# Patient Record
Sex: MALE | Race: WHITE | NOT HISPANIC OR LATINO | Employment: OTHER | RURAL
[De-identification: names, ages, dates, MRNs, and addresses within clinical notes are randomized per-mention and may not be internally consistent; named-entity substitution may affect disease eponyms.]

---

## 2017-03-23 ENCOUNTER — CONVERSION ENCOUNTER (OUTPATIENT)
Dept: FAMILY MEDICINE CLINIC | Facility: CLINIC | Age: 82
End: 2017-03-23

## 2017-03-23 LAB
ALBUMIN SERPL-MCNC: 3.7 G/DL (ref 3.6–5.1)
ALP SERPL-CCNC: 54 U/L (ref 40–115)
AST SERPL-CCNC: 31 U/L (ref 10–35)
BILIRUB SERPL-MCNC: 0.5 MG/DL (ref 0.2–1.2)
BUN SERPL-MCNC: 24 MG/DL (ref 7–25)
BUN/CREAT SERPL: 15 (CALC) (ref 6–22)
CALCIUM SERPL-MCNC: 10.4 MG/DL (ref 8.6–10.2)
CHLORIDE SERPL-SCNC: 101 MMOL/L (ref 98–110)
CHOLEST SERPL-MCNC: 132 MG/DL (ref 125–200)
CONV CO2: 27 MMOL/L (ref 21–33)
CONV TOTAL PROTEIN: 6.4 G/DL (ref 6.2–8.3)
CREAT UR-MCNC: 1.6 MG/DL (ref 0.67–1.54)
GLOBULIN UR ELPH-MCNC: 2.7 MG/DL (ref 2.1–3.7)
GLUCOSE UR QL: 147 MG/DL (ref 65–99)
HDLC SERPL-MCNC: 34 MG/DL
INSULIN SERPL-ACNC: 1.4 (CALC) (ref 1–2.1)
LDLC SERPL CALC-MCNC: 53 MG/DL
POTASSIUM SERPL-SCNC: 5.4 MMOL/L (ref 3.5–5.3)
SODIUM SERPL-SCNC: 143 MMOL/L (ref 135–146)
TRIGL SERPL-MCNC: 224 MG/DL

## 2017-04-04 ENCOUNTER — CONVERSION ENCOUNTER (OUTPATIENT)
Dept: FAMILY MEDICINE CLINIC | Facility: CLINIC | Age: 82
End: 2017-04-04

## 2017-04-05 LAB
ALBUMIN SERPL-MCNC: 3.9 G/DL (ref 3.6–5.1)
ALP SERPL-CCNC: 54 U/L (ref 40–115)
AST SERPL-CCNC: 24 U/L (ref 10–35)
BASOPHILS # BLD AUTO: 200 CELLS/UL (ref 0–200)
BILIRUB SERPL-MCNC: 0.6 MG/DL (ref 0.2–1.2)
BUN SERPL-MCNC: 25 MG/DL (ref 7–25)
BUN/CREAT SERPL: 15.6 (CALC) (ref 6–22)
CALCIUM SERPL-MCNC: 9.6 MG/DL (ref 8.6–10.2)
CHLORIDE SERPL-SCNC: 104 MMOL/L (ref 98–110)
CONV CO2: 25 MMOL/L (ref 21–33)
CONV TOTAL PROTEIN: 6.2 G/DL (ref 6.2–8.3)
CREAT UR-MCNC: 1.6 MG/DL (ref 0.67–1.54)
EOSINOPHIL # BLD AUTO: 400 CELLS/UL (ref 15–500)
EOSINOPHIL # BLD AUTO: 5 %
ERYTHROCYTE [DISTWIDTH] IN BLOOD BY AUTOMATED COUNT: 14.7 % (ref 11–15)
GLOBULIN UR ELPH-MCNC: 2.3 MG/DL (ref 2.1–3.7)
GLUCOSE UR QL: 128 MG/DL (ref 65–99)
HCT VFR BLD AUTO: 40.6 % (ref 38.5–50)
HGB BLD-MCNC: 13.4 G/DL (ref 13.2–17.1)
INSULIN SERPL-ACNC: 1.7 (CALC) (ref 1–2.1)
LYMPHOCYTES # BLD AUTO: 1500 CELLS/UL (ref 850–3900)
LYMPHOCYTES NFR BLD AUTO: 18 %
MAGNESIUM SERPL-MCNC: 1.7 MG/DL (ref 1.5–2.5)
MCH RBC QN AUTO: 28.9 PG (ref 27–33)
MCHC RBC AUTO-ENTMCNC: 33 G/DL (ref 32–36)
MCV RBC AUTO: 87.5 FL (ref 80–100)
MONOCYTES # BLD AUTO: 600 CELLS/UL (ref 200–950)
MONOCYTES NFR BLD AUTO: 7 %
NEUTROPHILS # BLD AUTO: 5900 CELLS/UL (ref 1500–7800)
NEUTROPHILS NFR BLD AUTO: 69 %
PLATELET # BLD AUTO: 219 10*3/UL (ref 140–400)
PMV BLD AUTO: 10.4 FL (ref 7.5–11.5)
POTASSIUM SERPL-SCNC: 4.8 MMOL/L (ref 3.5–5.3)
RBC # BLD AUTO: 4.64 MILLION/UL (ref 4.2–5.8)
SODIUM SERPL-SCNC: 140 MMOL/L (ref 135–146)
WBC # BLD AUTO: 8.6 10*3/UL (ref 3.8–10.8)

## 2017-08-11 ENCOUNTER — HOSPITAL ENCOUNTER (OUTPATIENT)
Dept: OTHER | Facility: HOSPITAL | Age: 82
Discharge: HOME | End: 2017-08-11
Attending: INTERNAL MEDICINE | Admitting: INTERNAL MEDICINE

## 2018-07-12 ENCOUNTER — ON CAMPUS - OUTPATIENT (OUTPATIENT)
Dept: URBAN - METROPOLITAN AREA HOSPITAL 85 | Facility: HOSPITAL | Age: 83
End: 2018-07-12
Payer: COMMERCIAL

## 2018-07-12 ENCOUNTER — OFFICE (OUTPATIENT)
Dept: URBAN - METROPOLITAN AREA CLINIC 64 | Facility: CLINIC | Age: 83
End: 2018-07-12
Payer: COMMERCIAL

## 2018-07-12 VITALS
SYSTOLIC BLOOD PRESSURE: 115 MMHG | HEIGHT: 70 IN | DIASTOLIC BLOOD PRESSURE: 43 MMHG | HEART RATE: 61 BPM | WEIGHT: 175 LBS

## 2018-07-12 DIAGNOSIS — D12.8 BENIGN NEOPLASM OF RECTUM: ICD-10-CM

## 2018-07-12 DIAGNOSIS — K44.9 DIAPHRAGMATIC HERNIA WITHOUT OBSTRUCTION OR GANGRENE: ICD-10-CM

## 2018-07-12 DIAGNOSIS — R19.7 DIARRHEA, UNSPECIFIED: ICD-10-CM

## 2018-07-12 DIAGNOSIS — R11.0 NAUSEA: ICD-10-CM

## 2018-07-12 DIAGNOSIS — R63.0 ANOREXIA: ICD-10-CM

## 2018-07-12 DIAGNOSIS — R63.4 ABNORMAL WEIGHT LOSS: ICD-10-CM

## 2018-07-12 DIAGNOSIS — K57.30 DIVERTICULOSIS OF LARGE INTESTINE WITHOUT PERFORATION OR ABS: ICD-10-CM

## 2018-07-12 DIAGNOSIS — K52.9 NONINFECTIVE GASTROENTERITIS AND COLITIS, UNSPECIFIED: ICD-10-CM

## 2018-07-12 DIAGNOSIS — Z80.0 FAMILY HISTORY OF MALIGNANT NEOPLASM OF DIGESTIVE ORGANS: ICD-10-CM

## 2018-07-12 DIAGNOSIS — K29.70 GASTRITIS, UNSPECIFIED, WITHOUT BLEEDING: ICD-10-CM

## 2018-07-12 PROCEDURE — 99204 OFFICE O/P NEW MOD 45 MIN: CPT | Performed by: INTERNAL MEDICINE

## 2018-07-12 PROCEDURE — 45385 COLONOSCOPY W/LESION REMOVAL: CPT | Performed by: INTERNAL MEDICINE

## 2018-07-12 PROCEDURE — 43239 EGD BIOPSY SINGLE/MULTIPLE: CPT | Performed by: INTERNAL MEDICINE

## 2018-08-14 ENCOUNTER — HOSPITAL ENCOUNTER (OUTPATIENT)
Dept: PREOP | Facility: HOSPITAL | Age: 83
Setting detail: HOSPITAL OUTPATIENT SURGERY
Discharge: HOME | End: 2018-08-14
Attending: INTERNAL MEDICINE | Admitting: INTERNAL MEDICINE

## 2018-08-14 LAB — GLUCOSE BLD-MCNC: 126 MG/DL (ref 70–105)

## 2018-08-24 ENCOUNTER — OFFICE (OUTPATIENT)
Dept: URBAN - METROPOLITAN AREA CLINIC 64 | Facility: CLINIC | Age: 83
End: 2018-08-24
Payer: COMMERCIAL

## 2018-08-24 VITALS
DIASTOLIC BLOOD PRESSURE: 58 MMHG | HEART RATE: 53 BPM | WEIGHT: 177 LBS | HEIGHT: 70 IN | SYSTOLIC BLOOD PRESSURE: 125 MMHG

## 2018-08-24 DIAGNOSIS — R63.4 ABNORMAL WEIGHT LOSS: ICD-10-CM

## 2018-08-24 DIAGNOSIS — R19.7 DIARRHEA, UNSPECIFIED: ICD-10-CM

## 2018-08-24 PROCEDURE — 99214 OFFICE O/P EST MOD 30 MIN: CPT | Performed by: INTERNAL MEDICINE

## 2018-08-24 RX ORDER — MESALAMINE 1.2 G/1
4.8 TABLET, DELAYED RELEASE ORAL
Qty: 120 | Refills: 11 | Status: ACTIVE
Start: 2018-08-24

## 2018-08-30 ENCOUNTER — HOSPITAL ENCOUNTER (OUTPATIENT)
Dept: LAB | Facility: HOSPITAL | Age: 83
Discharge: HOME | End: 2018-08-30
Attending: INTERNAL MEDICINE | Admitting: INTERNAL MEDICINE

## 2018-09-07 LAB
IBD PROGNOSTIC PNL SERPL IA: NORMAL
IBD SGI DIAGNOSTIC: NORMAL

## 2018-09-27 ENCOUNTER — OFFICE (OUTPATIENT)
Dept: URBAN - METROPOLITAN AREA CLINIC 64 | Facility: CLINIC | Age: 83
End: 2018-09-27
Payer: COMMERCIAL

## 2018-09-27 VITALS
SYSTOLIC BLOOD PRESSURE: 128 MMHG | HEART RATE: 71 BPM | HEIGHT: 70 IN | DIASTOLIC BLOOD PRESSURE: 57 MMHG | WEIGHT: 181 LBS

## 2018-09-27 DIAGNOSIS — R19.7 DIARRHEA, UNSPECIFIED: ICD-10-CM

## 2018-09-27 PROCEDURE — 99213 OFFICE O/P EST LOW 20 MIN: CPT | Performed by: INTERNAL MEDICINE

## 2018-09-27 NOTE — SERVICEHPINOTES
LLUVIA AMBRIZ is a 83 year old male c hx of cad, dm and lap jacek is being seen in the office today for diarrhea. He takes metformin and mag oxide and has been on both for years. He has been losing wt 20 pounds in past year. He has early satiety and does not want to eat. He has urgency with bm and about 5 daily for past year with worsening symptoms and occ accidents. EGD and colonoscopy suggested ulceration in TI suggesting possible crohn's but bx were nonspecific. He continued to have 4-5 bm. He was started on lialda which was $800. He took it for 1 month and his bowels are much better. He is having 2-3 formed stool. His IBD serology was negative. He has gained several pounds. He developed rash while on lialda.BRSept 2018 IBD serology NOT c/w IBDBRAug 2018 egd/colon gastritis, ulceration in TI bx showed ulcerated mucosa, TA polyp [id number] colonoscopy [place] hp polyp.

## 2019-02-07 ENCOUNTER — CONVERSION ENCOUNTER (OUTPATIENT)
Dept: FAMILY MEDICINE CLINIC | Facility: CLINIC | Age: 84
End: 2019-02-07

## 2019-02-08 LAB
ALBUMIN SERPL-MCNC: 3.6 G/DL (ref 3.6–5.1)
ALP SERPL-CCNC: 70 U/L (ref 40–115)
AST SERPL-CCNC: 25 U/L (ref 10–35)
BASOPHILS # BLD AUTO: 200 CELLS/UL (ref 0–200)
BILIRUB SERPL-MCNC: 0.9 MG/DL (ref 0.2–1.2)
BUN SERPL-MCNC: 28 MG/DL (ref 7–25)
BUN/CREAT SERPL: 17.5 (CALC) (ref 6–22)
CALCIUM SERPL-MCNC: 9.1 MG/DL (ref 8.6–10.2)
CHLORIDE SERPL-SCNC: 100 MMOL/L (ref 98–110)
CHOLEST SERPL-MCNC: 126 MG/DL (ref 125–200)
CONV CO2: 26 MMOL/L (ref 21–33)
CONV TOTAL PROTEIN: 5.9 G/DL (ref 6.2–8.3)
CREAT UR-MCNC: 1.6 MG/DL (ref 0.67–1.54)
EOSINOPHIL # BLD AUTO: 600 CELLS/UL (ref 15–500)
EOSINOPHIL # BLD AUTO: 7 %
ERYTHROCYTE [DISTWIDTH] IN BLOOD BY AUTOMATED COUNT: 14.7 % (ref 11–15)
GLOBULIN UR ELPH-MCNC: 2.3 MG/DL (ref 2.1–3.7)
GLUCOSE UR QL: 139 MG/DL (ref 65–99)
HBA1C MFR BLD: 6.1 %
HCT VFR BLD AUTO: 36.9 % (ref 38.5–50)
HDLC SERPL-MCNC: 37 MG/DL
HGB BLD-MCNC: 11.8 G/DL (ref 13.2–17.1)
INSULIN SERPL-ACNC: 1.6 (CALC) (ref 1–2.1)
LDLC SERPL CALC-MCNC: 64 MG/DL
LYMPHOCYTES # BLD AUTO: 1500 CELLS/UL (ref 850–3900)
LYMPHOCYTES NFR BLD AUTO: 16 %
MCH RBC QN AUTO: 29.8 PG (ref 27–33)
MCHC RBC AUTO-ENTMCNC: 32 G/DL (ref 32–36)
MCV RBC AUTO: 92.9 FL (ref 80–100)
MONOCYTES # BLD AUTO: 700 CELLS/UL (ref 200–950)
MONOCYTES NFR BLD AUTO: 7 %
NEUTROPHILS # BLD AUTO: 6200 CELLS/UL (ref 1500–7800)
NEUTROPHILS NFR BLD AUTO: 68 %
PLATELET # BLD AUTO: 233 10*3/UL (ref 140–400)
PMV BLD AUTO: 10 FL (ref 7.5–11.5)
POTASSIUM SERPL-SCNC: 5.3 MMOL/L (ref 3.5–5.3)
RBC # BLD AUTO: 3.97 MILLION/UL (ref 4.2–5.8)
SODIUM SERPL-SCNC: 134 MMOL/L (ref 135–146)
TRIGL SERPL-MCNC: 127 MG/DL
WBC # BLD AUTO: 9.2 10*3/UL (ref 3.8–10.8)

## 2019-02-21 ENCOUNTER — CONVERSION ENCOUNTER (OUTPATIENT)
Dept: FAMILY MEDICINE CLINIC | Facility: CLINIC | Age: 84
End: 2019-02-21

## 2019-02-21 LAB — HEMOCCULT STL QL IA: NEGATIVE

## 2019-02-25 ENCOUNTER — CONVERSION ENCOUNTER (OUTPATIENT)
Dept: FAMILY MEDICINE CLINIC | Facility: CLINIC | Age: 84
End: 2019-02-25

## 2019-02-25 LAB
BASOPHILS # BLD AUTO: 100 CELLS/UL (ref 0–200)
EOSINOPHIL # BLD AUTO: 500 CELLS/UL (ref 15–500)
EOSINOPHIL # BLD AUTO: 7 %
ERYTHROCYTE [DISTWIDTH] IN BLOOD BY AUTOMATED COUNT: 14.7 % (ref 11–15)
HCT VFR BLD AUTO: 35.9 % (ref 38.5–50)
HGB BLD-MCNC: 11.9 G/DL (ref 13.2–17.1)
LYMPHOCYTES # BLD AUTO: 1200 CELLS/UL (ref 850–3900)
LYMPHOCYTES NFR BLD AUTO: 15 %
MCH RBC QN AUTO: 30.3 PG (ref 27–33)
MCHC RBC AUTO-ENTMCNC: 33.1 G/DL (ref 32–36)
MCV RBC AUTO: 91.7 FL (ref 80–100)
MONOCYTES # BLD AUTO: 600 CELLS/UL (ref 200–950)
MONOCYTES NFR BLD AUTO: 7 %
NEUTROPHILS # BLD AUTO: 5400 CELLS/UL (ref 1500–7800)
NEUTROPHILS NFR BLD AUTO: 69 %
PLATELET # BLD AUTO: 199 10*3/UL (ref 140–400)
PMV BLD AUTO: 9.7 FL (ref 7.5–11.5)
RBC # BLD AUTO: 3.92 MILLION/UL (ref 4.2–5.8)
WBC # BLD AUTO: 7.9 10*3/UL (ref 3.8–10.8)

## 2019-08-07 ENCOUNTER — OFFICE VISIT (OUTPATIENT)
Dept: FAMILY MEDICINE CLINIC | Facility: CLINIC | Age: 84
End: 2019-08-07

## 2019-08-07 VITALS
BODY MASS INDEX: 24.51 KG/M2 | TEMPERATURE: 98.6 F | WEIGHT: 171.2 LBS | DIASTOLIC BLOOD PRESSURE: 70 MMHG | OXYGEN SATURATION: 97 % | RESPIRATION RATE: 18 BRPM | HEIGHT: 70 IN | SYSTOLIC BLOOD PRESSURE: 152 MMHG | HEART RATE: 74 BPM

## 2019-08-07 DIAGNOSIS — K29.90 GASTRITIS AND GASTRODUODENITIS: ICD-10-CM

## 2019-08-07 DIAGNOSIS — E55.9 VITAMIN D DEFICIENCY: ICD-10-CM

## 2019-08-07 DIAGNOSIS — E11.9 TYPE 2 DIABETES MELLITUS WITHOUT COMPLICATION, WITHOUT LONG-TERM CURRENT USE OF INSULIN (HCC): Primary | ICD-10-CM

## 2019-08-07 DIAGNOSIS — K29.70 GASTRITIS AND GASTRODUODENITIS: ICD-10-CM

## 2019-08-07 DIAGNOSIS — D51.9 ANEMIA DUE TO VITAMIN B12 DEFICIENCY, UNSPECIFIED B12 DEFICIENCY TYPE: ICD-10-CM

## 2019-08-07 DIAGNOSIS — R35.1 NOCTURIA: ICD-10-CM

## 2019-08-07 DIAGNOSIS — I49.1 PAC (PREMATURE ATRIAL CONTRACTION): ICD-10-CM

## 2019-08-07 DIAGNOSIS — I25.10 ATHEROSCLEROSIS OF NATIVE CORONARY ARTERY OF NATIVE HEART WITHOUT ANGINA PECTORIS: ICD-10-CM

## 2019-08-07 DIAGNOSIS — E53.8 VITAMIN B12 DEFICIENCY: ICD-10-CM

## 2019-08-07 DIAGNOSIS — I77.9 CAROTID ARTERY DISEASE, UNSPECIFIED LATERALITY, UNSPECIFIED TYPE (HCC): ICD-10-CM

## 2019-08-07 PROCEDURE — 99214 OFFICE O/P EST MOD 30 MIN: CPT | Performed by: FAMILY MEDICINE

## 2019-08-07 PROCEDURE — 93000 ELECTROCARDIOGRAM COMPLETE: CPT | Performed by: FAMILY MEDICINE

## 2019-08-07 RX ORDER — CHLORAL HYDRATE 500 MG
1 CAPSULE ORAL
COMMUNITY
End: 2019-11-25

## 2019-08-07 RX ORDER — LISINOPRIL 20 MG/1
1 TABLET ORAL DAILY
COMMUNITY
Start: 2015-06-08 | End: 2019-08-07 | Stop reason: SDUPTHER

## 2019-08-07 RX ORDER — SUCRALFATE 1 G/1
1 TABLET ORAL DAILY
COMMUNITY
Start: 2015-06-08 | End: 2019-11-25

## 2019-08-07 RX ORDER — VITAMIN E 200 UNIT
1 CAPSULE ORAL DAILY
COMMUNITY
Start: 2016-12-20 | End: 2019-11-25

## 2019-08-07 RX ORDER — METOPROLOL SUCCINATE 25 MG/1
25 TABLET, EXTENDED RELEASE ORAL DAILY
COMMUNITY
End: 2019-08-07 | Stop reason: SDUPTHER

## 2019-08-07 RX ORDER — ATORVASTATIN CALCIUM 80 MG/1
1 TABLET, FILM COATED ORAL NIGHTLY
COMMUNITY
Start: 2015-06-08 | End: 2019-08-07 | Stop reason: SDUPTHER

## 2019-08-07 RX ORDER — OMEPRAZOLE 40 MG/1
1 CAPSULE, DELAYED RELEASE ORAL DAILY
COMMUNITY
Start: 2019-05-09 | End: 2019-08-07 | Stop reason: SDUPTHER

## 2019-08-07 RX ORDER — SERTRALINE HYDROCHLORIDE 25 MG/1
25 TABLET, FILM COATED ORAL DAILY
COMMUNITY
Start: 2015-06-08 | End: 2019-08-07 | Stop reason: SDUPTHER

## 2019-08-07 RX ORDER — TAMSULOSIN HYDROCHLORIDE 0.4 MG/1
1 CAPSULE ORAL DAILY
COMMUNITY
Start: 2015-06-08 | End: 2019-08-07 | Stop reason: SDUPTHER

## 2019-08-07 RX ORDER — HYDROCHLOROTHIAZIDE 50 MG/1
50 TABLET ORAL DAILY
COMMUNITY
End: 2019-08-07 | Stop reason: SDUPTHER

## 2019-08-07 NOTE — ASSESSMENT & PLAN NOTE
EKG done 08/07/19, read by me (sinus sarah, old inferior infarct),  and discussed with pt. Will follow conservatively.

## 2019-08-07 NOTE — PROGRESS NOTES
Subjective   Mikael Shanks is a 84 y.o. male.     PAC:  Pt here today to discus PAC.      Diabetes   He presents for his follow-up diabetic visit. He has type 2 diabetes mellitus. No MedicAlert identification noted. His disease course has been fluctuating. Pertinent negatives for diabetes include no chest pain, no fatigue, no polyphagia and no polyuria. He is compliant with treatment all of the time. His weight is stable. He is following a generally healthy diet. He has not had a previous visit with a dietitian. He never participates in exercise. His home blood glucose trend is fluctuating minimally. An ACE inhibitor/angiotensin II receptor blocker is being taken. He does not see a podiatrist.Eye exam is current.   Coronary Artery Disease   Presents for follow-up visit. Pertinent negatives include no chest pain, palpitations or shortness of breath. The symptoms have been stable. Compliance with diet is good. Compliance with exercise is poor. Compliance with medications is good.   Heartburn   He complains of belching and heartburn. He reports no chest pain. This is a recurrent problem. The current episode started more than 1 year ago. The problem occurs occasionally. The problem has been gradually improving. The heartburn wakes him from sleep. The heartburn does not limit his activity. The heartburn doesn't change with position. The symptoms are aggravated by certain foods. Pertinent negatives include no fatigue. The treatment provided moderate relief.        The following portions of the patient's history were reviewed and updated as appropriate: allergies, current medications, past family history, past medical history, past social history, past surgical history and problem list.    History reviewed. No pertinent family history.    Social History     Tobacco Use   • Smoking status: Former Smoker     Types: Cigarettes   • Smokeless tobacco: Never Used   Substance Use Topics   • Alcohol use: No     Frequency: Never    • Drug use: No       History reviewed. No pertinent surgical history.    Patient Active Problem List   Diagnosis   • Carotid artery disease (CMS/HCC)   • Type 2 diabetes mellitus without complication, without long-term current use of insulin (CMS/[facility])   • PAC (premature atrial contraction)   • Anemia, deficiency   • Vitamin D deficiency   • Gastritis and gastroduodenitis   • Atherosclerosis of native coronary artery of native heart without angina pectoris   • Chronic kidney disease, stage III (moderate) (CMS/[facility])   • Hyperlipidemia   • Hypertension, benign   • Leg pain   • Spinal stenosis of lumbar region   • Lumbar radiculopathy   • Other folate deficiency anemias   • Type 2 diabetes mellitus without complication, without long-term current use of insulin (CMS/HCC)       Current Outpatient Medications on File Prior to Visit   Medication Sig Dispense Refill   • aspirin 81 MG tablet 1 tablet Daily.     • atorvastatin (LIPITOR) 80 MG tablet Take 1 tablet by mouth Every Night.     • Cholecalciferol (VITAMIN D) 1000 units tablet Take 1 tablet by mouth Daily.     • hydrochlorothiazide (HYDRODIURIL) 50 MG tablet Take 50 mg by mouth Daily.     • lisinopril (PRINIVIL,ZESTRIL) 20 MG tablet Take 1 tablet by mouth Daily.     • MEGARED OMEGA-3 KRILL  MG capsule Take 1 tablet by mouth Daily.     • metFORMIN (GLUCOPHAGE) 500 MG tablet Take 2 tablets by mouth Daily.     • metoprolol succinate XL (TOPROL-XL) 25 MG 24 hr tablet Take 25 mg by mouth Daily.     • Omega-3 Fatty Acids (FISH OIL) 1000 MG capsule capsule Take 1 capsule by mouth.     • omeprazole (priLOSEC) 40 MG capsule Take 1 capsule by mouth Daily.     • sertraline (ZOLOFT) 25 MG tablet Take 25 mg by mouth Daily.     • sucralfate (CARAFATE) 1 g tablet 1 tablet Daily.     • tamsulosin (FLOMAX) 0.4 MG capsule 24 hr capsule Take 1 capsule by mouth Daily.       No current facility-administered medications on file prior to visit.        Allergies   Allergen Reactions  "  • Penicillins Unknown (See Comments)       Review of Systems   Constitutional: Negative for fatigue.   HENT: Negative for hearing loss.    Eyes: Negative for visual disturbance.   Respiratory: Negative for shortness of breath.    Cardiovascular: Negative for chest pain and palpitations.   Gastrointestinal: Positive for GERD.   Endocrine: Negative for polyphagia and polyuria.   Genitourinary: Positive for nocturia.        Nocturia   Musculoskeletal: Negative for joint swelling.   Skin: Negative for skin lesions.   Neurological: Negative for syncope, numbness and memory problem.       Objective   Visit Vitals  /70 (BP Location: Right arm, Patient Position: Sitting, Cuff Size: Large Adult)   Pulse 74   Temp 98.6 °F (37 °C) (Oral)   Resp 18   Ht 177.8 cm (70\")   Wt 77.7 kg (171 lb 3.2 oz)   SpO2 97%   BMI 24.56 kg/m²     Physical Exam   Constitutional: He is oriented to person, place, and time. He appears well-developed and well-nourished. He is cooperative.   HENT:   Head: Normocephalic.   Neck: Trachea normal. Neck supple. Carotid bruit is not present. No thyromegaly present.   Cardiovascular: Normal rate and regular rhythm. Exam reveals no gallop and no friction rub.   No murmur heard.  Abdominal: Soft. There is no tenderness.        Neurological: He is alert and oriented to person, place, and time.   Skin: Skin is dry. No rash noted. Nails show no clubbing.         Assessment/Plan .  Problem List Items Addressed This Visit        Cardiovascular and Mediastinum    Carotid artery disease (CMS/HCC)    Current Assessment & Plan     Gave pt options to follow with me to continue his care, continue with Dr. Buckley, or be referred to a new cardiologist.  Pt will consider these options.  Will follow conservatively.         PAC (premature atrial contraction)    Current Assessment & Plan     EKG done 08/07/19, read by me (sinus sarah, old inferior infarct),  and discussed with pt. Will follow conservatively.         " Relevant Medications    metoprolol succinate XL (TOPROL-XL) 25 MG 24 hr tablet    Other Relevant Orders    ECG 12 Lead    Atherosclerosis of native coronary artery of native heart without angina pectoris    Overview     use naprasyn sparingly  saw mark 2-18         Current Assessment & Plan     Pt frustrated with [employer] not accepting his insurthus I gave him various options and he will think on them         Relevant Medications    metoprolol succinate XL (TOPROL-XL) 25 MG 24 hr tablet       Digestive    Vitamin D deficiency    Current Assessment & Plan     Vitamin D ordered, will discuss results at next visit.         Relevant Orders    Vitamin D 25 hydroxy (Completed)    Gastritis and gastroduodenitis    Current Assessment & Plan     Pt restarted Omeprazole per his choice.         Relevant Medications    omeprazole (priLOSEC) 40 MG capsule    sucralfate (CARAFATE) 1 g tablet       Endocrine    Type 2 diabetes mellitus without complication, without long-term current use of insulin (CMS/[facility]) - Primary    Current Assessment & Plan     A1c ordered, will discuss results at next visit.  Monofiliment Foot Exam done, read by me (abn), and discussed with pt including keeping dm in tight control and checking feet frequently.  Pt unable to produce urine sample, will bring sample at next visit.         Relevant Medications    metFORMIN (GLUCOPHAGE) 500 MG tablet    Other Relevant Orders    POCT microalbumin    Hemoglobin A1c (Completed)       Hematopoietic and Hemostatic    Anemia, deficiency    Overview     Improved.  Labs done 5-2-19, read by me, reviewed with pt.  Hemoglobin was 12.1 up from 11.9         Current Assessment & Plan     Large anemia panel ordered, will discuss results at next visit.           Other Visit Diagnoses     Nocturia        Relevant Orders    POCT urinalysis dipstick, manual    Vitamin B12 deficiency        Relevant Orders    Methylmalonic Acid, Serum (Completed)

## 2019-08-07 NOTE — ASSESSMENT & PLAN NOTE
A1c ordered, will discuss results at next visit.  Monofiliment Foot Exam done, read by me (abn), and discussed with pt including keeping dm in tight control and checking feet frequently.  Pt unable to produce urine sample, will bring sample at next visit.

## 2019-08-07 NOTE — ASSESSMENT & PLAN NOTE
Gave pt options to follow with me to continue his care, continue with Dr. Buckley, or be referred to a new cardiologist.  Pt will consider these options.  Will follow conservatively.

## 2019-08-09 LAB
25(OH)D3+25(OH)D2 SERPL-MCNC: 24.3 NG/ML (ref 30–100)
BASOPHILS # BLD AUTO: 0.1 X10E3/UL (ref 0–0.2)
BASOPHILS NFR BLD AUTO: 1 %
EOSINOPHIL # BLD AUTO: 0.4 X10E3/UL (ref 0–0.4)
EOSINOPHIL NFR BLD AUTO: 5 %
ERYTHROCYTE [DISTWIDTH] IN BLOOD BY AUTOMATED COUNT: 13.4 % (ref 12.3–15.4)
FERRITIN SERPL-MCNC: 93 NG/ML (ref 30–400)
FOLATE SERPL-MCNC: 14.6 NG/ML
HBA1C MFR BLD: 5.8 % (ref 4.8–5.6)
HCT VFR BLD AUTO: 37 % (ref 37.5–51)
HGB BLD-MCNC: 12.1 G/DL (ref 13–17.7)
IMM GRANULOCYTES # BLD AUTO: 0.1 X10E3/UL (ref 0–0.1)
IMM GRANULOCYTES NFR BLD AUTO: 1 %
IRON SATN MFR SERPL: 23 % (ref 15–55)
IRON SERPL-MCNC: 54 UG/DL (ref 38–169)
LYMPHOCYTES # BLD AUTO: 1.4 X10E3/UL (ref 0.7–3.1)
LYMPHOCYTES NFR BLD AUTO: 16 %
Lab: NORMAL
MCH RBC QN AUTO: 29.3 PG (ref 26.6–33)
MCHC RBC AUTO-ENTMCNC: 32.7 G/DL (ref 31.5–35.7)
MCV RBC AUTO: 90 FL (ref 79–97)
METHYLMALONATE SERPL-SCNC: 370 NMOL/L (ref 0–378)
MONOCYTES # BLD AUTO: 0.6 X10E3/UL (ref 0.1–0.9)
MONOCYTES NFR BLD AUTO: 7 %
NEUTROPHILS # BLD AUTO: 6.3 X10E3/UL (ref 1.4–7)
NEUTROPHILS NFR BLD AUTO: 70 %
PLATELET # BLD AUTO: 220 X10E3/UL (ref 150–450)
RBC # BLD AUTO: 4.13 X10E6/UL (ref 4.14–5.8)
TIBC SERPL-MCNC: 238 UG/DL (ref 250–450)
UIBC SERPL-MCNC: 184 UG/DL (ref 111–343)
VIT B12 SERPL-MCNC: 382 PG/ML (ref 232–1245)
WBC # BLD AUTO: 8.9 X10E3/UL (ref 3.4–10.8)

## 2019-08-10 PROBLEM — I10 HYPERTENSION, BENIGN: Status: ACTIVE | Noted: 2019-08-10

## 2019-08-10 PROBLEM — E11.9 TYPE 2 DIABETES MELLITUS WITHOUT COMPLICATION, WITHOUT LONG-TERM CURRENT USE OF INSULIN: Status: ACTIVE | Noted: 2019-08-10

## 2019-08-10 PROBLEM — D53.9 ANEMIA, DEFICIENCY: Status: ACTIVE | Noted: 2019-08-07

## 2019-08-10 PROBLEM — E78.5 HYPERLIPIDEMIA: Status: ACTIVE | Noted: 2019-08-10

## 2019-08-10 PROBLEM — D52.8 OTHER FOLATE DEFICIENCY ANEMIAS: Status: ACTIVE | Noted: 2019-08-10

## 2019-08-10 PROBLEM — N18.30 CHRONIC KIDNEY DISEASE, STAGE III (MODERATE): Status: ACTIVE | Noted: 2019-08-10

## 2019-08-10 RX ORDER — METOPROLOL SUCCINATE 25 MG/1
25 TABLET, EXTENDED RELEASE ORAL DAILY
Qty: 30 TABLET | Refills: 5 | Status: SHIPPED | OUTPATIENT
Start: 2019-08-10 | End: 2019-12-09 | Stop reason: SDUPTHER

## 2019-08-10 RX ORDER — HYDROCHLOROTHIAZIDE 50 MG/1
50 TABLET ORAL DAILY
Qty: 30 TABLET | Refills: 5 | Status: SHIPPED | OUTPATIENT
Start: 2019-08-10 | End: 2019-11-25

## 2019-08-10 RX ORDER — SERTRALINE HYDROCHLORIDE 25 MG/1
25 TABLET, FILM COATED ORAL DAILY
Qty: 30 TABLET | Refills: 5 | Status: SHIPPED | OUTPATIENT
Start: 2019-08-10 | End: 2019-12-09 | Stop reason: SDUPTHER

## 2019-08-10 RX ORDER — TAMSULOSIN HYDROCHLORIDE 0.4 MG/1
1 CAPSULE ORAL DAILY
Qty: 30 CAPSULE | Refills: 5 | Status: SHIPPED | OUTPATIENT
Start: 2019-08-10 | End: 2019-12-09 | Stop reason: SDUPTHER

## 2019-08-10 RX ORDER — ATORVASTATIN CALCIUM 80 MG/1
80 TABLET, FILM COATED ORAL NIGHTLY
Qty: 30 TABLET | Refills: 5 | Status: SHIPPED | OUTPATIENT
Start: 2019-08-10 | End: 2019-12-09 | Stop reason: SDUPTHER

## 2019-08-10 RX ORDER — OMEPRAZOLE 40 MG/1
40 CAPSULE, DELAYED RELEASE ORAL DAILY
Qty: 30 CAPSULE | Refills: 5 | Status: SHIPPED | OUTPATIENT
Start: 2019-08-10 | End: 2019-12-09 | Stop reason: SDUPTHER

## 2019-08-10 RX ORDER — LISINOPRIL 20 MG/1
20 TABLET ORAL DAILY
Qty: 30 TABLET | Refills: 5 | Status: SHIPPED | OUTPATIENT
Start: 2019-08-10 | End: 2019-12-06 | Stop reason: HOSPADM

## 2019-08-10 NOTE — ASSESSMENT & PLAN NOTE
Pt frustrated with [employer] not accepting his insurthus I gave him various options and he will think on them

## 2019-08-14 ENCOUNTER — OFFICE VISIT (OUTPATIENT)
Dept: FAMILY MEDICINE CLINIC | Facility: CLINIC | Age: 84
End: 2019-08-14

## 2019-08-14 VITALS
WEIGHT: 171.4 LBS | OXYGEN SATURATION: 96 % | TEMPERATURE: 97.8 F | SYSTOLIC BLOOD PRESSURE: 136 MMHG | HEART RATE: 66 BPM | RESPIRATION RATE: 18 BRPM | DIASTOLIC BLOOD PRESSURE: 72 MMHG | HEIGHT: 70 IN | BODY MASS INDEX: 24.54 KG/M2

## 2019-08-14 DIAGNOSIS — I73.9 ASYMPTOMATIC PERIPHERAL VASCULAR DISEASE (HCC): ICD-10-CM

## 2019-08-14 DIAGNOSIS — I37.9 PULMONARY VALVE DISORDER: ICD-10-CM

## 2019-08-14 DIAGNOSIS — E83.42 HYPOMAGNESEMIA: ICD-10-CM

## 2019-08-14 DIAGNOSIS — K29.90 GASTRITIS AND GASTRODUODENITIS: ICD-10-CM

## 2019-08-14 DIAGNOSIS — M54.16 LUMBAR RADICULOPATHY: ICD-10-CM

## 2019-08-14 DIAGNOSIS — F32.A DEPRESSION, UNSPECIFIED DEPRESSION TYPE: ICD-10-CM

## 2019-08-14 DIAGNOSIS — I49.1 PAC (PREMATURE ATRIAL CONTRACTION): ICD-10-CM

## 2019-08-14 DIAGNOSIS — I95.2 HYPOTENSION DUE TO DRUGS: ICD-10-CM

## 2019-08-14 DIAGNOSIS — K29.70 GASTRITIS AND GASTRODUODENITIS: ICD-10-CM

## 2019-08-14 DIAGNOSIS — D51.0 PERNICIOUS ANEMIA: ICD-10-CM

## 2019-08-14 DIAGNOSIS — M72.0 DUPUYTREN'S CONTRACTURE OF BOTH HANDS: ICD-10-CM

## 2019-08-14 DIAGNOSIS — I45.5 SINUS PAUSE: ICD-10-CM

## 2019-08-14 DIAGNOSIS — M19.90 OSTEOARTHRITIS, UNSPECIFIED OSTEOARTHRITIS TYPE, UNSPECIFIED SITE: ICD-10-CM

## 2019-08-14 DIAGNOSIS — Z00.00 ANNUAL PHYSICAL EXAM: ICD-10-CM

## 2019-08-14 DIAGNOSIS — E55.9 VITAMIN D DEFICIENCY: ICD-10-CM

## 2019-08-14 DIAGNOSIS — N18.30 CHRONIC KIDNEY DISEASE, STAGE III (MODERATE) (HCC): ICD-10-CM

## 2019-08-14 DIAGNOSIS — E87.1 HYPONATREMIA: ICD-10-CM

## 2019-08-14 DIAGNOSIS — E78.2 MIXED HYPERLIPIDEMIA: ICD-10-CM

## 2019-08-14 DIAGNOSIS — E11.9 TYPE 2 DIABETES MELLITUS WITHOUT COMPLICATION, WITHOUT LONG-TERM CURRENT USE OF INSULIN (HCC): ICD-10-CM

## 2019-08-14 DIAGNOSIS — M48.061 SPINAL STENOSIS OF LUMBAR REGION, UNSPECIFIED WHETHER NEUROGENIC CLAUDICATION PRESENT: ICD-10-CM

## 2019-08-14 DIAGNOSIS — I77.9 CAROTID ARTERY DISEASE, UNSPECIFIED LATERALITY, UNSPECIFIED TYPE (HCC): Primary | ICD-10-CM

## 2019-08-14 DIAGNOSIS — H83.3X3 NOISE-INDUCED HEARING LOSS OF BOTH EARS: ICD-10-CM

## 2019-08-14 DIAGNOSIS — I10 HYPERTENSION, BENIGN: ICD-10-CM

## 2019-08-14 DIAGNOSIS — Z80.0 FAMILY HISTORY OF COLON CANCER: ICD-10-CM

## 2019-08-14 DIAGNOSIS — D52.8 OTHER FOLATE DEFICIENCY ANEMIAS: ICD-10-CM

## 2019-08-14 DIAGNOSIS — I25.10 ATHEROSCLEROSIS OF NATIVE CORONARY ARTERY OF NATIVE HEART WITHOUT ANGINA PECTORIS: ICD-10-CM

## 2019-08-14 DIAGNOSIS — N40.0 PROSTATIC HYPERTROPHY: ICD-10-CM

## 2019-08-14 DIAGNOSIS — D53.9 ANEMIA, DEFICIENCY: ICD-10-CM

## 2019-08-14 DIAGNOSIS — E87.5 HYPERKALEMIA: ICD-10-CM

## 2019-08-14 PROBLEM — H91.90 HEARING LOSS: Status: ACTIVE | Noted: 2019-08-14

## 2019-08-14 LAB
BILIRUB BLD-MCNC: NEGATIVE MG/DL
CLARITY, POC: CLEAR
COLOR UR: YELLOW
GLUCOSE UR STRIP-MCNC: NEGATIVE MG/DL
KETONES UR QL: NEGATIVE
LEUKOCYTE EST, POC: NEGATIVE
NITRITE UR-MCNC: NEGATIVE MG/ML
PH UR: 5 [PH] (ref 5–8)
PROT UR STRIP-MCNC: NEGATIVE MG/DL
RBC # UR STRIP: NEGATIVE /UL
SP GR UR: 1 (ref 1–1.03)
UROBILINOGEN UR QL: NORMAL

## 2019-08-14 PROCEDURE — 96372 THER/PROPH/DIAG INJ SC/IM: CPT | Performed by: FAMILY MEDICINE

## 2019-08-14 PROCEDURE — 81002 URINALYSIS NONAUTO W/O SCOPE: CPT | Performed by: FAMILY MEDICINE

## 2019-08-14 PROCEDURE — 99397 PER PM REEVAL EST PAT 65+ YR: CPT | Performed by: FAMILY MEDICINE

## 2019-08-14 PROCEDURE — 99214 OFFICE O/P EST MOD 30 MIN: CPT | Performed by: FAMILY MEDICINE

## 2019-08-14 RX ORDER — CYANOCOBALAMIN 1000 UG/ML
1000 INJECTION, SOLUTION INTRAMUSCULAR; SUBCUTANEOUS ONCE
Status: COMPLETED | OUTPATIENT
Start: 2019-08-14 | End: 2019-08-14

## 2019-08-14 RX ADMIN — CYANOCOBALAMIN 1000 MCG: 1000 INJECTION, SOLUTION INTRAMUSCULAR; SUBCUTANEOUS at 14:23

## 2019-08-14 NOTE — ASSESSMENT & PLAN NOTE
Doing well.  Labs done 8-7-19, read by me, reviewed with pt.  CBC showed WBC 4.13 up from 4.09, HGB 12.1 same as last, Hematocrit was 37.0 same as last

## 2019-08-14 NOTE — ASSESSMENT & PLAN NOTE
Labs done 8-7-19, read by me, reviewed with pt.  A1c was 5.8 down from 6.0  Improved, decrease Metformin to 1 daily.  Encouraged to watch sugar intake, exercise more and lose weight.   Compliant with medication.   Not monitoring sugar at home.   Follow up in 3 months  Care management needs are self-addressed. Self-management abilities addressed and patient is capable of managing his own disease.

## 2019-08-14 NOTE — ASSESSMENT & PLAN NOTE
Doing well. Labs done 8-7-19, read by me, reviewed with pt.  Vit. B12 was 382 down from 313, MMA was 370, Ferritin was 93 down from 159, Folate was 14.6 up from 8.7, Iron tot. Binding was 238

## 2019-08-14 NOTE — ASSESSMENT & PLAN NOTE
Worse.  Labs done 8-7-19, read by me, reviewed with pt.  Vit. D was 24.3 down from 36.  Pt. Advised to increase Vit. D to 2000 units daily.

## 2019-08-14 NOTE — ASSESSMENT & PLAN NOTE
Last Lipids done 5-2-19, will repeat with next labs.  Encouraged to watch fatty intake, exercise more and lose weight

## 2019-08-14 NOTE — PROGRESS NOTES
Subjective   Mikael Shanks is a 84 y.o. male here for his annual physical with me. Mikael is here for coordination of medical care, to discuss health maintenance, disease prevention as well as to followup on medical problems. Patient has been followed by me since 1988. Patient's last CPE was 6-25-18. Activity level is minimal. Exercises 0 per week. Appetite is good. Feels well with few complaints. Energy level is good and fair. Sleeps poorly. Patient's last colonoscopy was 8-14-18. He is advised that he does not jamaal to repeat colonoscopy. Patient is doing routine self skin exam monthly. Patient is doing routine self-breast exams monthly. Patient is checking testicles monthly.  Last PSA was 9-9-15, PSA was 0.9.    Lab Results   Component Value Date    PSA 0.8 12/15/2016        Anemia   Presents for follow-up visit. There has been no abdominal pain, bruising/bleeding easily, fever, light-headedness, palpitations or weight loss. There are no compliance problems.    Benign Prostatic Hypertrophy   This is a chronic problem. The current episode started more than 1 year ago. The problem is unchanged. Irritative symptoms include urgency. Irritative symptoms do not include frequency. Obstructive symptoms include dribbling. Pertinent negatives include no chills, dysuria, hematuria, nausea or vomiting. He is not sexually active. Nothing aggravates the symptoms. Past treatments include tamsulosin. The treatment provided mild relief.   Hypertension   This is a chronic problem. The current episode started more than 1 year ago. The problem has been gradually worsening since onset. The problem is controlled. Pertinent negatives include no blurred vision, chest pain, neck pain, palpitations or shortness of breath.   Heartburn   He reports no abdominal pain, no chest pain, no coughing, no nausea, no sore throat or no wheezing. This is a chronic problem. The current episode started more than 1 year ago. The problem occurs  frequently. The problem has been gradually improving. Pertinent negatives include no fatigue or weight loss. The treatment provided significant relief.   Coronary Artery Disease   Presents for follow-up visit. Pertinent negatives include no chest pain, dizziness, leg swelling, palpitations, shortness of breath or weight gain. Risk factors do not include stress. The symptoms have been stable. Compliance with diet is variable. Compliance with exercise is poor. Compliance with medications is good.       The following portions of the patient's history were reviewed and updated as appropriate: allergies, current medications, past family history, past medical history, past social history, past surgical history and problem list.    Past Medical History:   Diagnosis Date   • Diabetes mellitus (CMS/[facility])    • GERD (gastroesophageal reflux disease)    • Hyperlipidemia    • Hypertension        History reviewed. No pertinent family history.    Social History     Socioeconomic History   • Marital status:      Spouse name: Not on file   • Number of children: Not on file   • Years of education: Not on file   • Highest education level: Not on file   Occupational History   • Occupation: Retired   Social Needs   • Financial resource strain: Not hard at all   • Food insecurity:     Worry: Never true     Inability: Never true   • Transportation needs:     Medical: No     Non-medical: No   Tobacco Use   • Smoking status: Former Smoker     Types: Cigarettes   • Smokeless tobacco: Never Used   Substance and Sexual Activity   • Alcohol use: No     Frequency: Never   • Drug use: No   • Sexual activity: No   Lifestyle   • Physical activity:     Days per week: 0 days     Minutes per session: 0 min   • Stress: Not at all   Relationships   • Social connections:     Talks on phone: More than three times a week     Gets together: More than three times a week     Attends [employer] service: 1 to 4 times per year     Active member of club or  organization: Yes     Attends meetings of clubs or organizations: Never     Relationship status: Living with partner       History reviewed. No pertinent surgical history.    Current Outpatient Medications on File Prior to Visit   Medication Sig Dispense Refill   • aspirin 81 MG tablet 1 tablet Daily.     • atorvastatin (LIPITOR) 80 MG tablet Take 1 tablet by mouth Every Night. 30 tablet 5   • Cholecalciferol (VITAMIN D) 1000 units tablet Take 1 tablet by mouth Daily. 30 tablet 5   • lisinopril (PRINIVIL,ZESTRIL) 20 MG tablet Take 1 tablet by mouth Daily. 30 tablet 5   • MEGARED OMEGA-3 KRILL  MG capsule Take 1 tablet by mouth Daily.     • metoprolol succinate XL (TOPROL-XL) 25 MG 24 hr tablet Take 1 tablet by mouth Daily. 30 tablet 5   • omeprazole (priLOSEC) 40 MG capsule Take 1 capsule by mouth Daily. 30 capsule 5   • sertraline (ZOLOFT) 25 MG tablet Take 1 tablet by mouth Daily. 30 tablet 5   • tamsulosin (FLOMAX) 0.4 MG capsule 24 hr capsule Take 1 capsule by mouth Daily. 30 capsule 5   • hydrochlorothiazide (HYDRODIURIL) 50 MG tablet Take 1 tablet by mouth Daily. 30 tablet 5   • Omega-3 Fatty Acids (FISH OIL) 1000 MG capsule capsule Take 1 capsule by mouth.     • sucralfate (CARAFATE) 1 g tablet 1 tablet Daily.       No current facility-administered medications on file prior to visit.        Allergies   Allergen Reactions   • Penicillins Hives       Review of Systems   Constitutional: Negative for appetite change, chills, fatigue, fever, unexpected weight gain and unexpected weight loss.   HENT: Positive for hearing loss. Negative for congestion, dental problem, ear discharge, ear pain, nosebleeds, postnasal drip, rhinorrhea, sinus pressure, sneezing, sore throat, tinnitus and voice change.         Last dental exam 7-2019, had a tooth pulled   Eyes: Negative for blurred vision, double vision, pain, redness and visual disturbance.        Last eye exam 5-2018, Dr. Smith-pt. Advised to follow   Respiratory:  "Negative for cough, shortness of breath, wheezing and stridor.    Cardiovascular: Negative for chest pain, palpitations and leg swelling.   Gastrointestinal: Positive for GERD. Negative for abdominal pain, anal bleeding, blood in stool, constipation, diarrhea, nausea, rectal pain, vomiting and indigestion.        21 BM weekly   Endocrine: Negative for cold intolerance, heat intolerance, polydipsia, polyphagia and polyuria.        Sex drive  He is a 0  She is a 0   Genitourinary: Positive for urgency. Negative for difficulty urinating, dysuria, frequency and hematuria.   Musculoskeletal: Negative for back pain, joint swelling, myalgias, neck pain and neck stiffness.   Skin: Positive for skin lesions. Negative for color change, dry skin and rash.        Lesion on right forearm   Neurological: Negative for dizziness, syncope, speech difficulty, weakness, light-headedness, headache and memory problem.   Hematological: Does not bruise/bleed easily.   Psychiatric/Behavioral: Negative for decreased concentration, sleep disturbance, depressed mood and stress. The patient is not nervous/anxious.        Objective   Visit Vitals  /72 (BP Location: Right arm, Patient Position: Sitting, Cuff Size: Adult)   Pulse 66   Temp 97.8 °F (36.6 °C) (Oral)   Resp 18   Ht 177.8 cm (70\")   Wt 77.7 kg (171 lb 6.4 oz)   SpO2 96%   BMI 24.59 kg/m²     Physical Exam   Constitutional: He is oriented to person, place, and time. He appears well-developed and well-nourished. No distress.   HENT:   Head: Normocephalic.   Right Ear: External ear and ear canal normal. Decreased hearing is noted.   Left Ear: External ear and ear canal normal. Decreased hearing is noted.   Nose: Nose normal.   Mouth/Throat: Oropharynx is clear and moist and mucous membranes are normal. No oropharyngeal exudate.   Severe bilat without hearing aids.   Eyes: Conjunctivae and EOM are normal. Pupils are equal, round, and reactive to light. Right eye exhibits no " discharge. Left eye exhibits no discharge. No scleral icterus.   Lens bilat.     Neck: Normal range of motion. Neck supple.   Cardiovascular: Normal rate, regular rhythm, normal heart sounds and intact distal pulses.   No murmur heard.  Pulses:       Dorsalis pedis pulses are 0 on the right side, and 0 on the left side.        Posterior tibial pulses are 0 on the right side, and 0 on the left side.   Pulmonary/Chest: Effort normal and breath sounds normal. He has no wheezes. He exhibits no tenderness.   Abdominal: Soft. Bowel sounds are normal. He exhibits no distension and no mass. There is no tenderness. No hernia.   Genitourinary:   Genitourinary Comments: Pt. Declined exam   Musculoskeletal: Normal range of motion. He exhibits no edema, tenderness or deformity.        Hands:  Lymphadenopathy:     He has no cervical adenopathy.   Neurological: He is alert and oriented to person, place, and time. He displays normal reflexes. No cranial nerve deficit or sensory deficit. He exhibits normal muscle tone. Coordination normal.   Skin: Skin is warm and dry. No rash noted. He is not diaphoretic. No erythema.   Rt. Forearm hyperkeratotic skin lesion  SCAR-lower back and RUQ.  Varicosities mild of bilateral lower legs.  Sebaceous cyst of the upper back.  Black Comedone of the mid back.   Psychiatric: He has a normal mood and affect. His behavior is normal. Judgment and thought content normal.       Assessment/Plan   Problem List Items Addressed This Visit        Cardiovascular and Mediastinum    Carotid artery disease (CMS/HCC) - Primary    Current Assessment & Plan     Will follow conservatively         PAC (premature atrial contraction)    Current Assessment & Plan     Doing well         Atherosclerosis of native coronary artery of native heart without angina pectoris    Current Assessment & Plan     Coronary artery disease is stable. EKG is nl   Declines more sprague           Hyperlipidemia    Current Assessment & Plan      Last Lipids done 5-2-19, will repeat with next labs.  Encouraged to watch fatty intake, exercise more and lose weight           Relevant Orders    Lipid Panel w/ Chol/HDL Ratio    Comprehensive Metabolic Panel    Hypertension, benign    Current Assessment & Plan     Doing well  Encouraged to watch salt, exercise more and lose weight.           Asymptomatic peripheral vascular disease (CMS/HCC)    Current Assessment & Plan     Doing well         Sinus pause    Current Assessment & Plan     Doing well         Pulmonary valve disorder    Current Assessment & Plan     Doing well         RESOLVED: Hypotension due to drugs       Digestive    Vitamin D deficiency    Current Assessment & Plan     Worse.  Labs done 8-7-19, read by me, reviewed with pt.  Vit. D was 24.3 down from 36.  Pt. Advised to increase Vit. D to 2000 units daily.         Relevant Orders    Vitamin D 25 Hydroxy    Gastritis and gastroduodenitis    Current Assessment & Plan     Doing well with Omeprazole.            Endocrine    Type 2 diabetes mellitus without complication, without long-term current use of insulin (CMS/HCC)    Current Assessment & Plan     Labs done 8-7-19, read by me, reviewed with pt.  A1c was 5.8 down from 6.0  Improved, decrease Metformin to 1 daily.  Encouraged to watch sugar intake, exercise more and lose weight.   Compliant with medication.   Not monitoring sugar at home.   Follow up in 3 months  Care management needs are self-addressed. Self-management abilities addressed and patient is capable of managing his own disease.           Relevant Medications    metFORMIN (GLUCOPHAGE) 500 MG tablet    Other Relevant Orders    Hemoglobin A1c       Nervous and Auditory    Hearing loss    Current Assessment & Plan     Severe.  Pt. Advised to wear hearing protection and avoid noise.         RESOLVED: Lumbar radiculopathy       Musculoskeletal and Integument    Osteoarthritis    Current Assessment & Plan     Doing well         Dupuytren's  contracture of both hands    Current Assessment & Plan     Doing well.            Genitourinary    Chronic kidney disease, stage III (moderate) (CMS/HCC)    Current Assessment & Plan     Last CMP done 5-2-2019.  Will repeat with next labs.         Prostatic hypertrophy    Current Assessment & Plan     Stable         Relevant Orders    POCT urinalysis dipstick, manual (Completed)       Hematopoietic and Hemostatic    Anemia, deficiency    Current Assessment & Plan     Doing well.  Labs done 8-7-19, read by me, reviewed with pt.  CBC showed WBC 4.13 up from 4.09, HGB 12.1 same as last, Hematocrit was 37.0 same as last         Other folate deficiency anemias    Current Assessment & Plan     Doing well. Labs done 8-7-19, read by me, reviewed with pt.  Vit. B12 was 382 down from 313, MMA was 370, Ferritin was 93 down from 159, Folate was 14.6 up from 8.7, Iron tot. Binding was 238         Pernicious anemia    Current Assessment & Plan     Doing well.  Labs done 8-7-19, read by me, reviewed with pt.  Vit. B12 was 382 up from 313, MMA was 370         Relevant Medications    cyanocobalamin injection 1,000 mcg (Completed)       Other    Spinal stenosis of lumbar region    Current Assessment & Plan     Doing well.           Hyperkalemia    Current Assessment & Plan     Last CMP done 5-2-19, will repeat with next labs.         Hyponatremia    Current Assessment & Plan     Last CMP done 5-2-19, will repeat with next labs.         Hypomagnesemia    Current Assessment & Plan     Will repeat with next labs         Relevant Orders    Magnesium    Depression    Current Assessment & Plan     Doing well         Family history of colon cancer    Current Assessment & Plan     Last colonoscopy done 8-14-18, will not repeat due to age         Annual physical exam    Overview     Medical records thoroughly reviewed and summarized in emr, since last PE         Current Assessment & Plan     Encouraged to do self-breast exam, self-testicle  exams, and self derm exams. Congratulated on using seat belts.  Encouraged to do annual physical exams.  Immunization status reviewed.                      Encouraged to check his skin, testicles and breasts monthly. Reviewed exercising regularly, eating a balanced diet, having regular mammograms, immunizations and if due, patient counselled to check with insurance company for coverage;, importance of bone density screening and regularly checking skin and breasts.

## 2019-08-14 NOTE — ASSESSMENT & PLAN NOTE
Doing well.  Labs done 8-7-19, read by me, reviewed with pt.  Vit. B12 was 382 up from 313, MMA was 370

## 2019-08-15 PROBLEM — Z00.00 ANNUAL PHYSICAL EXAM: Status: ACTIVE | Noted: 2019-08-15

## 2019-08-15 NOTE — ASSESSMENT & PLAN NOTE
Encouraged to do self-breast exam, self-testicle exams, and self derm exams. Congratulated on using seat belts.  Encouraged to do annual physical exams.  Immunization status reviewed.

## 2019-10-03 ENCOUNTER — HOSPITAL ENCOUNTER (OUTPATIENT)
Dept: CARDIOLOGY | Facility: HOSPITAL | Age: 84
Discharge: HOME | End: 2019-10-03
Admitting: INTERNAL MEDICINE

## 2019-10-03 ENCOUNTER — HOSPITAL ENCOUNTER (OUTPATIENT)
Dept: ULTRASOUND IMAGING | Facility: HOSPITAL | Age: 84
Discharge: HOME | End: 2019-10-03

## 2019-10-03 VITALS — HEIGHT: 70 IN | BODY MASS INDEX: 24.48 KG/M2 | WEIGHT: 171 LBS

## 2019-10-03 DIAGNOSIS — R09.89 BRUIT: ICD-10-CM

## 2019-10-03 DIAGNOSIS — R07.9 CHEST PAIN: ICD-10-CM

## 2019-10-03 PROCEDURE — 93306 TTE W/DOPPLER COMPLETE: CPT

## 2019-10-03 PROCEDURE — 93880 EXTRACRANIAL BILAT STUDY: CPT

## 2019-10-04 LAB
BH CV ECHO MEAS - ACS: 1 CM
BH CV ECHO MEAS - AO MAX PG (FULL): 9.3 MMHG
BH CV ECHO MEAS - AO MAX PG: 14.3 MMHG
BH CV ECHO MEAS - AO MEAN PG (FULL): 3.8 MMHG
BH CV ECHO MEAS - AO MEAN PG: 5.6 MMHG
BH CV ECHO MEAS - AO ROOT AREA (BSA CORRECTED): 1.7
BH CV ECHO MEAS - AO ROOT AREA: 8.6 CM^2
BH CV ECHO MEAS - AO ROOT DIAM: 3.3 CM
BH CV ECHO MEAS - AO V2 MAX: 189.2 CM/SEC
BH CV ECHO MEAS - AO V2 MEAN: 108.8 CM/SEC
BH CV ECHO MEAS - AO V2 VTI: 38.8 CM
BH CV ECHO MEAS - AVA(I,A): 2.5 CM^2
BH CV ECHO MEAS - AVA(I,D): 2.5 CM^2
BH CV ECHO MEAS - AVA(V,A): 2.3 CM^2
BH CV ECHO MEAS - AVA(V,D): 2.3 CM^2
BH CV ECHO MEAS - BSA(HAYCOCK): 2 M^2
BH CV ECHO MEAS - BSA: 2 M^2
BH CV ECHO MEAS - BZI_BMI: 24.5 KILOGRAMS/M^2
BH CV ECHO MEAS - BZI_METRIC_HEIGHT: 177.8 CM
BH CV ECHO MEAS - BZI_METRIC_WEIGHT: 77.6 KG
BH CV ECHO MEAS - EDV(CUBED): 157.1 ML
BH CV ECHO MEAS - EDV(MOD-SP4): 128.5 ML
BH CV ECHO MEAS - EDV(TEICH): 141.1 ML
BH CV ECHO MEAS - EF(CUBED): 59 %
BH CV ECHO MEAS - EF(MOD-BP): 50 %
BH CV ECHO MEAS - EF(MOD-SP4): 49.7 %
BH CV ECHO MEAS - EF(TEICH): 50.1 %
BH CV ECHO MEAS - ESV(CUBED): 64.4 ML
BH CV ECHO MEAS - ESV(MOD-SP4): 64.7 ML
BH CV ECHO MEAS - ESV(TEICH): 70.4 ML
BH CV ECHO MEAS - FS: 25.7 %
BH CV ECHO MEAS - IVS/LVPW: 0.99
BH CV ECHO MEAS - IVSD: 1.2 CM
BH CV ECHO MEAS - LA DIMENSION: 5.7 CM
BH CV ECHO MEAS - LA/AO: 1.7
BH CV ECHO MEAS - LV DIASTOLIC VOL/BSA (35-75): 65.8 ML/M^2
BH CV ECHO MEAS - LV MASS(C)D: 258.8 GRAMS
BH CV ECHO MEAS - LV MASS(C)DI: 132.5 GRAMS/M^2
BH CV ECHO MEAS - LV MAX PG: 5 MMHG
BH CV ECHO MEAS - LV MEAN PG: 1.8 MMHG
BH CV ECHO MEAS - LV SYSTOLIC VOL/BSA (12-30): 33.1 ML/M^2
BH CV ECHO MEAS - LV V1 MAX: 111.4 CM/SEC
BH CV ECHO MEAS - LV V1 MEAN: 60.6 CM/SEC
BH CV ECHO MEAS - LV V1 VTI: 25.1 CM
BH CV ECHO MEAS - LVIDD: 5.4 CM
BH CV ECHO MEAS - LVIDS: 4 CM
BH CV ECHO MEAS - LVOT AREA: 3.8 CM^2
BH CV ECHO MEAS - LVOT DIAM: 2.2 CM
BH CV ECHO MEAS - LVPWD: 1.2 CM
BH CV ECHO MEAS - MR MAX PG: 146.5 MMHG
BH CV ECHO MEAS - MR MAX VEL: 605.2 CM/SEC
BH CV ECHO MEAS - MV A MAX VEL: 131 CM/SEC
BH CV ECHO MEAS - MV E MAX VEL: 100.3 CM/SEC
BH CV ECHO MEAS - MV E/A: 0.77
BH CV ECHO MEAS - MV MAX PG: 10.3 MMHG
BH CV ECHO MEAS - MV MEAN PG: 3.7 MMHG
BH CV ECHO MEAS - MV V2 MAX: 160.5 CM/SEC
BH CV ECHO MEAS - MV V2 MEAN: 90.9 CM/SEC
BH CV ECHO MEAS - MV V2 VTI: 38.9 CM
BH CV ECHO MEAS - MVA(VTI): 2.5 CM^2
BH CV ECHO MEAS - PA ACC TIME: 0.05 SEC
BH CV ECHO MEAS - PA MAX PG: 3.9 MMHG
BH CV ECHO MEAS - PA PR(ACCEL): 58.4 MMHG
BH CV ECHO MEAS - PA V2 MAX: 99.1 CM/SEC
BH CV ECHO MEAS - PI END-D VEL: 150.2 CM/SEC
BH CV ECHO MEAS - RAP SYSTOLE: 10 MMHG
BH CV ECHO MEAS - RVDD(2D): 2.2 CM
BH CV ECHO MEAS - RVDD: 2.2 CM
BH CV ECHO MEAS - RVSP: 69.4 MMHG
BH CV ECHO MEAS - SI(AO): 170.8 ML/M^2
BH CV ECHO MEAS - SI(CUBED): 47.4 ML/M^2
BH CV ECHO MEAS - SI(LVOT): 49.3 ML/M^2
BH CV ECHO MEAS - SI(MOD-SP4): 32.7 ML/M^2
BH CV ECHO MEAS - SI(TEICH): 36.2 ML/M^2
BH CV ECHO MEAS - SV(AO): 333.6 ML
BH CV ECHO MEAS - SV(CUBED): 92.7 ML
BH CV ECHO MEAS - SV(LVOT): 96.4 ML
BH CV ECHO MEAS - SV(MOD-SP4): 63.9 ML
BH CV ECHO MEAS - SV(TEICH): 70.7 ML
BH CV ECHO MEAS - TR MAX VEL: 384.2 CM/SEC
LV EF 2D ECHO EST: 50 %
MAXIMAL PREDICTED HEART RATE: 136 BPM
STRESS TARGET HR: 116 BPM

## 2019-10-04 PROCEDURE — 93306 TTE W/DOPPLER COMPLETE: CPT | Performed by: INTERNAL MEDICINE

## 2019-10-16 ENCOUNTER — OFFICE VISIT (OUTPATIENT)
Dept: FAMILY MEDICINE CLINIC | Facility: CLINIC | Age: 84
End: 2019-10-16

## 2019-10-16 VITALS
HEART RATE: 88 BPM | HEIGHT: 70 IN | SYSTOLIC BLOOD PRESSURE: 136 MMHG | TEMPERATURE: 97.3 F | RESPIRATION RATE: 16 BRPM | OXYGEN SATURATION: 94 % | WEIGHT: 177.6 LBS | DIASTOLIC BLOOD PRESSURE: 74 MMHG | BODY MASS INDEX: 25.43 KG/M2

## 2019-10-16 DIAGNOSIS — M79.602 LEFT ARM PAIN: Primary | ICD-10-CM

## 2019-10-16 PROCEDURE — 99213 OFFICE O/P EST LOW 20 MIN: CPT | Performed by: FAMILY MEDICINE

## 2019-10-16 NOTE — PROGRESS NOTES
Subjective   Mikael Shanks is a 84 y.o. male.     Arm Pain    The incident occurred more than 1 week ago. The incident occurred at home. There was no injury mechanism. The pain is present in the left wrist and left forearm. The quality of the pain is described as aching. The pain is mild. The pain has been constant since the incident. The symptoms are aggravated by movement. He has tried immobilization for the symptoms. The treatment provided mild relief.        The following portions of the patient's history were reviewed and updated as appropriate: allergies, past family history, past medical history, past social history, past surgical history and problem list.    No family history on file.    Social History     Tobacco Use   • Smoking status: Former Smoker     Types: Cigarettes   • Smokeless tobacco: Never Used   Substance Use Topics   • Alcohol use: No     Frequency: Never   • Drug use: No       No past surgical history on file.    Patient Active Problem List   Diagnosis   • Carotid artery disease (CMS/HCC)   • Type 2 diabetes mellitus without complication, without long-term current use of insulin (CMS/HCC)   • PAC (premature atrial contraction)   • Anemia, deficiency   • Vitamin D deficiency   • Gastritis and gastroduodenitis   • Atherosclerosis of native coronary artery of native heart without angina pectoris   • Chronic kidney disease, stage III (moderate) (CMS/HCC)   • Hyperlipidemia   • Hypertension, benign   • Spinal stenosis of lumbar region   • Other folate deficiency anemias   • Asymptomatic peripheral vascular disease (CMS/HCC)   • Hyperkalemia   • Hyponatremia   • Osteoarthritis   • Prostatic hypertrophy   • Pernicious anemia   • Hypomagnesemia   • Depression   • Dupuytren's contracture of both hands   • Sinus pause   • Pulmonary valve disorder   • Hearing loss   • Family history of colon cancer   • Annual physical exam   • Left arm pain       Current Outpatient Medications on File Prior to Visit  "  Medication Sig Dispense Refill   • aspirin 81 MG tablet 1 tablet Daily.     • atorvastatin (LIPITOR) 80 MG tablet Take 1 tablet by mouth Every Night. 30 tablet 5   • Cholecalciferol (VITAMIN D) 1000 units tablet Take 1 tablet by mouth Daily. 30 tablet 5   • hydrochlorothiazide (HYDRODIURIL) 50 MG tablet Take 1 tablet by mouth Daily. 30 tablet 5   • lisinopril (PRINIVIL,ZESTRIL) 20 MG tablet Take 1 tablet by mouth Daily. 30 tablet 5   • MEGARED OMEGA-3 KRILL  MG capsule Take 1 tablet by mouth Daily.     • metFORMIN (GLUCOPHAGE) 500 MG tablet Take 1 tablet by mouth Daily. 30 tablet 5   • metoprolol succinate XL (TOPROL-XL) 25 MG 24 hr tablet Take 1 tablet by mouth Daily. 30 tablet 5   • Omega-3 Fatty Acids (FISH OIL) 1000 MG capsule capsule Take 1 capsule by mouth.     • omeprazole (priLOSEC) 40 MG capsule Take 1 capsule by mouth Daily. 30 capsule 5   • sertraline (ZOLOFT) 25 MG tablet Take 1 tablet by mouth Daily. 30 tablet 5   • sucralfate (CARAFATE) 1 g tablet 1 tablet Daily.     • tamsulosin (FLOMAX) 0.4 MG capsule 24 hr capsule Take 1 capsule by mouth Daily. 30 capsule 5     No current facility-administered medications on file prior to visit.        Allergies   Allergen Reactions   • Penicillins Hives       Review of Systems    Objective   Visit Vitals  /74 (BP Location: Right arm, Patient Position: Sitting)   Pulse 88   Temp 97.3 °F (36.3 °C)   Resp 16   Ht 177.8 cm (70\")   Wt 80.6 kg (177 lb 9.6 oz)   SpO2 94%   BMI 25.48 kg/m²     Physical Exam   Constitutional: He is oriented to person, place, and time. He appears well-developed and well-nourished. He is cooperative.   HENT:   Head: Normocephalic.   Neck: Trachea normal. Neck supple. Carotid bruit is not present. No thyromegaly present.   Cardiovascular: Normal rate and regular rhythm. Exam reveals no gallop and no friction rub.   No murmur heard.  Musculoskeletal:        Arms:  Neurological: He is alert and oriented to person, place, and time. " "  Skin: Skin is dry. No rash noted. Nails show no clubbing.         Assessment/Plan .  Problem List Items Addressed This Visit        Nervous and Auditory    Left arm pain - Primary    Current Assessment & Plan     Improved at this time.  Patient was seen at [facility] er 2 days for severe pain in the left arm after hearing a \"pop\".  Xrays were normal.  Offered anti-inflammatory medication, however patient states that his wife will not let him take them.  I advised him to have her come with him at his next visit.                     "

## 2019-10-16 NOTE — ASSESSMENT & PLAN NOTE
"Improved at this time.  Patient was seen at [facility] er 2 days for severe pain in the left arm after hearing a \"pop\".  Xrays were normal.  Offered anti-inflammatory medication, however patient states that his wife will not let him take them.  I advised him to have her come with him at his next visit.   "

## 2019-10-22 ENCOUNTER — OFFICE VISIT (OUTPATIENT)
Dept: FAMILY MEDICINE CLINIC | Facility: CLINIC | Age: 84
End: 2019-10-22

## 2019-10-22 VITALS
RESPIRATION RATE: 18 BRPM | BODY MASS INDEX: 28.19 KG/M2 | OXYGEN SATURATION: 95 % | HEIGHT: 67 IN | DIASTOLIC BLOOD PRESSURE: 82 MMHG | TEMPERATURE: 98.1 F | HEART RATE: 98 BPM | SYSTOLIC BLOOD PRESSURE: 154 MMHG | WEIGHT: 179.6 LBS

## 2019-10-22 DIAGNOSIS — R06.83 SNORING: Primary | ICD-10-CM

## 2019-10-22 DIAGNOSIS — I10 HYPERTENSION, BENIGN: ICD-10-CM

## 2019-10-22 DIAGNOSIS — M79.602 LEFT ARM PAIN: ICD-10-CM

## 2019-10-22 PROCEDURE — 99214 OFFICE O/P EST MOD 30 MIN: CPT | Performed by: FAMILY MEDICINE

## 2019-10-22 NOTE — PROGRESS NOTES
Subjective   Mikael Shanks is a 84 y.o. male.     Arm Pain    The incident occurred more than 1 week ago. There was no injury mechanism. Pertinent negatives include no chest pain.   Snoring   This is a chronic problem. The current episode started more than 1 year ago. The problem occurs daily. The problem has been gradually worsening. Pertinent negatives include no chest pain, congestion or fatigue. He has tried nothing for the symptoms.   Hypertension   This is a chronic problem. The current episode started more than 1 year ago. The problem has been gradually worsening since onset. The problem is uncontrolled. Pertinent negatives include no chest pain, palpitations or shortness of breath. Risk factors for coronary artery disease include dyslipidemia.        The following portions of the patient's history were reviewed and updated as appropriate: allergies, past family history, past medical history, past social history, past surgical history and problem list.    No family history on file.    Social History     Tobacco Use   • Smoking status: Former Smoker     Types: Cigarettes   • Smokeless tobacco: Never Used   Substance Use Topics   • Alcohol use: No     Frequency: Never   • Drug use: No       No past surgical history on file.    Patient Active Problem List   Diagnosis   • Carotid artery disease (CMS/HCC)   • Type 2 diabetes mellitus without complication, without long-term current use of insulin (CMS/HCC)   • PAC (premature atrial contraction)   • Anemia, deficiency   • Vitamin D deficiency   • Gastritis and gastroduodenitis   • Atherosclerosis of native coronary artery of native heart without angina pectoris   • Chronic kidney disease, stage III (moderate) (CMS/HCC)   • Hyperlipidemia   • Hypertension, benign   • Spinal stenosis of lumbar region   • Other folate deficiency anemias   • Asymptomatic peripheral vascular disease (CMS/HCC)   • Hyperkalemia   • Hyponatremia   • Osteoarthritis   • Prostatic  hypertrophy   • Pernicious anemia   • Hypomagnesemia   • Depression   • Dupuytren's contracture of both hands   • Sinus pause   • Pulmonary valve disorder   • Hearing loss   • Family history of colon cancer   • Annual physical exam   • Left arm pain   • Snoring       Current Outpatient Medications on File Prior to Visit   Medication Sig Dispense Refill   • aspirin 81 MG tablet 1 tablet Daily.     • atorvastatin (LIPITOR) 80 MG tablet Take 1 tablet by mouth Every Night. 30 tablet 5   • Cholecalciferol (VITAMIN D) 1000 units tablet Take 1 tablet by mouth Daily. 30 tablet 5   • hydrochlorothiazide (HYDRODIURIL) 50 MG tablet Take 1 tablet by mouth Daily. 30 tablet 5   • lisinopril (PRINIVIL,ZESTRIL) 20 MG tablet Take 1 tablet by mouth Daily. 30 tablet 5   • MEGARED OMEGA-3 KRILL  MG capsule Take 1 tablet by mouth Daily.     • metFORMIN (GLUCOPHAGE) 500 MG tablet Take 1 tablet by mouth Daily. 30 tablet 5   • metoprolol succinate XL (TOPROL-XL) 25 MG 24 hr tablet Take 1 tablet by mouth Daily. 30 tablet 5   • Omega-3 Fatty Acids (FISH OIL) 1000 MG capsule capsule Take 1 capsule by mouth.     • omeprazole (priLOSEC) 40 MG capsule Take 1 capsule by mouth Daily. 30 capsule 5   • sertraline (ZOLOFT) 25 MG tablet Take 1 tablet by mouth Daily. 30 tablet 5   • sucralfate (CARAFATE) 1 g tablet 1 tablet Daily.     • tamsulosin (FLOMAX) 0.4 MG capsule 24 hr capsule Take 1 capsule by mouth Daily. 30 capsule 5     No current facility-administered medications on file prior to visit.        Allergies   Allergen Reactions   • Penicillins Hives       Review of Systems   Constitutional: Negative for fatigue, unexpected weight gain and unexpected weight loss.   HENT: Negative for congestion.    Respiratory: Positive for snoring. Negative for shortness of breath.    Cardiovascular: Negative for chest pain, palpitations and leg swelling.   Neurological: Negative for headache.       Objective   Visit Vitals  /82 (BP Location: Left  "arm, Patient Position: Sitting, Cuff Size: Adult)   Pulse 98   Temp 98.1 °F (36.7 °C) (Oral)   Resp 18   Ht 170.2 cm (67\")   Wt 81.5 kg (179 lb 9.6 oz)   SpO2 95%   BMI 28.13 kg/m²     Physical Exam   Constitutional: He is oriented to person, place, and time. He appears well-developed and well-nourished. He is cooperative.   HENT:   Head: Normocephalic.   Neck: Trachea normal. Neck supple. Carotid bruit is not present. No thyromegaly present.   Cardiovascular: Normal rate and regular rhythm. Exam reveals no gallop and no friction rub.   No murmur heard.  Musculoskeletal:        Arms:  Neurological: He is alert and oriented to person, place, and time.   Skin: Skin is dry. No rash noted. Nails show no clubbing.         Assessment/Plan .  Problem List Items Addressed This Visit        Cardiovascular and Mediastinum    Hypertension, benign    Current Assessment & Plan     Hypertension is worsening.  Dietary sodium restriction.  Weight loss.  Regular aerobic exercise.  Continue current medications.  Blood pressure will be reassessed in 2 weeks.            Respiratory    Snoring - Primary    Current Assessment & Plan     Probable sleep apnea - referral for sleep evaluation         Relevant Orders    Ambulatory Referral to Neurology       Nervous and Auditory    Left arm pain    Current Assessment & Plan     Slightly improved -- offered ortho refferal but he declines                    "

## 2019-10-27 PROBLEM — R06.83 SNORING: Status: ACTIVE | Noted: 2019-10-27

## 2019-10-27 NOTE — ASSESSMENT & PLAN NOTE
Hypertension is worsening.  Dietary sodium restriction.  Weight loss.  Regular aerobic exercise.  Continue current medications.  Blood pressure will be reassessed in 2 weeks.

## 2019-10-28 ENCOUNTER — OFFICE VISIT (OUTPATIENT)
Dept: NEUROLOGY | Facility: CLINIC | Age: 84
End: 2019-10-28

## 2019-10-28 VITALS
DIASTOLIC BLOOD PRESSURE: 68 MMHG | HEIGHT: 70 IN | BODY MASS INDEX: 25.25 KG/M2 | SYSTOLIC BLOOD PRESSURE: 170 MMHG | WEIGHT: 176.4 LBS | HEART RATE: 73 BPM

## 2019-10-28 DIAGNOSIS — G47.33 OBSTRUCTIVE SLEEP APNEA: Primary | ICD-10-CM

## 2019-10-28 PROCEDURE — 99204 OFFICE O/P NEW MOD 45 MIN: CPT | Performed by: PSYCHIATRY & NEUROLOGY

## 2019-10-28 NOTE — PROGRESS NOTES
Sleep Medicine initial Consultation    Mikael Shanks  : 1935  84 y.o. male   Date of Service: 10/28/2019  Referring provider: Xander Robison MD    New sleep referred by PCP, neck measures 16 inches.     Patient having issues with snoring, most of life. Sleeps on  Side most of the time.     restless sleep and has been told he stops breathing in the night. Patient Cardiologist Dr. Talavera wants him to have a study due to not getting enough oxygen at night.     History Of Present Illness:   Mr. Mikael Shanks  is a 84 y.o. right handed  male patient has a H/O HTN, GERD is here for the evaluation of Snoring, Excessive Daytime Sleepiness, Chronic Fatigue, Insomnia and Disturbed Sleep.     The patient c/o excessive daytime sleepiness, , chronic fatigue and There is no H/O sleep paralysis, hypnagogic hallucinations or cataplexy..      The patient complains of snoring loud in all sleeping positions, has witnessed episodes of sleep apnea, awakened gasping for breath and wakened coughing, choking, or respiratory discomfort.    The patient complains of awakened with gastric discomfort or burning sensation in the chest or sour taste. This is controlled with medication    The patient complains of difficulty falling asleep, difficulty staying asleep, frequent awakenings 3-4, has restless sleep, Does not feel rested even after a long sleep and Still feels sleepy even when increasing sleep time.    The patient reports history of There is no h/O sleepwalking or bedwetting or nightmares or sleep eating or acting out dreams    Sleep schedule: Bedtime:9-11 , gets out of bed at 12, sleep latency: 30 minutes, Gets about 4 hours of sleep.    [place] Sleepiness Scale score:   EPWORTH SLEEPINESS SCALE  Sitting and reading  3  WatchingTV  3  Sitting, inactive, in a public place  0  As a passenger in a car for 1 hour w/o a break  0  Lying down to rest in the afternoon  0  Sitting and talking to someone  0  Sitting quietly  after a lunch  3  In a car, while stopped for traffic or a light  0  Total 9        9/24.  Past Medical History:   Diagnosis Date   • Diabetes mellitus (CMS/HCC)    • GERD (gastroesophageal reflux disease)    • Hyperlipidemia    • Hypertension      History reviewed. No pertinent surgical history.  Current Outpatient Medications on File Prior to Visit   Medication Sig Dispense Refill   • aspirin 81 MG tablet 1 tablet Daily.     • atorvastatin (LIPITOR) 80 MG tablet Take 1 tablet by mouth Every Night. 30 tablet 5   • Cholecalciferol (VITAMIN D) 1000 units tablet Take 1 tablet by mouth Daily. 30 tablet 5   • hydrochlorothiazide (HYDRODIURIL) 50 MG tablet Take 1 tablet by mouth Daily. 30 tablet 5   • lisinopril (PRINIVIL,ZESTRIL) 20 MG tablet Take 1 tablet by mouth Daily. 30 tablet 5   • MEGARED OMEGA-3 KRILL  MG capsule Take 1 tablet by mouth Daily.     • metFORMIN (GLUCOPHAGE) 500 MG tablet Take 1 tablet by mouth Daily. 30 tablet 5   • metoprolol succinate XL (TOPROL-XL) 25 MG 24 hr tablet Take 1 tablet by mouth Daily. 30 tablet 5   • Omega-3 Fatty Acids (FISH OIL) 1000 MG capsule capsule Take 1 capsule by mouth.     • omeprazole (priLOSEC) 40 MG capsule Take 1 capsule by mouth Daily. 30 capsule 5   • sertraline (ZOLOFT) 25 MG tablet Take 1 tablet by mouth Daily. 30 tablet 5   • sucralfate (CARAFATE) 1 g tablet 1 tablet Daily.     • tamsulosin (FLOMAX) 0.4 MG capsule 24 hr capsule Take 1 capsule by mouth Daily. 30 capsule 5     No current facility-administered medications on file prior to visit.      Allergies   Allergen Reactions   • Penicillins Hives     History reviewed. No pertinent family history.  Social History     Socioeconomic History   • Marital status:      Spouse name: Not on file   • Number of children: Not on file   • Years of education: Not on file   • Highest education level: Not on file   Occupational History   • Occupation: Retired   Social Needs   • Financial resource strain: Not hard  at all   • Food insecurity:     Worry: Never true     Inability: Never true   • Transportation needs:     Medical: No     Non-medical: No   Tobacco Use   • Smoking status: Former Smoker     Types: Cigarettes   • Smokeless tobacco: Never Used   Substance and Sexual Activity   • Alcohol use: No     Frequency: Never   • Drug use: No   • Sexual activity: No   Lifestyle   • Physical activity:     Days per week: 0 days     Minutes per session: 0 min   • Stress: Not at all   Relationships   • Social connections:     Talks on phone: More than three times a week     Gets together: More than three times a week     Attends Confucianism service: 1 to 4 times per year     Active member of club or organization: Yes     Attends meetings of clubs or organizations: Never     Relationship status: Living with partner     Review of Systems   Constitutional: Positive for fatigue. Negative for appetite change.   HENT: Positive for hearing loss. Negative for sinus pain.    Eyes: Negative for pain and itching.   Respiratory: Positive for shortness of breath. Negative for cough.    Cardiovascular: Negative for chest pain and palpitations.   Gastrointestinal: Negative for constipation and diarrhea.   Endocrine: Negative for cold intolerance and heat intolerance.   Genitourinary: Positive for frequency. Negative for difficulty urinating.   Musculoskeletal: Negative for back pain and neck pain.   Allergic/Immunologic: Negative for environmental allergies.   Neurological: Negative for dizziness, tremors, seizures, syncope, facial asymmetry, speech difficulty, weakness, light-headedness, numbness and headaches.   Psychiatric/Behavioral: Negative for agitation and confusion.     I reviewed and addressed ROS entered by MA.    Patient examination:  Vitals:    10/28/19 1728   BP: 170/68   Pulse: 73    Body mass index is 25.31 kg/m².     Physical Exam   Constitutional: He is oriented to person, place, and time. He appears well-developed and  well-nourished.   HENT:   Nose: Nose normal.   Mouth/Throat: Oropharynx is clear and moist.   Eyes: Conjunctivae and EOM are normal. Pupils are equal, round, and reactive to light.   Cardiovascular: Normal rate, regular rhythm and normal heart sounds.   Pulmonary/Chest: Effort normal and breath sounds normal. No respiratory distress.   Musculoskeletal: Normal range of motion. He exhibits no edema or deformity.   Neurological: He is alert and oriented to person, place, and time. No cranial nerve deficit.   Psychiatric: He has a normal mood and affect. His behavior is normal. Thought content normal.   Vitals reviewed.      ASSESSMENT AND PLAN:The patient has symptoms of obstructive sleep apnea syndrome with hypersomnia. We will proceed with polysomnography and treat with CPAP therapy if needed. Sleep hygiene techniques discussed.  Exercise diet and weight loss discussed.    Encounter Diagnosis   Name Primary?   • Obstructive sleep apnea Yes       Orders Placed This Encounter   Procedures   • Polysomnography 4 or More Parameters       Return in about 3 months (around 1/28/2020) for Recheck.    This document has been electronically signed by Joseph Seipel, MD  on October 28, 2019 6:03 PM

## 2019-11-05 ENCOUNTER — HOSPITAL ENCOUNTER (OUTPATIENT)
Dept: SLEEP MEDICINE | Facility: HOSPITAL | Age: 84
Discharge: HOME | End: 2019-11-05
Admitting: PSYCHIATRY & NEUROLOGY

## 2019-11-05 VITALS — HEIGHT: 70 IN | WEIGHT: 180 LBS | BODY MASS INDEX: 25.77 KG/M2

## 2019-11-05 DIAGNOSIS — G47.33 OBSTRUCTIVE SLEEP APNEA: ICD-10-CM

## 2019-11-05 PROCEDURE — 95811 POLYSOM 6/>YRS CPAP 4/> PARM: CPT

## 2019-11-12 PROCEDURE — 95811 POLYSOM 6/>YRS CPAP 4/> PARM: CPT | Performed by: PSYCHIATRY & NEUROLOGY

## 2019-11-14 ENCOUNTER — TELEPHONE (OUTPATIENT)
Dept: NEUROLOGY | Facility: CLINIC | Age: 84
End: 2019-11-14

## 2019-11-14 DIAGNOSIS — G47.33 OBSTRUCTIVE SLEEP APNEA: Primary | ICD-10-CM

## 2019-11-25 ENCOUNTER — APPOINTMENT (OUTPATIENT)
Dept: GENERAL RADIOLOGY | Facility: HOSPITAL | Age: 84
End: 2019-11-25

## 2019-11-25 ENCOUNTER — RESULTS ENCOUNTER (OUTPATIENT)
Dept: FAMILY MEDICINE CLINIC | Facility: CLINIC | Age: 84
End: 2019-11-25

## 2019-11-25 ENCOUNTER — HOSPITAL ENCOUNTER (INPATIENT)
Dept: CARDIOLOGY | Facility: HOSPITAL | Age: 84
Discharge: HOME | End: 2019-11-25

## 2019-11-25 ENCOUNTER — HOSPITAL ENCOUNTER (INPATIENT)
Facility: HOSPITAL | Age: 84
LOS: 11 days | Discharge: HOME | End: 2019-12-06
Attending: EMERGENCY MEDICINE | Admitting: INTERNAL MEDICINE

## 2019-11-25 VITALS
DIASTOLIC BLOOD PRESSURE: 74 MMHG | HEIGHT: 70 IN | SYSTOLIC BLOOD PRESSURE: 128 MMHG | WEIGHT: 206.79 LBS | HEART RATE: 83 BPM | BODY MASS INDEX: 29.6 KG/M2

## 2019-11-25 DIAGNOSIS — R06.02 SHORTNESS OF BREATH: Primary | ICD-10-CM

## 2019-11-25 DIAGNOSIS — E78.2 MIXED HYPERLIPIDEMIA: ICD-10-CM

## 2019-11-25 DIAGNOSIS — T82.855D STENOSIS OF CORONARY ARTERY STENT, SUBSEQUENT ENCOUNTER: ICD-10-CM

## 2019-11-25 DIAGNOSIS — I10 HYPERTENSION, BENIGN: ICD-10-CM

## 2019-11-25 DIAGNOSIS — N18.30 CKD (CHRONIC KIDNEY DISEASE) STAGE 3, GFR 30-59 ML/MIN (HCC): ICD-10-CM

## 2019-11-25 DIAGNOSIS — E11.9 TYPE 2 DIABETES MELLITUS WITHOUT COMPLICATION, WITHOUT LONG-TERM CURRENT USE OF INSULIN (HCC): ICD-10-CM

## 2019-11-25 DIAGNOSIS — E55.9 VITAMIN D DEFICIENCY: ICD-10-CM

## 2019-11-25 DIAGNOSIS — E83.42 HYPOMAGNESEMIA: ICD-10-CM

## 2019-11-25 DIAGNOSIS — I50.9 ACUTE CONGESTIVE HEART FAILURE, UNSPECIFIED HEART FAILURE TYPE (HCC): ICD-10-CM

## 2019-11-25 PROBLEM — G47.33 OSA (OBSTRUCTIVE SLEEP APNEA): Chronic | Status: ACTIVE | Noted: 2019-11-25

## 2019-11-25 PROBLEM — J96.01 ACUTE RESPIRATORY FAILURE WITH HYPOXIA: Status: ACTIVE | Noted: 2019-11-25

## 2019-11-25 LAB
ANION GAP SERPL CALCULATED.3IONS-SCNC: 10 MMOL/L (ref 5–15)
ANION GAP SERPL CALCULATED.3IONS-SCNC: 9 MMOL/L (ref 5–15)
APTT PPP: 26.3 SECONDS (ref 24–31)
ARTERIAL PATENCY WRIST A: POSITIVE
ATMOSPHERIC PRESS: NORMAL MM[HG]
BASE EXCESS BLDA CALC-SCNC: 1.8 MMOL/L (ref 0–3)
BASOPHILS # BLD AUTO: 0.1 10*3/MM3 (ref 0–0.2)
BASOPHILS # BLD AUTO: 0.2 10*3/MM3 (ref 0–0.2)
BASOPHILS NFR BLD AUTO: 1.1 % (ref 0–1.5)
BASOPHILS NFR BLD AUTO: 2.4 % (ref 0–1.5)
BDY SITE: NORMAL
BH CV ECHO MEAS - ACS: 1.8 CM
BH CV ECHO MEAS - AO MAX PG (FULL): 9.3 MMHG
BH CV ECHO MEAS - AO MAX PG: 11.5 MMHG
BH CV ECHO MEAS - AO MEAN PG (FULL): 5 MMHG
BH CV ECHO MEAS - AO MEAN PG: 6.3 MMHG
BH CV ECHO MEAS - AO ROOT AREA (BSA CORRECTED): 1.5
BH CV ECHO MEAS - AO ROOT AREA: 7.6 CM^2
BH CV ECHO MEAS - AO ROOT DIAM: 3.1 CM
BH CV ECHO MEAS - AO V2 MAX: 169.5 CM/SEC
BH CV ECHO MEAS - AO V2 MEAN: 120.3 CM/SEC
BH CV ECHO MEAS - AO V2 VTI: 33.4 CM
BH CV ECHO MEAS - AORTIC HR: 79.6 BPM
BH CV ECHO MEAS - AORTIC R-R: 0.75 SEC
BH CV ECHO MEAS - ASC AORTA: 3.2 CM
BH CV ECHO MEAS - AVA(I,A): 1.6 CM^2
BH CV ECHO MEAS - AVA(I,D): 1.6 CM^2
BH CV ECHO MEAS - AVA(V,A): 1.4 CM^2
BH CV ECHO MEAS - AVA(V,D): 1.4 CM^2
BH CV ECHO MEAS - BSA(HAYCOCK): 2.2 M^2
BH CV ECHO MEAS - BSA: 2.1 M^2
BH CV ECHO MEAS - BZI_BMI: 29.6 KILOGRAMS/M^2
BH CV ECHO MEAS - BZI_METRIC_HEIGHT: 177.8 CM
BH CV ECHO MEAS - BZI_METRIC_WEIGHT: 93.4 KG
BH CV ECHO MEAS - CI(AO): 9.5 L/MIN/M^2
BH CV ECHO MEAS - CI(LVOT): 2.1 L/MIN/M^2
BH CV ECHO MEAS - CO(AO): 20.2 L/MIN
BH CV ECHO MEAS - CO(LVOT): 4.3 L/MIN
BH CV ECHO MEAS - EDV(CUBED): 169.7 ML
BH CV ECHO MEAS - EDV(MOD-SP2): 105.5 ML
BH CV ECHO MEAS - EDV(MOD-SP4): 95.1 ML
BH CV ECHO MEAS - EDV(TEICH): 149.6 ML
BH CV ECHO MEAS - EF(CUBED): 29.9 %
BH CV ECHO MEAS - EF(MOD-BP): 28 %
BH CV ECHO MEAS - EF(MOD-SP2): 22.5 %
BH CV ECHO MEAS - EF(MOD-SP4): 31.5 %
BH CV ECHO MEAS - EF(TEICH): 23.9 %
BH CV ECHO MEAS - ESV(CUBED): 119 ML
BH CV ECHO MEAS - ESV(MOD-SP2): 81.8 ML
BH CV ECHO MEAS - ESV(MOD-SP4): 65.2 ML
BH CV ECHO MEAS - ESV(TEICH): 113.8 ML
BH CV ECHO MEAS - FS: 11.1 %
BH CV ECHO MEAS - IVS/LVPW: 0.83
BH CV ECHO MEAS - IVSD: 1.1 CM
BH CV ECHO MEAS - LA DIMENSION(2D): 5.4 CM
BH CV ECHO MEAS - LV DIASTOLIC VOL/BSA (35-75): 45 ML/M^2
BH CV ECHO MEAS - LV MASS(C)D: 293.3 GRAMS
BH CV ECHO MEAS - LV MASS(C)DI: 138.8 GRAMS/M^2
BH CV ECHO MEAS - LV MAX PG: 2.2 MMHG
BH CV ECHO MEAS - LV MEAN PG: 1.3 MMHG
BH CV ECHO MEAS - LV SYSTOLIC VOL/BSA (12-30): 30.8 ML/M^2
BH CV ECHO MEAS - LV V1 MAX: 73.9 CM/SEC
BH CV ECHO MEAS - LV V1 MEAN: 52.6 CM/SEC
BH CV ECHO MEAS - LV V1 VTI: 16.4 CM
BH CV ECHO MEAS - LVIDD: 5.5 CM
BH CV ECHO MEAS - LVIDS: 4.9 CM
BH CV ECHO MEAS - LVOT AREA: 3.3 CM^2
BH CV ECHO MEAS - LVOT DIAM: 2.1 CM
BH CV ECHO MEAS - LVPWD: 1.4 CM
BH CV ECHO MEAS - MR MAX PG: 76.1 MMHG
BH CV ECHO MEAS - MR MAX VEL: 436.1 CM/SEC
BH CV ECHO MEAS - MV A MAX VEL: 99.5 CM/SEC
BH CV ECHO MEAS - MV DEC SLOPE: 495.1 CM/SEC^2
BH CV ECHO MEAS - MV DEC TIME: 0.22 SEC
BH CV ECHO MEAS - MV E MAX VEL: 107.1 CM/SEC
BH CV ECHO MEAS - MV E/A: 1.1
BH CV ECHO MEAS - MV MAX PG: 6.8 MMHG
BH CV ECHO MEAS - MV MEAN PG: 3.6 MMHG
BH CV ECHO MEAS - MV V2 MAX: 130.3 CM/SEC
BH CV ECHO MEAS - MV V2 MEAN: 91.5 CM/SEC
BH CV ECHO MEAS - MV V2 VTI: 39 CM
BH CV ECHO MEAS - MVA(VTI): 1.4 CM^2
BH CV ECHO MEAS - PA ACC TIME: 0.13 SEC
BH CV ECHO MEAS - PA MAX PG (FULL): 0.46 MMHG
BH CV ECHO MEAS - PA MAX PG: 2 MMHG
BH CV ECHO MEAS - PA MEAN PG (FULL): 0.53 MMHG
BH CV ECHO MEAS - PA MEAN PG: 1.2 MMHG
BH CV ECHO MEAS - PA PR(ACCEL): 19.1 MMHG
BH CV ECHO MEAS - PA V2 MAX: 71.4 CM/SEC
BH CV ECHO MEAS - PA V2 MEAN: 54.5 CM/SEC
BH CV ECHO MEAS - PA V2 VTI: 16.2 CM
BH CV ECHO MEAS - PI END-D VEL: 165.9 CM/SEC
BH CV ECHO MEAS - PI MAX PG: 17.4 MMHG
BH CV ECHO MEAS - PI MAX VEL: 208.8 CM/SEC
BH CV ECHO MEAS - PVA(I,A): 5.7 CM^2
BH CV ECHO MEAS - PVA(I,D): 5.7 CM^2
BH CV ECHO MEAS - PVA(V,A): 7.1 CM^2
BH CV ECHO MEAS - PVA(V,D): 7.1 CM^2
BH CV ECHO MEAS - QP/QS: 1.7
BH CV ECHO MEAS - RAP SYSTOLE: 8 MMHG
BH CV ECHO MEAS - RV MAX PG: 1.6 MMHG
BH CV ECHO MEAS - RV MEAN PG: 0.71 MMHG
BH CV ECHO MEAS - RV V1 MAX: 62.9 CM/SEC
BH CV ECHO MEAS - RV V1 MEAN: 38.7 CM/SEC
BH CV ECHO MEAS - RV V1 VTI: 11.5 CM
BH CV ECHO MEAS - RVDD: 3.5 CM
BH CV ECHO MEAS - RVOT AREA: 8 CM^2
BH CV ECHO MEAS - RVOT DIAM: 3.2 CM
BH CV ECHO MEAS - RVSP: 47.7 MMHG
BH CV ECHO MEAS - SI(AO): 119.9 ML/M^2
BH CV ECHO MEAS - SI(CUBED): 24 ML/M^2
BH CV ECHO MEAS - SI(LVOT): 25.8 ML/M^2
BH CV ECHO MEAS - SI(MOD-SP2): 11.2 ML/M^2
BH CV ECHO MEAS - SI(MOD-SP4): 14.2 ML/M^2
BH CV ECHO MEAS - SI(TEICH): 16.9 ML/M^2
BH CV ECHO MEAS - SV(AO): 253.5 ML
BH CV ECHO MEAS - SV(CUBED): 50.6 ML
BH CV ECHO MEAS - SV(LVOT): 54.5 ML
BH CV ECHO MEAS - SV(MOD-SP2): 23.7 ML
BH CV ECHO MEAS - SV(MOD-SP4): 29.9 ML
BH CV ECHO MEAS - SV(RVOT): 92.2 ML
BH CV ECHO MEAS - SV(TEICH): 35.8 ML
BH CV ECHO MEAS - TR MAX VEL: 312.7 CM/SEC
BUN BLD-MCNC: 13 MG/DL (ref 8–23)
BUN BLD-MCNC: 14 MG/DL (ref 8–23)
BUN/CREAT SERPL: 10 (ref 7–25)
BUN/CREAT SERPL: 10.4 (ref 7–25)
CALCIUM SPEC-SCNC: 8.8 MG/DL (ref 8.6–10.5)
CALCIUM SPEC-SCNC: 9 MG/DL (ref 8.6–10.5)
CHLORIDE SERPL-SCNC: 106 MMOL/L (ref 98–107)
CHLORIDE SERPL-SCNC: 107 MMOL/L (ref 98–107)
CO2 BLDA-SCNC: 27.8 MMOL/L (ref 22–29)
CO2 SERPL-SCNC: 26 MMOL/L (ref 22–29)
CO2 SERPL-SCNC: 28 MMOL/L (ref 22–29)
CREAT BLD-MCNC: 1.3 MG/DL (ref 0.76–1.27)
CREAT BLD-MCNC: 1.34 MG/DL (ref 0.76–1.27)
D-LACTATE SERPL-SCNC: 0.9 MMOL/L (ref 0.5–2)
DEPRECATED RDW RBC AUTO: 45.1 FL (ref 37–54)
DEPRECATED RDW RBC AUTO: 45.9 FL (ref 37–54)
EOSINOPHIL # BLD AUTO: 0.1 10*3/MM3 (ref 0–0.4)
EOSINOPHIL # BLD AUTO: 0.3 10*3/MM3 (ref 0–0.4)
EOSINOPHIL NFR BLD AUTO: 0.6 % (ref 0.3–6.2)
EOSINOPHIL NFR BLD AUTO: 4.6 % (ref 0.3–6.2)
ERYTHROCYTE [DISTWIDTH] IN BLOOD BY AUTOMATED COUNT: 14.7 % (ref 12.3–15.4)
ERYTHROCYTE [DISTWIDTH] IN BLOOD BY AUTOMATED COUNT: 14.7 % (ref 12.3–15.4)
GFR SERPL CREATININE-BSD FRML MDRD: 51 ML/MIN/1.73
GFR SERPL CREATININE-BSD FRML MDRD: 53 ML/MIN/1.73
GLUCOSE BLD-MCNC: 120 MG/DL (ref 65–99)
GLUCOSE BLD-MCNC: 129 MG/DL (ref 65–99)
GLUCOSE BLDC GLUCOMTR-MCNC: 126 MG/DL (ref 70–105)
GLUCOSE BLDC GLUCOMTR-MCNC: 138 MG/DL (ref 70–105)
HBA1C MFR BLD: 6 % (ref 3.5–5.6)
HCO3 BLDA-SCNC: 26.5 MMOL/L (ref 21–28)
HCT VFR BLD AUTO: 34.1 % (ref 37.5–51)
HCT VFR BLD AUTO: 34.4 % (ref 37.5–51)
HEMODILUTION: NO
HGB BLD-MCNC: 11 G/DL (ref 13–17.7)
HGB BLD-MCNC: 11.2 G/DL (ref 13–17.7)
HOLD SPECIMEN: NORMAL
HOLD SPECIMEN: NORMAL
HOROWITZ INDEX BLD+IHG-RTO: 40 %
INR PPP: 1.19 (ref 0.9–1.1)
LV EF 2D ECHO EST: 25 %
LYMPHOCYTES # BLD AUTO: 0.5 10*3/MM3 (ref 0.7–3.1)
LYMPHOCYTES # BLD AUTO: 0.9 10*3/MM3 (ref 0.7–3.1)
LYMPHOCYTES NFR BLD AUTO: 14.2 % (ref 19.6–45.3)
LYMPHOCYTES NFR BLD AUTO: 5.8 % (ref 19.6–45.3)
MCH RBC QN AUTO: 28.1 PG (ref 26.6–33)
MCH RBC QN AUTO: 28.3 PG (ref 26.6–33)
MCHC RBC AUTO-ENTMCNC: 32.3 G/DL (ref 31.5–35.7)
MCHC RBC AUTO-ENTMCNC: 32.7 G/DL (ref 31.5–35.7)
MCV RBC AUTO: 86.8 FL (ref 79–97)
MCV RBC AUTO: 87.1 FL (ref 79–97)
MODALITY: NORMAL
MONOCYTES # BLD AUTO: 0.4 10*3/MM3 (ref 0.1–0.9)
MONOCYTES # BLD AUTO: 0.5 10*3/MM3 (ref 0.1–0.9)
MONOCYTES NFR BLD AUTO: 5.1 % (ref 5–12)
MONOCYTES NFR BLD AUTO: 7.5 % (ref 5–12)
NEUTROPHILS # BLD AUTO: 4.7 10*3/MM3 (ref 1.7–7)
NEUTROPHILS # BLD AUTO: 7 10*3/MM3 (ref 1.7–7)
NEUTROPHILS NFR BLD AUTO: 71.3 % (ref 42.7–76)
NEUTROPHILS NFR BLD AUTO: 87.4 % (ref 42.7–76)
NRBC BLD AUTO-RTO: 0 /100 WBC (ref 0–0.2)
NRBC BLD AUTO-RTO: 0.1 /100 WBC (ref 0–0.2)
NT-PROBNP SERPL-MCNC: ABNORMAL PG/ML (ref 5–1800)
PCO2 BLDA: 41.3 MM HG (ref 35–48)
PEEP RESPIRATORY: 5 CM[H2O]
PH BLDA: 7.42 PH UNITS (ref 7.35–7.45)
PLATELET # BLD AUTO: 155 10*3/MM3 (ref 140–450)
PLATELET # BLD AUTO: 184 10*3/MM3 (ref 140–450)
PMV BLD AUTO: 10.8 FL (ref 6–12)
PMV BLD AUTO: 11.1 FL (ref 6–12)
PO2 BLDA: 85.3 MM HG (ref 83–108)
POTASSIUM BLD-SCNC: 4 MMOL/L (ref 3.5–5.2)
POTASSIUM BLD-SCNC: 4.2 MMOL/L (ref 3.5–5.2)
PROTHROMBIN TIME: 12.1 SECONDS (ref 9.6–11.7)
RBC # BLD AUTO: 3.92 10*6/MM3 (ref 4.14–5.8)
RBC # BLD AUTO: 3.97 10*6/MM3 (ref 4.14–5.8)
RESPIRATORY RATE: 16
SAO2 % BLDCOA: 96.6 % (ref 94–98)
SODIUM BLD-SCNC: 142 MMOL/L (ref 136–145)
SODIUM BLD-SCNC: 144 MMOL/L (ref 136–145)
TOTAL RATE: 24 BREATHS/MINUTE
TROPONIN T SERPL-MCNC: 0.02 NG/ML (ref 0–0.03)
TROPONIN T SERPL-MCNC: 0.03 NG/ML (ref 0–0.03)
TROPONIN T SERPL-MCNC: 0.03 NG/ML (ref 0–0.03)
VENTILATOR MODE: NORMAL
VT ON VENT VENT: 600 ML
WBC NRBC COR # BLD: 6.6 10*3/MM3 (ref 3.4–10.8)
WBC NRBC COR # BLD: 8 10*3/MM3 (ref 3.4–10.8)
WHOLE BLOOD HOLD SPECIMEN: NORMAL
WHOLE BLOOD HOLD SPECIMEN: NORMAL

## 2019-11-25 PROCEDURE — 84484 ASSAY OF TROPONIN QUANT: CPT | Performed by: NURSE PRACTITIONER

## 2019-11-25 PROCEDURE — 83036 HEMOGLOBIN GLYCOSYLATED A1C: CPT | Performed by: NURSE PRACTITIONER

## 2019-11-25 PROCEDURE — 99222 1ST HOSP IP/OBS MODERATE 55: CPT | Performed by: NURSE PRACTITIONER

## 2019-11-25 PROCEDURE — 94799 UNLISTED PULMONARY SVC/PX: CPT

## 2019-11-25 PROCEDURE — 93005 ELECTROCARDIOGRAM TRACING: CPT

## 2019-11-25 PROCEDURE — 71045 X-RAY EXAM CHEST 1 VIEW: CPT

## 2019-11-25 PROCEDURE — 94660 CPAP INITIATION&MGMT: CPT

## 2019-11-25 PROCEDURE — 94640 AIRWAY INHALATION TREATMENT: CPT

## 2019-11-25 PROCEDURE — 85025 COMPLETE CBC W/AUTO DIFF WBC: CPT | Performed by: EMERGENCY MEDICINE

## 2019-11-25 PROCEDURE — 85025 COMPLETE CBC W/AUTO DIFF WBC: CPT | Performed by: NURSE PRACTITIONER

## 2019-11-25 PROCEDURE — 82803 BLOOD GASES ANY COMBINATION: CPT

## 2019-11-25 PROCEDURE — 82962 GLUCOSE BLOOD TEST: CPT

## 2019-11-25 PROCEDURE — 83880 ASSAY OF NATRIURETIC PEPTIDE: CPT | Performed by: EMERGENCY MEDICINE

## 2019-11-25 PROCEDURE — 25010000002 LEVOFLOXACIN PER 250 MG: Performed by: EMERGENCY MEDICINE

## 2019-11-25 PROCEDURE — 25010000002 FUROSEMIDE PER 20 MG: Performed by: NURSE PRACTITIONER

## 2019-11-25 PROCEDURE — 85610 PROTHROMBIN TIME: CPT | Performed by: EMERGENCY MEDICINE

## 2019-11-25 PROCEDURE — 83605 ASSAY OF LACTIC ACID: CPT

## 2019-11-25 PROCEDURE — 80048 BASIC METABOLIC PNL TOTAL CA: CPT | Performed by: NURSE PRACTITIONER

## 2019-11-25 PROCEDURE — 85730 THROMBOPLASTIN TIME PARTIAL: CPT | Performed by: EMERGENCY MEDICINE

## 2019-11-25 PROCEDURE — 36600 WITHDRAWAL OF ARTERIAL BLOOD: CPT

## 2019-11-25 PROCEDURE — 25010000002 FUROSEMIDE PER 20 MG: Performed by: EMERGENCY MEDICINE

## 2019-11-25 PROCEDURE — 84484 ASSAY OF TROPONIN QUANT: CPT | Performed by: EMERGENCY MEDICINE

## 2019-11-25 PROCEDURE — 87040 BLOOD CULTURE FOR BACTERIA: CPT | Performed by: EMERGENCY MEDICINE

## 2019-11-25 PROCEDURE — 93306 TTE W/DOPPLER COMPLETE: CPT | Performed by: INTERNAL MEDICINE

## 2019-11-25 PROCEDURE — 93306 TTE W/DOPPLER COMPLETE: CPT

## 2019-11-25 PROCEDURE — 99285 EMERGENCY DEPT VISIT HI MDM: CPT

## 2019-11-25 PROCEDURE — 80048 BASIC METABOLIC PNL TOTAL CA: CPT | Performed by: EMERGENCY MEDICINE

## 2019-11-25 PROCEDURE — 93005 ELECTROCARDIOGRAM TRACING: CPT | Performed by: EMERGENCY MEDICINE

## 2019-11-25 RX ORDER — ASPIRIN 325 MG
325 TABLET ORAL ONCE
Status: COMPLETED | OUTPATIENT
Start: 2019-11-25 | End: 2019-11-25

## 2019-11-25 RX ORDER — IPRATROPIUM BROMIDE AND ALBUTEROL SULFATE 2.5; .5 MG/3ML; MG/3ML
3 SOLUTION RESPIRATORY (INHALATION) EVERY 4 HOURS PRN
Status: DISCONTINUED | OUTPATIENT
Start: 2019-11-25 | End: 2019-12-06 | Stop reason: HOSPADM

## 2019-11-25 RX ORDER — POTASSIUM CHLORIDE 20 MEQ/1
40 TABLET, EXTENDED RELEASE ORAL AS NEEDED
Status: DISCONTINUED | OUTPATIENT
Start: 2019-11-25 | End: 2019-12-06 | Stop reason: HOSPADM

## 2019-11-25 RX ORDER — METOPROLOL SUCCINATE 25 MG/1
25 TABLET, EXTENDED RELEASE ORAL DAILY
Status: DISCONTINUED | OUTPATIENT
Start: 2019-11-26 | End: 2019-12-06

## 2019-11-25 RX ORDER — ALBUTEROL SULFATE 2.5 MG/3ML
2.5 SOLUTION RESPIRATORY (INHALATION) AS NEEDED
Status: COMPLETED | OUTPATIENT
Start: 2019-11-25 | End: 2019-11-25

## 2019-11-25 RX ORDER — BISACODYL 5 MG/1
5 TABLET, DELAYED RELEASE ORAL DAILY PRN
Status: DISCONTINUED | OUTPATIENT
Start: 2019-11-25 | End: 2019-12-06 | Stop reason: HOSPADM

## 2019-11-25 RX ORDER — CHOLECALCIFEROL (VITAMIN D3) 125 MCG
5 CAPSULE ORAL NIGHTLY PRN
Status: DISCONTINUED | OUTPATIENT
Start: 2019-11-25 | End: 2019-12-06 | Stop reason: HOSPADM

## 2019-11-25 RX ORDER — MAGNESIUM SULFATE 1 G/100ML
1 INJECTION INTRAVENOUS AS NEEDED
Status: DISCONTINUED | OUTPATIENT
Start: 2019-11-25 | End: 2019-12-06 | Stop reason: HOSPADM

## 2019-11-25 RX ORDER — IPRATROPIUM BROMIDE AND ALBUTEROL SULFATE 2.5; .5 MG/3ML; MG/3ML
3 SOLUTION RESPIRATORY (INHALATION) ONCE
Status: COMPLETED | OUTPATIENT
Start: 2019-11-25 | End: 2019-11-25

## 2019-11-25 RX ORDER — ASPIRIN 81 MG/1
81 TABLET, CHEWABLE ORAL DAILY
Status: DISCONTINUED | OUTPATIENT
Start: 2019-11-26 | End: 2019-12-06 | Stop reason: HOSPADM

## 2019-11-25 RX ORDER — FUROSEMIDE 10 MG/ML
40 INJECTION INTRAMUSCULAR; INTRAVENOUS EVERY 12 HOURS
Status: DISCONTINUED | OUTPATIENT
Start: 2019-11-25 | End: 2019-11-30

## 2019-11-25 RX ORDER — FUROSEMIDE 10 MG/ML
40 INJECTION INTRAMUSCULAR; INTRAVENOUS ONCE
Status: COMPLETED | OUTPATIENT
Start: 2019-11-25 | End: 2019-11-25

## 2019-11-25 RX ORDER — PANTOPRAZOLE SODIUM 40 MG/1
40 TABLET, DELAYED RELEASE ORAL EVERY MORNING
Status: DISCONTINUED | OUTPATIENT
Start: 2019-11-26 | End: 2019-12-06 | Stop reason: HOSPADM

## 2019-11-25 RX ORDER — ACETAMINOPHEN 650 MG/1
650 SUPPOSITORY RECTAL EVERY 4 HOURS PRN
Status: DISCONTINUED | OUTPATIENT
Start: 2019-11-25 | End: 2019-12-06 | Stop reason: HOSPADM

## 2019-11-25 RX ORDER — SODIUM CHLORIDE 0.9 % (FLUSH) 0.9 %
10 SYRINGE (ML) INJECTION AS NEEDED
Status: DISCONTINUED | OUTPATIENT
Start: 2019-11-25 | End: 2019-12-06 | Stop reason: HOSPADM

## 2019-11-25 RX ORDER — TAMSULOSIN HYDROCHLORIDE 0.4 MG/1
0.4 CAPSULE ORAL DAILY
Status: DISCONTINUED | OUTPATIENT
Start: 2019-11-26 | End: 2019-12-04

## 2019-11-25 RX ORDER — BISACODYL 10 MG
10 SUPPOSITORY, RECTAL RECTAL DAILY PRN
Status: DISCONTINUED | OUTPATIENT
Start: 2019-11-25 | End: 2019-12-06 | Stop reason: HOSPADM

## 2019-11-25 RX ORDER — MAGNESIUM SULFATE HEPTAHYDRATE 40 MG/ML
2 INJECTION, SOLUTION INTRAVENOUS AS NEEDED
Status: DISCONTINUED | OUTPATIENT
Start: 2019-11-25 | End: 2019-12-06 | Stop reason: HOSPADM

## 2019-11-25 RX ORDER — SODIUM CHLORIDE 0.9 % (FLUSH) 0.9 %
10 SYRINGE (ML) INJECTION EVERY 12 HOURS SCHEDULED
Status: DISCONTINUED | OUTPATIENT
Start: 2019-11-25 | End: 2019-12-06 | Stop reason: HOSPADM

## 2019-11-25 RX ORDER — ONDANSETRON 4 MG/1
4 TABLET, FILM COATED ORAL EVERY 6 HOURS PRN
Status: DISCONTINUED | OUTPATIENT
Start: 2019-11-25 | End: 2019-12-06 | Stop reason: HOSPADM

## 2019-11-25 RX ORDER — LISINOPRIL 20 MG/1
20 TABLET ORAL DAILY
Status: DISCONTINUED | OUTPATIENT
Start: 2019-11-26 | End: 2019-12-03

## 2019-11-25 RX ORDER — ONDANSETRON 2 MG/ML
4 INJECTION INTRAMUSCULAR; INTRAVENOUS EVERY 6 HOURS PRN
Status: DISCONTINUED | OUTPATIENT
Start: 2019-11-25 | End: 2019-12-06 | Stop reason: HOSPADM

## 2019-11-25 RX ORDER — LEVOFLOXACIN 5 MG/ML
750 INJECTION, SOLUTION INTRAVENOUS ONCE
Status: COMPLETED | OUTPATIENT
Start: 2019-11-25 | End: 2019-11-25

## 2019-11-25 RX ORDER — ATORVASTATIN CALCIUM 40 MG/1
80 TABLET, FILM COATED ORAL NIGHTLY
Status: DISCONTINUED | OUTPATIENT
Start: 2019-11-25 | End: 2019-12-06 | Stop reason: HOSPADM

## 2019-11-25 RX ORDER — ALUMINA, MAGNESIA, AND SIMETHICONE 2400; 2400; 240 MG/30ML; MG/30ML; MG/30ML
15 SUSPENSION ORAL EVERY 6 HOURS PRN
Status: DISCONTINUED | OUTPATIENT
Start: 2019-11-25 | End: 2019-12-06 | Stop reason: HOSPADM

## 2019-11-25 RX ORDER — POTASSIUM CHLORIDE 1.5 G/1.77G
40 POWDER, FOR SOLUTION ORAL AS NEEDED
Status: DISCONTINUED | OUTPATIENT
Start: 2019-11-25 | End: 2019-12-06 | Stop reason: HOSPADM

## 2019-11-25 RX ORDER — NICOTINE POLACRILEX 4 MG
15 LOZENGE BUCCAL
Status: DISCONTINUED | OUTPATIENT
Start: 2019-11-25 | End: 2019-12-06 | Stop reason: HOSPADM

## 2019-11-25 RX ORDER — ACETAMINOPHEN 325 MG/1
650 TABLET ORAL EVERY 4 HOURS PRN
Status: DISCONTINUED | OUTPATIENT
Start: 2019-11-25 | End: 2019-12-06 | Stop reason: HOSPADM

## 2019-11-25 RX ORDER — ACETAMINOPHEN 160 MG/5ML
650 SOLUTION ORAL EVERY 4 HOURS PRN
Status: DISCONTINUED | OUTPATIENT
Start: 2019-11-25 | End: 2019-12-06 | Stop reason: HOSPADM

## 2019-11-25 RX ORDER — DEXTROSE MONOHYDRATE 25 G/50ML
25 INJECTION, SOLUTION INTRAVENOUS
Status: DISCONTINUED | OUTPATIENT
Start: 2019-11-25 | End: 2019-12-06 | Stop reason: HOSPADM

## 2019-11-25 RX ADMIN — ATORVASTATIN CALCIUM 80 MG: 40 TABLET, FILM COATED ORAL at 20:36

## 2019-11-25 RX ADMIN — ALBUTEROL SULFATE 2.5 MG: 2.5 SOLUTION RESPIRATORY (INHALATION) at 10:35

## 2019-11-25 RX ADMIN — ASPIRIN 325 MG ORAL TABLET 325 MG: 325 PILL ORAL at 10:24

## 2019-11-25 RX ADMIN — FUROSEMIDE 40 MG: 10 INJECTION, SOLUTION INTRAMUSCULAR; INTRAVENOUS at 20:36

## 2019-11-25 RX ADMIN — IPRATROPIUM BROMIDE AND ALBUTEROL SULFATE 3 ML: .5; 3 SOLUTION RESPIRATORY (INHALATION) at 10:44

## 2019-11-25 RX ADMIN — FUROSEMIDE 40 MG: 10 INJECTION, SOLUTION INTRAVENOUS at 11:22

## 2019-11-25 RX ADMIN — ALBUTEROL SULFATE 2.5 MG: 2.5 SOLUTION RESPIRATORY (INHALATION) at 10:33

## 2019-11-25 RX ADMIN — NITROGLYCERIN 1 INCH: 20 OINTMENT TOPICAL at 10:24

## 2019-11-25 RX ADMIN — LEVOFLOXACIN 750 MG: 5 INJECTION, SOLUTION INTRAVENOUS at 10:24

## 2019-11-25 RX ADMIN — Medication 10 ML: at 20:36

## 2019-11-26 LAB
ANION GAP SERPL CALCULATED.3IONS-SCNC: 13 MMOL/L (ref 5–15)
BASOPHILS # BLD AUTO: 0.1 10*3/MM3 (ref 0–0.2)
BASOPHILS NFR BLD AUTO: 1.2 % (ref 0–1.5)
BUN BLD-MCNC: 14 MG/DL (ref 8–23)
BUN/CREAT SERPL: 9.3 (ref 7–25)
CALCIUM SPEC-SCNC: 8.8 MG/DL (ref 8.6–10.5)
CHLORIDE SERPL-SCNC: 101 MMOL/L (ref 98–107)
CO2 SERPL-SCNC: 29 MMOL/L (ref 22–29)
CREAT BLD-MCNC: 1.5 MG/DL (ref 0.76–1.27)
DEPRECATED RDW RBC AUTO: 46.4 FL (ref 37–54)
EOSINOPHIL # BLD AUTO: 0.3 10*3/MM3 (ref 0–0.4)
EOSINOPHIL NFR BLD AUTO: 2.7 % (ref 0.3–6.2)
ERYTHROCYTE [DISTWIDTH] IN BLOOD BY AUTOMATED COUNT: 15 % (ref 12.3–15.4)
GFR SERPL CREATININE-BSD FRML MDRD: 45 ML/MIN/1.73
GLUCOSE BLD-MCNC: 109 MG/DL (ref 65–99)
GLUCOSE BLDC GLUCOMTR-MCNC: 109 MG/DL (ref 70–105)
GLUCOSE BLDC GLUCOMTR-MCNC: 121 MG/DL (ref 70–105)
GLUCOSE BLDC GLUCOMTR-MCNC: 175 MG/DL (ref 70–105)
GLUCOSE BLDC GLUCOMTR-MCNC: 92 MG/DL (ref 70–105)
HCT VFR BLD AUTO: 35.2 % (ref 37.5–51)
HGB BLD-MCNC: 11.7 G/DL (ref 13–17.7)
LYMPHOCYTES # BLD AUTO: 1.2 10*3/MM3 (ref 0.7–3.1)
LYMPHOCYTES NFR BLD AUTO: 13.1 % (ref 19.6–45.3)
MAGNESIUM SERPL-MCNC: 1.2 MG/DL (ref 1.6–2.4)
MCH RBC QN AUTO: 28.6 PG (ref 26.6–33)
MCHC RBC AUTO-ENTMCNC: 33.3 G/DL (ref 31.5–35.7)
MCV RBC AUTO: 86 FL (ref 79–97)
MONOCYTES # BLD AUTO: 0.8 10*3/MM3 (ref 0.1–0.9)
MONOCYTES NFR BLD AUTO: 8.7 % (ref 5–12)
NEUTROPHILS # BLD AUTO: 6.9 10*3/MM3 (ref 1.7–7)
NEUTROPHILS NFR BLD AUTO: 74.3 % (ref 42.7–76)
NRBC BLD AUTO-RTO: 0 /100 WBC (ref 0–0.2)
PLATELET # BLD AUTO: 188 10*3/MM3 (ref 140–450)
PMV BLD AUTO: 10.8 FL (ref 6–12)
POTASSIUM BLD-SCNC: 3.7 MMOL/L (ref 3.5–5.2)
RBC # BLD AUTO: 4.1 10*6/MM3 (ref 4.14–5.8)
SODIUM BLD-SCNC: 143 MMOL/L (ref 136–145)
TROPONIN T SERPL-MCNC: 0.04 NG/ML (ref 0–0.03)
WBC NRBC COR # BLD: 9.3 10*3/MM3 (ref 3.4–10.8)

## 2019-11-26 PROCEDURE — 84484 ASSAY OF TROPONIN QUANT: CPT | Performed by: NURSE PRACTITIONER

## 2019-11-26 PROCEDURE — 99223 1ST HOSP IP/OBS HIGH 75: CPT | Performed by: INTERNAL MEDICINE

## 2019-11-26 PROCEDURE — 82962 GLUCOSE BLOOD TEST: CPT

## 2019-11-26 PROCEDURE — 97161 PT EVAL LOW COMPLEX 20 MIN: CPT

## 2019-11-26 PROCEDURE — 80048 BASIC METABOLIC PNL TOTAL CA: CPT | Performed by: NURSE PRACTITIONER

## 2019-11-26 PROCEDURE — 94799 UNLISTED PULMONARY SVC/PX: CPT

## 2019-11-26 PROCEDURE — 99232 SBSQ HOSP IP/OBS MODERATE 35: CPT | Performed by: INTERNAL MEDICINE

## 2019-11-26 PROCEDURE — 25010000002 FUROSEMIDE PER 20 MG: Performed by: NURSE PRACTITIONER

## 2019-11-26 PROCEDURE — 85025 COMPLETE CBC W/AUTO DIFF WBC: CPT | Performed by: NURSE PRACTITIONER

## 2019-11-26 PROCEDURE — 83735 ASSAY OF MAGNESIUM: CPT | Performed by: NURSE PRACTITIONER

## 2019-11-26 PROCEDURE — 25010000002 MAGNESIUM SULFATE 2 GM/50ML SOLUTION: Performed by: INTERNAL MEDICINE

## 2019-11-26 PROCEDURE — 25010000002 MAGNESIUM SULFATE 2 GM/50ML SOLUTION: Performed by: NURSE PRACTITIONER

## 2019-11-26 PROCEDURE — 97116 GAIT TRAINING THERAPY: CPT

## 2019-11-26 RX ORDER — MAGNESIUM SULFATE HEPTAHYDRATE 40 MG/ML
2 INJECTION, SOLUTION INTRAVENOUS ONCE
Status: DISCONTINUED | OUTPATIENT
Start: 2019-11-26 | End: 2019-11-26

## 2019-11-26 RX ORDER — MAGNESIUM SULFATE HEPTAHYDRATE 40 MG/ML
2 INJECTION, SOLUTION INTRAVENOUS ONCE
Status: COMPLETED | OUTPATIENT
Start: 2019-11-26 | End: 2019-11-26

## 2019-11-26 RX ADMIN — ASPIRIN 81 MG 81 MG: 81 TABLET ORAL at 09:09

## 2019-11-26 RX ADMIN — Medication 10 ML: at 21:15

## 2019-11-26 RX ADMIN — FUROSEMIDE 40 MG: 10 INJECTION, SOLUTION INTRAMUSCULAR; INTRAVENOUS at 21:15

## 2019-11-26 RX ADMIN — PHENOL 1 SPRAY: 1.5 LIQUID ORAL at 16:59

## 2019-11-26 RX ADMIN — SERTRALINE HYDROCHLORIDE 25 MG: 50 TABLET ORAL at 09:09

## 2019-11-26 RX ADMIN — Medication 10 ML: at 09:09

## 2019-11-26 RX ADMIN — LISINOPRIL 20 MG: 20 TABLET ORAL at 09:09

## 2019-11-26 RX ADMIN — PANTOPRAZOLE SODIUM 40 MG: 40 TABLET, DELAYED RELEASE ORAL at 06:03

## 2019-11-26 RX ADMIN — MAGNESIUM SULFATE HEPTAHYDRATE 2 G: 40 INJECTION, SOLUTION INTRAVENOUS at 06:03

## 2019-11-26 RX ADMIN — METOPROLOL SUCCINATE 25 MG: 25 TABLET, EXTENDED RELEASE ORAL at 09:09

## 2019-11-26 RX ADMIN — FUROSEMIDE 40 MG: 10 INJECTION, SOLUTION INTRAMUSCULAR; INTRAVENOUS at 09:09

## 2019-11-26 RX ADMIN — TAMSULOSIN HYDROCHLORIDE 0.4 MG: 0.4 CAPSULE ORAL at 09:09

## 2019-11-26 RX ADMIN — ATORVASTATIN CALCIUM 80 MG: 40 TABLET, FILM COATED ORAL at 21:15

## 2019-11-26 RX ADMIN — MAGNESIUM SULFATE HEPTAHYDRATE 2 G: 40 INJECTION, SOLUTION INTRAVENOUS at 10:09

## 2019-11-27 LAB
ALBUMIN SERPL-MCNC: 3.6 G/DL (ref 3.5–5.2)
ALBUMIN/GLOB SERPL: 1.3 G/DL
ALP SERPL-CCNC: 72 U/L (ref 39–117)
ALT SERPL W P-5'-P-CCNC: 12 U/L (ref 1–41)
ANION GAP SERPL CALCULATED.3IONS-SCNC: 10 MMOL/L (ref 5–15)
AST SERPL-CCNC: 13 U/L (ref 1–40)
BASOPHILS # BLD AUTO: 0.2 10*3/MM3 (ref 0–0.2)
BASOPHILS NFR BLD AUTO: 1.9 % (ref 0–1.5)
BILIRUB SERPL-MCNC: 0.7 MG/DL (ref 0.2–1.2)
BUN BLD-MCNC: 21 MG/DL (ref 8–23)
BUN/CREAT SERPL: 10.8 (ref 7–25)
CALCIUM SPEC-SCNC: 8.9 MG/DL (ref 8.6–10.5)
CHLORIDE SERPL-SCNC: 97 MMOL/L (ref 98–107)
CO2 SERPL-SCNC: 32 MMOL/L (ref 22–29)
CREAT BLD-MCNC: 1.95 MG/DL (ref 0.76–1.27)
DEPRECATED RDW RBC AUTO: 44.6 FL (ref 37–54)
EOSINOPHIL # BLD AUTO: 0.6 10*3/MM3 (ref 0–0.4)
EOSINOPHIL NFR BLD AUTO: 6.1 % (ref 0.3–6.2)
ERYTHROCYTE [DISTWIDTH] IN BLOOD BY AUTOMATED COUNT: 14.6 % (ref 12.3–15.4)
GFR SERPL CREATININE-BSD FRML MDRD: 33 ML/MIN/1.73
GLOBULIN UR ELPH-MCNC: 2.7 GM/DL
GLUCOSE BLD-MCNC: 115 MG/DL (ref 65–99)
GLUCOSE BLDC GLUCOMTR-MCNC: 101 MG/DL (ref 70–105)
GLUCOSE BLDC GLUCOMTR-MCNC: 106 MG/DL (ref 70–105)
GLUCOSE BLDC GLUCOMTR-MCNC: 136 MG/DL (ref 70–105)
GLUCOSE BLDC GLUCOMTR-MCNC: 152 MG/DL (ref 70–105)
HCT VFR BLD AUTO: 34.2 % (ref 37.5–51)
HGB BLD-MCNC: 11.3 G/DL (ref 13–17.7)
LYMPHOCYTES # BLD AUTO: 1.2 10*3/MM3 (ref 0.7–3.1)
LYMPHOCYTES NFR BLD AUTO: 13.2 % (ref 19.6–45.3)
MAGNESIUM SERPL-MCNC: 2 MG/DL (ref 1.6–2.4)
MCH RBC QN AUTO: 28.8 PG (ref 26.6–33)
MCHC RBC AUTO-ENTMCNC: 33.1 G/DL (ref 31.5–35.7)
MCV RBC AUTO: 86.9 FL (ref 79–97)
MONOCYTES # BLD AUTO: 0.7 10*3/MM3 (ref 0.1–0.9)
MONOCYTES NFR BLD AUTO: 8.2 % (ref 5–12)
NEUTROPHILS # BLD AUTO: 6.4 10*3/MM3 (ref 1.7–7)
NEUTROPHILS NFR BLD AUTO: 70.6 % (ref 42.7–76)
NRBC BLD AUTO-RTO: 0 /100 WBC (ref 0–0.2)
NT-PROBNP SERPL-MCNC: 6056 PG/ML (ref 5–1800)
PLATELET # BLD AUTO: 186 10*3/MM3 (ref 140–450)
PMV BLD AUTO: 10.8 FL (ref 6–12)
POTASSIUM BLD-SCNC: 4.1 MMOL/L (ref 3.5–5.2)
PROT SERPL-MCNC: 6.3 G/DL (ref 6–8.5)
RBC # BLD AUTO: 3.93 10*6/MM3 (ref 4.14–5.8)
SODIUM BLD-SCNC: 139 MMOL/L (ref 136–145)
TROPONIN T SERPL-MCNC: 0.04 NG/ML (ref 0–0.03)
TSH SERPL DL<=0.05 MIU/L-ACNC: 2.66 UIU/ML (ref 0.27–4.2)
WBC NRBC COR # BLD: 9.1 10*3/MM3 (ref 3.4–10.8)

## 2019-11-27 PROCEDURE — 93005 ELECTROCARDIOGRAM TRACING: CPT | Performed by: INTERNAL MEDICINE

## 2019-11-27 PROCEDURE — 82962 GLUCOSE BLOOD TEST: CPT

## 2019-11-27 PROCEDURE — 85025 COMPLETE CBC W/AUTO DIFF WBC: CPT | Performed by: NURSE PRACTITIONER

## 2019-11-27 PROCEDURE — 94799 UNLISTED PULMONARY SVC/PX: CPT

## 2019-11-27 PROCEDURE — 25010000002 FUROSEMIDE PER 20 MG: Performed by: NURSE PRACTITIONER

## 2019-11-27 PROCEDURE — 84484 ASSAY OF TROPONIN QUANT: CPT | Performed by: INTERNAL MEDICINE

## 2019-11-27 PROCEDURE — 99232 SBSQ HOSP IP/OBS MODERATE 35: CPT | Performed by: INTERNAL MEDICINE

## 2019-11-27 PROCEDURE — 25010000002 DOBUTAMINE PER 250 MG: Performed by: INTERNAL MEDICINE

## 2019-11-27 PROCEDURE — 83735 ASSAY OF MAGNESIUM: CPT | Performed by: INTERNAL MEDICINE

## 2019-11-27 PROCEDURE — 80053 COMPREHEN METABOLIC PANEL: CPT | Performed by: INTERNAL MEDICINE

## 2019-11-27 PROCEDURE — 83880 ASSAY OF NATRIURETIC PEPTIDE: CPT | Performed by: INTERNAL MEDICINE

## 2019-11-27 PROCEDURE — 99233 SBSQ HOSP IP/OBS HIGH 50: CPT | Performed by: INTERNAL MEDICINE

## 2019-11-27 PROCEDURE — 84443 ASSAY THYROID STIM HORMONE: CPT | Performed by: INTERNAL MEDICINE

## 2019-11-27 RX ORDER — DOBUTAMINE HYDROCHLORIDE 100 MG/100ML
5 INJECTION INTRAVENOUS CONTINUOUS
Status: DISCONTINUED | OUTPATIENT
Start: 2019-11-27 | End: 2019-12-05

## 2019-11-27 RX ADMIN — FUROSEMIDE 40 MG: 10 INJECTION, SOLUTION INTRAMUSCULAR; INTRAVENOUS at 21:28

## 2019-11-27 RX ADMIN — Medication 10 ML: at 08:50

## 2019-11-27 RX ADMIN — FUROSEMIDE 40 MG: 10 INJECTION, SOLUTION INTRAMUSCULAR; INTRAVENOUS at 08:50

## 2019-11-27 RX ADMIN — Medication 10 ML: at 21:28

## 2019-11-27 RX ADMIN — ATORVASTATIN CALCIUM 80 MG: 40 TABLET, FILM COATED ORAL at 21:28

## 2019-11-27 RX ADMIN — SERTRALINE HYDROCHLORIDE 25 MG: 50 TABLET ORAL at 08:49

## 2019-11-27 RX ADMIN — PANTOPRAZOLE SODIUM 40 MG: 40 TABLET, DELAYED RELEASE ORAL at 08:50

## 2019-11-27 RX ADMIN — METOPROLOL SUCCINATE 25 MG: 25 TABLET, EXTENDED RELEASE ORAL at 08:49

## 2019-11-27 RX ADMIN — LISINOPRIL 20 MG: 20 TABLET ORAL at 08:49

## 2019-11-27 RX ADMIN — TAMSULOSIN HYDROCHLORIDE 0.4 MG: 0.4 CAPSULE ORAL at 08:50

## 2019-11-27 RX ADMIN — DOBUTAMINE IN DEXTROSE 5 MCG/KG/MIN: 100 INJECTION, SOLUTION INTRAVENOUS at 12:32

## 2019-11-27 RX ADMIN — ASPIRIN 81 MG 81 MG: 81 TABLET ORAL at 08:50

## 2019-11-28 LAB
ALBUMIN SERPL-MCNC: 3.8 G/DL (ref 3.5–5.2)
ALBUMIN/GLOB SERPL: 1.3 G/DL
ALP SERPL-CCNC: 72 U/L (ref 39–117)
ALT SERPL W P-5'-P-CCNC: 11 U/L (ref 1–41)
ANION GAP SERPL CALCULATED.3IONS-SCNC: 9 MMOL/L (ref 5–15)
AST SERPL-CCNC: 12 U/L (ref 1–40)
BASOPHILS # BLD AUTO: 0.1 10*3/MM3 (ref 0–0.2)
BASOPHILS NFR BLD AUTO: 1.1 % (ref 0–1.5)
BILIRUB SERPL-MCNC: 0.8 MG/DL (ref 0.2–1.2)
BUN BLD-MCNC: 28 MG/DL (ref 8–23)
BUN/CREAT SERPL: 14.1 (ref 7–25)
CALCIUM SPEC-SCNC: 9.1 MG/DL (ref 8.6–10.5)
CHLORIDE SERPL-SCNC: 95 MMOL/L (ref 98–107)
CO2 SERPL-SCNC: 33 MMOL/L (ref 22–29)
CREAT BLD-MCNC: 1.99 MG/DL (ref 0.76–1.27)
DEPRECATED RDW RBC AUTO: 46.8 FL (ref 37–54)
EOSINOPHIL # BLD AUTO: 0.4 10*3/MM3 (ref 0–0.4)
EOSINOPHIL NFR BLD AUTO: 4.4 % (ref 0.3–6.2)
ERYTHROCYTE [DISTWIDTH] IN BLOOD BY AUTOMATED COUNT: 15.1 % (ref 12.3–15.4)
GFR SERPL CREATININE-BSD FRML MDRD: 32 ML/MIN/1.73
GLOBULIN UR ELPH-MCNC: 2.9 GM/DL
GLUCOSE BLD-MCNC: 107 MG/DL (ref 65–99)
GLUCOSE BLDC GLUCOMTR-MCNC: 101 MG/DL (ref 70–105)
GLUCOSE BLDC GLUCOMTR-MCNC: 109 MG/DL (ref 70–105)
GLUCOSE BLDC GLUCOMTR-MCNC: 122 MG/DL (ref 70–105)
GLUCOSE BLDC GLUCOMTR-MCNC: 130 MG/DL (ref 70–105)
HCT VFR BLD AUTO: 35.9 % (ref 37.5–51)
HGB BLD-MCNC: 11.5 G/DL (ref 13–17.7)
LYMPHOCYTES # BLD AUTO: 1.4 10*3/MM3 (ref 0.7–3.1)
LYMPHOCYTES NFR BLD AUTO: 15.8 % (ref 19.6–45.3)
MAGNESIUM SERPL-MCNC: 1.9 MG/DL (ref 1.6–2.4)
MCH RBC QN AUTO: 27.9 PG (ref 26.6–33)
MCHC RBC AUTO-ENTMCNC: 32.1 G/DL (ref 31.5–35.7)
MCV RBC AUTO: 86.8 FL (ref 79–97)
MONOCYTES # BLD AUTO: 0.7 10*3/MM3 (ref 0.1–0.9)
MONOCYTES NFR BLD AUTO: 7.8 % (ref 5–12)
NEUTROPHILS # BLD AUTO: 6.5 10*3/MM3 (ref 1.7–7)
NEUTROPHILS NFR BLD AUTO: 70.9 % (ref 42.7–76)
NRBC BLD AUTO-RTO: 0 /100 WBC (ref 0–0.2)
PLATELET # BLD AUTO: 206 10*3/MM3 (ref 140–450)
PMV BLD AUTO: 10.7 FL (ref 6–12)
POTASSIUM BLD-SCNC: 4.2 MMOL/L (ref 3.5–5.2)
PROT SERPL-MCNC: 6.7 G/DL (ref 6–8.5)
RBC # BLD AUTO: 4.13 10*6/MM3 (ref 4.14–5.8)
SODIUM BLD-SCNC: 137 MMOL/L (ref 136–145)
WBC NRBC COR # BLD: 9.2 10*3/MM3 (ref 3.4–10.8)

## 2019-11-28 PROCEDURE — 83735 ASSAY OF MAGNESIUM: CPT | Performed by: INTERNAL MEDICINE

## 2019-11-28 PROCEDURE — 85025 COMPLETE CBC W/AUTO DIFF WBC: CPT | Performed by: NURSE PRACTITIONER

## 2019-11-28 PROCEDURE — 80053 COMPREHEN METABOLIC PANEL: CPT | Performed by: INTERNAL MEDICINE

## 2019-11-28 PROCEDURE — 25010000002 FUROSEMIDE PER 20 MG: Performed by: NURSE PRACTITIONER

## 2019-11-28 PROCEDURE — 99233 SBSQ HOSP IP/OBS HIGH 50: CPT | Performed by: INTERNAL MEDICINE

## 2019-11-28 PROCEDURE — 94799 UNLISTED PULMONARY SVC/PX: CPT

## 2019-11-28 PROCEDURE — 99232 SBSQ HOSP IP/OBS MODERATE 35: CPT | Performed by: INTERNAL MEDICINE

## 2019-11-28 PROCEDURE — 25010000002 DOBUTAMINE PER 250 MG: Performed by: INTERNAL MEDICINE

## 2019-11-28 PROCEDURE — 82962 GLUCOSE BLOOD TEST: CPT

## 2019-11-28 RX ADMIN — ATORVASTATIN CALCIUM 80 MG: 40 TABLET, FILM COATED ORAL at 20:14

## 2019-11-28 RX ADMIN — PANTOPRAZOLE SODIUM 40 MG: 40 TABLET, DELAYED RELEASE ORAL at 07:05

## 2019-11-28 RX ADMIN — TAMSULOSIN HYDROCHLORIDE 0.4 MG: 0.4 CAPSULE ORAL at 08:55

## 2019-11-28 RX ADMIN — FUROSEMIDE 40 MG: 10 INJECTION, SOLUTION INTRAMUSCULAR; INTRAVENOUS at 08:55

## 2019-11-28 RX ADMIN — Medication 10 ML: at 08:55

## 2019-11-28 RX ADMIN — ASPIRIN 81 MG 81 MG: 81 TABLET ORAL at 08:54

## 2019-11-28 RX ADMIN — SERTRALINE HYDROCHLORIDE 25 MG: 50 TABLET ORAL at 08:55

## 2019-11-28 RX ADMIN — DOBUTAMINE IN DEXTROSE 5 MCG/KG/MIN: 100 INJECTION, SOLUTION INTRAVENOUS at 06:14

## 2019-11-28 RX ADMIN — Medication 10 ML: at 20:17

## 2019-11-28 RX ADMIN — DOBUTAMINE IN DEXTROSE 5 MCG/KG/MIN: 100 INJECTION, SOLUTION INTRAVENOUS at 17:53

## 2019-11-28 RX ADMIN — FUROSEMIDE 40 MG: 10 INJECTION, SOLUTION INTRAMUSCULAR; INTRAVENOUS at 20:14

## 2019-11-28 RX ADMIN — METOPROLOL SUCCINATE 25 MG: 25 TABLET, EXTENDED RELEASE ORAL at 08:55

## 2019-11-28 RX ADMIN — LISINOPRIL 20 MG: 20 TABLET ORAL at 08:54

## 2019-11-29 LAB
ALBUMIN SERPL-MCNC: 3.7 G/DL (ref 3.5–5.2)
ALBUMIN/GLOB SERPL: 1.3 G/DL
ALP SERPL-CCNC: 69 U/L (ref 39–117)
ALT SERPL W P-5'-P-CCNC: 11 U/L (ref 1–41)
ANION GAP SERPL CALCULATED.3IONS-SCNC: 11 MMOL/L (ref 5–15)
AST SERPL-CCNC: 13 U/L (ref 1–40)
BASOPHILS # BLD AUTO: 0.1 10*3/MM3 (ref 0–0.2)
BASOPHILS NFR BLD AUTO: 1.5 % (ref 0–1.5)
BILIRUB SERPL-MCNC: 0.7 MG/DL (ref 0.2–1.2)
BUN BLD-MCNC: 33 MG/DL (ref 8–23)
BUN/CREAT SERPL: 16.4 (ref 7–25)
CALCIUM SPEC-SCNC: 8.9 MG/DL (ref 8.6–10.5)
CHLORIDE SERPL-SCNC: 96 MMOL/L (ref 98–107)
CO2 SERPL-SCNC: 29 MMOL/L (ref 22–29)
CREAT BLD-MCNC: 2.01 MG/DL (ref 0.76–1.27)
DEPRECATED RDW RBC AUTO: 45.9 FL (ref 37–54)
EOSINOPHIL # BLD AUTO: 0.4 10*3/MM3 (ref 0–0.4)
EOSINOPHIL NFR BLD AUTO: 5.1 % (ref 0.3–6.2)
ERYTHROCYTE [DISTWIDTH] IN BLOOD BY AUTOMATED COUNT: 14.8 % (ref 12.3–15.4)
GFR SERPL CREATININE-BSD FRML MDRD: 32 ML/MIN/1.73
GLOBULIN UR ELPH-MCNC: 2.8 GM/DL
GLUCOSE BLD-MCNC: 105 MG/DL (ref 65–99)
GLUCOSE BLDC GLUCOMTR-MCNC: 106 MG/DL (ref 70–105)
GLUCOSE BLDC GLUCOMTR-MCNC: 110 MG/DL (ref 70–105)
GLUCOSE BLDC GLUCOMTR-MCNC: 125 MG/DL (ref 70–105)
GLUCOSE BLDC GLUCOMTR-MCNC: 139 MG/DL (ref 70–105)
HCT VFR BLD AUTO: 35.3 % (ref 37.5–51)
HGB BLD-MCNC: 11.6 G/DL (ref 13–17.7)
LYMPHOCYTES # BLD AUTO: 1 10*3/MM3 (ref 0.7–3.1)
LYMPHOCYTES NFR BLD AUTO: 12.7 % (ref 19.6–45.3)
MCH RBC QN AUTO: 28.5 PG (ref 26.6–33)
MCHC RBC AUTO-ENTMCNC: 32.8 G/DL (ref 31.5–35.7)
MCV RBC AUTO: 86.7 FL (ref 79–97)
MONOCYTES # BLD AUTO: 0.6 10*3/MM3 (ref 0.1–0.9)
MONOCYTES NFR BLD AUTO: 7.6 % (ref 5–12)
NEUTROPHILS # BLD AUTO: 6 10*3/MM3 (ref 1.7–7)
NEUTROPHILS NFR BLD AUTO: 73.1 % (ref 42.7–76)
NRBC BLD AUTO-RTO: 0 /100 WBC (ref 0–0.2)
PLATELET # BLD AUTO: 181 10*3/MM3 (ref 140–450)
PMV BLD AUTO: 10.7 FL (ref 6–12)
POTASSIUM BLD-SCNC: 4.3 MMOL/L (ref 3.5–5.2)
PROT SERPL-MCNC: 6.5 G/DL (ref 6–8.5)
RBC # BLD AUTO: 4.07 10*6/MM3 (ref 4.14–5.8)
SODIUM BLD-SCNC: 136 MMOL/L (ref 136–145)
WBC NRBC COR # BLD: 8.2 10*3/MM3 (ref 3.4–10.8)

## 2019-11-29 PROCEDURE — 80053 COMPREHEN METABOLIC PANEL: CPT | Performed by: INTERNAL MEDICINE

## 2019-11-29 PROCEDURE — 25010000002 FUROSEMIDE PER 20 MG: Performed by: NURSE PRACTITIONER

## 2019-11-29 PROCEDURE — 99232 SBSQ HOSP IP/OBS MODERATE 35: CPT | Performed by: INTERNAL MEDICINE

## 2019-11-29 PROCEDURE — 82962 GLUCOSE BLOOD TEST: CPT

## 2019-11-29 PROCEDURE — 94799 UNLISTED PULMONARY SVC/PX: CPT

## 2019-11-29 PROCEDURE — 85025 COMPLETE CBC W/AUTO DIFF WBC: CPT | Performed by: NURSE PRACTITIONER

## 2019-11-29 PROCEDURE — 94660 CPAP INITIATION&MGMT: CPT

## 2019-11-29 PROCEDURE — 25010000002 DOBUTAMINE PER 250 MG: Performed by: INTERNAL MEDICINE

## 2019-11-29 RX ADMIN — ATORVASTATIN CALCIUM 80 MG: 40 TABLET, FILM COATED ORAL at 21:50

## 2019-11-29 RX ADMIN — FUROSEMIDE 40 MG: 10 INJECTION, SOLUTION INTRAMUSCULAR; INTRAVENOUS at 21:52

## 2019-11-29 RX ADMIN — Medication 10 ML: at 10:31

## 2019-11-29 RX ADMIN — DOBUTAMINE IN DEXTROSE 5 MCG/KG/MIN: 100 INJECTION, SOLUTION INTRAVENOUS at 20:13

## 2019-11-29 RX ADMIN — PANTOPRAZOLE SODIUM 40 MG: 40 TABLET, DELAYED RELEASE ORAL at 07:52

## 2019-11-29 RX ADMIN — ASPIRIN 81 MG 81 MG: 81 TABLET ORAL at 10:30

## 2019-11-29 RX ADMIN — FUROSEMIDE 40 MG: 10 INJECTION, SOLUTION INTRAMUSCULAR; INTRAVENOUS at 10:30

## 2019-11-29 RX ADMIN — SERTRALINE HYDROCHLORIDE 25 MG: 50 TABLET ORAL at 10:30

## 2019-11-29 RX ADMIN — TAMSULOSIN HYDROCHLORIDE 0.4 MG: 0.4 CAPSULE ORAL at 10:30

## 2019-11-29 RX ADMIN — DOBUTAMINE IN DEXTROSE 5 MCG/KG/MIN: 100 INJECTION, SOLUTION INTRAVENOUS at 07:52

## 2019-11-29 RX ADMIN — LISINOPRIL 20 MG: 20 TABLET ORAL at 10:30

## 2019-11-29 RX ADMIN — Medication 10 ML: at 21:53

## 2019-11-29 RX ADMIN — METOPROLOL SUCCINATE 25 MG: 25 TABLET, EXTENDED RELEASE ORAL at 10:30

## 2019-11-30 LAB
ALBUMIN SERPL-MCNC: 3.8 G/DL (ref 3.5–5.2)
ALBUMIN/GLOB SERPL: 1.3 G/DL
ALP SERPL-CCNC: 70 U/L (ref 39–117)
ALT SERPL W P-5'-P-CCNC: 12 U/L (ref 1–41)
ANION GAP SERPL CALCULATED.3IONS-SCNC: 13 MMOL/L (ref 5–15)
AST SERPL-CCNC: 15 U/L (ref 1–40)
BACTERIA SPEC AEROBE CULT: NORMAL
BACTERIA SPEC AEROBE CULT: NORMAL
BASOPHILS # BLD AUTO: 0.1 10*3/MM3 (ref 0–0.2)
BASOPHILS NFR BLD AUTO: 2 % (ref 0–1.5)
BILIRUB SERPL-MCNC: 0.7 MG/DL (ref 0.2–1.2)
BUN BLD-MCNC: 35 MG/DL (ref 8–23)
BUN/CREAT SERPL: 18.1 (ref 7–25)
CALCIUM SPEC-SCNC: 9 MG/DL (ref 8.6–10.5)
CHLORIDE SERPL-SCNC: 95 MMOL/L (ref 98–107)
CO2 SERPL-SCNC: 30 MMOL/L (ref 22–29)
CREAT BLD-MCNC: 1.93 MG/DL (ref 0.76–1.27)
DEPRECATED RDW RBC AUTO: 45.5 FL (ref 37–54)
EOSINOPHIL # BLD AUTO: 0.4 10*3/MM3 (ref 0–0.4)
EOSINOPHIL NFR BLD AUTO: 5.7 % (ref 0.3–6.2)
ERYTHROCYTE [DISTWIDTH] IN BLOOD BY AUTOMATED COUNT: 14.9 % (ref 12.3–15.4)
GFR SERPL CREATININE-BSD FRML MDRD: 33 ML/MIN/1.73
GLOBULIN UR ELPH-MCNC: 2.9 GM/DL
GLUCOSE BLD-MCNC: 97 MG/DL (ref 65–99)
GLUCOSE BLDC GLUCOMTR-MCNC: 107 MG/DL (ref 70–105)
GLUCOSE BLDC GLUCOMTR-MCNC: 118 MG/DL (ref 70–105)
GLUCOSE BLDC GLUCOMTR-MCNC: 133 MG/DL (ref 70–105)
GLUCOSE BLDC GLUCOMTR-MCNC: 142 MG/DL (ref 70–105)
HCT VFR BLD AUTO: 34.7 % (ref 37.5–51)
HGB BLD-MCNC: 11.3 G/DL (ref 13–17.7)
LYMPHOCYTES # BLD AUTO: 1.2 10*3/MM3 (ref 0.7–3.1)
LYMPHOCYTES NFR BLD AUTO: 15.9 % (ref 19.6–45.3)
MCH RBC QN AUTO: 28.3 PG (ref 26.6–33)
MCHC RBC AUTO-ENTMCNC: 32.7 G/DL (ref 31.5–35.7)
MCV RBC AUTO: 86.5 FL (ref 79–97)
MONOCYTES # BLD AUTO: 0.6 10*3/MM3 (ref 0.1–0.9)
MONOCYTES NFR BLD AUTO: 7.8 % (ref 5–12)
NEUTROPHILS # BLD AUTO: 5.2 10*3/MM3 (ref 1.7–7)
NEUTROPHILS NFR BLD AUTO: 68.6 % (ref 42.7–76)
NRBC BLD AUTO-RTO: 0 /100 WBC (ref 0–0.2)
PLATELET # BLD AUTO: 191 10*3/MM3 (ref 140–450)
PMV BLD AUTO: 11.1 FL (ref 6–12)
POTASSIUM BLD-SCNC: 4.7 MMOL/L (ref 3.5–5.2)
PROT SERPL-MCNC: 6.7 G/DL (ref 6–8.5)
RBC # BLD AUTO: 4.01 10*6/MM3 (ref 4.14–5.8)
SODIUM BLD-SCNC: 138 MMOL/L (ref 136–145)
WBC NRBC COR # BLD: 7.5 10*3/MM3 (ref 3.4–10.8)

## 2019-11-30 PROCEDURE — 25010000002 DOBUTAMINE PER 250 MG: Performed by: INTERNAL MEDICINE

## 2019-11-30 PROCEDURE — 94799 UNLISTED PULMONARY SVC/PX: CPT

## 2019-11-30 PROCEDURE — 85025 COMPLETE CBC W/AUTO DIFF WBC: CPT | Performed by: NURSE PRACTITIONER

## 2019-11-30 PROCEDURE — 99232 SBSQ HOSP IP/OBS MODERATE 35: CPT | Performed by: INTERNAL MEDICINE

## 2019-11-30 PROCEDURE — 82962 GLUCOSE BLOOD TEST: CPT

## 2019-11-30 PROCEDURE — 80053 COMPREHEN METABOLIC PANEL: CPT | Performed by: INTERNAL MEDICINE

## 2019-11-30 PROCEDURE — 94660 CPAP INITIATION&MGMT: CPT

## 2019-11-30 RX ORDER — LOPERAMIDE HYDROCHLORIDE 2 MG/1
2 CAPSULE ORAL 4 TIMES DAILY PRN
Status: DISCONTINUED | OUTPATIENT
Start: 2019-11-30 | End: 2019-12-06 | Stop reason: HOSPADM

## 2019-11-30 RX ORDER — FUROSEMIDE 20 MG/1
20 TABLET ORAL
Status: DISCONTINUED | OUTPATIENT
Start: 2019-11-30 | End: 2019-12-06 | Stop reason: HOSPADM

## 2019-11-30 RX ADMIN — LOPERAMIDE HYDROCHLORIDE 2 MG: 2 CAPSULE ORAL at 20:31

## 2019-11-30 RX ADMIN — Medication 10 ML: at 20:33

## 2019-11-30 RX ADMIN — FUROSEMIDE 20 MG: 20 TABLET ORAL at 17:43

## 2019-11-30 RX ADMIN — TAMSULOSIN HYDROCHLORIDE 0.4 MG: 0.4 CAPSULE ORAL at 10:51

## 2019-11-30 RX ADMIN — ATORVASTATIN CALCIUM 80 MG: 40 TABLET, FILM COATED ORAL at 20:31

## 2019-11-30 RX ADMIN — FUROSEMIDE 20 MG: 20 TABLET ORAL at 12:14

## 2019-11-30 RX ADMIN — DOBUTAMINE IN DEXTROSE 2.5 MCG/KG/MIN: 100 INJECTION, SOLUTION INTRAVENOUS at 17:44

## 2019-11-30 RX ADMIN — ASPIRIN 81 MG 81 MG: 81 TABLET ORAL at 10:52

## 2019-11-30 RX ADMIN — SERTRALINE HYDROCHLORIDE 25 MG: 50 TABLET ORAL at 10:51

## 2019-11-30 RX ADMIN — Medication 10 ML: at 10:51

## 2019-11-30 RX ADMIN — PANTOPRAZOLE SODIUM 40 MG: 40 TABLET, DELAYED RELEASE ORAL at 06:37

## 2019-12-01 LAB
ALBUMIN SERPL-MCNC: 3.7 G/DL (ref 3.5–5.2)
ALBUMIN/GLOB SERPL: 1.2 G/DL
ALP SERPL-CCNC: 68 U/L (ref 39–117)
ALT SERPL W P-5'-P-CCNC: 14 U/L (ref 1–41)
ANION GAP SERPL CALCULATED.3IONS-SCNC: 12 MMOL/L (ref 5–15)
AST SERPL-CCNC: 17 U/L (ref 1–40)
BASOPHILS # BLD AUTO: 0.2 10*3/MM3 (ref 0–0.2)
BASOPHILS NFR BLD AUTO: 2.4 % (ref 0–1.5)
BILIRUB SERPL-MCNC: 0.6 MG/DL (ref 0.2–1.2)
BUN BLD-MCNC: 34 MG/DL (ref 8–23)
BUN/CREAT SERPL: 20.5 (ref 7–25)
CALCIUM SPEC-SCNC: 9.2 MG/DL (ref 8.6–10.5)
CHLORIDE SERPL-SCNC: 96 MMOL/L (ref 98–107)
CO2 SERPL-SCNC: 29 MMOL/L (ref 22–29)
CREAT BLD-MCNC: 1.66 MG/DL (ref 0.76–1.27)
DEPRECATED RDW RBC AUTO: 45.5 FL (ref 37–54)
EOSINOPHIL # BLD AUTO: 0.4 10*3/MM3 (ref 0–0.4)
EOSINOPHIL NFR BLD AUTO: 5.6 % (ref 0.3–6.2)
ERYTHROCYTE [DISTWIDTH] IN BLOOD BY AUTOMATED COUNT: 14.8 % (ref 12.3–15.4)
GFR SERPL CREATININE-BSD FRML MDRD: 40 ML/MIN/1.73
GLOBULIN UR ELPH-MCNC: 3 GM/DL
GLUCOSE BLD-MCNC: 104 MG/DL (ref 65–99)
GLUCOSE BLDC GLUCOMTR-MCNC: 102 MG/DL (ref 70–105)
GLUCOSE BLDC GLUCOMTR-MCNC: 110 MG/DL (ref 70–105)
GLUCOSE BLDC GLUCOMTR-MCNC: 124 MG/DL (ref 70–105)
GLUCOSE BLDC GLUCOMTR-MCNC: 96 MG/DL (ref 70–105)
HCT VFR BLD AUTO: 35.7 % (ref 37.5–51)
HGB BLD-MCNC: 11.7 G/DL (ref 13–17.7)
LYMPHOCYTES # BLD AUTO: 1.1 10*3/MM3 (ref 0.7–3.1)
LYMPHOCYTES NFR BLD AUTO: 15.3 % (ref 19.6–45.3)
MCH RBC QN AUTO: 28.2 PG (ref 26.6–33)
MCHC RBC AUTO-ENTMCNC: 32.8 G/DL (ref 31.5–35.7)
MCV RBC AUTO: 86 FL (ref 79–97)
MONOCYTES # BLD AUTO: 0.5 10*3/MM3 (ref 0.1–0.9)
MONOCYTES NFR BLD AUTO: 7.3 % (ref 5–12)
NEUTROPHILS # BLD AUTO: 4.9 10*3/MM3 (ref 1.7–7)
NEUTROPHILS NFR BLD AUTO: 69.4 % (ref 42.7–76)
NRBC BLD AUTO-RTO: 0 /100 WBC (ref 0–0.2)
PLATELET # BLD AUTO: 181 10*3/MM3 (ref 140–450)
PMV BLD AUTO: 10.3 FL (ref 6–12)
POTASSIUM BLD-SCNC: 4.7 MMOL/L (ref 3.5–5.2)
PROT SERPL-MCNC: 6.7 G/DL (ref 6–8.5)
RBC # BLD AUTO: 4.16 10*6/MM3 (ref 4.14–5.8)
SODIUM BLD-SCNC: 137 MMOL/L (ref 136–145)
WBC NRBC COR # BLD: 7.1 10*3/MM3 (ref 3.4–10.8)

## 2019-12-01 PROCEDURE — 94660 CPAP INITIATION&MGMT: CPT

## 2019-12-01 PROCEDURE — 25010000002 DOBUTAMINE PER 250 MG: Performed by: INTERNAL MEDICINE

## 2019-12-01 PROCEDURE — 82962 GLUCOSE BLOOD TEST: CPT

## 2019-12-01 PROCEDURE — 94799 UNLISTED PULMONARY SVC/PX: CPT

## 2019-12-01 PROCEDURE — 85025 COMPLETE CBC W/AUTO DIFF WBC: CPT | Performed by: NURSE PRACTITIONER

## 2019-12-01 PROCEDURE — 99232 SBSQ HOSP IP/OBS MODERATE 35: CPT | Performed by: INTERNAL MEDICINE

## 2019-12-01 PROCEDURE — 80053 COMPREHEN METABOLIC PANEL: CPT | Performed by: INTERNAL MEDICINE

## 2019-12-01 RX ADMIN — FUROSEMIDE 20 MG: 20 TABLET ORAL at 08:39

## 2019-12-01 RX ADMIN — ATORVASTATIN CALCIUM 80 MG: 40 TABLET, FILM COATED ORAL at 22:19

## 2019-12-01 RX ADMIN — METOPROLOL SUCCINATE 25 MG: 25 TABLET, EXTENDED RELEASE ORAL at 08:39

## 2019-12-01 RX ADMIN — LISINOPRIL 20 MG: 20 TABLET ORAL at 08:38

## 2019-12-01 RX ADMIN — SERTRALINE HYDROCHLORIDE 25 MG: 50 TABLET ORAL at 08:39

## 2019-12-01 RX ADMIN — DOBUTAMINE IN DEXTROSE 2.5 MCG/KG/MIN: 100 INJECTION, SOLUTION INTRAVENOUS at 20:47

## 2019-12-01 RX ADMIN — Medication 10 ML: at 22:19

## 2019-12-01 RX ADMIN — FUROSEMIDE 20 MG: 20 TABLET ORAL at 18:02

## 2019-12-01 RX ADMIN — PANTOPRAZOLE SODIUM 40 MG: 40 TABLET, DELAYED RELEASE ORAL at 06:05

## 2019-12-01 RX ADMIN — Medication 10 ML: at 08:38

## 2019-12-01 RX ADMIN — ASPIRIN 81 MG 81 MG: 81 TABLET ORAL at 08:38

## 2019-12-01 RX ADMIN — TAMSULOSIN HYDROCHLORIDE 0.4 MG: 0.4 CAPSULE ORAL at 08:38

## 2019-12-01 NOTE — THERAPY TREATMENT NOTE
Pt refused to use Bipap last night. Encouraged pt to use and he stated he liked the nasal cannula better but if he got into trouble he would try the bipap.

## 2019-12-01 NOTE — PLAN OF CARE
Problem: Patient Care Overview  Goal: Plan of Care Review  Outcome: Ongoing (interventions implemented as appropriate)   12/01/19 0143   OTHER   Outcome Summary Dobutamine infusing at 2.5mcg/kg/min throughout night. Occasional PVC'S noted throughout night. No specific voiced complaints. Will continue to monitor.     Goal: Discharge Needs Assessment  Outcome: Ongoing (interventions implemented as appropriate)    Goal: Interprofessional Rounds/Family Conf  Outcome: Ongoing (interventions implemented as appropriate)      Problem: Cardiac: Heart Failure (Adult)  Goal: Signs and Symptoms of Listed Potential Problems Will be Absent, Minimized or Managed (Cardiac: Heart Failure)  Outcome: Ongoing (interventions implemented as appropriate)

## 2019-12-01 NOTE — PROGRESS NOTES
Referring Provider: Reji Newby Jr., MD    Reason for follow-up:   Congestive heart failure  Status post stent placement  Renal dysfunction     Patient Care Team:  Xander Robison MD as PCP - General  Xander Robison MD as PCP - Claims Attributed    Subjective .  Feeling better     ROS    Since I have last seen him yesterday, the patient has been without any chest discomfort ,shortness of breath, palpitations, dizziness or syncope.  Denies having any headache ,abdominal pain ,nausea, vomiting , diarrhea constipation, loss of weight or loss of appetite.  Denies having any excessive bruising ,hematuria or blood in the stool.    Review of all systems negative except as indicated    History  Past Medical History:   Diagnosis Date   • Anemia    • CAD (coronary artery disease)    • Carotid artery disease (CMS/HCC)    • Diabetes mellitus (CMS/HCC)    • Gastritis    • GERD (gastroesophageal reflux disease)    • Hyperlipidemia    • Hypertension    • Mood disorder (CMS/HCC)    • Osteoarthritis    • Premature atrial contractions    • Prostatic hypertrophy    • Pulmonary valve disorder    • PVD (peripheral vascular disease) (CMS/HCC)    • Renal disease    • Sleep apnea        Past Surgical History:   Procedure Laterality Date   • BACK SURGERY     • CHOLECYSTECTOMY         Family History   Problem Relation Age of Onset   • Cancer Mother    • Heart disease Father    • Cancer Brother        Social History     Tobacco Use   • Smoking status: Former Smoker     Types: Cigarettes   • Smokeless tobacco: Never Used   Substance Use Topics   • Alcohol use: No     Frequency: Never   • Drug use: No        Medications Prior to Admission   Medication Sig Dispense Refill Last Dose   • metFORMIN (GLUCOPHAGE) 500 MG tablet Take 500 mg by mouth Every Night.      • aspirin 81 MG tablet Take 81 mg by mouth Daily.   Taking   • atorvastatin (LIPITOR) 80 MG tablet Take 1 tablet by mouth Every Night. 30 tablet 5 Taking   • lisinopril  (PRINIVIL,ZESTRIL) 20 MG tablet Take 1 tablet by mouth Daily. 30 tablet 5 Taking   • metoprolol succinate XL (TOPROL-XL) 25 MG 24 hr tablet Take 1 tablet by mouth Daily. 30 tablet 5 Taking   • omeprazole (priLOSEC) 40 MG capsule Take 1 capsule by mouth Daily. 30 capsule 5 Taking   • sertraline (ZOLOFT) 25 MG tablet Take 1 tablet by mouth Daily. 30 tablet 5 Taking   • tamsulosin (FLOMAX) 0.4 MG capsule 24 hr capsule Take 1 capsule by mouth Daily. 30 capsule 5 Taking       Allergies  Penicillins    Scheduled Meds:    aspirin 81 mg Oral Daily   atorvastatin 80 mg Oral Nightly   furosemide 20 mg Oral BID   insulin lispro 0-7 Units Subcutaneous 4x Daily With Meals & Nightly   lisinopril 20 mg Oral Daily   metoprolol succinate XL 25 mg Oral Daily   pantoprazole 40 mg Oral QAM   sertraline 25 mg Oral Daily   sodium chloride 10 mL Intravenous Q12H   tamsulosin 0.4 mg Oral Daily     Continuous Infusions:    DOBUTamine 5 mcg/kg/min Last Rate: 2.5 mcg/kg/min (11/30/19 5854)     PRN Meds:.•  acetaminophen **OR** acetaminophen **OR** acetaminophen  •  aluminum-magnesium hydroxide-simethicone  •  bisacodyl  •  bisacodyl  •  dextrose  •  dextrose  •  glucagon (human recombinant)  •  influenza vaccine  •  insulin lispro **AND** insulin lispro  •  ipratropium-albuterol  •  loperamide  •  magnesium hydroxide  •  magnesium sulfate **OR** magnesium sulfate in D5W 1g/100mL (PREMIX)  •  melatonin  •  ondansetron **OR** ondansetron  •  phenol  •  potassium chloride  •  potassium chloride  •  [COMPLETED] Insert peripheral IV **AND** sodium chloride  •  sodium chloride  •  sodium chloride    Objective     VITAL SIGNS  Vitals:    12/01/19 0700 12/01/19 0838 12/01/19 1026 12/01/19 1423   BP: 106/55 123/84 107/64 104/47   BP Location:   Right arm Right arm   Patient Position:   Lying Lying   Pulse: 74 72 71 67   Resp: 18  22 16   Temp: 97.5 °F (36.4 °C)  97.4 °F (36.3 °C) 97.5 °F (36.4 °C)   TempSrc:   Oral Oral   SpO2: 96%  97% 100%   Weight:  "      Height:           Flowsheet Rows      First Filed Value   Admission Height  177.8 cm (70\") Documented at 11/25/2019 0838   Admission Weight  93.8 kg (206 lb 12.7 oz) Documented at 11/25/2019 0838            Intake/Output Summary (Last 24 hours) at 12/1/2019 1531  Last data filed at 12/1/2019 1423  Gross per 24 hour   Intake 1300 ml   Output 1565 ml   Net -265 ml        TELEMETRY: Sinus rhythm    Physical Exam:  The patient is alert, oriented and in no distress.  Vital signs as noted above.  Head and neck revealed no carotid bruits or jugular venous distention.  No thyromegaly or lymphadenopathy is present  Lungs clear.  No wheezing.  Breath sounds are normal bilaterally.  Heart normal first and second heart sounds.  No murmur. No precordial rub is present.  No gallop is present.  Abdomen soft and nontender.  No organomegaly is present.  Extremities with good peripheral pulses without any pedal edema.  Skin warm and dry.  Musculoskeletal system is grossly normal  CNS grossly normal      Results Review:   I reviewed the patient's new clinical results.  Lab Results (last 24 hours)     Procedure Component Value Units Date/Time    POC Glucose Once [[id number]]  (Normal) Collected:  12/01/19 1127    Specimen:  Blood Updated:  12/01/19 1131     Glucose 102 mg/dL      Comment: Serial Number: [id number]:  [id number]       POC Glucose Once [[id number]]  (Normal) Collected:  12/01/19 0726    Specimen:  Blood Updated:  12/01/19 0728     Glucose 96 mg/dL      Comment: Serial Number: [id number]:  [id number]       Comprehensive Metabolic Panel [[id number]]  (Abnormal) Collected:  12/01/19 0412    Specimen:  Blood Updated:  12/01/19 0456     Glucose 104 mg/dL      BUN 34 mg/dL      Creatinine 1.66 mg/dL      Sodium 137 mmol/L      Potassium 4.7 mmol/L      Chloride 96 mmol/L      CO2 29.0 mmol/L      Calcium 9.2 mg/dL      Total Protein 6.7 g/dL      Albumin 3.70 g/dL      ALT (SGPT) 14 U/L      AST (SGOT) 17 U/L     "  Alkaline Phosphatase 68 U/L      Total Bilirubin 0.6 mg/dL      eGFR Non African Amer 40 mL/min/1.73      Globulin 3.0 gm/dL      A/G Ratio 1.2 g/dL      BUN/Creatinine Ratio 20.5     Anion Gap 12.0 mmol/L     Narrative:       GFR Normal >60  Chronic Kidney Disease <60  Kidney Failure <15    CBC & Differential [[id number]] Collected:  12/01/19 0412    Specimen:  Blood Updated:  12/01/19 0427    Narrative:       The following orders were created for panel order CBC & Differential.  Procedure                               Abnormality         Status                     ---------                               -----------         ------                     CBC Auto Differential[[id number]]        Abnormal            Final result                 Please view results for these tests on the individual orders.    CBC Auto Differential [[id number]]  (Abnormal) Collected:  12/01/19 0412    Specimen:  Blood Updated:  12/01/19 0427     WBC 7.10 10*3/mm3      RBC 4.16 10*6/mm3      Hemoglobin 11.7 g/dL      Hematocrit 35.7 %      MCV 86.0 fL      MCH 28.2 pg      MCHC 32.8 g/dL      RDW 14.8 %      RDW-SD 45.5 fl      MPV 10.3 fL      Platelets 181 10*3/mm3      Neutrophil % 69.4 %      Lymphocyte % 15.3 %      Monocyte % 7.3 %      Eosinophil % 5.6 %      Basophil % 2.4 %      Neutrophils, Absolute 4.90 10*3/mm3      Lymphocytes, Absolute 1.10 10*3/mm3      Monocytes, Absolute 0.50 10*3/mm3      Eosinophils, Absolute 0.40 10*3/mm3      Basophils, Absolute 0.20 10*3/mm3      nRBC 0.0 /100 WBC     POC Glucose Once [[id number]]  (Abnormal) Collected:  11/30/19 2032    Specimen:  Blood Updated:  11/30/19 2035     Glucose 133 mg/dL      Comment: Serial Number: [id number]:  [id number]       POC Glucose Once [[id number]]  (Abnormal) Collected:  11/30/19 1621    Specimen:  Blood Updated:  11/30/19 1623     Glucose 142 mg/dL      Comment: Serial Number: [id number]:  [id number]             Imaging Results (Last 24 Hours)     ** No  results found for the last 24 hours. **      LAB RESULTS (LAST 7 DAYS)    CBC  Results from last 7 days   Lab Units 12/01/19 0412 11/30/19 0354 11/29/19  0553 11/28/19  0432 11/27/19 0416 11/26/19  0308 11/25/19  1519   WBC 10*3/mm3 7.10 7.50 8.20 9.20 9.10 9.30 8.00   RBC 10*6/mm3 4.16 4.01* 4.07* 4.13* 3.93* 4.10* 3.97*   HEMOGLOBIN g/dL 11.7* 11.3* 11.6* 11.5* 11.3* 11.7* 11.2*   HEMATOCRIT % 35.7* 34.7* 35.3* 35.9* 34.2* 35.2* 34.4*   MCV fL 86.0 86.5 86.7 86.8 86.9 86.0 86.8   PLATELETS 10*3/mm3 181 191 181 206 186 188 184       BMP  Results from last 7 days   Lab Units 12/01/19 0412 11/30/19 0354 11/29/19 0553 11/28/19  0432 11/27/19 0416 11/26/19 0308 11/25/19  1519   SODIUM mmol/L 137 138 136 137 139 143 144   POTASSIUM mmol/L 4.7 4.7 4.3 4.2 4.1 3.7 4.2   CHLORIDE mmol/L 96* 95* 96* 95* 97* 101 106   CO2 mmol/L 29.0 30.0* 29.0 33.0* 32.0* 29.0 28.0   BUN mg/dL 34* 35* 33* 28* 21 14 14   CREATININE mg/dL 1.66* 1.93* 2.01* 1.99* 1.95* 1.50* 1.34*   GLUCOSE mg/dL 104* 97 105* 107* 115* 109* 129*   MAGNESIUM mg/dL  --   --   --  1.9 2.0 1.2*  --        CMP   Results from last 7 days   Lab Units 12/01/19 0412 11/30/19 0354 11/29/19  0553 11/28/19 0432 11/27/19 0416 11/26/19 0308 11/25/19  1519   SODIUM mmol/L 137 138 136 137 139 143 144   POTASSIUM mmol/L 4.7 4.7 4.3 4.2 4.1 3.7 4.2   CHLORIDE mmol/L 96* 95* 96* 95* 97* 101 106   CO2 mmol/L 29.0 30.0* 29.0 33.0* 32.0* 29.0 28.0   BUN mg/dL 34* 35* 33* 28* 21 14 14   CREATININE mg/dL 1.66* 1.93* 2.01* 1.99* 1.95* 1.50* 1.34*   GLUCOSE mg/dL 104* 97 105* 107* 115* 109* 129*   ALBUMIN g/dL 3.70 3.80 3.70 3.80 3.60  --   --    BILIRUBIN mg/dL 0.6 0.7 0.7 0.8 0.7  --   --    ALK PHOS U/L 68 70 69 72 72  --   --    AST (SGOT) U/L 17 15 13 12 13  --   --    ALT (SGPT) U/L 14 12 11 11 12  --   --          BNP        TROPONIN  Results from last 7 days   Lab Units 11/27/19  0416   TROPONIN T ng/mL 0.042*       CoAg  Results from last 7 days   Lab Units  11/25/19  0942   INR  1.19*   APTT seconds 26.3       Creatinine Clearance  Estimated Creatinine Clearance: 34.2 mL/min (A) (by C-G formula based on SCr of 1.66 mg/dL (H)).    ABG  Results from last 7 days   Lab Units 11/25/19  1635   PH, ARTERIAL pH units 7.416   PCO2, ARTERIAL mm Hg 41.3   PO2 ART mm Hg 85.3   O2 SATURATION ART % 96.6   BASE EXCESS ART mmol/L 1.8       Radiology  No radiology results for the last day            EKG      I personally viewed and interpreted the patient's EKG/Telemetry data:    ECHOCARDIOGRAM:    Results for orders placed during the hospital encounter of 11/25/19   Adult Transthoracic Echo Complete W/ Cont if Necessary Per Protocol    Narrative · There is a moderate (1-2cm) circumferential pericardial effusion.  · The left ventricular cavity is mildly dilated.  · Left ventricular wall thickness is consistent with mild concentric   hypertrophy.  · Estimated EF = 25%.  · Mild pulmonic valve regurgitation is present.  · Mild tricuspid valve regurgitation is present.  · Right ventricular cavity is moderately dilated.  · Mildly reduced right ventricular systolic function noted.  · Left atrial cavity size is severely dilated.  · Mild mitral valve regurgitation is present  · Left ventricular systolic function is severely decreased.              STRESS MYOVIEW:    Cardiolite (Tc-99m Sestamibi) stress test    CARDIAC CATHETERIZATION:            OTHER:         Assessment/Plan     Principal Problem:    Acute exacerbation of CHF (congestive heart failure) (CMS/[facility])  Active Problems:    Carotid artery disease (CMS/[facility])    Type 2 diabetes mellitus without complication, without long-term current use of insulin (CMS/[facility])    PAC (premature atrial contraction)    Anemia, deficiency    Vitamin D deficiency    Gastritis and gastroduodenitis    Atherosclerosis of native coronary artery of native heart without angina pectoris    Chronic kidney disease, stage III (moderate) (CMS/[facility])    Hyperlipidemia     Hypertension, benign    Depression    Shortness of breath    BILL (obstructive sleep apnea)    Acute respiratory failure with hypoxia (CMS/HCC)          ]]]]]]]]]]]]]]]]]]]]]]]  Impression  ========  Acute systolic congestive heart failure  Significant left ventricle dysfunction with ejection fraction of 25%.  Minimal elevation of troponin.     Acute respiratory failure with hypoxia     Status post stent 2003 at [facility].  Details not available.     Hypertension   diabetes   renal dysfunction.       Anemia     Premature atrial contractions     Severe hypomagnesemia.      Allergic to penicillin.     Plan  Patient has significant acute systolic congestive heart failure.  Patient has severe left ventricle dysfunction.  Continue intravenous diuresis with close observation of renal function.    Patient has underlying renal dysfunction.  Patient is started on intravenous dobutamine and patient has been diuresing well  We will decrease the dobutamine dose to 2.5 mcg  We will monitor renal function as well as electrolytes carefully   severe hypomagnesemia- improved.   Minimal elevation of troponin.  Observe closely.  Serial troponin levels and EKG.  Follow-up labs ordered.  Overall patient's condition is critical but stable with gradually improving congestive heart failure but has worsening of renal dysfunction.  Hopefully renal function will improve with intravenous dobutamine.  Have discussed with attending nurse for coordination of care.  Further plan will depend on patient's progress.       Joel Palomares MD  12/01/19  3:31 PM

## 2019-12-01 NOTE — PROGRESS NOTES
"[facility] Hospitalist Team      Patient Care Team:  Xander Robison MD as PCP - General  Xander Robison MD as PCP - Claims Attributed        Chief Complaint:  soa    Subjective:     Mr. Shanks is a 84 y.o. male with no past medical history of CHF or COPD but does have CAD and BILL. He is waiting on his cpap mask. He has been short of breath upon exertion for the last couple weeks or so but his dyspnea became worse in the last 3-4 days. He has dyspnea with minimal exertion and orthopnea. He has had to sleep in a chair and cannot lie flat. He has had lower extremity swelling, which has not had in the past. He has had a cough but no fever. He denied any chest pain. He has been urinating well.      In the ED the patient was found to have proBNP 11,275. CXR showed cardiomegaly with bilateral interstitial and alveolar opacities suggesting pulmonary edema, less likely atypical infection. Small bilateral pleural effusions. He was given 40mg IV lasix and nitro paste. He was admitted for further treatment.      2D echo from 10/3/2019 showed left ventricular wall thickness is consistent with mild concentric hypertrophy. Mild tricuspid valve regurgitation is present. There is mild calcification of the aortic valve. Estimated EF = 50%. Left ventricular systolic function is normal. Left atrial cavity size is severely dilated. Mild mitral valve regurgitation is present Left ventricular diastolic dysfunction (grade I) consistent with impaired relaxation. Mild mitral valve stenosis is present    Objective: alert nad,feels better, still on dobutamine        Vital Signs  Temp:  [97.1 °F (36.2 °C)-98.2 °F (36.8 °C)] 97.5 °F (36.4 °C)  Heart Rate:  [63-84] 74  Resp:  [14-22] 18  BP: ()/(35-60) 106/55  Oxygen Therapy  SpO2: 96 %  Pulse Oximetry Type: Continuous  Device (Oxygen Therapy): nasal cannula  Flow (L/min): 2  Oxygen Concentration (%): 40  Flowsheet Rows      First Filed Value   Admission Height  177.8 cm (70\") " Documented at 11/25/2019 0838   Admission Weight  93.8 kg (206 lb 12.7 oz) Documented at 11/25/2019 0838        Intake & Output (last 3 days)       11/28 0701 - 11/29 0700 11/29 0701 - 11/30 0700 11/30 0701 - 12/01 0700 12/01 0701 - 12/02 0700    P.O. 1500 1200 1300     I.V. (mL/kg) 572 (7.8)       Total Intake(mL/kg) 2072 (28.1) 1200 (16.5) 1300 (17.8)     Urine (mL/kg/hr)  600 (0.3) 1250 (0.7)     Stool   0     Total Output  600 1250     Net +2072 +600 +50             Urine Unmeasured Occurrence 5 x 4 x      Stool Unmeasured Occurrence   2 x         Lines, Drains & Airways    Active LDAs     Name:   Placement date:   Placement time:   Site:   Days:    Peripheral IV 11/25/19 0847 Right Antecubital   11/25/19 0847    Antecubital   1                Physical Exam:    General Appearance:    Alert, cooperative, in no acute distress   Head:    Normocephalic, without obvious abnormality, atraumatic   Eyes:            Lids and lashes normal, conjunctivae and sclerae normal, no   icterus, no pallor, corneas clear, PERRLA   Back:     No kyphosis present, no scoliosis present, no skin lesions,      erythema or scars, no tenderness to percussion or                   palpation,   range of motion normal   Lungs:     Bibasilar crackles,respirations regular, even and                  unlabored    Heart:    Regular rhythm and normal rate, normal S1 and S2, no            murmur, no gallop, no rub, no click   Chest Wall:    No abnormalities observed   Abdomen:     Normal bowel sounds, no masses, no organomegaly, soft        non-tender, non-distended, no guarding, no rebound                tenderness   Extremities:   Moves all extremities well, no edema, no cyanosis, no             redness       Results Review:       Lab Results (last 24 hours)     Procedure Component Value Units Date/Time    POC Glucose Once [[id number]]  (Normal) Collected:  12/01/19 0726    Specimen:  Blood Updated:  12/01/19 0728     Glucose 96 mg/dL       Comment: Serial Number: [id number]:  [id number]       Comprehensive Metabolic Panel [[id number]]  (Abnormal) Collected:  12/01/19 0412    Specimen:  Blood Updated:  12/01/19 0456     Glucose 104 mg/dL      BUN 34 mg/dL      Creatinine 1.66 mg/dL      Sodium 137 mmol/L      Potassium 4.7 mmol/L      Chloride 96 mmol/L      CO2 29.0 mmol/L      Calcium 9.2 mg/dL      Total Protein 6.7 g/dL      Albumin 3.70 g/dL      ALT (SGPT) 14 U/L      AST (SGOT) 17 U/L      Alkaline Phosphatase 68 U/L      Total Bilirubin 0.6 mg/dL      eGFR Non African Amer 40 mL/min/1.73      Globulin 3.0 gm/dL      A/G Ratio 1.2 g/dL      BUN/Creatinine Ratio 20.5     Anion Gap 12.0 mmol/L     Narrative:       GFR Normal >60  Chronic Kidney Disease <60  Kidney Failure <15    CBC & Differential [[id number]] Collected:  12/01/19 0412    Specimen:  Blood Updated:  12/01/19 0427    Narrative:       The following orders were created for panel order CBC & Differential.  Procedure                               Abnormality         Status                     ---------                               -----------         ------                     CBC Auto Differential[[id number]]        Abnormal            Final result                 Please view results for these tests on the individual orders.    CBC Auto Differential [[id number]]  (Abnormal) Collected:  12/01/19 0412    Specimen:  Blood Updated:  12/01/19 0427     WBC 7.10 10*3/mm3      RBC 4.16 10*6/mm3      Hemoglobin 11.7 g/dL      Hematocrit 35.7 %      MCV 86.0 fL      MCH 28.2 pg      MCHC 32.8 g/dL      RDW 14.8 %      RDW-SD 45.5 fl      MPV 10.3 fL      Platelets 181 10*3/mm3      Neutrophil % 69.4 %      Lymphocyte % 15.3 %      Monocyte % 7.3 %      Eosinophil % 5.6 %      Basophil % 2.4 %      Neutrophils, Absolute 4.90 10*3/mm3      Lymphocytes, Absolute 1.10 10*3/mm3      Monocytes, Absolute 0.50 10*3/mm3      Eosinophils, Absolute 0.40 10*3/mm3      Basophils, Absolute 0.20 10*3/mm3       nRBC 0.0 /100 WBC     POC Glucose Once [[id number]]  (Abnormal) Collected:  11/30/19 2032    Specimen:  Blood Updated:  11/30/19 2035     Glucose 133 mg/dL      Comment: Serial Number: [id number]:  [id number]       POC Glucose Once [[id number]]  (Abnormal) Collected:  11/30/19 1621    Specimen:  Blood Updated:  11/30/19 1623     Glucose 142 mg/dL      Comment: Serial Number: [id number]:  [id number]       POC Glucose Once [[id number]]  (Abnormal) Collected:  11/30/19 1130    Specimen:  Blood Updated:  11/30/19 1132     Glucose 118 mg/dL      Comment: Serial Number: [id number]:  [id number]       Blood Culture - Blood, Arm, Right [[id number]] Collected:  11/25/19 1023    Specimen:  Blood from Arm, Right Updated:  11/30/19 1030     Blood Culture No growth at 5 days    Blood Culture - Blood, Arm, Right [[id number]] Collected:  11/25/19 0942    Specimen:  Blood from Arm, Right Updated:  11/30/19 1000     Blood Culture No growth at 5 days          Imaging Results (Last 24 Hours)     ** No results found for the last 24 hours. **                                   I reviewed the patient's new clinical results.          ECG/EMG Results (most recent)     Procedure Component Value Units Date/Time    Adult Transthoracic Echo Complete W/ Cont if Necessary Per Protocol [[id number]] Collected:  11/25/19 1541     Updated:  11/25/19 1730     BSA 2.1 m^2      BH CV ECHO LISSETH - RVDD 3.5 cm      IVSd 1.1 cm      LVIDd 5.5 cm      LVIDs 4.9 cm      LVPWd 1.4 cm      IVS/LVPW 0.83     FS 11.1 %      EDV(Teich) 149.6 ml      ESV(Teich) 113.8 ml      EF(Teich) 23.9 %      EDV(cubed) 169.7 ml      ESV(cubed) 119.0 ml      EF(cubed) 29.9 %      LV mass(C)d 293.3 grams      LV mass(C)dI 138.8 grams/m^2      SV(Teich) 35.8 ml      SI(Teich) 16.9 ml/m^2      SV(cubed) 50.6 ml      SI(cubed) 24.0 ml/m^2      Ao root diam 3.1 cm      Ao root area 7.6 cm^2      ACS 1.8 cm      asc Aorta Diam 3.2 cm      LVOT diam 2.1 cm      LVOT area  3.3 cm^2      RVOT diam 3.2 cm      RVOT area 8.0 cm^2      EDV(MOD-sp4) 95.1 ml      ESV(MOD-sp4) 65.2 ml      EF(MOD-sp4) 31.5 %      EDV(MOD-sp2) 105.5 ml      ESV(MOD-sp2) 81.8 ml      EF(MOD-sp2) 22.5 %      SV(MOD-sp4) 29.9 ml      SI(MOD-sp4) 14.2 ml/m^2      SV(MOD-sp2) 23.7 ml      SI(MOD-sp2) 11.2 ml/m^2      Ao root area (BSA corrected) 1.5     LV [place] Vol (BSA corrected) 45.0 ml/m^2      LV Sys Vol (BSA corrected) 30.8 ml/m^2      Aortic R-R 0.75 sec      Aortic HR 79.6 BPM      MV E max dennis 107.1 cm/sec      MV A max dennis 99.5 cm/sec      MV E/A 1.1     MV V2 max 130.3 cm/sec      MV max PG 6.8 mmHg      MV V2 mean 91.5 cm/sec      MV mean PG 3.6 mmHg      MV V2 VTI 39.0 cm      MVA(VTI) 1.4 cm^2      MV dec slope 495.1 cm/sec^2      MV dec time 0.22 sec      Ao pk dennis 169.5 cm/sec      Ao max PG 11.5 mmHg      Ao max PG (full) 9.3 mmHg      Ao V2 mean 120.3 cm/sec      Ao mean PG 6.3 mmHg      Ao mean PG (full) 5.0 mmHg      Ao V2 VTI 33.4 cm      JAN(I,A) 1.6 cm^2      JAN(I,D) 1.6 cm^2      JAN(V,A) 1.4 cm^2      JAN(V,D) 1.4 cm^2      LV V1 max PG 2.2 mmHg      LV V1 mean PG 1.3 mmHg      LV V1 max 73.9 cm/sec      LV V1 mean 52.6 cm/sec      LV V1 VTI 16.4 cm      MR max dennis 436.1 cm/sec      MR max PG 76.1 mmHg      CO(Ao) 20.2 l/min      CI(Ao) 9.5 l/min/m^2      SV(Ao) 253.5 ml      SI(Ao) 119.9 ml/m^2      CO(LVOT) 4.3 l/min      CI(LVOT) 2.1 l/min/m^2      SV(LVOT) 54.5 ml      SV(RVOT) 92.2 ml      SI(LVOT) 25.8 ml/m^2      PA V2 max 71.4 cm/sec      PA max PG 2.0 mmHg      PA max PG (full) 0.46 mmHg      PA V2 mean 54.5 cm/sec      PA mean PG 1.2 mmHg      PA mean PG (full) 0.53 mmHg      PA V2 VTI 16.2 cm      PVA(I,A) 5.7 cm^2      BH CV ECHO LISSETH - PVA(I,D) 5.7 cm^2      BH CV ECHO LISSETH - PVA(V,A) 7.1 cm^2      BH CV ECHO LISSETH - PVA(V,D) 7.1 cm^2      PA acc time 0.13 sec      PI end-d dennis 165.9 cm/sec      PI max dennis 208.8 cm/sec      PI max PG 17.4 mmHg      RV V1 max PG 1.6 mmHg      RV  V1 mean PG 0.71 mmHg      RV V1 max 62.9 cm/sec      RV V1 mean 38.7 cm/sec      RV V1 VTI 11.5 cm      TR max dennis 312.7 cm/sec      RVSP(TR) 47.7 mmHg      RAP systole 8.0 mmHg      PA pr(Accel) 19.1 mmHg      Qp/Qs 1.7      CV ECHO LISSETH - BZI_BMI 29.6 kilograms/m^2       CV ECHO LISSETH - BSA(HAYCOCK) 2.2 m^2       CV ECHO LISSETH - BZI_METRIC_WEIGHT 93.4 kg       CV ECHO LISSETH - BZI_METRIC_HEIGHT 177.8 cm      EF(MOD-bp) 28.0 %      LA dimension(2D) 5.4 cm      Echo EF Estimated 25 %     Narrative:       · There is a moderate (1-2cm) circumferential pericardial effusion.  · The left ventricular cavity is mildly dilated.  · Left ventricular wall thickness is consistent with mild concentric   hypertrophy.  · Estimated EF = 25%.  · Mild pulmonic valve regurgitation is present.  · Mild tricuspid valve regurgitation is present.  · Right ventricular cavity is moderately dilated.  · Mildly reduced right ventricular systolic function noted.  · Left atrial cavity size is severely dilated.  · Mild mitral valve regurgitation is present  · Left ventricular systolic function is severely decreased.       ECG 12 Lead [[id number]] Collected:  11/25/19 0846     Updated:  11/26/19 0801    Narrative:       HEART RATE= 93  bpm  RR Interval= 716  ms  ID Interval= 184  ms  P Horizontal Axis= -34  deg  P Front Axis= -7  deg  QRSD Interval= 101  ms  QT Interval= 358  ms  QRS Axis= 35  deg  T Wave Axis= 90  deg  - ABNORMAL ECG -  Sinus rhythm  Multiform ventricular premature complexes  When compared with ECG of 21-Jul-2015 12:22:39,  Significant rate increase  Electronically Signed By: Saeed Carlson ([place]oro) 26-Nov-2019 08:00:54  Date and Time of Study: 2019-11-25 08:46:26    ECG 12 Lead [[id number]] Collected:  11/27/19 0057     Updated:  11/27/19 0100    Narrative:       HEART RATE= 69  bpm  RR Interval= 868  ms  ID Interval= 189  ms  P Horizontal Axis= -64  deg  P Front Axis= 13  deg  QRSD Interval= 99  ms  QT Interval= 433  ms  QRS  Axis= 33  deg  T Wave Axis= 104  deg  - ABNORMAL ECG -  Sinus rhythm  Atrial premature complex  Probable left ventricular hypertrophy  Nonspecific T abnormalities, lateral leads  When compared with ECG of 25-Nov-2019 8:46:26,  Significant rate decrease  Significant repolarization change  Electronically Signed By:   Date and Time of Study: 2019-11-27 00:57:44            Medication Review:       Current Facility-Administered Medications:   •  acetaminophen (TYLENOL) tablet 650 mg, 650 mg, Oral, Q4H PRN **OR** acetaminophen (TYLENOL) 160 MG/5ML solution 650 mg, 650 mg, Oral, Q4H PRN **OR** acetaminophen (TYLENOL) suppository 650 mg, 650 mg, Rectal, Q4H PRN, Achterberg, Debra, APRN  •  aluminum-magnesium hydroxide-simethicone (MAALOX MAX) 400-400-40 MG/5ML suspension 15 mL, 15 mL, Oral, Q6H PRN, Achterberg, Debra, APRN  •  aspirin chewable tablet 81 mg, 81 mg, Oral, Daily, Achterberg, Debra, APRN, 81 mg at 11/30/19 1052  •  atorvastatin (LIPITOR) tablet 80 mg, 80 mg, Oral, Nightly, Achterberg, Debra, APRN, 80 mg at 11/30/19 2031  •  bisacodyl (DULCOLAX) EC tablet 5 mg, 5 mg, Oral, Daily PRN, Achterberg, Debra, APRN  •  bisacodyl (DULCOLAX) suppository 10 mg, 10 mg, Rectal, Daily PRN, Achterberg, Debra, APRN  •  dextrose (D50W) 25 g/ 50mL Intravenous Solution 25 g, 25 g, Intravenous, Q15 Min PRN, Achterberg, Debra, APRN  •  dextrose (GLUTOSE) oral gel 15 g, 15 g, Oral, Q15 Min PRN, Achterberg, Debra, APRN  •  DOBUTamine (DOBUTREX) 1 mg/mL infusion, 5 mcg/kg/min, Intravenous, Continuous, Dayday Ruiz MD, Last Rate: 11.27 mL/hr at 11/30/19 1744, 2.5 mcg/kg/min at 11/30/19 1744  •  furosemide (LASIX) tablet 20 mg, 20 mg, Oral, BID, Joel Palomares MD, 20 mg at 11/30/19 1743  •  glucagon (human recombinant) (GLUCAGEN DIAGNOSTIC) injection 1 mg, 1 mg, Subcutaneous, PRN, Debra Holt, APRN  •  influenza vac split quad (FLUZONE,FLUARIX,AFLURIA) injection 0.5 mL, 0.5 mL, Intramuscular, During  Hospitalization, Reji Newby Jr., MD  •  insulin lispro (humaLOG) injection 0-7 Units, 0-7 Units, Subcutaneous, 4x Daily With Meals & Nightly **AND** insulin lispro (humaLOG) injection 0-7 Units, 0-7 Units, Subcutaneous, PRN, Jonatan Holtle, APRN  •  ipratropium-albuterol (DUO-NEB) nebulizer solution 3 mL, 3 mL, Nebulization, Q4H PRN, Debra Holt, APRN  •  lisinopril (PRINIVIL,ZESTRIL) tablet 20 mg, 20 mg, Oral, Daily, ElanterDebra willis, APRN, 20 mg at 11/29/19 1030  •  loperamide (IMODIUM) capsule 2 mg, 2 mg, Oral, 4x Daily PRN, Reji Newby Jr., MD, 2 mg at 11/30/19 2031  •  magnesium hydroxide (MILK OF MAGNESIA) suspension 2400 mg/10mL 10 mL, 10 mL, Oral, Daily PRN, Jonatan Holtle, APRN  •  Magnesium Sulfate 2 gram infusion - Mg less than or equal to 1.5 mg/dL, 2 g, Intravenous, PRN, Last Rate: 25 mL/hr at 11/26/19 0603, 2 g at 11/26/19 0603 **OR** Magnesium Sulfate 1 gram infusion - Mg 1.6-1.9 mg/dL, 1 g, Intravenous, PRN, Jonatan Holtle, APRN  •  melatonin tablet 5 mg, 5 mg, Oral, Nightly PRN, Jonatan Holtle, APRN  •  metoprolol succinate XL (TOPROL-XL) 24 hr tablet 25 mg, 25 mg, Oral, Daily, Jonatan Holtle, APRN, 25 mg at 11/29/19 1030  •  ondansetron (ZOFRAN) tablet 4 mg, 4 mg, Oral, Q6H PRN **OR** ondansetron (ZOFRAN) injection 4 mg, 4 mg, Intravenous, Q6H PRN, Jonatan Holtle, APRN  •  pantoprazole (PROTONIX) EC tablet 40 mg, 40 mg, Oral, QAM, Debra Holt APRN, 40 mg at 12/01/19 0605  •  phenol (CHLORASEPTIC) 1.4 % liquid 1 spray, 1 spray, Mouth/Throat, Q4H PRN, Reji Newby Jr., MD, 1 spray at 11/26/19 1659  •  potassium chloride (K-DUR,KLOR-CON) CR tablet 40 mEq, 40 mEq, Oral, PRN, Debra Holt, APRN  •  potassium chloride (KLOR-CON) packet 40 mEq, 40 mEq, Oral, PRN, Debra Holt APRN  •  sertraline (ZOLOFT) tablet 25 mg, 25 mg, Oral, Daily, Debra Holt APRN, 25 mg at 11/30/19 1051  •  [COMPLETED] Insert  peripheral IV, , , Once **AND** sodium chloride 0.9 % flush 10 mL, 10 mL, Intravenous, PRN, Saeed Carlson MD  •  sodium chloride 0.9 % flush 10 mL, 10 mL, Intravenous, PRN, Saeed Carlson MD  •  sodium chloride 0.9 % flush 10 mL, 10 mL, Intravenous, Q12H, Achterberg, Debra, APRN, 10 mL at 11/30/19 2033  •  sodium chloride 0.9 % flush 10 mL, 10 mL, Intravenous, PRN, Achterberg, Debra, APRN  •  tamsulosin (FLOMAX) 24 hr capsule 0.4 mg, 0.4 mg, Oral, Daily, Achterberg, Debra, APRN, 0.4 mg at 11/30/19 1051    I have reviewed the patient's current medication list    Assessment/Plan     Principal Problem:    Acute exacerbation of CHF (congestive heart failure) (CMS/HCC)-on dobutamine gtt , feels better today  Active Problems:    Carotid artery disease (CMS/HCC)    Type 2 diabetes mellitus without complication, without long-term current use of insulin (CMS/HCC)    PAC (premature atrial contraction)    Anemia, deficiency    Vitamin D deficiency    Gastritis and gastroduodenitis    Atherosclerosis of native coronary artery of native heart without angina pectoris    Chronic kidney disease, stage III (moderate) (CMS/HCC)- Cr increasing, may have to back off diuretics    Hyperlipidemia    Hypertension, benign    Depression    Shortness of breath- on dobutamine    BILL (obstructive sleep apnea)    Acute respiratory failure with hypoxia (CMS/HCC)      Plan for disposition:home    Reji Newby Jr., MD  12/01/19  8:33 AM      Time:

## 2019-12-02 LAB
ANION GAP SERPL CALCULATED.3IONS-SCNC: 12 MMOL/L (ref 5–15)
BASOPHILS # BLD AUTO: 0 10*3/MM3 (ref 0–0.2)
BASOPHILS NFR BLD AUTO: 0.5 % (ref 0–1.5)
BUN BLD-MCNC: 39 MG/DL (ref 8–23)
BUN/CREAT SERPL: 21.8 (ref 7–25)
CALCIUM SPEC-SCNC: 8.9 MG/DL (ref 8.6–10.5)
CHLORIDE SERPL-SCNC: 95 MMOL/L (ref 98–107)
CO2 SERPL-SCNC: 28 MMOL/L (ref 22–29)
CREAT BLD-MCNC: 1.79 MG/DL (ref 0.76–1.27)
DEPRECATED RDW RBC AUTO: 44.2 FL (ref 37–54)
EOSINOPHIL # BLD AUTO: 0.4 10*3/MM3 (ref 0–0.4)
EOSINOPHIL NFR BLD AUTO: 5 % (ref 0.3–6.2)
ERYTHROCYTE [DISTWIDTH] IN BLOOD BY AUTOMATED COUNT: 14.5 % (ref 12.3–15.4)
GFR SERPL CREATININE-BSD FRML MDRD: 36 ML/MIN/1.73
GLUCOSE BLD-MCNC: 97 MG/DL (ref 65–99)
GLUCOSE BLDC GLUCOMTR-MCNC: 110 MG/DL (ref 70–105)
GLUCOSE BLDC GLUCOMTR-MCNC: 118 MG/DL (ref 70–105)
GLUCOSE BLDC GLUCOMTR-MCNC: 164 MG/DL (ref 70–105)
GLUCOSE BLDC GLUCOMTR-MCNC: 97 MG/DL (ref 70–105)
HCT VFR BLD AUTO: 33.9 % (ref 37.5–51)
HGB BLD-MCNC: 11.3 G/DL (ref 13–17.7)
LYMPHOCYTES # BLD AUTO: 1.3 10*3/MM3 (ref 0.7–3.1)
LYMPHOCYTES NFR BLD AUTO: 17.6 % (ref 19.6–45.3)
MCH RBC QN AUTO: 28.3 PG (ref 26.6–33)
MCHC RBC AUTO-ENTMCNC: 33.2 G/DL (ref 31.5–35.7)
MCV RBC AUTO: 85.4 FL (ref 79–97)
MONOCYTES # BLD AUTO: 0.6 10*3/MM3 (ref 0.1–0.9)
MONOCYTES NFR BLD AUTO: 7.3 % (ref 5–12)
NEUTROPHILS # BLD AUTO: 5.3 10*3/MM3 (ref 1.7–7)
NEUTROPHILS NFR BLD AUTO: 69.6 % (ref 42.7–76)
NRBC BLD AUTO-RTO: 0.2 /100 WBC (ref 0–0.2)
PLATELET # BLD AUTO: 192 10*3/MM3 (ref 140–450)
PMV BLD AUTO: 9.9 FL (ref 6–12)
POTASSIUM BLD-SCNC: 4.9 MMOL/L (ref 3.5–5.2)
RBC # BLD AUTO: 3.97 10*6/MM3 (ref 4.14–5.8)
SODIUM BLD-SCNC: 135 MMOL/L (ref 136–145)
WBC NRBC COR # BLD: 7.6 10*3/MM3 (ref 3.4–10.8)

## 2019-12-02 PROCEDURE — 94660 CPAP INITIATION&MGMT: CPT

## 2019-12-02 PROCEDURE — 82962 GLUCOSE BLOOD TEST: CPT

## 2019-12-02 PROCEDURE — 94799 UNLISTED PULMONARY SVC/PX: CPT

## 2019-12-02 PROCEDURE — 99233 SBSQ HOSP IP/OBS HIGH 50: CPT | Performed by: INTERNAL MEDICINE

## 2019-12-02 PROCEDURE — 85025 COMPLETE CBC W/AUTO DIFF WBC: CPT | Performed by: NURSE PRACTITIONER

## 2019-12-02 PROCEDURE — 99232 SBSQ HOSP IP/OBS MODERATE 35: CPT | Performed by: INTERNAL MEDICINE

## 2019-12-02 PROCEDURE — 80048 BASIC METABOLIC PNL TOTAL CA: CPT | Performed by: NURSE PRACTITIONER

## 2019-12-02 RX ADMIN — PANTOPRAZOLE SODIUM 40 MG: 40 TABLET, DELAYED RELEASE ORAL at 08:18

## 2019-12-02 RX ADMIN — LISINOPRIL 20 MG: 20 TABLET ORAL at 08:18

## 2019-12-02 RX ADMIN — Medication 10 ML: at 21:22

## 2019-12-02 RX ADMIN — Medication 10 ML: at 08:18

## 2019-12-02 RX ADMIN — ASPIRIN 81 MG 81 MG: 81 TABLET ORAL at 08:18

## 2019-12-02 RX ADMIN — FUROSEMIDE 20 MG: 20 TABLET ORAL at 08:18

## 2019-12-02 RX ADMIN — FUROSEMIDE 20 MG: 20 TABLET ORAL at 17:07

## 2019-12-02 RX ADMIN — SERTRALINE HYDROCHLORIDE 25 MG: 50 TABLET ORAL at 08:17

## 2019-12-02 RX ADMIN — METOPROLOL SUCCINATE 25 MG: 25 TABLET, EXTENDED RELEASE ORAL at 08:18

## 2019-12-02 RX ADMIN — ATORVASTATIN CALCIUM 80 MG: 40 TABLET, FILM COATED ORAL at 21:21

## 2019-12-02 RX ADMIN — TAMSULOSIN HYDROCHLORIDE 0.4 MG: 0.4 CAPSULE ORAL at 08:18

## 2019-12-02 NOTE — PROGRESS NOTES
Referring Provider: Reji Newby Jr., MD    Reason for follow-up:   Congestive heart failure  Status post stent placement  Renal dysfunction     Patient Care Team:  Xander Robison MD as PCP - General  Xander Robison MD as PCP - Claims Attributed    Subjective .  Feeling better     ROS    Since I have last seen him yesterday, the patient has been without any chest discomfort ,shortness of breath, palpitations, dizziness or syncope.  Denies having any headache ,abdominal pain ,nausea, vomiting , diarrhea constipation, loss of weight or loss of appetite.  Denies having any excessive bruising ,hematuria or blood in the stool.    Review of all systems negative except as indicated    History  Past Medical History:   Diagnosis Date   • Anemia    • CAD (coronary artery disease)    • Carotid artery disease (CMS/HCC)    • Diabetes mellitus (CMS/HCC)    • Gastritis    • GERD (gastroesophageal reflux disease)    • Hyperlipidemia    • Hypertension    • Mood disorder (CMS/HCC)    • Osteoarthritis    • Premature atrial contractions    • Prostatic hypertrophy    • Pulmonary valve disorder    • PVD (peripheral vascular disease) (CMS/HCC)    • Renal disease    • Sleep apnea        Past Surgical History:   Procedure Laterality Date   • BACK SURGERY     • CHOLECYSTECTOMY         Family History   Problem Relation Age of Onset   • Cancer Mother    • Heart disease Father    • Cancer Brother        Social History     Tobacco Use   • Smoking status: Former Smoker     Types: Cigarettes   • Smokeless tobacco: Never Used   Substance Use Topics   • Alcohol use: No     Frequency: Never   • Drug use: No        Medications Prior to Admission   Medication Sig Dispense Refill Last Dose   • metFORMIN (GLUCOPHAGE) 500 MG tablet Take 500 mg by mouth Every Night.      • aspirin 81 MG tablet Take 81 mg by mouth Daily.   Taking   • atorvastatin (LIPITOR) 80 MG tablet Take 1 tablet by mouth Every Night. 30 tablet 5 Taking   • lisinopril  (PRINIVIL,ZESTRIL) 20 MG tablet Take 1 tablet by mouth Daily. 30 tablet 5 Taking   • metoprolol succinate XL (TOPROL-XL) 25 MG 24 hr tablet Take 1 tablet by mouth Daily. 30 tablet 5 Taking   • omeprazole (priLOSEC) 40 MG capsule Take 1 capsule by mouth Daily. 30 capsule 5 Taking   • sertraline (ZOLOFT) 25 MG tablet Take 1 tablet by mouth Daily. 30 tablet 5 Taking   • tamsulosin (FLOMAX) 0.4 MG capsule 24 hr capsule Take 1 capsule by mouth Daily. 30 capsule 5 Taking       Allergies  Penicillins    Scheduled Meds:    aspirin 81 mg Oral Daily   atorvastatin 80 mg Oral Nightly   furosemide 20 mg Oral BID   insulin lispro 0-7 Units Subcutaneous 4x Daily With Meals & Nightly   lisinopril 20 mg Oral Daily   metoprolol succinate XL 25 mg Oral Daily   pantoprazole 40 mg Oral QAM   sertraline 25 mg Oral Daily   sodium chloride 10 mL Intravenous Q12H   tamsulosin 0.4 mg Oral Daily     Continuous Infusions:    DOBUTamine 5 mcg/kg/min Last Rate: 2.5 mcg/kg/min (12/01/19 2047)     PRN Meds:.•  acetaminophen **OR** acetaminophen **OR** acetaminophen  •  aluminum-magnesium hydroxide-simethicone  •  bisacodyl  •  bisacodyl  •  dextrose  •  dextrose  •  glucagon (human recombinant)  •  influenza vaccine  •  insulin lispro **AND** insulin lispro  •  ipratropium-albuterol  •  loperamide  •  magnesium hydroxide  •  magnesium sulfate **OR** magnesium sulfate in D5W 1g/100mL (PREMIX)  •  melatonin  •  ondansetron **OR** ondansetron  •  phenol  •  potassium chloride  •  potassium chloride  •  [COMPLETED] Insert peripheral IV **AND** sodium chloride  •  sodium chloride  •  sodium chloride    Objective     VITAL SIGNS  Vitals:    12/01/19 1802 12/01/19 1813 12/01/19 2230 12/02/19 0216   BP: (!) 104/36 (!) 104/36 109/48 105/55   BP Location:  Right arm     Patient Position:  Lying     Pulse: 81 67     Resp:  20 20 20   Temp:  97.6 °F (36.4 °C) 98.5 °F (36.9 °C) 98.5 °F (36.9 °C)   TempSrc:  Oral     SpO2:  100%  96%   Weight:       Height:     "       Flowsheet Rows      First Filed Value   Admission Height  177.8 cm (70\") Documented at 11/25/2019 0838   Admission Weight  93.8 kg (206 lb 12.7 oz) Documented at 11/25/2019 0838            Intake/Output Summary (Last 24 hours) at 12/2/2019 0617  Last data filed at 12/1/2019 1423  Gross per 24 hour   Intake 720 ml   Output 665 ml   Net 55 ml        TELEMETRY: Sinus rhythm    Physical Exam:  The patient is alert, oriented and in no distress.  Vital signs as noted above.  Head and neck revealed no carotid bruits or jugular venous distention.  No thyromegaly or lymphadenopathy is present  Lungs clear.  No wheezing.  Breath sounds are normal bilaterally.  Heart normal first and second heart sounds.  No murmur. No precordial rub is present.  No gallop is present.  Abdomen soft and nontender.  No organomegaly is present.  Extremities with good peripheral pulses without any pedal edema.  Skin warm and dry.  Musculoskeletal system is grossly normal  CNS grossly normal      Results Review:   I reviewed the patient's new clinical results.  Lab Results (last 24 hours)     Procedure Component Value Units Date/Time    Basic Metabolic Panel [[id number]]  (Abnormal) Collected:  12/02/19 0255    Specimen:  Blood Updated:  12/02/19 0335     Glucose 97 mg/dL      BUN 39 mg/dL      Creatinine 1.79 mg/dL      Sodium 135 mmol/L      Potassium 4.9 mmol/L      Chloride 95 mmol/L      CO2 28.0 mmol/L      Calcium 8.9 mg/dL      eGFR Non African Amer 36 mL/min/1.73      BUN/Creatinine Ratio 21.8     Anion Gap 12.0 mmol/L     Narrative:       GFR Normal >60  Chronic Kidney Disease <60  Kidney Failure <15    CBC & Differential [[id number]] Collected:  12/02/19 0255    Specimen:  Blood Updated:  12/02/19 0309    Narrative:       The following orders were created for panel order CBC & Differential.  Procedure                               Abnormality         Status                     ---------                               -----------      "    ------                     CBC Auto Differential[[id number]]        Abnormal            Final result                 Please view results for these tests on the individual orders.    CBC Auto Differential [[id number]]  (Abnormal) Collected:  12/02/19 0255    Specimen:  Blood Updated:  12/02/19 0309     WBC 7.60 10*3/mm3      RBC 3.97 10*6/mm3      Hemoglobin 11.3 g/dL      Hematocrit 33.9 %      MCV 85.4 fL      MCH 28.3 pg      MCHC 33.2 g/dL      RDW 14.5 %      RDW-SD 44.2 fl      MPV 9.9 fL      Platelets 192 10*3/mm3      Neutrophil % 69.6 %      Lymphocyte % 17.6 %      Monocyte % 7.3 %      Eosinophil % 5.0 %      Basophil % 0.5 %      Neutrophils, Absolute 5.30 10*3/mm3      Lymphocytes, Absolute 1.30 10*3/mm3      Monocytes, Absolute 0.60 10*3/mm3      Eosinophils, Absolute 0.40 10*3/mm3      Basophils, Absolute 0.00 10*3/mm3      nRBC 0.2 /100 WBC     POC Glucose Once [[id number]]  (Abnormal) Collected:  12/01/19 1933    Specimen:  Blood Updated:  12/01/19 1938     Glucose 110 mg/dL      Comment: Serial Number: [id number]:  [id number]       POC Glucose Once [[id number]]  (Abnormal) Collected:  12/01/19 1634    Specimen:  Blood Updated:  12/01/19 1637     Glucose 124 mg/dL      Comment: Serial Number: [id number]:  [id number]       POC Glucose Once [[id number]]  (Normal) Collected:  12/01/19 1127    Specimen:  Blood Updated:  12/01/19 1131     Glucose 102 mg/dL      Comment: Serial Number: [id number]:  [id number]       POC Glucose Once [[id number]]  (Normal) Collected:  12/01/19 0726    Specimen:  Blood Updated:  12/01/19 0728     Glucose 96 mg/dL      Comment: Serial Number: [id number]:  [id number]             Imaging Results (Last 24 Hours)     ** No results found for the last 24 hours. **      LAB RESULTS (LAST 7 DAYS)    CBC  Results from last 7 days   Lab Units 12/02/19  0255 12/01/19  0412 11/30/19  0354 11/29/19  0553 11/28/19  0432 11/27/19  0416 11/26/19  0308   WBC 10*3/mm3 7.60  7.10 7.50 8.20 9.20 9.10 9.30   RBC 10*6/mm3 3.97* 4.16 4.01* 4.07* 4.13* 3.93* 4.10*   HEMOGLOBIN g/dL 11.3* 11.7* 11.3* 11.6* 11.5* 11.3* 11.7*   HEMATOCRIT % 33.9* 35.7* 34.7* 35.3* 35.9* 34.2* 35.2*   MCV fL 85.4 86.0 86.5 86.7 86.8 86.9 86.0   PLATELETS 10*3/mm3 192 181 191 181 206 186 188       BMP  Results from last 7 days   Lab Units 12/02/19 0255 12/01/19 0412 11/30/19 0354 11/29/19  0553 11/28/19 0432 11/27/19 0416 11/26/19  0308   SODIUM mmol/L 135* 137 138 136 137 139 143   POTASSIUM mmol/L 4.9 4.7 4.7 4.3 4.2 4.1 3.7   CHLORIDE mmol/L 95* 96* 95* 96* 95* 97* 101   CO2 mmol/L 28.0 29.0 30.0* 29.0 33.0* 32.0* 29.0   BUN mg/dL 39* 34* 35* 33* 28* 21 14   CREATININE mg/dL 1.79* 1.66* 1.93* 2.01* 1.99* 1.95* 1.50*   GLUCOSE mg/dL 97 104* 97 105* 107* 115* 109*   MAGNESIUM mg/dL  --   --   --   --  1.9 2.0 1.2*       CMP   Results from last 7 days   Lab Units 12/02/19  0255 12/01/19 0412 11/30/19 0354 11/29/19  0553 11/28/19  0432 11/27/19 0416 11/26/19  0308   SODIUM mmol/L 135* 137 138 136 137 139 143   POTASSIUM mmol/L 4.9 4.7 4.7 4.3 4.2 4.1 3.7   CHLORIDE mmol/L 95* 96* 95* 96* 95* 97* 101   CO2 mmol/L 28.0 29.0 30.0* 29.0 33.0* 32.0* 29.0   BUN mg/dL 39* 34* 35* 33* 28* 21 14   CREATININE mg/dL 1.79* 1.66* 1.93* 2.01* 1.99* 1.95* 1.50*   GLUCOSE mg/dL 97 104* 97 105* 107* 115* 109*   ALBUMIN g/dL  --  3.70 3.80 3.70 3.80 3.60  --    BILIRUBIN mg/dL  --  0.6 0.7 0.7 0.8 0.7  --    ALK PHOS U/L  --  68 70 69 72 72  --    AST (SGOT) U/L  --  17 15 13 12 13  --    ALT (SGPT) U/L  --  14 12 11 11 12  --          BNP        TROPONIN  Results from last 7 days   Lab Units 11/27/19  0416   TROPONIN T ng/mL 0.042*       CoAg  Results from last 7 days   Lab Units 11/25/19  0942   INR  1.19*   APTT seconds 26.3       Creatinine Clearance  Estimated Creatinine Clearance: 31.7 mL/min (A) (by C-G formula based on SCr of 1.79 mg/dL (H)).    ABG  Results from last 7 days   Lab Units 11/25/19  1635   PH, ARTERIAL  pH units 7.416   PCO2, ARTERIAL mm Hg 41.3   PO2 ART mm Hg 85.3   O2 SATURATION ART % 96.6   BASE EXCESS ART mmol/L 1.8       Radiology  No radiology results for the last day            EKG      I personally viewed and interpreted the patient's EKG/Telemetry data:    ECHOCARDIOGRAM:    Results for orders placed during the hospital encounter of 11/25/19   Adult Transthoracic Echo Complete W/ Cont if Necessary Per Protocol    Narrative · There is a moderate (1-2cm) circumferential pericardial effusion.  · The left ventricular cavity is mildly dilated.  · Left ventricular wall thickness is consistent with mild concentric   hypertrophy.  · Estimated EF = 25%.  · Mild pulmonic valve regurgitation is present.  · Mild tricuspid valve regurgitation is present.  · Right ventricular cavity is moderately dilated.  · Mildly reduced right ventricular systolic function noted.  · Left atrial cavity size is severely dilated.  · Mild mitral valve regurgitation is present  · Left ventricular systolic function is severely decreased.              STRESS MYOVIEW:    Cardiolite (Tc-99m Sestamibi) stress test    CARDIAC CATHETERIZATION:            OTHER:         Assessment/Plan     Principal Problem:    Acute exacerbation of CHF (congestive heart failure) (CMS/HCC)  Active Problems:    Carotid artery disease (CMS/HCC)    Type 2 diabetes mellitus without complication, without long-term current use of insulin (CMS/HCC)    PAC (premature atrial contraction)    Anemia, deficiency    Vitamin D deficiency    Gastritis and gastroduodenitis    Atherosclerosis of native coronary artery of native heart without angina pectoris    Chronic kidney disease, stage III (moderate) (CMS/HCC)    Hyperlipidemia    Hypertension, benign    Depression    Shortness of breath    BILL (obstructive sleep apnea)    Acute respiratory failure with hypoxia (CMS/HCC)          ]]]]]]]]]]]]]]]]]]]]]]]  Impression  ========  Acute systolic congestive heart  failure  Significant left ventricle dysfunction with ejection fraction of 25%.  Minimal elevation of troponin.     Acute respiratory failure with hypoxia     Status post stent 2003 at [facility].  Details not available.     Hypertension diabetes renal dysfunction.  BUN 14 creatinine 1.5.  Dyslipidemia     Anemia     Premature atrial contractions     Severe hypomagnesemia.  Serum magnesium 1.2.     Allergic to penicillin.     Plan  Patient has significant acute systolic congestive heart failure.  Patient has severe left ventricle dysfunction.  Continue diuretics.   Today BUN 39 creatinine 1.8  Patient has underlying renal dysfunction.  Continue on intravenous dobutamine for renal protective and diuresis.  Observe for toxic effects.  Severe hypomagnesemia- improved.  Magnesium level today 2.0  Minimal elevation of troponin.  Observe closely.  Serial troponin levels and EKG.  Follow-up labs ordered.  Overall patient's condition is critical but stable with gradually improving congestive heart failure but has worsening of renal dysfunction.  Hopefully renal function would continue to improve with intravenous dobutamine.  Follow-up labs ordered.  Further plan will depend on patient's progress.       Dayday Ruiz MD  12/02/19  6:17 AM

## 2019-12-02 NOTE — PROGRESS NOTES
Continued Stay Note  GAUTAM Hernandez     Patient Name: Mikael Shanks  MRN: [id number]  Today's Date: 12/2/2019    Admit Date: 11/25/2019    Discharge Plan     Row Name 12/02/19 1324       Plan    Plan  Anticipate routine home. May require walking oximetry 24-48 hrs prior to dc. Wants [facility] if needed.    Plan Comments  DC Barriers: Continues on dobutmaine gtt. Has bipap at home through [facility]. Will continue to walk O2 needs as patient is requiring 2L O2 and does not have any home O2.      Kathy Gomez RN       Office Phone: [phone]  Office Cell: [phone]

## 2019-12-02 NOTE — PROGRESS NOTES
[facility] Hospitalist Team      Patient Care Team:  Xander Robison MD as PCP - General  Xander Robison MD as PCP - Claims Attributed        Chief Complaint:  soa    Subjective: reports that he is less short of breath and swelling has improved.     Mr. Shanks is a 84 y.o. male with no past medical history of CHF or COPD but does have CAD and BILL. He is waiting on his cpap mask. He has been short of breath upon exertion for the last couple weeks or so but his dyspnea became worse in the last 3-4 days. He has dyspnea with minimal exertion and orthopnea. He has had to sleep in a chair and cannot lie flat. He has had lower extremity swelling, which has not had in the past. He has had a cough but no fever. He denied any chest pain. He has been urinating well.      In the ED the patient was found to have proBNP 11,275. CXR showed cardiomegaly with bilateral interstitial and alveolar opacities suggesting pulmonary edema, less likely atypical infection. Small bilateral pleural effusions. He was given 40mg IV lasix and nitro paste. He was admitted for further treatment.      2D echo from 10/3/2019 showed left ventricular wall thickness is consistent with mild concentric hypertrophy. Mild tricuspid valve regurgitation is present. There is mild calcification of the aortic valve. Estimated EF = 50%. Left ventricular systolic function is normal. Left atrial cavity size is severely dilated. Mild mitral valve regurgitation is present Left ventricular diastolic dysfunction (grade I) consistent with impaired relaxation. Mild mitral valve stenosis is present    Objective:    Vital Signs  Temp:  [97.3 °F (36.3 °C)-98.5 °F (36.9 °C)] 97.5 °F (36.4 °C)  Heart Rate:  [66-81] 66  Resp:  [18-20] 18  BP: ()/(36-81) 114/54  Oxygen Therapy  SpO2: 100 %  Pulse Oximetry Type: Continuous  Device (Oxygen Therapy): nasal cannula  Flow (L/min): 2  Oxygen Concentration (%): 40  Flowsheet Rows      First Filed Value   Admission Height   "177.8 cm (70\") Documented at 11/25/2019 0838   Admission Weight  93.8 kg (206 lb 12.7 oz) Documented at 11/25/2019 0838        Intake & Output (last 3 days)       11/29 0701 - 11/30 0700 11/30 0701 - 12/01 0700 12/01 0701 - 12/02 0700 12/02 0701 - 12/03 0700    P.O. 1200 1300 720 360    I.V. (mL/kg)        Total Intake(mL/kg) 1200 (16.5) 1300 (17.8) 720 (9.7) 360 (4.9)    Urine (mL/kg/hr) 600 (0.3) 1250 (0.7) 665 (0.4) 825 (1.1)    Stool  0  0    Total Output 600 1250 665 825    Net +600 +50 +55 -465            Urine Unmeasured Occurrence 4 x  8 x     Stool Unmeasured Occurrence  2 x  1 x        Lines, Drains & Airways    Active LDAs     Name:   Placement date:   Placement time:   Site:   Days:    Peripheral IV 11/25/19 0847 Right Antecubital   11/25/19    0847    Antecubital   1                Physical Exam:    Gen: NAD  HEENT: EOMI, no icterus, PERRL  Neck: No JVD  Heart: RRR, no murmur  Lung: CTA b/l, adequate air movement  ABD: soft, NT, ND, no rebound or guarding  MSK: moves ext spontaneously  Neuro: AO x 3  Psych: no anxiety, no depression  Skin: warm, dry, intact  Extremities:  No edema    Results Review:       Lab Results (last 24 hours)     Procedure Component Value Units Date/Time    POC Glucose Once [[id number]]  (Abnormal) Collected:  12/02/19 1636    Specimen:  Blood Updated:  12/02/19 1641     Glucose 118 mg/dL      Comment: Serial Number: [id number]:  [id number]       POC Glucose Once [[id number]]  (Abnormal) Collected:  12/02/19 1118    Specimen:  Blood Updated:  12/02/19 1123     Glucose 110 mg/dL      Comment: Serial Number: [id number]:  [id number]       POC Glucose Once [[id number]]  (Normal) Collected:  12/02/19 0730    Specimen:  Blood Updated:  12/02/19 0732     Glucose 97 mg/dL      Comment: Serial Number: [id number]:  [id number]       Basic Metabolic Panel [[id number]]  (Abnormal) Collected:  12/02/19 0255    Specimen:  Blood Updated:  12/02/19 0335     Glucose 97 mg/dL      BUN " 39 mg/dL      Creatinine 1.79 mg/dL      Sodium 135 mmol/L      Potassium 4.9 mmol/L      Chloride 95 mmol/L      CO2 28.0 mmol/L      Calcium 8.9 mg/dL      eGFR Non African Amer 36 mL/min/1.73      BUN/Creatinine Ratio 21.8     Anion Gap 12.0 mmol/L     Narrative:       GFR Normal >60  Chronic Kidney Disease <60  Kidney Failure <15    CBC & Differential [[id number]] Collected:  12/02/19 0255    Specimen:  Blood Updated:  12/02/19 0309    Narrative:       The following orders were created for panel order CBC & Differential.  Procedure                               Abnormality         Status                     ---------                               -----------         ------                     CBC Auto Differential[[id number]]        Abnormal            Final result                 Please view results for these tests on the individual orders.    CBC Auto Differential [[id number]]  (Abnormal) Collected:  12/02/19 0255    Specimen:  Blood Updated:  12/02/19 0309     WBC 7.60 10*3/mm3      RBC 3.97 10*6/mm3      Hemoglobin 11.3 g/dL      Hematocrit 33.9 %      MCV 85.4 fL      MCH 28.3 pg      MCHC 33.2 g/dL      RDW 14.5 %      RDW-SD 44.2 fl      MPV 9.9 fL      Platelets 192 10*3/mm3      Neutrophil % 69.6 %      Lymphocyte % 17.6 %      Monocyte % 7.3 %      Eosinophil % 5.0 %      Basophil % 0.5 %      Neutrophils, Absolute 5.30 10*3/mm3      Lymphocytes, Absolute 1.30 10*3/mm3      Monocytes, Absolute 0.60 10*3/mm3      Eosinophils, Absolute 0.40 10*3/mm3      Basophils, Absolute 0.00 10*3/mm3      nRBC 0.2 /100 WBC     POC Glucose Once [[id number]]  (Abnormal) Collected:  12/01/19 1933    Specimen:  Blood Updated:  12/01/19 1938     Glucose 110 mg/dL      Comment: Serial Number: [id number]:  [id number]             Imaging Results (Last 24 Hours)     ** No results found for the last 24 hours. **                                   I reviewed the patient's new clinical results.          ECG/EMG Results (most  recent)     Procedure Component Value Units Date/Time    Adult Transthoracic Echo Complete W/ Cont if Necessary Per Protocol [[id number]] Collected:  11/25/19 1541     Updated:  11/25/19 1730     BSA 2.1 m^2       CV ECHO LISSETH - RVDD 3.5 cm      IVSd 1.1 cm      LVIDd 5.5 cm      LVIDs 4.9 cm      LVPWd 1.4 cm      IVS/LVPW 0.83     FS 11.1 %      EDV(Teich) 149.6 ml      ESV(Teich) 113.8 ml      EF(Teich) 23.9 %      EDV(cubed) 169.7 ml      ESV(cubed) 119.0 ml      EF(cubed) 29.9 %      LV mass(C)d 293.3 grams      LV mass(C)dI 138.8 grams/m^2      SV(Teich) 35.8 ml      SI(Teich) 16.9 ml/m^2      SV(cubed) 50.6 ml      SI(cubed) 24.0 ml/m^2      Ao root diam 3.1 cm      Ao root area 7.6 cm^2      ACS 1.8 cm      asc Aorta Diam 3.2 cm      LVOT diam 2.1 cm      LVOT area 3.3 cm^2      RVOT diam 3.2 cm      RVOT area 8.0 cm^2      EDV(MOD-sp4) 95.1 ml      ESV(MOD-sp4) 65.2 ml      EF(MOD-sp4) 31.5 %      EDV(MOD-sp2) 105.5 ml      ESV(MOD-sp2) 81.8 ml      EF(MOD-sp2) 22.5 %      SV(MOD-sp4) 29.9 ml      SI(MOD-sp4) 14.2 ml/m^2      SV(MOD-sp2) 23.7 ml      SI(MOD-sp2) 11.2 ml/m^2      Ao root area (BSA corrected) 1.5     LV [place] Vol (BSA corrected) 45.0 ml/m^2      LV Sys Vol (BSA corrected) 30.8 ml/m^2      Aortic R-R 0.75 sec      Aortic HR 79.6 BPM      MV E max dennis 107.1 cm/sec      MV A max dennis 99.5 cm/sec      MV E/A 1.1     MV V2 max 130.3 cm/sec      MV max PG 6.8 mmHg      MV V2 mean 91.5 cm/sec      MV mean PG 3.6 mmHg      MV V2 VTI 39.0 cm      MVA(VTI) 1.4 cm^2      MV dec slope 495.1 cm/sec^2      MV dec time 0.22 sec      Ao pk dennis 169.5 cm/sec      Ao max PG 11.5 mmHg      Ao max PG (full) 9.3 mmHg      Ao V2 mean 120.3 cm/sec      Ao mean PG 6.3 mmHg      Ao mean PG (full) 5.0 mmHg      Ao V2 VTI 33.4 cm      JAN(I,A) 1.6 cm^2      JAN(I,D) 1.6 cm^2      JAN(V,A) 1.4 cm^2      JAN(V,D) 1.4 cm^2      LV V1 max PG 2.2 mmHg      LV V1 mean PG 1.3 mmHg      LV V1 max 73.9 cm/sec      LV V1 mean 52.6  cm/sec      LV V1 VTI 16.4 cm      MR max dennis 436.1 cm/sec      MR max PG 76.1 mmHg      CO(Ao) 20.2 l/min      CI(Ao) 9.5 l/min/m^2      SV(Ao) 253.5 ml      SI(Ao) 119.9 ml/m^2      CO(LVOT) 4.3 l/min      CI(LVOT) 2.1 l/min/m^2      SV(LVOT) 54.5 ml      SV(RVOT) 92.2 ml      SI(LVOT) 25.8 ml/m^2      PA V2 max 71.4 cm/sec      PA max PG 2.0 mmHg      PA max PG (full) 0.46 mmHg      PA V2 mean 54.5 cm/sec      PA mean PG 1.2 mmHg      PA mean PG (full) 0.53 mmHg      PA V2 VTI 16.2 cm      PVA(I,A) 5.7 cm^2       CV ECHO LISSETH - PVA(I,D) 5.7 cm^2       CV ECHO LISSETH - PVA(V,A) 7.1 cm^2       CV ECHO LISSETH - PVA(V,D) 7.1 cm^2      PA acc time 0.13 sec      PI end-d dennis 165.9 cm/sec      PI max dennis 208.8 cm/sec      PI max PG 17.4 mmHg      RV V1 max PG 1.6 mmHg      RV V1 mean PG 0.71 mmHg      RV V1 max 62.9 cm/sec      RV V1 mean 38.7 cm/sec      RV V1 VTI 11.5 cm      TR max dennis 312.7 cm/sec      RVSP(TR) 47.7 mmHg      RAP systole 8.0 mmHg      PA pr(Accel) 19.1 mmHg      Qp/Qs 1.7      CV ECHO LISSETH - BZI_BMI 29.6 kilograms/m^2       CV ECHO LISSETH - BSA(HAYCOCK) 2.2 m^2       CV ECHO LISSETH - BZI_METRIC_WEIGHT 93.4 kg       CV ECHO LISSETH - BZI_METRIC_HEIGHT 177.8 cm      EF(MOD-bp) 28.0 %      LA dimension(2D) 5.4 cm      Echo EF Estimated 25 %     Narrative:       · There is a moderate (1-2cm) circumferential pericardial effusion.  · The left ventricular cavity is mildly dilated.  · Left ventricular wall thickness is consistent with mild concentric   hypertrophy.  · Estimated EF = 25%.  · Mild pulmonic valve regurgitation is present.  · Mild tricuspid valve regurgitation is present.  · Right ventricular cavity is moderately dilated.  · Mildly reduced right ventricular systolic function noted.  · Left atrial cavity size is severely dilated.  · Mild mitral valve regurgitation is present  · Left ventricular systolic function is severely decreased.       ECG 12 Lead [[id number]] Collected:  11/25/19 0846      Updated:  11/26/19 0801    Narrative:       HEART RATE= 93  bpm  RR Interval= 716  ms  ME Interval= 184  ms  P Horizontal Axis= -34  deg  P Front Axis= -7  deg  QRSD Interval= 101  ms  QT Interval= 358  ms  QRS Axis= 35  deg  T Wave Axis= 90  deg  - ABNORMAL ECG -  Sinus rhythm  Multiform ventricular premature complexes  When compared with ECG of 21-Jul-2015 12:22:39,  Significant rate increase  Electronically Signed By: Saeed Carlson ([place]) 26-Nov-2019 08:00:54  Date and Time of Study: 2019-11-25 08:46:26    ECG 12 Lead [[id number]] Collected:  11/27/19 0057     Updated:  11/27/19 0100    Narrative:       HEART RATE= 69  bpm  RR Interval= 868  ms  ME Interval= 189  ms  P Horizontal Axis= -64  deg  P Front Axis= 13  deg  QRSD Interval= 99  ms  QT Interval= 433  ms  QRS Axis= 33  deg  T Wave Axis= 104  deg  - ABNORMAL ECG -  Sinus rhythm  Atrial premature complex  Probable left ventricular hypertrophy  Nonspecific T abnormalities, lateral leads  When compared with ECG of 25-Nov-2019 8:46:26,  Significant rate decrease  Significant repolarization change  Electronically Signed By:   Date and Time of Study: 2019-11-27 00:57:44            Medication Review:       Current Facility-Administered Medications:   •  acetaminophen (TYLENOL) tablet 650 mg, 650 mg, Oral, Q4H PRN **OR** acetaminophen (TYLENOL) 160 MG/5ML solution 650 mg, 650 mg, Oral, Q4H PRN **OR** acetaminophen (TYLENOL) suppository 650 mg, 650 mg, Rectal, Q4H PRN, Debra Holt APRN  •  aluminum-magnesium hydroxide-simethicone (MAALOX MAX) 400-400-40 MG/5ML suspension 15 mL, 15 mL, Oral, Q6H PRN, Debra Holt APRN  •  aspirin chewable tablet 81 mg, 81 mg, Oral, Daily, Debra Holt APRN, 81 mg at 12/02/19 0818  •  atorvastatin (LIPITOR) tablet 80 mg, 80 mg, Oral, Nightly, Debra Holt APRN, 80 mg at 12/01/19 2219  •  bisacodyl (DULCOLAX) EC tablet 5 mg, 5 mg, Oral, Daily PRN, Debra Holt, RAMONITA  •  bisacodyl (DULCOLAX)  suppository 10 mg, 10 mg, Rectal, Daily PRN, Debra Holt, APRN  •  dextrose (D50W) 25 g/ 50mL Intravenous Solution 25 g, 25 g, Intravenous, Q15 Min PRN, Debra Holt, RAMONITA  •  dextrose (GLUTOSE) oral gel 15 g, 15 g, Oral, Q15 Min PRN, Debra Holt, APRN  •  DOBUTamine (DOBUTREX) 1 mg/mL infusion, 5 mcg/kg/min, Intravenous, Continuous, Dayday Ruiz MD, Last Rate: 11.27 mL/hr at 12/01/19 2047, 2.5 mcg/kg/min at 12/01/19 2047  •  furosemide (LASIX) tablet 20 mg, 20 mg, Oral, BID, Joel Palomares MD, 20 mg at 12/02/19 1707  •  glucagon (human recombinant) (GLUCAGEN DIAGNOSTIC) injection 1 mg, 1 mg, Subcutaneous, PRN, Debra Holt, APRN  •  influenza vac split quad (FLUZONE,FLUARIX,AFLURIA) injection 0.5 mL, 0.5 mL, Intramuscular, During Hospitalization, Reji Newby Jr., MD  •  insulin lispro (humaLOG) injection 0-7 Units, 0-7 Units, Subcutaneous, 4x Daily With Meals & Nightly **AND** insulin lispro (humaLOG) injection 0-7 Units, 0-7 Units, Subcutaneous, PRN, Debra Holt, APRN  •  ipratropium-albuterol (DUO-NEB) nebulizer solution 3 mL, 3 mL, Nebulization, Q4H PRN, Debra Holt, APRN  •  lisinopril (PRINIVIL,ZESTRIL) tablet 20 mg, 20 mg, Oral, Daily, Debra Holt APRN, 20 mg at 12/02/19 0818  •  loperamide (IMODIUM) capsule 2 mg, 2 mg, Oral, 4x Daily PRN, Reji Newby Jr., MD, 2 mg at 11/30/19 2031  •  magnesium hydroxide (MILK OF MAGNESIA) suspension 2400 mg/10mL 10 mL, 10 mL, Oral, Daily PRN, Debra Holt APRN  •  Magnesium Sulfate 2 gram infusion - Mg less than or equal to 1.5 mg/dL, 2 g, Intravenous, PRN, Last Rate: 25 mL/hr at 11/26/19 0603, 2 g at 11/26/19 0603 **OR** Magnesium Sulfate 1 gram infusion - Mg 1.6-1.9 mg/dL, 1 g, Intravenous, PRN, Debra Holt APRN  •  melatonin tablet 5 mg, 5 mg, Oral, Nightly PRN, Debra Holt APRN  •  metoprolol succinate XL (TOPROL-XL) 24 hr tablet 25 mg, 25 mg, Oral, Daily,  Achterberg, Debra, APRN, 25 mg at 12/02/19 0818  •  ondansetron (ZOFRAN) tablet 4 mg, 4 mg, Oral, Q6H PRN **OR** ondansetron (ZOFRAN) injection 4 mg, 4 mg, Intravenous, Q6H PRN, Achterberg, Debra, APRN  •  pantoprazole (PROTONIX) EC tablet 40 mg, 40 mg, Oral, QAM, Achterberg, Debra, APRN, 40 mg at 12/02/19 0818  •  phenol (CHLORASEPTIC) 1.4 % liquid 1 spray, 1 spray, Mouth/Throat, Q4H PRN, Reji Newby Jr., MD, 1 spray at 11/26/19 1659  •  potassium chloride (K-DUR,KLOR-CON) CR tablet 40 mEq, 40 mEq, Oral, PRN, Achterberg, Debra, APRN  •  potassium chloride (KLOR-CON) packet 40 mEq, 40 mEq, Oral, PRN, Achterberg, Debra, APRN  •  sertraline (ZOLOFT) tablet 25 mg, 25 mg, Oral, Daily, Achterberg, Debra, APRN, 25 mg at 12/02/19 0817  •  [COMPLETED] Insert peripheral IV, , , Once **AND** sodium chloride 0.9 % flush 10 mL, 10 mL, Intravenous, PRN, Saeed Carlson MD  •  sodium chloride 0.9 % flush 10 mL, 10 mL, Intravenous, PRN, Saeed Carlson MD  •  sodium chloride 0.9 % flush 10 mL, 10 mL, Intravenous, Q12H, Achterberg, Debra, APRN, 10 mL at 12/02/19 0818  •  sodium chloride 0.9 % flush 10 mL, 10 mL, Intravenous, PRN, Achterberg, Debra, APRN  •  tamsulosin (FLOMAX) 24 hr capsule 0.4 mg, 0.4 mg, Oral, Daily, Achterberg, Debra, APRN, 0.4 mg at 12/02/19 0818    I have reviewed the patient's current medication list    Assessment/Plan     Acute exacerbation of HFrEF (congestive heart failure) (CMS/[facility])  -remains on dobutamine  -diuresing well  -continue per cardiology stop dobutamine when cleared by them    Carotid artery disease (CMS/HCC)    Type 2 diabetes mellitus without complication, without long-term current use of insulin (CMS/[facility])  -follow BG with adjustments prn    Gastritis and gastroduodenitis  -chronic on protonix     Atherosclerosis of native coronary artery of native heart without angina pectoris    Chronic kidney disease, stage III (moderate) (CMS/HCC)  -Cr  appears stable 1.7  -monitor closely     Hyperlipidemia  Hypertension, benign  Depression  -chronic c/w home meds     Shortness of breath  Acute respiratory failure with hypoxia (CMS/HCC)  -improving on 2L NC now  -will need to assess for home O2 at time of d/c       Plan for disposition:home soon when off dobutamine gtt     Henry Munson DO  12/02/19  5:31 PM

## 2019-12-02 NOTE — PROGRESS NOTES
"Heart Failure Program  Nurse Navigator  Discharge Planning: Follow-up Note    Patient Name:Mikael Shanks  :1935  Current Admission Date: 2019       Last 3 Weights:  Wt Readings from Last 3 Encounters:   19 74 kg (163 lb 2.3 oz)   19 93.8 kg (206 lb 12.7 oz)   19 81.6 kg (180 lb)       Intake and Output totals: I/O last 3 completed shifts:  In: 820 [P.O.:820]  Out: 1415 [Urine:1415]  I/O this shift:  In: 120 [P.O.:120]  Out: 400 [Urine:400]                Patient Assessment:   Resp even and unlabored, patient lying in bed, conversation without difficulty. Patient advises he has been getting up to walk bathroom without SOA. No edema noted. Patient advises \"they have switched my diuretic medication to pills and my urine is back to a normal color not clear.\" Pt with 2L NC continue. Patient advises, \" I take it off sometimes\" to see if I need it.      Patient Education:   Reviewed daily weights, s/s of heart failure, self-care initiatives for discharge.  Importance of taking medications as directed and keeping follow-up appointments post discharge.      Acceptance of learning: acceptable, attentive, teachback self-initiatives.          Identified needs/barriers:   2L NC continue -(may need 6 min walk to evaluate pulmonary status)    Intervention follow-up:  Discuss with primary RN    Emma Anton CNS  Heart Failure Program  "

## 2019-12-03 ENCOUNTER — APPOINTMENT (OUTPATIENT)
Dept: ULTRASOUND IMAGING | Facility: HOSPITAL | Age: 84
End: 2019-12-03

## 2019-12-03 LAB
ANION GAP SERPL CALCULATED.3IONS-SCNC: 13 MMOL/L (ref 5–15)
BILIRUB UR QL STRIP: NEGATIVE
BUN BLD-MCNC: 41 MG/DL (ref 8–23)
BUN/CREAT SERPL: 23.2 (ref 7–25)
CALCIUM SPEC-SCNC: 9.9 MG/DL (ref 8.6–10.5)
CHLORIDE SERPL-SCNC: 95 MMOL/L (ref 98–107)
CK SERPL-CCNC: 59 U/L (ref 20–200)
CLARITY UR: CLEAR
CO2 SERPL-SCNC: 28 MMOL/L (ref 22–29)
COLOR UR: YELLOW
CREAT BLD-MCNC: 1.77 MG/DL (ref 0.76–1.27)
DEPRECATED RDW RBC AUTO: 44.6 FL (ref 37–54)
EOSINOPHIL SPEC QL MICRO: 0 % EOS/100 CELLS (ref 0–0)
ERYTHROCYTE [DISTWIDTH] IN BLOOD BY AUTOMATED COUNT: 14.7 % (ref 12.3–15.4)
GFR SERPL CREATININE-BSD FRML MDRD: 37 ML/MIN/1.73
GLUCOSE BLD-MCNC: 92 MG/DL (ref 65–99)
GLUCOSE BLDC GLUCOMTR-MCNC: 104 MG/DL (ref 70–105)
GLUCOSE BLDC GLUCOMTR-MCNC: 121 MG/DL (ref 70–105)
GLUCOSE BLDC GLUCOMTR-MCNC: 125 MG/DL (ref 70–105)
GLUCOSE BLDC GLUCOMTR-MCNC: 148 MG/DL (ref 70–105)
GLUCOSE UR STRIP-MCNC: NEGATIVE MG/DL
HCT VFR BLD AUTO: 36.2 % (ref 37.5–51)
HGB BLD-MCNC: 11.8 G/DL (ref 13–17.7)
HGB UR QL STRIP.AUTO: NEGATIVE
KETONES UR QL STRIP: NEGATIVE
LEUKOCYTE ESTERASE UR QL STRIP.AUTO: NEGATIVE
MAGNESIUM SERPL-MCNC: 1.8 MG/DL (ref 1.6–2.4)
MCH RBC QN AUTO: 28.3 PG (ref 26.6–33)
MCHC RBC AUTO-ENTMCNC: 32.7 G/DL (ref 31.5–35.7)
MCV RBC AUTO: 86.5 FL (ref 79–97)
NITRITE UR QL STRIP: NEGATIVE
PH UR STRIP.AUTO: 6 [PH] (ref 5–8)
PLATELET # BLD AUTO: 200 10*3/MM3 (ref 140–450)
PMV BLD AUTO: 10.3 FL (ref 6–12)
POTASSIUM BLD-SCNC: 4.9 MMOL/L (ref 3.5–5.2)
PROT UR QL STRIP: NEGATIVE
PTH-INTACT SERPL-MCNC: 116.3 PG/ML (ref 15–65)
RBC # BLD AUTO: 4.18 10*6/MM3 (ref 4.14–5.8)
SODIUM BLD-SCNC: 136 MMOL/L (ref 136–145)
SODIUM UR-SCNC: 45 MMOL/L
SP GR UR STRIP: <=1.005 (ref 1–1.03)
TSH SERPL DL<=0.05 MIU/L-ACNC: 2.53 UIU/ML (ref 0.27–4.2)
URATE SERPL-MCNC: 7.8 MG/DL (ref 3.4–7)
UROBILINOGEN UR QL STRIP: NORMAL
WBC NRBC COR # BLD: 7.9 10*3/MM3 (ref 3.4–10.8)

## 2019-12-03 PROCEDURE — 81003 URINALYSIS AUTO W/O SCOPE: CPT | Performed by: INTERNAL MEDICINE

## 2019-12-03 PROCEDURE — 83970 ASSAY OF PARATHORMONE: CPT | Performed by: INTERNAL MEDICINE

## 2019-12-03 PROCEDURE — 84165 PROTEIN E-PHORESIS SERUM: CPT | Performed by: INTERNAL MEDICINE

## 2019-12-03 PROCEDURE — 82962 GLUCOSE BLOOD TEST: CPT

## 2019-12-03 PROCEDURE — 80048 BASIC METABOLIC PNL TOTAL CA: CPT | Performed by: NURSE PRACTITIONER

## 2019-12-03 PROCEDURE — 93010 ELECTROCARDIOGRAM REPORT: CPT | Performed by: INTERNAL MEDICINE

## 2019-12-03 PROCEDURE — 99233 SBSQ HOSP IP/OBS HIGH 50: CPT | Performed by: INTERNAL MEDICINE

## 2019-12-03 PROCEDURE — 76775 US EXAM ABDO BACK WALL LIM: CPT

## 2019-12-03 PROCEDURE — 94799 UNLISTED PULMONARY SVC/PX: CPT

## 2019-12-03 PROCEDURE — 84300 ASSAY OF URINE SODIUM: CPT | Performed by: INTERNAL MEDICINE

## 2019-12-03 PROCEDURE — 84443 ASSAY THYROID STIM HORMONE: CPT | Performed by: INTERNAL MEDICINE

## 2019-12-03 PROCEDURE — 87205 SMEAR GRAM STAIN: CPT | Performed by: INTERNAL MEDICINE

## 2019-12-03 PROCEDURE — 25010000002 DOBUTAMINE PER 250 MG: Performed by: INTERNAL MEDICINE

## 2019-12-03 PROCEDURE — 83735 ASSAY OF MAGNESIUM: CPT | Performed by: INTERNAL MEDICINE

## 2019-12-03 PROCEDURE — 84155 ASSAY OF PROTEIN SERUM: CPT | Performed by: INTERNAL MEDICINE

## 2019-12-03 PROCEDURE — 84550 ASSAY OF BLOOD/URIC ACID: CPT | Performed by: INTERNAL MEDICINE

## 2019-12-03 PROCEDURE — 82550 ASSAY OF CK (CPK): CPT | Performed by: INTERNAL MEDICINE

## 2019-12-03 PROCEDURE — 25010000002 MAGNESIUM SULFATE IN D5W 1G/100ML (PREMIX) 1-5 GM/100ML-% SOLUTION: Performed by: INTERNAL MEDICINE

## 2019-12-03 PROCEDURE — 85027 COMPLETE CBC AUTOMATED: CPT | Performed by: INTERNAL MEDICINE

## 2019-12-03 RX ORDER — MAGNESIUM SULFATE 1 G/100ML
1 INJECTION INTRAVENOUS ONCE
Status: COMPLETED | OUTPATIENT
Start: 2019-12-03 | End: 2019-12-03

## 2019-12-03 RX ADMIN — FUROSEMIDE 20 MG: 20 TABLET ORAL at 08:09

## 2019-12-03 RX ADMIN — Medication 10 ML: at 08:10

## 2019-12-03 RX ADMIN — FUROSEMIDE 20 MG: 20 TABLET ORAL at 17:51

## 2019-12-03 RX ADMIN — SERTRALINE HYDROCHLORIDE 25 MG: 50 TABLET ORAL at 08:09

## 2019-12-03 RX ADMIN — ASPIRIN 81 MG 81 MG: 81 TABLET ORAL at 08:10

## 2019-12-03 RX ADMIN — LISINOPRIL 20 MG: 20 TABLET ORAL at 08:09

## 2019-12-03 RX ADMIN — TAMSULOSIN HYDROCHLORIDE 0.4 MG: 0.4 CAPSULE ORAL at 08:09

## 2019-12-03 RX ADMIN — PANTOPRAZOLE SODIUM 40 MG: 40 TABLET, DELAYED RELEASE ORAL at 08:09

## 2019-12-03 RX ADMIN — METOPROLOL SUCCINATE 25 MG: 25 TABLET, EXTENDED RELEASE ORAL at 08:09

## 2019-12-03 RX ADMIN — Medication 10 ML: at 20:54

## 2019-12-03 RX ADMIN — MAGNESIUM SULFATE HEPTAHYDRATE 1 G: 1 INJECTION, SOLUTION INTRAVENOUS at 16:50

## 2019-12-03 RX ADMIN — DOBUTAMINE IN DEXTROSE 2.5 MCG/KG/MIN: 100 INJECTION, SOLUTION INTRAVENOUS at 02:17

## 2019-12-03 RX ADMIN — ATORVASTATIN CALCIUM 80 MG: 40 TABLET, FILM COATED ORAL at 20:54

## 2019-12-03 NOTE — PROGRESS NOTES
[facility] Hospitalist Team      Patient Care Team:  Xander Robison MD as PCP - General  Xander Robison MD as PCP - Claims Attributed  Delio Lund MD as Consulting Physician (Nephrology)        Chief Complaint:  soa    Subjective: breathing is stable no chest pain. No other new issues. ROS:  Denies CP, no edema, no fever    Mr. Shanks is a 84 y.o. male with no past medical history of CHF or COPD but does have CAD and BILL. He is waiting on his cpap mask. He has been short of breath upon exertion for the last couple weeks or so but his dyspnea became worse in the last 3-4 days. He has dyspnea with minimal exertion and orthopnea. He has had to sleep in a chair and cannot lie flat. He has had lower extremity swelling, which has not had in the past. He has had a cough but no fever. He denied any chest pain. He has been urinating well.      In the ED the patient was found to have proBNP 11,275. CXR showed cardiomegaly with bilateral interstitial and alveolar opacities suggesting pulmonary edema, less likely atypical infection. Small bilateral pleural effusions. He was given 40mg IV lasix and nitro paste. He was admitted for further treatment.      2D echo from 10/3/2019 showed left ventricular wall thickness is consistent with mild concentric hypertrophy. Mild tricuspid valve regurgitation is present. There is mild calcification of the aortic valve. Estimated EF = 50%. Left ventricular systolic function is normal. Left atrial cavity size is severely dilated. Mild mitral valve regurgitation is present Left ventricular diastolic dysfunction (grade I) consistent with impaired relaxation. Mild mitral valve stenosis is present    Objective:    Vital Signs  Temp:  [97.5 °F (36.4 °C)-98.4 °F (36.9 °C)] 97.5 °F (36.4 °C)  Heart Rate:  [60-68] 66  Resp:  [16-20] 16  BP: ()/(38-61) 83/38  Oxygen Therapy  SpO2: 96 %  Pulse Oximetry Type: Continuous  Device (Oxygen Therapy): room air  Flow (L/min):  "2  Oxygen Concentration (%): 40  Flowsheet Rows      First Filed Value   Admission Height  177.8 cm (70\") Documented at 11/25/2019 0838   Admission Weight  93.8 kg (206 lb 12.7 oz) Documented at 11/25/2019 0838        Intake & Output (last 3 days)       11/30 0701 - 12/01 0700 12/01 0701 - 12/02 0700 12/02 0701 - 12/03 0700 12/03 0701 - 12/04 0700    P.O. 1300 720 360 720    Total Intake(mL/kg) 1300 (17.8) 720 (9.7) 360 (4.9) 720 (9.8)    Urine (mL/kg/hr) 1250 (0.7) 665 (0.4) 1450 (0.8) 1125 (1.5)    Stool 0  0     Total Output 7890 963 5298 1125    Net +50 +55 -1090 -405            Urine Unmeasured Occurrence  8 x      Stool Unmeasured Occurrence 2 x  1 x         Lines, Drains & Airways    Active LDAs     Name:   Placement date:   Placement time:   Site:   Days:    Peripheral IV 11/25/19 0847 Right Antecubital   11/25/19    0847    Antecubital   1                Physical Exam:    Gen: NAD  HEENT: EOMI, no icterus, PERRL  Neck: No JVD  Heart: RRR, no murmur  Lung: CTA b/l, adequate air movement  ABD: soft, NT, ND, no rebound or guarding  MSK: moves ext spontaneously  Neuro: AO x 3  Psych: no anxiety, no depression  Skin: warm, dry, intact  Extremities:  No edema    Results Review:       Lab Results (last 24 hours)     Procedure Component Value Units Date/Time    POC Glucose Once [[id number]]  (Abnormal) Collected:  12/03/19 1619    Specimen:  Blood Updated:  12/03/19 1622     Glucose 125 mg/dL      Comment: Serial Number: [id number]:  [id number]       CK [[id number]]  (Normal) Collected:  12/03/19 1418    Specimen:  Blood Updated:  12/03/19 1454     Creatine Kinase 59 U/L     Uric Acid [[id number]]  (Abnormal) Collected:  12/03/19 1418    Specimen:  Blood Updated:  12/03/19 1454     Uric Acid 7.8 mg/dL     PTH, Intact [[id number]]  (Abnormal) Collected:  12/03/19 1418    Specimen:  Blood Updated:  12/03/19 1442     PTH, Intact 116.3 pg/mL     Protein Elec + Interp, Serum [[id number]] Collected:  12/03/19 1418    " Specimen:  Blood Updated:  12/03/19 1422    POC Glucose Once [[id number]]  (Abnormal) Collected:  12/03/19 1127    Specimen:  Blood Updated:  12/03/19 1134     Glucose 121 mg/dL      Comment: Serial Number: [id number]:  [id number]       POC Glucose Once [[id number]]  (Normal) Collected:  12/03/19 0721    Specimen:  Blood Updated:  12/03/19 0723     Glucose 104 mg/dL      Comment: Serial Number: [id number]:  [id number]       Basic Metabolic Panel [[id number]]  (Abnormal) Collected:  12/03/19 0309    Specimen:  Blood Updated:  12/03/19 0409     Glucose 92 mg/dL      BUN 41 mg/dL      Creatinine 1.77 mg/dL      Sodium 136 mmol/L      Potassium 4.9 mmol/L      Chloride 95 mmol/L      CO2 28.0 mmol/L      Calcium 9.9 mg/dL      eGFR Non African Amer 37 mL/min/1.73      BUN/Creatinine Ratio 23.2     Anion Gap 13.0 mmol/L     Narrative:       GFR Normal >60  Chronic Kidney Disease <60  Kidney Failure <15    Magnesium [[id number]]  (Normal) Collected:  12/03/19 0309    Specimen:  Blood Updated:  12/03/19 0409     Magnesium 1.8 mg/dL     TSH [[id number]]  (Normal) Collected:  12/03/19 0309    Specimen:  Blood Updated:  12/03/19 0406     TSH 2.530 uIU/mL     CBC (No Diff) [[id number]]  (Abnormal) Collected:  12/03/19 0309    Specimen:  Blood Updated:  12/03/19 0334     WBC 7.90 10*3/mm3      RBC 4.18 10*6/mm3      Hemoglobin 11.8 g/dL      Hematocrit 36.2 %      MCV 86.5 fL      MCH 28.3 pg      MCHC 32.7 g/dL      RDW 14.7 %      RDW-SD 44.6 fl      MPV 10.3 fL      Platelets 200 10*3/mm3     POC Glucose Once [[id number]]  (Abnormal) Collected:  12/02/19 2038    Specimen:  Blood Updated:  12/02/19 2040     Glucose 164 mg/dL      Comment: Serial Number: [id number]:  [id number]             Imaging Results (Last 24 Hours)     ** No results found for the last 24 hours. **                                   I reviewed the patient's new clinical results.          ECG/EMG Results (most recent)     Procedure Component  Value Units Date/Time    Adult Transthoracic Echo Complete W/ Cont if Necessary Per Protocol [[id number]] Collected:  11/25/19 1541     Updated:  11/25/19 1730     BSA 2.1 m^2       CV ECHO LISSETH - RVDD 3.5 cm      IVSd 1.1 cm      LVIDd 5.5 cm      LVIDs 4.9 cm      LVPWd 1.4 cm      IVS/LVPW 0.83     FS 11.1 %      EDV(Teich) 149.6 ml      ESV(Teich) 113.8 ml      EF(Teich) 23.9 %      EDV(cubed) 169.7 ml      ESV(cubed) 119.0 ml      EF(cubed) 29.9 %      LV mass(C)d 293.3 grams      LV mass(C)dI 138.8 grams/m^2      SV(Teich) 35.8 ml      SI(Teich) 16.9 ml/m^2      SV(cubed) 50.6 ml      SI(cubed) 24.0 ml/m^2      Ao root diam 3.1 cm      Ao root area 7.6 cm^2      ACS 1.8 cm      asc Aorta Diam 3.2 cm      LVOT diam 2.1 cm      LVOT area 3.3 cm^2      RVOT diam 3.2 cm      RVOT area 8.0 cm^2      EDV(MOD-sp4) 95.1 ml      ESV(MOD-sp4) 65.2 ml      EF(MOD-sp4) 31.5 %      EDV(MOD-sp2) 105.5 ml      ESV(MOD-sp2) 81.8 ml      EF(MOD-sp2) 22.5 %      SV(MOD-sp4) 29.9 ml      SI(MOD-sp4) 14.2 ml/m^2      SV(MOD-sp2) 23.7 ml      SI(MOD-sp2) 11.2 ml/m^2      Ao root area (BSA corrected) 1.5     LV [place] Vol (BSA corrected) 45.0 ml/m^2      LV Sys Vol (BSA corrected) 30.8 ml/m^2      Aortic R-R 0.75 sec      Aortic HR 79.6 BPM      MV E max dennis 107.1 cm/sec      MV A max dennis 99.5 cm/sec      MV E/A 1.1     MV V2 max 130.3 cm/sec      MV max PG 6.8 mmHg      MV V2 mean 91.5 cm/sec      MV mean PG 3.6 mmHg      MV V2 VTI 39.0 cm      MVA(VTI) 1.4 cm^2      MV dec slope 495.1 cm/sec^2      MV dec time 0.22 sec      Ao pk dennis 169.5 cm/sec      Ao max PG 11.5 mmHg      Ao max PG (full) 9.3 mmHg      Ao V2 mean 120.3 cm/sec      Ao mean PG 6.3 mmHg      Ao mean PG (full) 5.0 mmHg      Ao V2 VTI 33.4 cm      JAN(I,A) 1.6 cm^2      JAN(I,D) 1.6 cm^2      JAN(V,A) 1.4 cm^2      JAN(V,D) 1.4 cm^2      LV V1 max PG 2.2 mmHg      LV V1 mean PG 1.3 mmHg      LV V1 max 73.9 cm/sec      LV V1 mean 52.6 cm/sec      LV V1 VTI 16.4 cm       MR max dennis 436.1 cm/sec      MR max PG 76.1 mmHg      CO(Ao) 20.2 l/min      CI(Ao) 9.5 l/min/m^2      SV(Ao) 253.5 ml      SI(Ao) 119.9 ml/m^2      CO(LVOT) 4.3 l/min      CI(LVOT) 2.1 l/min/m^2      SV(LVOT) 54.5 ml      SV(RVOT) 92.2 ml      SI(LVOT) 25.8 ml/m^2      PA V2 max 71.4 cm/sec      PA max PG 2.0 mmHg      PA max PG (full) 0.46 mmHg      PA V2 mean 54.5 cm/sec      PA mean PG 1.2 mmHg      PA mean PG (full) 0.53 mmHg      PA V2 VTI 16.2 cm      PVA(I,A) 5.7 cm^2       CV ECHO LISSETH - PVA(I,D) 5.7 cm^2       CV ECHO LISSETH - PVA(V,A) 7.1 cm^2       CV ECHO LISSETH - PVA(V,D) 7.1 cm^2      PA acc time 0.13 sec      PI end-d dennis 165.9 cm/sec      PI max dennis 208.8 cm/sec      PI max PG 17.4 mmHg      RV V1 max PG 1.6 mmHg      RV V1 mean PG 0.71 mmHg      RV V1 max 62.9 cm/sec      RV V1 mean 38.7 cm/sec      RV V1 VTI 11.5 cm      TR max dennis 312.7 cm/sec      RVSP(TR) 47.7 mmHg      RAP systole 8.0 mmHg      PA pr(Accel) 19.1 mmHg      Qp/Qs 1.7      CV ECHO LISSETH - BZI_BMI 29.6 kilograms/m^2       CV ECHO LISSETH - BSA(HAYCOCK) 2.2 m^2       CV ECHO LISSETH - BZI_METRIC_WEIGHT 93.4 kg       CV ECHO LISSETH - BZI_METRIC_HEIGHT 177.8 cm      EF(MOD-bp) 28.0 %      LA dimension(2D) 5.4 cm      Echo EF Estimated 25 %     Narrative:       · There is a moderate (1-2cm) circumferential pericardial effusion.  · The left ventricular cavity is mildly dilated.  · Left ventricular wall thickness is consistent with mild concentric   hypertrophy.  · Estimated EF = 25%.  · Mild pulmonic valve regurgitation is present.  · Mild tricuspid valve regurgitation is present.  · Right ventricular cavity is moderately dilated.  · Mildly reduced right ventricular systolic function noted.  · Left atrial cavity size is severely dilated.  · Mild mitral valve regurgitation is present  · Left ventricular systolic function is severely decreased.       ECG 12 Lead [[id number]] Collected:  11/25/19 0846     Updated:  11/26/19 0801     Narrative:       HEART RATE= 93  bpm  RR Interval= 716  ms  NC Interval= 184  ms  P Horizontal Axis= -34  deg  P Front Axis= -7  deg  QRSD Interval= 101  ms  QT Interval= 358  ms  QRS Axis= 35  deg  T Wave Axis= 90  deg  - ABNORMAL ECG -  Sinus rhythm  Multiform ventricular premature complexes  When compared with ECG of 21-Jul-2015 12:22:39,  Significant rate increase  Electronically Signed By: Saeed Carlson (Toro) 26-Nov-2019 08:00:54  Date and Time of Study: 2019-11-25 08:46:26    ECG 12 Lead [[id number]] Collected:  11/27/19 0057     Updated:  11/27/19 0100    Narrative:       HEART RATE= 69  bpm  RR Interval= 868  ms  NC Interval= 189  ms  P Horizontal Axis= -64  deg  P Front Axis= 13  deg  QRSD Interval= 99  ms  QT Interval= 433  ms  QRS Axis= 33  deg  T Wave Axis= 104  deg  - ABNORMAL ECG -  Sinus rhythm  Atrial premature complex  Probable left ventricular hypertrophy  Nonspecific T abnormalities, lateral leads  When compared with ECG of 25-Nov-2019 8:46:26,  Significant rate decrease  Significant repolarization change  Electronically Signed By:   Date and Time of Study: 2019-11-27 00:57:44            Medication Review:       Current Facility-Administered Medications:   •  acetaminophen (TYLENOL) tablet 650 mg, 650 mg, Oral, Q4H PRN **OR** acetaminophen (TYLENOL) 160 MG/5ML solution 650 mg, 650 mg, Oral, Q4H PRN **OR** acetaminophen (TYLENOL) suppository 650 mg, 650 mg, Rectal, Q4H PRN, ElanterJonatan willisle, APRN  •  aluminum-magnesium hydroxide-simethicone (MAALOX MAX) 400-400-40 MG/5ML suspension 15 mL, 15 mL, Oral, Q6H PRN, AchterbergIrineoDebra, APRN  •  aspirin chewable tablet 81 mg, 81 mg, Oral, Daily, Debra Holt, APRN, 81 mg at 12/03/19 0810  •  atorvastatin (LIPITOR) tablet 80 mg, 80 mg, Oral, Nightly, AchterJonatan willisle, APRN, 80 mg at 12/02/19 2121  •  bisacodyl (DULCOLAX) EC tablet 5 mg, 5 mg, Oral, Daily PRN, Achterberg, Debra, APRN  •  bisacodyl (DULCOLAX) suppository 10 mg, 10 mg,  Rectal, Daily PRN, Debra Holt, APRN  •  dextrose (D50W) 25 g/ 50mL Intravenous Solution 25 g, 25 g, Intravenous, Q15 Min PRN, Debra Holt, APRN  •  dextrose (GLUTOSE) oral gel 15 g, 15 g, Oral, Q15 Min PRN, Debra Holt, APRN  •  DOBUTamine (DOBUTREX) 1 mg/mL infusion, 5 mcg/kg/min, Intravenous, Continuous, Dayday Ruiz MD, Last Rate: 11.27 mL/hr at 12/03/19 0217, 2.5 mcg/kg/min at 12/03/19 0217  •  furosemide (LASIX) tablet 20 mg, 20 mg, Oral, BID, Joel Palomares MD, 20 mg at 12/03/19 0809  •  glucagon (human recombinant) (GLUCAGEN DIAGNOSTIC) injection 1 mg, 1 mg, Subcutaneous, PRN, Jonatan Holtle, APRN  •  influenza vac split quad (FLUZONE,FLUARIX,AFLURIA) injection 0.5 mL, 0.5 mL, Intramuscular, During Hospitalization, Reji Newby Jr., MD  •  insulin lispro (humaLOG) injection 0-7 Units, 0-7 Units, Subcutaneous, 4x Daily With Meals & Nightly **AND** insulin lispro (humaLOG) injection 0-7 Units, 0-7 Units, Subcutaneous, PRN, Jonatan Holtle, APRN  •  ipratropium-albuterol (DUO-NEB) nebulizer solution 3 mL, 3 mL, Nebulization, Q4H PRN, Debra Holt, APRN  •  loperamide (IMODIUM) capsule 2 mg, 2 mg, Oral, 4x Daily PRN, Reji Newby Jr., MD, 2 mg at 11/30/19 2031  •  magnesium hydroxide (MILK OF MAGNESIA) suspension 2400 mg/10mL 10 mL, 10 mL, Oral, Daily PRN, Jonatan Holtle, APRN  •  Magnesium Sulfate 2 gram infusion - Mg less than or equal to 1.5 mg/dL, 2 g, Intravenous, PRN, Last Rate: 25 mL/hr at 11/26/19 0603, 2 g at 11/26/19 0603 **OR** Magnesium Sulfate 1 gram infusion - Mg 1.6-1.9 mg/dL, 1 g, Intravenous, PRN, Debra Holt APRN  •  melatonin tablet 5 mg, 5 mg, Oral, Nightly PRN, Debra Holt APRN  •  metoprolol succinate XL (TOPROL-XL) 24 hr tablet 25 mg, 25 mg, Oral, Daily, Debra Holt APRN, 25 mg at 12/03/19 0809  •  ondansetron (ZOFRAN) tablet 4 mg, 4 mg, Oral, Q6H PRN **OR** ondansetron (ZOFRAN) injection 4 mg, 4  mg, Intravenous, Q6H PRN, Achterberg, Debra, APRN  •  pantoprazole (PROTONIX) EC tablet 40 mg, 40 mg, Oral, QAM, Achterberg, Debra, APRN, 40 mg at 12/03/19 0809  •  phenol (CHLORASEPTIC) 1.4 % liquid 1 spray, 1 spray, Mouth/Throat, Q4H PRN, Reji Newby Jr., MD, 1 spray at 11/26/19 1659  •  potassium chloride (K-DUR,KLOR-CON) CR tablet 40 mEq, 40 mEq, Oral, PRN, Achterberg, Debra, APRN  •  potassium chloride (KLOR-CON) packet 40 mEq, 40 mEq, Oral, PRN, Achterberg, Debra, APRN  •  sertraline (ZOLOFT) tablet 25 mg, 25 mg, Oral, Daily, Achterberg, Debra, APRN, 25 mg at 12/03/19 0809  •  [COMPLETED] Insert peripheral IV, , , Once **AND** sodium chloride 0.9 % flush 10 mL, 10 mL, Intravenous, PRN, Saeed Carlson MD  •  sodium chloride 0.9 % flush 10 mL, 10 mL, Intravenous, PRNRobyn William Randall, MD  •  sodium chloride 0.9 % flush 10 mL, 10 mL, Intravenous, Q12H, Achterberg, Debra, APRN, 10 mL at 12/03/19 0810  •  sodium chloride 0.9 % flush 10 mL, 10 mL, Intravenous, PRN, Achterberg, Debra, APRN  •  tamsulosin (FLOMAX) 24 hr capsule 0.4 mg, 0.4 mg, Oral, Daily, Achterberg, Debra, APRN, 0.4 mg at 12/03/19 0809    I have reviewed the patient's current medication list    Assessment/Plan     Acute exacerbation of HFrEF (congestive heart failure) (CMS/[facility])  -remains on dobutamine  -diuresing well  -continue per cardiology stop dobutamine when cleared by them  -cardiology plans stress test tomorrow    Carotid artery disease (CMS/[facility])    Type 2 diabetes mellitus without complication, without long-term current use of insulin (CMS/[facility])  -follow BG with adjustments prn    Gastritis and gastroduodenitis  -chronic on protonix     Atherosclerosis of native coronary artery of native heart without angina pectoris    SANDY on Chronic kidney disease, stage III   -Cr 1.7  -monitor closely     Hyperlipidemia  Hypertension, benign  Depression  -chronic c/w home meds     Shortness of breath  Acute  respiratory failure with hypoxia (CMS/HCC)  -improving on 2L NC now  -will need to assess for home O2 at time of d/c       Plan for disposition:home soon when off dobutamine gtt and ischemic evaluation complete    Henry Munson DO  12/03/19  5:14 PM

## 2019-12-03 NOTE — CONSULTS
NEPHROLOGY CONSULTATION-----[facility]    [facility]  [phone]  Uriah Lund MD    Patient Care Team:  Xander Robison MD as PCP - General  Xander Robison MD as PCP - Claims Attributed    CC/REASON FOR CONSULTATION: RENAL FAILURE/ELEVATED SERUM CREATININE  PHYSICIAN REQUESTING CONSULTATION:     History of Present Illness     HPI    Patient is a 84 y.o. WM with long standing h/o DM, HTN, whom I was asked to see in consultation for evaluation and management of renal failure/elevated serum creatinine. Patient was admitted with   SOB, FELIX, edema. Patient denies prior knowledge of functional kidney disease, proteinuria, or hematuria. No NSAIDs or recent IV dye exposure. No known h/o hepatitis, TB, rheumatic fever, jaundice, SLE, bleeding/bruising disorders.  Some urinary frequency. Has been getting diuretics in the form of Lasix since admission. Has also been on ACE-I in the form of Lisinopril. No edema or fluid retention.  +Compliance with home meds.   Was on ACE-I/ARB in the form of Lisinopril prior to admission. No herbal med use. Some borderline hypotension.      Review of Systems   Constitutional: Positive for activity change and fatigue. Negative for appetite change, chills, diaphoresis, fever and unexpected weight change.   HENT: Negative for congestion, dental problem, drooling, ear discharge, ear pain, facial swelling, hearing loss, mouth sores, nosebleeds, postnasal drip, rhinorrhea, sinus pressure, sinus pain, sneezing, sore throat, tinnitus, trouble swallowing and voice change.    Eyes: Negative for photophobia, pain, discharge, redness, itching and visual disturbance.   Respiratory: Positive for shortness of breath. Negative for apnea, cough, choking, chest tightness, wheezing and stridor.    Cardiovascular: Negative for chest pain, palpitations and leg swelling.   Gastrointestinal: Negative for abdominal distention, abdominal pain, anal  bleeding, blood in stool, constipation, diarrhea, nausea, rectal pain and vomiting.   Endocrine: Negative for cold intolerance, heat intolerance, polydipsia, polyphagia and polyuria.   Genitourinary: Positive for frequency. Negative for decreased urine volume, difficulty urinating, dysuria, enuresis, flank pain, genital sores, hematuria and urgency.   Musculoskeletal: Positive for arthralgias. Negative for back pain, gait problem, joint swelling, myalgias, neck pain and neck stiffness.   Skin: Negative for color change, pallor, rash and wound.   Allergic/Immunologic: Negative for environmental allergies, food allergies and immunocompromised state.   Neurological: Positive for weakness. Negative for dizziness, tremors, seizures, syncope, facial asymmetry, speech difficulty, light-headedness, numbness and headaches.   Hematological: Negative for adenopathy. Does not bruise/bleed easily.   Psychiatric/Behavioral: Negative for agitation, behavioral problems, confusion, decreased concentration, dysphoric mood, hallucinations, self-injury, sleep disturbance and suicidal ideas. The patient is not nervous/anxious and is not hyperactive.           Past Medical History:   Diagnosis Date   • Anemia    • CAD (coronary artery disease)    • Carotid artery disease (CMS/HCC)    • Diabetes mellitus (CMS/HCC)    • Gastritis    • GERD (gastroesophageal reflux disease)    • Hyperlipidemia    • Hypertension    • Mood disorder (CMS/HCC)    • Osteoarthritis    • Premature atrial contractions    • Prostatic hypertrophy    • Pulmonary valve disorder    • PVD (peripheral vascular disease) (CMS/HCC)    • Renal disease    • Sleep apnea        Past Surgical History:   Procedure Laterality Date   • BACK SURGERY     • CHOLECYSTECTOMY         Family History   Problem Relation Age of Onset   • Cancer Mother    • Heart disease Father    • Cancer Brother        Social History     Tobacco Use   • Smoking status: Former Smoker     Types: Cigarettes   •  Smokeless tobacco: Never Used   Substance Use Topics   • Alcohol use: No     Frequency: Never   • Drug use: No       Home Meds:   Medications Prior to Admission   Medication Sig Dispense Refill Last Dose   • metFORMIN (GLUCOPHAGE) 500 MG tablet Take 500 mg by mouth Every Night.      • aspirin 81 MG tablet Take 81 mg by mouth Daily.   Taking   • atorvastatin (LIPITOR) 80 MG tablet Take 1 tablet by mouth Every Night. 30 tablet 5 Taking   • lisinopril (PRINIVIL,ZESTRIL) 20 MG tablet Take 1 tablet by mouth Daily. 30 tablet 5 Taking   • metoprolol succinate XL (TOPROL-XL) 25 MG 24 hr tablet Take 1 tablet by mouth Daily. 30 tablet 5 Taking   • omeprazole (priLOSEC) 40 MG capsule Take 1 capsule by mouth Daily. 30 capsule 5 Taking   • sertraline (ZOLOFT) 25 MG tablet Take 1 tablet by mouth Daily. 30 tablet 5 Taking   • tamsulosin (FLOMAX) 0.4 MG capsule 24 hr capsule Take 1 capsule by mouth Daily. 30 capsule 5 Taking       Scheduled Meds:    aspirin 81 mg Oral Daily   atorvastatin 80 mg Oral Nightly   furosemide 20 mg Oral BID   insulin lispro 0-7 Units Subcutaneous 4x Daily With Meals & Nightly   lisinopril 20 mg Oral Daily   metoprolol succinate XL 25 mg Oral Daily   pantoprazole 40 mg Oral QAM   sertraline 25 mg Oral Daily   sodium chloride 10 mL Intravenous Q12H   tamsulosin 0.4 mg Oral Daily       Continuous Infusions:    DOBUTamine 5 mcg/kg/min Last Rate: 2.5 mcg/kg/min (12/03/19 0217)       PRN Meds:  •  acetaminophen **OR** acetaminophen **OR** acetaminophen  •  aluminum-magnesium hydroxide-simethicone  •  bisacodyl  •  bisacodyl  •  dextrose  •  dextrose  •  glucagon (human recombinant)  •  influenza vaccine  •  insulin lispro **AND** insulin lispro  •  ipratropium-albuterol  •  loperamide  •  magnesium hydroxide  •  magnesium sulfate **OR** magnesium sulfate in D5W 1g/100mL (PREMIX)  •  melatonin  •  ondansetron **OR** ondansetron  •  phenol  •  potassium chloride  •  potassium chloride  •  [COMPLETED] Insert  peripheral IV **AND** sodium chloride  •  sodium chloride  •  sodium chloride    Allergies:  Penicillins    OBJECTIVE    Vital Signs  Temp:  [97.5 °F (36.4 °C)-98.4 °F (36.9 °C)] 97.6 °F (36.4 °C)  Heart Rate:  [60-68] 68  Resp:  [18-20] 20  BP: (104-114)/(41-61) 108/43    I/O this shift:  In: 240 [P.O.:240]  Out: 725 [Urine:725]  I/O last 3 completed shifts:  In: 360 [P.O.:360]  Out: 1450 [Urine:1450]    Physical Exam: ON DOBUTAMINE GTT  General Appearance: alert, appears stated age and cooperative  Head: normocephalic, without obvious abnormality and atraumatic  Eyes: conjunctivae and sclerae normal and no icterus  Neck: supple. NO GROSS JVD AT PRESENT  Lungs: DECREASED BS BIBASILAR. NO OBVIOUS CRACKLES  Heart: regular rhythm & normal rate and normal S1, S2 +MILO  Chest Wall: no abnormalities observed  Abdomen: normal bowel sounds and soft non-tender  Extremities: moves extremities well, no edema, no cyanosis and no redness +DJD  Skin: no bleeding, bruising or rash  Neurologic: Alert, and oriented. No focal deficits    Results Review:    I reviewed the patient's new clinical results.    WBC WBC   Date Value Ref Range Status   12/03/2019 7.90 3.40 - 10.80 10*3/mm3 Final   12/02/2019 7.60 3.40 - 10.80 10*3/mm3 Final   12/01/2019 7.10 3.40 - 10.80 10*3/mm3 Final      HGB Hemoglobin   Date Value Ref Range Status   12/03/2019 11.8 (L) 13.0 - 17.7 g/dL Final   12/02/2019 11.3 (L) 13.0 - 17.7 g/dL Final   12/01/2019 11.7 (L) 13.0 - 17.7 g/dL Final      HCT Hematocrit   Date Value Ref Range Status   12/03/2019 36.2 (L) 37.5 - 51.0 % Final   12/02/2019 33.9 (L) 37.5 - 51.0 % Final   12/01/2019 35.7 (L) 37.5 - 51.0 % Final      Platlets No results found for: LABPLAT   MCV MCV   Date Value Ref Range Status   12/03/2019 86.5 79.0 - 97.0 fL Final   12/02/2019 85.4 79.0 - 97.0 fL Final   12/01/2019 86.0 79.0 - 97.0 fL Final          Sodium Sodium   Date Value Ref Range Status   12/03/2019 136 136 - 145 mmol/L Final   12/02/2019  135 (L) 136 - 145 mmol/L Final   12/01/2019 137 136 - 145 mmol/L Final      Potassium Potassium   Date Value Ref Range Status   12/03/2019 4.9 3.5 - 5.2 mmol/L Final   12/02/2019 4.9 3.5 - 5.2 mmol/L Final   12/01/2019 4.7 3.5 - 5.2 mmol/L Final      Chloride Chloride   Date Value Ref Range Status   12/03/2019 95 (L) 98 - 107 mmol/L Final   12/02/2019 95 (L) 98 - 107 mmol/L Final   12/01/2019 96 (L) 98 - 107 mmol/L Final      CO2 CO2   Date Value Ref Range Status   12/03/2019 28.0 22.0 - 29.0 mmol/L Final   12/02/2019 28.0 22.0 - 29.0 mmol/L Final   12/01/2019 29.0 22.0 - 29.0 mmol/L Final      BUN BUN   Date Value Ref Range Status   12/03/2019 41 (H) 8 - 23 mg/dL Final   12/02/2019 39 (H) 8 - 23 mg/dL Final   12/01/2019 34 (H) 8 - 23 mg/dL Final      Creatinine Creatinine   Date Value Ref Range Status   12/03/2019 1.77 (H) 0.76 - 1.27 mg/dL Final   12/02/2019 1.79 (H) 0.76 - 1.27 mg/dL Final   12/01/2019 1.66 (H) 0.76 - 1.27 mg/dL Final      Calcium Calcium   Date Value Ref Range Status   12/03/2019 9.9 8.6 - 10.5 mg/dL Final   12/02/2019 8.9 8.6 - 10.5 mg/dL Final   12/01/2019 9.2 8.6 - 10.5 mg/dL Final      PO4 No results found for: CAPO4   Albumin Albumin   Date Value Ref Range Status   12/01/2019 3.70 3.50 - 5.20 g/dL Final      Magnesium Magnesium   Date Value Ref Range Status   12/03/2019 1.8 1.6 - 2.4 mg/dL Final      Uric Acid No results found for: URICACID       Imaging Results (Last 72 Hours)     ** No results found for the last 72 hours. **            Results for orders placed during the hospital encounter of 11/25/19   XR Chest 1 View    Narrative    DATE OF EXAM:   11/25/2019 9:48 AM     PROCEDURE:   XR CHEST 1 VW-     INDICATIONS:   SOA; R06.02-Shortness of breath     COMPARISON:  03/15/2017     TECHNIQUE:   [Portable chest radiograph]     FINDINGS:    Heart is enlarged, exaggerated by portable technique. There are  bilateral interstitial and alveolar opacities which may relate to  pulmonary edema or  less likely multifocal pneumonia. Small bilateral  pleural effusions. No pneumothorax. Aortic arch atherosclerotic  calcifications.       Impression 1. Cardiomegaly with bilateral interstitial and alveolar opacities  suggesting pulmonary edema, less likely atypical infection.  2. Small bilateral pleural effusions.     Electronically Signed By-Dane Bain On:11/25/2019 10:04 AM  This report was finalized on [id number] by  Dane Bain, .              ASSESSMENT / PLAN      Acute exacerbation of CHF (congestive heart failure) (CMS/HCC)    Carotid artery disease (CMS/[facility])    Type 2 diabetes mellitus without complication, without long-term current use of insulin (CMS/[facility])    PAC (premature atrial contraction)    Anemia, deficiency    Vitamin D deficiency    Gastritis and gastroduodenitis    Atherosclerosis of native coronary artery of native heart without angina pectoris    Chronic kidney disease, stage III (moderate) (CMS/[facility])    Hyperlipidemia    Hypertension, benign    Depression    Shortness of breath    BILL (obstructive sleep apnea)    Acute respiratory failure with hypoxia (CMS/[facility])    1. RENAL FAILURE--------Nonoliguric. +ARF/SANDY on top of what appears to be CRF/CKD STG 3, with a baseline serum creatinine of 1.5. Unknown if patient has baseline proteinuria or hematuria. CRF/CKD STG 3 likely secondary to DGS vs. HTN NS. +ARF/SANDY appears to be secondary to prerenal/hemodynamic fluctuation from decompensated CHF/CP state (Cardiorenal) and need for increased diuretics since admission. Avoid hypotension. Hold ACE-I. Back down diuretics cautiously. Check urine and serum studies and renal US and PVR to further characterize ARF/SANDY and CKD and to assess for secondary disease states associated with CKD. No NSAIDs or IV dye (without proper premedication). Dose meds for CrCl less than 10 cc/min until ARF/SANDY is resolved.    2. HTN WITH CKD STG 3-----BP low. D/C Lisinopril. No ACE/ARB/DRI for now    3. DMII-------No  Metformin given ARF/SANDY. Glucometers, SSI    4. ANEMIA------? Anemia of CKD. Check Fe and SPEP    5. OA/DJD-------No NSAIDs. Check uric acid level    6. VIATMIN D DEFICIENCY------On supplementation    7. HYPERLIPIDEMIA-------Check CK. TSH normal    8. PUD PROPHYLAXIS-----Counseled on risks of chronic PPI use with regards to CKD progression    9. BPH-------On Flomax. Check PVR    10. DEPRESSION------On SSRI    11. BILL    12. CAD/CHF-------per , Cardiology. Stress test pending. Currently on Dobutamine and decreased diuretics. TSH normal        I discussed the patients findings and my recommendations with patient, family and nursing staff    Will follow along closely. Thank you for allowing us to see this patient in renal consultation.    [facility]  [phone]  MD Uriah Ann MD  12/03/19  1:50 PM

## 2019-12-03 NOTE — PROGRESS NOTES
Referring Provider: Henry Munson DO    Reason for follow-up:   Congestive heart failure  Status post stent placement  Renal dysfunction     Patient Care Team:  Xander Robison MD as PCP - General  Xander Robison MD as PCP - Claims Attributed    Subjective .  Feeling better     ROS    Since I have last seen him yesterday, the patient has been without any chest discomfort ,shortness of breath, palpitations, dizziness or syncope.  Denies having any headache ,abdominal pain ,nausea, vomiting , diarrhea constipation, loss of weight or loss of appetite.  Denies having any excessive bruising ,hematuria or blood in the stool.    Review of all systems negative except as indicated    History  Past Medical History:   Diagnosis Date   • Anemia    • CAD (coronary artery disease)    • Carotid artery disease (CMS/HCC)    • Diabetes mellitus (CMS/HCC)    • Gastritis    • GERD (gastroesophageal reflux disease)    • Hyperlipidemia    • Hypertension    • Mood disorder (CMS/HCC)    • Osteoarthritis    • Premature atrial contractions    • Prostatic hypertrophy    • Pulmonary valve disorder    • PVD (peripheral vascular disease) (CMS/HCC)    • Renal disease    • Sleep apnea        Past Surgical History:   Procedure Laterality Date   • BACK SURGERY     • CHOLECYSTECTOMY         Family History   Problem Relation Age of Onset   • Cancer Mother    • Heart disease Father    • Cancer Brother        Social History     Tobacco Use   • Smoking status: Former Smoker     Types: Cigarettes   • Smokeless tobacco: Never Used   Substance Use Topics   • Alcohol use: No     Frequency: Never   • Drug use: No        Medications Prior to Admission   Medication Sig Dispense Refill Last Dose   • metFORMIN (GLUCOPHAGE) 500 MG tablet Take 500 mg by mouth Every Night.      • aspirin 81 MG tablet Take 81 mg by mouth Daily.   Taking   • atorvastatin (LIPITOR) 80 MG tablet Take 1 tablet by mouth Every Night. 30 tablet 5 Taking   • lisinopril  (PRINIVIL,ZESTRIL) 20 MG tablet Take 1 tablet by mouth Daily. 30 tablet 5 Taking   • metoprolol succinate XL (TOPROL-XL) 25 MG 24 hr tablet Take 1 tablet by mouth Daily. 30 tablet 5 Taking   • omeprazole (priLOSEC) 40 MG capsule Take 1 capsule by mouth Daily. 30 capsule 5 Taking   • sertraline (ZOLOFT) 25 MG tablet Take 1 tablet by mouth Daily. 30 tablet 5 Taking   • tamsulosin (FLOMAX) 0.4 MG capsule 24 hr capsule Take 1 capsule by mouth Daily. 30 capsule 5 Taking       Allergies  Penicillins    Scheduled Meds:    aspirin 81 mg Oral Daily   atorvastatin 80 mg Oral Nightly   furosemide 20 mg Oral BID   insulin lispro 0-7 Units Subcutaneous 4x Daily With Meals & Nightly   lisinopril 20 mg Oral Daily   metoprolol succinate XL 25 mg Oral Daily   pantoprazole 40 mg Oral QAM   sertraline 25 mg Oral Daily   sodium chloride 10 mL Intravenous Q12H   tamsulosin 0.4 mg Oral Daily     Continuous Infusions:    DOBUTamine 5 mcg/kg/min Last Rate: 2.5 mcg/kg/min (12/03/19 0217)     PRN Meds:.•  acetaminophen **OR** acetaminophen **OR** acetaminophen  •  aluminum-magnesium hydroxide-simethicone  •  bisacodyl  •  bisacodyl  •  dextrose  •  dextrose  •  glucagon (human recombinant)  •  influenza vaccine  •  insulin lispro **AND** insulin lispro  •  ipratropium-albuterol  •  loperamide  •  magnesium hydroxide  •  magnesium sulfate **OR** magnesium sulfate in D5W 1g/100mL (PREMIX)  •  melatonin  •  ondansetron **OR** ondansetron  •  phenol  •  potassium chloride  •  potassium chloride  •  [COMPLETED] Insert peripheral IV **AND** sodium chloride  •  sodium chloride  •  sodium chloride    Objective     VITAL SIGNS  Vitals:    12/02/19 2213 12/03/19 0222 12/03/19 0501 12/03/19 0558   BP: 114/61 104/42  107/41   Pulse: 60 61  66   Resp: 18 20  18   Temp: 98.4 °F (36.9 °C) 97.5 °F (36.4 °C)  98.2 °F (36.8 °C)   TempSrc: Oral   Oral   SpO2: 96% 95%  96%   Weight:   73.6 kg (162 lb 4.1 oz)    Height:           Flowsheet Rows      First Filed  "Value   Admission Height  177.8 cm (70\") Documented at 11/25/2019 0838   Admission Weight  93.8 kg (206 lb 12.7 oz) Documented at 11/25/2019 0838            Intake/Output Summary (Last 24 hours) at 12/3/2019 0638  Last data filed at 12/3/2019 0222  Gross per 24 hour   Intake 360 ml   Output 1450 ml   Net -1090 ml        TELEMETRY: Sinus rhythm    Physical Exam:  The patient is alert, oriented and in no distress.  Vital signs as noted above.  Head and neck revealed no carotid bruits or jugular venous distention.  No thyromegaly or lymphadenopathy is present  Lungs clear.  No wheezing.  Breath sounds are normal bilaterally.  Heart normal first and second heart sounds.  No murmur. No precordial rub is present.  No gallop is present.  Abdomen soft and nontender.  No organomegaly is present.  Extremities with good peripheral pulses without any pedal edema.  Skin warm and dry.  Musculoskeletal system is grossly normal  CNS grossly normal      Results Review:   I reviewed the patient's new clinical results.  Lab Results (last 24 hours)     Procedure Component Value Units Date/Time    Basic Metabolic Panel [[id number]]  (Abnormal) Collected:  12/03/19 0309    Specimen:  Blood Updated:  12/03/19 0409     Glucose 92 mg/dL      BUN 41 mg/dL      Creatinine 1.77 mg/dL      Sodium 136 mmol/L      Potassium 4.9 mmol/L      Chloride 95 mmol/L      CO2 28.0 mmol/L      Calcium 9.9 mg/dL      eGFR Non African Amer 37 mL/min/1.73      BUN/Creatinine Ratio 23.2     Anion Gap 13.0 mmol/L     Narrative:       GFR Normal >60  Chronic Kidney Disease <60  Kidney Failure <15    Magnesium [[id number]]  (Normal) Collected:  12/03/19 0309    Specimen:  Blood Updated:  12/03/19 0409     Magnesium 1.8 mg/dL     TSH [[id number]]  (Normal) Collected:  12/03/19 0309    Specimen:  Blood Updated:  12/03/19 0406     TSH 2.530 uIU/mL     CBC (No Diff) [[id number]]  (Abnormal) Collected:  12/03/19 0309    Specimen:  Blood Updated:  12/03/19 0334     WBC " 7.90 10*3/mm3      RBC 4.18 10*6/mm3      Hemoglobin 11.8 g/dL      Hematocrit 36.2 %      MCV 86.5 fL      MCH 28.3 pg      MCHC 32.7 g/dL      RDW 14.7 %      RDW-SD 44.6 fl      MPV 10.3 fL      Platelets 200 10*3/mm3     POC Glucose Once [[id number]]  (Abnormal) Collected:  12/02/19 2038    Specimen:  Blood Updated:  12/02/19 2040     Glucose 164 mg/dL      Comment: Serial Number: [id number]:  [id number]       POC Glucose Once [[id number]]  (Abnormal) Collected:  12/02/19 1636    Specimen:  Blood Updated:  12/02/19 1641     Glucose 118 mg/dL      Comment: Serial Number: [id number]:  [id number]       POC Glucose Once [[id number]]  (Abnormal) Collected:  12/02/19 1118    Specimen:  Blood Updated:  12/02/19 1123     Glucose 110 mg/dL      Comment: Serial Number: [id number]:  [id number]       POC Glucose Once [[id number]]  (Normal) Collected:  12/02/19 0730    Specimen:  Blood Updated:  12/02/19 0732     Glucose 97 mg/dL      Comment: Serial Number: [id number]:  [id number]             Imaging Results (Last 24 Hours)     ** No results found for the last 24 hours. **      LAB RESULTS (LAST 7 DAYS)    CBC  Results from last 7 days   Lab Units 12/03/19  0309 12/02/19  0255 12/01/19  0412 11/30/19  0354 11/29/19  0553 11/28/19  0432 11/27/19  0416   WBC 10*3/mm3 7.90 7.60 7.10 7.50 8.20 9.20 9.10   RBC 10*6/mm3 4.18 3.97* 4.16 4.01* 4.07* 4.13* 3.93*   HEMOGLOBIN g/dL 11.8* 11.3* 11.7* 11.3* 11.6* 11.5* 11.3*   HEMATOCRIT % 36.2* 33.9* 35.7* 34.7* 35.3* 35.9* 34.2*   MCV fL 86.5 85.4 86.0 86.5 86.7 86.8 86.9   PLATELETS 10*3/mm3 200 192 181 191 181 206 186       BMP  Results from last 7 days   Lab Units 12/03/19  0309 12/02/19  0255 12/01/19  0412 11/30/19  0354 11/29/19  0553 11/28/19  0432 11/27/19  0416   SODIUM mmol/L 136 135* 137 138 136 137 139   POTASSIUM mmol/L 4.9 4.9 4.7 4.7 4.3 4.2 4.1   CHLORIDE mmol/L 95* 95* 96* 95* 96* 95* 97*   CO2 mmol/L 28.0 28.0 29.0 30.0* 29.0 33.0* 32.0*   BUN  mg/dL 41* 39* 34* 35* 33* 28* 21   CREATININE mg/dL 1.77* 1.79* 1.66* 1.93* 2.01* 1.99* 1.95*   GLUCOSE mg/dL 92 97 104* 97 105* 107* 115*   MAGNESIUM mg/dL 1.8  --   --   --   --  1.9 2.0       CMP   Results from last 7 days   Lab Units 12/03/19  0309 12/02/19  0255 12/01/19  0412 11/30/19  0354 11/29/19  0553 11/28/19  0432 11/27/19  0416   SODIUM mmol/L 136 135* 137 138 136 137 139   POTASSIUM mmol/L 4.9 4.9 4.7 4.7 4.3 4.2 4.1   CHLORIDE mmol/L 95* 95* 96* 95* 96* 95* 97*   CO2 mmol/L 28.0 28.0 29.0 30.0* 29.0 33.0* 32.0*   BUN mg/dL 41* 39* 34* 35* 33* 28* 21   CREATININE mg/dL 1.77* 1.79* 1.66* 1.93* 2.01* 1.99* 1.95*   GLUCOSE mg/dL 92 97 104* 97 105* 107* 115*   ALBUMIN g/dL  --   --  3.70 3.80 3.70 3.80 3.60   BILIRUBIN mg/dL  --   --  0.6 0.7 0.7 0.8 0.7   ALK PHOS U/L  --   --  68 70 69 72 72   AST (SGOT) U/L  --   --  17 15 13 12 13   ALT (SGPT) U/L  --   --  14 12 11 11 12         BNP        TROPONIN  Results from last 7 days   Lab Units 11/27/19  0416   TROPONIN T ng/mL 0.042*       CoAg        Creatinine Clearance  Estimated Creatinine Clearance: 32.3 mL/min (A) (by C-G formula based on SCr of 1.77 mg/dL (H)).    ABG        Radiology  No radiology results for the last day            EKG      I personally viewed and interpreted the patient's EKG/Telemetry data:    ECHOCARDIOGRAM:    Results for orders placed during the hospital encounter of 11/25/19   Adult Transthoracic Echo Complete W/ Cont if Necessary Per Protocol    Narrative · There is a moderate (1-2cm) circumferential pericardial effusion.  · The left ventricular cavity is mildly dilated.  · Left ventricular wall thickness is consistent with mild concentric   hypertrophy.  · Estimated EF = 25%.  · Mild pulmonic valve regurgitation is present.  · Mild tricuspid valve regurgitation is present.  · Right ventricular cavity is moderately dilated.  · Mildly reduced right ventricular systolic function noted.  · Left atrial cavity size is severely  dilated.  · Mild mitral valve regurgitation is present  · Left ventricular systolic function is severely decreased.              STRESS MYOVIEW:    Cardiolite (Tc-99m Sestamibi) stress test    CARDIAC CATHETERIZATION:            OTHER:         Assessment/Plan     Principal Problem:    Acute exacerbation of CHF (congestive heart failure) (CMS/[facility])  Active Problems:    Carotid artery disease (CMS/[facility])    Type 2 diabetes mellitus without complication, without long-term current use of insulin (CMS/[facility])    PAC (premature atrial contraction)    Anemia, deficiency    Vitamin D deficiency    Gastritis and gastroduodenitis    Atherosclerosis of native coronary artery of native heart without angina pectoris    Chronic kidney disease, stage III (moderate) (CMS/[facility])    Hyperlipidemia    Hypertension, benign    Depression    Shortness of breath    BILL (obstructive sleep apnea)    Acute respiratory failure with hypoxia (CMS/[facility])          ]]]]]]]]]]]]]]]]]]]]]]]  Impression  ========  Acute systolic congestive heart failure  Significant left ventricle dysfunction with ejection fraction of 25%.  Minimal elevation of troponin.     Acute respiratory failure with hypoxia     Status post stent 2003 at [facility].  Details not available.     Hypertension diabetes renal dysfunction.  BUN 14 creatinine 1.5.  Dyslipidemia     Anemia     Premature atrial contractions     Severe hypomagnesemia.  Serum magnesium 1.2.     Allergic to penicillin.     Plan  Patient has significant acute systolic congestive heart failure.  Patient has severe left ventricle dysfunction.  Continue diuretics.   Today BUN 41 creatinine 1.8     Patient has underlying renal dysfunction.  Continue on intravenous dobutamine for renal protective and diuresis.  Observe for toxic effects.  Severe hypomagnesemia- improved.  Magnesium level today 1.8  Minimal elevation of troponin.  Observe closely.  Serial troponin levels and EKG.  Follow-up labs ordered.  Overall  patient's condition is critical but stable with gradually improving congestive heart failure but has worsening of renal dysfunction.  Hopefully renal function would continue to improve with intravenous dobutamine.  Patient would benefit from ischemic cardiac work-up.  Lexiscan Cardiolite test.  Renal consultation to assess and take renal precautions in case patient would need cardiac catheterization.  Follow-up labs ordered.  Have discussed with attending nurse for coordination of care.  Further plan will depend on patient's progress.       Dayday Ruiz MD  12/03/19  6:38 AM

## 2019-12-04 ENCOUNTER — APPOINTMENT (OUTPATIENT)
Dept: NUCLEAR MEDICINE | Facility: HOSPITAL | Age: 84
End: 2019-12-04

## 2019-12-04 LAB
ALBUMIN SERPL-MCNC: 3.5 G/DL (ref 2.9–4.4)
ALBUMIN SERPL-MCNC: 3.8 G/DL (ref 3.5–5.2)
ALBUMIN/GLOB SERPL: 1.1 {RATIO} (ref 0.7–1.7)
ALBUMIN/GLOB SERPL: 1.2 G/DL
ALP SERPL-CCNC: 68 U/L (ref 39–117)
ALPHA1 GLOB FLD ELPH-MCNC: 0.3 G/DL (ref 0–0.4)
ALPHA2 GLOB SERPL ELPH-MCNC: 0.9 G/DL (ref 0.4–1)
ALT SERPL W P-5'-P-CCNC: 16 U/L (ref 1–41)
ANION GAP SERPL CALCULATED.3IONS-SCNC: 12 MMOL/L (ref 5–15)
AST SERPL-CCNC: 16 U/L (ref 1–40)
B-GLOBULIN SERPL ELPH-MCNC: 0.9 G/DL (ref 0.7–1.3)
BILIRUB SERPL-MCNC: 0.7 MG/DL (ref 0.2–1.2)
BUN BLD-MCNC: 41 MG/DL (ref 8–23)
BUN/CREAT SERPL: 24.7 (ref 7–25)
CA-I SERPL ISE-MCNC: 1.25 MMOL/L (ref 1.2–1.3)
CALCIUM SPEC-SCNC: 9.7 MG/DL (ref 8.6–10.5)
CHLORIDE SERPL-SCNC: 97 MMOL/L (ref 98–107)
CO2 SERPL-SCNC: 27 MMOL/L (ref 22–29)
CREAT BLD-MCNC: 1.66 MG/DL (ref 0.76–1.27)
DEPRECATED RDW RBC AUTO: 44.6 FL (ref 37–54)
ERYTHROCYTE [DISTWIDTH] IN BLOOD BY AUTOMATED COUNT: 14.9 % (ref 12.3–15.4)
GAMMA GLOB SERPL ELPH-MCNC: 0.9 G/DL (ref 0.4–1.8)
GFR SERPL CREATININE-BSD FRML MDRD: 40 ML/MIN/1.73
GLOBULIN SER CALC-MCNC: 3.1 G/DL (ref 2.2–3.9)
GLOBULIN UR ELPH-MCNC: 3.2 GM/DL
GLUCOSE BLD-MCNC: 101 MG/DL (ref 65–99)
GLUCOSE BLDC GLUCOMTR-MCNC: 121 MG/DL (ref 70–105)
GLUCOSE BLDC GLUCOMTR-MCNC: 136 MG/DL (ref 70–105)
GLUCOSE BLDC GLUCOMTR-MCNC: 139 MG/DL (ref 70–105)
HCT VFR BLD AUTO: 34.7 % (ref 37.5–51)
HGB BLD-MCNC: 11.4 G/DL (ref 13–17.7)
IRON 24H UR-MRATE: 82 MCG/DL (ref 59–158)
Lab: NORMAL
M-SPIKE: NORMAL G/DL
MAGNESIUM SERPL-MCNC: 2.1 MG/DL (ref 1.6–2.4)
MCH RBC QN AUTO: 28.1 PG (ref 26.6–33)
MCHC RBC AUTO-ENTMCNC: 32.9 G/DL (ref 31.5–35.7)
MCV RBC AUTO: 85.4 FL (ref 79–97)
PHOSPHATE SERPL-MCNC: 3.6 MG/DL (ref 2.5–4.5)
PLATELET # BLD AUTO: 180 10*3/MM3 (ref 140–450)
PMV BLD AUTO: 9.9 FL (ref 6–12)
POTASSIUM BLD-SCNC: 4.6 MMOL/L (ref 3.5–5.2)
PROT PATTERN SERPL ELPH-IMP: NORMAL
PROT SERPL-MCNC: 6.6 G/DL (ref 6–8.5)
PROT SERPL-MCNC: 7 G/DL (ref 6–8.5)
RBC # BLD AUTO: 4.07 10*6/MM3 (ref 4.14–5.8)
SODIUM BLD-SCNC: 136 MMOL/L (ref 136–145)
WBC NRBC COR # BLD: 7 10*3/MM3 (ref 3.4–10.8)

## 2019-12-04 PROCEDURE — 83540 ASSAY OF IRON: CPT | Performed by: INTERNAL MEDICINE

## 2019-12-04 PROCEDURE — A9500 TC99M SESTAMIBI: HCPCS | Performed by: INTERNAL MEDICINE

## 2019-12-04 PROCEDURE — 94799 UNLISTED PULMONARY SVC/PX: CPT

## 2019-12-04 PROCEDURE — 99232 SBSQ HOSP IP/OBS MODERATE 35: CPT | Performed by: INTERNAL MEDICINE

## 2019-12-04 PROCEDURE — 25010000002 DOBUTAMINE PER 250 MG: Performed by: INTERNAL MEDICINE

## 2019-12-04 PROCEDURE — 83735 ASSAY OF MAGNESIUM: CPT | Performed by: INTERNAL MEDICINE

## 2019-12-04 PROCEDURE — 80053 COMPREHEN METABOLIC PANEL: CPT | Performed by: INTERNAL MEDICINE

## 2019-12-04 PROCEDURE — 82330 ASSAY OF CALCIUM: CPT | Performed by: INTERNAL MEDICINE

## 2019-12-04 PROCEDURE — 0 TECHNETIUM SESTAMIBI: Performed by: INTERNAL MEDICINE

## 2019-12-04 PROCEDURE — 84100 ASSAY OF PHOSPHORUS: CPT | Performed by: INTERNAL MEDICINE

## 2019-12-04 PROCEDURE — 85027 COMPLETE CBC AUTOMATED: CPT | Performed by: INTERNAL MEDICINE

## 2019-12-04 PROCEDURE — 82962 GLUCOSE BLOOD TEST: CPT

## 2019-12-04 RX ORDER — ALLOPURINOL 100 MG/1
100 TABLET ORAL DAILY
Status: DISCONTINUED | OUTPATIENT
Start: 2019-12-04 | End: 2019-12-06 | Stop reason: HOSPADM

## 2019-12-04 RX ORDER — TAMSULOSIN HYDROCHLORIDE 0.4 MG/1
0.4 CAPSULE ORAL 2 TIMES DAILY
Status: DISCONTINUED | OUTPATIENT
Start: 2019-12-04 | End: 2019-12-06

## 2019-12-04 RX ORDER — FINASTERIDE 5 MG/1
5 TABLET, FILM COATED ORAL DAILY
Status: DISCONTINUED | OUTPATIENT
Start: 2019-12-04 | End: 2019-12-06 | Stop reason: HOSPADM

## 2019-12-04 RX ADMIN — TAMSULOSIN HYDROCHLORIDE 0.4 MG: 0.4 CAPSULE ORAL at 08:22

## 2019-12-04 RX ADMIN — ATORVASTATIN CALCIUM 80 MG: 40 TABLET, FILM COATED ORAL at 21:00

## 2019-12-04 RX ADMIN — FINASTERIDE 5 MG: 5 TABLET, FILM COATED ORAL at 08:22

## 2019-12-04 RX ADMIN — FUROSEMIDE 20 MG: 20 TABLET ORAL at 17:16

## 2019-12-04 RX ADMIN — METOPROLOL SUCCINATE 25 MG: 25 TABLET, EXTENDED RELEASE ORAL at 08:22

## 2019-12-04 RX ADMIN — DOBUTAMINE IN DEXTROSE 2.5 MCG/KG/MIN: 100 INJECTION, SOLUTION INTRAVENOUS at 06:56

## 2019-12-04 RX ADMIN — Medication 10 ML: at 08:22

## 2019-12-04 RX ADMIN — FUROSEMIDE 20 MG: 20 TABLET ORAL at 08:22

## 2019-12-04 RX ADMIN — Medication 1200 MG: at 21:00

## 2019-12-04 RX ADMIN — Medication 1200 MG: at 08:22

## 2019-12-04 RX ADMIN — TAMSULOSIN HYDROCHLORIDE 0.4 MG: 0.4 CAPSULE ORAL at 21:00

## 2019-12-04 RX ADMIN — ALLOPURINOL 100 MG: 100 TABLET ORAL at 08:22

## 2019-12-04 RX ADMIN — PANTOPRAZOLE SODIUM 40 MG: 40 TABLET, DELAYED RELEASE ORAL at 08:22

## 2019-12-04 RX ADMIN — Medication 10 ML: at 21:00

## 2019-12-04 RX ADMIN — SERTRALINE HYDROCHLORIDE 25 MG: 50 TABLET ORAL at 08:22

## 2019-12-04 RX ADMIN — ASPIRIN 81 MG 81 MG: 81 TABLET ORAL at 08:22

## 2019-12-04 NOTE — PROGRESS NOTES
[facility] Hospitalist Team      Patient Care Team:  Xander Robison MD as PCP - General  Xander Robison MD as PCP - Claims Attributed  Delio Lund MD as Consulting Physician (Nephrology)        Chief Complaint:  soa    Subjective: Doing stable and edema has improved.  The patient refused stress testing earlier today.    ROS:  Denies CP, no edema, no fever    Mr. Shanks is a 84 y.o. male with no past medical history of CHF or COPD but does have CAD and BILL. He is waiting on his cpap mask. He has been short of breath upon exertion for the last couple weeks or so but his dyspnea became worse in the last 3-4 days. He has dyspnea with minimal exertion and orthopnea. He has had to sleep in a chair and cannot lie flat. He has had lower extremity swelling, which has not had in the past. He has had a cough but no fever. He denied any chest pain. He has been urinating well.      In the ED the patient was found to have proBNP 11,275. CXR showed cardiomegaly with bilateral interstitial and alveolar opacities suggesting pulmonary edema, less likely atypical infection. Small bilateral pleural effusions. He was given 40mg IV lasix and nitro paste. He was admitted for further treatment.      2D echo from 10/3/2019 showed left ventricular wall thickness is consistent with mild concentric hypertrophy. Mild tricuspid valve regurgitation is present. There is mild calcification of the aortic valve. Estimated EF = 50%. Left ventricular systolic function is normal. Left atrial cavity size is severely dilated. Mild mitral valve regurgitation is present Left ventricular diastolic dysfunction (grade I) consistent with impaired relaxation. Mild mitral valve stenosis is present    Objective:    Vital Signs  Temp:  [97.3 °F (36.3 °C)-98.7 °F (37.1 °C)] 98.7 °F (37.1 °C)  Heart Rate:  [59-96] 69  Resp:  [14-18] 16  BP: (106-129)/(42-61) 112/61  Oxygen Therapy  SpO2: 100 %  Pulse Oximetry Type: Continuous  Device (Oxygen  "Therapy): room air  Flow (L/min): 2  Oxygen Concentration (%): 40  Flowsheet Rows      First Filed Value   Admission Height  177.8 cm (70\") Documented at 11/25/2019 0838   Admission Weight  93.8 kg (206 lb 12.7 oz) Documented at 11/25/2019 0838        Intake & Output (last 3 days)       12/01 0701 - 12/02 0700 12/02 0701 - 12/03 0700 12/03 0701 - 12/04 0700 12/04 0701 - 12/05 0700    P.O. 720 360 720 360    Total Intake(mL/kg) 720 (9.7) 360 (4.9) 720 (9.9) 360 (5)    Urine (mL/kg/hr) 665 (0.4) 1450 (0.8) 1475 (0.8) 0 (0)    Stool  0      Total Output 665 1450 1475 0    Net +55 -1090 -755 +360            Urine Unmeasured Occurrence 8 x       Stool Unmeasured Occurrence  1 x          Lines, Drains & Airways    Active LDAs     Name:   Placement date:   Placement time:   Site:   Days:    Peripheral IV 11/25/19 0847 Right Antecubital   11/25/19    0847    Antecubital   1                Physical Exam:    Gen: NAD  HEENT: EOMI, no icterus, PERRL  Neck: No JVD  Heart: RRR, no murmur  Lung: CTA b/l, adequate air movement  ABD: soft, NT  MSK: moves ext spontaneously  Neuro: AO x 3  Psych: no anxiety, no depression  Skin: warm, dry, intact  Extremities:  No edema    Results Review:       Lab Results (last 24 hours)     Procedure Component Value Units Date/Time    POC Glucose Once [[id number]]  (Abnormal) Collected:  12/04/19 1644    Specimen:  Blood Updated:  12/04/19 1651     Glucose 121 mg/dL      Comment: Serial Number: [id number]:  [id number]       Protein Elec + Interp, Serum [[id number]] Collected:  12/03/19 1418    Specimen:  Blood Updated:  12/04/19 1409     Total Protein 6.6 g/dL      Albumin 3.5 g/dL      Alpha-1-Globulin 0.3 g/dL      Alpha-2-Globulin 0.9 g/dL      Beta Globulin 0.9 g/dL      Gamma Globulin 0.9 g/dL      M-Isael Not Observed g/dL      Globulin 3.1 g/dL      A/G Ratio 1.1     Please note Comment     Comment: Protein electrophoresis scan will follow via computer, mail, or   delivery.        " SPE Interpretation Comment     Comment: The SPE pattern appears essentially unremarkable. Evidence of  monoclonal protein is not apparent.       Narrative:       Performed at:  [address] [facility]  [id number]  : Hilario Sheppard PhD, Phone:  [phone]    POC Glucose Once [[id number]]  (Abnormal) Collected:  12/04/19 1152    Specimen:  Blood Updated:  12/04/19 1207     Glucose 136 mg/dL      Comment: Serial Number: [id number]:  [id number]       Iron [[id number]]  (Normal) Collected:  12/04/19 0257    Specimen:  Blood Updated:  12/04/19 0428     Iron 82 mcg/dL     Comprehensive Metabolic Panel [[id number]]  (Abnormal) Collected:  12/04/19 0257    Specimen:  Blood Updated:  12/04/19 0350     Glucose 101 mg/dL      BUN 41 mg/dL      Creatinine 1.66 mg/dL      Sodium 136 mmol/L      Potassium 4.6 mmol/L      Chloride 97 mmol/L      CO2 27.0 mmol/L      Calcium 9.7 mg/dL      Total Protein 7.0 g/dL      Albumin 3.80 g/dL      ALT (SGPT) 16 U/L      AST (SGOT) 16 U/L      Alkaline Phosphatase 68 U/L      Total Bilirubin 0.7 mg/dL      eGFR Non African Amer 40 mL/min/1.73      Globulin 3.2 gm/dL      A/G Ratio 1.2 g/dL      BUN/Creatinine Ratio 24.7     Anion Gap 12.0 mmol/L     Narrative:       GFR Normal >60  Chronic Kidney Disease <60  Kidney Failure <15    Phosphorus [[id number]]  (Normal) Collected:  12/04/19 0257    Specimen:  Blood Updated:  12/04/19 0350     Phosphorus 3.6 mg/dL     Magnesium [[id number]]  (Normal) Collected:  12/04/19 0257    Specimen:  Blood Updated:  12/04/19 0350     Magnesium 2.1 mg/dL     Calcium, Ionized [[id number]]  (Normal) Collected:  12/04/19 0257    Specimen:  Blood Updated:  12/04/19 0338     Ionized Calcium 1.25 mmol/L     CBC (No Diff) [[id number]]  (Abnormal) Collected:  12/04/19 0257    Specimen:  Blood Updated:  12/04/19 0334     WBC 7.00 10*3/mm3      RBC 4.07 10*6/mm3      Hemoglobin 11.4 g/dL      Hematocrit 34.7 %      MCV 85.4 fL       MCH 28.1 pg      MCHC 32.9 g/dL      RDW 14.9 %      RDW-SD 44.6 fl      MPV 9.9 fL      Platelets 180 10*3/mm3     Eosinophil Smear - Urine, Urine, Clean Catch [[id number]]  (Normal) Collected:  12/03/19 2038    Specimen:  Urine, Clean Catch Updated:  12/03/19 2142     Eosinophil Smear 0 % EOS/100 Cells     Sodium, Urine, Random - Urine, Clean Catch [[id number]] Collected:  12/03/19 2038    Specimen:  Urine, Clean Catch Updated:  12/03/19 2132     Sodium, Urine 45 mmol/L     Narrative:       Reference intervals for random urine have not been established.  Clinical usage is dependent upon physician's interpretation in combination with other laboratory tests.     Urinalysis With Culture If Indicated - Urine, Clean Catch [[id number]]  (Normal) Collected:  12/03/19 2037    Specimen:  Urine, Clean Catch Updated:  12/03/19 2053     Color, UA Yellow     Appearance, UA Clear     pH, UA 6.0     Specific Gravity, UA <=1.005     Glucose, UA Negative     Ketones, UA Negative     Bilirubin, UA Negative     Blood, UA Negative     Protein, UA Negative     Leuk Esterase, UA Negative     Nitrite, UA Negative     Urobilinogen, UA 0.2 E.U./dL    Narrative:       Urine microscopic not indicated.    POC Glucose Once [[id number]]  (Abnormal) Collected:  12/03/19 1947    Specimen:  Blood Updated:  12/03/19 1948     Glucose 148 mg/dL      Comment: Serial Number: [id number]:  [id number]             Imaging Results (Last 24 Hours)     Procedure Component Value Units Date/Time    US Renal Bilateral [[id number]] Collected:  12/04/19 0322     Updated:  12/04/19 0525    Narrative:       EXAMINATION: US RENAL BILATERAL    DATE: 12/3/2019 8:29 PM     INDICATION: Headache a.m. on CT the ;     COMPARISON: None.     FINDINGS:     Right Kidney: The right kidney measures 9.8 cm. The cortical thickness is within normal limits.  The renal cortical echogenicity is normal. There is no hydronephrosis or mass.     Left Kidney: The left kidney measures 9.9  cm. The cortical thickness is within normal limits. The renal cortical echogenicity is normal. There is no hydronephrosis or mass.     Bladder: No wall thickening.  Bilateral ureteral jets documented.    The prevoid bladder volume is 22 ml.    Incidentally noted markedly enlarged prostate gland measuring 6.4 x 4.3 x 4.9 cm        Impression:          1.  No hydronephrosis.  2.  Marked prostatomegaly.       Electronically signed by:  Hue Villalobos M.D.    12/4/2019 3:24 AM                                   I reviewed the patient's new clinical results.          ECG/EMG Results (most recent)     Procedure Component Value Units Date/Time    Adult Transthoracic Echo Complete W/ Cont if Necessary Per Protocol [[id number]] Collected:  11/25/19 1541     Updated:  11/25/19 1730     BSA 2.1 m^2       CV ECHO LISSETH - RVDD 3.5 cm      IVSd 1.1 cm      LVIDd 5.5 cm      LVIDs 4.9 cm      LVPWd 1.4 cm      IVS/LVPW 0.83     FS 11.1 %      EDV(Teich) 149.6 ml      ESV(Teich) 113.8 ml      EF(Teich) 23.9 %      EDV(cubed) 169.7 ml      ESV(cubed) 119.0 ml      EF(cubed) 29.9 %      LV mass(C)d 293.3 grams      LV mass(C)dI 138.8 grams/m^2      SV(Teich) 35.8 ml      SI(Teich) 16.9 ml/m^2      SV(cubed) 50.6 ml      SI(cubed) 24.0 ml/m^2      Ao root diam 3.1 cm      Ao root area 7.6 cm^2      ACS 1.8 cm      asc Aorta Diam 3.2 cm      LVOT diam 2.1 cm      LVOT area 3.3 cm^2      RVOT diam 3.2 cm      RVOT area 8.0 cm^2      EDV(MOD-sp4) 95.1 ml      ESV(MOD-sp4) 65.2 ml      EF(MOD-sp4) 31.5 %      EDV(MOD-sp2) 105.5 ml      ESV(MOD-sp2) 81.8 ml      EF(MOD-sp2) 22.5 %      SV(MOD-sp4) 29.9 ml      SI(MOD-sp4) 14.2 ml/m^2      SV(MOD-sp2) 23.7 ml      SI(MOD-sp2) 11.2 ml/m^2      Ao root area (BSA corrected) 1.5     LV [place] Vol (BSA corrected) 45.0 ml/m^2      LV Sys Vol (BSA corrected) 30.8 ml/m^2      Aortic R-R 0.75 sec      Aortic HR 79.6 BPM      MV E max dennis 107.1 cm/sec      MV A max dennis 99.5 cm/sec      MV E/A 1.1      MV V2 max 130.3 cm/sec      MV max PG 6.8 mmHg      MV V2 mean 91.5 cm/sec      MV mean PG 3.6 mmHg      MV V2 VTI 39.0 cm      MVA(VTI) 1.4 cm^2      MV dec slope 495.1 cm/sec^2      MV dec time 0.22 sec      Ao pk dennis 169.5 cm/sec      Ao max PG 11.5 mmHg      Ao max PG (full) 9.3 mmHg      Ao V2 mean 120.3 cm/sec      Ao mean PG 6.3 mmHg      Ao mean PG (full) 5.0 mmHg      Ao V2 VTI 33.4 cm      JAN(I,A) 1.6 cm^2      JAN(I,D) 1.6 cm^2      JAN(V,A) 1.4 cm^2      JAN(V,D) 1.4 cm^2      LV V1 max PG 2.2 mmHg      LV V1 mean PG 1.3 mmHg      LV V1 max 73.9 cm/sec      LV V1 mean 52.6 cm/sec      LV V1 VTI 16.4 cm      MR max dennis 436.1 cm/sec      MR max PG 76.1 mmHg      CO(Ao) 20.2 l/min      CI(Ao) 9.5 l/min/m^2      SV(Ao) 253.5 ml      SI(Ao) 119.9 ml/m^2      CO(LVOT) 4.3 l/min      CI(LVOT) 2.1 l/min/m^2      SV(LVOT) 54.5 ml      SV(RVOT) 92.2 ml      SI(LVOT) 25.8 ml/m^2      PA V2 max 71.4 cm/sec      PA max PG 2.0 mmHg      PA max PG (full) 0.46 mmHg      PA V2 mean 54.5 cm/sec      PA mean PG 1.2 mmHg      PA mean PG (full) 0.53 mmHg      PA V2 VTI 16.2 cm      PVA(I,A) 5.7 cm^2      BH CV ECHO LISSETH - PVA(I,D) 5.7 cm^2      BH CV ECHO LISSETH - PVA(V,A) 7.1 cm^2      BH CV ECHO LISSETH - PVA(V,D) 7.1 cm^2      PA acc time 0.13 sec      PI end-d dennis 165.9 cm/sec      PI max dennis 208.8 cm/sec      PI max PG 17.4 mmHg      RV V1 max PG 1.6 mmHg      RV V1 mean PG 0.71 mmHg      RV V1 max 62.9 cm/sec      RV V1 mean 38.7 cm/sec      RV V1 VTI 11.5 cm      TR max dennis 312.7 cm/sec      RVSP(TR) 47.7 mmHg      RAP systole 8.0 mmHg      PA pr(Accel) 19.1 mmHg      Qp/Qs 1.7      CV ECHO LISSETH - BZI_BMI 29.6 kilograms/m^2       CV ECHO LISSETH - BSA(HAYCOCK) 2.2 m^2       CV ECHO LISSETH - BZI_METRIC_WEIGHT 93.4 kg       CV ECHO LISSETH - BZI_METRIC_HEIGHT 177.8 cm      EF(MOD-bp) 28.0 %      LA dimension(2D) 5.4 cm      Echo EF Estimated 25 %     Narrative:       · There is a moderate (1-2cm) circumferential pericardial  effusion.  · The left ventricular cavity is mildly dilated.  · Left ventricular wall thickness is consistent with mild concentric   hypertrophy.  · Estimated EF = 25%.  · Mild pulmonic valve regurgitation is present.  · Mild tricuspid valve regurgitation is present.  · Right ventricular cavity is moderately dilated.  · Mildly reduced right ventricular systolic function noted.  · Left atrial cavity size is severely dilated.  · Mild mitral valve regurgitation is present  · Left ventricular systolic function is severely decreased.       ECG 12 Lead [[id number]] Collected:  11/25/19 0846     Updated:  11/26/19 0801    Narrative:       HEART RATE= 93  bpm  RR Interval= 716  ms  GA Interval= 184  ms  P Horizontal Axis= -34  deg  P Front Axis= -7  deg  QRSD Interval= 101  ms  QT Interval= 358  ms  QRS Axis= 35  deg  T Wave Axis= 90  deg  - ABNORMAL ECG -  Sinus rhythm  Multiform ventricular premature complexes  When compared with ECG of 21-Jul-2015 12:22:39,  Significant rate increase  Electronically Signed By: Saeed Carlson ([facility]) 26-Nov-2019 08:00:54  Date and Time of Study: 2019-11-25 08:46:26    ECG 12 Lead [[id number]] Collected:  11/27/19 0057     Updated:  12/03/19 2232    Narrative:       HEART RATE= 69  bpm  RR Interval= 868  ms  GA Interval= 189  ms  P Horizontal Axis= -64  deg  P Front Axis= 13  deg  QRSD Interval= 99  ms  QT Interval= 433  ms  QRS Axis= 33  deg  T Wave Axis= 104  deg  - ABNORMAL ECG -  Sinus rhythm  Atrial premature complex  Probable left ventricular hypertrophy  Nonspecific T abnormalities, lateral leads  When compared with ECG of 25-Nov-2019 8:46:26,  Significant rate decrease  Significant repolarization change  Electronically Signed By: Dayday Ruiz ([facility]) 03-Dec-2019 22:31:53  Date and Time of Study: 2019-11-27 00:57:44            Medication Review:       Current Facility-Administered Medications:   •  acetaminophen (TYLENOL) tablet 650 mg, 650 mg, Oral, Q4H PRN **OR** acetaminophen  (TYLENOL) 160 MG/5ML solution 650 mg, 650 mg, Oral, Q4H PRN **OR** acetaminophen (TYLENOL) suppository 650 mg, 650 mg, Rectal, Q4H PRN, Jonatan Holtle, APRN  •  Acetylcysteine capsule 1,200 mg, 1,200 mg, Oral, BID, Deilo Lund MD, 1,200 mg at 12/04/19 0822  •  allopurinol (ZYLOPRIM) tablet 100 mg, 100 mg, Oral, Daily, Delio Lund MD, 100 mg at 12/04/19 0822  •  aluminum-magnesium hydroxide-simethicone (MAALOX MAX) 400-400-40 MG/5ML suspension 15 mL, 15 mL, Oral, Q6H PRN, Jonatan Holtle, APRN  •  aspirin chewable tablet 81 mg, 81 mg, Oral, Daily, Jonatan Holtle, APRN, 81 mg at 12/04/19 0822  •  atorvastatin (LIPITOR) tablet 80 mg, 80 mg, Oral, Nightly, ElanterJonatan willisle, APRN, 80 mg at 12/03/19 2054  •  bisacodyl (DULCOLAX) EC tablet 5 mg, 5 mg, Oral, Daily PRN, ElanterJonatan willisle, APRN  •  bisacodyl (DULCOLAX) suppository 10 mg, 10 mg, Rectal, Daily PRN, Achterberg, Debra, APRN  •  dextrose (D50W) 25 g/ 50mL Intravenous Solution 25 g, 25 g, Intravenous, Q15 Min PRN, Elanteralba, Debra, APRN  •  dextrose (GLUTOSE) oral gel 15 g, 15 g, Oral, Q15 Min PRN, Achterberg, Debra, APRN  •  DOBUTamine (DOBUTREX) 1 mg/mL infusion, 5 mcg/kg/min, Intravenous, Continuous, Dayday Ruiz MD, Last Rate: 11.27 mL/hr at 12/04/19 0656, 2.5 mcg/kg/min at 12/04/19 0656  •  finasteride (PROSCAR) tablet 5 mg, 5 mg, Oral, Daily, Delio Lund MD, 5 mg at 12/04/19 0822  •  furosemide (LASIX) tablet 20 mg, 20 mg, Oral, BID, Joel Palomares MD, 20 mg at 12/04/19 0822  •  glucagon (human recombinant) (GLUCAGEN DIAGNOSTIC) injection 1 mg, 1 mg, Subcutaneous, PRN, Debra Holt, RAMONITA  •  influenza vac split quad (FLUZONE,FLUARIX,AFLURIA) injection 0.5 mL, 0.5 mL, Intramuscular, During Hospitalization, Reji Newby Jr., MD  •  insulin lispro (humaLOG) injection 0-7 Units, 0-7 Units, Subcutaneous, 4x Daily With Meals & Nightly **AND** insulin lispro (humaLOG)  injection 0-7 Units, 0-7 Units, Subcutaneous, PRN, ElanterJonatan willisle, APRN  •  ipratropium-albuterol (DUO-NEB) nebulizer solution 3 mL, 3 mL, Nebulization, Q4H PRN, Debra Holt, APRN  •  loperamide (IMODIUM) capsule 2 mg, 2 mg, Oral, 4x Daily PRN, Reji Newby Jr., MD, 2 mg at 11/30/19 2031  •  magnesium hydroxide (MILK OF MAGNESIA) suspension 2400 mg/10mL 10 mL, 10 mL, Oral, Daily PRN, ElanterJonatan willisle, APRN  •  Magnesium Sulfate 2 gram infusion - Mg less than or equal to 1.5 mg/dL, 2 g, Intravenous, PRN, Last Rate: 25 mL/hr at 11/26/19 0603, 2 g at 11/26/19 0603 **OR** Magnesium Sulfate 1 gram infusion - Mg 1.6-1.9 mg/dL, 1 g, Intravenous, PRN, Debra Holt, APRN  •  melatonin tablet 5 mg, 5 mg, Oral, Nightly PRN, Debra Holt, APRN  •  metoprolol succinate XL (TOPROL-XL) 24 hr tablet 25 mg, 25 mg, Oral, Daily, Debra Holt, APRN, 25 mg at 12/04/19 0822  •  ondansetron (ZOFRAN) tablet 4 mg, 4 mg, Oral, Q6H PRN **OR** ondansetron (ZOFRAN) injection 4 mg, 4 mg, Intravenous, Q6H PRN, Debra Holt, APRN  •  pantoprazole (PROTONIX) EC tablet 40 mg, 40 mg, Oral, QAM, ElanterJonatan willisle, APRN, 40 mg at 12/04/19 0822  •  phenol (CHLORASEPTIC) 1.4 % liquid 1 spray, 1 spray, Mouth/Throat, Q4H PRN, Reji Newby Jr., MD, 1 spray at 11/26/19 1659  •  potassium chloride (K-DUR,KLOR-CON) CR tablet 40 mEq, 40 mEq, Oral, PRN, Jonatan Holtle, APRN  •  potassium chloride (KLOR-CON) packet 40 mEq, 40 mEq, Oral, PRN, Debra Holt APRN  •  sertraline (ZOLOFT) tablet 25 mg, 25 mg, Oral, Daily, Debra Holt APRN, 25 mg at 12/04/19 0822  •  [COMPLETED] Insert peripheral IV, , , Once **AND** sodium chloride 0.9 % flush 10 mL, 10 mL, Intravenous, PRN, Saeed Carlson MD  •  sodium chloride 0.9 % flush 10 mL, 10 mL, Intravenous, PRN, Saeed Carlson MD  •  sodium chloride 0.9 % flush 10 mL, 10 mL, Intravenous, Q12H, Debra Holt APRN,  10 mL at 12/04/19 0822  •  sodium chloride 0.9 % flush 10 mL, 10 mL, Intravenous, PRN, Debra Holt APRN  •  tamsulosin (FLOMAX) 24 hr capsule 0.4 mg, 0.4 mg, Oral, BID, Delio Lund MD, 0.4 mg at 12/04/19 0822  •  technetium sestamibi (CARDIOLITE) injection 1 dose, 1 dose, Intravenous, Once in imaging, Henry Munson,     I have reviewed the patient's current medication list    Assessment/Plan     Acute exacerbation of HFrEF (congestive heart failure) (CMS/HCC)  -remains on dobutamine  -Diuresis per nephrology  -continue per cardiology stop dobutamine when cleared by them  -Review stress test awaiting further plans per cardiology    Carotid artery disease   -Home meds    Type 2 diabetes mellitus without complication, without long-term current use of insulin   -follow BG with adjustments prn    Gastritis and gastroduodenitis  -chronic on protonix     Atherosclerosis of native coronary artery of native heart without angina pectoris    SANDY on Chronic kidney disease, stage III   -Cr improved to 0.6  -monitor closely     Hyperlipidemia  Hypertension, benign  Depression  -chronic c/w home meds     Shortness of breath  Acute respiratory failure with hypoxia (CMS/HCC)  -improving on 2L NC now  -will need to assess for home O2 at time of d/c       Plan for disposition:home soon when off dobutamine gtt when cleared by cardiology    Henry Munson DO  12/04/19  5:01 PM

## 2019-12-04 NOTE — PROGRESS NOTES
Referring Provider: Henry Munson DO    Reason for follow-up:   Congestive heart failure  Status post stent placement  Renal dysfunction     Patient Care Team:  Xander Robison MD as PCP - General  Xander Robison MD as PCP - [facility]  Delio Lund MD as Consulting Physician (Nephrology)    Subjective .  Feeling better     ROS    Since I have last seen him yesterday, the patient has been without any chest discomfort ,shortness of breath, palpitations, dizziness or syncope.  Denies having any headache ,abdominal pain ,nausea, vomiting , diarrhea constipation, loss of weight or loss of appetite.  Denies having any excessive bruising ,hematuria or blood in the stool.    Review of all systems negative except as indicated    History  Past Medical History:   Diagnosis Date   • Anemia    • CAD (coronary artery disease)    • Carotid artery disease (CMS/HCC)    • Diabetes mellitus (CMS/HCC)    • Gastritis    • GERD (gastroesophageal reflux disease)    • Hyperlipidemia    • Hypertension    • Mood disorder (CMS/HCC)    • Osteoarthritis    • Premature atrial contractions    • Prostatic hypertrophy    • Pulmonary valve disorder    • PVD (peripheral vascular disease) (CMS/HCC)    • Renal disease    • Sleep apnea        Past Surgical History:   Procedure Laterality Date   • BACK SURGERY     • CHOLECYSTECTOMY         Family History   Problem Relation Age of Onset   • Cancer Mother    • Heart disease Father    • Cancer Brother        Social History     Tobacco Use   • Smoking status: Former Smoker     Types: Cigarettes   • Smokeless tobacco: Never Used   Substance Use Topics   • Alcohol use: No     Frequency: Never   • Drug use: No        Medications Prior to Admission   Medication Sig Dispense Refill Last Dose   • metFORMIN (GLUCOPHAGE) 500 MG tablet Take 500 mg by mouth Every Night.      • aspirin 81 MG tablet Take 81 mg by mouth Daily.   Taking   • atorvastatin (LIPITOR) 80 MG tablet Take 1 tablet by  mouth Every Night. 30 tablet 5 Taking   • lisinopril (PRINIVIL,ZESTRIL) 20 MG tablet Take 1 tablet by mouth Daily. 30 tablet 5 Taking   • metoprolol succinate XL (TOPROL-XL) 25 MG 24 hr tablet Take 1 tablet by mouth Daily. 30 tablet 5 Taking   • omeprazole (priLOSEC) 40 MG capsule Take 1 capsule by mouth Daily. 30 capsule 5 Taking   • sertraline (ZOLOFT) 25 MG tablet Take 1 tablet by mouth Daily. 30 tablet 5 Taking   • tamsulosin (FLOMAX) 0.4 MG capsule 24 hr capsule Take 1 capsule by mouth Daily. 30 capsule 5 Taking       Allergies  Penicillins    Scheduled Meds:    aspirin 81 mg Oral Daily   atorvastatin 80 mg Oral Nightly   furosemide 20 mg Oral BID   insulin lispro 0-7 Units Subcutaneous 4x Daily With Meals & Nightly   metoprolol succinate XL 25 mg Oral Daily   pantoprazole 40 mg Oral QAM   sertraline 25 mg Oral Daily   sodium chloride 10 mL Intravenous Q12H   tamsulosin 0.4 mg Oral Daily     Continuous Infusions:    DOBUTamine 5 mcg/kg/min Last Rate: 2.5 mcg/kg/min (12/03/19 0217)     PRN Meds:.•  acetaminophen **OR** acetaminophen **OR** acetaminophen  •  aluminum-magnesium hydroxide-simethicone  •  bisacodyl  •  bisacodyl  •  dextrose  •  dextrose  •  glucagon (human recombinant)  •  influenza vaccine  •  insulin lispro **AND** insulin lispro  •  ipratropium-albuterol  •  loperamide  •  magnesium hydroxide  •  magnesium sulfate **OR** magnesium sulfate in D5W 1g/100mL (PREMIX)  •  melatonin  •  ondansetron **OR** ondansetron  •  phenol  •  potassium chloride  •  potassium chloride  •  [COMPLETED] Insert peripheral IV **AND** sodium chloride  •  sodium chloride  •  sodium chloride    Objective     VITAL SIGNS  Vitals:    12/03/19 1729 12/03/19 2350 12/04/19 0004 12/04/19 0305   BP: 106/53 111/42  129/46   Pulse: 60 63 59 63   Resp: 16 16  14   Temp: 97.8 °F (36.6 °C) 97.8 °F (36.6 °C)  98.3 °F (36.8 °C)   TempSrc: Oral Oral  Oral   SpO2:  98% 97% 94%   Weight:       Height:           Flowsheet Rows      First  "Filed Value   Admission Height  177.8 cm (70\") Documented at 11/25/2019 0838   Admission Weight  93.8 kg (206 lb 12.7 oz) Documented at 11/25/2019 0838            Intake/Output Summary (Last 24 hours) at 12/4/2019 0636  Last data filed at 12/3/2019 2000  Gross per 24 hour   Intake 720 ml   Output 1475 ml   Net -755 ml        TELEMETRY: Sinus rhythm    Physical Exam:  The patient is alert, oriented and in no distress.  Vital signs as noted above.  Head and neck revealed no carotid bruits or jugular venous distention.  No thyromegaly or lymphadenopathy is present  Lungs clear.  No wheezing.  Breath sounds are normal bilaterally.  Heart normal first and second heart sounds.  No murmur. No precordial rub is present.  No gallop is present.  Abdomen soft and nontender.  No organomegaly is present.  Extremities with good peripheral pulses without any pedal edema.  Skin warm and dry.  Musculoskeletal system is grossly normal  CNS grossly normal      Results Review:   I reviewed the patient's new clinical results.  Lab Results (last 24 hours)     Procedure Component Value Units Date/Time    Iron [[id number]]  (Normal) Collected:  12/04/19 0257    Specimen:  Blood Updated:  12/04/19 0428     Iron 82 mcg/dL     Comprehensive Metabolic Panel [[id number]]  (Abnormal) Collected:  12/04/19 0257    Specimen:  Blood Updated:  12/04/19 0350     Glucose 101 mg/dL      BUN 41 mg/dL      Creatinine 1.66 mg/dL      Sodium 136 mmol/L      Potassium 4.6 mmol/L      Chloride 97 mmol/L      CO2 27.0 mmol/L      Calcium 9.7 mg/dL      Total Protein 7.0 g/dL      Albumin 3.80 g/dL      ALT (SGPT) 16 U/L      AST (SGOT) 16 U/L      Alkaline Phosphatase 68 U/L      Total Bilirubin 0.7 mg/dL      eGFR Non African Amer 40 mL/min/1.73      Globulin 3.2 gm/dL      A/G Ratio 1.2 g/dL      BUN/Creatinine Ratio 24.7     Anion Gap 12.0 mmol/L     Narrative:       GFR Normal >60  Chronic Kidney Disease <60  Kidney Failure <15    Phosphorus [[id number]]  " (Normal) Collected:  12/04/19 0257    Specimen:  Blood Updated:  12/04/19 0350     Phosphorus 3.6 mg/dL     Magnesium [[id number]]  (Normal) Collected:  12/04/19 0257    Specimen:  Blood Updated:  12/04/19 0350     Magnesium 2.1 mg/dL     Calcium, Ionized [[id number]]  (Normal) Collected:  12/04/19 0257    Specimen:  Blood Updated:  12/04/19 0338     Ionized Calcium 1.25 mmol/L     CBC (No Diff) [[id number]]  (Abnormal) Collected:  12/04/19 0257    Specimen:  Blood Updated:  12/04/19 0334     WBC 7.00 10*3/mm3      RBC 4.07 10*6/mm3      Hemoglobin 11.4 g/dL      Hematocrit 34.7 %      MCV 85.4 fL      MCH 28.1 pg      MCHC 32.9 g/dL      RDW 14.9 %      RDW-SD 44.6 fl      MPV 9.9 fL      Platelets 180 10*3/mm3     Eosinophil Smear - Urine, Urine, Clean Catch [[id number]]  (Normal) Collected:  12/03/19 2038    Specimen:  Urine, Clean Catch Updated:  12/03/19 2142     Eosinophil Smear 0 % EOS/100 Cells     Sodium, Urine, Random - Urine, Clean Catch [[id number]] Collected:  12/03/19 2038    Specimen:  Urine, Clean Catch Updated:  12/03/19 2132     Sodium, Urine 45 mmol/L     Narrative:       Reference intervals for random urine have not been established.  Clinical usage is dependent upon physician's interpretation in combination with other laboratory tests.     Urinalysis With Culture If Indicated - Urine, Clean Catch [[id number]]  (Normal) Collected:  12/03/19 2037    Specimen:  Urine, Clean Catch Updated:  12/03/19 2053     Color, UA Yellow     Appearance, UA Clear     pH, UA 6.0     Specific Gravity, UA <=1.005     Glucose, UA Negative     Ketones, UA Negative     Bilirubin, UA Negative     Blood, UA Negative     Protein, UA Negative     Leuk Esterase, UA Negative     Nitrite, UA Negative     Urobilinogen, UA 0.2 E.U./dL    Narrative:       Urine microscopic not indicated.    POC Glucose Once [[id number]]  (Abnormal) Collected:  12/03/19 1947    Specimen:  Blood Updated:  12/03/19 1948     Glucose 148 mg/dL      Comment:  Serial Number: [id number]:  [id number]       POC Glucose Once [[id number]]  (Abnormal) Collected:  12/03/19 1619    Specimen:  Blood Updated:  12/03/19 1622     Glucose 125 mg/dL      Comment: Serial Number: [id number]:  [id number]       CK [[id number]]  (Normal) Collected:  12/03/19 1418    Specimen:  Blood Updated:  12/03/19 1454     Creatine Kinase 59 U/L     Uric Acid [[id number]]  (Abnormal) Collected:  12/03/19 1418    Specimen:  Blood Updated:  12/03/19 1454     Uric Acid 7.8 mg/dL     PTH, Intact [[id number]]  (Abnormal) Collected:  12/03/19 1418    Specimen:  Blood Updated:  12/03/19 1442     PTH, Intact 116.3 pg/mL     Protein Elec + Interp, Serum [[id number]] Collected:  12/03/19 1418    Specimen:  Blood Updated:  12/03/19 1422    POC Glucose Once [[id number]]  (Abnormal) Collected:  12/03/19 1127    Specimen:  Blood Updated:  12/03/19 1134     Glucose 121 mg/dL      Comment: Serial Number: [id number]:  [id number]       POC Glucose Once [[id number]]  (Normal) Collected:  12/03/19 0721    Specimen:  Blood Updated:  12/03/19 0723     Glucose 104 mg/dL      Comment: Serial Number: [id number]:  [id number]             Imaging Results (Last 24 Hours)     Procedure Component Value Units Date/Time    US Renal Bilateral [[id number]] Collected:  12/04/19 0322     Updated:  12/04/19 0525    Narrative:       EXAMINATION: US RENAL BILATERAL    DATE: 12/3/2019 8:29 PM     INDICATION: Headache a.m. on CT the ;     COMPARISON: None.     FINDINGS:     Right Kidney: The right kidney measures 9.8 cm. The cortical thickness is within normal limits.  The renal cortical echogenicity is normal. There is no hydronephrosis or mass.     Left Kidney: The left kidney measures 9.9 cm. The cortical thickness is within normal limits. The renal cortical echogenicity is normal. There is no hydronephrosis or mass.     Bladder: No wall thickening.  Bilateral ureteral jets documented.    The prevoid bladder volume is 22  ml.    Incidentally noted markedly enlarged prostate gland measuring 6.4 x 4.3 x 4.9 cm        Impression:          1.  No hydronephrosis.  2.  Marked prostatomegaly.       Electronically signed by:  Hue Villalobos M.D.    12/4/2019 3:24 AM      LAB RESULTS (LAST 7 DAYS)    CBC  Results from last 7 days   Lab Units 12/04/19 0257 12/03/19 0309 12/02/19 0255 12/01/19 0412 11/30/19 0354 11/29/19  0553 11/28/19  0432   WBC 10*3/mm3 7.00 7.90 7.60 7.10 7.50 8.20 9.20   RBC 10*6/mm3 4.07* 4.18 3.97* 4.16 4.01* 4.07* 4.13*   HEMOGLOBIN g/dL 11.4* 11.8* 11.3* 11.7* 11.3* 11.6* 11.5*   HEMATOCRIT % 34.7* 36.2* 33.9* 35.7* 34.7* 35.3* 35.9*   MCV fL 85.4 86.5 85.4 86.0 86.5 86.7 86.8   PLATELETS 10*3/mm3 180 200 192 181 191 181 206       BMP  Results from last 7 days   Lab Units 12/04/19 0257 12/03/19 0309 12/02/19 0255 12/01/19 0412 11/30/19 0354 11/29/19  0553 11/28/19  0432   SODIUM mmol/L 136 136 135* 137 138 136 137   POTASSIUM mmol/L 4.6 4.9 4.9 4.7 4.7 4.3 4.2   CHLORIDE mmol/L 97* 95* 95* 96* 95* 96* 95*   CO2 mmol/L 27.0 28.0 28.0 29.0 30.0* 29.0 33.0*   BUN mg/dL 41* 41* 39* 34* 35* 33* 28*   CREATININE mg/dL 1.66* 1.77* 1.79* 1.66* 1.93* 2.01* 1.99*   GLUCOSE mg/dL 101* 92 97 104* 97 105* 107*   MAGNESIUM mg/dL 2.1 1.8  --   --   --   --  1.9   PHOSPHORUS mg/dL 3.6  --   --   --   --   --   --        CMP   Results from last 7 days   Lab Units 12/04/19  0257 12/03/19  0309 12/02/19  0255 12/01/19  0412 11/30/19  0354 11/29/19  0553 11/28/19  0432   SODIUM mmol/L 136 136 135* 137 138 136 137   POTASSIUM mmol/L 4.6 4.9 4.9 4.7 4.7 4.3 4.2   CHLORIDE mmol/L 97* 95* 95* 96* 95* 96* 95*   CO2 mmol/L 27.0 28.0 28.0 29.0 30.0* 29.0 33.0*   BUN mg/dL 41* 41* 39* 34* 35* 33* 28*   CREATININE mg/dL 1.66* 1.77* 1.79* 1.66* 1.93* 2.01* 1.99*   GLUCOSE mg/dL 101* 92 97 104* 97 105* 107*   ALBUMIN g/dL 3.80  --   --  3.70 3.80 3.70 3.80   BILIRUBIN mg/dL 0.7  --   --  0.6 0.7 0.7 0.8   ALK PHOS U/L 68  --   --  68 70  69 72   AST (SGOT) U/L 16  --   --  17 15 13 12   ALT (SGPT) U/L 16  --   --  14 12 11 11         BNP        TROPONIN  Results from last 7 days   Lab Units 12/03/19  1418   CK TOTAL U/L 59       CoAg        Creatinine Clearance  Estimated Creatinine Clearance: 34.5 mL/min (A) (by C-G formula based on SCr of 1.66 mg/dL (H)).    ABG        Radiology  Us Renal Bilateral    Result Date: 12/4/2019   1.  No hydronephrosis. 2.  Marked prostatomegaly.  Electronically signed by:  Hue Villalobos M.D.  12/4/2019 3:24 AM              EKG      I personally viewed and interpreted the patient's EKG/Telemetry data:    ECHOCARDIOGRAM:    Results for orders placed during the hospital encounter of 11/25/19   Adult Transthoracic Echo Complete W/ Cont if Necessary Per Protocol    Narrative · There is a moderate (1-2cm) circumferential pericardial effusion.  · The left ventricular cavity is mildly dilated.  · Left ventricular wall thickness is consistent with mild concentric   hypertrophy.  · Estimated EF = 25%.  · Mild pulmonic valve regurgitation is present.  · Mild tricuspid valve regurgitation is present.  · Right ventricular cavity is moderately dilated.  · Mildly reduced right ventricular systolic function noted.  · Left atrial cavity size is severely dilated.  · Mild mitral valve regurgitation is present  · Left ventricular systolic function is severely decreased.              STRESS MYOVIEW:    Cardiolite (Tc-99m Sestamibi) stress test    CARDIAC CATHETERIZATION:            OTHER:         Assessment/Plan     Principal Problem:    Acute exacerbation of CHF (congestive heart failure) (CMS/HCC)  Active Problems:    Carotid artery disease (CMS/HCC)    Type 2 diabetes mellitus without complication, without long-term current use of insulin (CMS/HCC)    PAC (premature atrial contraction)    Anemia, deficiency    Vitamin D deficiency    Gastritis and gastroduodenitis    Atherosclerosis of native coronary artery of native heart without  angina pectoris    Chronic kidney disease, stage III (moderate) (CMS/HCC)    Hyperlipidemia    Hypertension, benign    Depression    Shortness of breath    BILL (obstructive sleep apnea)    Acute respiratory failure with hypoxia (CMS/HCC)          ]]]]]]]]]]]]]]]]]]]]]]]  Impression  ========  Acute systolic congestive heart failure  Significant left ventricle dysfunction with ejection fraction of 25%.  Minimal elevation of troponin.     Acute respiratory failure with hypoxia     Status post stent 2003 at [facility].  Details not available.     Hypertension diabetes renal dysfunction.  BUN 14 creatinine 1.5.  Dyslipidemia     Anemia     Premature atrial contractions     Severe hypomagnesemia.  Serum magnesium 1.2.     Allergic to penicillin.     Plan  Patient has significant acute systolic congestive heart failure.  Patient has severe left ventricle dysfunction.  Continue diuretics.   Today BUN 41 creatinine 1.66   Patient has underlying renal dysfunction.  Continue on intravenous dobutamine for renal protective and diuresis.  Observe for toxic effects.  Severe hypomagnesemia- improved.  Magnesium level today 1.8  Minimal elevation of troponin.  Observe closely.  Serial troponin levels and EKG.  Follow-up labs ordered.  Overall patient's condition is critical but stable with gradually improving congestive heart failure but has worsening of renal dysfunction.  Hopefully renal function would continue to improve with intravenous dobutamine.  Patient would benefit from ischemic cardiac work-up.  Lexiscan Cardiolite test.  Patient has declined to have Lexiscan Cardiolite test today.  Renal consultation to assess and take renal precautions in case patient would need cardiac catheterization.  Follow-up labs ordered.  We will discuss with patient and patient's family regarding possible cardiac catheterization to assess coronary artery status.  Further plan will depend on patient's progress.       Dayday Ruiz  MD  12/04/19  6:36 AM

## 2019-12-04 NOTE — PROGRESS NOTES
"NEPHROLOGY PROGRESS NOTE------[facility]    [facility]  [phone]  Uriah Lund MD      Patient Care Team:  Xander Robison MD as PCP - General  Xander Robison MD as PCP - [facility]  Delio Lund MD as Consulting Physician (Nephrology)      Provider:  Uriah Lund MD  Patient Name: Mikael Shanks  :  1935    SUBJECTIVE:    Patient refused stress test this morning. No angina. No dysuria.    Medication:    aspirin 81 mg Oral Daily   atorvastatin 80 mg Oral Nightly   furosemide 20 mg Oral BID   insulin lispro 0-7 Units Subcutaneous 4x Daily With Meals & Nightly   metoprolol succinate XL 25 mg Oral Daily   pantoprazole 40 mg Oral QAM   sertraline 25 mg Oral Daily   sodium chloride 10 mL Intravenous Q12H   tamsulosin 0.4 mg Oral Daily       DOBUTamine 5 mcg/kg/min Last Rate: 2.5 mcg/kg/min (19 0656)       OBJECTIVE    Vital Sign Min/Max for last 24 hours  Temp  Min: 97.5 °F (36.4 °C)  Max: 98.3 °F (36.8 °C)   BP  Min: 83/38  Max: 129/46   Pulse  Min: 59  Max: 68   Resp  Min: 14  Max: 20   SpO2  Min: 93 %  Max: 98 %   No Data Recorded   Weight  Min: 72.4 kg (159 lb 9.8 oz)  Max: 72.4 kg (159 lb 9.8 oz)     Flowsheet Rows      First Filed Value   Admission Height  177.8 cm (70\") Documented at 2019 0838   Admission Weight  93.8 kg (206 lb 12.7 oz) Documented at 2019 0838          No intake/output data recorded.  I/O last 3 completed shifts:  In: 720 [P.O.:720]  Out: 2100 [Urine:2100]    Physical Exam:  General Appearance: alert, appears stated age and cooperative  Head: normocephalic, without obvious abnormality and atraumatic  Eyes: conjunctivae and sclerae normal and no icterus  Neck: supple and no JVD  Lungs: clear to auscultation and respirations regular  Heart: regular rhythm & normal rate and normal S1, S2  Chest: Wall no abnormalities observed  Abdomen: normal bowel sounds and soft " non-tender  Extremities: moves extremities well, no edema, no cyanosis and no redness  Skin: no bleeding, bruising or rash, turgor normal, color normal and no lesions noted  Neurologic: Alert, and oriented. No focal deficits    Labs:    WBC WBC   Date Value Ref Range Status   12/04/2019 7.00 3.40 - 10.80 10*3/mm3 Final   12/03/2019 7.90 3.40 - 10.80 10*3/mm3 Final   12/02/2019 7.60 3.40 - 10.80 10*3/mm3 Final      HGB Hemoglobin   Date Value Ref Range Status   12/04/2019 11.4 (L) 13.0 - 17.7 g/dL Final   12/03/2019 11.8 (L) 13.0 - 17.7 g/dL Final   12/02/2019 11.3 (L) 13.0 - 17.7 g/dL Final      HCT Hematocrit   Date Value Ref Range Status   12/04/2019 34.7 (L) 37.5 - 51.0 % Final   12/03/2019 36.2 (L) 37.5 - 51.0 % Final   12/02/2019 33.9 (L) 37.5 - 51.0 % Final      Platlets No results found for: LABPLAT   MCV MCV   Date Value Ref Range Status   12/04/2019 85.4 79.0 - 97.0 fL Final   12/03/2019 86.5 79.0 - 97.0 fL Final   12/02/2019 85.4 79.0 - 97.0 fL Final          Sodium Sodium   Date Value Ref Range Status   12/04/2019 136 136 - 145 mmol/L Final   12/03/2019 136 136 - 145 mmol/L Final   12/02/2019 135 (L) 136 - 145 mmol/L Final      Potassium Potassium   Date Value Ref Range Status   12/04/2019 4.6 3.5 - 5.2 mmol/L Final   12/03/2019 4.9 3.5 - 5.2 mmol/L Final   12/02/2019 4.9 3.5 - 5.2 mmol/L Final      Chloride Chloride   Date Value Ref Range Status   12/04/2019 97 (L) 98 - 107 mmol/L Final   12/03/2019 95 (L) 98 - 107 mmol/L Final   12/02/2019 95 (L) 98 - 107 mmol/L Final      CO2 CO2   Date Value Ref Range Status   12/04/2019 27.0 22.0 - 29.0 mmol/L Final   12/03/2019 28.0 22.0 - 29.0 mmol/L Final   12/02/2019 28.0 22.0 - 29.0 mmol/L Final      BUN BUN   Date Value Ref Range Status   12/04/2019 41 (H) 8 - 23 mg/dL Final   12/03/2019 41 (H) 8 - 23 mg/dL Final   12/02/2019 39 (H) 8 - 23 mg/dL Final      Creatinine Creatinine   Date Value Ref Range Status   12/04/2019 1.66 (H) 0.76 - 1.27 mg/dL Final    12/03/2019 1.77 (H) 0.76 - 1.27 mg/dL Final   12/02/2019 1.79 (H) 0.76 - 1.27 mg/dL Final      Calcium Calcium   Date Value Ref Range Status   12/04/2019 9.7 8.6 - 10.5 mg/dL Final   12/03/2019 9.9 8.6 - 10.5 mg/dL Final   12/02/2019 8.9 8.6 - 10.5 mg/dL Final      PO4 No components found for: PO4   Albumin Albumin   Date Value Ref Range Status   12/04/2019 3.80 3.50 - 5.20 g/dL Final      Magnesium Magnesium   Date Value Ref Range Status   12/04/2019 2.1 1.6 - 2.4 mg/dL Final   12/03/2019 1.8 1.6 - 2.4 mg/dL Final      Uric Acid No components found for: URIC ACID     Imaging Results (Last 72 Hours)     Procedure Component Value Units Date/Time    US Renal Bilateral [[id number]] Collected:  12/04/19 0322     Updated:  12/04/19 0525    Narrative:       EXAMINATION: US RENAL BILATERAL    DATE: 12/3/2019 8:29 PM     INDICATION: Headache a.m. on CT the ;     COMPARISON: None.     FINDINGS:     Right Kidney: The right kidney measures 9.8 cm. The cortical thickness is within normal limits.  The renal cortical echogenicity is normal. There is no hydronephrosis or mass.     Left Kidney: The left kidney measures 9.9 cm. The cortical thickness is within normal limits. The renal cortical echogenicity is normal. There is no hydronephrosis or mass.     Bladder: No wall thickening.  Bilateral ureteral jets documented.    The prevoid bladder volume is 22 ml.    Incidentally noted markedly enlarged prostate gland measuring 6.4 x 4.3 x 4.9 cm        Impression:          1.  No hydronephrosis.  2.  Marked prostatomegaly.       Electronically signed by:  Hue Villalobos M.D.    12/4/2019 3:24 AM          Results for orders placed during the hospital encounter of 11/25/19   XR Chest 1 View    Narrative    DATE OF EXAM:   11/25/2019 9:48 AM     PROCEDURE:   XR CHEST 1 VW-     INDICATIONS:   SOA; R06.02-Shortness of breath     COMPARISON:  03/15/2017     TECHNIQUE:   [Portable chest radiograph]     FINDINGS:    Heart is enlarged,  exaggerated by portable technique. There are  bilateral interstitial and alveolar opacities which may relate to  pulmonary edema or less likely multifocal pneumonia. Small bilateral  pleural effusions. No pneumothorax. Aortic arch atherosclerotic  calcifications.       Impression 1. Cardiomegaly with bilateral interstitial and alveolar opacities  suggesting pulmonary edema, less likely atypical infection.  2. Small bilateral pleural effusions.     Electronically Signed By-Dane Bain On:11/25/2019 10:04 AM  This report was finalized on [id number] by  Dane Bain, .              ASSESSMENT / PLAN      Acute exacerbation of CHF (congestive heart failure) (CMS/[facility])    Carotid artery disease (CMS/[facility])    Type 2 diabetes mellitus without complication, without long-term current use of insulin (CMS/[facility])    PAC (premature atrial contraction)    Anemia, deficiency    Vitamin D deficiency    Gastritis and gastroduodenitis    Atherosclerosis of native coronary artery of native heart without angina pectoris    Chronic kidney disease, stage III (moderate) (CMS/[facility])    Hyperlipidemia    Hypertension, benign    Depression    Shortness of breath    BILL (obstructive sleep apnea)    Acute respiratory failure with hypoxia (CMS/[facility])      1. ARF/SANDY/CRF/CKD STG 3--------Nonoliguric. BUN/Cr down a little. +ARF/SANDY on top of what appears to be CRF/CKD STG 3, with a baseline serum creatinine of 1.5. Unknown if patient has baseline proteinuria or hematuria. CRF/CKD STG 3 likely secondary to DGS vs. HTN NS. +ARF/SANDY appears to be secondary to prerenal/hemodynamic fluctuation from decompensated CHF/CP state (Cardiorenal) and need for increased diuretics since admission. Avoid hypotension. Hold ACE-I. Backed down diuretics cautiously. Renal US without obstructive uropathy. No NSAIDs or IV dye (without proper premedication). Dose meds for CrCl less than 10 cc/min until ARF/SANDY is resolved.     2. HTN WITH CKD STG 3-----BP low. Off  Lisinopril. No ACE/ARB/DRI for now     3. DMII-------No Metformin given ARF/SANDY. Glucometers, SSI     4. ANEMIA OF CKD------Fe normal. SPEP pending     5. OA/DJD-------No NSAIDs. Slightly elevated uric acid level     6. VIATMIN D DEFICIENCY------On supplementation     7. HYPERLIPIDEMIA------CK, TSH okay     8. PUD PROPHYLAXIS-----Counseled on risks of chronic PPI use with regards to CKD progression     9. BPH-------Increase Flomax and add Proscar. Will need outpatient Urology eval     10. DEPRESSION------On SSRI     11. BILL     12. CAD/CHF-------per , Cardiology. ?Cath. Currently on Dobutamine and decreased diuretics. TSH normal    13. SECONDARY HYPERPARATHYROIDISM------Intact PTH below treatment threshold    Uriah Lund MD  [facility]  [phone]  12/04/19  7:41 AM

## 2019-12-05 PROBLEM — E78.2 MIXED HYPERLIPIDEMIA: Status: ACTIVE | Noted: 2019-11-25

## 2019-12-05 PROBLEM — T82.855A STENOSIS OF CORONARY ARTERY STENT: Status: ACTIVE | Noted: 2019-11-25

## 2019-12-05 LAB
ANION GAP SERPL CALCULATED.3IONS-SCNC: 11 MMOL/L (ref 5–15)
APTT PPP: 25.5 SECONDS (ref 24–31)
BUN BLD-MCNC: 39 MG/DL (ref 8–23)
BUN/CREAT SERPL: 23.1 (ref 7–25)
CALCIUM SPEC-SCNC: 10 MG/DL (ref 8.6–10.5)
CHLORIDE SERPL-SCNC: 95 MMOL/L (ref 98–107)
CO2 SERPL-SCNC: 29 MMOL/L (ref 22–29)
CREAT BLD-MCNC: 1.69 MG/DL (ref 0.76–1.27)
DEPRECATED RDW RBC AUTO: 43.8 FL (ref 37–54)
ERYTHROCYTE [DISTWIDTH] IN BLOOD BY AUTOMATED COUNT: 14.5 % (ref 12.3–15.4)
GFR SERPL CREATININE-BSD FRML MDRD: 39 ML/MIN/1.73
GLUCOSE BLD-MCNC: 97 MG/DL (ref 65–99)
GLUCOSE BLDC GLUCOMTR-MCNC: 104 MG/DL (ref 70–105)
GLUCOSE BLDC GLUCOMTR-MCNC: 169 MG/DL (ref 70–105)
GLUCOSE BLDC GLUCOMTR-MCNC: 92 MG/DL (ref 70–105)
GLUCOSE BLDC GLUCOMTR-MCNC: 95 MG/DL (ref 70–105)
HCT VFR BLD AUTO: 36 % (ref 37.5–51)
HGB BLD-MCNC: 11.8 G/DL (ref 13–17.7)
INR PPP: 1.09 (ref 0.9–1.1)
MAGNESIUM SERPL-MCNC: 1.9 MG/DL (ref 1.6–2.4)
MAGNESIUM SERPL-MCNC: 2 MG/DL (ref 1.6–2.4)
MCH RBC QN AUTO: 28.3 PG (ref 26.6–33)
MCHC RBC AUTO-ENTMCNC: 32.8 G/DL (ref 31.5–35.7)
MCV RBC AUTO: 86.2 FL (ref 79–97)
PHOSPHATE SERPL-MCNC: 3.5 MG/DL (ref 2.5–4.5)
PLATELET # BLD AUTO: 175 10*3/MM3 (ref 140–450)
PMV BLD AUTO: 10.4 FL (ref 6–12)
POTASSIUM BLD-SCNC: 4.7 MMOL/L (ref 3.5–5.2)
PROTHROMBIN TIME: 11.3 SECONDS (ref 9.6–11.7)
RBC # BLD AUTO: 4.18 10*6/MM3 (ref 4.14–5.8)
SODIUM BLD-SCNC: 135 MMOL/L (ref 136–145)
WBC NRBC COR # BLD: 7 10*3/MM3 (ref 3.4–10.8)

## 2019-12-05 PROCEDURE — 99232 SBSQ HOSP IP/OBS MODERATE 35: CPT | Performed by: INTERNAL MEDICINE

## 2019-12-05 PROCEDURE — C1769 GUIDE WIRE: HCPCS | Performed by: INTERNAL MEDICINE

## 2019-12-05 PROCEDURE — 83735 ASSAY OF MAGNESIUM: CPT | Performed by: INTERNAL MEDICINE

## 2019-12-05 PROCEDURE — C1894 INTRO/SHEATH, NON-LASER: HCPCS | Performed by: INTERNAL MEDICINE

## 2019-12-05 PROCEDURE — 4A023N7 MEASUREMENT OF CARDIAC SAMPLING AND PRESSURE, LEFT HEART, PERCUTANEOUS APPROACH: ICD-10-PCS | Performed by: INTERNAL MEDICINE

## 2019-12-05 PROCEDURE — 99233 SBSQ HOSP IP/OBS HIGH 50: CPT | Performed by: INTERNAL MEDICINE

## 2019-12-05 PROCEDURE — 93005 ELECTROCARDIOGRAM TRACING: CPT | Performed by: INTERNAL MEDICINE

## 2019-12-05 PROCEDURE — 85730 THROMBOPLASTIN TIME PARTIAL: CPT | Performed by: INTERNAL MEDICINE

## 2019-12-05 PROCEDURE — 85610 PROTHROMBIN TIME: CPT | Performed by: INTERNAL MEDICINE

## 2019-12-05 PROCEDURE — 25010000002 FENTANYL CITRATE (PF) 100 MCG/2ML SOLUTION: Performed by: INTERNAL MEDICINE

## 2019-12-05 PROCEDURE — 25010000003 LIDOCAINE 1 % SOLUTION: Performed by: INTERNAL MEDICINE

## 2019-12-05 PROCEDURE — 84100 ASSAY OF PHOSPHORUS: CPT | Performed by: INTERNAL MEDICINE

## 2019-12-05 PROCEDURE — 25010000002 MIDAZOLAM PER 1 MG: Performed by: INTERNAL MEDICINE

## 2019-12-05 PROCEDURE — 63710000001 INSULIN LISPRO (HUMAN) PER 5 UNITS: Performed by: NURSE PRACTITIONER

## 2019-12-05 PROCEDURE — B2151ZZ FLUOROSCOPY OF LEFT HEART USING LOW OSMOLAR CONTRAST: ICD-10-PCS | Performed by: INTERNAL MEDICINE

## 2019-12-05 PROCEDURE — B2111ZZ FLUOROSCOPY OF MULTIPLE CORONARY ARTERIES USING LOW OSMOLAR CONTRAST: ICD-10-PCS | Performed by: INTERNAL MEDICINE

## 2019-12-05 PROCEDURE — 93456 R HRT CORONARY ARTERY ANGIO: CPT | Performed by: INTERNAL MEDICINE

## 2019-12-05 PROCEDURE — 99153 MOD SED SAME PHYS/QHP EA: CPT | Performed by: INTERNAL MEDICINE

## 2019-12-05 PROCEDURE — 82962 GLUCOSE BLOOD TEST: CPT

## 2019-12-05 PROCEDURE — 99152 MOD SED SAME PHYS/QHP 5/>YRS: CPT | Performed by: INTERNAL MEDICINE

## 2019-12-05 PROCEDURE — 80048 BASIC METABOLIC PNL TOTAL CA: CPT | Performed by: INTERNAL MEDICINE

## 2019-12-05 PROCEDURE — 85027 COMPLETE CBC AUTOMATED: CPT | Performed by: INTERNAL MEDICINE

## 2019-12-05 PROCEDURE — 25010000002 DOBUTAMINE PER 250 MG: Performed by: INTERNAL MEDICINE

## 2019-12-05 RX ORDER — SODIUM CHLORIDE 9 MG/ML
250 INJECTION, SOLUTION INTRAVENOUS ONCE AS NEEDED
Status: DISCONTINUED | OUTPATIENT
Start: 2019-12-05 | End: 2019-12-06 | Stop reason: HOSPADM

## 2019-12-05 RX ORDER — SODIUM CHLORIDE 0.9 % (FLUSH) 0.9 %
10 SYRINGE (ML) INJECTION AS NEEDED
Status: CANCELLED | OUTPATIENT
Start: 2019-12-05

## 2019-12-05 RX ORDER — DIPHENHYDRAMINE HCL 25 MG
25 TABLET ORAL EVERY 6 HOURS PRN
Status: DISCONTINUED | OUTPATIENT
Start: 2019-12-05 | End: 2019-12-06 | Stop reason: HOSPADM

## 2019-12-05 RX ORDER — ONDANSETRON 2 MG/ML
4 INJECTION INTRAMUSCULAR; INTRAVENOUS EVERY 6 HOURS PRN
Status: DISCONTINUED | OUTPATIENT
Start: 2019-12-05 | End: 2019-12-05 | Stop reason: SDUPTHER

## 2019-12-05 RX ORDER — ONDANSETRON 4 MG/1
4 TABLET, FILM COATED ORAL EVERY 6 HOURS PRN
Status: DISCONTINUED | OUTPATIENT
Start: 2019-12-05 | End: 2019-12-05 | Stop reason: SDUPTHER

## 2019-12-05 RX ORDER — ACETAMINOPHEN 325 MG/1
650 TABLET ORAL EVERY 4 HOURS PRN
Status: DISCONTINUED | OUTPATIENT
Start: 2019-12-05 | End: 2019-12-06 | Stop reason: HOSPADM

## 2019-12-05 RX ORDER — LIDOCAINE HYDROCHLORIDE 10 MG/ML
INJECTION, SOLUTION INFILTRATION; PERINEURAL AS NEEDED
Status: DISCONTINUED | OUTPATIENT
Start: 2019-12-05 | End: 2019-12-05 | Stop reason: HOSPADM

## 2019-12-05 RX ORDER — MIDAZOLAM HYDROCHLORIDE 1 MG/ML
INJECTION INTRAMUSCULAR; INTRAVENOUS AS NEEDED
Status: DISCONTINUED | OUTPATIENT
Start: 2019-12-05 | End: 2019-12-05 | Stop reason: HOSPADM

## 2019-12-05 RX ORDER — SODIUM CHLORIDE 0.9 % (FLUSH) 0.9 %
3 SYRINGE (ML) INJECTION EVERY 12 HOURS SCHEDULED
Status: CANCELLED | OUTPATIENT
Start: 2019-12-05

## 2019-12-05 RX ORDER — FENTANYL CITRATE 50 UG/ML
INJECTION, SOLUTION INTRAMUSCULAR; INTRAVENOUS AS NEEDED
Status: DISCONTINUED | OUTPATIENT
Start: 2019-12-05 | End: 2019-12-05 | Stop reason: HOSPADM

## 2019-12-05 RX ORDER — ATROPINE SULFATE 1 MG/ML
.5-1 INJECTION, SOLUTION INTRAMUSCULAR; INTRAVENOUS; SUBCUTANEOUS
Status: DISCONTINUED | OUTPATIENT
Start: 2019-12-05 | End: 2019-12-06 | Stop reason: HOSPADM

## 2019-12-05 RX ORDER — SODIUM CHLORIDE 9 MG/ML
50 INJECTION, SOLUTION INTRAVENOUS CONTINUOUS
Status: DISCONTINUED | OUTPATIENT
Start: 2019-12-06 | End: 2019-12-06

## 2019-12-05 RX ADMIN — TAMSULOSIN HYDROCHLORIDE 0.4 MG: 0.4 CAPSULE ORAL at 09:49

## 2019-12-05 RX ADMIN — Medication 10 ML: at 21:07

## 2019-12-05 RX ADMIN — INSULIN LISPRO 2 UNITS: 100 INJECTION, SOLUTION INTRAVENOUS; SUBCUTANEOUS at 12:24

## 2019-12-05 RX ADMIN — ALLOPURINOL 100 MG: 100 TABLET ORAL at 09:49

## 2019-12-05 RX ADMIN — FUROSEMIDE 20 MG: 20 TABLET ORAL at 21:11

## 2019-12-05 RX ADMIN — LOPERAMIDE HYDROCHLORIDE 2 MG: 2 CAPSULE ORAL at 12:24

## 2019-12-05 RX ADMIN — Medication 10 ML: at 09:50

## 2019-12-05 RX ADMIN — SODIUM CHLORIDE 50 ML/HR: 900 INJECTION, SOLUTION INTRAVENOUS at 15:00

## 2019-12-05 RX ADMIN — FUROSEMIDE 20 MG: 20 TABLET ORAL at 09:49

## 2019-12-05 RX ADMIN — DOBUTAMINE IN DEXTROSE 2.5 MCG/KG/MIN: 100 INJECTION, SOLUTION INTRAVENOUS at 11:14

## 2019-12-05 RX ADMIN — Medication 1200 MG: at 09:49

## 2019-12-05 RX ADMIN — FINASTERIDE 5 MG: 5 TABLET, FILM COATED ORAL at 09:49

## 2019-12-05 RX ADMIN — METOPROLOL SUCCINATE 25 MG: 25 TABLET, EXTENDED RELEASE ORAL at 09:49

## 2019-12-05 RX ADMIN — Medication 1200 MG: at 21:08

## 2019-12-05 RX ADMIN — TAMSULOSIN HYDROCHLORIDE 0.4 MG: 0.4 CAPSULE ORAL at 21:07

## 2019-12-05 RX ADMIN — ASPIRIN 81 MG 81 MG: 81 TABLET ORAL at 09:49

## 2019-12-05 RX ADMIN — ATORVASTATIN CALCIUM 80 MG: 40 TABLET, FILM COATED ORAL at 21:07

## 2019-12-05 RX ADMIN — PANTOPRAZOLE SODIUM 40 MG: 40 TABLET, DELAYED RELEASE ORAL at 06:15

## 2019-12-05 RX ADMIN — SERTRALINE HYDROCHLORIDE 25 MG: 50 TABLET ORAL at 09:49

## 2019-12-05 NOTE — PROGRESS NOTES
[facility] Hospitalist Team      Patient Care Team:  Xander Robison MD as PCP - General  Xander Robison MD as PCP - Claims Attributed  Delio Lund MD as Consulting Physician (Nephrology)        Chief Complaint:  soa    Subjective: setting in chair and reports feeling improved.   ROS:  Denies CP, no edema, no fever    Mr. Shanks is a 84 y.o. male with no past medical history of CHF or COPD but does have CAD and BILL. He is waiting on his cpap mask. He has been short of breath upon exertion for the last couple weeks or so but his dyspnea became worse in the last 3-4 days. He has dyspnea with minimal exertion and orthopnea. He has had to sleep in a chair and cannot lie flat. He has had lower extremity swelling, which has not had in the past. He has had a cough but no fever. He denied any chest pain. He has been urinating well.      In the ED the patient was found to have proBNP 11,275. CXR showed cardiomegaly with bilateral interstitial and alveolar opacities suggesting pulmonary edema, less likely atypical infection. Small bilateral pleural effusions. He was given 40mg IV lasix and nitro paste. He was admitted for further treatment.      2D echo from 10/3/2019 showed left ventricular wall thickness is consistent with mild concentric hypertrophy. Mild tricuspid valve regurgitation is present. There is mild calcification of the aortic valve. Estimated EF = 50%. Left ventricular systolic function is normal. Left atrial cavity size is severely dilated. Mild mitral valve regurgitation is present Left ventricular diastolic dysfunction (grade I) consistent with impaired relaxation. Mild mitral valve stenosis is present    Objective:    Vital Signs  Temp:  [97.3 °F (36.3 °C)-98.7 °F (37.1 °C)] 97.5 °F (36.4 °C)  Heart Rate:  [60-96] 66  Resp:  [16-18] 17  BP: (105-133)/(44-61) 117/49  Oxygen Therapy  SpO2: 96 %  Pulse Oximetry Type: Continuous  Device (Oxygen Therapy): room air  Flow (L/min): 2  Oxygen  "Concentration (%): 40  Flowsheet Rows      First Filed Value   Admission Height  177.8 cm (70\") Documented at 11/25/2019 0838   Admission Weight  93.8 kg (206 lb 12.7 oz) Documented at 11/25/2019 0838        Intake & Output (last 3 days)       12/02 0701 - 12/03 0700 12/03 0701 - 12/04 0700 12/04 0701 - 12/05 0700 12/05 0701 - 12/06 0700    P.O. 360 720 960     Total Intake(mL/kg) 360 (4.9) 720 (9.9) 960 (13.4)     Urine (mL/kg/hr) 1450 (0.8) 1475 (0.8) 400 (0.2) 400 (1.6)    Stool 0       Total Output 1450 1475 400 400    Net -1090 -755 +560 -400            Stool Unmeasured Occurrence 1 x           Lines, Drains & Airways    Active LDAs     Name:   Placement date:   Placement time:   Site:   Days:    Peripheral IV 11/25/19 0847 Right Antecubital   11/25/19    0847    Antecubital   1                Physical Exam:    Gen: NAD  HEENT: EOMI, no icterus, PERRL  Neck: No JVD  Heart: RRR, no murmur  Lung: CTA b/l, adequate air movement  ABD: soft, NT  MSK: moves ext spontaneously  Neuro: AO x 3  Psych: no anxiety, no depression  Skin: warm, dry, intact  Extremities:  No edema    Results Review:       Lab Results (last 24 hours)     Procedure Component Value Units Date/Time    POC Glucose Once [[id number]]  (Normal) Collected:  12/05/19 0728    Specimen:  Blood Updated:  12/05/19 0733     Glucose 95 mg/dL      Comment: Serial Number: [id number]:  [id number]       Basic Metabolic Panel [[id number]]  (Abnormal) Collected:  12/05/19 0514    Specimen:  Blood Updated:  12/05/19 0608     Glucose 97 mg/dL      BUN 39 mg/dL      Creatinine 1.69 mg/dL      Sodium 135 mmol/L      Potassium 4.7 mmol/L      Chloride 95 mmol/L      CO2 29.0 mmol/L      Calcium 10.0 mg/dL      eGFR Non African Amer 39 mL/min/1.73      BUN/Creatinine Ratio 23.1     Anion Gap 11.0 mmol/L     Narrative:       GFR Normal >60  Chronic Kidney Disease <60  Kidney Failure <15    Magnesium [[id number]]  (Normal) Collected:  12/05/19 0514    Specimen:  Blood " Updated:  12/05/19 0608     Magnesium 2.0 mg/dL     Phosphorus [[id number]]  (Normal) Collected:  12/05/19 0514    Specimen:  Blood Updated:  12/05/19 0608     Phosphorus 3.5 mg/dL     CBC (No Diff) [[id number]]  (Abnormal) Collected:  12/05/19 0514    Specimen:  Blood Updated:  12/05/19 0540     WBC 7.00 10*3/mm3      RBC 4.18 10*6/mm3      Hemoglobin 11.8 g/dL      Hematocrit 36.0 %      MCV 86.2 fL      MCH 28.3 pg      MCHC 32.8 g/dL      RDW 14.5 %      RDW-SD 43.8 fl      MPV 10.4 fL      Platelets 175 10*3/mm3     POC Glucose Once [[id number]]  (Abnormal) Collected:  12/04/19 2020    Specimen:  Blood Updated:  12/04/19 2026     Glucose 139 mg/dL      Comment: Serial Number: [id number]:  [id number]       POC Glucose Once [[id number]]  (Abnormal) Collected:  12/04/19 1644    Specimen:  Blood Updated:  12/04/19 1651     Glucose 121 mg/dL      Comment: Serial Number: [id number]:  [id number]       Protein Elec + Interp, Serum [[id number]] Collected:  12/03/19 1418    Specimen:  Blood Updated:  12/04/19 1409     Total Protein 6.6 g/dL      Albumin 3.5 g/dL      Alpha-1-Globulin 0.3 g/dL      Alpha-2-Globulin 0.9 g/dL      Beta Globulin 0.9 g/dL      Gamma Globulin 0.9 g/dL      M-Isael Not Observed g/dL      Globulin 3.1 g/dL      A/G Ratio 1.1     Please note Comment     Comment: Protein electrophoresis scan will follow via computer, mail, or   delivery.        SPE Interpretation Comment     Comment: The SPE pattern appears essentially unremarkable. Evidence of  monoclonal protein is not apparent.       Narrative:       Performed at:  [address] [place] [address]  [id number]  : Hilario Sheppard PhD, Phone:  [phone]    POC Glucose Once [[id number]]  (Abnormal) Collected:  12/04/19 1152    Specimen:  Blood Updated:  12/04/19 1207     Glucose 136 mg/dL      Comment: Serial Number: [id number]:  [id number]             Imaging Results (Last 24 Hours)     ** No results  found for the last 24 hours. **                                   I reviewed the patient's new clinical results.          ECG/EMG Results (most recent)     Procedure Component Value Units Date/Time    Adult Transthoracic Echo Complete W/ Cont if Necessary Per Protocol [[id number]] Collected:  11/25/19 1541     Updated:  11/25/19 1730     BSA 2.1 m^2       CV ECHO LISSETH - RVDD 3.5 cm      IVSd 1.1 cm      LVIDd 5.5 cm      LVIDs 4.9 cm      LVPWd 1.4 cm      IVS/LVPW 0.83     FS 11.1 %      EDV(Teich) 149.6 ml      ESV(Teich) 113.8 ml      EF(Teich) 23.9 %      EDV(cubed) 169.7 ml      ESV(cubed) 119.0 ml      EF(cubed) 29.9 %      LV mass(C)d 293.3 grams      LV mass(C)dI 138.8 grams/m^2      SV(Teich) 35.8 ml      SI(Teich) 16.9 ml/m^2      SV(cubed) 50.6 ml      SI(cubed) 24.0 ml/m^2      Ao root diam 3.1 cm      Ao root area 7.6 cm^2      ACS 1.8 cm      asc Aorta Diam 3.2 cm      LVOT diam 2.1 cm      LVOT area 3.3 cm^2      RVOT diam 3.2 cm      RVOT area 8.0 cm^2      EDV(MOD-sp4) 95.1 ml      ESV(MOD-sp4) 65.2 ml      EF(MOD-sp4) 31.5 %      EDV(MOD-sp2) 105.5 ml      ESV(MOD-sp2) 81.8 ml      EF(MOD-sp2) 22.5 %      SV(MOD-sp4) 29.9 ml      SI(MOD-sp4) 14.2 ml/m^2      SV(MOD-sp2) 23.7 ml      SI(MOD-sp2) 11.2 ml/m^2      Ao root area (BSA corrected) 1.5     LV [place] Vol (BSA corrected) 45.0 ml/m^2      LV Sys Vol (BSA corrected) 30.8 ml/m^2      Aortic R-R 0.75 sec      Aortic HR 79.6 BPM      MV E max dennis 107.1 cm/sec      MV A max dennis 99.5 cm/sec      MV E/A 1.1     MV V2 max 130.3 cm/sec      MV max PG 6.8 mmHg      MV V2 mean 91.5 cm/sec      MV mean PG 3.6 mmHg      MV V2 VTI 39.0 cm      MVA(VTI) 1.4 cm^2      MV dec slope 495.1 cm/sec^2      MV dec time 0.22 sec      Ao pk dennis 169.5 cm/sec      Ao max PG 11.5 mmHg      Ao max PG (full) 9.3 mmHg      Ao V2 mean 120.3 cm/sec      Ao mean PG 6.3 mmHg      Ao mean PG (full) 5.0 mmHg      Ao V2 VTI 33.4 cm      JAN(I,A) 1.6 cm^2      JAN(I,D) 1.6 cm^2       JAN(V,A) 1.4 cm^2      JAN(V,D) 1.4 cm^2      LV V1 max PG 2.2 mmHg      LV V1 mean PG 1.3 mmHg      LV V1 max 73.9 cm/sec      LV V1 mean 52.6 cm/sec      LV V1 VTI 16.4 cm      MR max dennis 436.1 cm/sec      MR max PG 76.1 mmHg      CO(Ao) 20.2 l/min      CI(Ao) 9.5 l/min/m^2      SV(Ao) 253.5 ml      SI(Ao) 119.9 ml/m^2      CO(LVOT) 4.3 l/min      CI(LVOT) 2.1 l/min/m^2      SV(LVOT) 54.5 ml      SV(RVOT) 92.2 ml      SI(LVOT) 25.8 ml/m^2      PA V2 max 71.4 cm/sec      PA max PG 2.0 mmHg      PA max PG (full) 0.46 mmHg      PA V2 mean 54.5 cm/sec      PA mean PG 1.2 mmHg      PA mean PG (full) 0.53 mmHg      PA V2 VTI 16.2 cm      PVA(I,A) 5.7 cm^2       CV ECHO LISSETH - PVA(I,D) 5.7 cm^2       CV ECHO LISSETH - PVA(V,A) 7.1 cm^2       CV ECHO LISSETH - PVA(V,D) 7.1 cm^2      PA acc time 0.13 sec      PI end-d dennis 165.9 cm/sec      PI max dennis 208.8 cm/sec      PI max PG 17.4 mmHg      RV V1 max PG 1.6 mmHg      RV V1 mean PG 0.71 mmHg      RV V1 max 62.9 cm/sec      RV V1 mean 38.7 cm/sec      RV V1 VTI 11.5 cm      TR max dennis 312.7 cm/sec      RVSP(TR) 47.7 mmHg      RAP systole 8.0 mmHg      PA pr(Accel) 19.1 mmHg      Qp/Qs 1.7      CV ECHO LISSETH - BZI_BMI 29.6 kilograms/m^2       CV ECHO LISSETH - BSA(HAYCOCK) 2.2 m^2       CV ECHO LISSETH - BZI_METRIC_WEIGHT 93.4 kg       CV ECHO LISSETH - BZI_METRIC_HEIGHT 177.8 cm      EF(MOD-bp) 28.0 %      LA dimension(2D) 5.4 cm      Echo EF Estimated 25 %     Narrative:       · There is a moderate (1-2cm) circumferential pericardial effusion.  · The left ventricular cavity is mildly dilated.  · Left ventricular wall thickness is consistent with mild concentric   hypertrophy.  · Estimated EF = 25%.  · Mild pulmonic valve regurgitation is present.  · Mild tricuspid valve regurgitation is present.  · Right ventricular cavity is moderately dilated.  · Mildly reduced right ventricular systolic function noted.  · Left atrial cavity size is severely dilated.  · Mild mitral valve  regurgitation is present  · Left ventricular systolic function is severely decreased.       ECG 12 Lead [[id number]] Collected:  11/25/19 0846     Updated:  11/26/19 0801    Narrative:       HEART RATE= 93  bpm  RR Interval= 716  ms  IA Interval= 184  ms  P Horizontal Axis= -34  deg  P Front Axis= -7  deg  QRSD Interval= 101  ms  QT Interval= 358  ms  QRS Axis= 35  deg  T Wave Axis= 90  deg  - ABNORMAL ECG -  Sinus rhythm  Multiform ventricular premature complexes  When compared with ECG of 21-Jul-2015 12:22:39,  Significant rate increase  Electronically Signed By: Saeed Carlson ([facility]) 26-Nov-2019 08:00:54  Date and Time of Study: 2019-11-25 08:46:26    ECG 12 Lead [[id number]] Collected:  11/27/19 0057     Updated:  12/03/19 2232    Narrative:       HEART RATE= 69  bpm  RR Interval= 868  ms  IA Interval= 189  ms  P Horizontal Axis= -64  deg  P Front Axis= 13  deg  QRSD Interval= 99  ms  QT Interval= 433  ms  QRS Axis= 33  deg  T Wave Axis= 104  deg  - ABNORMAL ECG -  Sinus rhythm  Atrial premature complex  Probable left ventricular hypertrophy  Nonspecific T abnormalities, lateral leads  When compared with ECG of 25-Nov-2019 8:46:26,  Significant rate decrease  Significant repolarization change  Electronically Signed By: Dayday Ruiz ([facility]) 03-Dec-2019 22:31:53  Date and Time of Study: 2019-11-27 00:57:44            Medication Review:       Current Facility-Administered Medications:   •  acetaminophen (TYLENOL) tablet 650 mg, 650 mg, Oral, Q4H PRN **OR** acetaminophen (TYLENOL) 160 MG/5ML solution 650 mg, 650 mg, Oral, Q4H PRN **OR** acetaminophen (TYLENOL) suppository 650 mg, 650 mg, Rectal, Q4H PRN, Debra Holt APRN  •  Acetylcysteine capsule 1,200 mg, 1,200 mg, Oral, BID, Delio Lund MD, 1,200 mg at 12/05/19 0949  •  allopurinol (ZYLOPRIM) tablet 100 mg, 100 mg, Oral, Daily, Delio Lund MD, 100 mg at 12/05/19 0949  •  aluminum-magnesium hydroxide-simethicone (MAALOX  MAX) 400-400-40 MG/5ML suspension 15 mL, 15 mL, Oral, Q6H PRN, Achterberg, Debra, APRN  •  aspirin chewable tablet 81 mg, 81 mg, Oral, Daily, Achterberg, Dbera, APRN, 81 mg at 12/05/19 0949  •  atorvastatin (LIPITOR) tablet 80 mg, 80 mg, Oral, Nightly, Achterberg, Debra, APRN, 80 mg at 12/04/19 2100  •  bisacodyl (DULCOLAX) EC tablet 5 mg, 5 mg, Oral, Daily PRN, Achterberg, Debra, APRN  •  bisacodyl (DULCOLAX) suppository 10 mg, 10 mg, Rectal, Daily PRN, Achterberg, Debra, APRN  •  dextrose (D50W) 25 g/ 50mL Intravenous Solution 25 g, 25 g, Intravenous, Q15 Min PRN, Achterberg, Debra, APRN  •  dextrose (GLUTOSE) oral gel 15 g, 15 g, Oral, Q15 Min PRN, Achterberg, Debra, APRN  •  DOBUTamine (DOBUTREX) 1 mg/mL infusion, 5 mcg/kg/min, Intravenous, Continuous, Dayday Ruiz MD, Last Rate: 11.27 mL/hr at 12/04/19 0656, 2.5 mcg/kg/min at 12/04/19 0656  •  finasteride (PROSCAR) tablet 5 mg, 5 mg, Oral, Daily, Delio Lund MD, 5 mg at 12/05/19 0949  •  furosemide (LASIX) tablet 20 mg, 20 mg, Oral, BID, Joel Palomares MD, 20 mg at 12/05/19 0949  •  glucagon (human recombinant) (GLUCAGEN DIAGNOSTIC) injection 1 mg, 1 mg, Subcutaneous, PRN, Achterberg, Debra, APRN  •  influenza vac split quad (FLUZONE,FLUARIX,AFLURIA) injection 0.5 mL, 0.5 mL, Intramuscular, During Hospitalization, Reji Newby Jr., MD  •  insulin lispro (humaLOG) injection 0-7 Units, 0-7 Units, Subcutaneous, 4x Daily With Meals & Nightly **AND** insulin lispro (humaLOG) injection 0-7 Units, 0-7 Units, Subcutaneous, PRN, Debra Holt APRN  •  ipratropium-albuterol (DUO-NEB) nebulizer solution 3 mL, 3 mL, Nebulization, Q4H PRN, Debra Holt APRN  •  loperamide (IMODIUM) capsule 2 mg, 2 mg, Oral, 4x Daily PRN, Reji Newby Jr., MD, 2 mg at 11/30/19 2031  •  magnesium hydroxide (MILK OF MAGNESIA) suspension 2400 mg/10mL 10 mL, 10 mL, Oral, Daily PRN, Debra Holt APRN  •  Magnesium  Sulfate 2 gram infusion - Mg less than or equal to 1.5 mg/dL, 2 g, Intravenous, PRN, Last Rate: 25 mL/hr at 11/26/19 0603, 2 g at 11/26/19 0603 **OR** Magnesium Sulfate 1 gram infusion - Mg 1.6-1.9 mg/dL, 1 g, Intravenous, PRN, Achterberg, Debra, APRN  •  melatonin tablet 5 mg, 5 mg, Oral, Nightly PRN, Achterberg, Debra, APRN  •  metoprolol succinate XL (TOPROL-XL) 24 hr tablet 25 mg, 25 mg, Oral, Daily, Achterberg, Debra, APRN, 25 mg at 12/05/19 0949  •  ondansetron (ZOFRAN) tablet 4 mg, 4 mg, Oral, Q6H PRN **OR** ondansetron (ZOFRAN) injection 4 mg, 4 mg, Intravenous, Q6H PRN, Achterberg, Debra, APRN  •  pantoprazole (PROTONIX) EC tablet 40 mg, 40 mg, Oral, QAM, Achterberg, Debra, APRN, 40 mg at 12/05/19 0615  •  phenol (CHLORASEPTIC) 1.4 % liquid 1 spray, 1 spray, Mouth/Throat, Q4H PRN, Reji Newby Jr., MD, 1 spray at 11/26/19 1659  •  potassium chloride (K-DUR,KLOR-CON) CR tablet 40 mEq, 40 mEq, Oral, PRN, Achterberg, Debra, APRN  •  potassium chloride (KLOR-CON) packet 40 mEq, 40 mEq, Oral, PRN, Achterberg, Debra, APRN  •  sertraline (ZOLOFT) tablet 25 mg, 25 mg, Oral, Daily, Achterberg, Debra, APRN, 25 mg at 12/05/19 0949  •  [COMPLETED] Insert peripheral IV, , , Once **AND** sodium chloride 0.9 % flush 10 mL, 10 mL, Intravenous, PRN, Saeed Carlson MD  •  sodium chloride 0.9 % flush 10 mL, 10 mL, Intravenous, PRNRobyn William Randall, MD  •  sodium chloride 0.9 % flush 10 mL, 10 mL, Intravenous, Q12H, Debra Holt APRN, 10 mL at 12/05/19 0950  •  sodium chloride 0.9 % flush 10 mL, 10 mL, Intravenous, PRN, Debra Holt, APRN  •  tamsulosin (FLOMAX) 24 hr capsule 0.4 mg, 0.4 mg, Oral, BID, Delio Lund MD, 0.4 mg at 12/05/19 0949  •  technetium sestamibi (CARDIOLITE) injection 1 dose, 1 dose, Intravenous, Once in imaging, Henry Munson, DO    I have reviewed the patient's current medication list    Assessment/Plan     Acute exacerbation  of HFrEF (congestive heart failure) (CMS/HCC)  -remains on dobutamine  -Diuresis per nephrology  -continue per cardiology stop dobutamine when cleared by them  -refused stress test, planning LHC per cardiology?    Carotid artery disease   -Home meds    Type 2 diabetes mellitus without complication, without long-term current use of insulin   -follow BG with adjustments prn    Gastritis and gastroduodenitis  -chronic on protonix     Atherosclerosis of native coronary artery of native heart without angina pectoris    SANDY on Chronic kidney disease, stage III   -Cr at 1.7  -monitor closely  -nephrology following     Hyperlipidemia  Hypertension, benign  Depression  -chronic c/w home meds     Shortness of breath  Acute respiratory failure with hypoxia (CMS/[facility])  -improving on RA  -will need to assess for home O2 at time of d/c       Plan for disposition:home soon when off dobutamine gtt when cleared by cardiology    Henry Munson DO  12/05/19  10:34 AM

## 2019-12-05 NOTE — PLAN OF CARE
Problem: Patient Care Overview  Goal: Plan of Care Review  Outcome: Ongoing (interventions implemented as appropriate)   12/05/19 9633   Plan of Care Review   Progress no change   Coping/Psychosocial   Plan of Care Reviewed With patient   OTHER   Outcome Summary Patient resting comfortably with no pain reported. Dobutamine gtt still on, monitoring blood pressures, vitals stable.        Problem: Cardiac: Heart Failure (Adult)  Goal: Signs and Symptoms of Listed Potential Problems Will be Absent, Minimized or Managed (Cardiac: Heart Failure)  Outcome: Ongoing (interventions implemented as appropriate)

## 2019-12-05 NOTE — PROGRESS NOTES
Referring Provider: Henry Munson DO    Reason for follow-up:   Congestive heart failure  Status post stent placement  Renal dysfunction     Patient Care Team:  Xander Robison MD as PCP - General  Xander Robison MD as PCP - [facility]  Delio Lund MD as Consulting Physician (Nephrology)    Subjective .  Feeling better     ROS    Since I have last seen him yesterday, the patient has been without any chest discomfort ,shortness of breath, palpitations, dizziness or syncope.  Denies having any headache ,abdominal pain ,nausea, vomiting , diarrhea constipation, loss of weight or loss of appetite.  Denies having any excessive bruising ,hematuria or blood in the stool.    Review of all systems negative except as indicated    History  Past Medical History:   Diagnosis Date   • Anemia    • CAD (coronary artery disease)    • Carotid artery disease (CMS/HCC)    • Diabetes mellitus (CMS/HCC)    • Gastritis    • GERD (gastroesophageal reflux disease)    • Hyperlipidemia    • Hypertension    • Mood disorder (CMS/HCC)    • Osteoarthritis    • Premature atrial contractions    • Prostatic hypertrophy    • Pulmonary valve disorder    • PVD (peripheral vascular disease) (CMS/HCC)    • Renal disease    • Sleep apnea        Past Surgical History:   Procedure Laterality Date   • BACK SURGERY     • CHOLECYSTECTOMY         Family History   Problem Relation Age of Onset   • Cancer Mother    • Heart disease Father    • Cancer Brother        Social History     Tobacco Use   • Smoking status: Former Smoker     Types: Cigarettes   • Smokeless tobacco: Never Used   Substance Use Topics   • Alcohol use: No     Frequency: Never   • Drug use: No        Medications Prior to Admission   Medication Sig Dispense Refill Last Dose   • metFORMIN (GLUCOPHAGE) 500 MG tablet Take 500 mg by mouth Every Night.      • aspirin 81 MG tablet Take 81 mg by mouth Daily.   Taking   • atorvastatin (LIPITOR) 80 MG tablet Take 1 tablet by  mouth Every Night. 30 tablet 5 Taking   • lisinopril (PRINIVIL,ZESTRIL) 20 MG tablet Take 1 tablet by mouth Daily. 30 tablet 5 Taking   • metoprolol succinate XL (TOPROL-XL) 25 MG 24 hr tablet Take 1 tablet by mouth Daily. 30 tablet 5 Taking   • omeprazole (priLOSEC) 40 MG capsule Take 1 capsule by mouth Daily. 30 capsule 5 Taking   • sertraline (ZOLOFT) 25 MG tablet Take 1 tablet by mouth Daily. 30 tablet 5 Taking   • tamsulosin (FLOMAX) 0.4 MG capsule 24 hr capsule Take 1 capsule by mouth Daily. 30 capsule 5 Taking       Allergies  Penicillins    Scheduled Meds:    Acetylcysteine 1,200 mg Oral BID   allopurinol 100 mg Oral Daily   aspirin 81 mg Oral Daily   atorvastatin 80 mg Oral Nightly   finasteride 5 mg Oral Daily   furosemide 20 mg Oral BID   insulin lispro 0-7 Units Subcutaneous 4x Daily With Meals & Nightly   metoprolol succinate XL 25 mg Oral Daily   pantoprazole 40 mg Oral QAM   sertraline 25 mg Oral Daily   sodium chloride 10 mL Intravenous Q12H   tamsulosin 0.4 mg Oral BID   technetium sestamibi 1 dose Intravenous Once in imaging     Continuous Infusions:    DOBUTamine 5 mcg/kg/min Last Rate: 2.5 mcg/kg/min (12/04/19 0656)     PRN Meds:.•  acetaminophen **OR** acetaminophen **OR** acetaminophen  •  aluminum-magnesium hydroxide-simethicone  •  bisacodyl  •  bisacodyl  •  dextrose  •  dextrose  •  glucagon (human recombinant)  •  influenza vaccine  •  insulin lispro **AND** insulin lispro  •  ipratropium-albuterol  •  loperamide  •  magnesium hydroxide  •  magnesium sulfate **OR** magnesium sulfate in D5W 1g/100mL (PREMIX)  •  melatonin  •  ondansetron **OR** ondansetron  •  phenol  •  potassium chloride  •  potassium chloride  •  [COMPLETED] Insert peripheral IV **AND** sodium chloride  •  sodium chloride  •  sodium chloride    Objective     VITAL SIGNS  Vitals:    12/04/19 1421 12/04/19 1741 12/04/19 2214 12/05/19 0214   BP: 112/61 112/44 133/57 105/56   Pulse: 69 60 64 63   Resp: 16 18 18 18   Temp:  "98.7 °F (37.1 °C) 97.6 °F (36.4 °C) 97.8 °F (36.6 °C) 97.6 °F (36.4 °C)   TempSrc: Oral Oral Oral Oral   SpO2: 100% 96% 98% 94%   Weight:       Height:           Flowsheet Rows      First Filed Value   Admission Height  177.8 cm (70\") Documented at 11/25/2019 0838   Admission Weight  93.8 kg (206 lb 12.7 oz) Documented at 11/25/2019 0838            Intake/Output Summary (Last 24 hours) at 12/5/2019 0634  Last data filed at 12/5/2019 0100  Gross per 24 hour   Intake 960 ml   Output 400 ml   Net 560 ml        TELEMETRY: Sinus rhythm    Physical Exam:  The patient is alert, oriented and in no distress.  Vital signs as noted above.  Head and neck revealed no carotid bruits or jugular venous distention.  No thyromegaly or lymphadenopathy is present  Lungs clear.  No wheezing.  Breath sounds are normal bilaterally.  Heart normal first and second heart sounds.  No murmur. No precordial rub is present.  No gallop is present.  Abdomen soft and nontender.  No organomegaly is present.  Extremities with good peripheral pulses without any pedal edema.  Skin warm and dry.  Musculoskeletal system is grossly normal  CNS grossly normal      Results Review:   I reviewed the patient's new clinical results.  Lab Results (last 24 hours)     Procedure Component Value Units Date/Time    Basic Metabolic Panel [[id number]]  (Abnormal) Collected:  12/05/19 0514    Specimen:  Blood Updated:  12/05/19 0608     Glucose 97 mg/dL      BUN 39 mg/dL      Creatinine 1.69 mg/dL      Sodium 135 mmol/L      Potassium 4.7 mmol/L      Chloride 95 mmol/L      CO2 29.0 mmol/L      Calcium 10.0 mg/dL      eGFR Non African Amer 39 mL/min/1.73      BUN/Creatinine Ratio 23.1     Anion Gap 11.0 mmol/L     Narrative:       GFR Normal >60  Chronic Kidney Disease <60  Kidney Failure <15    Magnesium [[id number]]  (Normal) Collected:  12/05/19 0514    Specimen:  Blood Updated:  12/05/19 0608     Magnesium 2.0 mg/dL     Phosphorus [[id number]]  (Normal) Collected:  " 12/05/19 0514    Specimen:  Blood Updated:  12/05/19 0608     Phosphorus 3.5 mg/dL     CBC (No Diff) [[id number]]  (Abnormal) Collected:  12/05/19 0514    Specimen:  Blood Updated:  12/05/19 0540     WBC 7.00 10*3/mm3      RBC 4.18 10*6/mm3      Hemoglobin 11.8 g/dL      Hematocrit 36.0 %      MCV 86.2 fL      MCH 28.3 pg      MCHC 32.8 g/dL      RDW 14.5 %      RDW-SD 43.8 fl      MPV 10.4 fL      Platelets 175 10*3/mm3     POC Glucose Once [[id number]]  (Abnormal) Collected:  12/04/19 2020    Specimen:  Blood Updated:  12/04/19 2026     Glucose 139 mg/dL      Comment: Serial Number: [id number]:  [id number]       POC Glucose Once [[id number]]  (Abnormal) Collected:  12/04/19 1644    Specimen:  Blood Updated:  12/04/19 1651     Glucose 121 mg/dL      Comment: Serial Number: [id number]:  [id number]       Protein Elec + Interp, Serum [[id number]] Collected:  12/03/19 1418    Specimen:  Blood Updated:  12/04/19 1409     Total Protein 6.6 g/dL      Albumin 3.5 g/dL      Alpha-1-Globulin 0.3 g/dL      Alpha-2-Globulin 0.9 g/dL      Beta Globulin 0.9 g/dL      Gamma Globulin 0.9 g/dL      M-Isael Not Observed g/dL      Globulin 3.1 g/dL      A/G Ratio 1.1     Please note Comment     Comment: Protein electrophoresis scan will follow via computer, mail, or   delivery.        SPE Interpretation Comment     Comment: The SPE pattern appears essentially unremarkable. Evidence of  monoclonal protein is not apparent.       Narrative:       Performed at:  [address] [place] [address]  [id number]  : Hilario Sheppard PhD, Phone:  [phone]    POC Glucose Once [[id number]]  (Abnormal) Collected:  12/04/19 1152    Specimen:  Blood Updated:  12/04/19 1207     Glucose 136 mg/dL      Comment: Serial Number: [id number]:  [id number]             Imaging Results (Last 24 Hours)     ** No results found for the last 24 hours. **      LAB RESULTS (LAST 7 DAYS)    CBC  Results from last 7 days    Lab Units 12/05/19  0514 12/04/19 0257 12/03/19  0309 12/02/19 0255 12/01/19 0412 11/30/19 0354 11/29/19  0553   WBC 10*3/mm3 7.00 7.00 7.90 7.60 7.10 7.50 8.20   RBC 10*6/mm3 4.18 4.07* 4.18 3.97* 4.16 4.01* 4.07*   HEMOGLOBIN g/dL 11.8* 11.4* 11.8* 11.3* 11.7* 11.3* 11.6*   HEMATOCRIT % 36.0* 34.7* 36.2* 33.9* 35.7* 34.7* 35.3*   MCV fL 86.2 85.4 86.5 85.4 86.0 86.5 86.7   PLATELETS 10*3/mm3 175 180 200 192 181 191 181       BMP  Results from last 7 days   Lab Units 12/05/19 0514 12/04/19 0257 12/03/19 0309 12/02/19 0255 12/01/19 0412 11/30/19 0354 11/29/19  0553   SODIUM mmol/L 135* 136 136 135* 137 138 136   POTASSIUM mmol/L 4.7 4.6 4.9 4.9 4.7 4.7 4.3   CHLORIDE mmol/L 95* 97* 95* 95* 96* 95* 96*   CO2 mmol/L 29.0 27.0 28.0 28.0 29.0 30.0* 29.0   BUN mg/dL 39* 41* 41* 39* 34* 35* 33*   CREATININE mg/dL 1.69* 1.66* 1.77* 1.79* 1.66* 1.93* 2.01*   GLUCOSE mg/dL 97 101* 92 97 104* 97 105*   MAGNESIUM mg/dL 2.0 2.1 1.8  --   --   --   --    PHOSPHORUS mg/dL 3.5 3.6  --   --   --   --   --        CMP   Results from last 7 days   Lab Units 12/05/19 0514 12/04/19 0257 12/03/19  1418 12/03/19 0309 12/02/19 0255 12/01/19 0412 11/30/19 0354 11/29/19  0553   SODIUM mmol/L 135* 136  --  136 135* 137 138 136   POTASSIUM mmol/L 4.7 4.6  --  4.9 4.9 4.7 4.7 4.3   CHLORIDE mmol/L 95* 97*  --  95* 95* 96* 95* 96*   CO2 mmol/L 29.0 27.0  --  28.0 28.0 29.0 30.0* 29.0   BUN mg/dL 39* 41*  --  41* 39* 34* 35* 33*   CREATININE mg/dL 1.69* 1.66*  --  1.77* 1.79* 1.66* 1.93* 2.01*   GLUCOSE mg/dL 97 101*  --  92 97 104* 97 105*   ALBUMIN g/dL  --  3.80 3.5  --   --  3.70 3.80 3.70   BILIRUBIN mg/dL  --  0.7  --   --   --  0.6 0.7 0.7   ALK PHOS U/L  --  68  --   --   --  68 70 69   AST (SGOT) U/L  --  16  --   --   --  17 15 13   ALT (SGPT) U/L  --  16  --   --   --  14 12 11         BNP        TROPONIN  Results from last 7 days   Lab Units 12/03/19  1418   CK TOTAL U/L 59       CoAg        Creatinine  Clearance  Estimated Creatinine Clearance: 33.3 mL/min (A) (by C-G formula based on SCr of 1.69 mg/dL (H)).    ABG        Radiology  Us Renal Bilateral    Result Date: 12/4/2019   1.  No hydronephrosis. 2.  Marked prostatomegaly.  Electronically signed by:  Hue Villalobos M.D.  12/4/2019 3:24 AM              EKG      I personally viewed and interpreted the patient's EKG/Telemetry data:    ECHOCARDIOGRAM:    Results for orders placed during the hospital encounter of 11/25/19   Adult Transthoracic Echo Complete W/ Cont if Necessary Per Protocol    Narrative · There is a moderate (1-2cm) circumferential pericardial effusion.  · The left ventricular cavity is mildly dilated.  · Left ventricular wall thickness is consistent with mild concentric   hypertrophy.  · Estimated EF = 25%.  · Mild pulmonic valve regurgitation is present.  · Mild tricuspid valve regurgitation is present.  · Right ventricular cavity is moderately dilated.  · Mildly reduced right ventricular systolic function noted.  · Left atrial cavity size is severely dilated.  · Mild mitral valve regurgitation is present  · Left ventricular systolic function is severely decreased.              STRESS MYOVIEW:    Cardiolite (Tc-99m Sestamibi) stress test    CARDIAC CATHETERIZATION:            OTHER:         Assessment/Plan     Principal Problem:    Acute exacerbation of CHF (congestive heart failure) (CMS/[facility])  Active Problems:    Carotid artery disease (CMS/HCC)    Type 2 diabetes mellitus without complication, without long-term current use of insulin (CMS/HCC)    PAC (premature atrial contraction)    Anemia, deficiency    Vitamin D deficiency    Gastritis and gastroduodenitis    Atherosclerosis of native coronary artery of native heart without angina pectoris    Chronic kidney disease, stage III (moderate) (CMS/HCC)    Hyperlipidemia    Hypertension, benign    Depression    Shortness of breath    BILL (obstructive sleep apnea)    Acute respiratory failure with  hypoxia (CMS/[facility])          ]]]]]]]]]]]]]]]]]]]]]]]  Impression  ========  Acute systolic congestive heart failure  Significant left ventricle dysfunction with ejection fraction of 25%.  Minimal elevation of troponin.     Acute respiratory failure with hypoxia     Status post stent 2003 at [facility].  Details not available.     Hypertension diabetes renal dysfunction.  BUN 14 creatinine 1.5.  Dyslipidemia     Anemia     Premature atrial contractions     Severe hypomagnesemia.  Serum magnesium 1.2.     Allergic to penicillin.     Plan  Patient has significant acute systolic congestive heart failure.  Patient has severe left ventricle dysfunction.  Continue diuretics.  BUN 41 creatinine 1.66 today BUN 39 and creatinine 1.7  Patient has underlying renal dysfunction.  Continue on intravenous dobutamine for renal protective and diuresis.  Observe for toxic effects.  Severe hypomagnesemia- improved.  Magnesium level today 1.8  Minimal elevation of troponin.  Observe closely.  Serial troponin levels and EKG.  Follow-up labs ordered.  Overall patient's condition is critical but stable with gradually improving congestive heart failure but has worsening of renal dysfunction.  Hopefully renal function would continue to improve with intravenous dobutamine.  Patient would benefit from ischemic cardiac work-up.  Lexiscan Cardiolite test.  Patient has declined to have Lexiscan Cardiolite test today.  Patient has significant prostatic enlargement.  Renal consultation appreciated.  Patient to have right and left heart catheterization later today.  Risks and benefits pros and cons were discussed with patient.  Patient understands increased risk of renal dysfunction  Renal precautions as per renal.  Have discussed with attending nurse for coordination of care.  Further plan will depend on patient's progress.       Dayday Ruiz MD  12/05/19  6:34 AM

## 2019-12-05 NOTE — PROGRESS NOTES
"NEPHROLOGY PROGRESS NOTE------[facility]    [facility]  [phone]  Uriah Lund MD      Patient Care Team:  Xander Robison MD as PCP - General  Xander Robison MD as PCP - [facility]  Delio Lund MD as Consulting Physician (Nephrology)      Provider:  Uriah Lund MD  Patient Name: Mikael Shanks  :  1935    SUBJECTIVE:    Patient refused stress test yesterday. Still on dobutamine.  No angina. No dysuria. Still awaiting Patient/Cardiology decision about treatment    Medication:    Acetylcysteine 1,200 mg Oral BID   allopurinol 100 mg Oral Daily   aspirin 81 mg Oral Daily   atorvastatin 80 mg Oral Nightly   finasteride 5 mg Oral Daily   furosemide 20 mg Oral BID   insulin lispro 0-7 Units Subcutaneous 4x Daily With Meals & Nightly   metoprolol succinate XL 25 mg Oral Daily   pantoprazole 40 mg Oral QAM   sertraline 25 mg Oral Daily   sodium chloride 10 mL Intravenous Q12H   tamsulosin 0.4 mg Oral BID   technetium sestamibi 1 dose Intravenous Once in imaging       DOBUTamine 5 mcg/kg/min Last Rate: 2.5 mcg/kg/min (19 0656)       OBJECTIVE    Vital Sign Min/Max for last 24 hours  Temp  Min: 97.3 °F (36.3 °C)  Max: 98.7 °F (37.1 °C)   BP  Min: 105/56  Max: 133/57   Pulse  Min: 60  Max: 96   Resp  Min: 16  Max: 18   SpO2  Min: 94 %  Max: 100 %   No Data Recorded   Weight  Min: 71.6 kg (157 lb 13.6 oz)  Max: 71.6 kg (157 lb 13.6 oz)     Flowsheet Rows      First Filed Value   Admission Height  177.8 cm (70\") Documented at 2019 08   Admission Weight  93.8 kg (206 lb 12.7 oz) Documented at 2019 0838          I/O this shift:  In: -   Out: 400 [Urine:400]  I/O last 3 completed shifts:  In: 960 [P.O.:960]  Out: 750 [Urine:750]    Physical Exam:  General Appearance: alert, appears stated age and cooperative  Head: normocephalic, without obvious abnormality and atraumatic  Eyes: conjunctivae and sclerae normal and " no icterus  Neck: supple +MINIMAL JVD  Lungs: +FEW SCATTERED RHONCHI  Heart: regular rhythm & normal rate and normal S1, S2 +MILO  Chest: Wall no abnormalities observed  Abdomen: normal bowel sounds and soft non-tender  Extremities: moves extremities well, no edema, no cyanosis and no redness  Skin: no bleeding, bruising or rash, turgor normal, color normal and no lesions noted  Neurologic: Alert, and oriented. No focal deficits    Labs:    WBC WBC   Date Value Ref Range Status   12/05/2019 7.00 3.40 - 10.80 10*3/mm3 Final   12/04/2019 7.00 3.40 - 10.80 10*3/mm3 Final   12/03/2019 7.90 3.40 - 10.80 10*3/mm3 Final      HGB Hemoglobin   Date Value Ref Range Status   12/05/2019 11.8 (L) 13.0 - 17.7 g/dL Final   12/04/2019 11.4 (L) 13.0 - 17.7 g/dL Final   12/03/2019 11.8 (L) 13.0 - 17.7 g/dL Final      HCT Hematocrit   Date Value Ref Range Status   12/05/2019 36.0 (L) 37.5 - 51.0 % Final   12/04/2019 34.7 (L) 37.5 - 51.0 % Final   12/03/2019 36.2 (L) 37.5 - 51.0 % Final      Platlets No results found for: LABPLAT   MCV MCV   Date Value Ref Range Status   12/05/2019 86.2 79.0 - 97.0 fL Final   12/04/2019 85.4 79.0 - 97.0 fL Final   12/03/2019 86.5 79.0 - 97.0 fL Final          Sodium Sodium   Date Value Ref Range Status   12/05/2019 135 (L) 136 - 145 mmol/L Final   12/04/2019 136 136 - 145 mmol/L Final   12/03/2019 136 136 - 145 mmol/L Final      Potassium Potassium   Date Value Ref Range Status   12/05/2019 4.7 3.5 - 5.2 mmol/L Final   12/04/2019 4.6 3.5 - 5.2 mmol/L Final   12/03/2019 4.9 3.5 - 5.2 mmol/L Final      Chloride Chloride   Date Value Ref Range Status   12/05/2019 95 (L) 98 - 107 mmol/L Final   12/04/2019 97 (L) 98 - 107 mmol/L Final   12/03/2019 95 (L) 98 - 107 mmol/L Final      CO2 CO2   Date Value Ref Range Status   12/05/2019 29.0 22.0 - 29.0 mmol/L Final   12/04/2019 27.0 22.0 - 29.0 mmol/L Final   12/03/2019 28.0 22.0 - 29.0 mmol/L Final      BUN BUN   Date Value Ref Range Status   12/05/2019 39 (H) 8  - 23 mg/dL Final   12/04/2019 41 (H) 8 - 23 mg/dL Final   12/03/2019 41 (H) 8 - 23 mg/dL Final      Creatinine Creatinine   Date Value Ref Range Status   12/05/2019 1.69 (H) 0.76 - 1.27 mg/dL Final   12/04/2019 1.66 (H) 0.76 - 1.27 mg/dL Final   12/03/2019 1.77 (H) 0.76 - 1.27 mg/dL Final      Calcium Calcium   Date Value Ref Range Status   12/05/2019 10.0 8.6 - 10.5 mg/dL Final   12/04/2019 9.7 8.6 - 10.5 mg/dL Final   12/03/2019 9.9 8.6 - 10.5 mg/dL Final      PO4 No components found for: PO4   Albumin Albumin   Date Value Ref Range Status   12/04/2019 3.80 3.50 - 5.20 g/dL Final   12/03/2019 3.5 2.9 - 4.4 g/dL Final      Magnesium Magnesium   Date Value Ref Range Status   12/05/2019 2.0 1.6 - 2.4 mg/dL Final   12/04/2019 2.1 1.6 - 2.4 mg/dL Final   12/03/2019 1.8 1.6 - 2.4 mg/dL Final      Uric Acid No components found for: URIC ACID     Imaging Results (Last 72 Hours)     Procedure Component Value Units Date/Time    US Renal Bilateral [[id number]] Collected:  12/04/19 0322     Updated:  12/04/19 0525    Narrative:       EXAMINATION: US RENAL BILATERAL    DATE: 12/3/2019 8:29 PM     INDICATION: Headache a.m. on CT the ;     COMPARISON: None.     FINDINGS:     Right Kidney: The right kidney measures 9.8 cm. The cortical thickness is within normal limits.  The renal cortical echogenicity is normal. There is no hydronephrosis or mass.     Left Kidney: The left kidney measures 9.9 cm. The cortical thickness is within normal limits. The renal cortical echogenicity is normal. There is no hydronephrosis or mass.     Bladder: No wall thickening.  Bilateral ureteral jets documented.    The prevoid bladder volume is 22 ml.    Incidentally noted markedly enlarged prostate gland measuring 6.4 x 4.3 x 4.9 cm        Impression:          1.  No hydronephrosis.  2.  Marked prostatomegaly.       Electronically signed by:  Hue Villalobos M.D.    12/4/2019 3:24 AM          Results for orders placed during the hospital encounter of  11/25/19   XR Chest 1 View    Narrative    DATE OF EXAM:   11/25/2019 9:48 AM     PROCEDURE:   XR CHEST 1 VW-     INDICATIONS:   SOA; R06.02-Shortness of breath     COMPARISON:  03/15/2017     TECHNIQUE:   [Portable chest radiograph]     FINDINGS:    Heart is enlarged, exaggerated by portable technique. There are  bilateral interstitial and alveolar opacities which may relate to  pulmonary edema or less likely multifocal pneumonia. Small bilateral  pleural effusions. No pneumothorax. Aortic arch atherosclerotic  calcifications.       Impression 1. Cardiomegaly with bilateral interstitial and alveolar opacities  suggesting pulmonary edema, less likely atypical infection.  2. Small bilateral pleural effusions.     Electronically Signed By-Dane Bain On:11/25/2019 10:04 AM  This report was finalized on [id number] by  Dane Bain, .              ASSESSMENT / PLAN      Acute exacerbation of CHF (congestive heart failure) (CMS/HCC)    Carotid artery disease (CMS/[facility])    Type 2 diabetes mellitus without complication, without long-term current use of insulin (CMS/[facility])    PAC (premature atrial contraction)    Anemia, deficiency    Vitamin D deficiency    Gastritis and gastroduodenitis    Atherosclerosis of native coronary artery of native heart without angina pectoris    Chronic kidney disease, stage III (moderate) (CMS/HCC)    Hyperlipidemia    Hypertension, benign    Depression    Shortness of breath    BILL (obstructive sleep apnea)    Acute respiratory failure with hypoxia (CMS/[facility])      1. ARF/SANDY/CRF/CKD STG 3--------Nonoliguric. BUN/Cr down a little. +ARF/SANDY on top of what appears to be CRF/CKD STG 3, with a baseline serum creatinine of 1.5. Unknown if patient has baseline proteinuria or hematuria. CRF/CKD STG 3 likely secondary to DGS vs. HTN NS. +ARF/SANDY appears to be secondary to prerenal/hemodynamic fluctuation from decompensated CHF/CP state (Cardiorenal) and need for increased diuretics since admission.  Avoid hypotension. Hold ACE-I. Backed down diuretics cautiously. Renal US without obstructive uropathy, but marked prostatomegaly No NSAIDs or IV dye (without proper premedication). Dose meds for CrCl less than 10 cc/min until ARF/SANDY is resolved.     2. HTN WITH CKD STG 3-----BP low. Off Lisinopril. No ACE/ARB/DRI for now     3. DMII-------No Metformin given ARF/SANDY. Glucometers, SSI     4. ANEMIA OF CKD------Fe normal. SPEP pending     5. OA/DJD-------No NSAIDs. Slightly elevated uric acid level     6. VIATMIN D DEFICIENCY------On supplementation     7. HYPERLIPIDEMIA------CK, TSH okay     8. PUD PROPHYLAXIS-----Counseled on risks of chronic PPI use with regards to CKD progression     9. BPH-------Marked prostatomegaly seen on Renal US. Increase Flomax and add Proscar. Will need outpatient Urology eval     10. DEPRESSION------On SSRI     11. BILL     12. CAD/CHF-------per , Cardiology. ?Cath. Currently on Dobutamine and decreased diuretics. TSH normal    13. SECONDARY HYPERPARATHYROIDISM------Intact PTH below treatment threshold    Uriah Lund MD  [facility]  [phone]  12/05/19  8:34 AM

## 2019-12-05 NOTE — PROGRESS NOTES
Continued Stay Note  GAUTAM Hernandez     Patient Name: Mikael Shanks  MRN: [id number]  Today's Date: 12/5/2019    Admit Date: 11/25/2019    Discharge Plan    Plan to return home.  Discharge Barrier: Dobutamine gtt, monitor home 02 needs    Dariana Delgado RN

## 2019-12-05 NOTE — H&P (VIEW-ONLY)
Referring Provider: Henry Munson DO    Reason for follow-up:   Congestive heart failure  Status post stent placement  Renal dysfunction     Patient Care Team:  Xander Robison MD as PCP - General  Xander Robison MD as PCP - [facility]  Delio Lund MD as Consulting Physician (Nephrology)    Subjective .  Feeling better     ROS    Since I have last seen him yesterday, the patient has been without any chest discomfort ,shortness of breath, palpitations, dizziness or syncope.  Denies having any headache ,abdominal pain ,nausea, vomiting , diarrhea constipation, loss of weight or loss of appetite.  Denies having any excessive bruising ,hematuria or blood in the stool.    Review of all systems negative except as indicated    History  Past Medical History:   Diagnosis Date   • Anemia    • CAD (coronary artery disease)    • Carotid artery disease (CMS/HCC)    • Diabetes mellitus (CMS/HCC)    • Gastritis    • GERD (gastroesophageal reflux disease)    • Hyperlipidemia    • Hypertension    • Mood disorder (CMS/HCC)    • Osteoarthritis    • Premature atrial contractions    • Prostatic hypertrophy    • Pulmonary valve disorder    • PVD (peripheral vascular disease) (CMS/HCC)    • Renal disease    • Sleep apnea        Past Surgical History:   Procedure Laterality Date   • BACK SURGERY     • CHOLECYSTECTOMY         Family History   Problem Relation Age of Onset   • Cancer Mother    • Heart disease Father    • Cancer Brother        Social History     Tobacco Use   • Smoking status: Former Smoker     Types: Cigarettes   • Smokeless tobacco: Never Used   Substance Use Topics   • Alcohol use: No     Frequency: Never   • Drug use: No        Medications Prior to Admission   Medication Sig Dispense Refill Last Dose   • metFORMIN (GLUCOPHAGE) 500 MG tablet Take 500 mg by mouth Every Night.      • aspirin 81 MG tablet Take 81 mg by mouth Daily.   Taking   • atorvastatin (LIPITOR) 80 MG tablet Take 1 tablet by  mouth Every Night. 30 tablet 5 Taking   • lisinopril (PRINIVIL,ZESTRIL) 20 MG tablet Take 1 tablet by mouth Daily. 30 tablet 5 Taking   • metoprolol succinate XL (TOPROL-XL) 25 MG 24 hr tablet Take 1 tablet by mouth Daily. 30 tablet 5 Taking   • omeprazole (priLOSEC) 40 MG capsule Take 1 capsule by mouth Daily. 30 capsule 5 Taking   • sertraline (ZOLOFT) 25 MG tablet Take 1 tablet by mouth Daily. 30 tablet 5 Taking   • tamsulosin (FLOMAX) 0.4 MG capsule 24 hr capsule Take 1 capsule by mouth Daily. 30 capsule 5 Taking       Allergies  Penicillins    Scheduled Meds:    Acetylcysteine 1,200 mg Oral BID   allopurinol 100 mg Oral Daily   aspirin 81 mg Oral Daily   atorvastatin 80 mg Oral Nightly   finasteride 5 mg Oral Daily   furosemide 20 mg Oral BID   insulin lispro 0-7 Units Subcutaneous 4x Daily With Meals & Nightly   metoprolol succinate XL 25 mg Oral Daily   pantoprazole 40 mg Oral QAM   sertraline 25 mg Oral Daily   sodium chloride 10 mL Intravenous Q12H   tamsulosin 0.4 mg Oral BID   technetium sestamibi 1 dose Intravenous Once in imaging     Continuous Infusions:    DOBUTamine 5 mcg/kg/min Last Rate: 2.5 mcg/kg/min (12/04/19 0656)     PRN Meds:.•  acetaminophen **OR** acetaminophen **OR** acetaminophen  •  aluminum-magnesium hydroxide-simethicone  •  bisacodyl  •  bisacodyl  •  dextrose  •  dextrose  •  glucagon (human recombinant)  •  influenza vaccine  •  insulin lispro **AND** insulin lispro  •  ipratropium-albuterol  •  loperamide  •  magnesium hydroxide  •  magnesium sulfate **OR** magnesium sulfate in D5W 1g/100mL (PREMIX)  •  melatonin  •  ondansetron **OR** ondansetron  •  phenol  •  potassium chloride  •  potassium chloride  •  [COMPLETED] Insert peripheral IV **AND** sodium chloride  •  sodium chloride  •  sodium chloride    Objective     VITAL SIGNS  Vitals:    12/04/19 1421 12/04/19 1741 12/04/19 2214 12/05/19 0214   BP: 112/61 112/44 133/57 105/56   Pulse: 69 60 64 63   Resp: 16 18 18 18   Temp:  "98.7 °F (37.1 °C) 97.6 °F (36.4 °C) 97.8 °F (36.6 °C) 97.6 °F (36.4 °C)   TempSrc: Oral Oral Oral Oral   SpO2: 100% 96% 98% 94%   Weight:       Height:           Flowsheet Rows      First Filed Value   Admission Height  177.8 cm (70\") Documented at 11/25/2019 0838   Admission Weight  93.8 kg (206 lb 12.7 oz) Documented at 11/25/2019 0838            Intake/Output Summary (Last 24 hours) at 12/5/2019 0634  Last data filed at 12/5/2019 0100  Gross per 24 hour   Intake 960 ml   Output 400 ml   Net 560 ml        TELEMETRY: Sinus rhythm    Physical Exam:  The patient is alert, oriented and in no distress.  Vital signs as noted above.  Head and neck revealed no carotid bruits or jugular venous distention.  No thyromegaly or lymphadenopathy is present  Lungs clear.  No wheezing.  Breath sounds are normal bilaterally.  Heart normal first and second heart sounds.  No murmur. No precordial rub is present.  No gallop is present.  Abdomen soft and nontender.  No organomegaly is present.  Extremities with good peripheral pulses without any pedal edema.  Skin warm and dry.  Musculoskeletal system is grossly normal  CNS grossly normal      Results Review:   I reviewed the patient's new clinical results.  Lab Results (last 24 hours)     Procedure Component Value Units Date/Time    Basic Metabolic Panel [[id number]]  (Abnormal) Collected:  12/05/19 0514    Specimen:  Blood Updated:  12/05/19 0608     Glucose 97 mg/dL      BUN 39 mg/dL      Creatinine 1.69 mg/dL      Sodium 135 mmol/L      Potassium 4.7 mmol/L      Chloride 95 mmol/L      CO2 29.0 mmol/L      Calcium 10.0 mg/dL      eGFR Non African Amer 39 mL/min/1.73      BUN/Creatinine Ratio 23.1     Anion Gap 11.0 mmol/L     Narrative:       GFR Normal >60  Chronic Kidney Disease <60  Kidney Failure <15    Magnesium [[id number]]  (Normal) Collected:  12/05/19 0514    Specimen:  Blood Updated:  12/05/19 0608     Magnesium 2.0 mg/dL     Phosphorus [[id number]]  (Normal) Collected:  " 12/05/19 0514    Specimen:  Blood Updated:  12/05/19 0608     Phosphorus 3.5 mg/dL     CBC (No Diff) [[id number]]  (Abnormal) Collected:  12/05/19 0514    Specimen:  Blood Updated:  12/05/19 0540     WBC 7.00 10*3/mm3      RBC 4.18 10*6/mm3      Hemoglobin 11.8 g/dL      Hematocrit 36.0 %      MCV 86.2 fL      MCH 28.3 pg      MCHC 32.8 g/dL      RDW 14.5 %      RDW-SD 43.8 fl      MPV 10.4 fL      Platelets 175 10*3/mm3     POC Glucose Once [[id number]]  (Abnormal) Collected:  12/04/19 2020    Specimen:  Blood Updated:  12/04/19 2026     Glucose 139 mg/dL      Comment: Serial Number: [id number]:  [id number]       POC Glucose Once [[id number]]  (Abnormal) Collected:  12/04/19 1644    Specimen:  Blood Updated:  12/04/19 1651     Glucose 121 mg/dL      Comment: Serial Number: [id number]:  [id number]       Protein Elec + Interp, Serum [[id number]] Collected:  12/03/19 1418    Specimen:  Blood Updated:  12/04/19 1409     Total Protein 6.6 g/dL      Albumin 3.5 g/dL      Alpha-1-Globulin 0.3 g/dL      Alpha-2-Globulin 0.9 g/dL      Beta Globulin 0.9 g/dL      Gamma Globulin 0.9 g/dL      M-Isael Not Observed g/dL      Globulin 3.1 g/dL      A/G Ratio 1.1     Please note Comment     Comment: Protein electrophoresis scan will follow via computer, mail, or   delivery.        SPE Interpretation Comment     Comment: The SPE pattern appears essentially unremarkable. Evidence of  monoclonal protein is not apparent.       Narrative:       Performed at:  [address] [place] [address]  [id number]  : Hilario Sheppard PhD, Phone:  [phone]    POC Glucose Once [[id number]]  (Abnormal) Collected:  12/04/19 1152    Specimen:  Blood Updated:  12/04/19 1207     Glucose 136 mg/dL      Comment: Serial Number: [id number]:  [id number]             Imaging Results (Last 24 Hours)     ** No results found for the last 24 hours. **      LAB RESULTS (LAST 7 DAYS)    CBC  Results from last 7 days     Lab Units 12/05/19  0514 12/04/19 0257 12/03/19  0309 12/02/19 0255 12/01/19 0412 11/30/19 0354 11/29/19  0553   WBC 10*3/mm3 7.00 7.00 7.90 7.60 7.10 7.50 8.20   RBC 10*6/mm3 4.18 4.07* 4.18 3.97* 4.16 4.01* 4.07*   HEMOGLOBIN g/dL 11.8* 11.4* 11.8* 11.3* 11.7* 11.3* 11.6*   HEMATOCRIT % 36.0* 34.7* 36.2* 33.9* 35.7* 34.7* 35.3*   MCV fL 86.2 85.4 86.5 85.4 86.0 86.5 86.7   PLATELETS 10*3/mm3 175 180 200 192 181 191 181       BMP  Results from last 7 days   Lab Units 12/05/19 0514 12/04/19 0257 12/03/19 0309 12/02/19 0255 12/01/19 0412 11/30/19 0354 11/29/19  0553   SODIUM mmol/L 135* 136 136 135* 137 138 136   POTASSIUM mmol/L 4.7 4.6 4.9 4.9 4.7 4.7 4.3   CHLORIDE mmol/L 95* 97* 95* 95* 96* 95* 96*   CO2 mmol/L 29.0 27.0 28.0 28.0 29.0 30.0* 29.0   BUN mg/dL 39* 41* 41* 39* 34* 35* 33*   CREATININE mg/dL 1.69* 1.66* 1.77* 1.79* 1.66* 1.93* 2.01*   GLUCOSE mg/dL 97 101* 92 97 104* 97 105*   MAGNESIUM mg/dL 2.0 2.1 1.8  --   --   --   --    PHOSPHORUS mg/dL 3.5 3.6  --   --   --   --   --        CMP   Results from last 7 days   Lab Units 12/05/19 0514 12/04/19 0257 12/03/19  1418 12/03/19 0309 12/02/19 0255 12/01/19 0412 11/30/19 0354 11/29/19  0553   SODIUM mmol/L 135* 136  --  136 135* 137 138 136   POTASSIUM mmol/L 4.7 4.6  --  4.9 4.9 4.7 4.7 4.3   CHLORIDE mmol/L 95* 97*  --  95* 95* 96* 95* 96*   CO2 mmol/L 29.0 27.0  --  28.0 28.0 29.0 30.0* 29.0   BUN mg/dL 39* 41*  --  41* 39* 34* 35* 33*   CREATININE mg/dL 1.69* 1.66*  --  1.77* 1.79* 1.66* 1.93* 2.01*   GLUCOSE mg/dL 97 101*  --  92 97 104* 97 105*   ALBUMIN g/dL  --  3.80 3.5  --   --  3.70 3.80 3.70   BILIRUBIN mg/dL  --  0.7  --   --   --  0.6 0.7 0.7   ALK PHOS U/L  --  68  --   --   --  68 70 69   AST (SGOT) U/L  --  16  --   --   --  17 15 13   ALT (SGPT) U/L  --  16  --   --   --  14 12 11         BNP        TROPONIN  Results from last 7 days   Lab Units 12/03/19  1418   CK TOTAL U/L 59       CoAg        Creatinine  Clearance  Estimated Creatinine Clearance: 33.3 mL/min (A) (by C-G formula based on SCr of 1.69 mg/dL (H)).    ABG        Radiology  Us Renal Bilateral    Result Date: 12/4/2019   1.  No hydronephrosis. 2.  Marked prostatomegaly.  Electronically signed by:  Hue Villalobos M.D.  12/4/2019 3:24 AM              EKG      I personally viewed and interpreted the patient's EKG/Telemetry data:    ECHOCARDIOGRAM:    Results for orders placed during the hospital encounter of 11/25/19   Adult Transthoracic Echo Complete W/ Cont if Necessary Per Protocol    Narrative · There is a moderate (1-2cm) circumferential pericardial effusion.  · The left ventricular cavity is mildly dilated.  · Left ventricular wall thickness is consistent with mild concentric   hypertrophy.  · Estimated EF = 25%.  · Mild pulmonic valve regurgitation is present.  · Mild tricuspid valve regurgitation is present.  · Right ventricular cavity is moderately dilated.  · Mildly reduced right ventricular systolic function noted.  · Left atrial cavity size is severely dilated.  · Mild mitral valve regurgitation is present  · Left ventricular systolic function is severely decreased.              STRESS MYOVIEW:    Cardiolite (Tc-99m Sestamibi) stress test    CARDIAC CATHETERIZATION:            OTHER:         Assessment/Plan     Principal Problem:    Acute exacerbation of CHF (congestive heart failure) (CMS/[facility])  Active Problems:    Carotid artery disease (CMS/HCC)    Type 2 diabetes mellitus without complication, without long-term current use of insulin (CMS/HCC)    PAC (premature atrial contraction)    Anemia, deficiency    Vitamin D deficiency    Gastritis and gastroduodenitis    Atherosclerosis of native coronary artery of native heart without angina pectoris    Chronic kidney disease, stage III (moderate) (CMS/HCC)    Hyperlipidemia    Hypertension, benign    Depression    Shortness of breath    BILL (obstructive sleep apnea)    Acute respiratory failure with  hypoxia (CMS/[facility])          ]]]]]]]]]]]]]]]]]]]]]]]  Impression  ========  Acute systolic congestive heart failure  Significant left ventricle dysfunction with ejection fraction of 25%.  Minimal elevation of troponin.     Acute respiratory failure with hypoxia     Status post stent 2003 at [facility].  Details not available.     Hypertension diabetes renal dysfunction.  BUN 14 creatinine 1.5.  Dyslipidemia     Anemia     Premature atrial contractions     Severe hypomagnesemia.  Serum magnesium 1.2.     Allergic to penicillin.     Plan  Patient has significant acute systolic congestive heart failure.  Patient has severe left ventricle dysfunction.  Continue diuretics.  BUN 41 creatinine 1.66 today BUN 39 and creatinine 1.7  Patient has underlying renal dysfunction.  Continue on intravenous dobutamine for renal protective and diuresis.  Observe for toxic effects.  Severe hypomagnesemia– improved.  Magnesium level today 1.8  Minimal elevation of troponin.  Observe closely.  Serial troponin levels and EKG.  Follow-up labs ordered.  Overall patient's condition is critical but stable with gradually improving congestive heart failure but has worsening of renal dysfunction.  Hopefully renal function would continue to improve with intravenous dobutamine.  Patient would benefit from ischemic cardiac work-up.  Lexiscan Cardiolite test.  Patient has declined to have Lexiscan Cardiolite test today.  Patient has significant prostatic enlargement.  Renal consultation appreciated.  Patient to have right and left heart catheterization later today.  Risks and benefits pros and cons were discussed with patient.  Patient understands increased risk of renal dysfunction  Renal precautions as per renal.  Have discussed with attending nurse for coordination of care.  Further plan will depend on patient's progress.       Dayday Ruiz MD  12/05/19  6:34 AM

## 2019-12-05 NOTE — CONSULTS
"Nutrition Services    Patient Name:  Mikael Shanks  YOB: 1935  MRN: [id number]  Admit Date:  11/25/2019     Comment:    Review of pt r/t LOS, no interventions needed at this time. Will continue to monitor.       Reason for Assessment     Row Name           Reason for Assessment    Reason For Assessment LOS (10 days)       Diagnosis H&P: CHF, COPD, CAD, BILL, DM, GERD, HLD, HTN, depression     Current Problems:   AECHF- cardiology following    Gastritis and gastroduodenitis    SANDY on Chronic kidney disease, stage III- Nonoliguric, Renal following    Shortness of breath  Acute respiratory failure with hypoxia         Nutrition/Diet History     Row Name           Nutrition/Diet History    Typical Food/Fluid Intake Visited pt who reports having a good appetite currently and prior to admission. Noted per EMR pt is eating 100% of meals and is ordering full meals. Prior to admission pt reports eating two full meals daily after 24 hr recall.    Pt states upon admission he was very edematous and wt loss likely fluid. Pt reports his weight fluctuates btw 170#-210# depending on fluid and \"In the winter time gains a little bit of weight\".        Functional Status Pt reports prior to admission was living at home with his girlfriend. They both share cooking responsibilities. Pt still very mobile. Ambulates independently.        Food Allergies  NKFA        Factors Affecting Nutritional Intake  None at this time          Anthropometrics             Anthropometrics    Height 70 inches     Weight 71.6 kg (157 lb 13.6 oz)    12/5/19 - wt down r/t fluid loss vs error?        Admit Weight    Admit Weight 79 kg (174 lb 2.6 oz)  93.8 kg (206 lb 12.7 oz)    Both 11/25/19, ? accuracies       Ideal Body Weight (IBW)    Ideal Body Weight (IBW) (kg) 166#     % Ideal Body Weight 95%        Usual Body Weight (UBW)    Usual Body Weight 170-210# per pt     % Usual Body Weight 92-75%     Weight Hx  No wt hx        Body Mass Index " (BMI)    BMI (kg/m2) 22.65            Labs/Tests/Procedures/Meds                Labs    Pertinent Lab Results Comments  Gluc 169 H, Na 135 L, Crt 1.69 H, BUN 39 H, Hgb 11.8 L, Hct 36.0 L         Medications    Pertinent Medications Comments Lasix, humalog, protonix, zoloft, dobutamine, SSI          Physical Findings                Physical Findings    Overall Physical Appearance NFPE completed on 12/5/19. Noted clavicle and temple wasting. BLE with very good muscle tone. Felt this was more consistent to pt's advanced age of 83 y/o vs malnutrition in context of pts good appetite & lack of evidence of consistent wt loss.        Gastrointestinal + BM 12/2        Tubes None        Oral/Mouth Cavity Pt denies chewing or swallowing issues.        Skin Intact          Estimated/Assessed Needs              Calculation Measurements    Weight Used For Calculations      Height         KCAL/KG    KCAL/KG               Protein Requirements    Weight Used For Protein Calculations      Est Protein Requirement Amount (gms/kg)      Estimated Protein Requirements (gms/day)         Fluid Requirements    Estimated Fluid Requirements (mL/day)          Nutrition Prescription Ordered                Nutrition Prescription PO    Current PO Diet  NPO at midnight for heart cath (Previously Consistent Carb)      Supplement  -- -     Supplement Frequency  -- -        Nutrition Prescription EN    Enteral Route  -- -     Product  -- -     TF Delivery Method  -----     Continuous TF Goal Rate (mL/hr)  -- -     Water flush (mL)   -- -     Water Flush Frequency  -- -        Nutrition Prescription PN    PN Route  -- -     PN Goal Volume (mL)  -- -     Dextrose (Kcal)  -- -     Amino Acid (gm)  -- -     Lipid Concentration (%)  -- -     Lipid Volume (mL)  -- -     Lipid Frequency  -- -         Evaluation of Received Nutrient/Fluid Intake                PO Evaluation    % PO Intake 100% of meals         EN Evaluation    TF Changes  -- -     TF Residual   -- -     TF Tolerance  -- -        PN Evaluation    Number of Days PN Evaluated  -- -           Problem/Interventions:  Problem 1                Nutrition Diagnoses Problem 1    Problem 1 No nutrition dxs at this time                Intervention Goal                Intervention Goal    General Pt will continue to eat >75% of meals.          Nutrition Intervention                Nutrition Intervention    RD/Tech Action Continue to monitor.          Nutrition Prescription     Row Name           Nutrition Prescription PO    Supplement --     Supplement Frequency --        Nutrition Prescription EN    Enteral Prescription  -- -        Nutrition Prescription PN    Parenteral Prescription  -- -         Education/Evaluation                Monitor/Evaluation    Monitor  weight, intakes, labs, BM, and skin                Electronically signed by:  Vera Ding RD  12/05/19 1:31 PM

## 2019-12-06 VITALS
RESPIRATION RATE: 22 BRPM | HEART RATE: 64 BPM | HEIGHT: 70 IN | TEMPERATURE: 97.4 F | SYSTOLIC BLOOD PRESSURE: 124 MMHG | BODY MASS INDEX: 22.76 KG/M2 | OXYGEN SATURATION: 98 % | WEIGHT: 158.95 LBS | DIASTOLIC BLOOD PRESSURE: 56 MMHG

## 2019-12-06 LAB
ALBUMIN SERPL-MCNC: 3.7 G/DL (ref 3.5–5.2)
ALBUMIN/GLOB SERPL: 1.4 G/DL
ALP SERPL-CCNC: 66 U/L (ref 39–117)
ALT SERPL W P-5'-P-CCNC: 18 U/L (ref 1–41)
ANION GAP SERPL CALCULATED.3IONS-SCNC: 10 MMOL/L (ref 5–15)
AST SERPL-CCNC: 18 U/L (ref 1–40)
BASOPHILS # BLD AUTO: 0.1 10*3/MM3 (ref 0–0.2)
BASOPHILS NFR BLD AUTO: 1.8 % (ref 0–1.5)
BILIRUB SERPL-MCNC: 0.5 MG/DL (ref 0.2–1.2)
BUN BLD-MCNC: 35 MG/DL (ref 8–23)
BUN/CREAT SERPL: 20.1 (ref 7–25)
CA-I SERPL ISE-MCNC: 1.26 MMOL/L (ref 1.2–1.3)
CALCIUM SPEC-SCNC: 9.2 MG/DL (ref 8.6–10.5)
CHLORIDE SERPL-SCNC: 98 MMOL/L (ref 98–107)
CO2 SERPL-SCNC: 27 MMOL/L (ref 22–29)
CREAT BLD-MCNC: 1.74 MG/DL (ref 0.76–1.27)
DEPRECATED RDW RBC AUTO: 45.1 FL (ref 37–54)
EOSINOPHIL # BLD AUTO: 0.3 10*3/MM3 (ref 0–0.4)
EOSINOPHIL NFR BLD AUTO: 4.5 % (ref 0.3–6.2)
ERYTHROCYTE [DISTWIDTH] IN BLOOD BY AUTOMATED COUNT: 14.6 % (ref 12.3–15.4)
GFR SERPL CREATININE-BSD FRML MDRD: 38 ML/MIN/1.73
GLOBULIN UR ELPH-MCNC: 2.7 GM/DL
GLUCOSE BLD-MCNC: 142 MG/DL (ref 65–99)
GLUCOSE BLDC GLUCOMTR-MCNC: 119 MG/DL (ref 70–105)
GLUCOSE BLDC GLUCOMTR-MCNC: 140 MG/DL (ref 70–105)
GLUCOSE BLDC GLUCOMTR-MCNC: 93 MG/DL (ref 70–105)
HCT VFR BLD AUTO: 33.7 % (ref 37.5–51)
HGB BLD-MCNC: 11.1 G/DL (ref 13–17.7)
INR PPP: 1.06 (ref 0.9–1.1)
LYMPHOCYTES # BLD AUTO: 0.9 10*3/MM3 (ref 0.7–3.1)
LYMPHOCYTES NFR BLD AUTO: 15 % (ref 19.6–45.3)
MAGNESIUM SERPL-MCNC: 1.8 MG/DL (ref 1.6–2.4)
MCH RBC QN AUTO: 28.7 PG (ref 26.6–33)
MCHC RBC AUTO-ENTMCNC: 33.1 G/DL (ref 31.5–35.7)
MCV RBC AUTO: 86.7 FL (ref 79–97)
MONOCYTES # BLD AUTO: 0.6 10*3/MM3 (ref 0.1–0.9)
MONOCYTES NFR BLD AUTO: 8.9 % (ref 5–12)
NEUTROPHILS # BLD AUTO: 4.4 10*3/MM3 (ref 1.7–7)
NEUTROPHILS NFR BLD AUTO: 69.8 % (ref 42.7–76)
NRBC BLD AUTO-RTO: 0.1 /100 WBC (ref 0–0.2)
PHOSPHATE SERPL-MCNC: 3.1 MG/DL (ref 2.5–4.5)
PLATELET # BLD AUTO: 174 10*3/MM3 (ref 140–450)
PMV BLD AUTO: 10.6 FL (ref 6–12)
POTASSIUM BLD-SCNC: 4.8 MMOL/L (ref 3.5–5.2)
PROT SERPL-MCNC: 6.4 G/DL (ref 6–8.5)
PROTHROMBIN TIME: 11 SECONDS (ref 9.6–11.7)
RBC # BLD AUTO: 3.89 10*6/MM3 (ref 4.14–5.8)
SODIUM BLD-SCNC: 135 MMOL/L (ref 136–145)
WBC NRBC COR # BLD: 6.3 10*3/MM3 (ref 3.4–10.8)

## 2019-12-06 PROCEDURE — 80053 COMPREHEN METABOLIC PANEL: CPT | Performed by: INTERNAL MEDICINE

## 2019-12-06 PROCEDURE — G0008 ADMIN INFLUENZA VIRUS VAC: HCPCS | Performed by: INTERNAL MEDICINE

## 2019-12-06 PROCEDURE — 93010 ELECTROCARDIOGRAM REPORT: CPT | Performed by: INTERNAL MEDICINE

## 2019-12-06 PROCEDURE — 99239 HOSP IP/OBS DSCHRG MGMT >30: CPT | Performed by: INTERNAL MEDICINE

## 2019-12-06 PROCEDURE — 82330 ASSAY OF CALCIUM: CPT | Performed by: INTERNAL MEDICINE

## 2019-12-06 PROCEDURE — 90686 IIV4 VACC NO PRSV 0.5 ML IM: CPT | Performed by: INTERNAL MEDICINE

## 2019-12-06 PROCEDURE — 85025 COMPLETE CBC W/AUTO DIFF WBC: CPT | Performed by: INTERNAL MEDICINE

## 2019-12-06 PROCEDURE — 99232 SBSQ HOSP IP/OBS MODERATE 35: CPT | Performed by: INTERNAL MEDICINE

## 2019-12-06 PROCEDURE — 82962 GLUCOSE BLOOD TEST: CPT

## 2019-12-06 PROCEDURE — 84100 ASSAY OF PHOSPHORUS: CPT | Performed by: INTERNAL MEDICINE

## 2019-12-06 PROCEDURE — 25010000002 INFLUENZA VAC SPLIT QUAD 0.5 ML SUSPENSION PREFILLED SYRINGE: Performed by: INTERNAL MEDICINE

## 2019-12-06 PROCEDURE — 85610 PROTHROMBIN TIME: CPT | Performed by: INTERNAL MEDICINE

## 2019-12-06 PROCEDURE — 83735 ASSAY OF MAGNESIUM: CPT | Performed by: INTERNAL MEDICINE

## 2019-12-06 RX ORDER — FUROSEMIDE 20 MG/1
20 TABLET ORAL 2 TIMES DAILY
Qty: 60 TABLET | Refills: 0 | Status: SHIPPED | OUTPATIENT
Start: 2019-12-06 | End: 2019-12-09 | Stop reason: SDUPTHER

## 2019-12-06 RX ORDER — FINASTERIDE 5 MG/1
5 TABLET, FILM COATED ORAL DAILY
Qty: 30 TABLET | Refills: 0 | Status: SHIPPED | OUTPATIENT
Start: 2019-12-07 | End: 2019-12-09 | Stop reason: SDUPTHER

## 2019-12-06 RX ORDER — METOPROLOL SUCCINATE 25 MG/1
12.5 TABLET, EXTENDED RELEASE ORAL DAILY
Status: DISCONTINUED | OUTPATIENT
Start: 2019-12-07 | End: 2019-12-06 | Stop reason: HOSPADM

## 2019-12-06 RX ORDER — TAMSULOSIN HYDROCHLORIDE 0.4 MG/1
0.4 CAPSULE ORAL DAILY
Status: DISCONTINUED | OUTPATIENT
Start: 2019-12-07 | End: 2019-12-06 | Stop reason: HOSPADM

## 2019-12-06 RX ADMIN — SODIUM CHLORIDE 50 ML/HR: 900 INJECTION, SOLUTION INTRAVENOUS at 01:28

## 2019-12-06 RX ADMIN — METOPROLOL SUCCINATE 25 MG: 25 TABLET, EXTENDED RELEASE ORAL at 09:25

## 2019-12-06 RX ADMIN — Medication 1200 MG: at 09:25

## 2019-12-06 RX ADMIN — ALLOPURINOL 100 MG: 100 TABLET ORAL at 09:25

## 2019-12-06 RX ADMIN — FUROSEMIDE 20 MG: 20 TABLET ORAL at 17:37

## 2019-12-06 RX ADMIN — FINASTERIDE 5 MG: 5 TABLET, FILM COATED ORAL at 09:25

## 2019-12-06 RX ADMIN — INFLUENZA A VIRUS A/BRISBANE/02/2018 IVR-190 (H1N1) ANTIGEN (PROPIOLACTONE INACTIVATED), INFLUENZA A VIRUS A/KANSAS/14/2017 X-327 (H3N2) ANTIGEN (PROPIOLACTONE INACTIVATED), INFLUENZA B VIRUS B/MARYLAND/15/2016 ANTIGEN (PROPIOLACTONE INACTIVATED), INFLUENZA B VIRUS B/PHUKET/3073/2013 BVR-1B ANTIGEN (PROPIOLACTONE INACTIVATED) 0.5 ML: 15; 15; 15; 15 INJECTION, SUSPENSION INTRAMUSCULAR at 18:45

## 2019-12-06 RX ADMIN — SERTRALINE HYDROCHLORIDE 25 MG: 50 TABLET ORAL at 09:25

## 2019-12-06 RX ADMIN — ASPIRIN 81 MG 81 MG: 81 TABLET ORAL at 09:25

## 2019-12-06 RX ADMIN — PANTOPRAZOLE SODIUM 40 MG: 40 TABLET, DELAYED RELEASE ORAL at 06:11

## 2019-12-06 RX ADMIN — Medication 10 ML: at 09:26

## 2019-12-06 RX ADMIN — TAMSULOSIN HYDROCHLORIDE 0.4 MG: 0.4 CAPSULE ORAL at 09:25

## 2019-12-06 RX ADMIN — FUROSEMIDE 20 MG: 20 TABLET ORAL at 09:25

## 2019-12-06 NOTE — PLAN OF CARE
Problem: Patient Care Overview  Goal: Plan of Care Review  Outcome: Ongoing (interventions implemented as appropriate)   12/06/19 0201   Plan of Care Review   Progress improving   Coping/Psychosocial   Plan of Care Reviewed With patient   OTHER   Outcome Summary Patient recovering after heart cath yesterday. Patient is off bedrest and up walking around with no pain reported. Cath site is clean and dry with no bleeding or bruising.        Problem: Cardiac: Heart Failure (Adult)  Goal: Signs and Symptoms of Listed Potential Problems Will be Absent, Minimized or Managed (Cardiac: Heart Failure)  Outcome: Ongoing (interventions implemented as appropriate)

## 2019-12-06 NOTE — PROGRESS NOTES
Referring Provider: Henry Munson DO    Reason for follow-up:   Congestive heart failure  Status post stent placement  Renal dysfunction     Patient Care Team:  Xander Robison MD as PCP - General  Xander Robison MD as PCP - [facility]  Delio Lund MD as Consulting Physician (Nephrology)    Subjective .  Feeling better     ROS    Since I have last seen him yesterday, the patient has been without any chest discomfort ,shortness of breath, palpitations, dizziness or syncope.  Denies having any headache ,abdominal pain ,nausea, vomiting , diarrhea constipation, loss of weight or loss of appetite.  Denies having any excessive bruising ,hematuria or blood in the stool.    Review of all systems negative except as indicated    History  Past Medical History:   Diagnosis Date   • Anemia    • CAD (coronary artery disease)    • Carotid artery disease (CMS/[facility]ly [facility])    • Diabetes mellitus (CMS/[facility])    • Gastritis    • GERD (gastroesophageal reflux disease)    • Hyperlipidemia    • Hypertension    • Mood disorder (CMS/HCC)    • Osteoarthritis    • Premature atrial contractions    • Prostatic hypertrophy    • Pulmonary valve disorder    • PVD (peripheral vascular disease) (CMS/[facility])    • Renal disease    • Sleep apnea        Past Surgical History:   Procedure Laterality Date   • BACK SURGERY     • CARDIAC CATHETERIZATION N/A 12/5/2019    Procedure: Left Heart Cath and coronary angiogram;  Surgeon: Dayday Ruiz MD;  Location:  [facility];  Service: Cardiovascular   • CARDIAC CATHETERIZATION N/A 12/5/2019    Procedure: Right and Left Heart Cath;  Surgeon: Dayday Ruiz MD;  Location:  [facility];  Service: Cardiovascular   • CHOLECYSTECTOMY         Family History   Problem Relation Age of Onset   • Cancer Mother    • Heart disease Father    • Cancer Brother        Social History     Tobacco Use   • Smoking status: Former Smoker     Types: Cigarettes   • Smokeless  tobacco: Never Used   Substance Use Topics   • Alcohol use: No     Frequency: Never   • Drug use: No        Medications Prior to Admission   Medication Sig Dispense Refill Last Dose   • metFORMIN (GLUCOPHAGE) 500 MG tablet Take 500 mg by mouth Every Night.      • aspirin 81 MG tablet Take 81 mg by mouth Daily.   Taking   • atorvastatin (LIPITOR) 80 MG tablet Take 1 tablet by mouth Every Night. 30 tablet 5 Taking   • lisinopril (PRINIVIL,ZESTRIL) 20 MG tablet Take 1 tablet by mouth Daily. 30 tablet 5 Taking   • metoprolol succinate XL (TOPROL-XL) 25 MG 24 hr tablet Take 1 tablet by mouth Daily. 30 tablet 5 Taking   • omeprazole (priLOSEC) 40 MG capsule Take 1 capsule by mouth Daily. 30 capsule 5 Taking   • sertraline (ZOLOFT) 25 MG tablet Take 1 tablet by mouth Daily. 30 tablet 5 Taking   • tamsulosin (FLOMAX) 0.4 MG capsule 24 hr capsule Take 1 capsule by mouth Daily. 30 capsule 5 Taking       Allergies  Penicillins    Scheduled Meds:    Acetylcysteine 1,200 mg Oral BID   allopurinol 100 mg Oral Daily   aspirin 81 mg Oral Daily   atorvastatin 80 mg Oral Nightly   finasteride 5 mg Oral Daily   furosemide 20 mg Oral BID   insulin lispro 0-7 Units Subcutaneous 4x Daily With Meals & Nightly   metoprolol succinate XL 25 mg Oral Daily   pantoprazole 40 mg Oral QAM   sertraline 25 mg Oral Daily   sodium chloride 10 mL Intravenous Q12H   tamsulosin 0.4 mg Oral BID   technetium sestamibi 1 dose Intravenous Once in imaging     Continuous Infusions:    sodium chloride 50 mL/hr Last Rate: Stopped (12/06/19 0219)     PRN Meds:.•  acetaminophen **OR** acetaminophen **OR** acetaminophen  •  acetaminophen  •  aluminum-magnesium hydroxide-simethicone  •  atropine  •  bisacodyl  •  bisacodyl  •  dextrose  •  dextrose  •  diphenhydrAMINE  •  glucagon (human recombinant)  •  influenza vaccine  •  insulin lispro **AND** insulin lispro  •  ipratropium-albuterol  •  loperamide  •  magnesium hydroxide  •  magnesium sulfate **OR** magnesium  "sulfate in D5W 1g/100mL (PREMIX)  •  melatonin  •  ondansetron **OR** ondansetron  •  phenol  •  potassium chloride  •  potassium chloride  •  [COMPLETED] Insert peripheral IV **AND** sodium chloride  •  sodium chloride  •  sodium chloride  •  sodium chloride    Objective     VITAL SIGNS  Vitals:    12/05/19 1510 12/05/19 1800 12/05/19 2241 12/06/19 0302   BP: (!) 109/39 114/60 135/54 98/44   Pulse: 69 67 71 62   Resp: 20 16 18 12   Temp: 98 °F (36.7 °C) 98.1 °F (36.7 °C) 97.5 °F (36.4 °C) 97.7 °F (36.5 °C)   TempSrc:   Oral Oral   SpO2: 97% 96% 94% 97%   Weight:       Height:           Flowsheet Rows      First Filed Value   Admission Height  177.8 cm (70\") Documented at 11/25/2019 0838   Admission Weight  93.8 kg (206 lb 12.7 oz) Documented at 11/25/2019 0838            Intake/Output Summary (Last 24 hours) at 12/6/2019 0643  Last data filed at 12/5/2019 2300  Gross per 24 hour   Intake 2060 ml   Output 1100 ml   Net 960 ml        TELEMETRY: Sinus rhythm    Physical Exam:  The patient is alert, oriented and in no distress.  Vital signs as noted above.  Head and neck revealed no carotid bruits or jugular venous distention.  No thyromegaly or lymphadenopathy is present  Lungs clear.  No wheezing.  Breath sounds are normal bilaterally.  Heart normal first and second heart sounds.  No murmur. No precordial rub is present.  No gallop is present.  Abdomen soft and nontender.  No organomegaly is present.  Extremities with good peripheral pulses without any pedal edema.  Cardiac cath site looks normal.  Skin warm and dry.  Musculoskeletal system is grossly normal  CNS grossly normal      Results Review:   I reviewed the patient's new clinical results.  Lab Results (last 24 hours)     Procedure Component Value Units Date/Time    Comprehensive Metabolic Panel [[id number]]  (Abnormal) Collected:  12/06/19 0256    Specimen:  Blood Updated:  12/06/19 0450     Glucose 142 mg/dL      BUN 35 mg/dL      Creatinine 1.74 mg/dL      " Sodium 135 mmol/L      Potassium 4.8 mmol/L      Chloride 98 mmol/L      CO2 27.0 mmol/L      Calcium 9.2 mg/dL      Total Protein 6.4 g/dL      Albumin 3.70 g/dL      ALT (SGPT) 18 U/L      AST (SGOT) 18 U/L      Alkaline Phosphatase 66 U/L      Total Bilirubin 0.5 mg/dL      eGFR Non African Amer 38 mL/min/1.73      Globulin 2.7 gm/dL      A/G Ratio 1.4 g/dL      BUN/Creatinine Ratio 20.1     Anion Gap 10.0 mmol/L     Narrative:       GFR Normal >60  Chronic Kidney Disease <60  Kidney Failure <15    Magnesium [[id number]]  (Normal) Collected:  12/06/19 0256    Specimen:  Blood Updated:  12/06/19 0450     Magnesium 1.8 mg/dL     Phosphorus [[id number]]  (Normal) Collected:  12/06/19 0256    Specimen:  Blood Updated:  12/06/19 0450     Phosphorus 3.1 mg/dL     Protime-INR [[id number]]  (Normal) Collected:  12/06/19 0256    Specimen:  Blood Updated:  12/06/19 0439     Protime 11.0 Seconds      INR 1.06    Calcium, Ionized [[id number]]  (Normal) Collected:  12/06/19 0256    Specimen:  Blood Updated:  12/06/19 0436     Ionized Calcium 1.26 mmol/L     CBC & Differential [[id number]] Collected:  12/06/19 0256    Specimen:  Blood Updated:  12/06/19 0424    Narrative:       The following orders were created for panel order CBC & Differential.  Procedure                               Abnormality         Status                     ---------                               -----------         ------                     CBC Auto Differential[[id number]]        Abnormal            Final result                 Please view results for these tests on the individual orders.    CBC Auto Differential [[id number]]  (Abnormal) Collected:  12/06/19 0256    Specimen:  Blood Updated:  12/06/19 0424     WBC 6.30 10*3/mm3      RBC 3.89 10*6/mm3      Hemoglobin 11.1 g/dL      Hematocrit 33.7 %      MCV 86.7 fL      MCH 28.7 pg      MCHC 33.1 g/dL      RDW 14.6 %      RDW-SD 45.1 fl      MPV 10.6 fL      Platelets 174 10*3/mm3      Neutrophil % 69.8 %       Lymphocyte % 15.0 %      Monocyte % 8.9 %      Eosinophil % 4.5 %      Basophil % 1.8 %      Neutrophils, Absolute 4.40 10*3/mm3      Lymphocytes, Absolute 0.90 10*3/mm3      Monocytes, Absolute 0.60 10*3/mm3      Eosinophils, Absolute 0.30 10*3/mm3      Basophils, Absolute 0.10 10*3/mm3      nRBC 0.1 /100 WBC     POC Glucose Once [[id number]]  (Normal) Collected:  12/05/19 2124    Specimen:  Blood Updated:  12/05/19 2230     Glucose 104 mg/dL      Comment: Serial Number: [id number]:  [id number]       POC Glucose Once [[id number]]  (Normal) Collected:  12/05/19 1642    Specimen:  Blood Updated:  12/05/19 1644     Glucose 92 mg/dL      Comment: Serial Number: [id number]:  [id number]       Protime-INR [[id number]]  (Normal) Collected:  12/05/19 1442    Specimen:  Blood Updated:  12/05/19 1537     Protime 11.3 Seconds      INR 1.09    aPTT [[id number]]  (Normal) Collected:  12/05/19 1442    Specimen:  Blood Updated:  12/05/19 1537     PTT 25.5 seconds     Magnesium [[id number]]  (Normal) Collected:  12/05/19 1337    Specimen:  Blood Updated:  12/05/19 1403     Magnesium 1.9 mg/dL     POC Glucose Once [[id number]]  (Abnormal) Collected:  12/05/19 1147    Specimen:  Blood Updated:  12/05/19 1202     Glucose 169 mg/dL      Comment: Serial Number: [id number]:  [id number]       POC Glucose Once [[id number]]  (Normal) Collected:  12/05/19 0728    Specimen:  Blood Updated:  12/05/19 0733     Glucose 95 mg/dL      Comment: Serial Number: [id number]:  [id number]             Imaging Results (Last 24 Hours)     ** No results found for the last 24 hours. **      LAB RESULTS (LAST 7 DAYS)    CBC  Results from last 7 days   Lab Units 12/06/19  0256 12/05/19  0514 12/04/19  0257 12/03/19  0309 12/02/19  0255 12/01/19  0412 11/30/19  0354   WBC 10*3/mm3 6.30 7.00 7.00 7.90 7.60 7.10 7.50   RBC 10*6/mm3 3.89* 4.18 4.07* 4.18 3.97* 4.16 4.01*   HEMOGLOBIN g/dL 11.1* 11.8* 11.4* 11.8* 11.3* 11.7* 11.3*   HEMATOCRIT  % 33.7* 36.0* 34.7* 36.2* 33.9* 35.7* 34.7*   MCV fL 86.7 86.2 85.4 86.5 85.4 86.0 86.5   PLATELETS 10*3/mm3 174 175 180 200 192 181 191       BMP  Results from last 7 days   Lab Units 12/06/19  0256 12/05/19  1337 12/05/19  0514 12/04/19  0257 12/03/19  0309 12/02/19  0255 12/01/19 0412 11/30/19  0354   SODIUM mmol/L 135*  --  135* 136 136 135* 137 138   POTASSIUM mmol/L 4.8  --  4.7 4.6 4.9 4.9 4.7 4.7   CHLORIDE mmol/L 98  --  95* 97* 95* 95* 96* 95*   CO2 mmol/L 27.0  --  29.0 27.0 28.0 28.0 29.0 30.0*   BUN mg/dL 35*  --  39* 41* 41* 39* 34* 35*   CREATININE mg/dL 1.74*  --  1.69* 1.66* 1.77* 1.79* 1.66* 1.93*   GLUCOSE mg/dL 142*  --  97 101* 92 97 104* 97   MAGNESIUM mg/dL 1.8 1.9 2.0 2.1 1.8  --   --   --    PHOSPHORUS mg/dL 3.1  --  3.5 3.6  --   --   --   --        CMP   Results from last 7 days   Lab Units 12/06/19  0256 12/05/19  0514 12/04/19  0257 12/03/19  1418 12/03/19  0309 12/02/19  0255 12/01/19 0412 11/30/19  0354   SODIUM mmol/L 135* 135* 136  --  136 135* 137 138   POTASSIUM mmol/L 4.8 4.7 4.6  --  4.9 4.9 4.7 4.7   CHLORIDE mmol/L 98 95* 97*  --  95* 95* 96* 95*   CO2 mmol/L 27.0 29.0 27.0  --  28.0 28.0 29.0 30.0*   BUN mg/dL 35* 39* 41*  --  41* 39* 34* 35*   CREATININE mg/dL 1.74* 1.69* 1.66*  --  1.77* 1.79* 1.66* 1.93*   GLUCOSE mg/dL 142* 97 101*  --  92 97 104* 97   ALBUMIN g/dL 3.70  --  3.80 3.5  --   --  3.70 3.80   BILIRUBIN mg/dL 0.5  --  0.7  --   --   --  0.6 0.7   ALK PHOS U/L 66  --  68  --   --   --  68 70   AST (SGOT) U/L 18  --  16  --   --   --  17 15   ALT (SGPT) U/L 18  --  16  --   --   --  14 12         BNP        TROPONIN  Results from last 7 days   Lab Units 12/03/19  1418   CK TOTAL U/L 59       CoAg  Results from last 7 days   Lab Units 12/06/19  0256 12/05/19  1442   INR  1.06 1.09   APTT seconds  --  25.5       Creatinine Clearance  Estimated Creatinine Clearance: 32 mL/min (A) (by C-G formula based on SCr of 1.74 mg/dL (H)).    ABG        Radiology  No  radiology results for the last day            EKG      I personally viewed and interpreted the patient's EKG/Telemetry data:    ECHOCARDIOGRAM:    Results for orders placed during the hospital encounter of 11/25/19   Adult Transthoracic Echo Complete W/ Cont if Necessary Per Protocol    Narrative · There is a moderate (1-2cm) circumferential pericardial effusion.  · The left ventricular cavity is mildly dilated.  · Left ventricular wall thickness is consistent with mild concentric   hypertrophy.  · Estimated EF = 25%.  · Mild pulmonic valve regurgitation is present.  · Mild tricuspid valve regurgitation is present.  · Right ventricular cavity is moderately dilated.  · Mildly reduced right ventricular systolic function noted.  · Left atrial cavity size is severely dilated.  · Mild mitral valve regurgitation is present  · Left ventricular systolic function is severely decreased.              STRESS MYOVIEW:    Cardiolite (Tc-99m Sestamibi) stress test    CARDIAC CATHETERIZATION:            OTHER:         Assessment/Plan     Principal Problem:    Acute exacerbation of CHF (congestive heart failure) (CMS/HCC)  Active Problems:    Carotid artery disease (CMS/HCC)    Type 2 diabetes mellitus without complication, without long-term current use of insulin (CMS/HCC)    PAC (premature atrial contraction)    Anemia, deficiency    Vitamin D deficiency    Gastritis and gastroduodenitis    Atherosclerosis of native coronary artery of native heart without angina pectoris    Chronic kidney disease, stage III (moderate) (CMS/HCC)    Hyperlipidemia    Hypertension, benign    Depression    Shortness of breath    BILL (obstructive sleep apnea)    Acute respiratory failure with hypoxia (CMS/HCC)          ]]]]]]]]]]]]]]]]]]]]]]]  Impression  ========  Acute systolic congestive heart failure  Significant left ventricle dysfunction with ejection fraction of 25%.  Minimal elevation of troponin.    Cardiac catheterization 12/6/2019  revealed  No evidence for pulmonary hypertension is present.  Left ventricle angiogram was not performed due to pre-existing renal dysfunction.  Left main coronary artery has ostial 30% disease.  Left anterior descending artery is normal.  Circumflex coronary artery is a codominant vessel and is normal.  Right coronary artery is a codominant vessel that has 30% luminal irregularities in the midsegment.    Acute respiratory failure with hypoxia     Status post stent 2003 at [facility].  Details not available.     Hypertension diabetes renal dysfunction.  BUN 14 creatinine 1.5.  Dyslipidemia     Anemia     Premature atrial contractions     Severe hypomagnesemia.  Serum magnesium 1.2.     Allergic to penicillin.     Plan  Patient has significant acute systolic congestive heart failure.  Should better  Patient has severe left ventricle dysfunction.  Continue diuretics.  BUN 41 creatinine 1.66.  Today BUN 37 and creatinine 1.74  Patient is on oral diuretics.  Patient is off intravenous dobutamine since yesterday.  Severe hypomagnesemia- improved.  Renal dysfunction-appreciated renal consultation.  Patient had cardiac catheterization 12/5/2019 that revealed no significant obstructive disease but has significant left ventricle dysfunction and pulmonary hypertension.  Will observe renal function closely.  Okay with discharge plans tomorrow.  Further plan will depend on patient's progress.       Dayday Ruiz MD  12/06/19  6:43 AM

## 2019-12-06 NOTE — DISCHARGE SUMMARY
Date of Admission: 11/25/2019    Date of Discharge:  12/6/2019    Length of stay:  LOS: 11 days     Presenting Problem:   Shortness of breath [R06.02]  Acute congestive heart failure, unspecified heart failure type (CMS/[facility]) [I50.9]  Acute exacerbation of CHF (congestive heart failure) (CMS/[facility]) [I50.9]    Principal and Active Diagnosis During Hospital Stay:    Acute exacerbation of HFrEF (congestive heart failure) (CMS/[facility])  -improved   -daily low dose lasix  -[facility] 12/5/2019 that revealed no significant obstructive disease but has significant left ventricle dysfunction and pulmonary hypertension.  -ACE on hold given renal issues      Carotid artery disease   -Home meds     Type 2 diabetes mellitus without complication, without long-term current use of insulin   -metformin at d/c     Gastritis and gastroduodenitis  -chronic on protonix      Atherosclerosis of native coronary artery of native heart without angina pectoris     SANDY on Chronic kidney disease, stage III   -Cr at 1.7   -monitor closely  -nephrology follow up      Hyperlipidemia  Hypertension, benign  Depression  -chronic c/w home meds      Shortness of breath  Acute respiratory failure with hypoxia (CMS/[facility])  -improving on RA at time of d/c     Hospital Course  Patient is a 84 y.o. male presented with shortness of breath and was found to have acute exacerbation of heart failure with reduced ejection fraction.  He was maintained on dobutamine drip for several days.  Eventually he was weaned off the dobutamine.  Cardiology did left heart catheterization that revealed no significant obstructive disease but still with cardiomyopathy.  He was diuresed with the help of nephrology.  He will need to follow-up with both nephrology and cardiology as an outpatient and and sent home in stable condition.      Procedures Performed:as noted     Procedure(s):  Left Heart Cath and coronary angiogram  Right and Left Heart Cath  -------------------       Consults:    Consults     Date and Time Order Name Status Description    12/3/2019 1027 Inpatient Nephrology Consult Completed     11/25/2019 1606 Inpatient Cardiology Consult      11/25/2019 1403 Inpatient Cardiology Consult      11/25/2019 1351 Hospitalist (on-call MD unless specified) Completed           Pertinent Test Results:     Lab Results (last 72 hours)     Procedure Component Value Units Date/Time    POC Glucose Once [[id number]]  (Abnormal) Collected:  12/06/19 1145    Specimen:  Blood Updated:  12/06/19 1200     Glucose 119 mg/dL      Comment: Serial Number: [id number]:  [id number]       POC Glucose Once [[id number]]  (Normal) Collected:  12/06/19 0716    Specimen:  Blood Updated:  12/06/19 0719     Glucose 93 mg/dL      Comment: Serial Number: [id number]:  [id number]       Comprehensive Metabolic Panel [[id number]]  (Abnormal) Collected:  12/06/19 0256    Specimen:  Blood Updated:  12/06/19 0450     Glucose 142 mg/dL      BUN 35 mg/dL      Creatinine 1.74 mg/dL      Sodium 135 mmol/L      Potassium 4.8 mmol/L      Chloride 98 mmol/L      CO2 27.0 mmol/L      Calcium 9.2 mg/dL      Total Protein 6.4 g/dL      Albumin 3.70 g/dL      ALT (SGPT) 18 U/L      AST (SGOT) 18 U/L      Alkaline Phosphatase 66 U/L      Total Bilirubin 0.5 mg/dL      eGFR Non African Amer 38 mL/min/1.73      Globulin 2.7 gm/dL      A/G Ratio 1.4 g/dL      BUN/Creatinine Ratio 20.1     Anion Gap 10.0 mmol/L     Narrative:       GFR Normal >60  Chronic Kidney Disease <60  Kidney Failure <15    Magnesium [[id number]]  (Normal) Collected:  12/06/19 0256    Specimen:  Blood Updated:  12/06/19 0450     Magnesium 1.8 mg/dL     Phosphorus [[id number]]  (Normal) Collected:  12/06/19 0256    Specimen:  Blood Updated:  12/06/19 0450     Phosphorus 3.1 mg/dL     Protime-INR [[id number]]  (Normal) Collected:  12/06/19 0256    Specimen:  Blood Updated:  12/06/19 0439     Protime 11.0 Seconds      INR 1.06    Calcium, Ionized [[id number]]  (Normal)  Collected:  12/06/19 0256    Specimen:  Blood Updated:  12/06/19 0436     Ionized Calcium 1.26 mmol/L     CBC & Differential [[id number]] Collected:  12/06/19 0256    Specimen:  Blood Updated:  12/06/19 0424    Narrative:       The following orders were created for panel order CBC & Differential.  Procedure                               Abnormality         Status                     ---------                               -----------         ------                     CBC Auto Differential[[id number]]        Abnormal            Final result                 Please view results for these tests on the individual orders.    CBC Auto Differential [[id number]]  (Abnormal) Collected:  12/06/19 0256    Specimen:  Blood Updated:  12/06/19 0424     WBC 6.30 10*3/mm3      RBC 3.89 10*6/mm3      Hemoglobin 11.1 g/dL      Hematocrit 33.7 %      MCV 86.7 fL      MCH 28.7 pg      MCHC 33.1 g/dL      RDW 14.6 %      RDW-SD 45.1 fl      MPV 10.6 fL      Platelets 174 10*3/mm3      Neutrophil % 69.8 %      Lymphocyte % 15.0 %      Monocyte % 8.9 %      Eosinophil % 4.5 %      Basophil % 1.8 %      Neutrophils, Absolute 4.40 10*3/mm3      Lymphocytes, Absolute 0.90 10*3/mm3      Monocytes, Absolute 0.60 10*3/mm3      Eosinophils, Absolute 0.30 10*3/mm3      Basophils, Absolute 0.10 10*3/mm3      nRBC 0.1 /100 WBC     POC Glucose Once [[id number]]  (Normal) Collected:  12/05/19 2124    Specimen:  Blood Updated:  12/05/19 2230     Glucose 104 mg/dL      Comment: Serial Number: [id number]:  [id number]       POC Glucose Once [[id number]]  (Normal) Collected:  12/05/19 1642    Specimen:  Blood Updated:  12/05/19 1644     Glucose 92 mg/dL      Comment: Serial Number: [id number]:  [id number]       Protime-INR [[id number]]  (Normal) Collected:  12/05/19 1442    Specimen:  Blood Updated:  12/05/19 1537     Protime 11.3 Seconds      INR 1.09    aPTT [[id number]]  (Normal) Collected:  12/05/19 1442    Specimen:  Blood Updated:  12/05/19  1537     PTT 25.5 seconds     Magnesium [[id number]]  (Normal) Collected:  12/05/19 1337    Specimen:  Blood Updated:  12/05/19 1403     Magnesium 1.9 mg/dL     POC Glucose Once [[id number]]  (Abnormal) Collected:  12/05/19 1147    Specimen:  Blood Updated:  12/05/19 1202     Glucose 169 mg/dL      Comment: Serial Number: [id number]:  [id number]       POC Glucose Once [[id number]]  (Normal) Collected:  12/05/19 0728    Specimen:  Blood Updated:  12/05/19 0733     Glucose 95 mg/dL      Comment: Serial Number: [id number]:  [id number]       Basic Metabolic Panel [[id number]]  (Abnormal) Collected:  12/05/19 0514    Specimen:  Blood Updated:  12/05/19 0608     Glucose 97 mg/dL      BUN 39 mg/dL      Creatinine 1.69 mg/dL      Sodium 135 mmol/L      Potassium 4.7 mmol/L      Chloride 95 mmol/L      CO2 29.0 mmol/L      Calcium 10.0 mg/dL      eGFR Non African Amer 39 mL/min/1.73      BUN/Creatinine Ratio 23.1     Anion Gap 11.0 mmol/L     Narrative:       GFR Normal >60  Chronic Kidney Disease <60  Kidney Failure <15    Magnesium [[id number]]  (Normal) Collected:  12/05/19 0514    Specimen:  Blood Updated:  12/05/19 0608     Magnesium 2.0 mg/dL     Phosphorus [[id number]]  (Normal) Collected:  12/05/19 0514    Specimen:  Blood Updated:  12/05/19 0608     Phosphorus 3.5 mg/dL     CBC (No Diff) [[id number]]  (Abnormal) Collected:  12/05/19 0514    Specimen:  Blood Updated:  12/05/19 0540     WBC 7.00 10*3/mm3      RBC 4.18 10*6/mm3      Hemoglobin 11.8 g/dL      Hematocrit 36.0 %      MCV 86.2 fL      MCH 28.3 pg      MCHC 32.8 g/dL      RDW 14.5 %      RDW-SD 43.8 fl      MPV 10.4 fL      Platelets 175 10*3/mm3     POC Glucose Once [[id number]]  (Abnormal) Collected:  12/04/19 2020    Specimen:  Blood Updated:  12/04/19 2026     Glucose 139 mg/dL      Comment: Serial Number: [id number]:  [id number]       POC Glucose Once [[id number]]  (Abnormal) Collected:  12/04/19 1644    Specimen:  Blood Updated:  12/04/19  1651     Glucose 121 mg/dL      Comment: Serial Number: [id number]:  [id number]       Protein Elec + Interp, Serum [[id number]] Collected:  12/03/19 1418    Specimen:  Blood Updated:  12/04/19 1409     Total Protein 6.6 g/dL      Albumin 3.5 g/dL      Alpha-1-Globulin 0.3 g/dL      Alpha-2-Globulin 0.9 g/dL      Beta Globulin 0.9 g/dL      Gamma Globulin 0.9 g/dL      M-Isael Not Observed g/dL      Globulin 3.1 g/dL      A/G Ratio 1.1     Please note Comment     Comment: Protein electrophoresis scan will follow via computer, mail, or   delivery.        SPE Interpretation Comment     Comment: The SPE pattern appears essentially unremarkable. Evidence of  monoclonal protein is not apparent.       Narrative:       Performed at:  [address] [place] [address]  [id number]  : Hilario Sheppard PhD, Phone:  [phone]    POC Glucose Once [[id number]]  (Abnormal) Collected:  12/04/19 1152    Specimen:  Blood Updated:  12/04/19 1207     Glucose 136 mg/dL      Comment: Serial Number: [id number]:  [id number]       Iron [[id number]]  (Normal) Collected:  12/04/19 0257    Specimen:  Blood Updated:  12/04/19 0428     Iron 82 mcg/dL     Comprehensive Metabolic Panel [[id number]]  (Abnormal) Collected:  12/04/19 0257    Specimen:  Blood Updated:  12/04/19 0350     Glucose 101 mg/dL      BUN 41 mg/dL      Creatinine 1.66 mg/dL      Sodium 136 mmol/L      Potassium 4.6 mmol/L      Chloride 97 mmol/L      CO2 27.0 mmol/L      Calcium 9.7 mg/dL      Total Protein 7.0 g/dL      Albumin 3.80 g/dL      ALT (SGPT) 16 U/L      AST (SGOT) 16 U/L      Alkaline Phosphatase 68 U/L      Total Bilirubin 0.7 mg/dL      eGFR Non African Amer 40 mL/min/1.73      Globulin 3.2 gm/dL      A/G Ratio 1.2 g/dL      BUN/Creatinine Ratio 24.7     Anion Gap 12.0 mmol/L     Narrative:       GFR Normal >60  Chronic Kidney Disease <60  Kidney Failure <15    Phosphorus [[id number]]  (Normal) Collected:  12/04/19 0257     Specimen:  Blood Updated:  12/04/19 0350     Phosphorus 3.6 mg/dL     Magnesium [[id number]]  (Normal) Collected:  12/04/19 0257    Specimen:  Blood Updated:  12/04/19 0350     Magnesium 2.1 mg/dL     Calcium, Ionized [[id number]]  (Normal) Collected:  12/04/19 0257    Specimen:  Blood Updated:  12/04/19 0338     Ionized Calcium 1.25 mmol/L     CBC (No Diff) [[id number]]  (Abnormal) Collected:  12/04/19 0257    Specimen:  Blood Updated:  12/04/19 0334     WBC 7.00 10*3/mm3      RBC 4.07 10*6/mm3      Hemoglobin 11.4 g/dL      Hematocrit 34.7 %      MCV 85.4 fL      MCH 28.1 pg      MCHC 32.9 g/dL      RDW 14.9 %      RDW-SD 44.6 fl      MPV 9.9 fL      Platelets 180 10*3/mm3     Eosinophil Smear - Urine, Urine, Clean Catch [[id number]]  (Normal) Collected:  12/03/19 2038    Specimen:  Urine, Clean Catch Updated:  12/03/19 2142     Eosinophil Smear 0 % EOS/100 Cells     Sodium, Urine, Random - Urine, Clean Catch [[id number]] Collected:  12/03/19 2038    Specimen:  Urine, Clean Catch Updated:  12/03/19 2132     Sodium, Urine 45 mmol/L     Narrative:       Reference intervals for random urine have not been established.  Clinical usage is dependent upon physician's interpretation in combination with other laboratory tests.     Urinalysis With Culture If Indicated - Urine, Clean Catch [[id number]]  (Normal) Collected:  12/03/19 2037    Specimen:  Urine, Clean Catch Updated:  12/03/19 2053     Color, UA Yellow     Appearance, UA Clear     pH, UA 6.0     Specific Gravity, UA <=1.005     Glucose, UA Negative     Ketones, UA Negative     Bilirubin, UA Negative     Blood, UA Negative     Protein, UA Negative     Leuk Esterase, UA Negative     Nitrite, UA Negative     Urobilinogen, UA 0.2 E.U./dL    Narrative:       Urine microscopic not indicated.    POC Glucose Once [[id number]]  (Abnormal) Collected:  12/03/19 1947    Specimen:  Blood Updated:  12/03/19 1948     Glucose 148 mg/dL      Comment: Serial Number: [id number]:   [id number]                  Microbiology Results (last 10 days)     Procedure Component Value - Date/Time    Eosinophil Smear - Urine, Urine, Clean Catch [[id number]]  (Normal) Collected:  12/03/19 2038    Lab Status:  Final result Specimen:  Urine, Clean Catch Updated:  12/03/19 2142     Eosinophil Smear 0 % EOS/100 Cells             Results for orders placed during the hospital encounter of 11/25/19   Adult Transthoracic Echo Complete W/ Cont if Necessary Per Protocol    Narrative · There is a moderate (1-2cm) circumferential pericardial effusion.  · The left ventricular cavity is mildly dilated.  · Left ventricular wall thickness is consistent with mild concentric   hypertrophy.  · Estimated EF = 25%.  · Mild pulmonic valve regurgitation is present.  · Mild tricuspid valve regurgitation is present.  · Right ventricular cavity is moderately dilated.  · Mildly reduced right ventricular systolic function noted.  · Left atrial cavity size is severely dilated.  · Mild mitral valve regurgitation is present  · Left ventricular systolic function is severely decreased.          Imaging Results (All)     Procedure Component Value Units Date/Time    US Renal Bilateral [[id number]] Collected:  12/04/19 0322     Updated:  12/04/19 0525    Narrative:       EXAMINATION: US RENAL BILATERAL    DATE: 12/3/2019 8:29 PM     INDICATION: Headache a.m. on CT the ;     COMPARISON: None.     FINDINGS:     Right Kidney: The right kidney measures 9.8 cm. The cortical thickness is within normal limits.  The renal cortical echogenicity is normal. There is no hydronephrosis or mass.     Left Kidney: The left kidney measures 9.9 cm. The cortical thickness is within normal limits. The renal cortical echogenicity is normal. There is no hydronephrosis or mass.     Bladder: No wall thickening.  Bilateral ureteral jets documented.    The prevoid bladder volume is 22 ml.    Incidentally noted markedly enlarged prostate gland measuring 6.4 x 4.3 x 4.9  cm        Impression:          1.  No hydronephrosis.  2.  Marked prostatomegaly.       Electronically signed by:  Hue Villalobos M.D.    12/4/2019 3:24 AM    XR Chest 1 View [[id number]] Collected:  11/25/19 1003     Updated:  11/25/19 1006    Narrative:          DATE OF EXAM:   11/25/2019 9:48 AM     PROCEDURE:   XR CHEST 1 VW-     INDICATIONS:   SOA; R06.02-Shortness of breath     COMPARISON:  03/15/2017     TECHNIQUE:   [Portable chest radiograph]     FINDINGS:    Heart is enlarged, exaggerated by portable technique. There are  bilateral interstitial and alveolar opacities which may relate to  pulmonary edema or less likely multifocal pneumonia. Small bilateral  pleural effusions. No pneumothorax. Aortic arch atherosclerotic  calcifications.       Impression:       1. Cardiomegaly with bilateral interstitial and alveolar opacities  suggesting pulmonary edema, less likely atypical infection.  2. Small bilateral pleural effusions.     Electronically Signed By-Dane Bain On:11/25/2019 10:04 AM  This report was finalized on [id number] by  Dane Bain, .            Condition on Discharge:  Stable     Vital Signs  Temp:  [97.4 °F (36.3 °C)-98.1 °F (36.7 °C)] 97.4 °F (36.3 °C)  Heart Rate:  [62-73] 68  Resp:  [12-22] 22  BP: ()/(44-60) 116/60    Physical Exam:  Physical Exam   Constitutional: He is oriented to person, place, and time. No distress.   HENT:   Head: Normocephalic.   Eyes: Pupils are equal, round, and reactive to light.   Cardiovascular: Normal rate and regular rhythm.   Pulmonary/Chest: Effort normal. No respiratory distress.   Abdominal: Soft.   Neurological: He is alert and oriented to person, place, and time.   Skin: Skin is warm.        Discharge Disposition  Home or Self Care    Discharge Medications     Discharge Medications      New Medications      Instructions Start Date   finasteride 5 MG tablet  Commonly known as:  PROSCAR   5 mg, Oral, Daily   Start Date:  12/7/2019     furosemide  20 MG tablet  Commonly known as:  LASIX   20 mg, Oral, 2 Times Daily         Continue These Medications      Instructions Start Date   aspirin 81 MG tablet   81 mg, Oral, Daily      atorvastatin 80 MG tablet  Commonly known as:  LIPITOR   80 mg, Oral, Nightly      metFORMIN 500 MG tablet  Commonly known as:  GLUCOPHAGE   500 mg, Oral, Nightly      metoprolol succinate XL 25 MG 24 hr tablet  Commonly known as:  TOPROL-XL   25 mg, Oral, Daily      omeprazole 40 MG capsule  Commonly known as:  priLOSEC   40 mg, Oral, Daily      sertraline 25 MG tablet  Commonly known as:  ZOLOFT   25 mg, Oral, Daily      tamsulosin 0.4 MG capsule 24 hr capsule  Commonly known as:  FLOMAX   1 capsule, Oral, Daily         Stop These Medications    lisinopril 20 MG tablet  Commonly known as:  PRINIVILZESTRIL            Discharge Diet:   Diet Instructions     Diet: Cardiac      Discharge Diet:  Cardiac          Activity at Discharge:   Activity Instructions     Activity as Tolerated            Follow-up Appointments  No future appointments.  Additional Instructions for the Follow-ups that You Need to Schedule     Discharge Follow-up with Specified Provider: cardiology; 2 Weeks   As directed      To:  cardiology    Follow Up:  2 Weeks         Discharge Follow-up with Specified Provider: nephrology; 1 Week   As directed      To:  nephrology    Follow Up:  1 Week         Basic Metabolic Panel    Dec 13, 2019 (Approximate)            Test Results Pending at Discharge       Risk for Readmission (LACE) Score: 14 (12/6/2019  6:00 AM)          Time: Discharge 38 min with face-to-face history exam, writing of prescriptions, and documenting discharge data including care coordination.      Henry Mnuson DO  12/06/19  5:04 PM

## 2019-12-06 NOTE — PROGRESS NOTES
"NEPHROLOGY PROGRESS NOTE------[facility]    [facility]  [phone]  Uriah Lund MD      Patient Care Team:  Xander Robison MD as PCP - General  Xander Robison MD as PCP - Claims Attributed  Delio Lund MD as Consulting Physician (Nephrology)      Provider:  Uriah Lund MD  Patient Name: Mikael Shanks  :  1935    SUBJECTIVE:     Still on dobutamine.  No angina. No dysuria. S/p heart cath yesterday with medication management.     Medication:    Acetylcysteine 1,200 mg Oral BID   allopurinol 100 mg Oral Daily   aspirin 81 mg Oral Daily   atorvastatin 80 mg Oral Nightly   finasteride 5 mg Oral Daily   furosemide 20 mg Oral BID   insulin lispro 0-7 Units Subcutaneous 4x Daily With Meals & Nightly   metoprolol succinate XL 25 mg Oral Daily   pantoprazole 40 mg Oral QAM   sertraline 25 mg Oral Daily   sodium chloride 10 mL Intravenous Q12H   tamsulosin 0.4 mg Oral BID   technetium sestamibi 1 dose Intravenous Once in imaging       sodium chloride 50 mL/hr Last Rate: Stopped (19)       OBJECTIVE    Vital Sign Min/Max for last 24 hours  Temp  Min: 97.5 °F (36.4 °C)  Max: 98.4 °F (36.9 °C)   BP  Min: 98/44  Max: 135/54   Pulse  Min: 62  Max: 81   Resp  Min: 12  Max: 20   SpO2  Min: 94 %  Max: 98 %   No Data Recorded   Weight  Min: 72.1 kg (158 lb 15.2 oz)  Max: 72.1 kg (158 lb 15.2 oz)     Flowsheet Rows      First Filed Value   Admission Height  177.8 cm (70\") Documented at 2019 0838   Admission Weight  93.8 kg (206 lb 12.7 oz) Documented at 2019 0838          No intake/output data recorded.  I/O last 3 completed shifts:  In: 2540 [P.O.:1640; I.V.:900]  Out:  [Urine:]    Physical Exam:  General Appearance: alert, appears stated age and cooperative  Head: normocephalic, without obvious abnormality and atraumatic  Eyes: conjunctivae and sclerae normal and no icterus  Neck: supple +MINIMAL JVD  Lungs: +FEW " SCATTERED RHONCHI  Heart: regular rhythm & normal rate and normal S1, S2 +MILO  Chest: Wall no abnormalities observed  Abdomen: normal bowel sounds and soft non-tender  Extremities: moves extremities well, no edema, no cyanosis and no redness  Skin: no bleeding, bruising or rash, turgor normal, color normal and no lesions noted  Neurologic: Alert, and oriented. No focal deficits    Labs:    WBC WBC   Date Value Ref Range Status   12/06/2019 6.30 3.40 - 10.80 10*3/mm3 Final   12/05/2019 7.00 3.40 - 10.80 10*3/mm3 Final   12/04/2019 7.00 3.40 - 10.80 10*3/mm3 Final      HGB Hemoglobin   Date Value Ref Range Status   12/06/2019 11.1 (L) 13.0 - 17.7 g/dL Final   12/05/2019 11.8 (L) 13.0 - 17.7 g/dL Final   12/04/2019 11.4 (L) 13.0 - 17.7 g/dL Final      HCT Hematocrit   Date Value Ref Range Status   12/06/2019 33.7 (L) 37.5 - 51.0 % Final   12/05/2019 36.0 (L) 37.5 - 51.0 % Final   12/04/2019 34.7 (L) 37.5 - 51.0 % Final      Platlets No results found for: LABPLAT   MCV MCV   Date Value Ref Range Status   12/06/2019 86.7 79.0 - 97.0 fL Final   12/05/2019 86.2 79.0 - 97.0 fL Final   12/04/2019 85.4 79.0 - 97.0 fL Final          Sodium Sodium   Date Value Ref Range Status   12/06/2019 135 (L) 136 - 145 mmol/L Final   12/05/2019 135 (L) 136 - 145 mmol/L Final   12/04/2019 136 136 - 145 mmol/L Final      Potassium Potassium   Date Value Ref Range Status   12/06/2019 4.8 3.5 - 5.2 mmol/L Final   12/05/2019 4.7 3.5 - 5.2 mmol/L Final   12/04/2019 4.6 3.5 - 5.2 mmol/L Final      Chloride Chloride   Date Value Ref Range Status   12/06/2019 98 98 - 107 mmol/L Final   12/05/2019 95 (L) 98 - 107 mmol/L Final   12/04/2019 97 (L) 98 - 107 mmol/L Final      CO2 CO2   Date Value Ref Range Status   12/06/2019 27.0 22.0 - 29.0 mmol/L Final   12/05/2019 29.0 22.0 - 29.0 mmol/L Final   12/04/2019 27.0 22.0 - 29.0 mmol/L Final      BUN BUN   Date Value Ref Range Status   12/06/2019 35 (H) 8 - 23 mg/dL Final   12/05/2019 39 (H) 8 - 23 mg/dL  Final   12/04/2019 41 (H) 8 - 23 mg/dL Final      Creatinine Creatinine   Date Value Ref Range Status   12/06/2019 1.74 (H) 0.76 - 1.27 mg/dL Final   12/05/2019 1.69 (H) 0.76 - 1.27 mg/dL Final   12/04/2019 1.66 (H) 0.76 - 1.27 mg/dL Final      Calcium Calcium   Date Value Ref Range Status   12/06/2019 9.2 8.6 - 10.5 mg/dL Final   12/05/2019 10.0 8.6 - 10.5 mg/dL Final   12/04/2019 9.7 8.6 - 10.5 mg/dL Final      PO4 No components found for: PO4   Albumin Albumin   Date Value Ref Range Status   12/06/2019 3.70 3.50 - 5.20 g/dL Final   12/04/2019 3.80 3.50 - 5.20 g/dL Final   12/03/2019 3.5 2.9 - 4.4 g/dL Final      Magnesium Magnesium   Date Value Ref Range Status   12/06/2019 1.8 1.6 - 2.4 mg/dL Final   12/05/2019 1.9 1.6 - 2.4 mg/dL Final   12/05/2019 2.0 1.6 - 2.4 mg/dL Final   12/04/2019 2.1 1.6 - 2.4 mg/dL Final      Uric Acid No components found for: URIC ACID     Imaging Results (Last 72 Hours)     Procedure Component Value Units Date/Time    US Renal Bilateral [[id number]] Collected:  12/04/19 0322     Updated:  12/04/19 0525    Narrative:       EXAMINATION: US RENAL BILATERAL    DATE: 12/3/2019 8:29 PM     INDICATION: Headache a.m. on CT the ;     COMPARISON: None.     FINDINGS:     Right Kidney: The right kidney measures 9.8 cm. The cortical thickness is within normal limits.  The renal cortical echogenicity is normal. There is no hydronephrosis or mass.     Left Kidney: The left kidney measures 9.9 cm. The cortical thickness is within normal limits. The renal cortical echogenicity is normal. There is no hydronephrosis or mass.     Bladder: No wall thickening.  Bilateral ureteral jets documented.    The prevoid bladder volume is 22 ml.    Incidentally noted markedly enlarged prostate gland measuring 6.4 x 4.3 x 4.9 cm        Impression:          1.  No hydronephrosis.  2.  Marked prostatomegaly.       Electronically signed by:  Hue Villalobos M.D.    12/4/2019 3:24 AM          Results for orders placed  during the hospital encounter of 11/25/19   XR Chest 1 View    Narrative    DATE OF EXAM:   11/25/2019 9:48 AM     PROCEDURE:   XR CHEST 1 VW-     INDICATIONS:   SOA; R06.02-Shortness of breath     COMPARISON:  03/15/2017     TECHNIQUE:   [Portable chest radiograph]     FINDINGS:    Heart is enlarged, exaggerated by portable technique. There are  bilateral interstitial and alveolar opacities which may relate to  pulmonary edema or less likely multifocal pneumonia. Small bilateral  pleural effusions. No pneumothorax. Aortic arch atherosclerotic  calcifications.       Impression 1. Cardiomegaly with bilateral interstitial and alveolar opacities  suggesting pulmonary edema, less likely atypical infection.  2. Small bilateral pleural effusions.     Electronically Signed By-Dane Bain On:11/25/2019 10:04 AM  This report was finalized on [id number] by  Dane Bain, .              ASSESSMENT / PLAN      Acute exacerbation of CHF (congestive heart failure) (CMS/HCC)    Carotid artery disease (CMS/HCC)    Type 2 diabetes mellitus without complication, without long-term current use of insulin (CMS/HCC)    PAC (premature atrial contraction)    Anemia, deficiency    Vitamin D deficiency    Gastritis and gastroduodenitis    Atherosclerosis of native coronary artery of native heart without angina pectoris    Chronic kidney disease, stage III (moderate) (CMS/HCC)    Hyperlipidemia    Hypertension, benign    Depression    Shortness of breath    BILL (obstructive sleep apnea)    Acute respiratory failure with hypoxia (CMS/HCC)    Stenosis of coronary artery stent    Mixed hyperlipidemia      1. ARF/SANDY/CRF/CKD STG 3--------Nonoliguric. BUN/Cr up very little post cath yesterday, but overall stable. In the long run we have to accept a higher baseline serum creatinine in order to keep euvolemic. +ARF/SANDY on top of what appears to be CRF/CKD STG 3, with a baseline serum creatinine of 1.5. Unknown if patient has baseline proteinuria  or hematuria. CRF/CKD STG 3 likely secondary to DGS vs. HTN NS. +ARF/SANDY appears to be secondary to prerenal/hemodynamic fluctuation from decompensated CHF/CP state (Cardiorenal) and need for increased diuretics since admission. Avoid hypotension. Hold ACE-I. Backed down diuretics cautiously. Renal US without obstructive uropathy, but marked prostatomegaly.       2. HTN WITH CKD STG 3-----BP low. Off Lisinopril. No ACE/ARB/DRI for now     3. DMII-------No Metformin given ARF/SANDY. Glucometers, SSI     4. ANEMIA OF CKD------Fe normal. SPEP pending     5. OA/DJD-------No NSAIDs. Slightly elevated uric acid level     6. VIATMIN D DEFICIENCY------On supplementation     7. HYPERLIPIDEMIA------CK, TSH okay     8. PUD PROPHYLAXIS-----Counseled on risks of chronic PPI use with regards to CKD progression     9. BPH-------Marked prostatomegaly seen on Renal US. Increase Flomax and add Proscar. Will need outpatient Urology eval     10. DEPRESSION------On SSRI     11. BILL     12. CAD/CHF-------Continue diuretics. Heart cath 12/5/2019 without intervention.     13. SECONDARY HYPERPARATHYROIDISM------Intact PTH below treatment threshold    14. HYPOXIC RESPIRATORY FAILURE--- Needs to be assessed for home O2 at time of D/C.     Okay from RENAL standpoint to d/c with close outpatient follow up    Uriah Lund MD  [facility]  [phone]  12/06/19  9:18 AM

## 2019-12-07 ENCOUNTER — READMISSION MANAGEMENT (OUTPATIENT)
Dept: CALL CENTER | Facility: HOSPITAL | Age: 84
End: 2019-12-07

## 2019-12-07 NOTE — NURSING NOTE
Discharge instructions read and understood; patient education reviewed, F/U highlighted and reviewed with patient. IV discontinued. Site dressing clean, dry and intact; no bruising noted to site.  W/C transport home.

## 2019-12-07 NOTE — OUTREACH NOTE
Prep Survey      Responses   Facility patient discharged from?  [facility]   Is patient eligible?  Yes   Discharge diagnosis  /acute exac of CHF   Does the patient have one of the following disease processes/diagnoses(primary or secondary)?  CHF   Does the patient have Home health ordered?  No   Is there a DME ordered?  No   Prep survey completed?  Yes          Jamila Metzger RN

## 2019-12-09 ENCOUNTER — TRANSITIONAL CARE MANAGEMENT TELEPHONE ENCOUNTER (OUTPATIENT)
Dept: FAMILY MEDICINE CLINIC | Facility: CLINIC | Age: 84
End: 2019-12-09

## 2019-12-09 ENCOUNTER — OFFICE VISIT (OUTPATIENT)
Dept: FAMILY MEDICINE CLINIC | Facility: CLINIC | Age: 84
End: 2019-12-09

## 2019-12-09 ENCOUNTER — READMISSION MANAGEMENT (OUTPATIENT)
Dept: CALL CENTER | Facility: HOSPITAL | Age: 84
End: 2019-12-09

## 2019-12-09 VITALS
WEIGHT: 162.4 LBS | OXYGEN SATURATION: 100 % | HEIGHT: 70 IN | DIASTOLIC BLOOD PRESSURE: 64 MMHG | BODY MASS INDEX: 23.25 KG/M2 | RESPIRATION RATE: 20 BRPM | SYSTOLIC BLOOD PRESSURE: 140 MMHG | HEART RATE: 65 BPM | TEMPERATURE: 97.7 F

## 2019-12-09 DIAGNOSIS — N40.0 PROSTATIC HYPERTROPHY: ICD-10-CM

## 2019-12-09 DIAGNOSIS — I10 HYPERTENSION, BENIGN: ICD-10-CM

## 2019-12-09 DIAGNOSIS — F32.A DEPRESSION, UNSPECIFIED DEPRESSION TYPE: ICD-10-CM

## 2019-12-09 DIAGNOSIS — E78.2 MIXED HYPERLIPIDEMIA: ICD-10-CM

## 2019-12-09 DIAGNOSIS — K29.90 GASTRITIS AND GASTRODUODENITIS: ICD-10-CM

## 2019-12-09 DIAGNOSIS — E11.9 TYPE 2 DIABETES MELLITUS WITHOUT COMPLICATION, WITHOUT LONG-TERM CURRENT USE OF INSULIN (HCC): ICD-10-CM

## 2019-12-09 DIAGNOSIS — I50.22 CHRONIC SYSTOLIC CONGESTIVE HEART FAILURE (HCC): Primary | ICD-10-CM

## 2019-12-09 DIAGNOSIS — J96.01 ACUTE RESPIRATORY FAILURE WITH HYPOXIA (HCC): ICD-10-CM

## 2019-12-09 DIAGNOSIS — N18.30 CHRONIC KIDNEY DISEASE, STAGE III (MODERATE) (HCC): ICD-10-CM

## 2019-12-09 DIAGNOSIS — K29.70 GASTRITIS AND GASTRODUODENITIS: ICD-10-CM

## 2019-12-09 PROCEDURE — 99214 OFFICE O/P EST MOD 30 MIN: CPT | Performed by: FAMILY MEDICINE

## 2019-12-09 RX ORDER — METOPROLOL SUCCINATE 25 MG/1
25 TABLET, EXTENDED RELEASE ORAL DAILY
Qty: 90 TABLET | Refills: 3 | Status: SHIPPED | OUTPATIENT
Start: 2019-12-09 | End: 2020-05-06 | Stop reason: SDUPTHER

## 2019-12-09 RX ORDER — OMEPRAZOLE 40 MG/1
40 CAPSULE, DELAYED RELEASE ORAL DAILY
Qty: 90 CAPSULE | Refills: 1 | Status: SHIPPED | OUTPATIENT
Start: 2019-12-09 | End: 2020-05-04

## 2019-12-09 RX ORDER — FUROSEMIDE 20 MG/1
20 TABLET ORAL 2 TIMES DAILY
Qty: 60 TABLET | Refills: 0 | Status: SHIPPED | OUTPATIENT
Start: 2019-12-09 | End: 2020-04-30

## 2019-12-09 RX ORDER — OMEPRAZOLE 40 MG/1
40 CAPSULE, DELAYED RELEASE ORAL DAILY
Qty: 30 CAPSULE | Refills: 5 | Status: SHIPPED | OUTPATIENT
Start: 2019-12-09 | End: 2019-12-09 | Stop reason: SDUPTHER

## 2019-12-09 RX ORDER — ATORVASTATIN CALCIUM 80 MG/1
80 TABLET, FILM COATED ORAL NIGHTLY
Qty: 90 TABLET | Refills: 1 | Status: SHIPPED | OUTPATIENT
Start: 2019-12-09 | End: 2020-05-04

## 2019-12-09 RX ORDER — FUROSEMIDE 20 MG/1
20 TABLET ORAL 2 TIMES DAILY
Qty: 120 TABLET | Refills: 3 | Status: SHIPPED | OUTPATIENT
Start: 2019-12-09 | End: 2019-12-09 | Stop reason: SDUPTHER

## 2019-12-09 RX ORDER — FINASTERIDE 5 MG/1
5 TABLET, FILM COATED ORAL DAILY
Qty: 30 TABLET | Refills: 1 | Status: SHIPPED | OUTPATIENT
Start: 2019-12-09 | End: 2019-12-09 | Stop reason: SDUPTHER

## 2019-12-09 RX ORDER — FINASTERIDE 5 MG/1
5 TABLET, FILM COATED ORAL DAILY
Qty: 90 TABLET | Refills: 3 | Status: SHIPPED | OUTPATIENT
Start: 2019-12-09 | End: 2019-12-09 | Stop reason: SDUPTHER

## 2019-12-09 RX ORDER — SERTRALINE HYDROCHLORIDE 25 MG/1
25 TABLET, FILM COATED ORAL DAILY
Qty: 30 TABLET | Refills: 5 | Status: SHIPPED | OUTPATIENT
Start: 2019-12-09 | End: 2019-12-09 | Stop reason: SDUPTHER

## 2019-12-09 RX ORDER — ATORVASTATIN CALCIUM 80 MG/1
80 TABLET, FILM COATED ORAL NIGHTLY
Qty: 30 TABLET | Refills: 5 | Status: SHIPPED | OUTPATIENT
Start: 2019-12-09 | End: 2019-12-09 | Stop reason: SDUPTHER

## 2019-12-09 RX ORDER — SERTRALINE HYDROCHLORIDE 25 MG/1
25 TABLET, FILM COATED ORAL DAILY
Qty: 90 TABLET | Refills: 3 | Status: SHIPPED | OUTPATIENT
Start: 2019-12-09 | End: 2020-05-06 | Stop reason: SDUPTHER

## 2019-12-09 RX ORDER — FINASTERIDE 5 MG/1
5 TABLET, FILM COATED ORAL DAILY
Qty: 30 TABLET | Refills: 0 | Status: SHIPPED | OUTPATIENT
Start: 2019-12-09 | End: 2020-05-06 | Stop reason: SDUPTHER

## 2019-12-09 RX ORDER — FUROSEMIDE 20 MG/1
20 TABLET ORAL 2 TIMES DAILY
Qty: 60 TABLET | Refills: 1 | Status: SHIPPED | OUTPATIENT
Start: 2019-12-09 | End: 2019-12-09 | Stop reason: SDUPTHER

## 2019-12-09 RX ORDER — TAMSULOSIN HYDROCHLORIDE 0.4 MG/1
1 CAPSULE ORAL DAILY
Qty: 30 CAPSULE | Refills: 5 | Status: SHIPPED | OUTPATIENT
Start: 2019-12-09 | End: 2019-12-09 | Stop reason: SDUPTHER

## 2019-12-09 RX ORDER — TAMSULOSIN HYDROCHLORIDE 0.4 MG/1
1 CAPSULE ORAL DAILY
Qty: 90 CAPSULE | Refills: 3 | Status: SHIPPED | OUTPATIENT
Start: 2019-12-09 | End: 2020-05-06 | Stop reason: SDUPTHER

## 2019-12-09 RX ORDER — METOPROLOL SUCCINATE 25 MG/1
25 TABLET, EXTENDED RELEASE ORAL DAILY
Qty: 30 TABLET | Refills: 5 | Status: SHIPPED | OUTPATIENT
Start: 2019-12-09 | End: 2019-12-09 | Stop reason: SDUPTHER

## 2019-12-09 NOTE — PROGRESS NOTES
Subjective   Mikael Shanks is a 84 y.o. male.     Transition to care from [facility] admit: 11-25-19 discharge 12-6-19 for CHF .    Congestive Heart Failure   Presents for initial visit. Associated symptoms include edema, fatigue and shortness of breath. Pertinent negatives include no chest pain, chest pressure or palpitations. The symptoms have been improving. Compliance with diet is %. Compliance with exercise is %. Compliance with medications is %.        The following portions of the patient's history were reviewed and updated as appropriate: allergies, current medications, past family history, past medical history, past social history, past surgical history and problem list.    Family History   Problem Relation Age of Onset   • Cancer Mother    • Heart disease Father    • Cancer Brother        Social History     Tobacco Use   • Smoking status: Former Smoker     Types: Cigarettes   • Smokeless tobacco: Never Used   Substance Use Topics   • Alcohol use: No     Frequency: Never   • Drug use: No       Past Surgical History:   Procedure Laterality Date   • BACK SURGERY     • CARDIAC CATHETERIZATION N/A 12/5/2019    Procedure: Left Heart Cath and coronary angiogram;  Surgeon: Dayday Ruiz MD;  Location: [facility] CATH INVASIVE LOCATION;  Service: Cardiovascular   • CARDIAC CATHETERIZATION N/A 12/5/2019    Procedure: Right and Left Heart Cath;  Surgeon: Dayday Ruiz MD;  Location: [facility] CATH INVASIVE LOCATION;  Service: Cardiovascular   • CHOLECYSTECTOMY         Patient Active Problem List   Diagnosis   • Carotid artery disease (CMS/HCC)   • Type 2 diabetes mellitus without complication, without long-term current use of insulin (CMS/HCC)   • PAC (premature atrial contraction)   • Anemia, deficiency   • Vitamin D deficiency   • Gastritis and gastroduodenitis   • Atherosclerosis of native coronary artery of native heart without angina pectoris   • Chronic kidney disease, stage III (moderate)  "(CMS/[facility])   • Hyperlipidemia   • Hypertension, benign   • Spinal stenosis of lumbar region   • Other folate deficiency anemias   • Asymptomatic peripheral vascular disease (CMS/[facility])   • Hyperkalemia   • Hyponatremia   • Osteoarthritis   • Prostatic hypertrophy   • Pernicious anemia   • Hypomagnesemia   • Depression   • Dupuytren's contracture of both hands   • Sinus pause   • Pulmonary valve disorder   • Hearing loss   • Family history of colon cancer   • Annual physical exam   • Left arm pain   • Snoring   • Shortness of breath   • BILL (obstructive sleep apnea)   • Acute exacerbation of CHF (congestive heart failure) (CMS/[facility])   • Acute respiratory failure with hypoxia (CMS/[facility])   • Stenosis of coronary artery stent   • Mixed hyperlipidemia   • Chronic systolic congestive heart failure (CMS/[facility])       Current Outpatient Medications on File Prior to Visit   Medication Sig Dispense Refill   • aspirin 81 MG tablet Take 81 mg by mouth Daily.       No current facility-administered medications on file prior to visit.        Allergies   Allergen Reactions   • Penicillins Hives       Review of Systems   Constitutional: Positive for fatigue. Negative for appetite change.   HENT: Negative for ear pain and postnasal drip.    Respiratory: Positive for shortness of breath. Negative for cough.    Cardiovascular: Positive for leg swelling. Negative for chest pain and palpitations.   Gastrointestinal: Negative for nausea and vomiting.   Musculoskeletal: Negative for joint swelling.   Neurological: Negative for weakness and headache.   Hematological: Negative for adenopathy. Does not bruise/bleed easily.   Psychiatric/Behavioral: Negative for sleep disturbance. The patient is not nervous/anxious.        Objective   Visit Vitals  /64 (BP Location: Left arm, Patient Position: Sitting, Cuff Size: Adult)   Pulse 65   Temp 97.7 °F (36.5 °C) (Oral)   Resp 20   Ht 177.8 cm (70\")   Wt 73.7 kg (162 lb 6.4 oz)   SpO2 100%   BMI 23.30 " kg/m²     Physical Exam   Constitutional: He is oriented to person, place, and time. He appears well-developed and well-nourished. He is cooperative.   HENT:   Head: Normocephalic.   Neck: Trachea normal. Neck supple. Carotid bruit is not present. No thyromegaly present.   Cardiovascular: Normal rate and regular rhythm. Exam reveals no gallop and no friction rub.   No murmur heard.  Pulmonary/Chest: Effort normal and breath sounds normal. No respiratory distress. He has no wheezes. He exhibits no tenderness.   Abdominal:   Appendectomy scar   Neurological: He is alert and oriented to person, place, and time.   Skin: Skin is dry. No rash noted. Nails show no clubbing.   Nursing note and vitals reviewed.        Assessment/Plan .  Problem List Items Addressed This Visit        Cardiovascular and Mediastinum    Chronic systolic congestive heart failure (CMS/HCC) - Primary    Current Assessment & Plan     New dx.  Pt. Scheduled to follow with Dr. Ruiz in 1 week.  Watch weight closely and return if it increases or sob increases         Relevant Medications    metoprolol succinate XL (TOPROL-XL) 25 MG 24 hr tablet    furosemide (LASIX) 20 MG tablet    Hyperlipidemia    Current Assessment & Plan     Needs refill.         Relevant Medications    atorvastatin (LIPITOR) 80 MG tablet    Hypertension, benign    Current Assessment & Plan     Hypertension is improving with treatment.  Continue current treatment regimen.  Dietary sodium restriction.  Blood pressure will be reassessed in 2 weeks.         Relevant Medications    metoprolol succinate XL (TOPROL-XL) 25 MG 24 hr tablet    furosemide (LASIX) 20 MG tablet       Respiratory    Acute respiratory failure with hypoxia (CMS/HCC)    Current Assessment & Plan     Greatly improved but he is unable to fill his lasix since we sent a 90 day in. He came back to the office 3 times and we spoke to 3 different pharmacist. I finally spoke to the pt again myself at the end of the day and  I was finnaly able to convince the pharmacist to let him pay cash.            Digestive    Gastritis and gastroduodenitis    Current Assessment & Plan     Improving refill prilosec         Relevant Medications    omeprazole (priLOSEC) 40 MG capsule       Endocrine    Type 2 diabetes mellitus without complication, without long-term current use of insulin (CMS/[facility])    Relevant Medications    metFORMIN (GLUCOPHAGE) 500 MG tablet       Genitourinary    Chronic kidney disease, stage III (moderate) (CMS/HCC)    Current Assessment & Plan     Renal condition is worse.  Pt. Scheduled to follow with Dr. Lund in 1 week.         Relevant Medications    finasteride (PROSCAR) 5 MG tablet    furosemide (LASIX) 20 MG tablet    Prostatic hypertrophy    Relevant Medications    tamsulosin (FLOMAX) 0.4 MG capsule 24 hr capsule       Other    Depression    Relevant Medications    sertraline (ZOLOFT) 25 MG tablet

## 2019-12-09 NOTE — ASSESSMENT & PLAN NOTE
Renal condition is worse. Most recent creat is 1.7-- Pt. Scheduled to follow with Dr. Lund in 1 week.

## 2019-12-09 NOTE — OUTREACH NOTE
No TCM call made as pt was d/c on Friday 12/06/19, and has fwp with Dr Robison this morning 12/09/19 @ 1pm.

## 2019-12-09 NOTE — OUTREACH NOTE
CHF Week 1 Survey      Responses   Facility patient discharged from?  [facility]   Does the patient have one of the following disease processes/diagnoses(primary or secondary)?  CHF   Is there a successful TCM telephone encounter documented?  Yes          Karey Noble LPN

## 2019-12-09 NOTE — ASSESSMENT & PLAN NOTE
New dx. Severe with 11 day hosp stay and dobutamine drip. EF of 25% Pt is high risk for readmission.  Pt. Scheduled to follow with Dr. Ruiz in 1 week.  Watch weight closely and return if it increases or sob increases

## 2019-12-13 ENCOUNTER — READMISSION MANAGEMENT (OUTPATIENT)
Dept: CALL CENTER | Facility: HOSPITAL | Age: 84
End: 2019-12-13

## 2019-12-13 NOTE — OUTREACH NOTE
CHF Week 2 Survey      Responses   Facility patient discharged from?  [facility]   Does the patient have one of the following disease processes/diagnoses(primary or secondary)?  CHF   Week 2 attempt successful?  Yes   Call start time  1543   Call end time  1547   Discharge diagnosis  acute exac of CHF   Is patient permission given to speak with other caregiver?  No   Meds reviewed with patient/caregiver?  Yes   Is the patient having any side effects they believe may be caused by any medication additions or changes?  No   Does the patient have all medications ordered at discharge?  Yes   Is the patient taking all medications as directed (includes completed medication regime)?  Yes   Does the patient have a primary care provider?   Yes   Does the patient have an appointment with their PCP within 7 days of discharge?  Greater than 7 days   Comments regarding PCP  Xander Robison MD, Appt 12/16/19   Nursing Interventions  Verified appointment date/time/provider   Has the patient kept scheduled appointments due by today?  N/A   Has home health visited the patient within 72 hours of discharge?  N/A   Psychosocial issues?  No   Did the patient receive a copy of their discharge instructions?  Yes   Nursing interventions  Reviewed instructions with patient   What is the patient's perception of their health status since discharge?  Improving   Nursing interventions  Nurse provided patient education   Is the patient weighing daily?  Yes [Patient reports that weight is staying exactly the same. ]   Does the patient have scales?  Yes   Daily weight interventions  Education provided on importance of daily weight   Is the patient able to teach back signs and symptoms of worsening condition? (i.e. weight gain, shortness of air, etc.)  Yes   Is the patient/caregiver able to teach back the hierarchy of who to call/visit for symptoms/problems? PCP, Specialist, Home health nurse, Urgent Care, ED, 911  Yes   CHF Week 2 call completed?  Yes           Natalia Chung, RN

## 2019-12-15 NOTE — ASSESSMENT & PLAN NOTE
Greatly improved but he is unable to fill his lasix since we sent a 90 day in. He came back to the office 3 times and we spoke to 3 different pharmacist. I finally spoke to the pt again myself at the end of the day and I was finnaly able to convince the pharmacist to let him pay cash.

## 2019-12-15 NOTE — ASSESSMENT & PLAN NOTE
Hypertension is improving with treatment.  Continue current treatment regimen.  Dietary sodium restriction.  Blood pressure will be reassessed in 2 weeks.

## 2019-12-16 ENCOUNTER — OFFICE VISIT (OUTPATIENT)
Dept: FAMILY MEDICINE CLINIC | Facility: CLINIC | Age: 84
End: 2019-12-16

## 2019-12-16 VITALS
DIASTOLIC BLOOD PRESSURE: 65 MMHG | SYSTOLIC BLOOD PRESSURE: 136 MMHG | BODY MASS INDEX: 23.37 KG/M2 | WEIGHT: 163.2 LBS | OXYGEN SATURATION: 99 % | HEIGHT: 70 IN | HEART RATE: 57 BPM | RESPIRATION RATE: 16 BRPM | TEMPERATURE: 98 F

## 2019-12-16 DIAGNOSIS — R53.83 LETHARGY: ICD-10-CM

## 2019-12-16 DIAGNOSIS — T78.40XD ALLERGIC STATE, SUBSEQUENT ENCOUNTER: ICD-10-CM

## 2019-12-16 DIAGNOSIS — I50.22 CHRONIC SYSTOLIC CONGESTIVE HEART FAILURE (HCC): Primary | ICD-10-CM

## 2019-12-16 PROBLEM — T78.40XA ALLERGY: Status: ACTIVE | Noted: 2019-12-16

## 2019-12-16 PROCEDURE — 99213 OFFICE O/P EST LOW 20 MIN: CPT | Performed by: FAMILY MEDICINE

## 2019-12-16 RX ORDER — FLUTICASONE PROPIONATE 50 MCG
2 SPRAY, SUSPENSION (ML) NASAL DAILY
Qty: 3 BOTTLE | Refills: 3 | Status: SHIPPED | OUTPATIENT
Start: 2019-12-16 | End: 2020-12-28

## 2019-12-16 RX ORDER — LORATADINE 10 MG/1
10 TABLET ORAL DAILY
Status: DISCONTINUED | OUTPATIENT
Start: 2019-12-16 | End: 2020-09-04

## 2019-12-16 NOTE — PROGRESS NOTES
Subjective   Mikael Shanks is a 84 y.o. male.     Allergies-worsening    Congestive Heart Failure   Presents for follow-up visit. Associated symptoms include shortness of breath (improving). Pertinent negatives include no fatigue. The symptoms have been improving.   Fatigue   This is a recurrent problem. The current episode started more than 1 year ago. The problem occurs daily. The problem has been gradually improving. Associated symptoms include congestion. Pertinent negatives include no fatigue.        The following portions of the patient's history were reviewed and updated as appropriate: allergies, current medications, past family history, past social history, past surgical history and problem list.    Family History   Problem Relation Age of Onset   • Cancer Mother    • Heart disease Father    • Cancer Brother        Social History     Tobacco Use   • Smoking status: Former Smoker     Types: Cigarettes   • Smokeless tobacco: Never Used   Substance Use Topics   • Alcohol use: No     Frequency: Never   • Drug use: No       Past Surgical History:   Procedure Laterality Date   • BACK SURGERY     • CARDIAC CATHETERIZATION N/A 12/5/2019    Procedure: Left Heart Cath and coronary angiogram;  Surgeon: Dayday Ruiz MD;  Location: [facility] CATH INVASIVE LOCATION;  Service: Cardiovascular   • CARDIAC CATHETERIZATION N/A 12/5/2019    Procedure: Right and Left Heart Cath;  Surgeon: Dayday Ruiz MD;  Location: [facility] CATH INVASIVE LOCATION;  Service: Cardiovascular   • CHOLECYSTECTOMY         Patient Active Problem List   Diagnosis   • Carotid artery disease (CMS/[facility])   • Type 2 diabetes mellitus without complication, without long-term current use of insulin (CMS/[facility])   • PAC (premature atrial contraction)   • Vitamin D deficiency   • Gastritis and gastroduodenitis   • Atherosclerosis of native coronary artery of native heart without angina pectoris   • Chronic kidney disease, stage III (moderate) (CMS/HCC)   •  Hypertension, benign   • Spinal stenosis of lumbar region   • Other folate deficiency anemias   • Asymptomatic peripheral vascular disease (CMS/HCC)   • Hyperkalemia   • Hyponatremia   • Osteoarthritis   • Prostatic hypertrophy   • Pernicious anemia   • Hypomagnesemia   • Depression   • Dupuytren's contracture of both hands   • Sinus pause   • Pulmonary valve disorder   • Hearing loss   • Family history of colon cancer   • Annual physical exam   • Left arm pain   • Snoring   • Shortness of breath   • BILL (obstructive sleep apnea)   • Acute exacerbation of CHF (congestive heart failure) (CMS/HCC)   • Acute respiratory failure with hypoxia (CMS/HCC)   • Stenosis of coronary artery stent   • Mixed hyperlipidemia   • Chronic systolic congestive heart failure (CMS/HCC)   • Lethargy   • Allergy       Current Outpatient Medications on File Prior to Visit   Medication Sig Dispense Refill   • aspirin 81 MG tablet Take 81 mg by mouth Daily.     • atorvastatin (LIPITOR) 80 MG tablet Take 1 tablet by mouth Every Night. 90 tablet 1   • finasteride (PROSCAR) 5 MG tablet Take 1 tablet by mouth Daily. 30 tablet 0   • furosemide (LASIX) 20 MG tablet Take 1 tablet by mouth 2 (Two) Times a Day. 60 tablet 0   • metFORMIN (GLUCOPHAGE) 500 MG tablet Take 1 tablet by mouth Every Night. 90 tablet 3   • metoprolol succinate XL (TOPROL-XL) 25 MG 24 hr tablet Take 1 tablet by mouth Daily. 90 tablet 3   • omeprazole (priLOSEC) 40 MG capsule Take 1 capsule by mouth Daily. 90 capsule 1   • sertraline (ZOLOFT) 25 MG tablet Take 1 tablet by mouth Daily. 90 tablet 3   • tamsulosin (FLOMAX) 0.4 MG capsule 24 hr capsule Take 1 capsule by mouth Daily. 90 capsule 3     No current facility-administered medications on file prior to visit.        Allergies   Allergen Reactions   • Penicillins Hives       Review of Systems   Constitutional: Negative for fatigue.   HENT: Positive for congestion and rhinorrhea.    Respiratory: Positive for shortness of  "breath (improving).        Objective   Visit Vitals  /65 (BP Location: Right arm, Patient Position: Sitting, Cuff Size: Adult)   Pulse 57   Temp 98 °F (36.7 °C)   Resp 16   Ht 177.8 cm (70\")   Wt 74 kg (163 lb 3.2 oz)   SpO2 99%   BMI 23.42 kg/m²     Physical Exam   Constitutional: He is oriented to person, place, and time. He appears well-developed and well-nourished. He is cooperative.   HENT:   Head: Normocephalic.   Neck: Trachea normal. Neck supple. Carotid bruit is not present. No thyromegaly present.   Cardiovascular: Normal rate and regular rhythm. Exam reveals no gallop and no friction rub.   No murmur heard.  Pulmonary/Chest: Effort normal and breath sounds normal. No respiratory distress. He has no wheezes. He exhibits no tenderness.   Neurological: He is alert and oriented to person, place, and time.   Skin: Skin is dry. No rash noted. Nails show no clubbing.   Nursing note and vitals reviewed.        Assessment/Plan .  Problem List Items Addressed This Visit        Cardiovascular and Mediastinum    Chronic systolic congestive heart failure (CMS/HCC) - Primary    Current Assessment & Plan     Improving; Follow up in 2-3 weeks.             Other    Lethargy    Current Assessment & Plan     Improving but not back to normal. Recommended trying to get more exercise.         Allergy    Current Assessment & Plan     Worsening; Advised to start Flonase and Clartitin         Relevant Medications    fluticasone (FLONASE) 50 MCG/ACT nasal spray    loratadine (CLARITIN) tablet 10 mg (Start on 12/16/2019 12:09 PM)               "

## 2019-12-18 ENCOUNTER — OFFICE VISIT (OUTPATIENT)
Dept: CARDIOLOGY | Facility: CLINIC | Age: 84
End: 2019-12-18

## 2019-12-18 VITALS
HEART RATE: 60 BPM | SYSTOLIC BLOOD PRESSURE: 144 MMHG | OXYGEN SATURATION: 99 % | DIASTOLIC BLOOD PRESSURE: 69 MMHG | BODY MASS INDEX: 23.59 KG/M2 | HEIGHT: 70 IN | WEIGHT: 164.75 LBS

## 2019-12-18 DIAGNOSIS — E78.2 MIXED HYPERLIPIDEMIA: Primary | ICD-10-CM

## 2019-12-18 DIAGNOSIS — R06.02 SHORTNESS OF BREATH: ICD-10-CM

## 2019-12-18 DIAGNOSIS — E11.9 TYPE 2 DIABETES MELLITUS WITHOUT COMPLICATION, WITHOUT LONG-TERM CURRENT USE OF INSULIN (HCC): ICD-10-CM

## 2019-12-18 DIAGNOSIS — I10 HYPERTENSION, BENIGN: ICD-10-CM

## 2019-12-18 PROCEDURE — 99214 OFFICE O/P EST MOD 30 MIN: CPT | Performed by: INTERNAL MEDICINE

## 2019-12-18 NOTE — PROGRESS NOTES
Encounter Date:12/18/2019  Recent hospitalization for congestive heart failure      Patient ID: Mikael Shanks is a 84 y.o. male.    Chief Complaint:  Congestive heart failure  Status post stent  Hypertension  Diabetes  Renal dysfunction      History of Present Illness  Patient recently was admitted to [facility] with acute systolic congestive heart failure.  Patient had diuresis with significant improvement and subsequently had cardiac catheterization coronary artery coffee revealed patent stent with borderline coronary artery disease.  Patient was released home.  Patient was treated with intravenous dobutamine and diuresis.    Since I have last seen, the patient has been without any chest discomfort ,shortness of breath, palpitations, dizziness or syncope.  Denies having any headache ,abdominal pain ,nausea, vomiting , diarrhea constipation, loss of weight or loss of appetite.  Denies having any excessive bruising ,hematuria or blood in the stool.    Review of all systems negative except as indicated    Assessment and Plan     ]]]]]]]]]]]]]]]]]]]]]]]  Impression  ========  -Status post stent 2003 at [facility]    - Cardiomyopathy with ejection fraction of 25%.    -Status post acute on chronic systolic congestive heart failure  Significant left ventricle dysfunction with ejection fraction of 25%.    -Status post acute respiratory failure with hypoxia-improved.    Cardiac catheterization 12/6/2019 revealed  No evidence for pulmonary hypertension is present.  Left ventricle angiogram was not performed due to pre-existing renal dysfunction.  Left main coronary artery has ostial 30% disease.  Left anterior descending artery is normal.  Circumflex coronary artery is a codominant vessel and is normal.  Right coronary artery is a codominant vessel that has 30% luminal irregularities in the midsegment.      -Hypertension diabetes renal dysfunction.  BUN 14 creatinine 1.5.  Dyslipidemia     -History of  anemia     -History of premature atrial contractions     -History of severe hypomagnesemia.-Improved     -Allergic to penicillin.   =======  Plan  =======  Patient had recently significant acute systolic congestive heart failure.  Improved.  Patient has severe left ventricle dysfunction.  Patient is not having any angina pectoris or congestive heart failure.  Medications were reviewed and updated.  Follow-up in office in 3 months.  Further plan will depend on patient's progress.  ]]]]]]]]]]]]]              Diagnosis Plan   1. Mixed hyperlipidemia     2. Hypertension, benign     3. Shortness of breath     4. Type 2 diabetes mellitus without complication, without long-term current use of insulin (CMS/[facility])     LAB RESULTS (LAST 7 DAYS)    CBC        BMP        CMP         BNP        TROPONIN        CoAg        Creatinine Clearance  Estimated Creatinine Clearance: 33.4 mL/min (A) (by C-G formula based on SCr of 1.74 mg/dL (H)).    ABG        Radiology  No radiology results for the last day                The following portions of the patient's history were reviewed and updated as appropriate: allergies, current medications, past family history, past medical history, past social history, past surgical history and problem list.    Review of Systems   Constitution: Negative for malaise/fatigue.   Cardiovascular: Negative for chest pain, leg swelling, palpitations and syncope.   Respiratory: Negative for shortness of breath.    Skin: Negative for rash.   Gastrointestinal: Negative for nausea and vomiting.   Neurological: Negative for dizziness, light-headedness and numbness.         Current Outpatient Medications:   •  aspirin 81 MG tablet, Take 81 mg by mouth Daily., Disp: , Rfl:   •  atorvastatin (LIPITOR) 80 MG tablet, Take 1 tablet by mouth Every Night., Disp: 90 tablet, Rfl: 1  •  finasteride (PROSCAR) 5 MG tablet, Take 1 tablet by mouth Daily., Disp: 30 tablet, Rfl: 0  •  fluticasone (FLONASE) 50 MCG/ACT nasal spray,  2 sprays into the nostril(s) as directed by provider Daily., Disp: 3 bottle, Rfl: 3  •  furosemide (LASIX) 20 MG tablet, Take 1 tablet by mouth 2 (Two) Times a Day., Disp: 60 tablet, Rfl: 0  •  metFORMIN (GLUCOPHAGE) 500 MG tablet, Take 1 tablet by mouth Every Night., Disp: 90 tablet, Rfl: 3  •  metoprolol succinate XL (TOPROL-XL) 25 MG 24 hr tablet, Take 1 tablet by mouth Daily., Disp: 90 tablet, Rfl: 3  •  omeprazole (priLOSEC) 40 MG capsule, Take 1 capsule by mouth Daily., Disp: 90 capsule, Rfl: 1  •  sertraline (ZOLOFT) 25 MG tablet, Take 1 tablet by mouth Daily., Disp: 90 tablet, Rfl: 3  •  tamsulosin (FLOMAX) 0.4 MG capsule 24 hr capsule, Take 1 capsule by mouth Daily., Disp: 90 capsule, Rfl: 3    Current Facility-Administered Medications:   •  loratadine (CLARITIN) tablet 10 mg, 10 mg, Oral, Daily, Xander Robison MD    Allergies   Allergen Reactions   • Penicillins Hives       Family History   Problem Relation Age of Onset   • Cancer Mother    • Heart disease Father    • Cancer Brother        Past Surgical History:   Procedure Laterality Date   • BACK SURGERY     • CARDIAC CATHETERIZATION N/A 12/5/2019    Procedure: Left Heart Cath and coronary angiogram;  Surgeon: Dayday Ruiz MD;  Location: [facility] CATH INVASIVE LOCATION;  Service: Cardiovascular   • CARDIAC CATHETERIZATION N/A 12/5/2019    Procedure: Right and Left Heart Cath;  Surgeon: Dayday Ruiz MD;  Location: [facility] CATH INVASIVE LOCATION;  Service: Cardiovascular   • CHOLECYSTECTOMY         Past Medical History:   Diagnosis Date   • Carotid artery disease (CMS/HCC)    • GERD (gastroesophageal reflux disease)    • Hyperlipidemia    • Mood disorder (CMS/HCC)    • Osteoarthritis    • Prostatic hypertrophy    • Pulmonary valve disorder        Family History   Problem Relation Age of Onset   • Cancer Mother    • Heart disease Father    • Cancer Brother        Social History     Socioeconomic History   • Marital status:      Spouse name:  "Not on file   • Number of children: Not on file   • Years of education: Not on file   • Highest education level: Not on file   Occupational History   • Occupation: Retired   Social Needs   • Financial resource strain: Not hard at all   • Food insecurity:     Worry: Never true     Inability: Never true   • Transportation needs:     Medical: No     Non-medical: No   Tobacco Use   • Smoking status: Former Smoker     Types: Cigarettes   • Smokeless tobacco: Never Used   Substance and Sexual Activity   • Alcohol use: No     Frequency: Never   • Drug use: No   • Sexual activity: Never   Lifestyle   • Physical activity:     Days per week: 0 days     Minutes per session: 0 min   • Stress: Not at all   Relationships   • Social connections:     Talks on phone: More than three times a week     Gets together: More than three times a week     Attends [employer] service: 1 to 4 times per year     Active member of club or organization: Yes     Attends meetings of clubs or organizations: Never     Relationship status: Living with partner         Procedures      Objective:       Physical Exam    /69   Pulse 60   Ht 177.8 cm (70\")   Wt 74.7 kg (164 lb 12 oz)   SpO2 99%   BMI 23.64 kg/m²   The patient is alert, oriented and in no distress.    Vital signs as noted above.    Head and neck revealed no carotid bruits or jugular venous distension.  No thyromegaly or lymphadenopathy is present.    Lungs clear.  No wheezing.  Breath sounds are normal bilaterally.    Heart normal first and second heart sounds.  No murmur..  No pericardial rub is present.  No gallop is present.    Abdomen soft and nontender.  No organomegaly is present.    Extremities revealed good peripheral pulses without any pedal edema.    Skin warm and dry.    Musculoskeletal system is grossly normal.    CNS grossly normal.        "

## 2019-12-20 ENCOUNTER — READMISSION MANAGEMENT (OUTPATIENT)
Dept: CALL CENTER | Facility: HOSPITAL | Age: 84
End: 2019-12-20

## 2019-12-20 NOTE — OUTREACH NOTE
CHF Week 3 Survey      Responses   Facility patient discharged from?  [facility]   Does the patient have one of the following disease processes/diagnoses(primary or secondary)?  CHF   Week 3 attempt successful?  No   Unsuccessful attempts  Attempt 1 [NO ANSWER, NO VM]          Karey Noble LPN

## 2019-12-23 ENCOUNTER — READMISSION MANAGEMENT (OUTPATIENT)
Dept: CALL CENTER | Facility: HOSPITAL | Age: 84
End: 2019-12-23

## 2019-12-23 NOTE — OUTREACH NOTE
CHF Week 3 Survey      Responses   Facility patient discharged from?  [facility]   Does the patient have one of the following disease processes/diagnoses(primary or secondary)?  CHF   Week 3 attempt successful?  Yes   Call start time  0852   Call end time  [phone]   Discharge diagnosis  acute exac of CHF   List who call center can speak with  inga Richardson other   Person spoke with today (if not patient) and relationship  Debra Arizmendi reviewed with patient/caregiver?  Yes   Is the patient taking all medications as directed (includes completed medication regime)?  Yes   Does the patient have a primary care provider?   Yes   Has the patient kept scheduled appointments due by today?  Yes   Has home health visited the patient within 72 hours of discharge?  N/A   Psychosocial issues?  No   Did the patient receive a copy of their discharge instructions?  Yes   Nursing interventions  Reviewed instructions with patient   What is the patient's perception of their health status since discharge?  Improving   Nursing interventions  Nurse provided patient education   Is the patient weighing daily?  Yes   Does the patient have scales?  Yes   Daily weight interventions  Education provided on importance of daily weight   Is the patient able to teach back signs and symptoms of worsening condition? (i.e. weight gain, shortness of air, etc.)  Yes   Is the patient/caregiver able to teach back the hierarchy of who to call/visit for symptoms/problems? PCP, Specialist, Home health nurse, Urgent Care, ED, 911  Yes   CHF Week 3 call completed?  Yes          Natalia Chung RN

## 2019-12-31 ENCOUNTER — READMISSION MANAGEMENT (OUTPATIENT)
Dept: CALL CENTER | Facility: HOSPITAL | Age: 84
End: 2019-12-31

## 2019-12-31 NOTE — OUTREACH NOTE
CHF Week 4 Survey      Responses   Facility patient discharged from?  [facility]   Does the patient have one of the following disease processes/diagnoses(primary or secondary)?  CHF   Week 4 attempt successful?  No   Rescheduled  Revoked   Revoke  Decline to participate [NO ANSWER, NO VM]          Karey Noble LPN

## 2020-01-02 ENCOUNTER — EPISODE CHANGES (OUTPATIENT)
Dept: CASE MANAGEMENT | Facility: OTHER | Age: 85
End: 2020-01-02

## 2020-01-07 ENCOUNTER — PATIENT OUTREACH (OUTPATIENT)
Dept: CASE MANAGEMENT | Facility: OTHER | Age: 85
End: 2020-01-07

## 2020-01-07 NOTE — OUTREACH NOTE
Patient Outreach Note    Number listed is answered by patient's wife. She states patient is in the shower and can not come to the phone. She reports he has not had any chest pain and has been compliant with MD f/u visits. Reminded her patient needs to continue to weigh self daily & if develops weight gain of 3 lbs or more overnight or 5 lbs in a week will contact his MD right away. We reviewed gaps in care, patient has had his pneumonia shot and needs # 2 zoster completed. ACP is on file and MyChart is not in use. Explained role of Care Advisor and contact information given to patient.Nurse provided patient education.No other questions or concerns voiced at this time. No needs identified at this call.    Juana Hsieh RN  Ambulatory     1/7/2020, 10:07 AM

## 2020-01-08 ENCOUNTER — OFFICE VISIT (OUTPATIENT)
Dept: FAMILY MEDICINE CLINIC | Facility: CLINIC | Age: 85
End: 2020-01-08

## 2020-01-08 VITALS
BODY MASS INDEX: 23.77 KG/M2 | RESPIRATION RATE: 16 BRPM | SYSTOLIC BLOOD PRESSURE: 137 MMHG | HEIGHT: 70 IN | WEIGHT: 166 LBS | TEMPERATURE: 98 F | DIASTOLIC BLOOD PRESSURE: 65 MMHG | OXYGEN SATURATION: 96 % | HEART RATE: 64 BPM

## 2020-01-08 DIAGNOSIS — E78.2 MIXED HYPERLIPIDEMIA: Primary | ICD-10-CM

## 2020-01-08 DIAGNOSIS — I10 HYPERTENSION, BENIGN: ICD-10-CM

## 2020-01-08 DIAGNOSIS — I50.22 CHRONIC SYSTOLIC CONGESTIVE HEART FAILURE (HCC): ICD-10-CM

## 2020-01-08 DIAGNOSIS — I25.10 ATHEROSCLEROSIS OF NATIVE CORONARY ARTERY OF NATIVE HEART WITHOUT ANGINA PECTORIS: ICD-10-CM

## 2020-01-08 DIAGNOSIS — D53.9 ANEMIA, DEFICIENCY: ICD-10-CM

## 2020-01-08 DIAGNOSIS — K76.9 LIVER DISEASE, UNSPECIFIED: ICD-10-CM

## 2020-01-08 DIAGNOSIS — E11.9 TYPE 2 DIABETES MELLITUS WITHOUT COMPLICATION, WITHOUT LONG-TERM CURRENT USE OF INSULIN (HCC): ICD-10-CM

## 2020-01-08 PROCEDURE — 96372 THER/PROPH/DIAG INJ SC/IM: CPT | Performed by: FAMILY MEDICINE

## 2020-01-08 PROCEDURE — 99213 OFFICE O/P EST LOW 20 MIN: CPT | Performed by: FAMILY MEDICINE

## 2020-01-08 RX ORDER — LANCETS 33 GAUGE
1 EACH MISCELLANEOUS DAILY
Qty: 100 EACH | Refills: 5 | Status: SHIPPED | OUTPATIENT
Start: 2020-01-08 | End: 2021-06-30 | Stop reason: SDUPTHER

## 2020-01-08 RX ORDER — GLIMEPIRIDE 1 MG/1
1 TABLET ORAL
COMMUNITY
End: 2020-05-06 | Stop reason: SDUPTHER

## 2020-01-08 RX ORDER — CYANOCOBALAMIN 1000 UG/ML
1000 INJECTION, SOLUTION INTRAMUSCULAR; SUBCUTANEOUS ONCE
Status: COMPLETED | OUTPATIENT
Start: 2020-01-08 | End: 2020-01-08

## 2020-01-08 RX ORDER — BLOOD-GLUCOSE METER
1 EACH MISCELLANEOUS DAILY
Qty: 1 EACH | Refills: 0 | Status: SHIPPED | OUTPATIENT
Start: 2020-01-08 | End: 2021-06-30 | Stop reason: SDUPTHER

## 2020-01-08 RX ADMIN — CYANOCOBALAMIN 1000 MCG: 1000 INJECTION, SOLUTION INTRAMUSCULAR; SUBCUTANEOUS at 14:09

## 2020-01-08 NOTE — PROGRESS NOTES
Subjective   Mikael Shanks is a 84 y.o. male.     Congestive Heart Failure   Presents for follow-up visit. Pertinent negatives include no edema, near-syncope, palpitations or shortness of breath. The symptoms have been improving. Compliance with total regimen is %. Compliance problems include adherence to diet and adherence to exercise.    Hypertension   This is a chronic problem. The current episode started more than 1 year ago. The problem has been gradually improving since onset. Pertinent negatives include no palpitations or shortness of breath.        The following portions of the patient's history were reviewed and updated as appropriate: current medications, past family history, past medical history, past social history, past surgical history and problem list.    Family History   Problem Relation Age of Onset   • Cancer Mother    • Heart disease Father    • Cancer Brother        Social History     Tobacco Use   • Smoking status: Former Smoker     Types: Cigarettes   • Smokeless tobacco: Never Used   Substance Use Topics   • Alcohol use: No     Frequency: Never   • Drug use: No       Past Surgical History:   Procedure Laterality Date   • BACK SURGERY     • CARDIAC CATHETERIZATION N/A 12/5/2019    Procedure: Left Heart Cath and coronary angiogram;  Surgeon: Dayday Ruiz MD;  Location: [facility] CATH INVASIVE LOCATION;  Service: Cardiovascular   • CARDIAC CATHETERIZATION N/A 12/5/2019    Procedure: Right and Left Heart Cath;  Surgeon: Dayday Ruiz MD;  Location: [facility] CATH INVASIVE LOCATION;  Service: Cardiovascular   • CHOLECYSTECTOMY         Patient Active Problem List   Diagnosis   • Carotid artery disease (CMS/HCC)   • Type 2 diabetes mellitus without complication, without long-term current use of insulin (CMS/HCC)   • PAC (premature atrial contraction)   • Vitamin D deficiency   • Gastritis and gastroduodenitis   • Atherosclerosis of native coronary artery of native heart without angina  pectoris   • Chronic kidney disease, stage III (moderate) (CMS/HCC)   • Hypertension, benign   • Spinal stenosis of lumbar region   • Other folate deficiency anemias   • Asymptomatic peripheral vascular disease (CMS/HCC)   • Hyperkalemia   • Hyponatremia   • Osteoarthritis   • Prostatic hypertrophy   • Pernicious anemia   • Hypomagnesemia   • Depression   • Dupuytren's contracture of both hands   • Sinus pause   • Pulmonary valve disorder   • Hearing loss   • Family history of colon cancer   • Annual physical exam   • Left arm pain   • Snoring   • Shortness of breath   • BILL (obstructive sleep apnea)   • Acute exacerbation of CHF (congestive heart failure) (CMS/HCC)   • Acute respiratory failure with hypoxia (CMS/HCC)   • Stenosis of coronary artery stent   • Mixed hyperlipidemia   • Chronic systolic congestive heart failure (CMS/HCC)   • Lethargy   • Allergy       Current Outpatient Medications on File Prior to Visit   Medication Sig Dispense Refill   • aspirin 81 MG tablet Take 81 mg by mouth Daily.     • atorvastatin (LIPITOR) 80 MG tablet Take 1 tablet by mouth Every Night. 90 tablet 1   • finasteride (PROSCAR) 5 MG tablet Take 1 tablet by mouth Daily. 30 tablet 0   • fluticasone (FLONASE) 50 MCG/ACT nasal spray 2 sprays into the nostril(s) as directed by provider Daily. 3 bottle 3   • furosemide (LASIX) 20 MG tablet Take 1 tablet by mouth 2 (Two) Times a Day. 60 tablet 0   • glimepiride (AMARYL) 1 MG tablet Take 1 mg by mouth Every Morning Before Breakfast.     • metoprolol succinate XL (TOPROL-XL) 25 MG 24 hr tablet Take 1 tablet by mouth Daily. 90 tablet 3   • omeprazole (priLOSEC) 40 MG capsule Take 1 capsule by mouth Daily. 90 capsule 1   • sertraline (ZOLOFT) 25 MG tablet Take 1 tablet by mouth Daily. 90 tablet 3   • tamsulosin (FLOMAX) 0.4 MG capsule 24 hr capsule Take 1 capsule by mouth Daily. 90 capsule 3     Current Facility-Administered Medications on File Prior to Visit   Medication Dose Route  "Frequency Provider Last Rate Last Dose   • loratadine (CLARITIN) tablet 10 mg  10 mg Oral Daily Xander Robison MD           Allergies   Allergen Reactions   • Penicillins Hives       Review of Systems   Respiratory: Negative for shortness of breath.    Cardiovascular: Negative for palpitations and near-syncope.       Objective   Visit Vitals  /65 (BP Location: Left arm, Patient Position: Sitting, Cuff Size: Large Adult)   Pulse 64   Temp 98 °F (36.7 °C)   Resp 16   Ht 177.8 cm (70\")   Wt 75.3 kg (166 lb)   SpO2 96%   BMI 23.82 kg/m²     Physical Exam   Constitutional: He is oriented to person, place, and time. He appears well-developed and well-nourished. He is cooperative.   HENT:   Head: Normocephalic.   Neck: Trachea normal. Neck supple. Carotid bruit is not present. No thyromegaly present.   Cardiovascular: Normal rate and regular rhythm. Exam reveals no gallop and no friction rub.   No murmur heard.  Pulmonary/Chest: Effort normal and breath sounds normal. No respiratory distress. He has no wheezes. He exhibits no tenderness.   Neurological: He is alert and oriented to person, place, and time.   Skin: Skin is dry. No rash noted. Nails show no clubbing.   Nursing note and vitals reviewed.        Assessment/Plan .  Problem List Items Addressed This Visit        High    Atherosclerosis of native coronary artery of native heart without angina pectoris    Current Assessment & Plan     Coronary artery disease is improving with lifestyle modifications.  Continue current treatment regimen.  Dietary sodium restriction.  Regular aerobic exercise.  Continue current medications.  Cardiac status will be reassessed in 3 months.            Medium    Hypertension, benign    Current Assessment & Plan     Hypertension is improving with lifestyle modifications.  Continue current treatment regimen.  Dietary sodium restriction.  Continue current medications.  Blood pressure will be reassessed in 3 months.         Type 2 " diabetes mellitus without complication, without long-term current use of insulin (CMS/[facility])    Relevant Medications    glimepiride (AMARYL) 1 MG tablet    Blood Glucose Monitoring Suppl (ONE TOUCH ULTRA 2) w/Device kit    glucose blood (ONE TOUCH ULTRA TEST) test strip    ONETOUCH DELICA LANCETS 33G misc    Other Relevant Orders    Hemoglobin A1c (Completed)       Unprioritized    Chronic systolic congestive heart failure (CMS/[facility])    Current Assessment & Plan     Greatly improved; Patient has started exercising and watching diet closely.          Mixed hyperlipidemia - Primary    Overview     Added automatically from request for surgery [id number]         Relevant Orders    Lipid Panel With / Chol / HDL Ratio (Completed)    Comprehensive Metabolic Panel (Completed)      Other Visit Diagnoses     Anemia, deficiency        Relevant Medications    cyanocobalamin injection 1,000 mcg (Completed)    Other Relevant Orders    CBC & Differential (Completed)    Vitamin D 25 Hydroxy (Completed)    Vitamin B12 (Completed)    Liver disease, unspecified         Relevant Orders    Vitamin D 25 Hydroxy (Completed)

## 2020-01-09 LAB
25(OH)D3+25(OH)D2 SERPL-MCNC: 29 NG/ML (ref 30–100)
ALBUMIN SERPL-MCNC: 4.2 G/DL (ref 3.5–4.7)
ALBUMIN/GLOB SERPL: 1.6 {RATIO} (ref 1.2–2.2)
ALP SERPL-CCNC: 70 IU/L (ref 39–117)
ALT SERPL-CCNC: 33 IU/L (ref 0–44)
AST SERPL-CCNC: 29 IU/L (ref 0–40)
BASOPHILS # BLD AUTO: 0.1 X10E3/UL (ref 0–0.2)
BASOPHILS NFR BLD AUTO: 1 %
BILIRUB SERPL-MCNC: 0.3 MG/DL (ref 0–1.2)
BUN SERPL-MCNC: 29 MG/DL (ref 8–27)
BUN/CREAT SERPL: 21 (ref 10–24)
CALCIUM SERPL-MCNC: 9.2 MG/DL (ref 8.6–10.2)
CHLORIDE SERPL-SCNC: 100 MMOL/L (ref 96–106)
CHOLEST SERPL-MCNC: 131 MG/DL (ref 100–199)
CHOLEST/HDLC SERPL: 2.6 RATIO (ref 0–5)
CO2 SERPL-SCNC: 27 MMOL/L (ref 20–29)
CREAT SERPL-MCNC: 1.36 MG/DL (ref 0.76–1.27)
EOSINOPHIL # BLD AUTO: 0.4 X10E3/UL (ref 0–0.4)
EOSINOPHIL NFR BLD AUTO: 5 %
ERYTHROCYTE [DISTWIDTH] IN BLOOD BY AUTOMATED COUNT: 15.4 % (ref 11.6–15.4)
GLOBULIN SER CALC-MCNC: 2.6 G/DL (ref 1.5–4.5)
GLUCOSE SERPL-MCNC: 83 MG/DL (ref 65–99)
HBA1C MFR BLD: 6 % (ref 4.8–5.6)
HCT VFR BLD AUTO: 36.6 % (ref 37.5–51)
HDLC SERPL-MCNC: 51 MG/DL
HGB BLD-MCNC: 11.8 G/DL (ref 13–17.7)
IMM GRANULOCYTES # BLD AUTO: 0.1 X10E3/UL (ref 0–0.1)
IMM GRANULOCYTES NFR BLD AUTO: 1 %
LDLC SERPL CALC-MCNC: 57 MG/DL (ref 0–99)
LYMPHOCYTES # BLD AUTO: 1.3 X10E3/UL (ref 0.7–3.1)
LYMPHOCYTES NFR BLD AUTO: 16 %
MCH RBC QN AUTO: 27.8 PG (ref 26.6–33)
MCHC RBC AUTO-ENTMCNC: 32.2 G/DL (ref 31.5–35.7)
MCV RBC AUTO: 86 FL (ref 79–97)
MONOCYTES # BLD AUTO: 0.7 X10E3/UL (ref 0.1–0.9)
MONOCYTES NFR BLD AUTO: 8 %
NEUTROPHILS # BLD AUTO: 5.7 X10E3/UL (ref 1.4–7)
NEUTROPHILS NFR BLD AUTO: 69 %
PLATELET # BLD AUTO: 219 X10E3/UL (ref 150–450)
POTASSIUM SERPL-SCNC: 4.6 MMOL/L (ref 3.5–5.2)
PROT SERPL-MCNC: 6.8 G/DL (ref 6–8.5)
RBC # BLD AUTO: 4.25 X10E6/UL (ref 4.14–5.8)
SODIUM SERPL-SCNC: 143 MMOL/L (ref 134–144)
TRIGL SERPL-MCNC: 114 MG/DL (ref 0–149)
VIT B12 SERPL-MCNC: >2000 PG/ML (ref 232–1245)
VLDLC SERPL CALC-MCNC: 23 MG/DL (ref 5–40)
WBC # BLD AUTO: 8.3 X10E3/UL (ref 3.4–10.8)

## 2020-01-11 NOTE — ASSESSMENT & PLAN NOTE
Hypertension is improving with lifestyle modifications.  Continue current treatment regimen.  Dietary sodium restriction.  Continue current medications.  Blood pressure will be reassessed in 3 months.

## 2020-01-11 NOTE — ASSESSMENT & PLAN NOTE
Coronary artery disease is improving with lifestyle modifications.  Continue current treatment regimen.  Dietary sodium restriction.  Regular aerobic exercise.  Continue current medications.  Cardiac status will be reassessed in 3 months.

## 2020-01-15 ENCOUNTER — OFFICE VISIT (OUTPATIENT)
Dept: FAMILY MEDICINE CLINIC | Facility: CLINIC | Age: 85
End: 2020-01-15

## 2020-01-15 VITALS
RESPIRATION RATE: 15 BRPM | WEIGHT: 166.4 LBS | TEMPERATURE: 97.2 F | DIASTOLIC BLOOD PRESSURE: 73 MMHG | HEART RATE: 55 BPM | HEIGHT: 70 IN | BODY MASS INDEX: 23.82 KG/M2 | OXYGEN SATURATION: 98 % | SYSTOLIC BLOOD PRESSURE: 138 MMHG

## 2020-01-15 DIAGNOSIS — D52.8 OTHER FOLATE DEFICIENCY ANEMIAS: ICD-10-CM

## 2020-01-15 DIAGNOSIS — I10 HYPERTENSION, BENIGN: ICD-10-CM

## 2020-01-15 DIAGNOSIS — E11.9 TYPE 2 DIABETES MELLITUS WITHOUT COMPLICATION, WITHOUT LONG-TERM CURRENT USE OF INSULIN (HCC): ICD-10-CM

## 2020-01-15 DIAGNOSIS — E78.2 MIXED HYPERLIPIDEMIA: Primary | ICD-10-CM

## 2020-01-15 DIAGNOSIS — E55.9 VITAMIN D DEFICIENCY: ICD-10-CM

## 2020-01-15 PROCEDURE — 99214 OFFICE O/P EST MOD 30 MIN: CPT | Performed by: FAMILY MEDICINE

## 2020-01-15 NOTE — ASSESSMENT & PLAN NOTE
Improved.   Encouraged to watch fatty intake, exercise more, and lose weight.   compliant with medication   Is not getting adequate diet and exercise  Goals developed at last visit were met  follow up in  3 months  Care management needs are self-addressed.. Self-management abilities addressed and patient is capable of managing his/her own disease.

## 2020-01-15 NOTE — PROGRESS NOTES
Subjective   Mikael Shanks is a 84 y.o. male.     Hypertension   This is a chronic problem. The current episode started more than 1 year ago. The problem has been gradually improving since onset. The problem is controlled. Pertinent negatives include no chest pain, palpitations or shortness of breath. There are no associated agents to hypertension. Risk factors for coronary artery disease include dyslipidemia and diabetes mellitus. Current antihypertension treatment includes beta blockers. The current treatment provides mild improvement.   Hyperlipidemia   This is a chronic problem. The current episode started more than 1 year ago. The problem is controlled. Pertinent negatives include no chest pain, myalgias or shortness of breath. Risk factors for coronary artery disease include dyslipidemia, diabetes mellitus and hypertension.   Diabetes   He presents for his follow-up diabetic visit. He has type 2 diabetes mellitus. His disease course has been stable. There are no hypoglycemic associated symptoms. Pertinent negatives for hypoglycemia include no pallor. Pertinent negatives for diabetes include no chest pain, no fatigue, no polyphagia, no polyuria and no weight loss. There are no hypoglycemic complications. Symptoms are stable. Risk factors for coronary artery disease include dyslipidemia and diabetes mellitus.   Anemia   Presents for follow-up visit. There has been no abdominal pain, pallor, palpitations or weight loss.        The following portions of the patient's history were reviewed and updated as appropriate: current medications, past family history, past medical history, past social history, past surgical history and problem list.    Family History   Problem Relation Age of Onset   • Cancer Mother    • Heart disease Father    • Cancer Brother        Social History     Tobacco Use   • Smoking status: Former Smoker     Types: Cigarettes   • Smokeless tobacco: Never Used   Substance Use Topics   • Alcohol  use: No     Frequency: Never   • Drug use: No       Past Surgical History:   Procedure Laterality Date   • BACK SURGERY     • CARDIAC CATHETERIZATION N/A 12/5/2019    Procedure: Left Heart Cath and coronary angiogram;  Surgeon: Dayday Ruiz MD;  Location:  [facility] CATH INVASIVE LOCATION;  Service: Cardiovascular   • CARDIAC CATHETERIZATION N/A 12/5/2019    Procedure: Right and Left Heart Cath;  Surgeon: Dayday Ruiz MD;  Location: [facility] CATH INVASIVE LOCATION;  Service: Cardiovascular   • CHOLECYSTECTOMY         Patient Active Problem List   Diagnosis   • Carotid artery disease (CMS/[facility])   • Type 2 diabetes mellitus without complication, without long-term current use of insulin (CMS/[facility])   • PAC (premature atrial contraction)   • Vitamin D deficiency   • Gastritis and gastroduodenitis   • Atherosclerosis of native coronary artery of native heart without angina pectoris   • Chronic kidney disease, stage III (moderate) (CMS/[facility])   • Hypertension, benign   • Spinal stenosis of lumbar region   • Other folate deficiency anemias   • Asymptomatic peripheral vascular disease (CMS/[facility])   • Hyperkalemia   • Hyponatremia   • Osteoarthritis   • Prostatic hypertrophy   • Pernicious anemia   • Hypomagnesemia   • Depression   • Dupuytren's contracture of both hands   • Sinus pause   • Pulmonary valve disorder   • Hearing loss   • Family history of colon cancer   • Annual physical exam   • Left arm pain   • Snoring   • Shortness of breath   • BILL (obstructive sleep apnea)   • Acute exacerbation of CHF (congestive heart failure) (CMS/[facility])   • Acute respiratory failure with hypoxia (CMS/[facility])   • Stenosis of coronary artery stent   • Mixed hyperlipidemia   • Chronic systolic congestive heart failure (CMS/[facility])   • Lethargy   • Allergy       Current Outpatient Medications on File Prior to Visit   Medication Sig Dispense Refill   • aspirin 81 MG tablet Take 81 mg by mouth Daily.     • atorvastatin (LIPITOR) 80 MG tablet Take 1  tablet by mouth Every Night. 90 tablet 1   • finasteride (PROSCAR) 5 MG tablet Take 1 tablet by mouth Daily. 30 tablet 0   • furosemide (LASIX) 20 MG tablet Take 1 tablet by mouth 2 (Two) Times a Day. 60 tablet 0   • glimepiride (AMARYL) 1 MG tablet Take 1 mg by mouth Every Morning Before Breakfast.     • omeprazole (priLOSEC) 40 MG capsule Take 1 capsule by mouth Daily. 90 capsule 1   • sertraline (ZOLOFT) 25 MG tablet Take 1 tablet by mouth Daily. 90 tablet 3   • tamsulosin (FLOMAX) 0.4 MG capsule 24 hr capsule Take 1 capsule by mouth Daily. 90 capsule 3   • Blood Glucose Monitoring Suppl (ONE TOUCH ULTRA 2) w/Device kit 1 Device Daily. 1 each 0   • fluticasone (FLONASE) 50 MCG/ACT nasal spray 2 sprays into the nostril(s) as directed by provider Daily. 3 bottle 3   • glucose blood (ONE TOUCH ULTRA TEST) test strip Use as instructed 100 each 5   • metoprolol succinate XL (TOPROL-XL) 25 MG 24 hr tablet Take 1 tablet by mouth Daily. 90 tablet 3   • ONETOUCH DELICA LANCETS 33G misc 1 pen Daily. 100 each 5     Current Facility-Administered Medications on File Prior to Visit   Medication Dose Route Frequency Provider Last Rate Last Dose   • loratadine (CLARITIN) tablet 10 mg  10 mg Oral Daily Xander Robison MD           Allergies   Allergen Reactions   • Penicillins Hives       Review of Systems   Constitutional: Negative for fatigue, unexpected weight gain and unexpected weight loss.   Eyes: Negative for visual disturbance.   Respiratory: Negative for shortness of breath.    Cardiovascular: Negative for chest pain, palpitations and leg swelling.   Gastrointestinal: Negative for abdominal pain and nausea.   Endocrine: Negative for polyphagia and polyuria.   Genitourinary: Negative for frequency.   Musculoskeletal: Negative for myalgias.   Skin: Negative for dry skin, pallor and skin lesions.   Neurological: Negative for syncope, numbness and headache.   I have reviewed and confirmed the accuracy of the ROS as  "documented by the MA/LPN/RN Xander Robison MD      Objective   Visit Vitals  /73 (BP Location: Left arm, Patient Position: Sitting, Cuff Size: Adult)   Pulse 55   Temp 97.2 °F (36.2 °C)   Resp 15   Ht 177.8 cm (70\")   Wt 75.5 kg (166 lb 6.4 oz)   SpO2 98%   BMI 23.88 kg/m²     Physical Exam   Constitutional: He is oriented to person, place, and time. He appears well-developed and well-nourished. He is cooperative.   HENT:   Head: Normocephalic.   Neck: Trachea normal. Neck supple. Carotid bruit is not present. No thyromegaly present.   Cardiovascular: Normal rate and regular rhythm. Exam reveals no gallop and no friction rub.   No murmur heard.  Pulmonary/Chest: Effort normal and breath sounds normal. No respiratory distress. He has no wheezes. He exhibits no tenderness.   Neurological: He is alert and oriented to person, place, and time.   Skin: Skin is dry. No rash noted. Nails show no clubbing.   Nursing note and vitals reviewed.        Assessment/Plan .  Problem List Items Addressed This Visit        Medium    Hypertension, benign    Current Assessment & Plan     Doing well. Encouraged to watch salt, exercise more and lose weight.           Type 2 diabetes mellitus without complication, without long-term current use of insulin (CMS/[facility])    Current Assessment & Plan     Stable,   Encouraged to watch sugar intake, exercise more and lose weight.   compliant with medication.   Not monitoring sugar at home.   Follow up in 3 months  Care management needs are self-addressed.  Self-management abilities addressed and patient is capable of managing his/her own disease.              Unprioritized    Mixed hyperlipidemia - Primary    Current Assessment & Plan      Improved.   Encouraged to watch fatty intake, exercise more, and lose weight.   compliant with medication   Is not getting adequate diet and exercise  Goals developed at last visit were met  follow up in  3 months  Care management needs are " self-addressed.. Self-management abilities addressed and patient is capable of managing his/her own disease.           Other folate deficiency anemias    Current Assessment & Plan     Improving. Hgb increased from 11.1 to 11.8.  Will follow conservatively.         Vitamin D deficiency    Current Assessment & Plan     Improving but not at goal; Adivsed to increase Vitamin D tablets to 2 a day.

## 2020-01-15 NOTE — ASSESSMENT & PLAN NOTE
Stable,   Encouraged to watch sugar intake, exercise more and lose weight.   compliant with medication.   Not monitoring sugar at home.   Follow up in 3 months  Care management needs are self-addressed.  Self-management abilities addressed and patient is capable of managing his/her own disease.

## 2020-01-16 ENCOUNTER — EPISODE CHANGES (OUTPATIENT)
Dept: CASE MANAGEMENT | Facility: OTHER | Age: 85
End: 2020-01-16

## 2020-02-17 ENCOUNTER — OFFICE VISIT (OUTPATIENT)
Dept: NEUROLOGY | Facility: CLINIC | Age: 85
End: 2020-02-17

## 2020-02-17 VITALS
DIASTOLIC BLOOD PRESSURE: 66 MMHG | HEART RATE: 61 BPM | SYSTOLIC BLOOD PRESSURE: 146 MMHG | BODY MASS INDEX: 25.08 KG/M2 | HEIGHT: 70 IN | WEIGHT: 175.2 LBS

## 2020-02-17 DIAGNOSIS — G47.33 OBSTRUCTIVE SLEEP APNEA: Primary | ICD-10-CM

## 2020-02-17 PROCEDURE — 99212 OFFICE O/P EST SF 10 MIN: CPT | Performed by: PSYCHIATRY & NEUROLOGY

## 2020-02-17 NOTE — PROGRESS NOTES
Sleep medicine follow-up visit    Mikael Shanks   1935  84 y.o. male   DATE OF SERVICE: 2/17/2020      Patient f/u from sleep study with new PAP machine, doing well with pap therapy. Patient uses a FFM and goes through the Internet for supplies.       On NPSG at [facility] , 11/05/2019 patient had Severe obstructive sleep apnea syndrome with apnea-hypopnea index of 78.3 per sleep hour, minimum SpO2 of 85%    The compliance data--will review when available    Sleeping nightly about 8 hours with bipap , now feeling much better during the day  He is now used to sleeping with the mask.      Review of Systems   Constitutional: Positive for fatigue. Negative for appetite change.   HENT: Positive for hearing loss. Negative for sinus pain.    Eyes: Negative for pain and itching.   Respiratory: Positive for shortness of breath. Negative for cough.    Cardiovascular: Negative for chest pain and palpitations.   Gastrointestinal: Negative for constipation and diarrhea.   Endocrine: Negative for cold intolerance and heat intolerance.   Genitourinary: Positive for frequency. Negative for difficulty urinating.   Musculoskeletal: Negative for back pain and neck pain.   Allergic/Immunologic: Negative for environmental allergies.   Neurological: Negative for dizziness, tremors, seizures, syncope, facial asymmetry, speech difficulty, weakness, light-headedness, numbness and headaches.   Psychiatric/Behavioral: Negative for agitation and confusion.     I reviewed and addressed ROS entered by MA.        The following portions of the patient's history were reviewed and updated as appropriate: allergies, current medications, past family history, past medical history, past social history, past surgical history and problem list.      Family History   Problem Relation Age of Onset   • Cancer Mother    • Heart disease Father    • Cancer Brother        Past Medical History:   Diagnosis Date   • Carotid artery disease (CMS/HCC)    • GERD  (gastroesophageal reflux disease)    • Hyperlipidemia    • Mood disorder (CMS/[facility])    • Osteoarthritis    • Prostatic hypertrophy    • Pulmonary valve disorder    • Sleep apnea        Social History     Socioeconomic History   • Marital status:      Spouse name: Not on file   • Number of children: Not on file   • Years of education: Not on file   • Highest education level: Not on file   Occupational History   • Occupation: Retired   Social Needs   • Financial resource strain: Not hard at all   • Food insecurity:     Worry: Never true     Inability: Never true   • Transportation needs:     Medical: No     Non-medical: No   Tobacco Use   • Smoking status: Former Smoker     Types: Cigarettes   • Smokeless tobacco: Never Used   Substance and Sexual Activity   • Alcohol use: No     Frequency: Never   • Drug use: No   • Sexual activity: Never   Lifestyle   • Physical activity:     Days per week: 0 days     Minutes per session: 0 min   • Stress: Not at all   Relationships   • Social connections:     Talks on phone: More than three times a week     Gets together: More than three times a week     Attends J[employer] service: 1 to 4 times per year     Active member of club or organization: Yes     Attends meetings of clubs or organizations: Never     Relationship status: Living with partner         Current Outpatient Medications:   •  aspirin 81 MG tablet, Take 81 mg by mouth Daily., Disp: , Rfl:   •  atorvastatin (LIPITOR) 80 MG tablet, Take 1 tablet by mouth Every Night., Disp: 90 tablet, Rfl: 1  •  Blood Glucose Monitoring Suppl (ONE TOUCH ULTRA 2) w/Device kit, 1 Device Daily., Disp: 1 each, Rfl: 0  •  finasteride (PROSCAR) 5 MG tablet, Take 1 tablet by mouth Daily., Disp: 30 tablet, Rfl: 0  •  fluticasone (FLONASE) 50 MCG/ACT nasal spray, 2 sprays into the nostril(s) as directed by provider Daily., Disp: 3 bottle, Rfl: 3  •  furosemide (LASIX) 20 MG tablet, Take 1 tablet by mouth 2 (Two) Times a Day., Disp: 60  tablet, Rfl: 0  •  glimepiride (AMARYL) 1 MG tablet, Take 1 mg by mouth Every Morning Before Breakfast., Disp: , Rfl:   •  glucose blood (ONE TOUCH ULTRA TEST) test strip, Use as instructed, Disp: 100 each, Rfl: 5  •  metoprolol succinate XL (TOPROL-XL) 25 MG 24 hr tablet, Take 1 tablet by mouth Daily., Disp: 90 tablet, Rfl: 3  •  omeprazole (priLOSEC) 40 MG capsule, Take 1 capsule by mouth Daily., Disp: 90 capsule, Rfl: 1  •  ONETOUCH DELICA LANCETS 33G misc, 1 pen Daily., Disp: 100 each, Rfl: 5  •  sertraline (ZOLOFT) 25 MG tablet, Take 1 tablet by mouth Daily., Disp: 90 tablet, Rfl: 3  •  tamsulosin (FLOMAX) 0.4 MG capsule 24 hr capsule, Take 1 capsule by mouth Daily., Disp: 90 capsule, Rfl: 3    Current Facility-Administered Medications:   •  loratadine (CLARITIN) tablet 10 mg, 10 mg, Oral, Daily, Xander Robison MD    Allergies   Allergen Reactions   • Penicillins Hives        PHYSICAL EXAMINATION:  Vitals:    02/17/20 1422   BP: 146/66   Pulse: 61      Body mass index is 25.14 kg/m².       HEENT: Normal.      EXTREMITIES: No edema.     IMPRESSION:    Patient with obstructive sleep apnea syndrome with hypersomnia successfully treated with CPAP therapy and is compliant and benefiting from it.   Will review download when available.     RECOMMENDATIONS:   1. Continue present CPAP.   2. Follow up 1 year.     EPWORTH SLEEPINESS SCALE  Sitting and reading  0  WatchingTV  0  Sitting, inactive, in a public place  0  As a passenger in a car for 1 hour w/o a break  0  Lying down to rest in the afternoon  0  Sitting and talking to someone  0  Sitting quietly after a lunch  0  In a car, while stopped for traffic or a light  0  Total 0             This document has been electronically signed by Joseph Seipel, MD on February 17, 2020 2:33 PM

## 2020-02-18 ENCOUNTER — TELEPHONE (OUTPATIENT)
Dept: NEUROLOGY | Facility: CLINIC | Age: 85
End: 2020-02-18

## 2020-02-18 DIAGNOSIS — G47.33 OBSTRUCTIVE SLEEP APNEA: Primary | ICD-10-CM

## 2020-02-19 ENCOUNTER — OFFICE VISIT (OUTPATIENT)
Dept: FAMILY MEDICINE CLINIC | Facility: CLINIC | Age: 85
End: 2020-02-19

## 2020-02-19 VITALS
HEIGHT: 70 IN | OXYGEN SATURATION: 99 % | RESPIRATION RATE: 15 BRPM | BODY MASS INDEX: 25.51 KG/M2 | HEART RATE: 59 BPM | WEIGHT: 178.2 LBS

## 2020-02-19 DIAGNOSIS — F41.1 GENERALIZED ANXIETY DISORDER: Primary | ICD-10-CM

## 2020-02-19 DIAGNOSIS — Z63.4 GRIEF AT LOSS OF CHILD: ICD-10-CM

## 2020-02-19 DIAGNOSIS — F43.21 GRIEF AT LOSS OF CHILD: ICD-10-CM

## 2020-02-19 PROBLEM — F41.9 ANXIETY DISORDER, UNSPECIFIED: Status: ACTIVE | Noted: 2020-02-19

## 2020-02-19 PROCEDURE — 99213 OFFICE O/P EST LOW 20 MIN: CPT | Performed by: FAMILY MEDICINE

## 2020-02-19 RX ORDER — LORAZEPAM 0.5 MG/1
0.5 TABLET ORAL EVERY 8 HOURS PRN
Qty: 20 TABLET | Refills: 0 | Status: ON HOLD | OUTPATIENT
Start: 2020-02-19 | End: 2020-09-04

## 2020-02-19 SDOH — SOCIAL STABILITY - SOCIAL INSECURITY: DISSAPEARANCE AND DEATH OF FAMILY MEMBER: Z63.4

## 2020-02-19 NOTE — PROGRESS NOTES
Subjective   Mikael Shanks is a 84 y.o. male.     Anxiety   Presents for initial visit. Onset was in the past 7 days. The problem has been gradually worsening. Symptoms occur constantly. The symptoms are aggravated by family issues (daughter passed away from cancer).            The following portions of the patient's history were reviewed and updated as appropriate: current medications, past family history, past medical history, past social history, past surgical history and problem list.    Family History   Problem Relation Age of Onset   • Cancer Mother    • Heart disease Father    • Cancer Brother        Social History     Tobacco Use   • Smoking status: Former Smoker     Types: Cigarettes   • Smokeless tobacco: Never Used   Substance Use Topics   • Alcohol use: No     Frequency: Never   • Drug use: No       Past Surgical History:   Procedure Laterality Date   • BACK SURGERY     • CARDIAC CATHETERIZATION N/A 12/5/2019    Procedure: Left Heart Cath and coronary angiogram;  Surgeon: Dayday Ruiz MD;  Location: [facility] CATH INVASIVE LOCATION;  Service: Cardiovascular   • CARDIAC CATHETERIZATION N/A 12/5/2019    Procedure: Right and Left Heart Cath;  Surgeon: Dayday Ruiz MD;  Location: [facility] CATH INVASIVE LOCATION;  Service: Cardiovascular   • CHOLECYSTECTOMY         Patient Active Problem List   Diagnosis   • Carotid artery disease (CMS/HCC)   • Type 2 diabetes mellitus without complication, without long-term current use of insulin (CMS/HCC)   • PAC (premature atrial contraction)   • Vitamin D deficiency   • Gastritis and gastroduodenitis   • Atherosclerosis of native coronary artery of native heart without angina pectoris   • Chronic kidney disease, stage III (moderate) (CMS/HCC)   • Hypertension, benign   • Spinal stenosis of lumbar region   • Other folate deficiency anemias   • Asymptomatic peripheral vascular disease (CMS/HCC)   • Hyperkalemia   • Hyponatremia   • Osteoarthritis   • Prostatic  hypertrophy   • Pernicious anemia   • Hypomagnesemia   • Depression   • Dupuytren's contracture of both hands   • Sinus pause   • Pulmonary valve disorder   • Hearing loss   • Family history of colon cancer   • Annual physical exam   • Left arm pain   • Snoring   • Shortness of breath   • BILL (obstructive sleep apnea)   • Acute exacerbation of CHF (congestive heart failure) (CMS/HCC)   • Acute respiratory failure with hypoxia (CMS/HCC)   • Stenosis of coronary artery stent   • Mixed hyperlipidemia   • Chronic systolic congestive heart failure (CMS/HCC)   • Lethargy   • Allergy   • Anxiety disorder, unspecified   • Grief at loss of child       Current Outpatient Medications on File Prior to Visit   Medication Sig Dispense Refill   • aspirin 81 MG tablet Take 81 mg by mouth Daily.     • atorvastatin (LIPITOR) 80 MG tablet Take 1 tablet by mouth Every Night. 90 tablet 1   • Blood Glucose Monitoring Suppl (ONE TOUCH ULTRA 2) w/Device kit 1 Device Daily. 1 each 0   • finasteride (PROSCAR) 5 MG tablet Take 1 tablet by mouth Daily. 30 tablet 0   • fluticasone (FLONASE) 50 MCG/ACT nasal spray 2 sprays into the nostril(s) as directed by provider Daily. 3 bottle 3   • furosemide (LASIX) 20 MG tablet Take 1 tablet by mouth 2 (Two) Times a Day. 60 tablet 0   • glimepiride (AMARYL) 1 MG tablet Take 1 mg by mouth Every Morning Before Breakfast.     • glucose blood (ONE TOUCH ULTRA TEST) test strip Use as instructed 100 each 5   • metoprolol succinate XL (TOPROL-XL) 25 MG 24 hr tablet Take 1 tablet by mouth Daily. 90 tablet 3   • omeprazole (priLOSEC) 40 MG capsule Take 1 capsule by mouth Daily. 90 capsule 1   • ONETOUCH DELICA LANCETS 33G misc 1 pen Daily. 100 each 5   • sertraline (ZOLOFT) 25 MG tablet Take 1 tablet by mouth Daily. 90 tablet 3   • tamsulosin (FLOMAX) 0.4 MG capsule 24 hr capsule Take 1 capsule by mouth Daily. 90 capsule 3     Current Facility-Administered Medications on File Prior to Visit   Medication Dose  "Route Frequency Provider Last Rate Last Dose   • loratadine (CLARITIN) tablet 10 mg  10 mg Oral Daily Xander Robison MD           Allergies   Allergen Reactions   • Penicillins Hives       Review of Systems   Psychiatric/Behavioral:        Anxiety    Crying alot       Objective   Visit Vitals  Pulse 59   Resp 15   Ht 177.8 cm (70\")   Wt 80.8 kg (178 lb 3.2 oz)   SpO2 99%   BMI 25.57 kg/m²     Physical Exam   Constitutional: He is oriented to person, place, and time. He appears well-developed and well-nourished. He is cooperative.   HENT:   Head: Normocephalic.   Neck: Trachea normal. Neck supple. Carotid bruit is not present. No thyromegaly present.   Cardiovascular: Normal rate and regular rhythm. Exam reveals no gallop and no friction rub.   No murmur heard.  Pulmonary/Chest: Effort normal and breath sounds normal. No respiratory distress. He has no wheezes. He exhibits no tenderness.   Neurological: He is alert and oriented to person, place, and time.   Skin: Skin is dry. No rash noted. Nails show no clubbing.   Nursing note and vitals reviewed.        Assessment/Plan .  Problem List Items Addressed This Visit        High    Grief at loss of child    Current Assessment & Plan     Long discussion counseling - use ativan sparingly            Unprioritized    Anxiety disorder, unspecified - Primary    Current Assessment & Plan     New diagnosis due to death of a daughter from cancer. Discussed that he needs to grief. I advised him that I will give him Ativan but to use sparingly.          Relevant Medications    LORazepam (ATIVAN) 0.5 MG tablet               "

## 2020-02-19 NOTE — ASSESSMENT & PLAN NOTE
New diagnosis due to death of a daughter from cancer. Discussed that he needs to grief. I advised him that I will give him Ativan but to use sparingly.

## 2020-02-22 PROBLEM — F43.21 GRIEF AT LOSS OF CHILD: Status: ACTIVE | Noted: 2020-02-22

## 2020-02-22 PROBLEM — Z63.4 GRIEF AT LOSS OF CHILD: Status: ACTIVE | Noted: 2020-02-22

## 2020-02-26 ENCOUNTER — OFFICE VISIT (OUTPATIENT)
Dept: FAMILY MEDICINE CLINIC | Facility: CLINIC | Age: 85
End: 2020-02-26

## 2020-02-26 VITALS
BODY MASS INDEX: 24.54 KG/M2 | OXYGEN SATURATION: 97 % | SYSTOLIC BLOOD PRESSURE: 132 MMHG | DIASTOLIC BLOOD PRESSURE: 70 MMHG | TEMPERATURE: 97.6 F | WEIGHT: 171.4 LBS | HEIGHT: 70 IN | HEART RATE: 73 BPM | RESPIRATION RATE: 20 BRPM

## 2020-02-26 DIAGNOSIS — R68.89 FLU-LIKE SYMPTOMS: ICD-10-CM

## 2020-02-26 DIAGNOSIS — F43.21 GRIEF AT LOSS OF CHILD: ICD-10-CM

## 2020-02-26 DIAGNOSIS — F41.1 GENERALIZED ANXIETY DISORDER: Primary | ICD-10-CM

## 2020-02-26 DIAGNOSIS — J10.1 INFLUENZA A: ICD-10-CM

## 2020-02-26 DIAGNOSIS — Z63.4 GRIEF AT LOSS OF CHILD: ICD-10-CM

## 2020-02-26 PROCEDURE — 99213 OFFICE O/P EST LOW 20 MIN: CPT | Performed by: FAMILY MEDICINE

## 2020-02-26 PROCEDURE — 87804 INFLUENZA ASSAY W/OPTIC: CPT | Performed by: FAMILY MEDICINE

## 2020-02-26 RX ORDER — OSELTAMIVIR PHOSPHATE 75 MG/1
75 CAPSULE ORAL 2 TIMES DAILY
Qty: 10 CAPSULE | Refills: 0 | Status: SHIPPED | OUTPATIENT
Start: 2020-02-26 | End: 2020-02-26

## 2020-02-26 RX ORDER — TRIAMCINOLONE ACETONIDE 1 MG/G
CREAM TOPICAL
Status: ON HOLD | COMMUNITY
End: 2020-09-04

## 2020-02-26 RX ORDER — OSELTAMIVIR PHOSPHATE 75 MG/1
75 CAPSULE ORAL 2 TIMES DAILY
Qty: 10 CAPSULE | Refills: 0 | Status: ON HOLD | OUTPATIENT
Start: 2020-02-26 | End: 2020-09-04

## 2020-02-26 RX ORDER — TRIAMCINOLONE ACETONIDE 0.25 MG/G
CREAM TOPICAL
Status: ON HOLD | COMMUNITY
End: 2020-09-04

## 2020-02-26 SDOH — SOCIAL STABILITY - SOCIAL INSECURITY: DISSAPEARANCE AND DEATH OF FAMILY MEMBER: Z63.4

## 2020-02-26 NOTE — ASSESSMENT & PLAN NOTE
Influenza swab done, Positive for Flu A,  Prescribed Tamiflu 75mg.  Pt advised to rest and drink plenty of fluids.  Will call office if symptoms persist or worsen.  Will follow conservatively.

## 2020-02-26 NOTE — PROGRESS NOTES
Subjective   Mikael Shanks is a 84 y.o. male.     Grief:  Pt here today for follow up.    Anxiety   Presents for follow-up visit. Symptoms include depressed mood and nervous/anxious behavior. Symptoms occur most days. The severity of symptoms is moderate. The quality of sleep is fair. Nighttime awakenings: one to two.     Compliance with medications is %.   URI    This is a new problem. The current episode started in the past 7 days. The problem has been gradually worsening. There has been no fever. Associated symptoms include congestion (clear), coughing (clear) and diarrhea. He has tried nothing for the symptoms.        The following portions of the patient's history were reviewed and updated as appropriate: allergies, current medications, past family history, past medical history, past social history, past surgical history and problem list.    Family History   Problem Relation Age of Onset   • Cancer Mother    • Heart disease Father    • Cancer Brother        Social History     Tobacco Use   • Smoking status: Former Smoker     Types: Cigarettes   • Smokeless tobacco: Never Used   Substance Use Topics   • Alcohol use: No     Frequency: Never   • Drug use: No       Past Surgical History:   Procedure Laterality Date   • BACK SURGERY     • CARDIAC CATHETERIZATION N/A 12/5/2019    Procedure: Left Heart Cath and coronary angiogram;  Surgeon: Dayday Ruiz MD;  Location: [facility] CATH INVASIVE LOCATION;  Service: Cardiovascular   • CARDIAC CATHETERIZATION N/A 12/5/2019    Procedure: Right and Left Heart Cath;  Surgeon: Dayday Ruiz MD;  Location: [facility] CATH INVASIVE LOCATION;  Service: Cardiovascular   • CHOLECYSTECTOMY         Patient Active Problem List   Diagnosis   • Carotid artery disease (CMS/HCC)   • Type 2 diabetes mellitus without complication, without long-term current use of insulin (CMS/HCC)   • PAC (premature atrial contraction)   • Vitamin D deficiency   • Gastritis and gastroduodenitis   •  Atherosclerosis of native coronary artery of native heart without angina pectoris   • Chronic kidney disease, stage III (moderate) (CMS/[facility])   • Hypertension, benign   • Spinal stenosis of lumbar region   • Other folate deficiency anemias   • Asymptomatic peripheral vascular disease (CMS/[facility])   • Hyperkalemia   • Hyponatremia   • Osteoarthritis   • Prostatic hypertrophy   • Pernicious anemia   • Hypomagnesemia   • Depression   • Dupuytren's contracture of both hands   • Sinus pause   • Pulmonary valve disorder   • Hearing loss   • Family history of colon cancer   • Annual physical exam   • Left arm pain   • Snoring   • Shortness of breath   • BILL (obstructive sleep apnea)   • Acute exacerbation of CHF (congestive heart failure) (CMS/[facility])   • Acute respiratory failure with hypoxia (CMS/[facility])   • Stenosis of coronary artery stent   • Mixed hyperlipidemia   • Chronic systolic congestive heart failure (CMS/[facility])   • Lethargy   • Allergy   • Anxiety disorder, unspecified   • Grief at loss of child   • Flu-like symptoms   • Influenza A       Current Outpatient Medications on File Prior to Visit   Medication Sig Dispense Refill   • aspirin 81 MG tablet Take 81 mg by mouth Daily.     • atorvastatin (LIPITOR) 80 MG tablet Take 1 tablet by mouth Every Night. 90 tablet 1   • Blood Glucose Monitoring Suppl (ONE TOUCH ULTRA 2) w/Device kit 1 Device Daily. 1 each 0   • finasteride (PROSCAR) 5 MG tablet Take 1 tablet by mouth Daily. 30 tablet 0   • fluticasone (FLONASE) 50 MCG/ACT nasal spray 2 sprays into the nostril(s) as directed by provider Daily. 3 bottle 3   • furosemide (LASIX) 20 MG tablet Take 1 tablet by mouth 2 (Two) Times a Day. 60 tablet 0   • glimepiride (AMARYL) 1 MG tablet Take 1 mg by mouth Every Morning Before Breakfast.     • glucose blood (ONE TOUCH ULTRA TEST) test strip Use as instructed 100 each 5   • LORazepam (ATIVAN) 0.5 MG tablet Take 1 tablet by mouth Every 8 (Eight) Hours As Needed for Anxiety. 20 tablet  "0   • metoprolol succinate XL (TOPROL-XL) 25 MG 24 hr tablet Take 1 tablet by mouth Daily. 90 tablet 3   • omeprazole (priLOSEC) 40 MG capsule Take 1 capsule by mouth Daily. 90 capsule 1   • ONETOUCH DELICA LANCETS 33G misc 1 pen Daily. 100 each 5   • sertraline (ZOLOFT) 25 MG tablet Take 1 tablet by mouth Daily. 90 tablet 3   • tamsulosin (FLOMAX) 0.4 MG capsule 24 hr capsule Take 1 capsule by mouth Daily. 90 capsule 3   • triamcinolone (KENALOG) 0.025 % cream triamcinolone acetonide 0.025 % topical cream     • triamcinolone (KENALOG) 0.1 % cream triamcinolone acetonide 0.1 % topical cream       Current Facility-Administered Medications on File Prior to Visit   Medication Dose Route Frequency Provider Last Rate Last Dose   • loratadine (CLARITIN) tablet 10 mg  10 mg Oral Daily Xander Robison MD           Allergies   Allergen Reactions   • Penicillins Hives       Review of Systems   Constitutional: Positive for fatigue. Negative for fever.   HENT: Positive for congestion (clear) and postnasal drip.    Respiratory: Positive for cough (clear).    Gastrointestinal: Positive for diarrhea.   Musculoskeletal:        Body aches   Psychiatric/Behavioral: Positive for depressed mood. The patient is nervous/anxious.        Objective   Visit Vitals  /70 (BP Location: Right arm, Patient Position: Sitting, Cuff Size: Large Adult)   Pulse 73   Temp 97.6 °F (36.4 °C) (Oral)   Resp 20   Ht 177.8 cm (70\")   Wt 77.7 kg (171 lb 6.4 oz)   SpO2 97%   BMI 24.59 kg/m²     Physical Exam   Constitutional: He is oriented to person, place, and time. He appears well-developed and well-nourished. He is cooperative.   HENT:   Head: Normocephalic.   Right Ear: Tympanic membrane, external ear and ear canal normal. No middle ear effusion. cerumen impaction is not present.  Left Ear: Tympanic membrane, external ear and ear canal normal.  No middle ear effusion. An impacted cerumen is not present.  Nose: No mucosal edema, sinus tenderness, " septal deviation or congestion. Right sinus exhibits no maxillary sinus tenderness and no frontal sinus tenderness. Left sinus exhibits no maxillary sinus tenderness and no frontal sinus tenderness.   Mouth/Throat: Oropharynx is clear and moist. No oral lesions. No oropharyngeal exudate or tonsillar abscesses.   Neck: Trachea normal. Neck supple. Carotid bruit is not present. No thyromegaly present.   Cardiovascular: Normal rate and regular rhythm. Exam reveals no gallop and no friction rub.   No murmur heard.  Pulmonary/Chest: Effort normal and breath sounds normal. No respiratory distress. He has no wheezes. He exhibits no tenderness.   Neurological: He is alert and oriented to person, place, and time.   Skin: Skin is dry. No rash noted. Nails show no clubbing.   Nursing note and vitals reviewed.        Assessment/Plan .  Problem List Items Addressed This Visit        High    Grief at loss of child    Current Assessment & Plan     Improving.-- Pt states he has taken Ativan 3 times.            Unprioritized    Anxiety disorder, unspecified - Primary    Current Assessment & Plan     Improving.  Pt states he has taken Ativan 3 times.           Flu-like symptoms    Current Assessment & Plan     Influenza swab done, positive for Flu A.         Relevant Orders    POCT Influenza A/B (Completed)    Influenza A    Current Assessment & Plan     Influenza swab done, Positive for Flu A,  Prescribed Tamiflu 75mg.  Pt advised to rest and drink plenty of fluids.  Will call office if symptoms persist or worsen.  Will follow conservatively.         Relevant Medications    oseltamivir (TAMIFLU) 75 MG capsule

## 2020-02-29 LAB
EXPIRATION DATE: ABNORMAL
FLUAV AG NPH QL: POSITIVE
FLUBV AG NPH QL: NEGATIVE
INTERNAL CONTROL: ABNORMAL
Lab: 111

## 2020-04-13 ENCOUNTER — RESULTS ENCOUNTER (OUTPATIENT)
Dept: FAMILY MEDICINE CLINIC | Facility: CLINIC | Age: 85
End: 2020-04-13

## 2020-04-13 DIAGNOSIS — E55.9 VITAMIN D DEFICIENCY: ICD-10-CM

## 2020-04-13 DIAGNOSIS — D53.9 ANEMIA, DEFICIENCY: ICD-10-CM

## 2020-04-13 DIAGNOSIS — D51.0 PERNICIOUS ANEMIA: ICD-10-CM

## 2020-04-13 DIAGNOSIS — E11.9 TYPE 2 DIABETES MELLITUS WITHOUT COMPLICATION, WITHOUT LONG-TERM CURRENT USE OF INSULIN (HCC): ICD-10-CM

## 2020-04-13 DIAGNOSIS — E78.2 MIXED HYPERLIPIDEMIA: ICD-10-CM

## 2020-04-30 DIAGNOSIS — I50.22 CHRONIC SYSTOLIC CONGESTIVE HEART FAILURE (HCC): ICD-10-CM

## 2020-04-30 RX ORDER — FUROSEMIDE 20 MG/1
TABLET ORAL
Qty: 180 TABLET | Refills: 1 | Status: SHIPPED | OUTPATIENT
Start: 2020-04-30 | End: 2020-05-06 | Stop reason: SDUPTHER

## 2020-05-03 DIAGNOSIS — K29.70 GASTRITIS AND GASTRODUODENITIS: ICD-10-CM

## 2020-05-03 DIAGNOSIS — K29.90 GASTRITIS AND GASTRODUODENITIS: ICD-10-CM

## 2020-05-03 DIAGNOSIS — E78.2 MIXED HYPERLIPIDEMIA: ICD-10-CM

## 2020-05-04 ENCOUNTER — TELEPHONE (OUTPATIENT)
Dept: NEUROLOGY | Facility: CLINIC | Age: 85
End: 2020-05-04

## 2020-05-04 DIAGNOSIS — G47.33 OBSTRUCTIVE SLEEP APNEA: Primary | ICD-10-CM

## 2020-05-04 RX ORDER — OMEPRAZOLE 40 MG/1
40 CAPSULE, DELAYED RELEASE ORAL DAILY
Qty: 90 CAPSULE | Refills: 0 | Status: SHIPPED | OUTPATIENT
Start: 2020-05-04 | End: 2020-05-06 | Stop reason: SDUPTHER

## 2020-05-04 RX ORDER — ATORVASTATIN CALCIUM 80 MG/1
80 TABLET, FILM COATED ORAL NIGHTLY
Qty: 90 TABLET | Refills: 0 | Status: SHIPPED | OUTPATIENT
Start: 2020-05-04 | End: 2020-05-06 | Stop reason: SDUPTHER

## 2020-05-04 NOTE — TELEPHONE ENCOUNTER
Patient is having a hard time getting his CPAP and would like to get his supplies from [facility] in [place]. He needs the supplies that comes in the first six months., he has had the machine since November and still needs supplies    PLEASE CALL PATIENT WITH UPDATE.  [phone]

## 2020-05-06 DIAGNOSIS — E78.2 MIXED HYPERLIPIDEMIA: ICD-10-CM

## 2020-05-06 DIAGNOSIS — F32.A DEPRESSION, UNSPECIFIED DEPRESSION TYPE: ICD-10-CM

## 2020-05-06 DIAGNOSIS — N18.30 CHRONIC KIDNEY DISEASE, STAGE III (MODERATE) (HCC): ICD-10-CM

## 2020-05-06 DIAGNOSIS — K29.90 GASTRITIS AND GASTRODUODENITIS: ICD-10-CM

## 2020-05-06 DIAGNOSIS — N40.0 PROSTATIC HYPERTROPHY: ICD-10-CM

## 2020-05-06 DIAGNOSIS — I10 HYPERTENSION, BENIGN: ICD-10-CM

## 2020-05-06 DIAGNOSIS — I50.22 CHRONIC SYSTOLIC CONGESTIVE HEART FAILURE (HCC): ICD-10-CM

## 2020-05-06 DIAGNOSIS — E11.9 TYPE 2 DIABETES MELLITUS WITHOUT COMPLICATION, WITHOUT LONG-TERM CURRENT USE OF INSULIN (HCC): Primary | ICD-10-CM

## 2020-05-06 DIAGNOSIS — K29.70 GASTRITIS AND GASTRODUODENITIS: ICD-10-CM

## 2020-05-06 RX ORDER — OMEPRAZOLE 40 MG/1
40 CAPSULE, DELAYED RELEASE ORAL DAILY
Qty: 90 CAPSULE | Refills: 0 | Status: SHIPPED | OUTPATIENT
Start: 2020-05-06 | End: 2020-07-13

## 2020-05-06 RX ORDER — METOPROLOL SUCCINATE 25 MG/1
25 TABLET, EXTENDED RELEASE ORAL DAILY
Qty: 90 TABLET | Refills: 0 | Status: SHIPPED | OUTPATIENT
Start: 2020-05-06 | End: 2020-07-07

## 2020-05-06 RX ORDER — FUROSEMIDE 20 MG/1
20 TABLET ORAL 2 TIMES DAILY
Qty: 180 TABLET | Refills: 0 | Status: SHIPPED | OUTPATIENT
Start: 2020-05-06 | End: 2020-08-31 | Stop reason: SDUPTHER

## 2020-05-06 RX ORDER — FINASTERIDE 5 MG/1
5 TABLET, FILM COATED ORAL DAILY
Qty: 90 TABLET | Refills: 0 | Status: SHIPPED | OUTPATIENT
Start: 2020-05-06 | End: 2020-07-07

## 2020-05-06 RX ORDER — GLIMEPIRIDE 1 MG/1
1 TABLET ORAL
Qty: 90 TABLET | Refills: 0 | Status: SHIPPED | OUTPATIENT
Start: 2020-05-06 | End: 2020-07-07

## 2020-05-06 RX ORDER — ATORVASTATIN CALCIUM 80 MG/1
80 TABLET, FILM COATED ORAL NIGHTLY
Qty: 90 TABLET | Refills: 0 | Status: SHIPPED | OUTPATIENT
Start: 2020-05-06 | End: 2020-07-13

## 2020-05-06 RX ORDER — TAMSULOSIN HYDROCHLORIDE 0.4 MG/1
1 CAPSULE ORAL DAILY
Qty: 90 CAPSULE | Refills: 0 | Status: SHIPPED | OUTPATIENT
Start: 2020-05-06 | End: 2020-07-07

## 2020-05-06 RX ORDER — SERTRALINE HYDROCHLORIDE 25 MG/1
25 TABLET, FILM COATED ORAL DAILY
Qty: 90 TABLET | Refills: 0 | Status: SHIPPED | OUTPATIENT
Start: 2020-05-06 | End: 2020-07-07

## 2020-05-07 LAB
25(OH)D3+25(OH)D2 SERPL-MCNC: 39 NG/ML (ref 30–100)
ALBUMIN SERPL-MCNC: 4.3 G/DL (ref 3.6–4.6)
ALBUMIN/GLOB SERPL: 1.5 {RATIO} (ref 1.2–2.2)
ALP SERPL-CCNC: 80 IU/L (ref 39–117)
ALT SERPL-CCNC: 27 IU/L (ref 0–44)
AST SERPL-CCNC: 27 IU/L (ref 0–40)
BASOPHILS # BLD AUTO: 0.1 X10E3/UL (ref 0–0.2)
BASOPHILS NFR BLD AUTO: 1 %
BILIRUB SERPL-MCNC: 0.5 MG/DL (ref 0–1.2)
BUN SERPL-MCNC: 26 MG/DL (ref 8–27)
BUN/CREAT SERPL: 16 (ref 10–24)
CALCIUM SERPL-MCNC: 9.3 MG/DL (ref 8.6–10.2)
CHLORIDE SERPL-SCNC: 101 MMOL/L (ref 96–106)
CHOLEST SERPL-MCNC: 146 MG/DL (ref 100–199)
CHOLEST/HDLC SERPL: 2.8 RATIO (ref 0–5)
CO2 SERPL-SCNC: 24 MMOL/L (ref 20–29)
CREAT SERPL-MCNC: 1.64 MG/DL (ref 0.76–1.27)
EOSINOPHIL # BLD AUTO: 0.5 X10E3/UL (ref 0–0.4)
EOSINOPHIL NFR BLD AUTO: 5 %
ERYTHROCYTE [DISTWIDTH] IN BLOOD BY AUTOMATED COUNT: 12.9 % (ref 11.6–15.4)
GLOBULIN SER CALC-MCNC: 2.9 G/DL (ref 1.5–4.5)
GLUCOSE SERPL-MCNC: 113 MG/DL (ref 65–99)
HBA1C MFR BLD: 5.9 % (ref 4.8–5.6)
HCT VFR BLD AUTO: 38.4 % (ref 37.5–51)
HDLC SERPL-MCNC: 52 MG/DL
HGB BLD-MCNC: 12.6 G/DL (ref 13–17.7)
IMM GRANULOCYTES # BLD AUTO: 0.2 X10E3/UL (ref 0–0.1)
IMM GRANULOCYTES NFR BLD AUTO: 2 %
LDLC SERPL CALC-MCNC: 71 MG/DL (ref 0–99)
LYMPHOCYTES # BLD AUTO: 1.4 X10E3/UL (ref 0.7–3.1)
LYMPHOCYTES NFR BLD AUTO: 14 %
MCH RBC QN AUTO: 29.1 PG (ref 26.6–33)
MCHC RBC AUTO-ENTMCNC: 32.8 G/DL (ref 31.5–35.7)
MCV RBC AUTO: 89 FL (ref 79–97)
MONOCYTES # BLD AUTO: 0.7 X10E3/UL (ref 0.1–0.9)
MONOCYTES NFR BLD AUTO: 7 %
NEUTROPHILS # BLD AUTO: 7.2 X10E3/UL (ref 1.4–7)
NEUTROPHILS NFR BLD AUTO: 71 %
PLATELET # BLD AUTO: 215 X10E3/UL (ref 150–450)
POTASSIUM SERPL-SCNC: 4.7 MMOL/L (ref 3.5–5.2)
PROT SERPL-MCNC: 7.2 G/DL (ref 6–8.5)
RBC # BLD AUTO: 4.33 X10E6/UL (ref 4.14–5.8)
SODIUM SERPL-SCNC: 141 MMOL/L (ref 134–144)
TRIGL SERPL-MCNC: 113 MG/DL (ref 0–149)
VIT B12 SERPL-MCNC: 536 PG/ML (ref 232–1245)
VLDLC SERPL CALC-MCNC: 23 MG/DL (ref 5–40)
WBC # BLD AUTO: 10.1 X10E3/UL (ref 3.4–10.8)

## 2020-05-07 NOTE — TELEPHONE ENCOUNTER
Called pt- he stated he had issues getting supplies from [facility] [place]. I've called [facility] [place] to see what the issue is.

## 2020-05-07 NOTE — TELEPHONE ENCOUNTER
PT CALLING HE DOES NOT WANT TO USE [facility] HE WANTS TO USE [facility]. IF YOU COULD PLEASE  PUT IN A ORDER FOR HIS SUPPLIES. PT CAN NOT USE HIS CPAP CAUSE THE MASK IS WORE OUT AND WILL NOT STAY ON HIS FACE, SO PLEASE HAVE THEM CALL ME WHEN THEY SHIP OUT HIS SUPPLIES.

## 2020-05-13 ENCOUNTER — TELEPHONE (OUTPATIENT)
Dept: NEUROLOGY | Facility: CLINIC | Age: 85
End: 2020-05-13

## 2020-05-13 NOTE — TELEPHONE ENCOUNTER
PT CALLED TO REQUEST UPDATE RE: CPAP MASK HE REQUESTED TO GET FROM [facility] LAST WEEK; PER NOTES ON 5-7-20, [facility] SEE WHY THE PT WAS HAVING TROUBLE WITH THEM AS A SUPPLIER.  PT WOULD LIKE UPDATE AS HE HAS NOT HEARD FROM OFFICE OR DME COMPANY.    PLEASE CALL: [phone]; PLEASE LEAVE VM IF NO ANSWER.

## 2020-05-28 ENCOUNTER — OFFICE VISIT (OUTPATIENT)
Dept: CARDIOLOGY | Facility: CLINIC | Age: 85
End: 2020-05-28

## 2020-05-28 VITALS
HEART RATE: 69 BPM | WEIGHT: 183 LBS | SYSTOLIC BLOOD PRESSURE: 147 MMHG | DIASTOLIC BLOOD PRESSURE: 67 MMHG | BODY MASS INDEX: 26.26 KG/M2 | OXYGEN SATURATION: 97 %

## 2020-05-28 DIAGNOSIS — I25.5 ISCHEMIC CARDIOMYOPATHY: Primary | ICD-10-CM

## 2020-05-28 DIAGNOSIS — E11.9 TYPE 2 DIABETES MELLITUS WITHOUT COMPLICATION, WITHOUT LONG-TERM CURRENT USE OF INSULIN (HCC): ICD-10-CM

## 2020-05-28 DIAGNOSIS — I10 HYPERTENSION, BENIGN: ICD-10-CM

## 2020-05-28 DIAGNOSIS — R06.02 SHORTNESS OF BREATH: ICD-10-CM

## 2020-05-28 DIAGNOSIS — E78.2 MIXED HYPERLIPIDEMIA: ICD-10-CM

## 2020-05-28 PROCEDURE — 93000 ELECTROCARDIOGRAM COMPLETE: CPT | Performed by: INTERNAL MEDICINE

## 2020-05-28 PROCEDURE — 99214 OFFICE O/P EST MOD 30 MIN: CPT | Performed by: INTERNAL MEDICINE

## 2020-05-28 NOTE — PROGRESS NOTES
Encounter Date:05/28/2020  Last seen 12/18/2019      Patient ID: Mikael Shanks is a 84 y.o. male.    Chief Complaint:  Congestive heart failure  Status post stent  Hypertension  Diabetes  Renal dysfunction        History of Present Illness  Since I have last seen, the patient has been without any chest discomfort ,shortness of breath, palpitations, dizziness or syncope.  Denies having any headache ,abdominal pain ,nausea, vomiting , diarrhea constipation, loss of weight or loss of appetite.  Denies having any excessive bruising ,hematuria or blood in the stool.    Review of all systems negative except as indicated  Assessment and Plan      ]]]]]]]]]]]]]]]]]]]]]]]  Impression  ========  -Status post stent 2003 at [facility]     - Ischemic cardiomyopathy with ejection fraction of 25%.     -Status post acute on chronic systolic congestive heart failure-improved significant left ventricle dysfunction with ejection fraction of 25%.     -Status post acute respiratory failure with hypoxia-improved.     Cardiac catheterization 12/6/2019 revealed  No evidence for pulmonary hypertension is present.  Left ventricle angiogram was not performed due to pre-existing renal dysfunction.  Left main coronary artery has ostial 30% disease.  Left anterior descending artery is normal.  Circumflex coronary artery is a codominant vessel and is normal.  Right coronary artery is a codominant vessel that has 30% luminal irregularities in the midsegment.      -Hypertension diabetes renal dysfunction.  BUN 14 creatinine 1.5.  Dyslipidemia     -History of anemia     -History of premature atrial contractions     -History of severe hypomagnesemia.-Improved     -Allergic to penicillin.   =======  Plan  =======  EKG showed sinus rhythm premature ventricular contractions  Patient had recently significant acute systolic congestive heart failure.  Improved.  Patient has severe left ventricle dysfunction.  Patient is not having any angina  pectoris or congestive heart failure.  Medications were reviewed and updated.  Follow-up in office in 3 months with an echocardiogram and EKG.  Consideration will be given for ICD  Patient has renal dysfunction and not on ACE inhibitor at this time.  Further plan will depend on patient's progress.  ]]]]]]]]]]]]]                            Diagnosis Plan   1. Mixed hyperlipidemia     2. Hypertension, benign     3. Shortness of breath     4. Type 2 diabetes mellitus without complication, without long-term current use of insulin (CMS/[facility])     5. Ischemic cardiomyopathy     LAB RESULTS (LAST 7 DAYS)    CBC        BMP        CMP         BNP        TROPONIN        CoAg        Creatinine Clearance  Estimated Creatinine Clearance: 39.4 mL/min (A) (by C-G formula based on SCr of 1.64 mg/dL (H)).    ABG        Radiology  No radiology results for the last day                The following portions of the patient's history were reviewed and updated as appropriate: allergies, current medications, past family history, past medical history, past social history, past surgical history and problem list.    Review of Systems   Constitution: Negative for fever and malaise/fatigue.   HENT: Negative for congestion and hearing loss.    Eyes: Negative for double vision and visual disturbance.   Cardiovascular: Negative for chest pain, claudication, dyspnea on exertion, leg swelling and syncope.   Respiratory: Negative for cough and shortness of breath.    Endocrine: Negative for cold intolerance.   Skin: Negative for color change and rash.   Musculoskeletal: Negative for arthritis and joint pain.   Gastrointestinal: Negative for abdominal pain and heartburn.   Genitourinary: Negative for hematuria.   Neurological: Negative for excessive daytime sleepiness and dizziness.   Psychiatric/Behavioral: Negative for depression. The patient is not nervous/anxious.    All other systems reviewed and are negative.        Current Outpatient Medications:    •  aspirin 81 MG tablet, Take 81 mg by mouth Daily., Disp: , Rfl:   •  atorvastatin (LIPITOR) 80 MG tablet, Take 1 tablet by mouth Every Night., Disp: 90 tablet, Rfl: 0  •  Blood Glucose Monitoring Suppl (ONE TOUCH ULTRA 2) w/Device kit, 1 Device Daily., Disp: 1 each, Rfl: 0  •  finasteride (PROSCAR) 5 MG tablet, Take 1 tablet by mouth Daily., Disp: 90 tablet, Rfl: 0  •  fluticasone (FLONASE) 50 MCG/ACT nasal spray, 2 sprays into the nostril(s) as directed by provider Daily., Disp: 3 bottle, Rfl: 3  •  furosemide (LASIX) 20 MG tablet, Take 1 tablet by mouth 2 (Two) Times a Day., Disp: 180 tablet, Rfl: 0  •  glimepiride (AMARYL) 1 MG tablet, Take 1 tablet by mouth Every Morning Before Breakfast., Disp: 90 tablet, Rfl: 0  •  glucose blood (ONE TOUCH ULTRA TEST) test strip, Use as instructed, Disp: 100 each, Rfl: 5  •  metoprolol succinate XL (TOPROL-XL) 25 MG 24 hr tablet, Take 1 tablet by mouth Daily., Disp: 90 tablet, Rfl: 0  •  omeprazole (priLOSEC) 40 MG capsule, Take 1 capsule by mouth Daily., Disp: 90 capsule, Rfl: 0  •  ONETOUCH DELICA LANCETS 33G misc, 1 pen Daily., Disp: 100 each, Rfl: 5  •  sertraline (ZOLOFT) 25 MG tablet, Take 1 tablet by mouth Daily., Disp: 90 tablet, Rfl: 0  •  tamsulosin (FLOMAX) 0.4 MG capsule 24 hr capsule, Take 1 capsule by mouth Daily., Disp: 90 capsule, Rfl: 0  •  LORazepam (ATIVAN) 0.5 MG tablet, Take 1 tablet by mouth Every 8 (Eight) Hours As Needed for Anxiety., Disp: 20 tablet, Rfl: 0  •  oseltamivir (TAMIFLU) 75 MG capsule, Take 1 capsule by mouth 2 (Two) Times a Day., Disp: 10 capsule, Rfl: 0  •  triamcinolone (KENALOG) 0.025 % cream, triamcinolone acetonide 0.025 % topical cream, Disp: , Rfl:   •  triamcinolone (KENALOG) 0.1 % cream, triamcinolone acetonide 0.1 % topical cream, Disp: , Rfl:     Current Facility-Administered Medications:   •  loratadine (CLARITIN) tablet 10 mg, 10 mg, Oral, Daily, Xander Robison MD    Allergies   Allergen Reactions   • Penicillins Hives        Family History   Problem Relation Age of Onset   • Cancer Mother    • Heart disease Father    • Cancer Brother        Past Surgical History:   Procedure Laterality Date   • BACK SURGERY     • CARDIAC CATHETERIZATION N/A 12/5/2019    Procedure: Left Heart Cath and coronary angiogram;  Surgeon: Dayday Ruiz MD;  Location: [facility] CATH INVASIVE LOCATION;  Service: Cardiovascular   • CARDIAC CATHETERIZATION N/A 12/5/2019    Procedure: Right and Left Heart Cath;  Surgeon: Dayday Ruiz MD;  Location: [facility] CATH INVASIVE LOCATION;  Service: Cardiovascular   • CHOLECYSTECTOMY         Past Medical History:   Diagnosis Date   • Carotid artery disease (CMS/HCC)    • GERD (gastroesophageal reflux disease)    • Hyperlipidemia    • Mood disorder (CMS/HCC)    • Osteoarthritis    • Prostatic hypertrophy    • Pulmonary valve disorder    • Sleep apnea        Family History   Problem Relation Age of Onset   • Cancer Mother    • Heart disease Father    • Cancer Brother        Social History     Socioeconomic History   • Marital status:      Spouse name: Not on file   • Number of children: Not on file   • Years of education: Not on file   • Highest education level: Not on file   Occupational History   • Occupation: Retired   Social Needs   • Financial resource strain: Not hard at all   • Food insecurity:     Worry: Never true     Inability: Never true   • Transportation needs:     Medical: No     Non-medical: No   Tobacco Use   • Smoking status: Former Smoker     Types: Cigarettes   • Smokeless tobacco: Never Used   Substance and Sexual Activity   • Alcohol use: No     Frequency: Never   • Drug use: No   • Sexual activity: Never   Lifestyle   • Physical activity:     Days per week: 0 days     Minutes per session: 0 min   • Stress: Not at all   Relationships   • Social connections:     Talks on phone: More than three times a week     Gets together: More than three times a week     Attends [employer] service: 1 to 4  times per year     Active member of club or organization: Yes     Attends meetings of clubs or organizations: Never     Relationship status: Living with partner           ECG 12 Lead  Date/Time: 5/28/2020 4:30 PM  Performed by: Dayday Ruiz MD  Authorized by: Dayday Ruiz MD   Comparison: compared with previous ECG   Similar to previous ECG  Comments: Normal sinus rhythm premature ventricular contractions nonspecific ST-T wave changes 69/min normal axis normal intervals no change from previous tracing              Objective:       Physical Exam    /67   Pulse 69   Wt 83 kg (183 lb)   SpO2 97%   BMI 26.26 kg/m²   The patient is alert, oriented and in no distress.    Vital signs as noted above.    Head and neck revealed no carotid bruits or jugular venous distension.  No thyromegaly or lymphadenopathy is present.    Lungs clear.  No wheezing.  Breath sounds are normal bilaterally.    Heart normal first and second heart sounds.  No murmur..  No pericardial rub is present.  No gallop is present.    Abdomen soft and nontender.  No organomegaly is present.    Extremities revealed good peripheral pulses without any pedal edema.    Skin warm and dry.    Musculoskeletal system is grossly normal.    CNS grossly normal.

## 2020-06-04 ENCOUNTER — OFFICE VISIT (OUTPATIENT)
Dept: FAMILY MEDICINE CLINIC | Facility: CLINIC | Age: 85
End: 2020-06-04

## 2020-06-04 VITALS
TEMPERATURE: 98.3 F | WEIGHT: 184.2 LBS | BODY MASS INDEX: 26.37 KG/M2 | RESPIRATION RATE: 15 BRPM | HEART RATE: 59 BPM | HEIGHT: 70 IN | OXYGEN SATURATION: 99 % | DIASTOLIC BLOOD PRESSURE: 76 MMHG | SYSTOLIC BLOOD PRESSURE: 151 MMHG

## 2020-06-04 DIAGNOSIS — E11.9 TYPE 2 DIABETES MELLITUS WITHOUT COMPLICATION, WITHOUT LONG-TERM CURRENT USE OF INSULIN (HCC): ICD-10-CM

## 2020-06-04 DIAGNOSIS — E55.9 VITAMIN D DEFICIENCY: ICD-10-CM

## 2020-06-04 DIAGNOSIS — D52.8 OTHER FOLATE DEFICIENCY ANEMIAS: ICD-10-CM

## 2020-06-04 DIAGNOSIS — E78.2 MIXED HYPERLIPIDEMIA: ICD-10-CM

## 2020-06-04 DIAGNOSIS — N18.30 CHRONIC KIDNEY DISEASE, STAGE III (MODERATE) (HCC): Primary | ICD-10-CM

## 2020-06-04 DIAGNOSIS — I10 HYPERTENSION, BENIGN: ICD-10-CM

## 2020-06-04 DIAGNOSIS — I25.5 ISCHEMIC CARDIOMYOPATHY: ICD-10-CM

## 2020-06-04 PROBLEM — T23.231A: Status: ACTIVE | Noted: 2020-06-04

## 2020-06-04 PROCEDURE — 99214 OFFICE O/P EST MOD 30 MIN: CPT | Performed by: FAMILY MEDICINE

## 2020-06-04 NOTE — ASSESSMENT & PLAN NOTE
Improving; Hgb 12.6 up from 11.8, Vitamin B12 536,. Denies epitaxis, hemoptisis, heartburn, blood in stool or black tarry stools

## 2020-06-04 NOTE — ASSESSMENT & PLAN NOTE
Improved,  Hgb a1c 5.9 down from 6.0  Encouraged to watch sugar intake, exercise more and lose weight.   compliant with medication. Not monitoring sugar at home.   Follow up in 3 months  Care management needs are self-addressed. . Self-management abilities addressed and patient is capable of managing his own disease.
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
## 2020-06-04 NOTE — ASSESSMENT & PLAN NOTE
Worsening; Creatinine 1.64 up from 1.36.  Patient has appt with nephrologist.  Patient encouraged to avoid anti-inflammatories

## 2020-06-04 NOTE — ASSESSMENT & PLAN NOTE
Improved. Chol 146 up from 131, Trig 113 down from 114, HDL 52 up from 51, LDL 71 up from 57  Encouraged to watch fatty intake, exercise more, and lose weight.  compliant with medication    Is not getting adequate diet and exercise  Goals developed at last visit were met   Follow up in 3  months  Care management needs are self-addressed. . Self-management abilities addressed and patient is capable of managing his own disease.

## 2020-07-07 DIAGNOSIS — N18.30 CHRONIC KIDNEY DISEASE, STAGE III (MODERATE) (HCC): ICD-10-CM

## 2020-07-07 DIAGNOSIS — N40.0 PROSTATIC HYPERTROPHY: ICD-10-CM

## 2020-07-07 DIAGNOSIS — E11.9 TYPE 2 DIABETES MELLITUS WITHOUT COMPLICATION, WITHOUT LONG-TERM CURRENT USE OF INSULIN (HCC): ICD-10-CM

## 2020-07-07 DIAGNOSIS — F32.A DEPRESSION, UNSPECIFIED DEPRESSION TYPE: ICD-10-CM

## 2020-07-07 DIAGNOSIS — I10 HYPERTENSION, BENIGN: ICD-10-CM

## 2020-07-07 RX ORDER — SERTRALINE HYDROCHLORIDE 25 MG/1
25 TABLET, FILM COATED ORAL DAILY
Qty: 90 TABLET | Refills: 0 | Status: SHIPPED | OUTPATIENT
Start: 2020-07-07 | End: 2020-08-31 | Stop reason: SDUPTHER

## 2020-07-07 RX ORDER — GLIMEPIRIDE 1 MG/1
TABLET ORAL
Qty: 90 TABLET | Refills: 0 | Status: SHIPPED | OUTPATIENT
Start: 2020-07-07 | End: 2020-08-31 | Stop reason: SDUPTHER

## 2020-07-07 RX ORDER — METOPROLOL SUCCINATE 25 MG/1
25 TABLET, EXTENDED RELEASE ORAL DAILY
Qty: 90 TABLET | Refills: 0 | Status: SHIPPED | OUTPATIENT
Start: 2020-07-07 | End: 2020-08-31 | Stop reason: SDUPTHER

## 2020-07-07 RX ORDER — FINASTERIDE 5 MG/1
5 TABLET, FILM COATED ORAL DAILY
Qty: 90 TABLET | Refills: 0 | Status: SHIPPED | OUTPATIENT
Start: 2020-07-07 | End: 2020-08-31 | Stop reason: SDUPTHER

## 2020-07-07 RX ORDER — TAMSULOSIN HYDROCHLORIDE 0.4 MG/1
1 CAPSULE ORAL DAILY
Qty: 90 CAPSULE | Refills: 0 | Status: SHIPPED | OUTPATIENT
Start: 2020-07-07 | End: 2020-08-31 | Stop reason: SDUPTHER

## 2020-07-10 DIAGNOSIS — K29.90 GASTRITIS AND GASTRODUODENITIS: ICD-10-CM

## 2020-07-10 DIAGNOSIS — K29.70 GASTRITIS AND GASTRODUODENITIS: ICD-10-CM

## 2020-07-10 DIAGNOSIS — E78.2 MIXED HYPERLIPIDEMIA: ICD-10-CM

## 2020-07-13 RX ORDER — OMEPRAZOLE 40 MG/1
40 CAPSULE, DELAYED RELEASE ORAL DAILY
Qty: 90 CAPSULE | Refills: 0 | Status: SHIPPED | OUTPATIENT
Start: 2020-07-13 | End: 2020-08-31 | Stop reason: SDUPTHER

## 2020-07-13 RX ORDER — ATORVASTATIN CALCIUM 80 MG/1
80 TABLET, FILM COATED ORAL NIGHTLY
Qty: 90 TABLET | Refills: 0 | Status: SHIPPED | OUTPATIENT
Start: 2020-07-13 | End: 2020-08-31 | Stop reason: SDUPTHER

## 2020-08-31 ENCOUNTER — OFFICE VISIT (OUTPATIENT)
Dept: FAMILY MEDICINE CLINIC | Facility: CLINIC | Age: 85
End: 2020-08-31

## 2020-08-31 ENCOUNTER — HOSPITAL ENCOUNTER (OUTPATIENT)
Dept: GENERAL RADIOLOGY | Facility: HOSPITAL | Age: 85
Discharge: HOME | End: 2020-08-31

## 2020-08-31 ENCOUNTER — LAB (OUTPATIENT)
Dept: LAB | Facility: HOSPITAL | Age: 85
End: 2020-08-31

## 2020-08-31 ENCOUNTER — HOSPITAL ENCOUNTER (OUTPATIENT)
Dept: CARDIOLOGY | Facility: HOSPITAL | Age: 85
Discharge: HOME | End: 2020-08-31
Admitting: INTERNAL MEDICINE

## 2020-08-31 ENCOUNTER — OFFICE VISIT (OUTPATIENT)
Dept: CARDIOLOGY | Facility: CLINIC | Age: 85
End: 2020-08-31

## 2020-08-31 VITALS
HEART RATE: 67 BPM | BODY MASS INDEX: 26.66 KG/M2 | RESPIRATION RATE: 18 BRPM | OXYGEN SATURATION: 95 % | SYSTOLIC BLOOD PRESSURE: 160 MMHG | TEMPERATURE: 98.4 F | HEIGHT: 70 IN | DIASTOLIC BLOOD PRESSURE: 78 MMHG | WEIGHT: 186.2 LBS

## 2020-08-31 VITALS
OXYGEN SATURATION: 97 % | DIASTOLIC BLOOD PRESSURE: 70 MMHG | HEART RATE: 66 BPM | HEIGHT: 70 IN | BODY MASS INDEX: 26.77 KG/M2 | SYSTOLIC BLOOD PRESSURE: 175 MMHG | WEIGHT: 187 LBS

## 2020-08-31 VITALS
SYSTOLIC BLOOD PRESSURE: 121 MMHG | WEIGHT: 186.29 LBS | DIASTOLIC BLOOD PRESSURE: 48 MMHG | HEIGHT: 70 IN | BODY MASS INDEX: 26.67 KG/M2

## 2020-08-31 DIAGNOSIS — E11.9 TYPE 2 DIABETES MELLITUS WITHOUT COMPLICATION, WITHOUT LONG-TERM CURRENT USE OF INSULIN (HCC): ICD-10-CM

## 2020-08-31 DIAGNOSIS — I25.5 ISCHEMIC CARDIOMYOPATHY: ICD-10-CM

## 2020-08-31 DIAGNOSIS — K29.70 GASTRITIS AND GASTRODUODENITIS: ICD-10-CM

## 2020-08-31 DIAGNOSIS — E78.2 MIXED HYPERLIPIDEMIA: ICD-10-CM

## 2020-08-31 DIAGNOSIS — E11.9 TYPE 2 DIABETES MELLITUS WITHOUT COMPLICATION, WITHOUT LONG-TERM CURRENT USE OF INSULIN (HCC): Primary | ICD-10-CM

## 2020-08-31 DIAGNOSIS — R31.9 HEMATURIA, UNSPECIFIED TYPE: ICD-10-CM

## 2020-08-31 DIAGNOSIS — I10 HYPERTENSION, BENIGN: ICD-10-CM

## 2020-08-31 DIAGNOSIS — R06.02 SHORTNESS OF BREATH: ICD-10-CM

## 2020-08-31 DIAGNOSIS — I20.8 ANGINA AT REST (HCC): ICD-10-CM

## 2020-08-31 DIAGNOSIS — R53.83 LETHARGY: ICD-10-CM

## 2020-08-31 DIAGNOSIS — I50.22 CHRONIC SYSTOLIC CONGESTIVE HEART FAILURE (HCC): ICD-10-CM

## 2020-08-31 DIAGNOSIS — R35.1 NOCTURIA: ICD-10-CM

## 2020-08-31 DIAGNOSIS — F32.A DEPRESSION, UNSPECIFIED DEPRESSION TYPE: ICD-10-CM

## 2020-08-31 DIAGNOSIS — D52.8 OTHER FOLATE DEFICIENCY ANEMIAS: ICD-10-CM

## 2020-08-31 DIAGNOSIS — H83.3X3 NOISE-INDUCED HEARING LOSS OF BOTH EARS: ICD-10-CM

## 2020-08-31 DIAGNOSIS — R07.9 CHEST PAIN, UNSPECIFIED TYPE: ICD-10-CM

## 2020-08-31 DIAGNOSIS — D51.0 PERNICIOUS ANEMIA: ICD-10-CM

## 2020-08-31 DIAGNOSIS — I73.9 CLAUDICATION OF LOWER EXTREMITY (HCC): ICD-10-CM

## 2020-08-31 DIAGNOSIS — E55.9 VITAMIN D DEFICIENCY: ICD-10-CM

## 2020-08-31 DIAGNOSIS — N18.30 CHRONIC KIDNEY DISEASE, STAGE III (MODERATE) (HCC): ICD-10-CM

## 2020-08-31 DIAGNOSIS — K29.90 GASTRITIS AND GASTRODUODENITIS: ICD-10-CM

## 2020-08-31 DIAGNOSIS — R41.3 MEMORY LOSS: ICD-10-CM

## 2020-08-31 DIAGNOSIS — N40.0 PROSTATIC HYPERTROPHY: ICD-10-CM

## 2020-08-31 DIAGNOSIS — I20.8 ANGINA AT REST (HCC): Primary | ICD-10-CM

## 2020-08-31 DIAGNOSIS — E83.42 HYPOMAGNESEMIA: ICD-10-CM

## 2020-08-31 PROBLEM — I20.89 ANGINA AT REST: Status: ACTIVE | Noted: 2020-08-31

## 2020-08-31 LAB
BH CV ECHO MEAS - ACS: 1.2 CM
BH CV ECHO MEAS - AO MAX PG (FULL): 6.9 MMHG
BH CV ECHO MEAS - AO MAX PG: 10.8 MMHG
BH CV ECHO MEAS - AO MEAN PG (FULL): 4.4 MMHG
BH CV ECHO MEAS - AO MEAN PG: 6.4 MMHG
BH CV ECHO MEAS - AO ROOT AREA (BSA CORRECTED): 1.3
BH CV ECHO MEAS - AO ROOT AREA: 9.2 CM^2
BH CV ECHO MEAS - AO ROOT DIAM: 3.4 CM
BH CV ECHO MEAS - AO V2 MAX: 164.5 CM/SEC
BH CV ECHO MEAS - AO V2 MEAN: 120.8 CM/SEC
BH CV ECHO MEAS - AO V2 VTI: 33.9 CM
BH CV ECHO MEAS - ASC AORTA: 3.7 CM
BH CV ECHO MEAS - AVA(I,A): 1.9 CM^2
BH CV ECHO MEAS - AVA(I,D): 1.9 CM^2
BH CV ECHO MEAS - AVA(V,A): 1.9 CM^2
BH CV ECHO MEAS - AVA(V,D): 1.9 CM^2
BH CV ECHO MEAS - BSA(HAYCOCK): 1.8 M^2
BH CV ECHO MEAS - BSA: 2.7 M^2
BH CV ECHO MEAS - BZI_BMI: 1.4 KILOGRAMS/M^2
BH CV ECHO MEAS - BZI_METRIC_HEIGHT: 472.4 CM
BH CV ECHO MEAS - BZI_METRIC_WEIGHT: 31.8 KG
BH CV ECHO MEAS - EDV(CUBED): 86.2 ML
BH CV ECHO MEAS - EDV(MOD-SP4): 73.2 ML
BH CV ECHO MEAS - EDV(TEICH): 88.5 ML
BH CV ECHO MEAS - EF(CUBED): 67.8 %
BH CV ECHO MEAS - EF(MOD-SP4): 44.1 %
BH CV ECHO MEAS - EF(TEICH): 59.6 %
BH CV ECHO MEAS - ESV(CUBED): 27.7 ML
BH CV ECHO MEAS - ESV(MOD-SP4): 40.9 ML
BH CV ECHO MEAS - ESV(TEICH): 35.8 ML
BH CV ECHO MEAS - FS: 31.5 %
BH CV ECHO MEAS - IVS/LVPW: 1
BH CV ECHO MEAS - IVSD: 1.3 CM
BH CV ECHO MEAS - LA DIMENSION: 5.6 CM
BH CV ECHO MEAS - LA/AO: 1.6
BH CV ECHO MEAS - LV DIASTOLIC VOL/BSA (35-75): 27 ML/M^2
BH CV ECHO MEAS - LV MASS(C)D: 214 GRAMS
BH CV ECHO MEAS - LV MASS(C)DI: 78.9 GRAMS/M^2
BH CV ECHO MEAS - LV MAX PG: 3.9 MMHG
BH CV ECHO MEAS - LV MEAN PG: 2 MMHG
BH CV ECHO MEAS - LV SYSTOLIC VOL/BSA (12-30): 15.1 ML/M^2
BH CV ECHO MEAS - LV V1 MAX: 98.5 CM/SEC
BH CV ECHO MEAS - LV V1 MEAN: 68.5 CM/SEC
BH CV ECHO MEAS - LV V1 VTI: 20.4 CM
BH CV ECHO MEAS - LVIDD: 4.4 CM
BH CV ECHO MEAS - LVIDS: 3 CM
BH CV ECHO MEAS - LVOT AREA: 3.1 CM^2
BH CV ECHO MEAS - LVOT DIAM: 2 CM
BH CV ECHO MEAS - LVPWD: 1.3 CM
BH CV ECHO MEAS - MV A MAX VEL: 104.1 CM/SEC
BH CV ECHO MEAS - MV DEC SLOPE: 155.8 CM/SEC^2
BH CV ECHO MEAS - MV DEC TIME: 0.31 SEC
BH CV ECHO MEAS - MV E MAX VEL: 48.6 CM/SEC
BH CV ECHO MEAS - MV E/A: 0.47
BH CV ECHO MEAS - MV MAX PG: 6.9 MMHG
BH CV ECHO MEAS - MV MEAN PG: 1.2 MMHG
BH CV ECHO MEAS - MV V2 MAX: 131.2 CM/SEC
BH CV ECHO MEAS - MV V2 MEAN: 45.4 CM/SEC
BH CV ECHO MEAS - MV V2 VTI: 35.2 CM
BH CV ECHO MEAS - MVA(VTI): 1.8 CM^2
BH CV ECHO MEAS - PA ACC TIME: 0.11 SEC
BH CV ECHO MEAS - PA MAX PG (FULL): 4.2 MMHG
BH CV ECHO MEAS - PA MAX PG: 7.1 MMHG
BH CV ECHO MEAS - PA PR(ACCEL): 28.1 MMHG
BH CV ECHO MEAS - PA V2 MAX: 132.8 CM/SEC
BH CV ECHO MEAS - PI END-D VEL: 151.6 CM/SEC
BH CV ECHO MEAS - PI MAX PG: 12.8 MMHG
BH CV ECHO MEAS - PI MAX VEL: 178.8 CM/SEC
BH CV ECHO MEAS - RAP SYSTOLE: 3 MMHG
BH CV ECHO MEAS - RV MAX PG: 2.8 MMHG
BH CV ECHO MEAS - RV MEAN PG: 1.6 MMHG
BH CV ECHO MEAS - RV V1 MAX: 84 CM/SEC
BH CV ECHO MEAS - RV V1 MEAN: 61.3 CM/SEC
BH CV ECHO MEAS - RV V1 VTI: 18.1 CM
BH CV ECHO MEAS - RVDD: 3.6 CM
BH CV ECHO MEAS - RVSP: 36.4 MMHG
BH CV ECHO MEAS - SI(AO): 115.1 ML/M^2
BH CV ECHO MEAS - SI(CUBED): 21.6 ML/M^2
BH CV ECHO MEAS - SI(LVOT): 23.5 ML/M^2
BH CV ECHO MEAS - SI(MOD-SP4): 11.9 ML/M^2
BH CV ECHO MEAS - SI(TEICH): 19.4 ML/M^2
BH CV ECHO MEAS - SV(AO): 312.3 ML
BH CV ECHO MEAS - SV(CUBED): 58.5 ML
BH CV ECHO MEAS - SV(LVOT): 63.8 ML
BH CV ECHO MEAS - SV(MOD-SP4): 32.3 ML
BH CV ECHO MEAS - SV(TEICH): 52.8 ML
BH CV ECHO MEAS - TR MAX VEL: 288.9 CM/SEC
BILIRUB BLD-MCNC: NEGATIVE MG/DL
CLARITY, POC: CLEAR
COLOR UR: YELLOW
GLUCOSE UR STRIP-MCNC: NEGATIVE MG/DL
INR PPP: 1 (ref 0.93–1.1)
KETONES UR QL: NEGATIVE
LEUKOCYTE EST, POC: NEGATIVE
LV EF 2D ECHO EST: 55 %
MAXIMAL PREDICTED HEART RATE: 135 BPM
NITRITE UR-MCNC: NEGATIVE MG/ML
PH UR: 5 [PH] (ref 5–8)
POC CREATININE URINE: ABNORMAL
POC MICROALBUMIN URINE: 100
PROT UR STRIP-MCNC: ABNORMAL MG/DL
PROTHROMBIN TIME: 10.9 SECONDS (ref 9.6–11.7)
RBC # UR STRIP: ABNORMAL /UL
SP GR UR: 1.01 (ref 1–1.03)
STRESS TARGET HR: 115 BPM
UROBILINOGEN UR QL: NORMAL

## 2020-08-31 PROCEDURE — G0439 PPPS, SUBSEQ VISIT: HCPCS | Performed by: FAMILY MEDICINE

## 2020-08-31 PROCEDURE — 36415 COLL VENOUS BLD VENIPUNCTURE: CPT

## 2020-08-31 PROCEDURE — 80048 BASIC METABOLIC PNL TOTAL CA: CPT

## 2020-08-31 PROCEDURE — 93306 TTE W/DOPPLER COMPLETE: CPT

## 2020-08-31 PROCEDURE — 93306 TTE W/DOPPLER COMPLETE: CPT | Performed by: INTERNAL MEDICINE

## 2020-08-31 PROCEDURE — 85610 PROTHROMBIN TIME: CPT

## 2020-08-31 PROCEDURE — 81002 URINALYSIS NONAUTO W/O SCOPE: CPT | Performed by: FAMILY MEDICINE

## 2020-08-31 PROCEDURE — 80061 LIPID PANEL: CPT

## 2020-08-31 PROCEDURE — 99215 OFFICE O/P EST HI 40 MIN: CPT | Performed by: INTERNAL MEDICINE

## 2020-08-31 PROCEDURE — 85027 COMPLETE CBC AUTOMATED: CPT

## 2020-08-31 PROCEDURE — 99214 OFFICE O/P EST MOD 30 MIN: CPT | Performed by: FAMILY MEDICINE

## 2020-08-31 PROCEDURE — 99497 ADVNCD CARE PLAN 30 MIN: CPT | Performed by: FAMILY MEDICINE

## 2020-08-31 PROCEDURE — 71046 X-RAY EXAM CHEST 2 VIEWS: CPT

## 2020-08-31 PROCEDURE — 82044 UR ALBUMIN SEMIQUANTITATIVE: CPT | Performed by: FAMILY MEDICINE

## 2020-08-31 RX ORDER — GLIMEPIRIDE 1 MG/1
1 TABLET ORAL
Qty: 90 TABLET | Refills: 0 | Status: SHIPPED | OUTPATIENT
Start: 2020-08-31 | End: 2020-09-16 | Stop reason: SDUPTHER

## 2020-08-31 RX ORDER — ATORVASTATIN CALCIUM 80 MG/1
80 TABLET, FILM COATED ORAL NIGHTLY
Qty: 90 TABLET | Refills: 0 | Status: SHIPPED | OUTPATIENT
Start: 2020-08-31 | End: 2020-09-16 | Stop reason: SDUPTHER

## 2020-08-31 RX ORDER — SERTRALINE HYDROCHLORIDE 25 MG/1
25 TABLET, FILM COATED ORAL DAILY
Qty: 90 TABLET | Refills: 0 | Status: SHIPPED | OUTPATIENT
Start: 2020-08-31 | End: 2020-09-16 | Stop reason: SDUPTHER

## 2020-08-31 RX ORDER — FINASTERIDE 5 MG/1
5 TABLET, FILM COATED ORAL DAILY
Qty: 90 TABLET | Refills: 0 | Status: SHIPPED | OUTPATIENT
Start: 2020-08-31 | End: 2020-09-16 | Stop reason: SDUPTHER

## 2020-08-31 RX ORDER — OMEPRAZOLE 40 MG/1
40 CAPSULE, DELAYED RELEASE ORAL DAILY
Qty: 90 CAPSULE | Refills: 0 | Status: SHIPPED | OUTPATIENT
Start: 2020-08-31 | End: 2020-09-16 | Stop reason: SDUPTHER

## 2020-08-31 RX ORDER — FUROSEMIDE 20 MG/1
20 TABLET ORAL 2 TIMES DAILY
Qty: 180 TABLET | Refills: 0 | Status: SHIPPED | OUTPATIENT
Start: 2020-08-31 | End: 2020-09-02 | Stop reason: SDUPTHER

## 2020-08-31 RX ORDER — METOPROLOL SUCCINATE 25 MG/1
25 TABLET, EXTENDED RELEASE ORAL DAILY
Qty: 90 TABLET | Refills: 0 | Status: SHIPPED | OUTPATIENT
Start: 2020-08-31 | End: 2020-09-16 | Stop reason: SDUPTHER

## 2020-08-31 RX ORDER — TAMSULOSIN HYDROCHLORIDE 0.4 MG/1
1 CAPSULE ORAL DAILY
Qty: 90 CAPSULE | Refills: 0 | Status: SHIPPED | OUTPATIENT
Start: 2020-08-31 | End: 2020-09-16 | Stop reason: SDUPTHER

## 2020-08-31 NOTE — PROGRESS NOTES
Encounter Date:08/31/2020  Last seen 5/28/2020      Patient ID: Mikael Shanks is a 85 y.o. male.    Chief Complaint:  Congestive heart failure  Status post stent  Hypertension  Diabetes  Renal dysfunction  Bilateral arm discomfort and chest discomfort-new problem        History of Present Illness    Patient recently has been having bilateral upper extremity discomfort associated with substernal heaviness and tightness sensation with or without exertion.  No other associated aggravating or alleviating factors.    Since I have last seen, the patient has been without any shortness of breath, palpitations, dizziness or syncope.  Denies having any headache ,abdominal pain ,nausea, vomiting , diarrhea constipation, loss of weight or loss of appetite.  Denies having any excessive bruising ,hematuria or blood in the stool.     Review of all systems negative except as indicated  Assessment and Plan      ]]]]]]]]]]]]]]]]]]]]]]]  Impression  ========  -Bilateral arm discomfort and chest discomfort suggestive of angina pectoris.    -Status post stent 2003 at [facility]     - Ischemic cardiomyopathy with ejection fraction of 25%.-Improved and 55%    Echocardiogram 8/31/2020  Thickened aortic valve with adequate opening motion.  Left atrial enlargement  Left ventricular size and contractility is normal with ejection fraction of 55 %'    -Status post acute on chronic systolic congestive heart failure-improved significant left ventricle dysfunction with ejection fraction of 25%.     -Status post acute respiratory failure with hypoxia-improved.     Cardiac catheterization 12/6/2019 revealed  No evidence for pulmonary hypertension is present.  Left ventricle angiogram was not performed due to pre-existing renal dysfunction.  Left main coronary artery has ostial 30% disease.  Left anterior descending artery is normal.  Circumflex coronary artery is a codominant vessel and is normal.  Right coronary artery is a codominant  vessel that has 30% luminal irregularities in the midsegment.      -Hypertension diabetes renal dysfunction.  BUN 14 creatinine 1.5.  Dyslipidemia     -History of anemia     -History of premature atrial contractions     -History of severe hypomagnesemia.-Improved     -Allergic to penicillin.   =======  Plan  =======  Patient is having nonexertional and exertional bilateral arm discomfort and chest discomfort suggestive of angina pectoris.  Echocardiogram 8/31/2020 revealed improved left ventricular function with ejection fraction of 55%.  No need for ICD placement.  Patient was advised ischemic cardiac work-up due to chest discomfort and bilateral arm discomfort.  Patient does not want Lexiscan due to shortness of breath and cannot walk on a treadmill.  Patient strongly favors having heart catheterization done for definite evaluation of coronary artery status.  Risks and benefits pros and cons of the procedure were discussed with patient.  Medications were reviewed and updated.  Patient is not on ACE inhibitor due to pre-existing renal dysfunction.  Renal precautions.  Patient was asked to drink more fluids.  Further plan will depend on patient's progress.  ]]]]]]]]]]]]]                  Diagnosis Plan   1. Angina at rest (CMS/HCC)  Case Request Cath Lab: Left and Right Heart Cath with Coronary Angiography    CBC (No Diff)    Basic Metabolic Panel    Protime-INR    Lipid Panel    ECG 12 Lead    XR Chest 2 View   2. Mixed hyperlipidemia  Case Request Cath Lab: Left and Right Heart Cath with Coronary Angiography    CBC (No Diff)    Basic Metabolic Panel    Protime-INR    Lipid Panel    ECG 12 Lead    XR Chest 2 View   3. Hypertension, benign  Case Request Cath Lab: Left and Right Heart Cath with Coronary Angiography    CBC (No Diff)    Basic Metabolic Panel    Protime-INR    Lipid Panel    ECG 12 Lead    XR Chest 2 View   4. Shortness of breath  Case Request Cath Lab: Left and Right Heart Cath with Coronary  Angiography    CBC (No Diff)    Basic Metabolic Panel    Protime-INR    Lipid Panel    ECG 12 Lead    XR Chest 2 View   5. Ischemic cardiomyopathy  Case Request Cath Lab: Left and Right Heart Cath with Coronary Angiography    CBC (No Diff)    Basic Metabolic Panel    Protime-INR    Lipid Panel    ECG 12 Lead    XR Chest 2 View   6. Type 2 diabetes mellitus without complication, without long-term current use of insulin (CMS/[facility])  Case Request Cath Lab: Left and Right Heart Cath with Coronary Angiography    CBC (No Diff)    Basic Metabolic Panel    Protime-INR    Lipid Panel    ECG 12 Lead    XR Chest 2 View   LAB RESULTS (LAST 7 DAYS)    CBC        BMP        CMP         BNP        TROPONIN        CoAg        Creatinine Clearance  CrCl cannot be calculated (Patient's most recent lab result is older than the maximum 30 days allowed.).    ABG        Radiology  No radiology results for the last day                The following portions of the patient's history were reviewed and updated as appropriate: allergies, current medications, past family history, past medical history, past social history, past surgical history and problem list.    Review of Systems   Constitution: Negative for malaise/fatigue.   Cardiovascular: Positive for chest pain (at times). Negative for leg swelling, palpitations and syncope.   Respiratory: Positive for shortness of breath.    Skin: Negative for rash.   Musculoskeletal: Positive for joint pain (in arm).   Gastrointestinal: Negative for nausea and vomiting.   Neurological: Negative for dizziness, light-headedness and numbness.         Current Outpatient Medications:   •  aspirin 81 MG tablet, Take 81 mg by mouth Daily., Disp: , Rfl:   •  atorvastatin (LIPITOR) 80 MG tablet, Take 1 tablet by mouth Every Night., Disp: 90 tablet, Rfl: 0  •  Blood Glucose Monitoring Suppl (ONE TOUCH ULTRA 2) w/Device kit, 1 Device Daily., Disp: 1 each, Rfl: 0  •  finasteride (PROSCAR) 5 MG tablet, Take 1 tablet  by mouth Daily., Disp: 90 tablet, Rfl: 0  •  fluticasone (FLONASE) 50 MCG/ACT nasal spray, 2 sprays into the nostril(s) as directed by provider Daily., Disp: 3 bottle, Rfl: 3  •  furosemide (LASIX) 20 MG tablet, Take 1 tablet by mouth 2 (Two) Times a Day., Disp: 180 tablet, Rfl: 0  •  glimepiride (AMARYL) 1 MG tablet, Take 1 tablet by mouth Every Morning Before Breakfast., Disp: 90 tablet, Rfl: 0  •  glucose blood (ONE TOUCH ULTRA TEST) test strip, Use as instructed, Disp: 100 each, Rfl: 5  •  LORazepam (ATIVAN) 0.5 MG tablet, Take 1 tablet by mouth Every 8 (Eight) Hours As Needed for Anxiety., Disp: 20 tablet, Rfl: 0  •  metoprolol succinate XL (TOPROL-XL) 25 MG 24 hr tablet, Take 1 tablet by mouth Daily., Disp: 90 tablet, Rfl: 0  •  omeprazole (priLOSEC) 40 MG capsule, Take 1 capsule by mouth Daily., Disp: 90 capsule, Rfl: 0  •  ONETOUCH DELICA LANCETS 33G misc, 1 pen Daily., Disp: 100 each, Rfl: 5  •  oseltamivir (TAMIFLU) 75 MG capsule, Take 1 capsule by mouth 2 (Two) Times a Day., Disp: 10 capsule, Rfl: 0  •  sertraline (ZOLOFT) 25 MG tablet, Take 1 tablet by mouth Daily., Disp: 90 tablet, Rfl: 0  •  tamsulosin (FLOMAX) 0.4 MG capsule 24 hr capsule, Take 1 capsule by mouth Daily., Disp: 90 capsule, Rfl: 0  •  triamcinolone (KENALOG) 0.025 % cream, triamcinolone acetonide 0.025 % topical cream, Disp: , Rfl:   •  triamcinolone (KENALOG) 0.1 % cream, triamcinolone acetonide 0.1 % topical cream, Disp: , Rfl:     Current Facility-Administered Medications:   •  loratadine (CLARITIN) tablet 10 mg, 10 mg, Oral, Daily, Xander Robison MD    Allergies   Allergen Reactions   • Penicillins Hives       Family History   Problem Relation Age of Onset   • Cancer Mother    • Heart disease Father    • Cancer Brother        Past Surgical History:   Procedure Laterality Date   • BACK SURGERY     • CARDIAC CATHETERIZATION N/A 12/5/2019    Procedure: Left Heart Cath and coronary angiogram;  Surgeon: Dayday Ruiz MD;   Location: [facility] CATH INVASIVE LOCATION;  Service: Cardiovascular   • CARDIAC CATHETERIZATION N/A 2019    Procedure: Right and Left Heart Cath;  Surgeon: Dayday Ruiz MD;  Location: [facility] CATH INVASIVE LOCATION;  Service: Cardiovascular   • CHOLECYSTECTOMY         Past Medical History:   Diagnosis Date   • Carotid artery disease (CMS/HCC)    • GERD (gastroesophageal reflux disease)    • Hyperlipidemia    • Mood disorder (CMS/HCC)    • Osteoarthritis    • Prostatic hypertrophy    • Pulmonary valve disorder    • Sleep apnea        Family History   Problem Relation Age of Onset   • Cancer Mother    • Heart disease Father    • Cancer Brother        Social History     Socioeconomic History   • Marital status:      Spouse name: Not on file   • Number of children: Not on file   • Years of education: Not on file   • Highest education level: Not on file   Occupational History   • Occupation: Retired   Social Needs   • Financial resource strain: Not hard at all   • Food insecurity:     Worry: Never true     Inability: Never true   • Transportation needs:     Medical: No     Non-medical: No   Tobacco Use   • Smoking status: Former Smoker     Packs/day: 5.00     Types: Cigarettes     Start date: 1953     Last attempt to quit: 1960     Years since quittin.0   • Smokeless tobacco: Never Used   Substance and Sexual Activity   • Alcohol use: Not Currently     Frequency: Never     Comment: very rare   • Drug use: No   • Sexual activity: Never   Lifestyle   • Physical activity:     Days per week: 0 days     Minutes per session: 0 min   • Stress: Not at all   Relationships   • Social connections:     Talks on phone: More than three times a week     Gets together: More than three times a week     Attends [employer] service: 1 to 4 times per year     Active member of club or organization: Yes     Attends meetings of clubs or organizations: Never     Relationship status: Living with partner  "        Procedures      Objective:       Physical Exam    /70   Pulse 66   Ht 177.8 cm (70\")   Wt 84.8 kg (187 lb)   SpO2 97%   BMI 26.83 kg/m²   The patient is alert, oriented and in no distress.    Vital signs as noted above.    Head and neck revealed no carotid bruits or jugular venous distension.  No thyromegaly or lymphadenopathy is present.    Lungs clear.  No wheezing.  Breath sounds are normal bilaterally.    Heart normal first and second heart sounds.  No murmur..  No pericardial rub is present.  No gallop is present.    Abdomen soft and nontender.  No organomegaly is present.    Extremities revealed good peripheral pulses without any pedal edema.    Skin warm and dry.    Musculoskeletal system is grossly normal.    CNS grossly normal.        " Anorectal abscess    History of Clostridium difficile colitis  2012  Mass of left thigh  neuroma excised posterior thigh  Other benign neoplasm of skin of unspecified lower limb, including hip

## 2020-09-01 ENCOUNTER — HOSPITAL ENCOUNTER (OUTPATIENT)
Dept: ULTRASOUND IMAGING | Facility: HOSPITAL | Age: 85
Discharge: HOME | End: 2020-09-01
Admitting: FAMILY MEDICINE

## 2020-09-01 LAB
ANION GAP SERPL CALCULATED.3IONS-SCNC: 7.8 MMOL/L (ref 5–15)
APPEARANCE UR: CLEAR
BACTERIA #/AREA URNS HPF: NORMAL /[HPF]
BILIRUB UR QL STRIP: NEGATIVE
BUN SERPL-MCNC: 27 MG/DL (ref 8–23)
BUN/CREAT SERPL: 16.5 (ref 7–25)
CALCIUM SPEC-SCNC: 9.3 MG/DL (ref 8.6–10.5)
CHLORIDE SERPL-SCNC: 101 MMOL/L (ref 98–107)
CHOLEST SERPL-MCNC: 140 MG/DL (ref 0–200)
CO2 SERPL-SCNC: 29.2 MMOL/L (ref 22–29)
COLOR UR: YELLOW
CREAT SERPL-MCNC: 1.64 MG/DL (ref 0.76–1.27)
DEPRECATED RDW RBC AUTO: 40.3 FL (ref 37–54)
EPI CELLS #/AREA URNS HPF: NORMAL /HPF (ref 0–10)
ERYTHROCYTE [DISTWIDTH] IN BLOOD BY AUTOMATED COUNT: 12.8 % (ref 12.3–15.4)
GFR SERPL CREATININE-BSD FRML MDRD: 40 ML/MIN/1.73
GLUCOSE SERPL-MCNC: 191 MG/DL (ref 65–99)
GLUCOSE UR QL: NEGATIVE
HCT VFR BLD AUTO: 37.3 % (ref 37.5–51)
HDLC SERPL-MCNC: 34 MG/DL (ref 40–60)
HGB BLD-MCNC: 12.4 G/DL (ref 13–17.7)
HGB UR QL STRIP: NEGATIVE
KETONES UR QL STRIP: NEGATIVE
LDLC SERPL CALC-MCNC: 47 MG/DL (ref 0–100)
LDLC/HDLC SERPL: 1.38 {RATIO}
LEUKOCYTE ESTERASE UR QL STRIP: NEGATIVE
MCH RBC QN AUTO: 29.2 PG (ref 26.6–33)
MCHC RBC AUTO-ENTMCNC: 33.2 G/DL (ref 31.5–35.7)
MCV RBC AUTO: 87.8 FL (ref 79–97)
MICRO URNS: ABNORMAL
MUCOUS THREADS URNS QL MICRO: PRESENT
NITRITE UR QL STRIP: NEGATIVE
PH UR STRIP: 5.5 [PH] (ref 5–7.5)
PLATELET # BLD AUTO: 192 10*3/MM3 (ref 140–450)
PMV BLD AUTO: 11.5 FL (ref 6–12)
POTASSIUM SERPL-SCNC: 4 MMOL/L (ref 3.5–5.2)
PROT UR QL STRIP: ABNORMAL
RBC # BLD AUTO: 4.25 10*6/MM3 (ref 4.14–5.8)
RBC #/AREA URNS HPF: NORMAL /HPF (ref 0–2)
SODIUM SERPL-SCNC: 138 MMOL/L (ref 136–145)
SP GR UR: 1.02 (ref 1–1.03)
TRIGL SERPL-MCNC: 295 MG/DL (ref 0–150)
UROBILINOGEN UR STRIP-MCNC: 0.2 MG/DL (ref 0.2–1)
VLDLC SERPL-MCNC: 59 MG/DL (ref 5–40)
WBC # BLD AUTO: 8.04 10*3/MM3 (ref 3.4–10.8)
WBC #/AREA URNS HPF: NORMAL /HPF (ref 0–5)

## 2020-09-01 PROCEDURE — 93923 UPR/LXTR ART STDY 3+ LVLS: CPT

## 2020-09-02 ENCOUNTER — TRANSCRIBE ORDERS (OUTPATIENT)
Dept: ADMINISTRATIVE | Facility: HOSPITAL | Age: 85
End: 2020-09-02

## 2020-09-02 ENCOUNTER — LAB (OUTPATIENT)
Dept: LAB | Facility: HOSPITAL | Age: 85
End: 2020-09-02

## 2020-09-02 DIAGNOSIS — I50.22 CHRONIC SYSTOLIC CONGESTIVE HEART FAILURE (HCC): ICD-10-CM

## 2020-09-02 DIAGNOSIS — Z01.818 PRE-OP TESTING: ICD-10-CM

## 2020-09-02 DIAGNOSIS — Z01.818 PRE-OP TESTING: Primary | ICD-10-CM

## 2020-09-02 LAB
25(OH)D3+25(OH)D2 SERPL-MCNC: 32.7 NG/ML (ref 30–100)
ALBUMIN SERPL-MCNC: 4.3 G/DL (ref 3.6–4.6)
ALBUMIN/GLOB SERPL: 1.6 {RATIO} (ref 1.2–2.2)
ALP SERPL-CCNC: 83 IU/L (ref 39–117)
ALT SERPL-CCNC: 32 IU/L (ref 0–44)
AST SERPL-CCNC: 22 IU/L (ref 0–40)
BACTERIA UR CULT: NORMAL
BACTERIA UR CULT: NORMAL
BASOPHILS # BLD AUTO: 0.1 X10E3/UL (ref 0–0.2)
BASOPHILS NFR BLD AUTO: 1 %
BILIRUB SERPL-MCNC: 0.5 MG/DL (ref 0–1.2)
BUN SERPL-MCNC: 29 MG/DL (ref 8–27)
BUN/CREAT SERPL: 17 (ref 10–24)
CALCIUM SERPL-MCNC: 9.7 MG/DL (ref 8.6–10.2)
CHLORIDE SERPL-SCNC: 100 MMOL/L (ref 96–106)
CHOLEST SERPL-MCNC: 155 MG/DL (ref 100–199)
CHOLEST/HDLC SERPL: 3.8 RATIO (ref 0–5)
CO2 SERPL-SCNC: 24 MMOL/L (ref 20–29)
CREAT SERPL-MCNC: 1.66 MG/DL (ref 0.76–1.27)
EOSINOPHIL # BLD AUTO: 0.4 X10E3/UL (ref 0–0.4)
EOSINOPHIL NFR BLD AUTO: 4 %
ERYTHROCYTE [DISTWIDTH] IN BLOOD BY AUTOMATED COUNT: 13 % (ref 11.6–15.4)
FOLATE SERPL-MCNC: 9.9 NG/ML
GLOBULIN SER CALC-MCNC: 2.7 G/DL (ref 1.5–4.5)
GLUCOSE SERPL-MCNC: 141 MG/DL (ref 65–99)
HBA1C MFR BLD: 6.2 % (ref 4.8–5.6)
HCT VFR BLD AUTO: 38.8 % (ref 37.5–51)
HDLC SERPL-MCNC: 41 MG/DL
HGB BLD-MCNC: 12.8 G/DL (ref 13–17.7)
IMM GRANULOCYTES # BLD AUTO: 0.2 X10E3/UL (ref 0–0.1)
IMM GRANULOCYTES NFR BLD AUTO: 2 %
LABORATORY COMMENT REPORT: ABNORMAL
LDLC SERPL CALC-MCNC: 79 MG/DL (ref 0–99)
LYMPHOCYTES # BLD AUTO: 1.5 X10E3/UL (ref 0.7–3.1)
LYMPHOCYTES NFR BLD AUTO: 16 %
Lab: NORMAL
MAGNESIUM SERPL-MCNC: 1.6 MG/DL (ref 1.6–2.3)
MCH RBC QN AUTO: 28.8 PG (ref 26.6–33)
MCHC RBC AUTO-ENTMCNC: 33 G/DL (ref 31.5–35.7)
MCV RBC AUTO: 87 FL (ref 79–97)
METHYLMALONATE SERPL-SCNC: 348 NMOL/L (ref 0–378)
MONOCYTES # BLD AUTO: 0.6 X10E3/UL (ref 0.1–0.9)
MONOCYTES NFR BLD AUTO: 6 %
NEUTROPHILS # BLD AUTO: 6.6 X10E3/UL (ref 1.4–7)
NEUTROPHILS NFR BLD AUTO: 71 %
PLATELET # BLD AUTO: 205 X10E3/UL (ref 150–450)
POTASSIUM SERPL-SCNC: 4.4 MMOL/L (ref 3.5–5.2)
PROT SERPL-MCNC: 7 G/DL (ref 6–8.5)
RBC # BLD AUTO: 4.44 X10E6/UL (ref 4.14–5.8)
SODIUM SERPL-SCNC: 141 MMOL/L (ref 134–144)
TRIGL SERPL-MCNC: 206 MG/DL (ref 0–149)
VIT B12 SERPL-MCNC: 459 PG/ML (ref 232–1245)
VLDLC SERPL CALC-MCNC: 35 MG/DL (ref 5–40)
WBC # BLD AUTO: 9.3 X10E3/UL (ref 3.4–10.8)

## 2020-09-02 PROCEDURE — U0004 COV-19 TEST NON-CDC HGH THRU: HCPCS

## 2020-09-02 RX ORDER — FUROSEMIDE 20 MG/1
20 TABLET ORAL 2 TIMES DAILY
Qty: 180 TABLET | Refills: 1 | Status: SHIPPED | OUTPATIENT
Start: 2020-09-02 | End: 2020-09-16 | Stop reason: SDUPTHER

## 2020-09-03 LAB — SARS-COV-2 RNA NOSE QL NAA+PROBE: NOT DETECTED

## 2020-09-04 ENCOUNTER — HOSPITAL ENCOUNTER (OUTPATIENT)
Facility: HOSPITAL | Age: 85
Setting detail: HOSPITAL OUTPATIENT SURGERY
Discharge: HOME | End: 2020-09-04
Attending: INTERNAL MEDICINE | Admitting: INTERNAL MEDICINE

## 2020-09-04 VITALS
OXYGEN SATURATION: 95 % | WEIGHT: 184.08 LBS | TEMPERATURE: 97.3 F | BODY MASS INDEX: 27.9 KG/M2 | SYSTOLIC BLOOD PRESSURE: 147 MMHG | HEIGHT: 68 IN | RESPIRATION RATE: 16 BRPM | DIASTOLIC BLOOD PRESSURE: 110 MMHG | HEART RATE: 71 BPM

## 2020-09-04 DIAGNOSIS — N18.30 CKD (CHRONIC KIDNEY DISEASE) STAGE 3, GFR 30-59 ML/MIN (HCC): Primary | ICD-10-CM

## 2020-09-04 DIAGNOSIS — I20.8 ANGINA AT REST (HCC): ICD-10-CM

## 2020-09-04 DIAGNOSIS — E11.9 TYPE 2 DIABETES MELLITUS WITHOUT COMPLICATION, WITHOUT LONG-TERM CURRENT USE OF INSULIN (HCC): ICD-10-CM

## 2020-09-04 DIAGNOSIS — E78.2 MIXED HYPERLIPIDEMIA: ICD-10-CM

## 2020-09-04 DIAGNOSIS — I10 HYPERTENSION, BENIGN: ICD-10-CM

## 2020-09-04 DIAGNOSIS — R06.02 SHORTNESS OF BREATH: ICD-10-CM

## 2020-09-04 DIAGNOSIS — I25.5 ISCHEMIC CARDIOMYOPATHY: ICD-10-CM

## 2020-09-04 LAB — GLUCOSE BLDC GLUCOMTR-MCNC: 192 MG/DL (ref 70–105)

## 2020-09-04 PROCEDURE — C1894 INTRO/SHEATH, NON-LASER: HCPCS | Performed by: INTERNAL MEDICINE

## 2020-09-04 PROCEDURE — 99152 MOD SED SAME PHYS/QHP 5/>YRS: CPT | Performed by: INTERNAL MEDICINE

## 2020-09-04 PROCEDURE — 93458 L HRT ARTERY/VENTRICLE ANGIO: CPT | Performed by: INTERNAL MEDICINE

## 2020-09-04 PROCEDURE — 0 IOPAMIDOL PER 1 ML: Performed by: INTERNAL MEDICINE

## 2020-09-04 PROCEDURE — C1769 GUIDE WIRE: HCPCS | Performed by: INTERNAL MEDICINE

## 2020-09-04 PROCEDURE — 25010000002 MIDAZOLAM PER 1 MG: Performed by: INTERNAL MEDICINE

## 2020-09-04 PROCEDURE — 82962 GLUCOSE BLOOD TEST: CPT

## 2020-09-04 RX ORDER — DIPHENHYDRAMINE HCL 25 MG
25 CAPSULE ORAL EVERY 6 HOURS PRN
Status: DISCONTINUED | OUTPATIENT
Start: 2020-09-04 | End: 2020-09-05 | Stop reason: HOSPADM

## 2020-09-04 RX ORDER — ONDANSETRON 2 MG/ML
4 INJECTION INTRAMUSCULAR; INTRAVENOUS EVERY 6 HOURS PRN
Status: DISCONTINUED | OUTPATIENT
Start: 2020-09-04 | End: 2020-09-05 | Stop reason: HOSPADM

## 2020-09-04 RX ORDER — ONDANSETRON 4 MG/1
4 TABLET, FILM COATED ORAL EVERY 6 HOURS PRN
Status: DISCONTINUED | OUTPATIENT
Start: 2020-09-04 | End: 2020-09-05 | Stop reason: HOSPADM

## 2020-09-04 RX ORDER — SODIUM CHLORIDE 9 MG/ML
250 INJECTION, SOLUTION INTRAVENOUS ONCE AS NEEDED
Status: DISCONTINUED | OUTPATIENT
Start: 2020-09-04 | End: 2020-09-05 | Stop reason: HOSPADM

## 2020-09-04 RX ORDER — SODIUM CHLORIDE 9 MG/ML
100 INJECTION, SOLUTION INTRAVENOUS CONTINUOUS
Status: DISCONTINUED | OUTPATIENT
Start: 2020-09-04 | End: 2020-09-04

## 2020-09-04 RX ORDER — SODIUM CHLORIDE 9 MG/ML
100 INJECTION, SOLUTION INTRAVENOUS CONTINUOUS
Status: DISCONTINUED | OUTPATIENT
Start: 2020-09-04 | End: 2020-09-05 | Stop reason: HOSPADM

## 2020-09-04 RX ORDER — MIDAZOLAM HYDROCHLORIDE 1 MG/ML
INJECTION INTRAMUSCULAR; INTRAVENOUS AS NEEDED
Status: DISCONTINUED | OUTPATIENT
Start: 2020-09-04 | End: 2020-09-04 | Stop reason: HOSPADM

## 2020-09-04 RX ORDER — ACETAMINOPHEN 325 MG/1
650 TABLET ORAL EVERY 4 HOURS PRN
Status: DISCONTINUED | OUTPATIENT
Start: 2020-09-04 | End: 2020-09-05 | Stop reason: HOSPADM

## 2020-09-04 RX ORDER — SODIUM BICARBONATE 650 MG/1
1300 TABLET ORAL EVERY 4 HOURS
Status: COMPLETED | OUTPATIENT
Start: 2020-09-04 | End: 2020-09-04

## 2020-09-04 RX ORDER — LIDOCAINE HYDROCHLORIDE 20 MG/ML
INJECTION, SOLUTION INFILTRATION; PERINEURAL AS NEEDED
Status: DISCONTINUED | OUTPATIENT
Start: 2020-09-04 | End: 2020-09-04 | Stop reason: HOSPADM

## 2020-09-04 RX ADMIN — Medication 1200 MG: at 22:12

## 2020-09-04 RX ADMIN — SODIUM BICARBONATE 650 MG TABLET 1300 MG: at 19:22

## 2020-09-04 RX ADMIN — SODIUM CHLORIDE 100 ML/HR: 900 INJECTION, SOLUTION INTRAVENOUS at 14:51

## 2020-09-04 RX ADMIN — SODIUM BICARBONATE 650 MG TABLET 1300 MG: at 12:21

## 2020-09-04 RX ADMIN — Medication 1200 MG: at 12:21

## 2020-09-04 RX ADMIN — SODIUM CHLORIDE 100 ML/HR: 900 INJECTION, SOLUTION INTRAVENOUS at 11:19

## 2020-09-04 NOTE — CONSULTS
NEPHROLOGY CONSULTATION-----[facility]    [facility]  [phone]  Uriah Lund MD    Patient Care Team:  Xander Robison MD as PCP - General  Xander Robison MD as PCP - Claims Attributed  Delio Lund MD as Consulting Physician (Nephrology)  Dayday Ruiz MD as Consulting Physician (Cardiology)    CC/REASON FOR CONSULTATION:     History of Present Illness     Patient is a 85 y.o. WM, very well known to me, whom I was asked to see in consultation for evaluation and management of renal failure/elevated serum creatinine. Patient was admitted so that he could undergo cardiac cath.  Patient with known CRF/CKD STG 3. No NSAIDs or recent IV dye exposure. No known h/o hepatitis, TB, rheumatic fever, jaundice, SLE, bleeding/bruising disorders. No urinary sx. Has been getting diuretics in the form of Lasix prior to admission. Has not been on ACE/ARB.  No edema or fluid retention. +Compliance with home meds.        Review of Systems   Constitutional: Positive for activity change and fatigue. Negative for appetite change, chills, diaphoresis, fever and unexpected weight change.   HENT: Negative for congestion, dental problem, drooling, ear discharge, ear pain, facial swelling, hearing loss, mouth sores, nosebleeds, postnasal drip, rhinorrhea, sinus pressure, sinus pain, sneezing, sore throat, tinnitus, trouble swallowing and voice change.    Eyes: Negative for photophobia, pain, discharge, redness, itching and visual disturbance.   Respiratory: Negative for apnea, cough, choking, chest tightness, shortness of breath, wheezing and stridor.    Cardiovascular: Positive for chest pain. Negative for palpitations and leg swelling.   Gastrointestinal: Negative for abdominal distention, abdominal pain, anal bleeding, blood in stool, constipation, diarrhea, nausea, rectal pain and vomiting.   Endocrine: Negative for cold intolerance, heat intolerance, polydipsia,  polyphagia and polyuria.   Genitourinary: Negative for decreased urine volume, difficulty urinating, dysuria, enuresis, flank pain, frequency, genital sores, hematuria and urgency.   Musculoskeletal: Positive for arthralgias and back pain. Negative for gait problem, joint swelling, myalgias, neck pain and neck stiffness.   Skin: Negative for color change, pallor, rash and wound.   Allergic/Immunologic: Negative for environmental allergies, food allergies and immunocompromised state.   Neurological: Negative for dizziness, tremors, seizures, syncope, facial asymmetry, speech difficulty, weakness, light-headedness, numbness and headaches.   Hematological: Negative for adenopathy. Does not bruise/bleed easily.   Psychiatric/Behavioral: Negative for agitation, behavioral problems, confusion, decreased concentration, dysphoric mood, hallucinations, self-injury, sleep disturbance and suicidal ideas. The patient is not nervous/anxious and is not hyperactive.           Past Medical History:   Diagnosis Date   • Carotid artery disease (CMS/HCC)    • Diabetes mellitus (CMS/[facility])    • GERD (gastroesophageal reflux disease)    • Hyperlipidemia    • Mood disorder (CMS/HCC)    • Osteoarthritis    • Prostatic hypertrophy    • Pulmonary valve disorder    • Sleep apnea        Past Surgical History:   Procedure Laterality Date   • BACK SURGERY     • CARDIAC CATHETERIZATION N/A 12/5/2019    Procedure: Left Heart Cath and coronary angiogram;  Surgeon: Dayday Ruiz MD;  Location: [facility] CATH INVASIVE LOCATION;  Service: Cardiovascular   • CARDIAC CATHETERIZATION N/A 12/5/2019    Procedure: Right and Left Heart Cath;  Surgeon: Dayday Ruiz MD;  Location: [facility] CATH INVASIVE LOCATION;  Service: Cardiovascular   • CAROTID ENDARTERECTOMY Left    • CHOLECYSTECTOMY     • CORONARY ANGIOPLASTY WITH STENT PLACEMENT         Family History   Problem Relation Age of Onset   • Cancer Mother    • Heart disease Father    • Cancer Brother         Social History     Tobacco Use   • Smoking status: Former Smoker     Packs/day: 5.00     Types: Cigarettes     Start date: 1953     Last attempt to quit: 1960     Years since quittin.0   • Smokeless tobacco: Never Used   Substance Use Topics   • Alcohol use: Not Currently     Frequency: Never     Comment: very rare   • Drug use: No       Home Meds:   Facility-Administered Medications Prior to Admission   Medication Dose Route Frequency Provider Last Rate Last Dose   • [DISCONTINUED] loratadine (CLARITIN) tablet 10 mg  10 mg Oral Daily Xander Robison MD         Medications Prior to Admission   Medication Sig Dispense Refill Last Dose   • aspirin 81 MG tablet Take 81 mg by mouth Daily.   2020 at Unknown time   • atorvastatin (LIPITOR) 80 MG tablet Take 1 tablet by mouth Every Night. 90 tablet 0 9/3/2020 at Unknown time   • finasteride (PROSCAR) 5 MG tablet Take 1 tablet by mouth Daily. 90 tablet 0 2020 at Unknown time   • fluticasone (FLONASE) 50 MCG/ACT nasal spray 2 sprays into the nostril(s) as directed by provider Daily. 3 bottle 3 2020 at Unknown time   • furosemide (LASIX) 20 MG tablet Take 1 tablet by mouth 2 (Two) Times a Day. 180 tablet 1 9/3/2020 at Unknown time   • glimepiride (AMARYL) 1 MG tablet Take 1 tablet by mouth Every Morning Before Breakfast. 90 tablet 0 9/3/2020 at Unknown time   • metoprolol succinate XL (TOPROL-XL) 25 MG 24 hr tablet Take 1 tablet by mouth Daily. 90 tablet 0 2020 at Unknown time   • omeprazole (priLOSEC) 40 MG capsule Take 1 capsule by mouth Daily. 90 capsule 0 9/3/2020 at Unknown time   • sertraline (ZOLOFT) 25 MG tablet Take 1 tablet by mouth Daily. 90 tablet 0 2020 at Unknown time   • tamsulosin (FLOMAX) 0.4 MG capsule 24 hr capsule Take 1 capsule by mouth Daily. 90 capsule 0 9/3/2020 at Unknown time   • Blood Glucose Monitoring Suppl (ONE TOUCH ULTRA 2) w/Device kit 1 Device Daily. 1 each 0 Taking   • glucose blood (ONE TOUCH ULTRA  TEST) test strip Use as instructed 100 each 5 Taking   • ONETOUCH DELICA LANCETS 33G misc 1 pen Daily. 100 each 5 Taking       Scheduled Meds:       Continuous Infusions:    sodium chloride 100 mL/hr Last Rate: 100 mL/hr (09/04/20 1119)       PRN Meds:      Allergies:  Penicillins    OBJECTIVE    Vital Signs  Temp:  [97.3 °F (36.3 °C)] 97.3 °F (36.3 °C)  Heart Rate:  [71] 71  Resp:  [16] 16  BP: (123-187)/(68-77) 123/68    No intake/output data recorded.  No intake/output data recorded.    Physical Exam:  General Appearance: alert, appears stated age and cooperative  Head: normocephalic, without obvious abnormality and atraumatic  Eyes: conjunctivae and sclerae normal and no icterus  Neck: supple and no JVD  Lungs: clear to auscultation and respirations regular  Heart: regular rhythm & normal rate and normal S1, S2 +MILO  Chest Wall: no abnormalities observed  Abdomen: normal bowel sounds and soft non-tender  Extremities: moves extremities well, no edema, no cyanosis and no redness +DJD  Skin: no bleeding, bruising or rash  Neurologic: Alert, and oriented. No focal deficits    Results Review:    I reviewed the patient's new clinical results.    WBC No results found for: WBC   HGB No results found for: HGB   HCT No results found for: HCT   Platlets No results found for: LABPLAT   MCV No results found for: MCV       Sodium No results found for: NA   Potassium No results found for: K   Chloride No results found for: CL   CO2 No results found for: CO2   BUN No results found for: BUN   Creatinine No results found for: CREATININE   Calcium No results found for: CALCIUM   PO4 No results found for: CAPO4   Albumin No results found for: ALBUMIN   Magnesium No results found for: MG   Uric Acid No results found for: URICACID       Imaging Results (Last 72 Hours)     ** No results found for the last 72 hours. **            Results for orders placed during the hospital encounter of 08/31/20   XR Chest 2 View    Narrative DATE OF  EXAM:  8/31/2020 4:33 PM     PROCEDURE:  XR CHEST 2 VW-     INDICATIONS:  Pre-Procedure; E78.2-Mixed hyperlipidemia; I10-Essential (primary)  hypertension; R06.02-Shortness of breath; I25.5-Ischemic cardiomyopathy;  E11.9-Type 2 diabetes mellitus without complications; I20.8-Other forms  of angina pectoris       COMPARISON:  AP portable chest 11/25/2019, 03/15/2017     TECHNIQUE:   Two radiologic views of the chest.     FINDINGS:  Low volume inspiration. Interstitial thickening in both lungs is noted,  similar to the 2017 examination, favored to represent a chronic finding.  Chronic left basilar scarring, similar to 2017. No acute lung  consolidation. Heart size is normal. Multilevel degenerative endplate  spurring in the thoracic and lumbar spine without acute osseous  abnormalities. Moderate degenerative change of the right shoulder.  Presumed cholecystectomy changes in the right upper quadrant. Calcified  granuloma in the right lung base.       Impression Chronic interstitial changes and scarring within both lungs, similar to  the more remote 2017 examination. No acute chest findings.     Electronically Signed By-Dr. Surekha Gaspar MD On:8/31/2020 4:38 PM  This report was finalized on [id number] by Dr. Surekha Gaspar MD.              ASSESSMENT / PLAN      Type 2 diabetes mellitus without complication, without long-term current use of insulin (CMS/HCC)    Hypertension, benign    Shortness of breath    Mixed hyperlipidemia    Ischemic cardiomyopathy    Angina at rest (CMS/HCC)    1. RENAL FAILURE--------Nonoliguric. Known CRF/CKD STG 3, with a baseline serum creatinine of 1.5. Current serum creatinine at baseline. CRF/CKD STG 3 is secondary to DGS/HTN NS. Will premedicate for cardiac cath today with IVFs, Mucomyst, and po Bicarb. Patient has been explained and understands risks of IV dye exposure. Will need to continue IVFs for 8 hours post cath today. If d/c today post IVFs, will need to have repeat BMP on  Monday to ensure no delayed BRITTA.  No NSAIDs. Dose meds for CrCl 30-60 cc/min. Patient to hold home Lasix until Monday.     2. HTN WITH CKD STG 3-----BP okay. No ACE/ARB/DRI for now     3. DMII WITH RENAL MANIFESTAITONS-----BS okay. Glucometers, SSI. On Amaryl     4. ANEMIA OF CKD------H/H stable     5. OA/DJD-------No NSAIDs.       6. VIATMIN D DEFICIENCY------On supplementation     7. HYPERLIPIDEMIA-------On Statin     8. GERD------On PPI. Counseled on risks of chronic PPI use with regards to CKD progression     9. BPH-------On Flomax.       10. DEPRESSION------On SSRI     11. BILL     12. CAD/CARDIOMYOPATHY------per , Cardiology. Cardiac cath today. Hold Lasix until Monday        I discussed the patients findings and my recommendations with patient, nursing staff and consulting provider    Will follow along closely. Thank you for allowing us to see this patient in renal consultation.    [facility]  [phone]  MD Uriah Ann MD  09/04/20  12:16

## 2020-09-16 ENCOUNTER — OFFICE VISIT (OUTPATIENT)
Dept: FAMILY MEDICINE CLINIC | Facility: CLINIC | Age: 85
End: 2020-09-16

## 2020-09-16 VITALS
WEIGHT: 189 LBS | RESPIRATION RATE: 18 BRPM | HEART RATE: 72 BPM | DIASTOLIC BLOOD PRESSURE: 80 MMHG | TEMPERATURE: 97.7 F | HEIGHT: 71 IN | SYSTOLIC BLOOD PRESSURE: 130 MMHG | OXYGEN SATURATION: 97 % | BODY MASS INDEX: 26.46 KG/M2

## 2020-09-16 DIAGNOSIS — R31.9 HEMATURIA, UNSPECIFIED TYPE: ICD-10-CM

## 2020-09-16 DIAGNOSIS — I10 HYPERTENSION, BENIGN: ICD-10-CM

## 2020-09-16 DIAGNOSIS — R06.02 SHORTNESS OF BREATH: ICD-10-CM

## 2020-09-16 DIAGNOSIS — Z23 NEED FOR IMMUNIZATION AGAINST INFLUENZA: ICD-10-CM

## 2020-09-16 DIAGNOSIS — G95.19 NEUROGENIC CLAUDICATION: ICD-10-CM

## 2020-09-16 DIAGNOSIS — D52.8 OTHER FOLATE DEFICIENCY ANEMIAS: ICD-10-CM

## 2020-09-16 DIAGNOSIS — H83.3X3 NOISE-INDUCED HEARING LOSS OF BOTH EARS: ICD-10-CM

## 2020-09-16 DIAGNOSIS — R53.83 LETHARGY: ICD-10-CM

## 2020-09-16 DIAGNOSIS — J96.01 ACUTE RESPIRATORY FAILURE WITH HYPOXIA (HCC): ICD-10-CM

## 2020-09-16 DIAGNOSIS — I95.2 HYPOTENSION DUE TO DRUGS: ICD-10-CM

## 2020-09-16 DIAGNOSIS — Z00.01 ENCOUNTER FOR GENERAL ADULT MEDICAL EXAMINATION WITH ABNORMAL FINDINGS: ICD-10-CM

## 2020-09-16 DIAGNOSIS — I50.22 CHRONIC SYSTOLIC CONGESTIVE HEART FAILURE (HCC): ICD-10-CM

## 2020-09-16 DIAGNOSIS — G47.33 OSA (OBSTRUCTIVE SLEEP APNEA): Chronic | ICD-10-CM

## 2020-09-16 DIAGNOSIS — N40.0 PROSTATIC HYPERTROPHY: ICD-10-CM

## 2020-09-16 DIAGNOSIS — I25.5 ISCHEMIC CARDIOMYOPATHY: ICD-10-CM

## 2020-09-16 DIAGNOSIS — E78.2 MIXED HYPERLIPIDEMIA: ICD-10-CM

## 2020-09-16 DIAGNOSIS — I37.9 PULMONARY VALVE DISORDER: ICD-10-CM

## 2020-09-16 DIAGNOSIS — F41.1 GENERALIZED ANXIETY DISORDER: ICD-10-CM

## 2020-09-16 DIAGNOSIS — E55.9 VITAMIN D DEFICIENCY: ICD-10-CM

## 2020-09-16 DIAGNOSIS — M19.90 OSTEOARTHRITIS, UNSPECIFIED OSTEOARTHRITIS TYPE, UNSPECIFIED SITE: ICD-10-CM

## 2020-09-16 DIAGNOSIS — F32.A DEPRESSION, UNSPECIFIED DEPRESSION TYPE: ICD-10-CM

## 2020-09-16 DIAGNOSIS — Z80.0 FAMILY HISTORY OF COLON CANCER: ICD-10-CM

## 2020-09-16 DIAGNOSIS — I73.9 ASYMPTOMATIC PERIPHERAL VASCULAR DISEASE (HCC): ICD-10-CM

## 2020-09-16 DIAGNOSIS — T82.855S STENOSIS OF CORONARY ARTERY STENT, SEQUELA: ICD-10-CM

## 2020-09-16 DIAGNOSIS — K29.90 GASTRITIS AND GASTRODUODENITIS: ICD-10-CM

## 2020-09-16 DIAGNOSIS — N18.30 CHRONIC KIDNEY DISEASE, STAGE III (MODERATE) (HCC): ICD-10-CM

## 2020-09-16 DIAGNOSIS — M54.16 LUMBAR RADICULOPATHY: ICD-10-CM

## 2020-09-16 DIAGNOSIS — E83.42 HYPOMAGNESEMIA: ICD-10-CM

## 2020-09-16 DIAGNOSIS — I49.1 PAC (PREMATURE ATRIAL CONTRACTION): ICD-10-CM

## 2020-09-16 DIAGNOSIS — I77.9 CAROTID ARTERY DISEASE, UNSPECIFIED LATERALITY, UNSPECIFIED TYPE (HCC): Primary | Chronic | ICD-10-CM

## 2020-09-16 DIAGNOSIS — I45.5 SINUS PAUSE: ICD-10-CM

## 2020-09-16 DIAGNOSIS — E11.9 TYPE 2 DIABETES MELLITUS WITHOUT COMPLICATION, WITHOUT LONG-TERM CURRENT USE OF INSULIN (HCC): ICD-10-CM

## 2020-09-16 DIAGNOSIS — R80.9 PROTEINURIA, UNSPECIFIED TYPE: ICD-10-CM

## 2020-09-16 DIAGNOSIS — I25.10 ATHEROSCLEROSIS OF NATIVE CORONARY ARTERY OF NATIVE HEART WITHOUT ANGINA PECTORIS: ICD-10-CM

## 2020-09-16 DIAGNOSIS — K29.70 GASTRITIS AND GASTRODUODENITIS: ICD-10-CM

## 2020-09-16 DIAGNOSIS — M72.0 DUPUYTREN'S CONTRACTURE OF BOTH HANDS: ICD-10-CM

## 2020-09-16 DIAGNOSIS — D51.0 PERNICIOUS ANEMIA: ICD-10-CM

## 2020-09-16 DIAGNOSIS — F43.21 GRIEF AT LOSS OF CHILD: ICD-10-CM

## 2020-09-16 DIAGNOSIS — M48.061 SPINAL STENOSIS OF LUMBAR REGION, UNSPECIFIED WHETHER NEUROGENIC CLAUDICATION PRESENT: ICD-10-CM

## 2020-09-16 DIAGNOSIS — Z63.4 GRIEF AT LOSS OF CHILD: ICD-10-CM

## 2020-09-16 PROBLEM — R29.818 NEUROGENIC CLAUDICATION: Status: ACTIVE | Noted: 2020-09-16

## 2020-09-16 PROCEDURE — 99214 OFFICE O/P EST MOD 30 MIN: CPT | Performed by: FAMILY MEDICINE

## 2020-09-16 PROCEDURE — 99397 PER PM REEVAL EST PAT 65+ YR: CPT | Performed by: FAMILY MEDICINE

## 2020-09-16 RX ORDER — FUROSEMIDE 20 MG/1
20 TABLET ORAL 2 TIMES DAILY
Qty: 180 TABLET | Refills: 3 | Status: SHIPPED | OUTPATIENT
Start: 2020-09-16 | End: 2021-07-21

## 2020-09-16 RX ORDER — TAMSULOSIN HYDROCHLORIDE 0.4 MG/1
1 CAPSULE ORAL DAILY
Qty: 90 CAPSULE | Refills: 3 | Status: SHIPPED | OUTPATIENT
Start: 2020-09-16 | End: 2020-12-28

## 2020-09-16 RX ORDER — GLIMEPIRIDE 1 MG/1
1 TABLET ORAL
Qty: 90 TABLET | Refills: 3 | Status: SHIPPED | OUTPATIENT
Start: 2020-09-16 | End: 2021-07-13 | Stop reason: SDUPTHER

## 2020-09-16 RX ORDER — FINASTERIDE 5 MG/1
5 TABLET, FILM COATED ORAL DAILY
Qty: 90 TABLET | Refills: 3 | Status: SHIPPED | OUTPATIENT
Start: 2020-09-16 | End: 2021-07-21

## 2020-09-16 RX ORDER — OMEPRAZOLE 40 MG/1
40 CAPSULE, DELAYED RELEASE ORAL DAILY
Qty: 90 CAPSULE | Refills: 3 | Status: SHIPPED | OUTPATIENT
Start: 2020-09-16 | End: 2021-07-21

## 2020-09-16 RX ORDER — METOPROLOL SUCCINATE 25 MG/1
25 TABLET, EXTENDED RELEASE ORAL DAILY
Qty: 90 TABLET | Refills: 3 | Status: SHIPPED | OUTPATIENT
Start: 2020-09-16 | End: 2021-07-21

## 2020-09-16 RX ORDER — SERTRALINE HYDROCHLORIDE 25 MG/1
25 TABLET, FILM COATED ORAL DAILY
Qty: 90 TABLET | Refills: 3 | Status: SHIPPED | OUTPATIENT
Start: 2020-09-16 | End: 2020-12-28

## 2020-09-16 RX ORDER — ATORVASTATIN CALCIUM 80 MG/1
80 TABLET, FILM COATED ORAL NIGHTLY
Qty: 90 TABLET | Refills: 3 | Status: SHIPPED | OUTPATIENT
Start: 2020-09-16 | End: 2021-07-21

## 2020-09-16 SDOH — SOCIAL STABILITY - SOCIAL INSECURITY: DISSAPEARANCE AND DEATH OF FAMILY MEMBER: Z63.4

## 2020-09-16 NOTE — ASSESSMENT & PLAN NOTE
Doing well.  Patient tolerated Zoloft well without side effects. I feel the benefits of the medication outweigh the risks.

## 2020-09-16 NOTE — ASSESSMENT & PLAN NOTE
Improved.  Labs done 8-, read by me, reviewed with pt.  MMA was 348 down from 370, Vit. B12 459 down from 536, globin done recently was 12.8 up from 12.6

## 2020-09-16 NOTE — ASSESSMENT & PLAN NOTE
Doing well.  Patient tolerated Omeprazole well without side effects. I feel the benefits of the medication outweigh the risks.

## 2020-09-16 NOTE — ASSESSMENT & PLAN NOTE
Doing well.  Patient tolerated Metoprolol well without side effects. I feel the benefits of the medication outweigh the risks.  Encouraged to watch salt, exercise more and lose weight.

## 2020-09-16 NOTE — ASSESSMENT & PLAN NOTE
Labs done 8-, read by me, reviewed with pt. A1c was 6.2 up from 5.9  Worsening.  Patient tolerated Amaryl well without side effects. I feel the benefits of the medication outweigh the risks.  Encouraged to watch sugar intake, exercise more and lose weight.   No medication.   Not monitoring sugar at home.   Follow up in 3 months  Care management needs are self-addressed. Self-management abilities addressed and patient is capable of managing his own disease.

## 2020-09-16 NOTE — ASSESSMENT & PLAN NOTE
Labs done 8-, read by me, reviewed with pt.  Trig. 206 up from 113, Tot. Chol. 155 up from 146, HDL 41 down from 52, LDL 79.  Patient tolerated Lipitor well without side effects. I feel the benefits of the medication outweigh the risks.  Worsening  Encouraged to watch fatty intake, exercise more, and lose weight.   Compliant with medication  Is not getting adequate diet and exercise  Goals developed at last visit were not met because diet and exercise  Follow up in   months  Care management needs are self-addressed.  Self-management abilities addressed and patient is capable of managing his own disease.

## 2020-09-16 NOTE — ASSESSMENT & PLAN NOTE
Stable, Arterial U/S done 9-1-2020, results reviewed with pt.  U/S showed Right was normal and left was mild

## 2020-09-16 NOTE — ASSESSMENT & PLAN NOTE
Slightly worse.    Labs done 8-, read by me, reviewed with pt.  Creatinine was 1.66 up from 1.64, EGFR was 37 down from 38

## 2020-09-16 NOTE — PROGRESS NOTES
Subjective   Mikael Shanks is a 85 y.o. male here for his annual physical with me. Mikael is here for coordination of medical care, to discuss health maintenance, disease prevention as well as to followup on medical problems. Patient has been followed by me since 1988. Patient's last CPE was 8-. Activity level is minimal. Exercises 0 per week. Appetite is good. Feels well with few complaints. Energy level is poor. Sleeps well. Patient's last colonoscopy was 8-.  Patient declines due to age. Patient is doing routine self skin exam monthly. Patient is doing routine self-breast exams monthly. Patient is checking testicles monthly.    Lab Results   Component Value Date    PSA 0.8 12/15/2016        Carotid Artery Disease  This is a chronic problem. The current episode started more than 1 year ago. The problem occurs constantly. The problem has been unchanged. Pertinent negatives include no abdominal pain, chest pain, chills, congestion, coughing, fatigue, fever, joint swelling, myalgias, nausea, neck pain, rash, sore throat, vomiting or weakness. Nothing aggravates the symptoms.   Chronic Kidney Disease  This is a chronic problem. The current episode started more than 1 year ago. The problem occurs constantly. The problem has been gradually worsening. Pertinent negatives include no abdominal pain, chest pain, chills, congestion, coughing, fatigue, fever, joint swelling, myalgias, nausea, neck pain, rash, sore throat, vomiting or weakness. Nothing aggravates the symptoms.   Congestive Heart Failure  Presents for follow-up visit. Pertinent negatives include no abdominal pain, chest pain, fatigue, palpitations or shortness of breath. The symptoms have been stable. Compliance with total regimen is %. Compliance with diet is %. Compliance with exercise is %. Compliance with medications is %.   Hypertension  This is a chronic problem. The current episode started more than 1 year ago. The  problem is unchanged. The problem is controlled. Pertinent negatives include no blurred vision, chest pain, neck pain, palpitations or shortness of breath.        The following portions of the patient's history were reviewed and updated as appropriate: allergies, current medications, past family history, past medical history, past social history, past surgical history and problem list.    Past Medical History:   Diagnosis Date   • Carotid artery disease (CMS/HCC)    • GERD (gastroesophageal reflux disease)    • Osteoarthritis    • Prostatic hypertrophy    • Pulmonary valve disorder    • Sleep apnea        Family History   Problem Relation Age of Onset   • Cancer Mother    • Heart disease Father    • Cancer Brother        Social History     Socioeconomic History   • Marital status:      Spouse name: Not on file   • Number of children: Not on file   • Years of education: Not on file   • Highest education level: Not on file   Occupational History   • Occupation: Retired   Social Needs   • Financial resource strain: Not hard at all   • Food insecurity     Worry: Never true     Inability: Never true   • Transportation needs     Medical: No     Non-medical: No   Tobacco Use   • Smoking status: Former Smoker     Packs/day: 5.00     Types: Cigarettes     Start date: 1953     Quit date: 1960     Years since quittin.1   • Smokeless tobacco: Never Used   Substance and Sexual Activity   • Alcohol use: Not Currently     Frequency: Never     Comment: very rare   • Drug use: No   • Sexual activity: Never   Lifestyle   • Physical activity     Days per week: 0 days     Minutes per session: 0 min   • Stress: Not at all   Relationships   • Social connections     Talks on phone: More than three times a week     Gets together: More than three times a week     Attends [employer] service: 1 to 4 times per year     Active member of club or organization: Yes     Attends meetings of clubs or organizations: Never      Relationship status: Living with partner   Social History Narrative     3 x.  First wife  for 17 years have 5 children,  to 2nd wife for 11 years, they had 1 child together, 3rd wife  for 16 years she passed in 2003 from breast cancer.  He has been with his girlfriend Debra since 2004, she is in bad health with lung disease.  Retired from [employer] in 1995.  Caffeine none, Always wears seatbelts.  Hobbies TV.  Exercise none.       Past Surgical History:   Procedure Laterality Date   • BACK SURGERY     • CARDIAC CATHETERIZATION N/A 12/5/2019    Procedure: Left Heart Cath and coronary angiogram;  Surgeon: Dayday Ruiz MD;  Location: [facility] CATH INVASIVE LOCATION;  Service: Cardiovascular   • CARDIAC CATHETERIZATION N/A 12/5/2019    Procedure: Right and Left Heart Cath;  Surgeon: Dayday Ruiz MD;  Location: [facility] CATH INVASIVE LOCATION;  Service: Cardiovascular   • CARDIAC CATHETERIZATION N/A 9/4/2020    Procedure: Left and Right Heart Cath with Coronary Angiography;  Surgeon: Dayday Ruiz MD;  Location: [facility] CATH INVASIVE LOCATION;  Service: Cardiovascular;  Laterality: N/A;   • CAROTID ENDARTERECTOMY Left    • CHOLECYSTECTOMY     • COLONOSCOPY  08/14/2018    No repeat   • CORONARY ANGIOPLASTY WITH STENT PLACEMENT         Current Outpatient Medications on File Prior to Visit   Medication Sig Dispense Refill   • aspirin 81 MG tablet Take 81 mg by mouth Daily.     • Blood Glucose Monitoring Suppl (ONE TOUCH ULTRA 2) w/Device kit 1 Device Daily. 1 each 0   • fluticasone (FLONASE) 50 MCG/ACT nasal spray 2 sprays into the nostril(s) as directed by provider Daily. 3 bottle 3   • glucose blood (ONE TOUCH ULTRA TEST) test strip Use as instructed 100 each 5   • ONETOUCH DELICA LANCETS 33G misc 1 pen Daily. 100 each 5     No current facility-administered medications on file prior to visit.        Allergies   Allergen Reactions   • Penicillins Hives       Review of Systems  "  Constitutional: Negative for appetite change, chills, fatigue, fever, unexpected weight gain and unexpected weight loss.   HENT: Negative for congestion, dental problem, ear discharge, ear pain, hearing loss, nosebleeds, postnasal drip, rhinorrhea, sinus pressure, sneezing, sore throat, tinnitus and voice change.         Last Dental exam 7-2019 had a tooth pulled-advised to follow   Eyes: Negative for blurred vision, double vision, pain, redness and visual disturbance.        Last Vision exam  5-2018, Dr. Smith advised to follow   Respiratory: Negative for cough, shortness of breath, wheezing and stridor.    Cardiovascular: Negative for chest pain, palpitations and leg swelling.   Gastrointestinal: Negative for abdominal pain, anal bleeding, blood in stool, constipation, diarrhea, nausea, rectal pain, vomiting, GERD and indigestion.        14-21 BM weekly   Endocrine: Negative for cold intolerance, heat intolerance, polydipsia, polyphagia and polyuria.        Sex Drive  He is a 0  She is a 0   Genitourinary: Negative for difficulty urinating, dysuria, frequency, hematuria and urgency.   Musculoskeletal: Negative for back pain, joint swelling, myalgias, neck pain and neck stiffness.        Leg pain   Skin: Negative for color change, dry skin and rash.   Neurological: Negative for dizziness, syncope, speech difficulty, weakness, light-headedness, headache and memory problem.   Hematological: Does not bruise/bleed easily.   Psychiatric/Behavioral: Negative for decreased concentration, sleep disturbance, depressed mood and stress. The patient is not nervous/anxious.        Objective   Visit Vitals  /80 (BP Location: Left arm, Patient Position: Sitting, Cuff Size: Adult)   Pulse 72   Temp 97.7 °F (36.5 °C) (Temporal)   Resp 18   Ht 179.1 cm (70.5\")   Wt 85.7 kg (189 lb)   SpO2 97%   BMI 26.74 kg/m²     Physical Exam  Constitutional:       Appearance: He is well-developed.   HENT:      Head: Normocephalic.      " Right Ear: Tympanic membrane, ear canal and external ear normal. Decreased hearing (hearing aids-severe loss) noted.      Left Ear: Tympanic membrane, ear canal and external ear normal. Decreased hearing (hearing aids-moderate loss) noted.      Nose: Nose normal.      Mouth/Throat:      Lips: Pink.      Mouth: Mucous membranes are moist.      Dentition: Has dentures (upper).      Pharynx: Oropharynx is clear. No oropharyngeal exudate.   Eyes:      General: Lids are normal.         Right eye: No discharge.         Left eye: No discharge.      Conjunctiva/sclera: Conjunctivae normal.      Pupils: Pupils are equal, round, and reactive to light.      Comments: Lens bilateral   Neck:      Musculoskeletal: Full passive range of motion without pain, normal range of motion and neck supple.      Trachea: Trachea normal.   Cardiovascular:      Rate and Rhythm: Normal rate and regular rhythm.      Pulses:           Dorsalis pedis pulses are 0 on the right side and 0 on the left side.        Posterior tibial pulses are 0 on the right side and 1+ on the left side.      Heart sounds: Normal heart sounds. No murmur.   Pulmonary:      Effort: Pulmonary effort is normal. No respiratory distress.      Breath sounds: Normal breath sounds. No stridor. No wheezing or rales.   Chest:      Chest wall: No tenderness.   Abdominal:      General: Bowel sounds are normal. There is no distension.      Palpations: Abdomen is soft. There is no mass.      Tenderness: There is no abdominal tenderness. There is no guarding or rebound.      Hernia: No hernia is present.   Genitourinary:     Penis: Normal and uncircumcised. No tenderness.       Scrotum/Testes: Normal.      Epididymis:      Right: Normal.      Rectum: Guaiac result negative.      Comments: Pt. Declined prostate exam due to age  Musculoskeletal: Normal range of motion.         General: No tenderness or deformity.   Lymphadenopathy:      Cervical: No cervical adenopathy.   Skin:      General: Skin is warm and dry.      Coloration: Skin is not pale.      Findings: No erythema or rash.      Comments: Scar RUQ.  Scar lower back.  Sebaceous cyst upper back.  Black comedone of the mid back.  Mild Varicosities of the lower extremities bilateral     Neurological:      General: No focal deficit present.      Mental Status: He is alert and oriented to person, place, and time.      Cranial Nerves: Cranial nerves are intact. No cranial nerve deficit.      Sensory: Sensation is intact. No sensory deficit.      Motor: Motor function is intact.      Coordination: Coordination is intact. Coordination normal.      Gait: Gait is intact.      Deep Tendon Reflexes: Reflexes normal.   Psychiatric:         Behavior: Behavior normal.         Thought Content: Thought content normal.         Judgment: Judgment normal.         Assessment/Plan   Problem List Items Addressed This Visit        High    Asymptomatic peripheral vascular disease (CMS/HCC)    Current Assessment & Plan     Stable, Arterial U/S done 9-1-2020, results reviewed with pt.  U/S showed Right was normal and left was mild         Atherosclerosis of native coronary artery of native heart without angina pectoris    Current Assessment & Plan     Doing well, followed with Dr. Ruiz         Relevant Medications    metoprolol succinate XL (TOPROL-XL) 25 MG 24 hr tablet    Grief at loss of child    Current Assessment & Plan     Improved         Ischemic cardiomyopathy    Overview     Very mild with cardiac cath done September 4, 2020 showing normal ejection fraction right coronary artery stent is patent with 20% blockage left anterior descending has luminal irregularities.         Current Assessment & Plan     Stable.  Patient tolerated Lasix well without side effects. I feel the benefits of the medication outweigh the risks.         Relevant Medications    metoprolol succinate XL (TOPROL-XL) 25 MG 24 hr tablet    Stenosis of coronary artery stent    Overview      Added automatically from request for surgery [id number]         Current Assessment & Plan     Doing well with resent cardiac cath            Medium    Carotid artery disease (CMS/HCC) - Primary (Chronic)    Current Assessment & Plan     Moderate, advised to repeat Carotid U/S, pt. Declines U/S due to age.         Chronic kidney disease, stage III (moderate) (CMS/HCC)    Current Assessment & Plan     Slightly worse.    Labs done 8-, read by me, reviewed with pt.  Creatinine was 1.66 up from 1.64, EGFR was 37 down from 38         Relevant Medications    finasteride (PROSCAR) 5 MG tablet    furosemide (LASIX) 20 MG tablet    Depression    Current Assessment & Plan     Doing well.  Patient tolerated Zoloft well without side effects. I feel the benefits of the medication outweigh the risks.         Relevant Medications    sertraline (ZOLOFT) 25 MG tablet    Hypertension, benign    Current Assessment & Plan     Doing well.  Patient tolerated Metoprolol well without side effects. I feel the benefits of the medication outweigh the risks.  Encouraged to watch salt, exercise more and lose weight.           Relevant Medications    furosemide (LASIX) 20 MG tablet    metoprolol succinate XL (TOPROL-XL) 25 MG 24 hr tablet    Mixed hyperlipidemia    Current Assessment & Plan     Labs done 8-, read by me, reviewed with pt.  Trig. 206 up from 113, Tot. Chol. 155 up from 146, HDL 41 down from 52, LDL 79.  Patient tolerated Lipitor well without side effects. I feel the benefits of the medication outweigh the risks.  Worsening  Encouraged to watch fatty intake, exercise more, and lose weight.   Compliant with medication  Is not getting adequate diet and exercise  Goals developed at last visit were not met because diet and exercise  Follow up in   months  Care management needs are self-addressed.  Self-management abilities addressed and patient is capable of managing his own disease.             Relevant Medications     atorvastatin (LIPITOR) 80 MG tablet    Other Relevant Orders    Lipid Panel With / Chol / HDL Ratio    Comprehensive metabolic panel    Other folate deficiency anemias    Current Assessment & Plan     Doing well. Labs done 8-, read by me, reviewed with pt.  Folate was 9.9 down from 14.6         Pernicious anemia    Current Assessment & Plan     Improved.  Labs done 8-, read by me, reviewed with pt.  MMA was 348 down from 370, Vit. B12 459 down from 536, globin done recently was 12.8 up from 12.6         Relevant Orders    CBC & Differential    Type 2 diabetes mellitus without complication, without long-term current use of insulin (CMS/[facility])    Current Assessment & Plan     Labs done 8-, read by me, reviewed with pt. A1c was 6.2 up from 5.9  Worsening.  Patient tolerated Amaryl well without side effects. I feel the benefits of the medication outweigh the risks.  Encouraged to watch sugar intake, exercise more and lose weight.   No medication.   Not monitoring sugar at home.   Follow up in 3 months  Care management needs are self-addressed. Self-management abilities addressed and patient is capable of managing his own disease.           Relevant Medications    glimepiride (AMARYL) 1 MG tablet    Other Relevant Orders    Hemoglobin A1c       Low    Anxiety disorder, unspecified    Current Assessment & Plan     Doing well         Relevant Medications    sertraline (ZOLOFT) 25 MG tablet    Hypomagnesemia    Current Assessment & Plan     Doing well.  Labs done 8-, read by me, reviewed with pt.  Magnesium was 1.6 down from 1.8         Relevant Orders    Magnesium    BILL (obstructive sleep apnea) (Chronic)    Current Assessment & Plan     Doing well with C-pap         Spinal stenosis of lumbar region    Current Assessment & Plan     Worse, pt. Declines further evaluation or treatment         Vitamin D deficiency    Current Assessment & Plan     Worse.  Labs done 8-, read by me, reviewed  with pt. Vit. D 32.7 down from 39.0.  Increase Vit. D to 3 daily         Relevant Orders    Vitamin D 25 hydroxy       Unprioritized    Chronic systolic congestive heart failure (CMS/HCC)    Relevant Medications    furosemide (LASIX) 20 MG tablet    metoprolol succinate XL (TOPROL-XL) 25 MG 24 hr tablet    Dupuytren's contracture of both hands    Current Assessment & Plan     Stable, follow conservatively         Encounter for general adult medical examination with abnormal findings    Overview     Medical records thoroughly reviewed and summarized in emr, since last PE           Current Assessment & Plan     Encouraged to do self-breast exam, self-testicle exams, and self derm exams. Congratulated on using seat belts.  Encouraged to do annual physical exams.  Immunization status reviewed.  He is due for Prevnar 13 and Shingrix he will check with insurance           Family history of colon cancer    Current Assessment & Plan     Pt. Declines repeat colonoscopy due to age.         Gastritis and gastroduodenitis    Overview     EGD done in August 2018 by Dr. Peraza showed a small hiatal hernia otherwise was normal esophagus.  Stomach did show some mild erythema congested consistent with gastritis         Current Assessment & Plan     Doing well.  Patient tolerated Omeprazole well without side effects. I feel the benefits of the medication outweigh the risks.         Relevant Medications    omeprazole (priLOSEC) 40 MG capsule    Hearing loss    Current Assessment & Plan     Advised to avoid noise exposure and wear hearing protection         Hematuria    Current Assessment & Plan     Resolved.  U/A done 8-, read by me, reviewed with pt.  U/A showed 1+ protein         RESOLVED: Hypotension due to drugs    Lethargy    Current Assessment & Plan      2nd to deconditioning.  Labs done 8-, read by me, reviewed with pt.         RESOLVED: Lumbar radiculopathy    Need for immunization against influenza    Relevant  Orders    Fluad Tri 65yr+    Neurogenic claudication    Current Assessment & Plan     New dx.  Pt. Declines further work up and treatment         Osteoarthritis    Current Assessment & Plan     Mild and stable         PAC (premature atrial contraction)    Relevant Medications    metoprolol succinate XL (TOPROL-XL) 25 MG 24 hr tablet    Prostatic hypertrophy    Current Assessment & Plan     Stable.  Patient tolerated Flomax and Proscar well without side effects. I feel the benefits of the medication outweigh the risks.         Relevant Medications    finasteride (PROSCAR) 5 MG tablet    tamsulosin (FLOMAX) 0.4 MG capsule 24 hr capsule    Proteinuria    Current Assessment & Plan     Advised to discuss with Dr. Briones         Pulmonary valve disorder    Current Assessment & Plan     Stable         Relevant Medications    metoprolol succinate XL (TOPROL-XL) 25 MG 24 hr tablet    Shortness of breath    Current Assessment & Plan     Improved--recent pulmonary functions were normal         Sinus pause    Current Assessment & Plan     Doing well         Relevant Medications    metoprolol succinate XL (TOPROL-XL) 25 MG 24 hr tablet      Other Visit Diagnoses     Acute respiratory failure with hypoxia (CMS/HCC)                   Encouraged to check his skin, testicles and breasts monthly. Reviewed exercising regularly, eating a balanced diet, immunizations and if due, patient counselled to check with insurance company for coverage; and regularly checking skin and breasts.

## 2020-09-16 NOTE — ASSESSMENT & PLAN NOTE
Worse.  Labs done 8-, read by me, reviewed with pt. Vit. D 32.7 down from 39.0.  Increase Vit. D to 3 daily

## 2020-09-16 NOTE — ASSESSMENT & PLAN NOTE
Stable.  Patient tolerated Flomax and Proscar well without side effects. I feel the benefits of the medication outweigh the risks.

## 2020-09-17 ENCOUNTER — RESULTS ENCOUNTER (OUTPATIENT)
Dept: FAMILY MEDICINE CLINIC | Facility: CLINIC | Age: 85
End: 2020-09-17

## 2020-09-17 DIAGNOSIS — D51.0 PERNICIOUS ANEMIA: ICD-10-CM

## 2020-09-17 PROBLEM — Z23 NEED FOR IMMUNIZATION AGAINST INFLUENZA: Status: ACTIVE | Noted: 2020-09-17

## 2020-09-17 PROBLEM — Z00.01 ENCOUNTER FOR GENERAL ADULT MEDICAL EXAMINATION WITH ABNORMAL FINDINGS: Status: ACTIVE | Noted: 2020-09-17

## 2020-09-17 PROBLEM — I50.22 CHRONIC SYSTOLIC CONGESTIVE HEART FAILURE: Status: ACTIVE | Noted: 2020-09-17

## 2020-09-17 NOTE — ASSESSMENT & PLAN NOTE
Stable.  Patient tolerated Lasix well without side effects. I feel the benefits of the medication outweigh the risks.

## 2020-09-17 NOTE — ASSESSMENT & PLAN NOTE
Encouraged to do self-breast exam, self-testicle exams, and self derm exams. Congratulated on using seat belts.  Encouraged to do annual physical exams.  Immunization status reviewed.  He is due for Prevnar 13 and Shingrix he will check with insurance

## 2020-09-27 PROCEDURE — G0008 ADMIN INFLUENZA VIRUS VAC: HCPCS | Performed by: FAMILY MEDICINE

## 2020-09-27 PROCEDURE — 90694 VACC AIIV4 NO PRSRV 0.5ML IM: CPT | Performed by: FAMILY MEDICINE

## 2020-09-30 ENCOUNTER — OFFICE VISIT (OUTPATIENT)
Dept: CARDIOLOGY | Facility: CLINIC | Age: 85
End: 2020-09-30

## 2020-09-30 VITALS
OXYGEN SATURATION: 97 % | WEIGHT: 190.75 LBS | DIASTOLIC BLOOD PRESSURE: 76 MMHG | HEIGHT: 70 IN | SYSTOLIC BLOOD PRESSURE: 161 MMHG | HEART RATE: 57 BPM | BODY MASS INDEX: 27.31 KG/M2

## 2020-09-30 DIAGNOSIS — E11.9 TYPE 2 DIABETES MELLITUS WITHOUT COMPLICATION, WITHOUT LONG-TERM CURRENT USE OF INSULIN (HCC): ICD-10-CM

## 2020-09-30 DIAGNOSIS — I25.5 ISCHEMIC CARDIOMYOPATHY: Primary | ICD-10-CM

## 2020-09-30 DIAGNOSIS — E78.2 MIXED HYPERLIPIDEMIA: ICD-10-CM

## 2020-09-30 DIAGNOSIS — N18.30 CKD (CHRONIC KIDNEY DISEASE) STAGE 3, GFR 30-59 ML/MIN (HCC): ICD-10-CM

## 2020-09-30 DIAGNOSIS — I10 HYPERTENSION, BENIGN: ICD-10-CM

## 2020-09-30 PROCEDURE — 99214 OFFICE O/P EST MOD 30 MIN: CPT | Performed by: INTERNAL MEDICINE

## 2020-09-30 RX ORDER — OMEGA-3S/DHA/EPA/FISH OIL/D3 300MG-1000
400 CAPSULE ORAL DAILY
COMMUNITY

## 2020-09-30 NOTE — PROGRESS NOTES
Encounter Date:09/30/2020  Last seen 8/31/2020      Patient ID: Mikael Shanks is a 85 y.o. male.    Chief Complaint:  Follow-up recent cardiac catheterization  Congestive heart failure  Status post stent  Hypertension  Diabetes  Renal dysfunction  Bilateral arm discomfort and chest discomfort-new problem        History of Present Illness     Patient had cardiac catheterization recently.  Patient was discharged home.    Since I have last seen, the patient has been without any shortness of breath, palpitations, dizziness or syncope.  Denies having any headache ,abdominal pain ,nausea, vomiting , diarrhea constipation, loss of weight or loss of appetite.  Denies having any excessive bruising ,hematuria or blood in the stool.     Review of all systems negative except as indicated.    Reviewed ROS  Assessment and Plan      ]]]]]]]]]]]]]]]]]]]]]]]  Impression  ========  -Bilateral arm discomfort and chest discomfort suggestive of angina pectoris.-Improved     -Status post stent 2003 at [facility]     - Ischemic cardiomyopathy with ejection fraction of 25%.-Improved and 60 %    Cardiac catheterization 9/4/2020 revealed  Left ventricular size and contractility is normal with ejection fraction of 60%.  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  Left anterior descending artery has luminal irregularities.  Circumflex coronary artery has luminal irregularities.  Right coronary artery stent is patent with 20% plaquing.      Echocardiogram 8/31/2020  Thickened aortic valve with adequate opening motion.  Left atrial enlargement  Left ventricular size and contractility is normal with ejection fraction of 55 %'     -Status post acute on chronic systolic congestive heart failure-improved significant left ventricle dysfunction with ejection fraction of 25%.     -Status post acute respiratory failure with hypoxia-improved.       -Hypertension diabetes renal dysfunction.   BUN 14 creatinine 1.5.  Dyslipidemia     -History of anemia     -History of premature atrial contractions     -History of severe hypomagnesemia.-Improved     -Allergic to penicillin.   =======  Plan  =======  Patient was having nonexertional and exertional bilateral arm discomfort and chest discomfort suggestive of angina pectoris.-Improved  Echocardiogram 8/31/2020 revealed improved left ventricular function with ejection fraction of 55%.  No need for ICD placement.  Recent cardiac catheterization results were discussed and educated patient.  No significant obstructive coronary artery disease is present.  Porcelain aorta is present.  Patient is not on ACE inhibitor due to pre-existing renal dysfunction.  Follow-up in the office in 6 months.  Further plan will depend on patient's progress.  ]]]]]]]]]]]]]                      Diagnosis Plan   1. Ischemic cardiomyopathy     2. CKD (chronic kidney disease) stage 3, GFR 30-59 ml/min     3. Hypertension, benign     4. Mixed hyperlipidemia     5. Type 2 diabetes mellitus without complication, without long-term current use of insulin (CMS/[facility])     LAB RESULTS (LAST 7 DAYS)    CBC        BMP        CMP         BNP        TROPONIN        CoAg        Creatinine Clearance  CrCl cannot be calculated (Patient's most recent lab result is older than the maximum 30 days allowed.).    ABG        Radiology  No radiology results for the last day                The following portions of the patient's history were reviewed and updated as appropriate: allergies, current medications, past family history, past medical history, past social history, past surgical history and problem list.    Review of Systems   Constitution: Negative for malaise/fatigue.   Cardiovascular: Negative for chest pain, leg swelling, palpitations and syncope.   Respiratory: Negative for shortness of breath.    Skin: Negative for rash.   Gastrointestinal: Negative for nausea and vomiting.   Neurological: Negative  for dizziness, light-headedness and numbness.         Current Outpatient Medications:   •  aspirin 81 MG tablet, Take 81 mg by mouth Daily., Disp: , Rfl:   •  atorvastatin (LIPITOR) 80 MG tablet, Take 1 tablet by mouth Every Night., Disp: 90 tablet, Rfl: 3  •  Blood Glucose Monitoring Suppl (ONE TOUCH ULTRA 2) w/Device kit, 1 Device Daily., Disp: 1 each, Rfl: 0  •  cholecalciferol (VITAMIN D3) 10 MCG (400 UNIT) tablet, Take 400 Units by mouth Daily., Disp: , Rfl:   •  finasteride (PROSCAR) 5 MG tablet, Take 1 tablet by mouth Daily., Disp: 90 tablet, Rfl: 3  •  fluticasone (FLONASE) 50 MCG/ACT nasal spray, 2 sprays into the nostril(s) as directed by provider Daily., Disp: 3 bottle, Rfl: 3  •  furosemide (LASIX) 20 MG tablet, Take 1 tablet by mouth 2 (Two) Times a Day., Disp: 180 tablet, Rfl: 3  •  glimepiride (AMARYL) 1 MG tablet, Take 1 tablet by mouth Every Morning Before Breakfast., Disp: 90 tablet, Rfl: 3  •  glucose blood (ONE TOUCH ULTRA TEST) test strip, Use as instructed, Disp: 100 each, Rfl: 5  •  metoprolol succinate XL (TOPROL-XL) 25 MG 24 hr tablet, Take 1 tablet by mouth Daily., Disp: 90 tablet, Rfl: 3  •  omeprazole (priLOSEC) 40 MG capsule, Take 1 capsule by mouth Daily., Disp: 90 capsule, Rfl: 3  •  ONETOUCH DELICA LANCETS 33G misc, 1 pen Daily., Disp: 100 each, Rfl: 5  •  sertraline (ZOLOFT) 25 MG tablet, Take 1 tablet by mouth Daily., Disp: 90 tablet, Rfl: 3  •  tamsulosin (FLOMAX) 0.4 MG capsule 24 hr capsule, Take 1 capsule by mouth Daily., Disp: 90 capsule, Rfl: 3    Allergies   Allergen Reactions   • Penicillins Hives       Family History   Problem Relation Age of Onset   • Cancer Mother    • Heart disease Father    • Cancer Brother        Past Surgical History:   Procedure Laterality Date   • BACK SURGERY     • CARDIAC CATHETERIZATION N/A 12/5/2019    Procedure: Left Heart Cath and coronary angiogram;  Surgeon: Dayday Ruiz MD;  Location: [facility] CATH INVASIVE LOCATION;  Service:  Cardiovascular   • CARDIAC CATHETERIZATION N/A 2019    Procedure: Right and Left Heart Cath;  Surgeon: Dayday Ruiz MD;  Location:  [facility] CATH INVASIVE LOCATION;  Service: Cardiovascular   • CARDIAC CATHETERIZATION N/A 2020    Procedure: Left and Right Heart Cath with Coronary Angiography;  Surgeon: Dayday Ruiz MD;  Location:  [facility] CATH INVASIVE LOCATION;  Service: Cardiovascular;  Laterality: N/A;   • CAROTID ENDARTERECTOMY Left    • CHOLECYSTECTOMY     • COLONOSCOPY  2018    No repeat   • CORONARY ANGIOPLASTY WITH STENT PLACEMENT         Past Medical History:   Diagnosis Date   • Carotid artery disease (CMS/HCC)    • GERD (gastroesophageal reflux disease)    • Osteoarthritis    • Prostatic hypertrophy    • Pulmonary valve disorder    • Sleep apnea        Family History   Problem Relation Age of Onset   • Cancer Mother    • Heart disease Father    • Cancer Brother        Social History     Socioeconomic History   • Marital status:      Spouse name: Not on file   • Number of children: Not on file   • Years of education: Not on file   • Highest education level: Not on file   Occupational History   • Occupation: Retired   Social Needs   • Financial resource strain: Not hard at all   • Food insecurity     Worry: Never true     Inability: Never true   • Transportation needs     Medical: No     Non-medical: No   Tobacco Use   • Smoking status: Former Smoker     Packs/day: 5.00     Types: Cigarettes     Start date: 1953     Quit date: 1960     Years since quittin.1   • Smokeless tobacco: Never Used   Substance and Sexual Activity   • Alcohol use: Not Currently     Frequency: Never     Comment: very rare   • Drug use: No   • Sexual activity: Never   Lifestyle   • Physical activity     Days per week: 0 days     Minutes per session: 0 min   • Stress: Not at all   Relationships   • Social connections     Talks on phone: More than three times a week     Gets together: More than  "three times a week     Attends [employer] service: 1 to 4 times per year     Active member of club or organization: Yes     Attends meetings of clubs or organizations: Never     Relationship status: Living with partner   Social History Narrative     3 x.  First wife  for 17 years have 5 children,  to 2nd wife for 11 years, they had 1 child together, 3rd wife  for 16 years she passed in 2003 from breast cancer.  He has been with his girlfriend Debra since 2004, she is in bad health with lung disease.  Retired from [employer] in 1995.  Caffeine none, Always wears seatbelts.  Hobbies TV.  Exercise none.         Procedures      Objective:       Physical Exam    /76   Pulse 57   Ht 177.8 cm (70\")   Wt 86.5 kg (190 lb 12 oz)   SpO2 97%   BMI 27.37 kg/m²   The patient is alert, oriented and in no distress.    Vital signs as noted above.    Head and neck revealed no carotid bruits or jugular venous distension.  No thyromegaly or lymphadenopathy is present.    Lungs clear.  No wheezing.  Breath sounds are normal bilaterally.    Heart normal first and second heart sounds.  No murmur..  No pericardial rub is present.  No gallop is present.    Abdomen soft and nontender.  No organomegaly is present.    Extremities revealed good peripheral pulses without any pedal edema.    Skin warm and dry.    Musculoskeletal system is grossly normal.    CNS grossly normal.    Reviewed and unchanged from last visit.        "

## 2020-12-17 ENCOUNTER — RESULTS ENCOUNTER (OUTPATIENT)
Dept: FAMILY MEDICINE CLINIC | Facility: CLINIC | Age: 85
End: 2020-12-17

## 2020-12-17 DIAGNOSIS — E83.42 HYPOMAGNESEMIA: ICD-10-CM

## 2020-12-17 DIAGNOSIS — E78.2 MIXED HYPERLIPIDEMIA: ICD-10-CM

## 2020-12-17 DIAGNOSIS — E11.9 TYPE 2 DIABETES MELLITUS WITHOUT COMPLICATION, WITHOUT LONG-TERM CURRENT USE OF INSULIN (HCC): ICD-10-CM

## 2020-12-17 DIAGNOSIS — E55.9 VITAMIN D DEFICIENCY: ICD-10-CM

## 2020-12-28 ENCOUNTER — OFFICE VISIT (OUTPATIENT)
Dept: FAMILY MEDICINE CLINIC | Facility: CLINIC | Age: 85
End: 2020-12-28

## 2020-12-28 VITALS
HEART RATE: 78 BPM | TEMPERATURE: 98.1 F | BODY MASS INDEX: 27.77 KG/M2 | OXYGEN SATURATION: 96 % | SYSTOLIC BLOOD PRESSURE: 140 MMHG | WEIGHT: 194 LBS | DIASTOLIC BLOOD PRESSURE: 88 MMHG | RESPIRATION RATE: 16 BRPM | HEIGHT: 70 IN

## 2020-12-28 DIAGNOSIS — E83.42 HYPOMAGNESEMIA: ICD-10-CM

## 2020-12-28 DIAGNOSIS — F32.A DEPRESSION, UNSPECIFIED DEPRESSION TYPE: ICD-10-CM

## 2020-12-28 DIAGNOSIS — N18.31 STAGE 3A CHRONIC KIDNEY DISEASE (HCC): ICD-10-CM

## 2020-12-28 DIAGNOSIS — D51.0 PERNICIOUS ANEMIA: ICD-10-CM

## 2020-12-28 DIAGNOSIS — E11.9 TYPE 2 DIABETES MELLITUS WITHOUT COMPLICATION, WITHOUT LONG-TERM CURRENT USE OF INSULIN (HCC): ICD-10-CM

## 2020-12-28 DIAGNOSIS — I10 HYPERTENSION, BENIGN: Primary | ICD-10-CM

## 2020-12-28 DIAGNOSIS — E21.5 PARATHYROID ABNORMALITY (HCC): ICD-10-CM

## 2020-12-28 DIAGNOSIS — E78.2 MIXED HYPERLIPIDEMIA: ICD-10-CM

## 2020-12-28 PROBLEM — I65.29 CAROTID ARTERY OCCLUSION: Status: ACTIVE | Noted: 2020-12-28

## 2020-12-28 PROBLEM — E78.9 DISORDER OF LIPID METABOLISM: Status: ACTIVE | Noted: 2020-12-28

## 2020-12-28 PROCEDURE — 99214 OFFICE O/P EST MOD 30 MIN: CPT | Performed by: FAMILY MEDICINE

## 2020-12-28 NOTE — PROGRESS NOTES
Subjective   Mikael Shanks is a 85 y.o. male.     Hypertension  This is a chronic problem. The current episode started more than 1 year ago. The problem is unchanged. The problem is controlled. Pertinent negatives include no chest pain, palpitations or shortness of breath. Risk factors for coronary artery disease include dyslipidemia and diabetes mellitus. Past treatments include beta blockers. The current treatment provides mild improvement.   Hyperlipidemia  This is a chronic problem. The current episode started more than 1 year ago. The problem is controlled. Pertinent negatives include no chest pain, myalgias or shortness of breath. Current antihyperlipidemic treatment includes statins. Risk factors for coronary artery disease include dyslipidemia, diabetes mellitus and hypertension.   Diabetes  He presents for his follow-up diabetic visit. He has type 2 diabetes mellitus. His disease course has been stable. There are no hypoglycemic associated symptoms. There are no diabetic associated symptoms. Pertinent negatives for diabetes include no chest pain, no fatigue, no polyphagia, no polyuria and no weight loss. Risk factors for coronary artery disease include diabetes mellitus, dyslipidemia and hypertension. Current diabetic treatment includes oral agent (monotherapy).            Family History   Problem Relation Age of Onset   • Cancer Mother    • Heart disease Father    • Cancer Brother        Social History     Tobacco Use   • Smoking status: Former Smoker     Packs/day: 5.00     Types: Cigarettes     Start date: 1953     Quit date: 1960     Years since quittin.3   • Smokeless tobacco: Never Used   Substance Use Topics   • Alcohol use: Not Currently     Frequency: Never     Comment: very rare   • Drug use: No       Past Surgical History:   Procedure Laterality Date   • BACK SURGERY     • CARDIAC CATHETERIZATION N/A 2019    Procedure: Left Heart Cath and coronary angiogram;  Surgeon: Joseph  MD Dayday;  Location: [facility] CATH INVASIVE LOCATION;  Service: Cardiovascular   • CARDIAC CATHETERIZATION N/A 12/5/2019    Procedure: Right and Left Heart Cath;  Surgeon: Dayday Ruiz MD;  Location: [facility] CATH INVASIVE LOCATION;  Service: Cardiovascular   • CARDIAC CATHETERIZATION N/A 9/4/2020    Procedure: Left and Right Heart Cath with Coronary Angiography;  Surgeon: Dayday Ruiz MD;  Location: [facility] CATH INVASIVE LOCATION;  Service: Cardiovascular;  Laterality: N/A;   • CAROTID ENDARTERECTOMY Left    • CHOLECYSTECTOMY     • COLONOSCOPY  08/14/2018    No repeat   • CORONARY ANGIOPLASTY WITH STENT PLACEMENT         Patient Active Problem List   Diagnosis   • Carotid artery disease (CMS/[facility])   • Type 2 diabetes mellitus without complication, without long-term current use of insulin (CMS/[facility])   • PAC (premature atrial contraction)   • Vitamin D deficiency   • Gastritis and gastroduodenitis   • Atherosclerosis of native coronary artery of native heart without angina pectoris   • Chronic kidney disease, stage III (moderate)   • Hypertension, benign   • Spinal stenosis of lumbar region   • Other folate deficiency anemias   • Asymptomatic peripheral vascular disease (CMS/HCC)   • Osteoarthritis   • Prostatic hypertrophy   • Pernicious anemia   • Hypomagnesemia   • Depression   • Dupuytren's contracture of both hands   • Sinus pause   • Pulmonary valve disorder   • Hearing loss   • Family history of colon cancer   • Shortness of breath   • BILL (obstructive sleep apnea)   • Stenosis of coronary artery stent   • Mixed hyperlipidemia   • Lethargy   • Anxiety disorder, unspecified   • Grief at loss of child   • Ischemic cardiomyopathy   • Hematuria   • Neurogenic claudication   • Proteinuria   • Encounter for general adult medical examination with abnormal findings   • Need for immunization against influenza   • Chronic systolic congestive heart failure (CMS/HCC)   • Carotid artery occlusion   • Disorder of  lipid metabolism   • 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CMS/[facility])       Current Outpatient Medications on File Prior to Visit   Medication Sig Dispense Refill   • aspirin 81 MG tablet Take 81 mg by mouth Daily.     • atorvastatin (LIPITOR) 80 MG tablet Take 1 tablet by mouth Every Night. 90 tablet 3   • Blood Glucose Monitoring Suppl (ONE TOUCH ULTRA 2) w/Device kit 1 Device Daily. 1 each 0   • cholecalciferol (VITAMIN D3) 10 MCG (400 UNIT) tablet Take 400 Units by mouth Daily.     • finasteride (PROSCAR) 5 MG tablet Take 1 tablet by mouth Daily. 90 tablet 3   • furosemide (LASIX) 20 MG tablet Take 1 tablet by mouth 2 (Two) Times a Day. 180 tablet 3   • glimepiride (AMARYL) 1 MG tablet Take 1 tablet by mouth Every Morning Before Breakfast. 90 tablet 3   • glucose blood (ONE TOUCH ULTRA TEST) test strip Use as instructed 100 each 5   • metoprolol succinate XL (TOPROL-XL) 25 MG 24 hr tablet Take 1 tablet by mouth Daily. 90 tablet 3   • omeprazole (priLOSEC) 40 MG capsule Take 1 capsule by mouth Daily. 90 capsule 3   • ONETOUCH DELICA LANCETS 33G misc 1 pen Daily. 100 each 5     No current facility-administered medications on file prior to visit.        Allergies   Allergen Reactions   • Penicillins Hives       Review of Systems   Constitutional: Negative for fatigue, unexpected weight gain and unexpected weight loss.   Eyes: Negative for visual disturbance.   Respiratory: Negative for shortness of breath.    Cardiovascular: Negative for chest pain, palpitations and leg swelling.   Gastrointestinal: Negative for nausea.   Endocrine: Negative for polyphagia and polyuria.   Genitourinary: Negative for frequency.   Musculoskeletal: Negative for myalgias.   Skin: Negative for dry skin and skin lesions.   Neurological: Negative for syncope, numbness and headache.   Psychiatric/Behavioral: Positive for depressed mood.       Objective   Visit Vitals  /88 (BP Location: Left arm, Patient Position: Sitting, Cuff Size: Large Adult)  "  Pulse 78   Temp 98.1 °F (36.7 °C)   Resp 16   Ht 177.8 cm (70\")   Wt 88 kg (194 lb)   SpO2 96%   BMI 27.84 kg/m²     Physical Exam  Vitals signs and nursing note reviewed.   Constitutional:       Appearance: He is well-developed.   HENT:      Head: Normocephalic.   Neck:      Musculoskeletal: Neck supple.      Thyroid: No thyromegaly.      Vascular: No carotid bruit.      Trachea: Trachea normal.   Cardiovascular:      Rate and Rhythm: Normal rate and regular rhythm.      Heart sounds: No murmur. No friction rub. No gallop.    Pulmonary:      Effort: Pulmonary effort is normal. No respiratory distress.      Breath sounds: Normal breath sounds. No wheezing.   Chest:      Chest wall: No tenderness.   Skin:     General: Skin is dry.      Findings: No rash.      Nails: There is no clubbing.     Neurological:      Mental Status: He is alert and oriented to person, place, and time.   Psychiatric:         Behavior: Behavior is cooperative.           Assessment/Plan .  Problem List Items Addressed This Visit        Medium    Chronic kidney disease, stage III (moderate)    Current Assessment & Plan     Improving         Depression    Current Assessment & Plan     Worsening- Advised patient to increase Sertraline to 50mg daily. .         Relevant Medications    sertraline (ZOLOFT) 50 MG tablet    Hypertension, benign - Primary    Current Assessment & Plan     Doing well; Encouraged to watch fatty intake, exercise more and lose weight           Mixed hyperlipidemia    Current Assessment & Plan     Improved and close to goal.   Encouraged to watch fatty intake, exercise more, and lose weight  compliant with medication    Is not getting adequate diet and exercise  Goals developed at last visit were met   Follow up in  3 months  Care management needs are self-addressed. Self-management abilities addressed and patient is capable of managing his own disease.           Relevant Orders    Lipid Panel With / Chol / HDL Ratio    " Comprehensive Metabolic Panel    Pernicious anemia    Current Assessment & Plan     Improving         Relevant Orders    CBC & Differential    Type 2 diabetes mellitus without complication, without long-term current use of insulin (CMS/[facility])    Current Assessment & Plan     Worsening. Hgb a1c 6.7 up from 6.2  Encouraged to watch sugar intake, exercise more and lose weight.   compliant with medication. Not monitoring sugar at home.   Follow up in 3 months  Care management needs are self-addressed.  Self-management abilities addressed and patient is capable of managing his own disease.             Relevant Orders    Hemoglobin A1c       Low    Hypomagnesemia    Current Assessment & Plan     Check magnesium level         Relevant Orders    Magnesium       Unprioritized    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CMS/[facility])    Current Assessment & Plan     Worsening;- Will recheck and discuss the result at next visit.          Relevant Orders    PTH, Intact

## 2020-12-29 NOTE — ASSESSMENT & PLAN NOTE
Improved and close to goal.   Encouraged to watch fatty intake, exercise more, and lose weight  compliant with medication    Is not getting adequate diet and exercise  Goals developed at last visit were met   Follow up in  3 months  Care management needs are self-addressed. Self-management abilities addressed and patient is capable of managing his own disease.

## 2020-12-29 NOTE — ASSESSMENT & PLAN NOTE
Worsening. Hgb a1c 6.7 up from 6.2  Encouraged to watch sugar intake, exercise more and lose weight.   compliant with medication. Not monitoring sugar at home.   Follow up in 3 months  Care management needs are self-addressed.  Self-management abilities addressed and patient is capable of managing his own disease.

## 2021-03-01 ENCOUNTER — OFFICE VISIT (OUTPATIENT)
Dept: NEUROLOGY | Facility: CLINIC | Age: 86
End: 2021-03-01

## 2021-03-01 VITALS
TEMPERATURE: 99.3 F | BODY MASS INDEX: 27.2 KG/M2 | WEIGHT: 190 LBS | DIASTOLIC BLOOD PRESSURE: 63 MMHG | SYSTOLIC BLOOD PRESSURE: 165 MMHG | HEART RATE: 69 BPM | HEIGHT: 70 IN

## 2021-03-01 DIAGNOSIS — G47.33 OSA (OBSTRUCTIVE SLEEP APNEA): Primary | Chronic | ICD-10-CM

## 2021-03-01 PROCEDURE — 99212 OFFICE O/P EST SF 10 MIN: CPT | Performed by: PSYCHIATRY & NEUROLOGY

## 2021-03-01 NOTE — PROGRESS NOTES
Sleep medicine follow-up visit    Mikael Shanks   1935  85 y.o. male   DATE OF SERVICE: 3/1/2021     BILL, yearly f/u for CPAP compliance, patient doing well with pap therapy. Patient uses a FFM and goes through the [facility] for supplies. Patient doing well with his pap machine has trouble getting supplies but otherwise sleeps good wakes up 4-5 times to use the bathroom but goes back to sleep okay. Patient feels his mask doesn't last very long gets real flimsy last for about a month.     SLEEP TESTING HISTORY:    On NPSG at [facility] , 11/05/2019 patient had Severe obstructive sleep apnea syndrome with apnea-hypopnea index of 78.3 per sleep hour, minimum SpO2 of 85%    The compliance data reviewed and the patient is on biPAP therapy at 6-16/4-6 cm/H2O and compliance data indicates excellent compliance with 86% usage for more than 4 hours with an average usage of 9 hours 33 minutes. AHI down to 7.0       The patient's hypersomnia also resolved with [place] Sleepiness Scale score of 0 with CPAP therapy.    The patient feels great and is benefiting from it and is compliant.     EPWORTH SLEEPINESS SCALE  Sitting and reading 0 WatchingTV 0  Sitting, inactive, in a public place 0  As a passenger in a car for 1 hour w/o a break  0  Lying down to rest in the afternoon  0  Sitting and talking to someone  0  Sitting quietly after a lunch  0  In a car, while stopped for traffic or a light  0  Total 0    Review of Systems   Constitutional: Negative for appetite change and fatigue.   HENT: Positive for hearing loss. Negative for sinus pain.    Eyes: Negative for pain and itching.   Respiratory: Positive for apnea and shortness of breath. Negative for cough.    Cardiovascular: Negative for chest pain and palpitations.   Gastrointestinal: Negative for constipation and diarrhea.   Endocrine: Negative for cold intolerance and heat intolerance.   Genitourinary: Positive for frequency. Negative for difficulty urinating.    Musculoskeletal: Negative for back pain and neck pain.   Allergic/Immunologic: Negative for environmental allergies.   Neurological: Negative for dizziness, tremors, seizures, syncope, facial asymmetry, speech difficulty, weakness, light-headedness, numbness and headaches.   Psychiatric/Behavioral: Negative for agitation and confusion.     I reviewed and addressed ROS entered by MA.      The following portions of the patient's history were reviewed and updated as appropriate: allergies, current medications, past family history, past medical history, past social history, past surgical history and problem list.      Family History   Problem Relation Age of Onset   • Cancer Mother    • Heart disease Father    • Cancer Brother        Past Medical History:   Diagnosis Date   • Carotid artery disease (CMS/HCC)    • GERD (gastroesophageal reflux disease)    • Osteoarthritis    • Prostatic hypertrophy    • Pulmonary valve disorder    • Sleep apnea        Social History     Socioeconomic History   • Marital status:      Spouse name: Not on file   • Number of children: Not on file   • Years of education: Not on file   • Highest education level: Not on file   Occupational History   • Occupation: Retired   Social Needs   • Financial resource strain: Not hard at all   • Food insecurity     Worry: Never true     Inability: Never true   • Transportation needs     Medical: No     Non-medical: No   Tobacco Use   • Smoking status: Former Smoker     Packs/day: 5.00     Types: Cigarettes     Start date: 1953     Quit date: 1960     Years since quittin.5   • Smokeless tobacco: Never Used   Substance and Sexual Activity   • Alcohol use: Not Currently     Frequency: Never     Comment: very rare   • Drug use: No   • Sexual activity: Never   Lifestyle   • Physical activity     Days per week: 0 days     Minutes per session: 0 min   • Stress: Not at all   Relationships   • Social connections     Talks on phone: More  than three times a week     Gets together: More than three times a week     Attends [employer] service: 1 to 4 times per year     Active member of club or organization: Yes     Attends meetings of clubs or organizations: Never     Relationship status: Living with partner   Social History Narrative     3 x.  First wife  for 17 years have 5 children,  to 2nd wife for 11 years, they had 1 child together, 3rd wife  for 16 years she passed in 2003 from breast cancer.  He has been with his girlfriend Debra since 2004, she is in bad health with lung disease.  Retired from [employer] in 1995.  Caffeine none, Always wears seatbelts.  Hobbies TV.  Exercise none.         Current Outpatient Medications:   •  aspirin 81 MG tablet, Take 81 mg by mouth Daily., Disp: , Rfl:   •  atorvastatin (LIPITOR) 80 MG tablet, Take 1 tablet by mouth Every Night., Disp: 90 tablet, Rfl: 3  •  Blood Glucose Monitoring Suppl (ONE TOUCH ULTRA 2) w/Device kit, 1 Device Daily., Disp: 1 each, Rfl: 0  •  cholecalciferol (VITAMIN D3) 10 MCG (400 UNIT) tablet, Take 400 Units by mouth Daily., Disp: , Rfl:   •  finasteride (PROSCAR) 5 MG tablet, Take 1 tablet by mouth Daily., Disp: 90 tablet, Rfl: 3  •  furosemide (LASIX) 20 MG tablet, Take 1 tablet by mouth 2 (Two) Times a Day., Disp: 180 tablet, Rfl: 3  •  glimepiride (AMARYL) 1 MG tablet, Take 1 tablet by mouth Every Morning Before Breakfast., Disp: 90 tablet, Rfl: 3  •  glucose blood (ONE TOUCH ULTRA TEST) test strip, Use as instructed, Disp: 100 each, Rfl: 5  •  metoprolol succinate XL (TOPROL-XL) 25 MG 24 hr tablet, Take 1 tablet by mouth Daily., Disp: 90 tablet, Rfl: 3  •  omeprazole (priLOSEC) 40 MG capsule, Take 1 capsule by mouth Daily., Disp: 90 capsule, Rfl: 3  •  ONETOUCH DELICA LANCETS 33G misc, 1 pen Daily., Disp: 100 each, Rfl: 5  •  sertraline (ZOLOFT) 50 MG tablet, Take 1 tablet by mouth Daily., Disp: 90 tablet, Rfl: 3    Allergies   Allergen Reactions   •  Penicillins Hives        PHYSICAL EXAMINATION:  Vitals:    03/01/21 1519   BP: 165/63   Pulse: 69   Temp: 99.3 °F (37.4 °C)      Body mass index is 27.26 kg/m².       HEENT: Normal.      EXTREMITIES: No edema.     Diagnoses and all orders for this visit:    1. BILL (obstructive sleep apnea) (Primary)     continue bipap  Pt needs new mask and supplies, air leak about 3 hours per night      This document has been electronically signed by Joseph Seipel, MD on March 1, 2021 15:42 EST

## 2021-03-02 ENCOUNTER — TELEPHONE (OUTPATIENT)
Dept: NEUROLOGY | Facility: CLINIC | Age: 86
End: 2021-03-02

## 2021-03-02 DIAGNOSIS — G47.33 OSA (OBSTRUCTIVE SLEEP APNEA): Primary | ICD-10-CM

## 2021-03-02 NOTE — TELEPHONE ENCOUNTER
----- Message from Joseph F Seipel, MD sent at 3/1/2021  3:41 PM EST -----  FFM and goes through the [facility] for supplies

## 2021-03-23 ENCOUNTER — TELEPHONE (OUTPATIENT)
Dept: FAMILY MEDICINE CLINIC | Facility: CLINIC | Age: 86
End: 2021-03-23

## 2021-03-30 ENCOUNTER — OFFICE VISIT (OUTPATIENT)
Dept: FAMILY MEDICINE CLINIC | Facility: CLINIC | Age: 86
End: 2021-03-30

## 2021-03-30 VITALS
BODY MASS INDEX: 27.94 KG/M2 | HEART RATE: 89 BPM | DIASTOLIC BLOOD PRESSURE: 80 MMHG | HEIGHT: 70 IN | SYSTOLIC BLOOD PRESSURE: 138 MMHG | WEIGHT: 195.2 LBS | OXYGEN SATURATION: 96 % | TEMPERATURE: 97.7 F | RESPIRATION RATE: 20 BRPM

## 2021-03-30 DIAGNOSIS — N18.31 STAGE 3A CHRONIC KIDNEY DISEASE (HCC): ICD-10-CM

## 2021-03-30 DIAGNOSIS — E78.2 MIXED HYPERLIPIDEMIA: Primary | ICD-10-CM

## 2021-03-30 DIAGNOSIS — I10 HYPERTENSION, BENIGN: ICD-10-CM

## 2021-03-30 DIAGNOSIS — E11.9 TYPE 2 DIABETES MELLITUS WITHOUT COMPLICATION, WITHOUT LONG-TERM CURRENT USE OF INSULIN (HCC): ICD-10-CM

## 2021-03-30 PROCEDURE — 99214 OFFICE O/P EST MOD 30 MIN: CPT | Performed by: FAMILY MEDICINE

## 2021-03-30 RX ORDER — MAGNESIUM CITRATE
SOLUTION, ORAL ORAL
COMMUNITY
End: 2021-04-14

## 2021-04-14 ENCOUNTER — OFFICE VISIT (OUTPATIENT)
Dept: CARDIOLOGY | Facility: CLINIC | Age: 86
End: 2021-04-14

## 2021-04-14 VITALS
HEART RATE: 67 BPM | SYSTOLIC BLOOD PRESSURE: 143 MMHG | TEMPERATURE: 97.1 F | HEIGHT: 70 IN | BODY MASS INDEX: 28.2 KG/M2 | DIASTOLIC BLOOD PRESSURE: 73 MMHG | WEIGHT: 197 LBS | OXYGEN SATURATION: 96 %

## 2021-04-14 DIAGNOSIS — I25.5 ISCHEMIC CARDIOMYOPATHY: Primary | ICD-10-CM

## 2021-04-14 DIAGNOSIS — I10 HYPERTENSION, BENIGN: ICD-10-CM

## 2021-04-14 DIAGNOSIS — E11.9 TYPE 2 DIABETES MELLITUS WITHOUT COMPLICATION, WITHOUT LONG-TERM CURRENT USE OF INSULIN (HCC): ICD-10-CM

## 2021-04-14 DIAGNOSIS — R06.02 SHORTNESS OF BREATH: ICD-10-CM

## 2021-04-14 PROCEDURE — 93000 ELECTROCARDIOGRAM COMPLETE: CPT | Performed by: INTERNAL MEDICINE

## 2021-04-14 PROCEDURE — 99214 OFFICE O/P EST MOD 30 MIN: CPT | Performed by: INTERNAL MEDICINE

## 2021-04-14 RX ORDER — TAMSULOSIN HYDROCHLORIDE 0.4 MG/1
1 CAPSULE ORAL DAILY
COMMUNITY
End: 2021-10-25

## 2021-04-14 RX ORDER — MULTIPLE VITAMINS W/ MINERALS TAB 9MG-400MCG
1 TAB ORAL DAILY
COMMUNITY
End: 2021-10-25

## 2021-04-14 NOTE — PROGRESS NOTES
Encounter Date:04/14/2021  Last seen 9/30/2020      Patient ID: Mikael Shanks is a 85 y.o. male.    Chief Complaint:  Congestive heart failure  Status post stent  Hypertension  Diabetes  Renal dysfunction     History of Present Illness     Since I have last seen, the patient has been without any chest discomfort , unusual shortness of breath, palpitations, dizziness or syncope.  Denies having any headache ,abdominal pain ,nausea, vomiting , diarrhea constipation, loss of weight or loss of appetite.  Denies having any excessive bruising ,hematuria or blood in the stool.    Review of all systems negative except as indicated.    Reviewed ROS.    Assessment and Plan      ]]]]]]]]]]]]]]]]]]]]]]]  Impression  ========  -Bilateral arm discomfort and chest discomfort suggestive of angina pectoris.-Improved     -Status post stent 2003 at [facility].     - Ischemic cardiomyopathy with ejection fraction of 25%.-Improved and 60 %     Cardiac catheterization 9/4/2020 revealed  Left ventricular size and contractility is normal with ejection fraction of 60%.  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  Left anterior descending artery has luminal irregularities.  Circumflex coronary artery has luminal irregularities.  Right coronary artery stent is patent with 20% plaquing.      Echocardiogram 8/31/2020  Thickened aortic valve with adequate opening motion.  Left atrial enlargement  Left ventricular size and contractility is normal with ejection fraction of 55 %'     -Status post acute on chronic systolic congestive heart failure-improved significant left ventricle dysfunction with ejection fraction of 25%.     -Status post acute respiratory failure with hypoxia-improved.       -Hypertension diabetes renal dysfunction.  BUN 14 creatinine 1.5.  Dyslipidemia     -History of anemia     -History of premature atrial contractions     -History of severe  hypomagnesemia.-Improved     -Allergic to penicillin.   =======  Plan  =======  Status post stent.  Patient is not having any angina pectoris or congestive heart failure.  EKG showed normal sinus rhythm normal ECG    Ischemic cardiomyopathy-improved.  Latest ejection fraction 55%.  No need for ICD    Hypertension-well-controlled 143/73.    Dyslipidemia-continue atorvastatin    .Recent cardiac catheterization results were discussed and educated patient.  No significant obstructive coronary artery disease is present.  Porcelain aorta is present.    Patient is not on ACE inhibitor due to pre-existing renal dysfunction.    Medications were reviewed and updated.  Follow-up in the office in 6 months.  Further plan will depend on patient's progress.  ]]]]]]]]]]]]]                      Diagnosis Plan   1. Ischemic cardiomyopathy  ECG 12 Lead   2. Hypertension, benign  ECG 12 Lead   3. Type 2 diabetes mellitus without complication, without long-term current use of insulin (CMS/[facility])  ECG 12 Lead   4. Shortness of breath  ECG 12 Lead   LAB RESULTS (LAST 7 DAYS)    CBC        BMP        CMP         BNP        TROPONIN        CoAg        Creatinine Clearance  CrCl cannot be calculated (Patient's most recent lab result is older than the maximum 30 days allowed.).    ABG        Radiology  No radiology results for the last day                The following portions of the patient's history were reviewed and updated as appropriate: allergies, current medications, past family history, past medical history, past social history, past surgical history and problem list.    Review of Systems   Constitutional: Negative for malaise/fatigue.   Cardiovascular: Negative for chest pain, leg swelling, palpitations and syncope.   Respiratory: Positive for shortness of breath (w/ exertion).    Skin: Negative for rash.   Gastrointestinal: Negative for nausea and vomiting.   Neurological: Negative for dizziness, light-headedness and numbness.          Current Outpatient Medications:   •  aspirin 81 MG tablet, Take 81 mg by mouth Daily., Disp: , Rfl:   •  atorvastatin (LIPITOR) 80 MG tablet, Take 1 tablet by mouth Every Night., Disp: 90 tablet, Rfl: 3  •  Blood Glucose Monitoring Suppl (ONE TOUCH ULTRA 2) w/Device kit, 1 Device Daily., Disp: 1 each, Rfl: 0  •  cholecalciferol (VITAMIN D3) 10 MCG (400 UNIT) tablet, Take 400 Units by mouth Daily., Disp: , Rfl:   •  finasteride (PROSCAR) 5 MG tablet, Take 1 tablet by mouth Daily., Disp: 90 tablet, Rfl: 3  •  furosemide (LASIX) 20 MG tablet, Take 1 tablet by mouth 2 (Two) Times a Day., Disp: 180 tablet, Rfl: 3  •  glimepiride (AMARYL) 1 MG tablet, Take 1 tablet by mouth Every Morning Before Breakfast., Disp: 90 tablet, Rfl: 3  •  glucose blood (ONE TOUCH ULTRA TEST) test strip, Use as instructed, Disp: 100 each, Rfl: 5  •  metoprolol succinate XL (TOPROL-XL) 25 MG 24 hr tablet, Take 1 tablet by mouth Daily., Disp: 90 tablet, Rfl: 3  •  multivitamin with minerals (RA VISION-DIANA PRESERVE PO), Take 1 tablet by mouth Daily., Disp: , Rfl:   •  omeprazole (priLOSEC) 40 MG capsule, Take 1 capsule by mouth Daily., Disp: 90 capsule, Rfl: 3  •  ONETOUCH DELICA LANCETS 33G misc, 1 pen Daily., Disp: 100 each, Rfl: 5  •  sertraline (ZOLOFT) 50 MG tablet, Take 1 tablet by mouth Daily., Disp: 90 tablet, Rfl: 3  •  tamsulosin (FLOMAX) 0.4 MG capsule 24 hr capsule, Take 1 capsule by mouth Daily., Disp: , Rfl:     Allergies   Allergen Reactions   • Penicillins Hives       Family History   Problem Relation Age of Onset   • Cancer Mother    • Heart disease Father    • Cancer Brother        Past Surgical History:   Procedure Laterality Date   • BACK SURGERY     • CARDIAC CATHETERIZATION N/A 12/5/2019    Procedure: Left Heart Cath and coronary angiogram;  Surgeon: Dayday Ruiz MD;  Location: [facility] CATH INVASIVE LOCATION;  Service: Cardiovascular   • CARDIAC CATHETERIZATION N/A 12/5/2019    Procedure: Right and Left Heart  "Cath;  Surgeon: Dayday Ruiz MD;  Location: [facility] CATH INVASIVE LOCATION;  Service: Cardiovascular   • CARDIAC CATHETERIZATION N/A 2020    Procedure: Left and Right Heart Cath with Coronary Angiography;  Surgeon: Dayday Ruiz MD;  Location: [facility] CATH INVASIVE LOCATION;  Service: Cardiovascular;  Laterality: N/A;   • CAROTID ENDARTERECTOMY Left    • CHOLECYSTECTOMY     • COLONOSCOPY  2018    No repeat   • CORONARY ANGIOPLASTY WITH STENT PLACEMENT         Past Medical History:   Diagnosis Date   • Carotid artery disease (CMS/HCC)    • GERD (gastroesophageal reflux disease)    • Osteoarthritis    • Prostatic hypertrophy    • Pulmonary valve disorder    • Sleep apnea        Family History   Problem Relation Age of Onset   • Cancer Mother    • Heart disease Father    • Cancer Brother        Social History     Socioeconomic History   • Marital status:      Spouse name: Not on file   • Number of children: Not on file   • Years of education: Not on file   • Highest education level: Not on file   Tobacco Use   • Smoking status: Former Smoker     Packs/day: 5.00     Types: Cigarettes     Start date: 1953     Quit date: 1960     Years since quittin.6   • Smokeless tobacco: Never Used   Vaping Use   • Vaping Use: Never used   Substance and Sexual Activity   • Alcohol use: Not Currently     Comment: very rare   • Drug use: No   • Sexual activity: Never           ECG 12 Lead    Date/Time: 2021 2:25 PM  Performed by: Dayday Ruiz MD  Authorized by: Dayday Ruiz MD   Comparison: compared with previous ECG   Comparison to previous ECG: Normal sinus rhythm normal ECG 67/min normal axis normal intervals no ectopy no significant change from 2020                Objective:       Physical Exam    /73 (BP Location: Left arm, Patient Position: Sitting, Cuff Size: Large Adult)   Pulse 67   Temp 97.1 °F (36.2 °C)   Ht 177.8 cm (70\")   Wt 89.4 kg (197 lb)   SpO2 96%   " BMI 28.27 kg/m²   The patient is alert, oriented and in no distress.    Vital signs as noted above.    Head and neck revealed no carotid bruits or jugular venous distension.  No thyromegaly or lymphadenopathy is present.    Lungs clear.  No wheezing.  Breath sounds are normal bilaterally.    Heart normal first and second heart sounds.  No murmur..  No pericardial rub is present.  No gallop is present.    Abdomen soft and nontender.  No organomegaly is present.    Extremities revealed good peripheral pulses without any pedal edema.    Skin warm and dry.    Musculoskeletal system is grossly normal.    CNS grossly normal.    Reviewed and unchanged from last visit.

## 2021-06-10 ENCOUNTER — OFFICE VISIT (OUTPATIENT)
Dept: FAMILY MEDICINE CLINIC | Facility: CLINIC | Age: 86
End: 2021-06-10

## 2021-06-10 ENCOUNTER — TELEPHONE (OUTPATIENT)
Dept: FAMILY MEDICINE CLINIC | Facility: CLINIC | Age: 86
End: 2021-06-10

## 2021-06-10 ENCOUNTER — HOSPITAL ENCOUNTER (OUTPATIENT)
Dept: GENERAL RADIOLOGY | Facility: HOSPITAL | Age: 86
Discharge: HOME | End: 2021-06-10

## 2021-06-10 VITALS
OXYGEN SATURATION: 97 % | WEIGHT: 193.2 LBS | DIASTOLIC BLOOD PRESSURE: 72 MMHG | TEMPERATURE: 97.7 F | HEIGHT: 70 IN | BODY MASS INDEX: 27.66 KG/M2 | RESPIRATION RATE: 15 BRPM | SYSTOLIC BLOOD PRESSURE: 150 MMHG | HEART RATE: 68 BPM

## 2021-06-10 DIAGNOSIS — M48.061 SPINAL STENOSIS OF LUMBAR REGION, UNSPECIFIED WHETHER NEUROGENIC CLAUDICATION PRESENT: ICD-10-CM

## 2021-06-10 DIAGNOSIS — M25.552 LEFT HIP PAIN: Primary | ICD-10-CM

## 2021-06-10 PROBLEM — R20.2 PARESTHESIA: Status: ACTIVE | Noted: 2021-06-10

## 2021-06-10 PROCEDURE — 73502 X-RAY EXAM HIP UNI 2-3 VIEWS: CPT

## 2021-06-10 PROCEDURE — 72100 X-RAY EXAM L-S SPINE 2/3 VWS: CPT

## 2021-06-10 PROCEDURE — 99214 OFFICE O/P EST MOD 30 MIN: CPT | Performed by: FAMILY MEDICINE

## 2021-06-10 RX ORDER — MELOXICAM 15 MG/1
15 TABLET ORAL DAILY
Qty: 30 TABLET | Refills: 12 | Status: SHIPPED | OUTPATIENT
Start: 2021-06-10 | End: 2021-10-25

## 2021-06-10 NOTE — ASSESSMENT & PLAN NOTE
New diagnosis- left calf area- possibly due to a pinched nerve in back.  Will xray back and hip today.

## 2021-06-10 NOTE — ASSESSMENT & PLAN NOTE
Worsening for 1-2 weeks. No known injury. Probably a combination of spinal stenosis and arthritis of the hip,  Will xray today and call patient with the results. I advised him to start Mobic 15mg daily.  Follow up in 2weeks.

## 2021-06-10 NOTE — PROGRESS NOTES
Subjective   Mikael Shanks is a 85 y.o. male.     Paresthesia of the left calf area.     Hip Pain   The incident occurred more than 1 week ago. There was no injury mechanism. The pain is present in the left hip. The quality of the pain is described as aching. The pain is moderate. The pain has been intermittent since onset. The symptoms are aggravated by movement and weight bearing.        The following portions of the patient's history were reviewed and updated as appropriate: current medications, past family history, past medical history, past social history, past surgical history and problem list.    Family History   Problem Relation Age of Onset   • Cancer Mother    • Heart disease Father    • Cancer Brother        Social History     Tobacco Use   • Smoking status: Former Smoker     Packs/day: 5.00     Types: Cigarettes     Start date: 1953     Quit date: 1960     Years since quittin.8   • Smokeless tobacco: Never Used   Vaping Use   • Vaping Use: Never used   Substance Use Topics   • Alcohol use: Not Currently     Comment: very rare   • Drug use: No       Past Surgical History:   Procedure Laterality Date   • BACK SURGERY     • CARDIAC CATHETERIZATION N/A 2019    Procedure: Left Heart Cath and coronary angiogram;  Surgeon: Dayday Ruiz MD;  Location: [facility] CATH INVASIVE LOCATION;  Service: Cardiovascular   • CARDIAC CATHETERIZATION N/A 2019    Procedure: Right and Left Heart Cath;  Surgeon: Dayday Ruiz MD;  Location: [facility] CATH INVASIVE LOCATION;  Service: Cardiovascular   • CARDIAC CATHETERIZATION N/A 2020    Procedure: Left and Right Heart Cath with Coronary Angiography;  Surgeon: Dayday Ruiz MD;  Location: [facility] CATH INVASIVE LOCATION;  Service: Cardiovascular;  Laterality: N/A;   • CAROTID ENDARTERECTOMY Left    • CHOLECYSTECTOMY     • COLONOSCOPY  2018    No repeat   • CORONARY ANGIOPLASTY WITH STENT PLACEMENT         Patient Active Problem List    Diagnosis   • Carotid artery disease (CMS/HCC)   • Type 2 diabetes mellitus without complication, without long-term current use of insulin (CMS/HCC)   • PAC (premature atrial contraction)   • Vitamin D deficiency   • Gastritis and gastroduodenitis   • Atherosclerosis of native coronary artery of native heart without angina pectoris   • Stage 3a chronic kidney disease (CMS/HCC)   • Hypertension, benign   • Spinal stenosis of lumbar region   • Other folate deficiency anemias   • Asymptomatic peripheral vascular disease (CMS/HCC)   • Osteoarthritis   • Prostatic hypertrophy   • Pernicious anemia   • Hypomagnesemia   • Depression   • Dupuytren's contracture of both hands   • Sinus pause   • Pulmonary valve disorder   • Hearing loss   • Family history of colon cancer   • Shortness of breath   • BILL (obstructive sleep apnea)   • Stenosis of coronary artery stent   • Mixed hyperlipidemia   • Lethargy   • Anxiety disorder, unspecified   • Grief at loss of child   • Ischemic cardiomyopathy   • Hematuria   • Neurogenic claudication   • Proteinuria   • Encounter for general adult medical examination with abnormal findings   • Need for immunization against influenza   • Chronic systolic congestive heart failure (CMS/HCC)   • Carotid artery occlusion   • 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CMS/[facility])   • Carotid artery disease (CMS/HCC)   • Left hip pain   • Paresthesia       Current Outpatient Medications on File Prior to Visit   Medication Sig Dispense Refill   • aspirin 81 MG tablet Take 81 mg by mouth Daily.     • atorvastatin (LIPITOR) 80 MG tablet Take 1 tablet by mouth Every Night. 90 tablet 3   • Blood Glucose Monitoring Suppl (ONE TOUCH ULTRA 2) w/Device kit 1 Device Daily. 1 each 0   • cholecalciferol (VITAMIN D3) 10 MCG (400 UNIT) tablet Take 400 Units by mouth Daily.     • finasteride (PROSCAR) 5 MG tablet Take 1 tablet by mouth Daily. 90 tablet 3   • furosemide (LASIX) 20 MG tablet Take 1 tablet by mouth 2 (Two) Times a Day.  "180 tablet 3   • glimepiride (AMARYL) 1 MG tablet Take 1 tablet by mouth Every Morning Before Breakfast. 90 tablet 3   • glucose blood (ONE TOUCH ULTRA TEST) test strip Use as instructed 100 each 5   • metoprolol succinate XL (TOPROL-XL) 25 MG 24 hr tablet Take 1 tablet by mouth Daily. 90 tablet 3   • multivitamin with minerals (RA VISION-DIANA PRESERVE PO) Take 1 tablet by mouth Daily.     • omeprazole (priLOSEC) 40 MG capsule Take 1 capsule by mouth Daily. 90 capsule 3   • ONETOUCH DELICA LANCETS 33G misc 1 pen Daily. 100 each 5   • sertraline (ZOLOFT) 50 MG tablet Take 1 tablet by mouth Daily. 90 tablet 3   • tamsulosin (FLOMAX) 0.4 MG capsule 24 hr capsule Take 1 capsule by mouth Daily.       No current facility-administered medications on file prior to visit.       Allergies   Allergen Reactions   • Penicillins Hives       Review of Systems   Musculoskeletal: Positive for gait problem (limping) and joint swelling (hip pain-left).       Objective   Visit Vitals  /72 (BP Location: Right arm, Patient Position: Sitting, Cuff Size: Large Adult)   Pulse 68   Temp 97.7 °F (36.5 °C)   Resp 15   Ht 177.8 cm (70\")   Wt 87.6 kg (193 lb 3.2 oz)   SpO2 97%   BMI 27.72 kg/m²     Physical Exam  Vitals and nursing note reviewed.   Constitutional:       Appearance: He is well-developed.   HENT:      Head: Normocephalic.   Neck:      Thyroid: No thyromegaly.      Vascular: No carotid bruit.      Trachea: Trachea normal.   Cardiovascular:      Rate and Rhythm: Normal rate and regular rhythm.      Heart sounds: No murmur heard.   No friction rub. No gallop.    Pulmonary:      Effort: Pulmonary effort is normal. No respiratory distress.      Breath sounds: Normal breath sounds. No wheezing.   Chest:      Chest wall: No tenderness.   Musculoskeletal:      Cervical back: Neck supple.      Lumbar back: Normal.      Right hip: Normal.      Left hip: Normal.   Skin:     General: Skin is dry.      Findings: No rash.      Nails: " There is no clubbing.   Neurological:      Mental Status: He is alert and oriented to person, place, and time.   Psychiatric:         Behavior: Behavior is cooperative.           Assessment/Plan .  Problem List Items Addressed This Visit        Low    Spinal stenosis of lumbar region    Current Assessment & Plan     Worsening for 1-2 weeks. No known injury.  Will xray today and call patient with the results. I advised him to start Mobic 15mg daily.  Discussed with him that I felt the benefits of the drugs outweigh the risk ---follow up in 2weeks.         Relevant Orders    XR Spine Lumbar 2 or 3 View (Completed)       Unprioritized    Left hip pain - Primary    Current Assessment & Plan     Worsening for 1-2 weeks. No known injury. Probably a combination of spinal stenosis and arthritis of the hip,  Will xray today and call patient with the results. I advised him to start Mobic 15mg daily.  Follow up in 2weeks.         Relevant Orders    XR Hip With or Without Pelvis 2 - 3 View Left (Completed)

## 2021-06-10 NOTE — ASSESSMENT & PLAN NOTE
Worsening for 1-2 weeks. No known injury.  Will xray today and call patient with the results. I advised him to start Mobic 15mg daily.  Discussed with him that I felt the benefits of the drugs outweigh the risk ---follow up in 2weeks.

## 2021-06-11 ENCOUNTER — TELEPHONE (OUTPATIENT)
Dept: FAMILY MEDICINE CLINIC | Facility: CLINIC | Age: 86
End: 2021-06-11

## 2021-06-30 ENCOUNTER — OFFICE VISIT (OUTPATIENT)
Dept: FAMILY MEDICINE CLINIC | Facility: CLINIC | Age: 86
End: 2021-06-30

## 2021-06-30 VITALS
HEART RATE: 64 BPM | SYSTOLIC BLOOD PRESSURE: 160 MMHG | TEMPERATURE: 97.5 F | HEIGHT: 71 IN | OXYGEN SATURATION: 97 % | WEIGHT: 195.2 LBS | DIASTOLIC BLOOD PRESSURE: 88 MMHG | BODY MASS INDEX: 27.33 KG/M2 | RESPIRATION RATE: 18 BRPM

## 2021-06-30 DIAGNOSIS — E11.9 TYPE 2 DIABETES MELLITUS WITHOUT COMPLICATION, WITHOUT LONG-TERM CURRENT USE OF INSULIN (HCC): ICD-10-CM

## 2021-06-30 DIAGNOSIS — I10 HYPERTENSION, BENIGN: ICD-10-CM

## 2021-06-30 DIAGNOSIS — E11.9 TYPE 2 DIABETES MELLITUS WITHOUT COMPLICATION, WITHOUT LONG-TERM CURRENT USE OF INSULIN (HCC): Primary | ICD-10-CM

## 2021-06-30 DIAGNOSIS — M48.061 SPINAL STENOSIS OF LUMBAR REGION, UNSPECIFIED WHETHER NEUROGENIC CLAUDICATION PRESENT: ICD-10-CM

## 2021-06-30 DIAGNOSIS — M25.552 LEFT HIP PAIN: ICD-10-CM

## 2021-06-30 LAB — HBA1C MFR BLD: 7 %

## 2021-06-30 PROCEDURE — 99214 OFFICE O/P EST MOD 30 MIN: CPT | Performed by: FAMILY MEDICINE

## 2021-06-30 PROCEDURE — 83036 HEMOGLOBIN GLYCOSYLATED A1C: CPT | Performed by: FAMILY MEDICINE

## 2021-06-30 RX ORDER — GLIPIZIDE 10 MG/1
10 TABLET, FILM COATED, EXTENDED RELEASE ORAL DAILY
Qty: 30 TABLET | Refills: 5 | Status: CANCELLED | OUTPATIENT
Start: 2021-06-30

## 2021-07-01 ENCOUNTER — TELEPHONE (OUTPATIENT)
Dept: FAMILY MEDICINE CLINIC | Facility: CLINIC | Age: 86
End: 2021-07-01

## 2021-07-01 RX ORDER — LANCETS 33 GAUGE
1 EACH MISCELLANEOUS DAILY
Qty: 100 EACH | Refills: 5 | Status: SHIPPED | OUTPATIENT
Start: 2021-07-01

## 2021-07-01 RX ORDER — BLOOD-GLUCOSE METER
1 EACH MISCELLANEOUS DAILY
Qty: 1 EACH | Refills: 0 | Status: SHIPPED | OUTPATIENT
Start: 2021-07-01

## 2021-07-01 NOTE — TELEPHONE ENCOUNTER
Patient notified that MRI L-spine w/wo contrast scheduled for 7-12-21 @ 7:30 am @ [facility]

## 2021-07-09 ENCOUNTER — TELEPHONE (OUTPATIENT)
Dept: FAMILY MEDICINE CLINIC | Facility: CLINIC | Age: 86
End: 2021-07-09

## 2021-07-13 ENCOUNTER — OFFICE VISIT (OUTPATIENT)
Dept: FAMILY MEDICINE CLINIC | Facility: CLINIC | Age: 86
End: 2021-07-13

## 2021-07-13 VITALS
TEMPERATURE: 97.5 F | RESPIRATION RATE: 17 BRPM | DIASTOLIC BLOOD PRESSURE: 55 MMHG | SYSTOLIC BLOOD PRESSURE: 169 MMHG | HEIGHT: 70 IN | BODY MASS INDEX: 27.32 KG/M2 | OXYGEN SATURATION: 96 % | WEIGHT: 190.8 LBS | HEART RATE: 74 BPM

## 2021-07-13 DIAGNOSIS — E11.9 TYPE 2 DIABETES MELLITUS WITHOUT COMPLICATION, WITHOUT LONG-TERM CURRENT USE OF INSULIN (HCC): ICD-10-CM

## 2021-07-13 DIAGNOSIS — M48.061 SPINAL STENOSIS OF LUMBAR REGION, UNSPECIFIED WHETHER NEUROGENIC CLAUDICATION PRESENT: Primary | ICD-10-CM

## 2021-07-13 DIAGNOSIS — F32.A DEPRESSION, UNSPECIFIED DEPRESSION TYPE: ICD-10-CM

## 2021-07-13 DIAGNOSIS — M48.061 SPINAL STENOSIS OF LUMBAR REGION, UNSPECIFIED WHETHER NEUROGENIC CLAUDICATION PRESENT: ICD-10-CM

## 2021-07-13 PROCEDURE — 99214 OFFICE O/P EST MOD 30 MIN: CPT | Performed by: FAMILY MEDICINE

## 2021-07-13 RX ORDER — PREDNISONE 10 MG/1
10 TABLET ORAL TAKE AS DIRECTED
Qty: 35 TABLET | Refills: 0 | Status: SHIPPED | OUTPATIENT
Start: 2021-07-13 | End: 2021-07-13 | Stop reason: SDUPTHER

## 2021-07-13 RX ORDER — PREDNISONE 10 MG/1
10 TABLET ORAL TAKE AS DIRECTED
Qty: 35 TABLET | Refills: 0 | Status: SHIPPED | OUTPATIENT
Start: 2021-07-13 | End: 2021-07-28

## 2021-07-13 RX ORDER — GLIMEPIRIDE 1 MG/1
1 TABLET ORAL
Qty: 90 TABLET | Refills: 3 | Status: SHIPPED | OUTPATIENT
Start: 2021-07-13 | End: 2021-07-30 | Stop reason: SDUPTHER

## 2021-07-13 NOTE — ASSESSMENT & PLAN NOTE
Discussed with patient that steroids may cause blood sugars to increase.  He brings in list of blood sugar readings have been running in the 160-180 range.  He tolerates Amaryl well but advised him the steroids may make this uncontrolled.  If blood sugars run into the mid 200 he is to call and make an appointment

## 2021-07-13 NOTE — ASSESSMENT & PLAN NOTE
Worse- Discussed the MRI results with patient. MRI shows severe degenative disc disease and old compression fractures. , Mod-severe spinal canal stenosis. Will refer patient to Pain management for epidural blocks and also Physical therapy.  Also start Prednisone 10mg tapered pack.

## 2021-07-13 NOTE — PROGRESS NOTES
Subjective   Mikael Shanks is a 86 y.o. male.     Back Pain  This is a chronic problem. The current episode started more than 1 month ago. The problem is unchanged. The pain is present in the lumbar spine. The quality of the pain is described as aching. The pain radiates to the left thigh and left knee. The pain is moderate. The symptoms are aggravated by standing. Pertinent negatives include no bladder incontinence.        The following portions of the patient's history were reviewed and updated as appropriate: current medications, past family history, past medical history, past social history, past surgical history and problem list.    Family History   Problem Relation Age of Onset   • Cancer Mother    • Heart disease Father    • Cancer Brother        Social History     Tobacco Use   • Smoking status: Former Smoker     Packs/day: 5.00     Types: Cigarettes     Start date: 1953     Quit date: 1960     Years since quittin.9   • Smokeless tobacco: Never Used   Vaping Use   • Vaping Use: Never used   Substance Use Topics   • Alcohol use: Not Currently     Comment: very rare   • Drug use: No       Past Surgical History:   Procedure Laterality Date   • BACK SURGERY     • CARDIAC CATHETERIZATION N/A 2019    Procedure: Left Heart Cath and coronary angiogram;  Surgeon: Dayday Ruiz MD;  Location: [facility] CATH INVASIVE LOCATION;  Service: Cardiovascular   • CARDIAC CATHETERIZATION N/A 2019    Procedure: Right and Left Heart Cath;  Surgeon: Dayday Ruiz MD;  Location: [facility] CATH INVASIVE LOCATION;  Service: Cardiovascular   • CARDIAC CATHETERIZATION N/A 2020    Procedure: Left and Right Heart Cath with Coronary Angiography;  Surgeon: Dayday Ruiz MD;  Location: [facility] CATH INVASIVE LOCATION;  Service: Cardiovascular;  Laterality: N/A;   • CAROTID ENDARTERECTOMY Left    • CHOLECYSTECTOMY     • COLONOSCOPY  2018    No repeat   • CORONARY ANGIOPLASTY WITH STENT PLACEMENT          Patient Active Problem List   Diagnosis   • Carotid artery disease (CMS/[facility])   • Type 2 diabetes mellitus without complication, without long-term current use of insulin (CMS/[facility])   • PAC (premature atrial contraction)   • Vitamin D deficiency   • Gastritis and gastroduodenitis   • Atherosclerosis of native coronary artery of native heart without angina pectoris   • Stage 3a chronic kidney disease (CMS/[facility])   • Hypertension, benign   • Spinal stenosis of lumbar region   • Anemia   • Asymptomatic peripheral vascular disease (CMS/[facility])   • Osteoarthritis   • Prostatic hypertrophy   • Pernicious anemia   • Hypomagnesemia   • Depression   • Dupuytren's contracture of both hands   • Sinus pause   • Pulmonary valve disorder   • Hearing loss   • Family history of colon cancer   • Shortness of breath   • BILL (obstructive sleep apnea)   • Stenosis of coronary artery stent   • Mixed hyperlipidemia   • Lethargy   • Anxiety disorder, unspecified   • Grief at loss of child   • Ischemic cardiomyopathy   • Hematuria   • Neurogenic claudication   • Proteinuria   • Encounter for general adult medical examination with abnormal findings   • Need for immunization against influenza   • Chronic systolic congestive heart failure (CMS/[facility])   • Carotid artery occlusion   • 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CMS/[facility])   • Carotid artery disease (CMS/[facility])   • Left hip pain   • Paresthesia   • Benign prostatic hyperplasia   • Mitral valve insufficiency   • Muscle pain       Current Outpatient Medications on File Prior to Visit   Medication Sig Dispense Refill   • aspirin 81 MG tablet Take 81 mg by mouth Daily.     • atorvastatin (LIPITOR) 80 MG tablet Take 1 tablet by mouth Every Night. 90 tablet 3   • Blood Glucose Monitoring Suppl (ONE TOUCH ULTRA 2) w/Device kit 1 Device Daily. Test 1 x daily 1 each 0   • cholecalciferol (VITAMIN D3) 10 MCG (400 UNIT) tablet Take 400 Units by mouth Daily.     • finasteride (PROSCAR) 5 MG tablet Take 1 tablet by  "mouth Daily. 90 tablet 3   • furosemide (LASIX) 20 MG tablet Take 1 tablet by mouth 2 (Two) Times a Day. 180 tablet 3   • glucose blood (ONE TOUCH ULTRA TEST) test strip Test 1 x daily 100 each 5   • meloxicam (MOBIC) 15 MG tablet Take 1 tablet by mouth Daily. 30 tablet 12   • metoprolol succinate XL (TOPROL-XL) 25 MG 24 hr tablet Take 1 tablet by mouth Daily. 90 tablet 3   • multivitamin with minerals (RA VISION-DIANA PRESERVE PO) Take 1 tablet by mouth Daily.     • omeprazole (priLOSEC) 40 MG capsule Take 1 capsule by mouth Daily. 90 capsule 3   • OneTouch Delica Lancets 33G misc 1 strip Daily. Test 1 x daily 100 each 5   • sertraline (ZOLOFT) 50 MG tablet Take 1 tablet by mouth Daily. 90 tablet 3   • tamsulosin (FLOMAX) 0.4 MG capsule 24 hr capsule Take 1 capsule by mouth Daily.       No current facility-administered medications on file prior to visit.       Allergies   Allergen Reactions   • Penicillins Hives       Review of Systems   Genitourinary: Negative for urinary incontinence.   Musculoskeletal: Positive for back pain.   Psychiatric/Behavioral: Positive for depressed mood.       Objective   Visit Vitals  /55 (BP Location: Left arm, Patient Position: Sitting, Cuff Size: Adult)   Pulse 74   Temp 97.5 °F (36.4 °C)   Resp 17   Ht 177.8 cm (70\")   Wt 86.5 kg (190 lb 12.8 oz)   SpO2 96%   BMI 27.38 kg/m²     Physical Exam  Vitals and nursing note reviewed.   Constitutional:       Appearance: He is well-developed.   HENT:      Head: Normocephalic.   Neck:      Thyroid: No thyromegaly.      Vascular: No carotid bruit.      Trachea: Trachea normal.   Cardiovascular:      Rate and Rhythm: Normal rate and regular rhythm.      Heart sounds: No murmur heard.   No friction rub. No gallop.    Pulmonary:      Effort: Pulmonary effort is normal. No respiratory distress.      Breath sounds: Normal breath sounds. No wheezing.   Chest:      Chest wall: No tenderness.   Musculoskeletal:      Cervical back: Neck supple. "      Lumbar back: Tenderness present. Decreased range of motion.   Skin:     General: Skin is dry.      Findings: No rash.      Nails: There is no clubbing.   Neurological:      Mental Status: He is alert and oriented to person, place, and time.   Psychiatric:         Behavior: Behavior is cooperative.           Assessment/Plan .  Problem List Items Addressed This Visit        Medium    Depression    Current Assessment & Plan     Worse probably due to pain--discussed switching to Cymbalta in the future which may also help with pain.  At the present he prefers to stay with his Zoloft         Type 2 diabetes mellitus without complication, without long-term current use of insulin (CMS/[facility])    Current Assessment & Plan     Discussed with patient that steroids may cause blood sugars to increase.  He brings in list of blood sugar readings have been running in the 160-180 range.  He tolerates Amaryl well but advised him the steroids may make this uncontrolled.  If blood sugars run into the mid 200 he is to call and make an appointment            Low    Spinal stenosis of lumbar region - Primary    Current Assessment & Plan     Worse- Discussed the MRI results with patient. MRI shows severe degenative disc disease and old compression fractures. , Mod-severe spinal canal stenosis. Will refer patient to Pain management for epidural blocks and also Physical therapy.  Also start Prednisone 10mg tapered pack.          Relevant Orders    Ambulatory Referral to Physical Therapy Evaluate and treat    Ambulatory Referral to Pain Management

## 2021-07-13 NOTE — ASSESSMENT & PLAN NOTE
Worse probably due to pain--discussed switching to Cymbalta in the future which may also help with pain.  At the present he prefers to stay with his Zoloft

## 2021-07-16 ENCOUNTER — APPOINTMENT (OUTPATIENT)
Dept: MRI IMAGING | Facility: HOSPITAL | Age: 86
End: 2021-07-16

## 2021-07-16 ENCOUNTER — TELEPHONE (OUTPATIENT)
Dept: FAMILY MEDICINE CLINIC | Facility: CLINIC | Age: 86
End: 2021-07-16

## 2021-07-16 DIAGNOSIS — K29.70 GASTRITIS AND GASTRODUODENITIS: ICD-10-CM

## 2021-07-16 DIAGNOSIS — K29.90 GASTRITIS AND GASTRODUODENITIS: ICD-10-CM

## 2021-07-16 DIAGNOSIS — I10 HYPERTENSION, BENIGN: ICD-10-CM

## 2021-07-16 DIAGNOSIS — N18.30 CHRONIC KIDNEY DISEASE, STAGE III (MODERATE) (HCC): ICD-10-CM

## 2021-07-16 DIAGNOSIS — N40.0 PROSTATIC HYPERTROPHY: ICD-10-CM

## 2021-07-16 DIAGNOSIS — E78.2 MIXED HYPERLIPIDEMIA: ICD-10-CM

## 2021-07-16 DIAGNOSIS — I50.22 CHRONIC SYSTOLIC CONGESTIVE HEART FAILURE (HCC): ICD-10-CM

## 2021-07-18 PROBLEM — D64.9 ANEMIA: Status: ACTIVE | Noted: 2019-08-10

## 2021-07-18 PROBLEM — N40.0 BENIGN PROSTATIC HYPERPLASIA: Status: ACTIVE | Noted: 2021-07-18

## 2021-07-18 PROBLEM — M79.10 MUSCLE PAIN: Status: ACTIVE | Noted: 2021-07-18

## 2021-07-18 PROBLEM — I34.0 MITRAL VALVE INSUFFICIENCY: Status: ACTIVE | Noted: 2021-07-18

## 2021-07-21 ENCOUNTER — OFFICE VISIT (OUTPATIENT)
Dept: FAMILY MEDICINE CLINIC | Facility: CLINIC | Age: 86
End: 2021-07-21

## 2021-07-21 VITALS
RESPIRATION RATE: 18 BRPM | SYSTOLIC BLOOD PRESSURE: 182 MMHG | BODY MASS INDEX: 27.14 KG/M2 | TEMPERATURE: 97.9 F | HEIGHT: 70 IN | HEART RATE: 71 BPM | DIASTOLIC BLOOD PRESSURE: 82 MMHG | WEIGHT: 189.6 LBS | OXYGEN SATURATION: 95 %

## 2021-07-21 DIAGNOSIS — I10 HYPERTENSION, BENIGN: ICD-10-CM

## 2021-07-21 DIAGNOSIS — I50.22 CHRONIC SYSTOLIC CONGESTIVE HEART FAILURE (HCC): ICD-10-CM

## 2021-07-21 DIAGNOSIS — E11.22 TYPE 2 DIABETES MELLITUS WITH STAGE 3A CHRONIC KIDNEY DISEASE, WITHOUT LONG-TERM CURRENT USE OF INSULIN (HCC): ICD-10-CM

## 2021-07-21 DIAGNOSIS — M48.061 SPINAL STENOSIS OF LUMBAR REGION, UNSPECIFIED WHETHER NEUROGENIC CLAUDICATION PRESENT: ICD-10-CM

## 2021-07-21 DIAGNOSIS — E21.5 PARATHYROID ABNORMALITY (HCC): Primary | ICD-10-CM

## 2021-07-21 DIAGNOSIS — N18.31 TYPE 2 DIABETES MELLITUS WITH STAGE 3A CHRONIC KIDNEY DISEASE, WITHOUT LONG-TERM CURRENT USE OF INSULIN (HCC): ICD-10-CM

## 2021-07-21 PROCEDURE — 99214 OFFICE O/P EST MOD 30 MIN: CPT | Performed by: FAMILY MEDICINE

## 2021-07-21 RX ORDER — METOPROLOL SUCCINATE 25 MG/1
25 TABLET, EXTENDED RELEASE ORAL DAILY
Qty: 90 TABLET | Refills: 3 | Status: SHIPPED | OUTPATIENT
Start: 2021-07-21 | End: 2021-12-29 | Stop reason: SDUPTHER

## 2021-07-21 RX ORDER — ATORVASTATIN CALCIUM 80 MG/1
TABLET, FILM COATED ORAL
Qty: 90 TABLET | Refills: 3 | Status: SHIPPED | OUTPATIENT
Start: 2021-07-21 | End: 2021-12-29 | Stop reason: SDUPTHER

## 2021-07-21 RX ORDER — OMEPRAZOLE 40 MG/1
40 CAPSULE, DELAYED RELEASE ORAL DAILY
Qty: 90 CAPSULE | Refills: 3 | Status: SHIPPED | OUTPATIENT
Start: 2021-07-21 | End: 2021-12-29 | Stop reason: SDUPTHER

## 2021-07-21 RX ORDER — FUROSEMIDE 20 MG/1
TABLET ORAL
Qty: 180 TABLET | Refills: 3 | Status: SHIPPED | OUTPATIENT
Start: 2021-07-21 | End: 2021-12-29 | Stop reason: SDUPTHER

## 2021-07-21 RX ORDER — FINASTERIDE 5 MG/1
5 TABLET, FILM COATED ORAL DAILY
Qty: 90 TABLET | Refills: 3 | Status: SHIPPED | OUTPATIENT
Start: 2021-07-21 | End: 2021-12-29 | Stop reason: SDUPTHER

## 2021-07-21 NOTE — ASSESSMENT & PLAN NOTE
Worse, 2nd to steroids.  Blood sugars have been in the mid 100's.  GFR was 44.  Will switch from Amaryl to glipizide if blood sugars consistently in the 200s she is to return sooner.

## 2021-07-21 NOTE — PROGRESS NOTES
Subjective   Mikael Shanks is a 86 y.o. male.   Chief Complaint   Patient presents with   • Back Pain   • Hypertension   • Diabetes     Back Pain  This is a chronic problem. The problem occurs constantly. The problem is unchanged. The pain is present in the lumbar spine. The pain is moderate. Pertinent negatives include no chest pain, numbness or weight loss. The treatment provided no relief.   Hypertension  This is a chronic problem. The current episode started more than 1 year ago. The problem has been gradually worsening since onset. The problem is controlled. Pertinent negatives include no chest pain, palpitations or shortness of breath. There are no associated agents to hypertension. Risk factors for coronary artery disease include dyslipidemia and diabetes mellitus. The current treatment provides no improvement. There are no compliance problems.    Diabetes  He presents for his follow-up diabetic visit. He has type 2 diabetes mellitus. His disease course has been worsening. Pertinent negatives for diabetes include no chest pain, no fatigue, no polyphagia, no polyuria and no weight loss. Symptoms are worsening.        The following portions of the patient's history were reviewed and updated as appropriate: allergies, current medications, past family history, past medical history, past social history, past surgical history and problem list.    Past Medical History:   Diagnosis Date   • Carotid artery disease (CMS/HCC)    • GERD (gastroesophageal reflux disease)    • Osteoarthritis    • Prostatic hypertrophy    • Pulmonary valve disorder    • Sleep apnea        Past Surgical History:   Procedure Laterality Date   • BACK SURGERY     • CARDIAC CATHETERIZATION N/A 12/5/2019    Procedure: Left Heart Cath and coronary angiogram;  Surgeon: Dayday Ruiz MD;  Location: [facility] INVASIVE LOCATION;  Service: Cardiovascular   • CARDIAC CATHETERIZATION N/A 12/5/2019    Procedure: Right and Left Heart Cath;   "Surgeon: Dayday Ruiz MD;  Location: [facility] CATH INVASIVE LOCATION;  Service: Cardiovascular   • CARDIAC CATHETERIZATION N/A 2020    Procedure: Left and Right Heart Cath with Coronary Angiography;  Surgeon: Dayday Ruiz MD;  Location:  [facility] CATH INVASIVE LOCATION;  Service: Cardiovascular;  Laterality: N/A;   • CAROTID ENDARTERECTOMY Left    • CHOLECYSTECTOMY     • COLONOSCOPY  2018    No repeat   • CORONARY ANGIOPLASTY WITH STENT PLACEMENT          Family History   Problem Relation Age of Onset   • Cancer Mother    • Heart disease Father    • Cancer Brother         Social History     Socioeconomic History   • Marital status:      Spouse name: Not on file   • Number of children: Not on file   • Years of education: Not on file   • Highest education level: Not on file   Tobacco Use   • Smoking status: Former Smoker     Packs/day: 5.00     Types: Cigarettes     Start date: 1953     Quit date: 1960     Years since quittin.9   • Smokeless tobacco: Never Used   Vaping Use   • Vaping Use: Never used   Substance and Sexual Activity   • Alcohol use: Not Currently     Comment: very rare   • Drug use: No   • Sexual activity: Never         Review of Systems   Constitutional: Negative for fatigue, unexpected weight gain and unexpected weight loss.   Eyes: Negative for visual disturbance.   Respiratory: Negative for shortness of breath.    Cardiovascular: Negative for chest pain, palpitations and leg swelling.   Endocrine: Negative for polyphagia and polyuria.   Genitourinary: Negative for frequency.   Musculoskeletal: Positive for back pain.   Skin: Negative for skin lesions.   Neurological: Negative for syncope, numbness and headache.       Objective   Visit Vitals  BP (!) 182/82 (BP Location: Right arm, Patient Position: Sitting, Cuff Size: Adult)   Pulse 71   Temp 97.9 °F (36.6 °C) (Temporal)   Resp 18   Ht 177.8 cm (70\")   Wt 86 kg (189 lb 9.6 oz)   SpO2 95%   BMI 27.20 kg/m² "     Physical Exam  Vitals and nursing note reviewed.   Constitutional:       Appearance: He is well-developed.   HENT:      Head: Normocephalic.   Neck:      Thyroid: No thyromegaly.      Vascular: No carotid bruit.      Trachea: Trachea normal.   Cardiovascular:      Rate and Rhythm: Normal rate and regular rhythm.      Heart sounds: No murmur heard.   No friction rub. No gallop.    Pulmonary:      Effort: Pulmonary effort is normal. No respiratory distress.      Breath sounds: Normal breath sounds. No wheezing.   Chest:      Chest wall: No tenderness.   Musculoskeletal:      Cervical back: Neck supple.      Lumbar back: Tenderness present. Decreased range of motion.   Skin:     General: Skin is dry.      Findings: No rash.      Nails: There is no clubbing.   Neurological:      Mental Status: He is alert and oriented to person, place, and time.   Psychiatric:         Behavior: Behavior is cooperative.         Assessment/Plan   Problem List Items Addressed This Visit        High    Chronic systolic congestive heart failure (CMS/HCC)    Current Assessment & Plan     Try skipping a Lasix due to increase urination at hs.         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CMS/HCC) - Primary    Current Assessment & Plan     Will repeat PTH with next labs.            Medium    Hypertension, benign    Current Assessment & Plan     Worse, 2nd to steroid use.  He is on metoprolol without side effects.  Benefits the drug outweigh the risk  Encouraged to watch salt, exercise more and lose weight.           Type 2 diabetes mellitus with stage 3a chronic kidney disease, without long-term current use of insulin (CMS/HCC)    Current Assessment & Plan     Worse, 2nd to steroids.  Blood sugars have been in the mid 100's.  GFR was 44.  Will switch from Amaryl to glipizide if blood sugars consistently in the 200s she is to return sooner.         Relevant Medications    glipizide (glipiZIDE XL) 10 MG 24 hr tablet       Low    Spinal stenosis of lumbar  region    Current Assessment & Plan     Not improved with steroids, advised to complete steroids.

## 2021-07-21 NOTE — ASSESSMENT & PLAN NOTE
Worse, 2nd to steroid use.  He is on metoprolol without side effects.  Benefits the drug outweigh the risk  Encouraged to watch salt, exercise more and lose weight.

## 2021-07-23 ENCOUNTER — APPOINTMENT (OUTPATIENT)
Dept: OTHER | Facility: HOSPITAL | Age: 86
End: 2021-07-23

## 2021-07-23 ENCOUNTER — OFFICE VISIT (OUTPATIENT)
Dept: PAIN MEDICINE | Facility: CLINIC | Age: 86
End: 2021-07-23

## 2021-07-23 VITALS
HEIGHT: 70 IN | SYSTOLIC BLOOD PRESSURE: 169 MMHG | BODY MASS INDEX: 27.06 KG/M2 | OXYGEN SATURATION: 96 % | WEIGHT: 189 LBS | RESPIRATION RATE: 16 BRPM | DIASTOLIC BLOOD PRESSURE: 66 MMHG | TEMPERATURE: 96.9 F | HEART RATE: 73 BPM

## 2021-07-23 DIAGNOSIS — M99.79 FORAMINAL STENOSIS DUE TO INTERVERTEBRAL DISC DISEASE: Primary | ICD-10-CM

## 2021-07-23 DIAGNOSIS — M51.9 FORAMINAL STENOSIS DUE TO INTERVERTEBRAL DISC DISEASE: Primary | ICD-10-CM

## 2021-07-23 PROCEDURE — G0463 HOSPITAL OUTPT CLINIC VISIT: HCPCS | Performed by: STUDENT IN AN ORGANIZED HEALTH CARE EDUCATION/TRAINING PROGRAM

## 2021-07-23 PROCEDURE — 99204 OFFICE O/P NEW MOD 45 MIN: CPT | Performed by: STUDENT IN AN ORGANIZED HEALTH CARE EDUCATION/TRAINING PROGRAM

## 2021-07-23 RX ORDER — ACETAMINOPHEN AND CODEINE PHOSPHATE 300; 30 MG/1; MG/1
1 TABLET ORAL EVERY 6 HOURS PRN
Qty: 40 TABLET | Refills: 0 | Status: ON HOLD | OUTPATIENT
Start: 2021-07-23 | End: 2022-01-31

## 2021-07-23 NOTE — PROGRESS NOTES
CHIEF COMPLAINT  Chief Complaint   Patient presents with   • Hip Pain     Left No Narcotics       Primary Care  Xander Robison MD    Subjective   Mikael Shanks is a 86 y.o. male  who presents for left lower extremity radiculopathy.  He states the pain began several weeks ago and has gotten progressively worse.  He describes pain in the anterior lateral aspect of the left thigh with radiation to just above the knee.  He states the pain is worse with prolonged walking and standing and improved with sitting.  He has tried both acetaminophen and ibuprofen without significant pain relief.  He had an MRI of the lumbar spine which does show significant foraminal stenosis especially at the L4-5 region.  He states in the past, he had previous surgery for foraminal stenosis    History of Present Illness     Location: Left anterior lateral thigh  Onset: Several weeks ago  Duration: Constant  Timing: Constant throughout the day  Quality: Sharp, stabbing  Severity: Today: 8       Last Week: 8       Worst: 10  Modifying Factors: The pain is worse with walking and standing and improved with sitting    Physical Therapy: He is scheduled to undergo physical therapy next week    Interval Update 07/23/2021:     The following portions of the patient's history were reviewed and updated as appropriate: allergies, current medications, past family history, past medical history, past social history, past surgical history and problem list.      Current Outpatient Medications:   •  aspirin 81 MG tablet, Take 81 mg by mouth Daily., Disp: , Rfl:   •  atorvastatin (LIPITOR) 80 MG tablet, TAKE 1 TABLET BY MOUTH AT  NIGHT, Disp: 90 tablet, Rfl: 3  •  Blood Glucose Monitoring Suppl (ONE TOUCH ULTRA 2) w/Device kit, 1 Device Daily. Test 1 x daily, Disp: 1 each, Rfl: 0  •  cholecalciferol (VITAMIN D3) 10 MCG (400 UNIT) tablet, Take 400 Units by mouth Daily., Disp: , Rfl:   •  finasteride (PROSCAR) 5 MG tablet, TAKE 1 TABLET BY MOUTH  DAILY, Disp:  "90 tablet, Rfl: 3  •  furosemide (LASIX) 20 MG tablet, TAKE 1 TABLET BY MOUTH  TWICE DAILY, Disp: 180 tablet, Rfl: 3  •  glimepiride (AMARYL) 1 MG tablet, Take 1 tablet by mouth Every Morning Before Breakfast., Disp: 90 tablet, Rfl: 3  •  glucose blood (ONE TOUCH ULTRA TEST) test strip, Test 1 x daily, Disp: 100 each, Rfl: 5  •  meloxicam (MOBIC) 15 MG tablet, Take 1 tablet by mouth Daily., Disp: 30 tablet, Rfl: 12  •  metoprolol succinate XL (TOPROL-XL) 25 MG 24 hr tablet, TAKE 1 TABLET BY MOUTH  DAILY, Disp: 90 tablet, Rfl: 3  •  multivitamin with minerals (RA VISION-DIANA PRESERVE PO), Take 1 tablet by mouth Daily., Disp: , Rfl:   •  omeprazole (priLOSEC) 40 MG capsule, TAKE 1 CAPSULE BY MOUTH  DAILY, Disp: 90 capsule, Rfl: 3  •  OneTouch Delica Lancets 33G misc, 1 strip Daily. Test 1 x daily, Disp: 100 each, Rfl: 5  •  predniSONE (DELTASONE) 10 MG tablet, Take 1 tablet by mouth Take As Directed for 15 days. 5 tab x 1day, 4 tab x 2 day, 3 tab x 3 day, 2 tab x 4 day, 1 tab x 5 day, Disp: 35 tablet, Rfl: 0  •  predniSONE (DELTASONE) 10 MG tablet, Take 1 tablet by mouth Take As Directed for 15 days. 5 tab x 1day, 4 tab x 2 day, 3 tab x 3 day, 2 tab x 4 day, 1 tab x 5 day, Disp: 35 tablet, Rfl: 0  •  sertraline (ZOLOFT) 50 MG tablet, Take 1 tablet by mouth Daily., Disp: 90 tablet, Rfl: 3  •  tamsulosin (FLOMAX) 0.4 MG capsule 24 hr capsule, Take 1 capsule by mouth Daily., Disp: , Rfl:   •  acetaminophen-codeine (TYLENOL #3) 300-30 MG per tablet, Take 1 tablet by mouth Every 6 (Six) Hours As Needed for Moderate Pain ., Disp: 40 tablet, Rfl: 0    Review of Systems   Musculoskeletal: Positive for back pain and gait problem.   Neurological: Positive for numbness. Negative for weakness.       Vitals:    07/23/21 1449   BP: 169/66   Pulse: 73   Resp: 16   Temp: 96.9 °F (36.1 °C)   SpO2: 96%   Weight: 85.7 kg (189 lb)   Height: 177.8 cm (70\")   PainSc:   8       Objective   Physical Exam  Vitals and nursing note reviewed. "   Constitutional:       General: He is not in acute distress.     Appearance: Normal appearance. He is well-developed and normal weight.   Neck:      Trachea: No tracheal deviation.   Musculoskeletal:      Comments: Lumbar Spine Exam:  Tender to palpation over the lumbar paraspinal musculature No  Limited range of motion secondary to pain No  Facet loading positive: Negative  Facets tender to palpation: Negative  Straight leg raise test positive: left     Neurological:      General: No focal deficit present.      Mental Status: He is alert.      Sensory: No sensory deficit.           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 He has signs and symptoms of foraminal stenosis.  Imaging review does appear to show foraminal narrowing both on the plain film as well as the MRI  2. Plan for left-sided L3 and L4 transforaminal epidural steroid injections  3. We will also give a short course of Tylenol 3 for breakthrough pain until we can perform the intervention  --- Follow-up next available for left-sided L3-L4 transforaminal           INSPECT REPORT    As part of the patient's treatment plan, I may be prescribing controlled substances. The patient has been made aware of appropriate use of such medications, including potential risk of somnolence, limited ability to drive and/or work safely, and the potential for dependence or overdose. It has also bee made clear that these medications are for use by this patient only, without concomitant use of alcohol or other substances unless prescribed.     Patient has completed prescribing agreement detailing terms of  continued prescribing of controlled substances, including monitoring GUNNER reports, urine drug screening, and pill counts if necessary. The patient is aware that inappropriate use will results in cessation of prescribing such medications.    INSPECT report has been reviewed and scanned into the patient's chart.    As the clinician, I personally reviewed the INSPECT from 7/21/2021.    History and physical exam exhibit continued safe and appropriate use of controlled substances.      EMR Dragon/Transcription disclaimer:   Much of this encounter note is an electronic transcription/translation of spoken language to printed text. The electronic translation of spoken language may permit erroneous, or at times, nonsensical words or phrases to be inadvertently transcribed; Although I have reviewed the note for such errors, some may still exist.

## 2021-07-30 ENCOUNTER — HOSPITAL ENCOUNTER (OUTPATIENT)
Dept: PAIN MEDICINE | Facility: HOSPITAL | Age: 86
Discharge: HOME | End: 2021-07-30

## 2021-07-30 VITALS
TEMPERATURE: 97.3 F | RESPIRATION RATE: 16 BRPM | BODY MASS INDEX: 27.06 KG/M2 | HEIGHT: 70 IN | WEIGHT: 189 LBS | HEART RATE: 60 BPM | OXYGEN SATURATION: 97 % | DIASTOLIC BLOOD PRESSURE: 75 MMHG | SYSTOLIC BLOOD PRESSURE: 132 MMHG

## 2021-07-30 DIAGNOSIS — M99.79 FORAMINAL STENOSIS DUE TO INTERVERTEBRAL DISC DISEASE: Primary | ICD-10-CM

## 2021-07-30 DIAGNOSIS — R52 PAIN: ICD-10-CM

## 2021-07-30 DIAGNOSIS — M51.9 FORAMINAL STENOSIS DUE TO INTERVERTEBRAL DISC DISEASE: Primary | ICD-10-CM

## 2021-07-30 PROCEDURE — 64484 NJX AA&/STRD TFRM EPI L/S EA: CPT | Performed by: STUDENT IN AN ORGANIZED HEALTH CARE EDUCATION/TRAINING PROGRAM

## 2021-07-30 PROCEDURE — 0 IOPAMIDOL 41 % SOLUTION: Performed by: STUDENT IN AN ORGANIZED HEALTH CARE EDUCATION/TRAINING PROGRAM

## 2021-07-30 PROCEDURE — 77003 FLUOROGUIDE FOR SPINE INJECT: CPT

## 2021-07-30 PROCEDURE — 64483 NJX AA&/STRD TFRM EPI L/S 1: CPT | Performed by: STUDENT IN AN ORGANIZED HEALTH CARE EDUCATION/TRAINING PROGRAM

## 2021-07-30 PROCEDURE — 25010000002 DEXAMETHASONE SODIUM PHOSPHATE 10 MG/ML SOLUTION: Performed by: STUDENT IN AN ORGANIZED HEALTH CARE EDUCATION/TRAINING PROGRAM

## 2021-07-30 RX ORDER — DEXAMETHASONE SODIUM PHOSPHATE 10 MG/ML
10 INJECTION, SOLUTION INTRAMUSCULAR; INTRAVENOUS ONCE
Status: COMPLETED | OUTPATIENT
Start: 2021-07-30 | End: 2021-07-30

## 2021-07-30 RX ORDER — GLIPIZIDE 10 MG/1
10 TABLET, FILM COATED, EXTENDED RELEASE ORAL DAILY
Qty: 90 TABLET | Refills: 3 | Status: SHIPPED | OUTPATIENT
Start: 2021-07-30 | End: 2021-12-29 | Stop reason: SDUPTHER

## 2021-07-30 RX ORDER — BUPIVACAINE HYDROCHLORIDE 2.5 MG/ML
10 INJECTION, SOLUTION EPIDURAL; INFILTRATION; INTRACAUDAL ONCE
Status: COMPLETED | OUTPATIENT
Start: 2021-07-30 | End: 2021-07-30

## 2021-07-30 RX ADMIN — IOPAMIDOL 3 ML: 408 INJECTION, SOLUTION INTRATHECAL at 11:15

## 2021-07-30 RX ADMIN — BUPIVACAINE HYDROCHLORIDE 10 ML: 2.5 INJECTION, SOLUTION EPIDURAL; INFILTRATION; INTRACAUDAL; PERINEURAL at 11:14

## 2021-07-30 RX ADMIN — DEXAMETHASONE SODIUM PHOSPHATE 10 MG: 10 INJECTION, SOLUTION INTRAMUSCULAR; INTRAVENOUS at 11:14

## 2021-07-30 NOTE — PROCEDURES
Lumbar Transforaminal Epidural Steroid Injection (two levels)  [facility]      PREOPERATIVE DIAGNOSIS:  Lumbar Degenerative Disc Disease, Lumbar Spinal Stenosis WITH Neurogenic Claudication and Lumbar Disc Displacement    POSTOPERATIVE DIAGNOSIS:  Same as preop diagnosis    PROCEDURE:    1. CPT 79984 --  Diagnostic & Therapeutic Transforaminal Epidural Steroid Injection at the L3 level, on the left   2. CPT 98935 --  Diagnostic & Therapeutic Transforaminal Epidural Steroid Injection at the L4 level, on the left     PRE-PROCEDURE DISCUSSION WITH PATIENT:    Risks and complications were discussed with the patient prior to starting the procedure and informed consent was obtained.  We discussed various topics including but not limited to bleeding, infection, injury, nerve injury, paralysis, coma, death, postprocedural painful flare-up, postprocedural site soreness, and a lack of pain relief.  We discussed the diagnostic aspect of transforaminal epidural / selective nerve root blockade.    SURGEON:  Geraldo Alcala MD    REASON FOR PROCEDURE:    Diagnostic injection at this level is needed, Degenerative changes are noted in the area., Stenotic area is noted, and is likely contributing to this chronic &/or recurrent pain.  and Radicular pain pattern seems consistent with this dermatome.    SEDATION:  Patient declined administration of moderate sedation    ANESTHETIC:  Marcaine 0.25%  STEROID:  dexamethasone 10mg    DESCRIPTON OF PROCEDURE:  After obtaining informed consent, an I.V. was not started in the preoperative area. The patient taken to the operating room and was placed in the prone position with a pillow under the abdomen.  All pressure points were well padded.  The lumbar area was prepped with Chloraprep and draped in a sterile fashion. Under fluoroscopic guidance in an oblique dimension on the above mentioned side, the transverse process of the first aforementioned vertebra at the junction of the body at  6 o'clock position was identified. Skin and subcutaneous tissue was anesthetized with 1% lidocaine. A 22-gauge spinal needle was introduced under fluoroscopic guidance at the above junction into the foramen without parasthesias and into the epidural space. After confirming the position of the needle with PA, lateral, and oblique fluoroscopic views, aspiration was checked and was clear of blood or CSF.  Next, 1 mL of Omnipaque was injected. After seeing adequate spread on the corresponding nerve root, a total volume 2mL of injectate containing local anesthetic as above and half of the above mentioned corticosteroid was injected into the epidural space.  The needle was removed intact.      Next, in similar fashion, the second level mentioned above was addressed and a similar amount of injectate was delivered after similar imaging was achieved.      Vital signs were stable throughout.          ESTIMATED BLOOD LOSS:  <5 mL  SPECIMENS:  none    COMPLICATIONS:   No complications were noted., There was no indication of vascular uptake on live injection of contrast dye., There was no indication of intrathecal uptake on live injection of contrast dye. and There was not any evidence of dural puncture.      TOLERANCE & DISCHARGE CONDITION:    The patient tolerated the procedure well.  The patient was transported to the recovery area without difficulties.  The patient was discharged to home under the care of family in stable and satisfactory condition.    PLAN OF CARE:  1. The patient was given our standard instruction sheet.  2. The patient will Return to clinic 2-3 wks.  3. The patient will resume all medications as per the medication reconciliation sheet.

## 2021-08-04 ENCOUNTER — TREATMENT (OUTPATIENT)
Dept: PHYSICAL THERAPY | Facility: CLINIC | Age: 86
End: 2021-08-04

## 2021-08-04 DIAGNOSIS — M79.605 PAIN OF LEFT LOWER EXTREMITY: Primary | ICD-10-CM

## 2021-08-04 DIAGNOSIS — M25.559 PAIN, HIP: ICD-10-CM

## 2021-08-04 PROCEDURE — 97110 THERAPEUTIC EXERCISES: CPT | Performed by: PHYSICAL THERAPIST

## 2021-08-04 PROCEDURE — 97162 PT EVAL MOD COMPLEX 30 MIN: CPT | Performed by: PHYSICAL THERAPIST

## 2021-08-04 NOTE — PROGRESS NOTES
"Physical Therapy Initial Evaluation and Plan of Care    Patient: Mikael Shanks   : 1935  Diagnosis/ICD-10 Code:  Pain of left lower extremity [M79.605]  Referring practitioner: Xander Robison MD  Date of Initial Visit: 2021  Today's Date: 2021  Patient seen for 1 sessions           Subjective Questionnaire: Oswestry: 48% disability       Subjective Evaluation    History of Present Illness  Mechanism of injury: Patient is an 86 year old male with complaints of L thigh pain with gradual onset ~1-2 months amaya.  He reports having L LE pain in  which was radiating from his back.  He had injections at this time followed by surgery to \"scrape the arthritis\" and reports no issues until now.  Pain is located primarily at L anterior thigh but sometimes into posterior thigh.  Pain worsens with standing and ambulation.  Pain improves with sitting.  He reports no back pain.  He had an injection in his low back last week but states his pain remains unchanged.  He also had an x-ray of his L hip which indicated moderate hip OA.  Patient currently using a rolling walker so he doesn't fall but denies any history of falls.  Denies any LE buckling.  Desnies any NT or changes in bowel/bladder function.      Pain  Current pain ratin  At best pain ratin  At worst pain ratin  Location: L anterior thigh    Social Support  Lives in: one-story house  Lives with: spouse    Diagnostic Tests  MRI studies: abnormal    Patient Goals  Patient goals for therapy: decreased pain, increased motion and increased strength             Objective          Active Range of Motion     Lumbar   Left lateral flexion: WFL  Right lateral flexion: WFL    Additional Active Range of Motion Details  Lumbar flexion ~25% limited, stretch felt  Lumbar extension ~50% limited with increased posterior thigh pain but no increased anterior thigh pain    R hip IR ~25% limited  L hip IR ~50% limited and painful    Strength/Myotome Testing "     Left Hip   Planes of Motion   Flexion: 4    Right Hip   Planes of Motion   Flexion: 4+    Left Knee   Flexion: 4+  Extension: 4    Right Knee   Flexion: 5  Extension: 5    Left Ankle/Foot   Dorsiflexion: 5    Right Ankle/Foot   Dorsiflexion: 5    Additional Strength Details  Pain with MMT of L knee extension    Tests     Additional Tests Details  L quadrant testing reproduced L posterior thigh pain  L anterior thigh pain reproduced with hip flexion and IR active and passive ROM, BESSIE, and hip scour    Lumbar Flexibility Comments:   Tightness of B hamstrings (min) and hip flexors (mod)          Assessment & Plan     Assessment  Impairments: abnormal or restricted ROM, activity intolerance, impaired physical strength, lacks appropriate home exercise program, pain with function and weight-bearing intolerance  Assessment details: Patient is an 86 year old male with complaints of L thigh pain with insidious onset 1-2 months ago.  Patient presents with positive testing for L hip involvement with positive hip scour and BESSIE, tightness of L hip flexors, decreased B hip strength, decreased B hip and lumbar mobility, and decreased standing and ambulation tolerance.  Patient presents with the impairments listed above and based on the objective findings and the physical therapy evaluation, the patient's condition has the potential to improve in response to therapy.   The patient's condition and/or services required are at a level of complexity that necessitates the skill & supervision of a physical therapist.    Prognosis: good  Functional Limitations: lifting, walking and standing  Goals  Plan Goals: STG:  -Patient will report a reduction in L thigh pain by >/=20% for improved standing/ambulation tolerance in 2-3 weeks.  -Patient will be independent with HEP in 2 weeks.  -Patient will demonstrate increased L hip IR AROM by >/=25% in 3 weeks.    LTG:  -Patient will be able to ambulate without use of AD for return to OF  by discharge,  -Patient will be able to tolerate standing/ambulation for >/=30 minutes without use of AD by discharge.  -Patient will report min to no L thigh pain for improved standing/ambulation tolerance by discharge.  -Patient will demonstrate increased L hip flexion to WFL without complaints of pain by discharge.    Plan  Therapy options: will be seen for skilled physical therapy services  Planned modality interventions: cryotherapy, dry needling, electrical stimulation/Russian stimulation, thermotherapy (hydrocollator packs) and ultrasound  Planned therapy interventions: manual therapy, neuromuscular re-education, soft tissue mobilization, spinal/joint mobilization, strengthening, therapeutic activities, joint mobilization, stretching, home exercise program, functional ROM exercises, gait training, flexibility and balance/weight-bearing training  Frequency: 2x week  Duration in visits: 16  Treatment plan discussed with: patient        Timed:         Manual Therapy:         mins  10504;     Therapeutic Exercise:    15     mins  92489;     Neuromuscular Desi:        mins  82621;    Therapeutic Activity:          mins  22793;     Gait Training:           mins  06476;     Ultrasound:          mins  98046;    Ionto                                   mins   48981  Self-care  ____ mins 88872    Un-Timed:  Electrical Stimulation:         mins  91215 ( );  Dry Needling          mins self-pay  Traction          mins 69616  Low Eval          Mins  36721  Mod Eval     25     Mins  86227  High Eval                           Mins  97546        Timed Treatment:   15   mins   Total Treatment:     40   mins    PT SIGNATURE: Natalia Wilkes PT   [place] License # [id number]  Physical Therapist  DATE TREATMENT INITIATED: 8/4/2021    Initial Certification  Certification Period: 11/2/2021  I certify that the therapy services are furnished while this patient is under my care.  The services outlined above are required by this  patient, and will be reviewed every 90 days.     PHYSICIAN: Xander Robison MD      DATE:     Please sign and return via fax to [phone].. Thank you, [facility].

## 2021-08-10 ENCOUNTER — TREATMENT (OUTPATIENT)
Dept: PHYSICAL THERAPY | Facility: CLINIC | Age: 86
End: 2021-08-10

## 2021-08-10 DIAGNOSIS — M79.605 PAIN OF LEFT LOWER EXTREMITY: Primary | ICD-10-CM

## 2021-08-10 DIAGNOSIS — M25.559 PAIN, HIP: ICD-10-CM

## 2021-08-10 PROCEDURE — 97110 THERAPEUTIC EXERCISES: CPT | Performed by: PHYSICAL THERAPIST

## 2021-08-10 PROCEDURE — 97140 MANUAL THERAPY 1/> REGIONS: CPT | Performed by: PHYSICAL THERAPIST

## 2021-08-10 NOTE — PROGRESS NOTES
Physical Therapy Daily Progress Note      Patient: Mikael Shanks   : 1935  Diagnosis/ICD-10 Code:  Pain of left lower extremity [M79.605]   Problems Addressed this Visit     None      Visit Diagnoses     Pain of left lower extremity    -  Primary    Pain, hip          Diagnoses       Codes Comments    Pain of left lower extremity    -  Primary ICD-10-CM: M79.605  ICD-9-CM: 729.5     Pain, hip     ICD-10-CM: M25.559  ICD-9-CM: 719.45          Referring practitioner: Xander Robison MD  Date of Initial Visit: Type: THERAPY  Noted: 2021  Today's Date: 8/10/2021    VISIT#: 2    Subjective Patient reports his hip felt good for the first few days when performing his HEP but states it has been more sore the past 3 days.  Patient denies any change in activity level.      Objective     See Exercise, Manual, and Modality Logs for complete treatment.     Assessment/Plan Progressed hip mobility with addition of manual LLE long axis distraction and hip flexor stretch with patient reporting decreased pressure at anterior L hip with LAD.  Patient tolerated exercise well with no complaints of hip pain except with ambulation.    Progress per Plan of Care and Progress strengthening /stabilization /functional activity         Timed:         Manual Therapy:    10     mins  70300;     Therapeutic Exercise:    19     mins  99591;     Neuromuscular Desi:        mins  73339;    Therapeutic Activity:          mins  54149;     Gait Training:           mins  22232;     Ultrasound:          mins  77442;    Ionto                                   mins   86676  Self Care                            mins   65873    Un-Timed:  Electrical Stimulation:         mins  41974 ( );  Dry Needling          mins self-pay  Traction          mins 49718  Low Eval          Mins  99965  Mod Eval          Mins  76000  High Eval                            Mins  93897  Re-Eval                               mins  05087    Timed Treatment:    29   mins   Total Treatment:     39   mins    Natalia Wilkes, PT  Physical Therapist

## 2021-08-13 ENCOUNTER — OFFICE VISIT (OUTPATIENT)
Dept: PAIN MEDICINE | Facility: CLINIC | Age: 86
End: 2021-08-13

## 2021-08-13 VITALS
HEART RATE: 67 BPM | HEIGHT: 70 IN | TEMPERATURE: 97.5 F | RESPIRATION RATE: 16 BRPM | BODY MASS INDEX: 27.06 KG/M2 | DIASTOLIC BLOOD PRESSURE: 75 MMHG | SYSTOLIC BLOOD PRESSURE: 171 MMHG | OXYGEN SATURATION: 96 % | WEIGHT: 189 LBS

## 2021-08-13 DIAGNOSIS — M99.79 FORAMINAL STENOSIS DUE TO INTERVERTEBRAL DISC DISEASE: Primary | ICD-10-CM

## 2021-08-13 DIAGNOSIS — M51.9 FORAMINAL STENOSIS DUE TO INTERVERTEBRAL DISC DISEASE: Primary | ICD-10-CM

## 2021-08-13 PROCEDURE — G0463 HOSPITAL OUTPT CLINIC VISIT: HCPCS | Performed by: STUDENT IN AN ORGANIZED HEALTH CARE EDUCATION/TRAINING PROGRAM

## 2021-08-13 PROCEDURE — 99213 OFFICE O/P EST LOW 20 MIN: CPT | Performed by: STUDENT IN AN ORGANIZED HEALTH CARE EDUCATION/TRAINING PROGRAM

## 2021-08-13 NOTE — PROGRESS NOTES
CHIEF COMPLAINT  Chief Complaint   Patient presents with   • Hip Pain     TYLENOL #3 A WEEK AGO. LEFT HIP GOING INTO THIGH. INJECTION DID NOT HELP BUT PT HAS BEEN HELPING.        Primary Care  Xander Robison MD    Subjective   Mikael Shanks is a 86 y.o. male  who presents for left lower extremity radiculopathy.  He states the pain began several weeks ago and has gotten progressively worse.  He describes pain in the anterior lateral aspect of the left thigh with radiation to just above the knee.  He states the pain is worse with prolonged walking and standing and improved with sitting.  He has tried both acetaminophen and ibuprofen without significant pain relief.  He had an MRI of the lumbar spine which does show significant foraminal stenosis especially at the L4-5 region.  He states in the past, he had previous surgery for foraminal stenosis    Hip Pain   Associated symptoms include numbness.        Location: Left anterior lateral thigh  Onset: Several weeks ago  Duration: Constant  Timing: Constant throughout the day  Quality: Sharp, stabbing  Severity: Today: 5       Last Week: 5       Worst: 10  Modifying Factors: The pain is worse with walking and standing and improved with sitting    Physical Therapy: He is scheduled to undergo physical therapy next week    Interval Update 08/13/2021: Unsure whether he got significant benefit from his transforaminal epidural however with a combination of injection and physical therapy, he states his pain is much improved.  He no longer walks with a cane and his pain is approximately 50% better.  Otherwise, no issues.  He is no longer taking Tylenol 3.    The following portions of the patient's history were reviewed and updated as appropriate: allergies, current medications, past family history, past medical history, past social history, past surgical history and problem list.      Current Outpatient Medications:   •  acetaminophen-codeine (TYLENOL #3) 300-30 MG per  "tablet, Take 1 tablet by mouth Every 6 (Six) Hours As Needed for Moderate Pain ., Disp: 40 tablet, Rfl: 0  •  aspirin 81 MG tablet, Take 81 mg by mouth Daily., Disp: , Rfl:   •  atorvastatin (LIPITOR) 80 MG tablet, TAKE 1 TABLET BY MOUTH AT  NIGHT, Disp: 90 tablet, Rfl: 3  •  Blood Glucose Monitoring Suppl (ONE TOUCH ULTRA 2) w/Device kit, 1 Device Daily. Test 1 x daily, Disp: 1 each, Rfl: 0  •  cholecalciferol (VITAMIN D3) 10 MCG (400 UNIT) tablet, Take 400 Units by mouth Daily., Disp: , Rfl:   •  finasteride (PROSCAR) 5 MG tablet, TAKE 1 TABLET BY MOUTH  DAILY, Disp: 90 tablet, Rfl: 3  •  furosemide (LASIX) 20 MG tablet, TAKE 1 TABLET BY MOUTH  TWICE DAILY, Disp: 180 tablet, Rfl: 3  •  glipizide (glipiZIDE XL) 10 MG 24 hr tablet, Take 1 tablet by mouth Daily., Disp: 90 tablet, Rfl: 3  •  glucose blood (ONE TOUCH ULTRA TEST) test strip, Test 1 x daily, Disp: 100 each, Rfl: 5  •  meloxicam (MOBIC) 15 MG tablet, Take 1 tablet by mouth Daily., Disp: 30 tablet, Rfl: 12  •  metoprolol succinate XL (TOPROL-XL) 25 MG 24 hr tablet, TAKE 1 TABLET BY MOUTH  DAILY, Disp: 90 tablet, Rfl: 3  •  multivitamin with minerals (RA VISION-DIANA PRESERVE PO), Take 1 tablet by mouth Daily., Disp: , Rfl:   •  omeprazole (priLOSEC) 40 MG capsule, TAKE 1 CAPSULE BY MOUTH  DAILY, Disp: 90 capsule, Rfl: 3  •  OneTouch Delica Lancets 33G misc, 1 strip Daily. Test 1 x daily, Disp: 100 each, Rfl: 5  •  sertraline (ZOLOFT) 50 MG tablet, Take 1 tablet by mouth Daily., Disp: 90 tablet, Rfl: 3  •  tamsulosin (FLOMAX) 0.4 MG capsule 24 hr capsule, Take 1 capsule by mouth Daily., Disp: , Rfl:     Review of Systems   Musculoskeletal: Positive for back pain and gait problem.   Neurological: Positive for numbness. Negative for weakness.       Vitals:    08/13/21 0913   BP: 171/75   Pulse: 67   Resp: 16   Temp: 97.5 °F (36.4 °C)   SpO2: 96%   Weight: 85.7 kg (189 lb)   Height: 177.8 cm (70\")   PainSc:   5       Objective   Physical Exam  Vitals and " nursing note reviewed.   Constitutional:       General: He is not in acute distress.     Appearance: Normal appearance. He is well-developed and normal weight.   Neck:      Trachea: No tracheal deviation.   Musculoskeletal:      Comments: Lumbar Spine Exam:  Tender to palpation over the lumbar paraspinal musculature No  Limited range of motion secondary to pain No  Facet loading positive: Negative  Facets tender to palpation: Negative  Straight leg raise test positive: left     Neurological:      General: No focal deficit present.      Mental Status: He is alert.      Sensory: No sensory deficit.           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 Excellent benefit with a combination of transforaminal and physical therapy  2. Continue Tylenol 3 as needed  3. Follow-up as needed  --- Follow-up as needed for repeat transforaminal           INSPECT REPORT    As part of the patient's treatment plan, I may be prescribing controlled substances. The patient has been made aware of appropriate use of such medications, including potential risk of somnolence, limited ability to drive and/or work safely, and the potential for dependence or overdose. It has also bee made clear that these medications are for use by this patient only, without concomitant use of alcohol or other substances unless prescribed.     Patient has completed prescribing agreement detailing terms of continued prescribing of controlled substances, including monitoring GUNNER reports, urine drug screening, and pill counts if necessary. The patient is aware that inappropriate use will results in cessation of prescribing such medications.    INSPECT report has been reviewed and  scanned into the patient's chart.    As the clinician, I personally reviewed the INSPECT from 8/11/2021.    History and physical exam exhibit continued safe and appropriate use of controlled substances.      EMR Dragon/Transcription disclaimer:   Much of this encounter note is an electronic transcription/translation of spoken language to printed text. The electronic translation of spoken language may permit erroneous, or at times, nonsensical words or phrases to be inadvertently transcribed; Although I have reviewed the note for such errors, some may still exist.

## 2021-08-17 ENCOUNTER — TREATMENT (OUTPATIENT)
Dept: PHYSICAL THERAPY | Facility: CLINIC | Age: 86
End: 2021-08-17

## 2021-08-17 DIAGNOSIS — M79.605 PAIN OF LEFT LOWER EXTREMITY: Primary | ICD-10-CM

## 2021-08-17 DIAGNOSIS — M25.559 PAIN, HIP: ICD-10-CM

## 2021-08-17 PROCEDURE — 97140 MANUAL THERAPY 1/> REGIONS: CPT | Performed by: PHYSICAL THERAPIST

## 2021-08-17 PROCEDURE — 97110 THERAPEUTIC EXERCISES: CPT | Performed by: PHYSICAL THERAPIST

## 2021-08-17 NOTE — PROGRESS NOTES
Physical Therapy Daily Progress Note      Patient: Mikael Shanks   : 1935  Diagnosis/ICD-10 Code:  Pain of left lower extremity [M79.605]   Problems Addressed this Visit     None      Visit Diagnoses     Pain of left lower extremity    -  Primary    Pain, hip          Diagnoses       Codes Comments    Pain of left lower extremity    -  Primary ICD-10-CM: M79.605  ICD-9-CM: 729.5     Pain, hip     ICD-10-CM: M25.559  ICD-9-CM: 719.45          Referring practitioner: Xander Robison MD  Date of Initial Visit: Type: THERAPY  Noted: 2021  Today's Date: 2021    VISIT#: 3    Subjective Patient reports he felt good following his last therapy session but then doubled the repetitions of his HEP so has been feeling sore since.        Objective     See Exercise, Manual, and Modality Logs for complete treatment.     Assessment/Plan Patient educated on frequency, duration, and repetitions for HEP and adhering to therapist recommendations with patient verbalizing understanding.  Patient continues to respond well to manual long axis distraction with decreased pain reported after.  Patient with no increased pain with exercise today.    Progress per Plan of Care and Progress strengthening /stabilization /functional activity         Timed:         Manual Therapy:    12     mins  37919;     Therapeutic Exercise:    17     mins  16882;     Neuromuscular Desi:        mins  00892;    Therapeutic Activity:          mins  58812;     Gait Training:           mins  10342;     Ultrasound:          mins  42283;    Ionto                                   mins   86366  Self Care                            mins   13049    Un-Timed:  Electrical Stimulation:         mins  54793 ( );  Dry Needling          mins self-pay  Traction          mins 25996  Low Eval          Mins  54231  Mod Eval          Mins  66701  High Eval                            Mins  94293  Re-Eval                               mins  45739    Timed  Treatment:   29   mins   Total Treatment:     39   mins    Natalia Wilkes, PT  Physical Therapist

## 2021-08-24 ENCOUNTER — TREATMENT (OUTPATIENT)
Dept: PHYSICAL THERAPY | Facility: CLINIC | Age: 86
End: 2021-08-24

## 2021-08-24 DIAGNOSIS — M79.605 PAIN OF LEFT LOWER EXTREMITY: Primary | ICD-10-CM

## 2021-08-24 DIAGNOSIS — M25.559 PAIN, HIP: ICD-10-CM

## 2021-08-24 PROCEDURE — 97140 MANUAL THERAPY 1/> REGIONS: CPT | Performed by: PHYSICAL THERAPIST

## 2021-08-24 PROCEDURE — 97110 THERAPEUTIC EXERCISES: CPT | Performed by: PHYSICAL THERAPIST

## 2021-08-24 NOTE — PROGRESS NOTES
"     Physical Therapy Daily Progress Note      Patient: Mikael Shanks   : 1935  Diagnosis/ICD-10 Code:  Pain of left lower extremity [M79.605]   Problems Addressed this Visit     None      Visit Diagnoses     Pain of left lower extremity    -  Primary    Pain, hip          Diagnoses       Codes Comments    Pain of left lower extremity    -  Primary ICD-10-CM: M79.605  ICD-9-CM: 729.5     Pain, hip     ICD-10-CM: M25.559  ICD-9-CM: 719.45          Referring practitioner: Xander Robison MD  Date of Initial Visit: Type: THERAPY  Noted: 2021  Today's Date: 2021    VISIT#: 4    Subjective Patient reports he feels \"so so\" today but overall he is improving.  He reports he has started using a heating pad at home which has helped.        Objective     See Exercise, Manual, and Modality Logs for complete treatment.     Assessment/Plan  Patient reported discomfort at low back and L lateral thigh with hip flexor stretch in supine today so modified to perform in sidelying with improved comfort.  Patient also reported pulling sensation at L low back with standing marching but no pain.      Progress per Plan of Care and Progress strengthening /stabilization /functional activity         Timed:         Manual Therapy:    12     mins  71273;     Therapeutic Exercise:    18     mins  52274;     Neuromuscular Desi:        mins  46334;    Therapeutic Activity:          mins  70940;     Gait Training:           mins  40278;     Ultrasound:          mins  10362;    Ionto                                   mins   98072  Self Care                            mins   92218    Un-Timed:  Electrical Stimulation:         mins  65962 ( );  Dry Needling          mins self-pay  Traction          mins 55599  Low Eval          Mins  86042  Mod Eval          Mins  60630  High Eval                            Mins  84412  Re-Eval                               mins  30509    Timed Treatment:   30   mins   Total Treatment:     40 "   mins    Natalia Wilkes, PT  Physical Therapist

## 2021-08-26 ENCOUNTER — TREATMENT (OUTPATIENT)
Dept: PHYSICAL THERAPY | Facility: CLINIC | Age: 86
End: 2021-08-26

## 2021-08-26 DIAGNOSIS — M79.605 PAIN OF LEFT LOWER EXTREMITY: Primary | ICD-10-CM

## 2021-08-26 DIAGNOSIS — M25.559 PAIN, HIP: ICD-10-CM

## 2021-08-26 PROCEDURE — 97140 MANUAL THERAPY 1/> REGIONS: CPT | Performed by: PHYSICAL THERAPIST

## 2021-08-26 PROCEDURE — 97110 THERAPEUTIC EXERCISES: CPT | Performed by: PHYSICAL THERAPIST

## 2021-08-26 NOTE — PROGRESS NOTES
Physical Therapy Daily Progress Note      Patient: Mikael Shanks   : 1935  Diagnosis/ICD-10 Code:  Pain of left lower extremity [M79.605]   Problems Addressed this Visit     None      Visit Diagnoses     Pain of left lower extremity    -  Primary    Pain, hip          Diagnoses       Codes Comments    Pain of left lower extremity    -  Primary ICD-10-CM: M79.605  ICD-9-CM: 729.5     Pain, hip     ICD-10-CM: M25.559  ICD-9-CM: 719.45          Referring practitioner: Xander Robison MD  Date of Initial Visit: Type: THERAPY  Noted: 2021  Today's Date: 2021    VISIT#: 5    Subjective Patient reports his thigh has been sore since his last visit. He also reports doing a lot of walking yesterday to do his grocery shopping which has increased his soreness.       Objective     See Exercise, Manual, and Modality Logs for complete treatment.     Assessment/Plan Patient tolerated exercise with increased pain reported.  Progressed to sidelying abduction x 10 repetitions with no worsening of symptoms.  Patient continues to respond well to manual LLE LAD and caudal distraction for decreased pain.     Progress per Plan of Care and Progress strengthening /stabilization /functional activity         Timed:         Manual Therapy:    17     mins  20939;     Therapeutic Exercise:    12     mins  83081;     Neuromuscular Desi:        mins  67114;    Therapeutic Activity:          mins  36198;     Gait Training:           mins  47445;     Ultrasound:          mins  14674;    Ionto                                   mins   93773  Self Care                            mins   90024    Un-Timed:  Electrical Stimulation:         mins  78766 ( );  Dry Needling          mins self-pay  Traction          mins 92911  Low Eval          Mins  08911  Mod Eval          Mins  53004  High Eval                            Mins  51891  Re-Eval                               mins  60900    Timed Treatment:   29   mins   Total  Treatment:     39   mins    Natalia Wilkes, PT  Physical Therapist

## 2021-08-31 ENCOUNTER — TREATMENT (OUTPATIENT)
Dept: PHYSICAL THERAPY | Facility: CLINIC | Age: 86
End: 2021-08-31

## 2021-08-31 DIAGNOSIS — M79.605 PAIN OF LEFT LOWER EXTREMITY: Primary | ICD-10-CM

## 2021-08-31 DIAGNOSIS — M25.559 PAIN, HIP: ICD-10-CM

## 2021-08-31 PROCEDURE — 97140 MANUAL THERAPY 1/> REGIONS: CPT | Performed by: PHYSICAL THERAPIST

## 2021-08-31 PROCEDURE — 97110 THERAPEUTIC EXERCISES: CPT | Performed by: PHYSICAL THERAPIST

## 2021-08-31 NOTE — PROGRESS NOTES
Physical Therapy Daily Progress Note      Patient: Mikael Shanks   : 1935  Diagnosis/ICD-10 Code:  Pain of left lower extremity [M79.605]   Problems Addressed this Visit     None      Visit Diagnoses     Pain of left lower extremity    -  Primary    Pain, hip          Diagnoses       Codes Comments    Pain of left lower extremity    -  Primary ICD-10-CM: M79.605  ICD-9-CM: 729.5     Pain, hip     ICD-10-CM: M25.559  ICD-9-CM: 719.45          Referring practitioner: Xander Robison MD  Date of Initial Visit: Type: THERAPY  Noted: 2021  Today's Date: 2021    VISIT#: 6    Subjective Patient reports his L thigh and calf are sore today but states he is improving.  He presented to PT today ambulating without AD.      Objective     See Exercise, Manual, and Modality Logs for complete treatment.     Assessment/Plan Progressed strengthening with no complaints of pain with activity but fatigued noted with sidelying hip abduction.  Patient with improving gait mechanics noted with decreased LLE antalgia noted without AD today.  Continue to progress LE strengthening and mobility as tolerated.    Progress per Plan of Care and Progress strengthening /stabilization /functional activity         Timed:         Manual Therapy:    14     mins  55263;     Therapeutic Exercise:    18     mins  58012;     Neuromuscular Desi:        mins  01245;    Therapeutic Activity:          mins  83047;     Gait Training:           mins  73162;     Ultrasound:          mins  21850;    Ionto                                   mins   47575  Self Care                            mins   45763    Un-Timed:  Electrical Stimulation:         mins  86228 (MC );  Dry Needling          mins self-pay  Traction          mins 11358  Low Eval          Mins  30821  Mod Eval          Mins  72944  High Eval                            Mins  05075  Re-Eval                               mins  87840    Timed Treatment:   32   mins   Total  Treatment:     47   mins    Natalia Wilkes, PT  Physical Therapist

## 2021-09-07 ENCOUNTER — TREATMENT (OUTPATIENT)
Dept: PHYSICAL THERAPY | Facility: CLINIC | Age: 86
End: 2021-09-07

## 2021-09-07 DIAGNOSIS — M25.559 PAIN, HIP: ICD-10-CM

## 2021-09-07 DIAGNOSIS — M79.605 PAIN OF LEFT LOWER EXTREMITY: Primary | ICD-10-CM

## 2021-09-07 PROCEDURE — 97110 THERAPEUTIC EXERCISES: CPT | Performed by: PHYSICAL THERAPIST

## 2021-09-07 PROCEDURE — 97140 MANUAL THERAPY 1/> REGIONS: CPT | Performed by: PHYSICAL THERAPIST

## 2021-09-07 NOTE — PROGRESS NOTES
Physical Therapy Daily Progress Note      Patient: Mikael Shanks   : 1935  Diagnosis/ICD-10 Code:  Pain of left lower extremity [M79.605]   Problems Addressed this Visit     None      Visit Diagnoses     Pain of left lower extremity    -  Primary    Pain, hip          Diagnoses       Codes Comments    Pain of left lower extremity    -  Primary ICD-10-CM: M79.605  ICD-9-CM: 729.5     Pain, hip     ICD-10-CM: M25.559  ICD-9-CM: 719.45          Referring practitioner: Xander Robison MD  Date of Initial Visit: Type: THERAPY  Noted: 2021  Today's Date: 2021    VISIT#: 7    Subjective Patient reports he did a lot of standing over the weekend and has noted an increase in the pain in his L calf.  Patient presents to therapy using SPC today.      Objective     See Exercise, Manual, and Modality Logs for complete treatment.     Assessment/Plan Patient responded well to exercise with no complaints of pain with activity.  Patient with decreased tenderness noted at L quads today.  He reported decreased pain following exercise and manual therapy today.  Plan to add mini squats at next visit for improved strengthening and stability with ambulation.     Progress per Plan of Care and Progress strengthening /stabilization /functional activity         Timed:         Manual Therapy:    14     mins  18228;     Therapeutic Exercise:    17     mins  98118;     Neuromuscular Desi:        mins  36355;    Therapeutic Activity:          mins  67250;     Gait Training:           mins  05912;     Ultrasound:          mins  76073;    Ionto                                   mins   60790  Self Care                            mins   72914    Un-Timed:  Electrical Stimulation:         mins  58824 ( );  Dry Needling          mins self-pay  Traction          mins 68110  Low Eval          Mins  13961  Mod Eval          Mins  09761  High Eval                            Mins  63392  Re-Eval                                mins  00778    Timed Treatment:   31   mins   Total Treatment:     31   mins    Natalia Wilkes, PT  Physical Therapist

## 2021-09-09 ENCOUNTER — TREATMENT (OUTPATIENT)
Dept: PHYSICAL THERAPY | Facility: CLINIC | Age: 86
End: 2021-09-09

## 2021-09-09 DIAGNOSIS — M25.559 PAIN, HIP: ICD-10-CM

## 2021-09-09 DIAGNOSIS — M79.605 PAIN OF LEFT LOWER EXTREMITY: Primary | ICD-10-CM

## 2021-09-09 PROCEDURE — 97110 THERAPEUTIC EXERCISES: CPT | Performed by: PHYSICAL THERAPIST

## 2021-09-09 PROCEDURE — 97140 MANUAL THERAPY 1/> REGIONS: CPT | Performed by: PHYSICAL THERAPIST

## 2021-09-09 NOTE — PROGRESS NOTES
Progress Note      Patient: Mikael Shanks   : 1935  Diagnosis/ICD-10 Code:  Pain of left lower extremity [M79.605]  Referring practitioner: Xander Robison MD  Date of Initial Visit: Type: THERAPY  Noted: 2021  Today's Date: 2021  Patient seen for 8 sessions      Subjective:   Mikael Shanks reports: his L thigh pain has been much better but has been experiencing intermittent sharp pain in his L calf when he steps a certain way.  He reports this typically improves for 1-2 days after his therapy sessions but then returns.   Clinical Progress: improved  Home Program Compliance: Yes  Treatment has included: therapeutic exercise, neuromuscular re-education, manual therapy and moist heat      Subjective   Objective          Active Range of Motion   Left Hip   Flexion: WFL    Right Hip   Flexion: WFL    Additional Active Range of Motion Details  L hip IR AROM 25% limited     Strength/Myotome Testing     Left Knee   Flexion: 5  Extension: 5    Right Knee   Flexion: 5  Extension: 5      Assessment/Plan     Patient has attended 8 PT sessions with steady progress towards goals.  Patient reports the pain in his L thigh has improved but has noted pain at L calf when he steps a certain way.  He has been using his SPC for ambulation at this time due to L LE feeling like it might give way when he experiences the sharp pain in his calf.  Patient demonstrates improving L hip ROM and increased L hip and knee strength noted per obj findings.  Recommending continued PT for 2x/week for up to 10 additional visits.       STG:  -Patient will report a reduction in L thigh pain by >/=20% for improved standing/ambulation tolerance in 2-3 weeks. MET   -Patient will be independent with HEP in 2 weeks. MET   -Patient will demonstrate increased L hip IR AROM by >/=25% in 3 weeks. MET     LTG:  -Patient will be able to ambulate without use of AD for return to PLOF by discharge. NOT MET, progressing   -Patient will be able to  tolerate standing/ambulation for >/=30 minutes without use of AD by discharge. NOT MET, progressing   -Patient will report min to no L thigh pain for improved standing/ambulation tolerance by discharge. NOT MET, progressing  -Patient will demonstrate increased L hip flexion to WFL without complaints of pain by discharge. MET     Progress toward previous goals: Partially Met        Recommendations: Continue as planned  Timeframe: 6 weeks  Frequency: 2 per week  Duration in visits: 10-12  Planned therapy and modality interventions: therapeutic exercise, neuromuscular re-education, manual therapy, therapeutic activity, gait training and moist heat  Prognosis to achieve goals: good    PT Signature: Natalia Wilkes PT      Based upon review of the patient's progress and continued therapy plan, it is my medical opinion that Mikael Shanks should continue physical therapy treatment at [facility]  [address] [place] [address] [phone].      Timed:         Manual Therapy:    16     mins  14588;     Therapeutic Exercise:    17     mins  00056;     Neuromuscular Desi:        mins  37148;    Therapeutic Activity:          mins  33020;     Gait Training:           mins  54250;     Ultrasound:          mins  71471;    Ionto                                   mins   10363  Self Care                            mins   67947    Un-Timed:  Electrical Stimulation:         mins  09741 ( );  Dry Needling          mins self-pay  Traction          mins 95136  Low Eval          Mins  64095  Mod Eval          Mins  11397  High Eval                            Mins  55300  Re-Eval                               mins  56180      Timed Treatment:   33   mins   Total Treatment:     33   mins

## 2021-09-14 ENCOUNTER — TREATMENT (OUTPATIENT)
Dept: PHYSICAL THERAPY | Facility: CLINIC | Age: 86
End: 2021-09-14

## 2021-09-14 DIAGNOSIS — M25.559 PAIN, HIP: ICD-10-CM

## 2021-09-14 DIAGNOSIS — M79.605 PAIN OF LEFT LOWER EXTREMITY: Primary | ICD-10-CM

## 2021-09-14 PROCEDURE — 97110 THERAPEUTIC EXERCISES: CPT | Performed by: PHYSICAL THERAPIST

## 2021-09-14 PROCEDURE — 97140 MANUAL THERAPY 1/> REGIONS: CPT | Performed by: PHYSICAL THERAPIST

## 2021-09-14 NOTE — PROGRESS NOTES
Physical Therapy Daily Progress Note      Patient: Mikael Shanks   : 1935  Diagnosis/ICD-10 Code:  Pain of left lower extremity [M79.605]   Problems Addressed this Visit     None      Visit Diagnoses     Pain of left lower extremity    -  Primary    Pain, hip          Diagnoses       Codes Comments    Pain of left lower extremity    -  Primary ICD-10-CM: M79.605  ICD-9-CM: 729.5     Pain, hip     ICD-10-CM: M25.559  ICD-9-CM: 719.45          Referring practitioner: Xander Robison MD  Date of Initial Visit: Type: THERAPY  Noted: 2021  Today's Date: 2021    VISIT#: 9    Subjective Questionnaire: 36% disability     Subjective Patient reports his calf has been feeling better with pain located in the L thigh.  Patient states he tried to take it easy over the weekend which he think helped.      Objective     See Exercise, Manual, and Modality Logs for complete treatment.     Assessment/Plan Patient continues to respond well to LLE long axis distraction with decreased pain reported after.  Progressed strengthening with no complaints of pain with standing and supine exercise, but discomfort noted at L groin and thigh with positioning of LLE onto leg press.      Progress per Plan of Care and Progress strengthening /stabilization /functional activity         Timed:         Manual Therapy:    15     mins  97955;     Therapeutic Exercise:    17     mins  89784;     Neuromuscular Dsei:        mins  77465;    Therapeutic Activity:          mins  76103;     Gait Training:           mins  03493;     Ultrasound:          mins  47659;    Ionto                                   mins   22236  Self Care                            mins   45435    Un-Timed:  Electrical Stimulation:         mins  10701 ( );  Dry Needling          mins self-pay  Traction          mins 22554  Low Eval          Mins  76418  Mod Eval          Mins  08793  High Eval                            Mins  58782  Re-Eval                                mins  64368    Timed Treatment:   32   mins   Total Treatment:     32   mins    Natalia Wilkes, PT  Physical Therapist

## 2021-09-16 ENCOUNTER — TREATMENT (OUTPATIENT)
Dept: PHYSICAL THERAPY | Facility: CLINIC | Age: 86
End: 2021-09-16

## 2021-09-16 DIAGNOSIS — M79.605 PAIN OF LEFT LOWER EXTREMITY: Primary | ICD-10-CM

## 2021-09-16 DIAGNOSIS — M25.559 PAIN, HIP: ICD-10-CM

## 2021-09-16 PROCEDURE — 97140 MANUAL THERAPY 1/> REGIONS: CPT | Performed by: PHYSICAL THERAPIST

## 2021-09-16 PROCEDURE — 97110 THERAPEUTIC EXERCISES: CPT | Performed by: PHYSICAL THERAPIST

## 2021-09-16 NOTE — PROGRESS NOTES
Physical Therapy Daily Progress Note      Patient: Mikael Shanks   : 1935  Diagnosis/ICD-10 Code:  Pain of left lower extremity [M79.605]   Problems Addressed this Visit     None      Visit Diagnoses     Pain of left lower extremity    -  Primary    Pain, hip          Diagnoses       Codes Comments    Pain of left lower extremity    -  Primary ICD-10-CM: M79.605  ICD-9-CM: 729.5     Pain, hip     ICD-10-CM: M25.559  ICD-9-CM: 719.45          Referring practitioner: Xander Robison MD  Date of Initial Visit: Type: THERAPY  Noted: 2021  Today's Date: 2021    VISIT#: 10    Subjective Patient reports his thighs are sore after last visit.  Patient states he thinks the squats made his legs sore.  He has not had any pain in his L calf recently.      Objective     See Exercise, Manual, and Modality Logs for complete treatment.     Assessment/Plan Patient tolerated exercise well without complaints of pain with activity.  Held squats this date per soreness following last visit.  Patient able to complete increased repetitions with the leg press today.  Moist heat used at end of session due to L thigh soreness.      Progress per Plan of Care and Progress strengthening /stabilization /functional activity         Timed:         Manual Therapy:    15     mins  37198;     Therapeutic Exercise:    14     mins  86653;     Neuromuscular Desi:        mins  70347;    Therapeutic Activity:          mins  06463;     Gait Training:           mins  13862;     Ultrasound:          mins  78564;    Ionto                                   mins   67240  Self Care                            mins   01619    Un-Timed:  Electrical Stimulation:         mins  48036 ( );  Dry Needling          mins self-pay  Traction          mins 92941  Low Eval          Mins  03677  Mod Eval          Mins  25367  High Eval                            Mins  05869  Re-Eval                               mins  50784    Timed Treatment:   29    mins   Total Treatment:     44   mins    Natalia Wilkes, PT  Physical Therapist

## 2021-09-21 ENCOUNTER — TREATMENT (OUTPATIENT)
Dept: PHYSICAL THERAPY | Facility: CLINIC | Age: 86
End: 2021-09-21

## 2021-09-21 DIAGNOSIS — M25.559 PAIN, HIP: ICD-10-CM

## 2021-09-21 DIAGNOSIS — M79.605 PAIN OF LEFT LOWER EXTREMITY: Primary | ICD-10-CM

## 2021-09-21 PROCEDURE — 97110 THERAPEUTIC EXERCISES: CPT | Performed by: PHYSICAL THERAPIST

## 2021-09-21 PROCEDURE — 97140 MANUAL THERAPY 1/> REGIONS: CPT | Performed by: PHYSICAL THERAPIST

## 2021-09-21 NOTE — PROGRESS NOTES
Physical Therapy Daily Progress Note      Patient: Mikael Shanks   : 1935  Diagnosis/ICD-10 Code:  Pain of left lower extremity [M79.605]   Problems Addressed this Visit     None      Visit Diagnoses     Pain of left lower extremity    -  Primary    Pain, hip          Diagnoses       Codes Comments    Pain of left lower extremity    -  Primary ICD-10-CM: M79.605  ICD-9-CM: 729.5     Pain, hip     ICD-10-CM: M25.559  ICD-9-CM: 719.45          Referring practitioner: Xander Robison MD  Date of Initial Visit: Type: THERAPY  Noted: 2021  Today's Date: 2021    VISIT#: 11    Subjective Patient reports his thigh pain has been better since his last PT session.  He reports min soreness into his anterior thigh and no pain into his calf.      Objective     See Exercise, Manual, and Modality Logs for complete treatment.     Assessment/Plan Patient tolerated exercise well with no complaints of pain.  He was able to position his LLE onto the leg press without pain into thigh or groin today.  He continues to respond well to manual lumbar distraction and LLE distraction with decreased pain and soreness after.    Progress per Plan of Care and Progress strengthening /stabilization /functional activity         Timed:         Manual Therapy:    15     mins  62699;     Therapeutic Exercise:    17     mins  38254;     Neuromuscular Desi:        mins  46345;    Therapeutic Activity:          mins  21474;     Gait Training:           mins  47649;     Ultrasound:          mins  12657;    Ionto                                   mins   98113  Self Care                            mins   00740    Un-Timed:  Electrical Stimulation:         mins  26305 ( );  Dry Needling          mins self-pay  Traction          mins 35976  Low Eval          Mins  39161  Mod Eval          Mins  40288  High Eval                            Mins  83392  Re-Eval                               mins  81361    Timed Treatment:   32   mins    Total Treatment:     42   mins    Natalia Wilkes, PT  Physical Therapist

## 2021-09-23 ENCOUNTER — TREATMENT (OUTPATIENT)
Dept: PHYSICAL THERAPY | Facility: CLINIC | Age: 86
End: 2021-09-23

## 2021-09-23 DIAGNOSIS — M25.559 PAIN, HIP: ICD-10-CM

## 2021-09-23 DIAGNOSIS — M79.605 PAIN OF LEFT LOWER EXTREMITY: Primary | ICD-10-CM

## 2021-09-23 PROCEDURE — 97140 MANUAL THERAPY 1/> REGIONS: CPT | Performed by: PHYSICAL THERAPIST

## 2021-09-23 PROCEDURE — 97110 THERAPEUTIC EXERCISES: CPT | Performed by: PHYSICAL THERAPIST

## 2021-09-23 NOTE — PROGRESS NOTES
Physical Therapy Daily Progress Note      Patient: Mikael Shanks   : 1935  Diagnosis/ICD-10 Code:  Pain of left lower extremity [M79.605]   Problems Addressed this Visit     None      Visit Diagnoses     Pain of left lower extremity    -  Primary    Pain, hip          Diagnoses       Codes Comments    Pain of left lower extremity    -  Primary ICD-10-CM: M79.605  ICD-9-CM: 729.5     Pain, hip     ICD-10-CM: M25.559  ICD-9-CM: 719.45          Referring practitioner: Xander Robison MD  Date of Initial Visit: Type: THERAPY  Noted: 2021  Today's Date: 2021    VISIT#: 12    Subjective Patient denies any anterior thigh or calf pain today but states he is experiencing some soreness into his L posterior thigh when he is up and moving.  This soreness just started this morning.      Objective     See Exercise, Manual, and Modality Logs for complete treatment.     Assessment/Plan Progressed strengthening with addition of standing hip abduction and extension.  Patient able to complete with min verbal cues and no increased pain.  Progress discussed with patient.  Plan for patient to attend 2 additional visits and then d/c with HEP for gym and an aquatic HEP.      Progress per Plan of Care and Progress strengthening /stabilization /functional activity         Timed:         Manual Therapy:    10     mins  66733;     Therapeutic Exercise:    20     mins  80823;     Neuromuscular Desi:        mins  67407;    Therapeutic Activity:          mins  94722;     Gait Training:           mins  79466;     Ultrasound:          mins  70684;    Ionto                                   mins   56232  Self Care                            mins   86796    Un-Timed:  Electrical Stimulation:         mins  24041 ( );  Dry Needling          mins self-pay  Traction          mins 42520  Low Eval          Mins  53538  Mod Eval          Mins  14905  High Eval                            Mins  67823  Re-Eval                                mins  17323    Timed Treatment:   30   mins   Total Treatment:     40   mins    Natalia Wilkes, PT  Physical Therapist

## 2021-09-28 ENCOUNTER — TREATMENT (OUTPATIENT)
Dept: PHYSICAL THERAPY | Facility: CLINIC | Age: 86
End: 2021-09-28

## 2021-09-28 DIAGNOSIS — M79.605 PAIN OF LEFT LOWER EXTREMITY: Primary | ICD-10-CM

## 2021-09-28 DIAGNOSIS — M25.559 PAIN, HIP: ICD-10-CM

## 2021-09-28 PROCEDURE — 97110 THERAPEUTIC EXERCISES: CPT | Performed by: PHYSICAL THERAPIST

## 2021-09-28 PROCEDURE — 97140 MANUAL THERAPY 1/> REGIONS: CPT | Performed by: PHYSICAL THERAPIST

## 2021-09-28 NOTE — PROGRESS NOTES
Physical Therapy Daily Progress Note      Patient: Mikael Shanks   : 1935  Diagnosis/ICD-10 Code:  Pain of left lower extremity [M79.605]   Problems Addressed this Visit     None      Visit Diagnoses     Pain of left lower extremity    -  Primary    Pain, hip          Diagnoses       Codes Comments    Pain of left lower extremity    -  Primary ICD-10-CM: M79.605  ICD-9-CM: 729.5     Pain, hip     ICD-10-CM: M25.559  ICD-9-CM: 719.45          Referring practitioner: Xander Robison MD  Date of Initial Visit: Type: THERAPY  Noted: 2021  Today's Date: 2021    VISIT#: 13    Subjective Patient states his inner L thigh is sore today but only when he is walking.  He reports he would like to learn some exercises that he can do at the [facility] once he is finished with therapy.      Objective     See Exercise, Manual, and Modality Logs for complete treatment.     Assessment/Plan Progressed exercise with addition of standing hip abduction and extension with patient reporting a pulling sensation at L inner thigh when in L SLS.  Patient reported no complaints of pain with supine exercise this date.  Decreased pain also reported with LLE long axis distraction.  Progress discussed with patient.  Plan to for patient to d/c with HEP (pool and gym equipment) at next visit.    Anticipate DC next Visit         Timed:         Manual Therapy:    8     mins  33131;     Therapeutic Exercise:    22     mins  67820;     Neuromuscular Desi:        mins  07006;    Therapeutic Activity:          mins  17670;     Gait Training:           mins  58188;     Ultrasound:          mins  22837;    Ionto                                   mins   94650  Self Care                            mins   33696    Un-Timed:  Electrical Stimulation:         mins  12225 ( );  Dry Needling          mins self-pay  Traction          mins 92457  Low Eval          Mins  26843  Mod Eval          Mins  35839  High Eval                             Mins  50135  Re-Eval                               mins  35800    Timed Treatment:   30   mins   Total Treatment:     45   mins    Natalia Wilkes, PT  Physical Therapist

## 2021-09-30 ENCOUNTER — TREATMENT (OUTPATIENT)
Dept: PHYSICAL THERAPY | Facility: CLINIC | Age: 86
End: 2021-09-30

## 2021-09-30 DIAGNOSIS — M79.605 PAIN OF LEFT LOWER EXTREMITY: Primary | ICD-10-CM

## 2021-09-30 DIAGNOSIS — M25.559 PAIN, HIP: ICD-10-CM

## 2021-09-30 PROCEDURE — 97140 MANUAL THERAPY 1/> REGIONS: CPT | Performed by: PHYSICAL THERAPIST

## 2021-09-30 PROCEDURE — 97110 THERAPEUTIC EXERCISES: CPT | Performed by: PHYSICAL THERAPIST

## 2021-09-30 NOTE — PROGRESS NOTES
Physical Therapy Daily Progress Note      Patient: Mikael Shanks   : 1935  Diagnosis/ICD-10 Code:  Pain of left lower extremity [M79.605]   Problems Addressed this Visit     None      Visit Diagnoses     Pain of left lower extremity    -  Primary    Pain, hip          Diagnoses       Codes Comments    Pain of left lower extremity    -  Primary ICD-10-CM: M79.605  ICD-9-CM: 729.5     Pain, hip     ICD-10-CM: M25.559  ICD-9-CM: 719.45          Referring practitioner: Xander Robison MD  Date of Initial Visit: Type: THERAPY  Noted: 2021  Today's Date: 2021    VISIT#: 14    Subjective Patient continues to report intermittent L thigh pain but reports his pain has significantly improved.  He reports he plans to get a [employer] membership to continue his exercise.       Objective   L hip flexion AROM WFL  L hip flexion strength 4+/5  L BESSIE and hip scour positive  See Exercise, Manual, and Modality Logs for complete treatment.     Assessment/Plan     STG:  -Patient will report a reduction in L thigh pain by >/=20% for improved standing/ambulation tolerance in 2-3 weeks. MET   -Patient will be independent with HEP in 2 weeks. MET   -Patient will demonstrate increased L hip IR AROM by >/=25% in 3 weeks. MET     LTG:  -Patient will be able to ambulate without use of AD for return to PLOF by discharge. NOT MET, progressing   -Patient will be able to tolerate standing/ambulation for >/=30 minutes without use of AD by discharge. NOT MET, progressing  -Patient will report min to no L thigh pain for improved standing/ambulation tolerance by discharge. MET   -Patient will demonstrate increased L hip flexion to WFL without complaints of pain by discharge. MET     Patient has attended 14 PT sessions with good progress towards goals.  Patient demonstrates improved L hip flexion AROM and strength, as well as improved pain levels.  Patient has been able to tolerate standing/ambulation for >30 minutes but requires  use of SPC due to thigh pain with weight bearing.  Patient continues to demonstrate a positive BESSIE and hip scour test of the L hip indicating hip involvement versus low back.  He also continues to report decreased pain with long axis distraction.  Patient voices readiness for transition to HEP at this time due to good progress.  Therapist reviewed gym and pool HEP with appropriate technique noted.  D/C with HEP at this time.    Plan to DC with HEP         Timed:         Manual Therapy:    10     mins  83718;     Therapeutic Exercise:    20     mins  39670;     Neuromuscular Desi:        mins  57427;    Therapeutic Activity:          mins  17631;     Gait Training:           mins  78835;     Ultrasound:          mins  60449;    Ionto                                   mins   82280  Self Care                            mins   61360    Un-Timed:  Electrical Stimulation:         mins  78742 ( );  Dry Needling          mins self-pay  Traction          mins 90131  Low Eval          Mins  61312  Mod Eval          Mins  54568  High Eval                            Mins  80711  Re-Eval                               mins  67239    Timed Treatment:   30   mins   Total Treatment:     40   mins    Natalia Wilkes PT  Physical Therapist    Discharge Summary  Discharge Summary from Physical Therapy Report      Dates  PT visit: 8/4/21 - 9/30/21  Number of Visits: 14     Discharge Status of Patient: See MD Note dated 9/30/21    Goals: Partially Met    Discharge Plan: Continue with current home exercise program as instructed    Comments See above assessment    Date of Discharge 9/30/21        Natalia Wilkes PT  Physical Therapist

## 2021-10-01 DIAGNOSIS — F32.A DEPRESSION, UNSPECIFIED DEPRESSION TYPE: ICD-10-CM

## 2021-10-13 ENCOUNTER — OFFICE VISIT (OUTPATIENT)
Dept: FAMILY MEDICINE CLINIC | Facility: CLINIC | Age: 86
End: 2021-10-13

## 2021-10-13 VITALS
WEIGHT: 185.6 LBS | DIASTOLIC BLOOD PRESSURE: 74 MMHG | HEIGHT: 71 IN | BODY MASS INDEX: 25.98 KG/M2 | HEART RATE: 71 BPM | SYSTOLIC BLOOD PRESSURE: 163 MMHG | TEMPERATURE: 97.5 F | OXYGEN SATURATION: 95 % | RESPIRATION RATE: 18 BRPM

## 2021-10-13 DIAGNOSIS — E11.9 TYPE 2 DIABETES MELLITUS WITHOUT COMPLICATION, WITHOUT LONG-TERM CURRENT USE OF INSULIN (HCC): Primary | ICD-10-CM

## 2021-10-13 DIAGNOSIS — E78.2 MIXED HYPERLIPIDEMIA: ICD-10-CM

## 2021-10-13 DIAGNOSIS — Z00.00 MEDICARE ANNUAL WELLNESS VISIT, SUBSEQUENT: ICD-10-CM

## 2021-10-13 DIAGNOSIS — Z23 NEED FOR INFLUENZA VACCINATION: ICD-10-CM

## 2021-10-13 DIAGNOSIS — E83.42 HYPOMAGNESEMIA: ICD-10-CM

## 2021-10-13 DIAGNOSIS — I10 HYPERTENSION, BENIGN: ICD-10-CM

## 2021-10-13 DIAGNOSIS — D64.9 ANEMIA, UNSPECIFIED TYPE: ICD-10-CM

## 2021-10-13 DIAGNOSIS — R35.1 NOCTURIA: ICD-10-CM

## 2021-10-13 DIAGNOSIS — E55.9 VITAMIN D DEFICIENCY: ICD-10-CM

## 2021-10-13 DIAGNOSIS — Z23 NEED FOR 23-POLYVALENT PNEUMOCOCCAL POLYSACCHARIDE VACCINE: ICD-10-CM

## 2021-10-13 DIAGNOSIS — I65.23 BILATERAL CAROTID ARTERY STENOSIS: ICD-10-CM

## 2021-10-13 PROCEDURE — 99214 OFFICE O/P EST MOD 30 MIN: CPT | Performed by: FAMILY MEDICINE

## 2021-10-13 PROCEDURE — 1159F MED LIST DOCD IN RCRD: CPT | Performed by: FAMILY MEDICINE

## 2021-10-13 PROCEDURE — G0009 ADMIN PNEUMOCOCCAL VACCINE: HCPCS | Performed by: FAMILY MEDICINE

## 2021-10-13 PROCEDURE — 90653 IIV ADJUVANT VACCINE IM: CPT | Performed by: FAMILY MEDICINE

## 2021-10-13 PROCEDURE — 1126F AMNT PAIN NOTED NONE PRSNT: CPT | Performed by: FAMILY MEDICINE

## 2021-10-13 PROCEDURE — 90732 PPSV23 VACC 2 YRS+ SUBQ/IM: CPT | Performed by: FAMILY MEDICINE

## 2021-10-13 PROCEDURE — 1170F FXNL STATUS ASSESSED: CPT | Performed by: FAMILY MEDICINE

## 2021-10-13 PROCEDURE — 99497 ADVNCD CARE PLAN 30 MIN: CPT | Performed by: FAMILY MEDICINE

## 2021-10-13 PROCEDURE — G0439 PPPS, SUBSEQ VISIT: HCPCS | Performed by: FAMILY MEDICINE

## 2021-10-13 PROCEDURE — G0008 ADMIN INFLUENZA VIRUS VAC: HCPCS | Performed by: FAMILY MEDICINE

## 2021-10-13 NOTE — ASSESSMENT & PLAN NOTE
Microalbumin order given for labs.  Monofilament foot exam was done and was WNL. No ulcers seen on the feet. Eye exam up to date by Dr. Alvarenga  Patient encouraged to continue checking blood sugar daily.  Blood sugars reviewed and scanned to chart encourged to watch sugar intake, exercise more, lose weight.  Labs drawn today and will discuss results at next visit.  Patient tolerates glipizide well without side effects.  Benefits of drugs outweigh the risk

## 2021-10-13 NOTE — PROGRESS NOTES
"Subjective   Mikael Shanks is a 86 y.o. male here for his annual physical with me. Mikael is here for coordination of medical care, to discuss health maintenance, disease prevention as well as to followup on medical problems. Patient has been followed by me since ***. Patient's last CPE was ***. Activity level is {desc; exercise frequency:45891}. Exercises {NUMBERS 0-7:36218} per week. Appetite is {Good Fair Poor:95269}. Feels {DESC; WELL/FAIRLY WELL/POORLY:99144} with {Desc; few/many/moderate amount:04256} complaints. Energy level is {Good Fair Poor:50087}. Sleeps {DESC; WELL/FAIRLY WELL/POORLY:13597}. Patient's last colonoscopy was ***. He is advised to repeat in {#:48311}. Patient is doing routine self skin exam {monthly:95576}. Patient is doing routine self-breast exams {monthly:57869}. Patient is checking testicles {monthly:44322}.    Lab Results   Component Value Date    PSA 0.8 12/15/2016        History of Present Illness     The following portions of the patient's history were reviewed and updated as appropriate: {history reviewed:::\"allergies\",\"current medications\",\"past family history\",\"past medical history\",\"past social history\",\"past surgical history\",\"problem list\"}.    Past Medical History:   Diagnosis Date   • Carotid artery disease (CMS/HCC)    • GERD (gastroesophageal reflux disease)    • Osteoarthritis    • Prostatic hypertrophy    • Pulmonary valve disorder    • Sleep apnea        Family History   Problem Relation Age of Onset   • Cancer Mother    • Heart disease Father    • Cancer Brother        Social History     Socioeconomic History   • Marital status:    Tobacco Use   • Smoking status: Former Smoker     Packs/day: 5.00     Types: Cigarettes     Start date: 1953     Quit date: 1960     Years since quittin.1   • Smokeless tobacco: Never Used   Vaping Use   • Vaping Use: Never used   Substance and Sexual Activity   • Alcohol use: Not Currently     Comment: very rare "   • Drug use: No   • Sexual activity: Never       Social History     Social History Narrative     3 x.  First wife  for 17 years have 5 children,  to 2nd wife for 11 years, they had 1 child together, 3rd wife  for 16 years she passed in 2003 from breast cancer.  He has been with his girlfriend Debra since 2004, she is in bad health with lung disease.  Retired from [employer] in 1995.  Caffeine none, Always wears seatbelts.  Hobbies TV.  Exercise none.        Past Surgical History:   Procedure Laterality Date   • BACK SURGERY     • CARDIAC CATHETERIZATION N/A 12/5/2019    Procedure: Left Heart Cath and coronary angiogram;  Surgeon: Dayday Ruiz MD;  Location: [facility] CATH INVASIVE LOCATION;  Service: Cardiovascular   • CARDIAC CATHETERIZATION N/A 12/5/2019    Procedure: Right and Left Heart Cath;  Surgeon: Dayday Ruiz MD;  Location: [facility] CATH INVASIVE LOCATION;  Service: Cardiovascular   • CARDIAC CATHETERIZATION N/A 9/4/2020    Procedure: Left and Right Heart Cath with Coronary Angiography;  Surgeon: Dayday Ruiz MD;  Location: [facility] CATH INVASIVE LOCATION;  Service: Cardiovascular;  Laterality: N/A;   • CAROTID ENDARTERECTOMY Left    • CHOLECYSTECTOMY     • COLONOSCOPY  08/14/2018    No repeat   • CORONARY ANGIOPLASTY WITH STENT PLACEMENT         Current Outpatient Medications on File Prior to Visit   Medication Sig Dispense Refill   • acetaminophen-codeine (TYLENOL #3) 300-30 MG per tablet Take 1 tablet by mouth Every 6 (Six) Hours As Needed for Moderate Pain . 40 tablet 0   • aspirin 81 MG tablet Take 81 mg by mouth Daily.     • atorvastatin (LIPITOR) 80 MG tablet TAKE 1 TABLET BY MOUTH AT  NIGHT 90 tablet 3   • Blood Glucose Monitoring Suppl (ONE TOUCH ULTRA 2) w/Device kit 1 Device Daily. Test 1 x daily 1 each 0   • cholecalciferol (VITAMIN D3) 10 MCG (400 UNIT) tablet Take 400 Units by mouth Daily.     • finasteride (PROSCAR) 5 MG tablet TAKE 1 TABLET BY MOUTH   DAILY 90 tablet 3   • furosemide (LASIX) 20 MG tablet TAKE 1 TABLET BY MOUTH  TWICE DAILY 180 tablet 3   • glipizide (glipiZIDE XL) 10 MG 24 hr tablet Take 1 tablet by mouth Daily. 90 tablet 3   • glucose blood (ONE TOUCH ULTRA TEST) test strip Test 1 x daily 100 each 5   • meloxicam (MOBIC) 15 MG tablet Take 1 tablet by mouth Daily. 30 tablet 12   • metoprolol succinate XL (TOPROL-XL) 25 MG 24 hr tablet TAKE 1 TABLET BY MOUTH  DAILY 90 tablet 3   • multivitamin with minerals (RA VISION-DIANA PRESERVE PO) Take 1 tablet by mouth Daily.     • omeprazole (priLOSEC) 40 MG capsule TAKE 1 CAPSULE BY MOUTH  DAILY 90 capsule 3   • OneTouch Delica Lancets 33G misc 1 strip Daily. Test 1 x daily 100 each 5   • sertraline (ZOLOFT) 50 MG tablet TAKE 1 TABLET BY MOUTH  DAILY 90 tablet 0   • tamsulosin (FLOMAX) 0.4 MG capsule 24 hr capsule Take 1 capsule by mouth Daily.       No current facility-administered medications on file prior to visit.       Allergies   Allergen Reactions   • Penicillins Hives       Review of Systems   Constitutional: Negative for appetite change, chills, fatigue, fever, unexpected weight gain and unexpected weight loss.   HENT: Negative for congestion, dental problem, ear discharge, ear pain, hearing loss, nosebleeds, postnasal drip, rhinorrhea, sinus pressure, sneezing, sore throat, tinnitus and voice change.         Last dental exam   Eyes: Negative for blurred vision, double vision, pain, redness and visual disturbance.        Last vision exam   Respiratory: Negative for cough, shortness of breath, wheezing and stridor.    Cardiovascular: Negative for chest pain, palpitations and leg swelling.   Gastrointestinal: Negative for abdominal pain, anal bleeding, blood in stool, constipation, diarrhea, nausea, rectal pain, vomiting, GERD and indigestion.        BM weekly   Endocrine: Negative for cold intolerance, heat intolerance, polydipsia, polyphagia and polyuria.        Sex drive  He is a  She is a    Genitourinary: Negative for difficulty urinating, dysuria, frequency, hematuria and urgency.   Musculoskeletal: Negative for back pain, joint swelling, myalgias, neck pain and neck stiffness.   Skin: Negative for color change, dry skin and rash.   Neurological: Negative for dizziness, syncope, speech difficulty, weakness, light-headedness, headache and memory problem.   Hematological: Does not bruise/bleed easily.   Psychiatric/Behavioral: Negative for decreased concentration, sleep disturbance, depressed mood and stress. The patient is not nervous/anxious.        Objective   There were no vitals taken for this visit.  Physical Exam    Assessment/Plan   Problem List Items Addressed This Visit        Cardiac and Vasculature    PAC (premature atrial contraction)    Atherosclerosis of native coronary artery of native heart without angina pectoris    Hypertension, benign    Asymptomatic peripheral vascular disease (HCC) - Primary    Sinus pause    Pulmonary valve disorder    Stenosis of coronary artery stent    Overview     Added automatically from request for surgery [id number]         Mixed hyperlipidemia    Ischemic cardiomyopathy    Overview     Very mild with cardiac cath done September 4, 2020 showing normal ejection fraction right coronary artery stent is patent with 20% blockage left anterior descending has luminal irregularities.         Chronic systolic congestive heart failure (HCC)    Carotid artery occlusion    Carotid artery disease (HCC)    Overview     Formatting of this note might be different from the original.  Status post left CEA         Mitral valve insufficiency       ENT    Hearing loss       Endocrine and Metabolic    Type 2 diabetes mellitus without complication, without long-term current use of insulin (HCC)    Vitamin D deficiency    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HCC)       Family History    Family history of colon cancer       Gastrointestinal Abdominal     Gastritis and gastroduodenitis     Overview     EGD done in August 2018 by Dr. Peraza showed a small hiatal hernia otherwise was normal esophagus.  Stomach did show some mild erythema congested consistent with gastritis            Genitourinary and Reproductive     Stage 3a chronic kidney disease (CMS/HCC)    Prostatic hypertrophy    Hypomagnesemia    Hematuria    Proteinuria    Benign prostatic hyperplasia       Health Encounters    Encounter for general adult medical examination with abnormal findings    Overview     Medical records thoroughly reviewed and summarized in emr, since last PE              Hematology and Neoplasia    Anemia    Pernicious anemia       Mental Health    Depression    Anxiety disorder, unspecified    Grief at loss of child       Musculoskeletal and Injuries    Osteoarthritis    Dupuytren's contracture of both hands    Neurogenic claudication (HCC)    Left hip pain    Muscle pain       Neuro    Spinal stenosis of lumbar region    RESOLVED: Lumbar radiculopathy       Pulmonary and Pneumonias    Shortness of breath       Sleep    BILL (obstructive sleep apnea) (Chronic)       Symptoms and Signs    Lethargy    Paresthesia       Other    Need for immunization against influenza    Type 2 diabetes mellitus with stage 3a chronic kidney disease, without long-term current use of insulin ([facility])               Encouraged to check his skin, testicles and breasts monthly. Reviewed {GEPREVENTIVE:94794}.

## 2021-10-13 NOTE — PROGRESS NOTES
QUICK REFERENCE INFORMATION:  The ABCs of the Annual Wellness Visit    Medicare visit type: Subsequent     HEALTH RISK ASSESSMENT    : 1935    Recent Hospitalizations:  No hospitalization(s) within the last year..    Current Medical Providers:  Patient Care Team:  Xander Robison MD as PCP - General  Delio Lund MD as Consulting Physician (Nephrology)  Dayday Ruiz MD as Consulting Physician (Cardiology)   Dr. Dumont-Eye    Smoking Status:  Social History     Tobacco Use   Smoking Status Former Smoker   • Packs/day: 5.00   • Types: Cigarettes   • Start date: 1953   • Quit date: 1960   • Years since quittin.1   Smokeless Tobacco Never Used       Alcohol Consumption:  Social History     Substance and Sexual Activity   Alcohol Use Not Currently    Comment: very rare       Depression Screen:   PHQ-9 Depression Screening  Little interest or pleasure in doing things? 0   Feeling down, depressed, or hopeless? 0   Trouble falling or staying asleep, or sleeping too much?     Feeling tired or having little energy?     Poor appetite or overeating?     Feeling bad about yourself - or that you are a failure or have let yourself or your family down?     Trouble concentrating on things, such as reading the newspaper or watching television?     Moving or speaking so slowly that other people could have noticed? Or the opposite - being so fidgety or restless that you have been moving around a lot more than usual?     Thoughts that you would be better off dead, or of hurting yourself in some way?     PHQ-9 Total Score 0   If you checked off any problems, how difficult have these problems made it for you to do your work, take care of things at home, or get along with other people?        PHQ-2/PHQ-9 Depression Screening 10/13/2021   Little interest or pleasure in doing things 0   Feeling down, depressed, or hopeless 0   Total Score 0     We spent 7 min  asking patient questions, counseling  and documenting in the chart patients risk of depression.    Health Habits and Functional and Cognitive Screening:  Functional & Cognitive Status 10/13/2021   Do you have difficulty preparing food and eating? No   Do you have difficulty bathing yourself, getting dressed or grooming yourself? No   Do you have difficulty using the toilet? No   Do you have difficulty moving around from place to place? No   Do you have trouble with steps or getting out of a bed or a chair? No   Current Diet Well Balanced Diet   Dental Exam Not up to date   Eye Exam Up to date   Exercise (times per week) 7 times per week   Current Exercises Include Swimming;Stationary Bicycling/Spin Class   Current Exercise Activities Include -   Do you need help using the phone?  No   Are you deaf or do you have serious difficulty hearing?  Yes   Do you need help with transportation? No   Do you need help shopping? No   Do you need help preparing meals?  No   Do you need help with housework?  No   Do you need help with laundry? No   Do you need help taking your medications? No   Do you need help managing money? No   Do you ever drive or ride in a car without wearing a seat belt? No   Have you felt unusual stress, anger or loneliness in the last month? No   Who do you live with? Alone   If you need help, do you have trouble finding someone available to you? No   Have you been bothered in the last four weeks by sexual problems? No   Do you have difficulty concentrating, remembering or making decisions? No       Does the patient have evidence of cognitive impairment? Yes    Asiprin use counseling: Taking ASA appropriately as indicated    Recent Lab Results:    Lab Results   Component Value Date     (H) 08/31/2020     Lab Results   Component Value Date    HGBA1C 7.0 06/30/2021     Lab Results   Component Value Date    CHOL 140 08/31/2020    TRIG 295 (H) 08/31/2020    HDL 34 (L) 08/31/2020    VLDL 59 (H) 08/31/2020    LDLHDL 1.38 08/31/2020        Age-appropriate Screening Schedule:  Refer to the list below for future screening recommendations based on patient's age, sex and/or medical conditions. Orders for these recommended tests are listed in the plan section. The patient has been provided with a written plan.    Health Maintenance   Topic Date Due   • LIPID PANEL  08/31/2021   • URINE MICROALBUMIN  08/31/2021   • DIABETIC EYE EXAM  10/15/2021   • HEMOGLOBIN A1C  12/30/2021   • TDAP/TD VACCINES (2 - Tdap) 05/13/2022   • INFLUENZA VACCINE  Completed   • ZOSTER VACCINE  Completed   Colonoscopy done 2018, per GI no repeat colonoscopy due to age.    Immunizations reviewed and the patient was encouraged to update.  The patient agrees to Flu shot and Prevnar 13.       Subjective   History of Present Illness    Mikael Shanks is a 86 y.o. male who presents for an Annual Wellness Visit.  Hypertension  This is a chronic problem. The current episode started more than 1 year ago. The problem is unchanged. The problem is controlled. Pertinent negatives include no chest pain, palpitations or shortness of breath. Risk factors for coronary artery disease include diabetes mellitus and dyslipidemia. The current treatment provides significant improvement. There are no compliance problems.    Diabetes  He presents for his follow-up diabetic visit. He has type 2 diabetes mellitus. His disease course has been stable. There are no hypoglycemic associated symptoms. There are no diabetic associated symptoms. Pertinent negatives for diabetes include no chest pain and no fatigue (mild). There are no hypoglycemic complications. Symptoms are resolved. There are no diabetic complications. Risk factors for coronary artery disease include dyslipidemia, diabetes mellitus and hypertension. He is compliant with treatment all of the time. He participates in exercise daily. He does not see a podiatrist.Eye exam is current.       The following portions of the patient's history were  reviewed and updated as appropriate: allergies, current medications, past family history, past medical history, past social history, past surgical history and problem list.    Outpatient Medications Prior to Visit   Medication Sig Dispense Refill   • aspirin 81 MG tablet Take 81 mg by mouth Daily.     • atorvastatin (LIPITOR) 80 MG tablet TAKE 1 TABLET BY MOUTH AT  NIGHT 90 tablet 3   • Blood Glucose Monitoring Suppl (ONE TOUCH ULTRA 2) w/Device kit 1 Device Daily. Test 1 x daily 1 each 0   • cholecalciferol (VITAMIN D3) 10 MCG (400 UNIT) tablet Take 400 Units by mouth Daily.     • finasteride (PROSCAR) 5 MG tablet TAKE 1 TABLET BY MOUTH  DAILY 90 tablet 3   • furosemide (LASIX) 20 MG tablet TAKE 1 TABLET BY MOUTH  TWICE DAILY 180 tablet 3   • glipizide (glipiZIDE XL) 10 MG 24 hr tablet Take 1 tablet by mouth Daily. 90 tablet 3   • glucose blood (ONE TOUCH ULTRA TEST) test strip Test 1 x daily 100 each 5   • metoprolol succinate XL (TOPROL-XL) 25 MG 24 hr tablet TAKE 1 TABLET BY MOUTH  DAILY 90 tablet 3   • multivitamin with minerals (RA VISION-DIANA PRESERVE PO) Take 1 tablet by mouth Daily.     • omeprazole (priLOSEC) 40 MG capsule TAKE 1 CAPSULE BY MOUTH  DAILY 90 capsule 3   • OneTouch Delica Lancets 33G misc 1 strip Daily. Test 1 x daily 100 each 5   • sertraline (ZOLOFT) 50 MG tablet TAKE 1 TABLET BY MOUTH  DAILY 90 tablet 0   • acetaminophen-codeine (TYLENOL #3) 300-30 MG per tablet Take 1 tablet by mouth Every 6 (Six) Hours As Needed for Moderate Pain . 40 tablet 0   • meloxicam (MOBIC) 15 MG tablet Take 1 tablet by mouth Daily. 30 tablet 12   • tamsulosin (FLOMAX) 0.4 MG capsule 24 hr capsule Take 1 capsule by mouth Daily.       No facility-administered medications prior to visit.       Patient Active Problem List   Diagnosis   • Type 2 diabetes mellitus without complication, without long-term current use of insulin (HCC)   • PAC (premature atrial contraction)   • Vitamin D deficiency   • Gastritis and  gastroduodenitis   • Atherosclerosis of native coronary artery of native heart without angina pectoris   • Stage 3a chronic kidney disease (CMS/HCC)   • Hypertension, benign   • Spinal stenosis of lumbar region   • Anemia   • Asymptomatic peripheral vascular disease (HCC)   • Osteoarthritis   • Prostatic hypertrophy   • Pernicious anemia   • Hypomagnesemia   • Depression   • Dupuytren's contracture of both hands   • Sinus pause   • Pulmonary valve disorder   • Hearing loss   • Family history of colon cancer   • Shortness of breath   • BILL (obstructive sleep apnea)   • Stenosis of coronary artery stent   • Mixed hyperlipidemia   • Lethargy   • Anxiety disorder, unspecified   • Grief at loss of child   • Ischemic cardiomyopathy   • Hematuria   • Neurogenic claudication (HCC)   • Proteinuria   • Encounter for general adult medical examination with abnormal findings   • Need for influenza vaccination   • Chronic systolic congestive heart failure (HCC)   • Carotid artery occlusion   • 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 Patient has an advance directive in EMR which is still valid.     Discussion with Patientregarding advanced directives. POST form discussed at length and reviewed with patient. Patient states he does not want CPR. Reviewed medical interventions with patient and the differences between each: Comfort, Limited and Full. Patient opted for Limited. Discussed the use of antibiotics at the end of life. He chose to use antibiotics consistent with treatment goals. Discussed artificially administered nutrition,  patient is aware that if he is alert and oriented they can change their mind at any time. However, they have elected to have no artificial nutrition. Patient has identified his brother Tamra Shanks as his healthcare representative. Patient encouraged to have a family meeting to discuss his/her decision regarding advanced care directives and goals of care.    In regard to the POST form:The patient opted to complete POST while in the office and copy scanned into the chart.  We spent 17 minutes discussing Advanced Care Planning information and documenting in the chart.    Identification of Risk Factors:  Risk factors include: Advance Directive Discussion.    Review of Systems   Constitutional: Negative for fatigue (mild).   HENT: Positive for hearing loss.    Respiratory: Negative for shortness of breath.    Cardiovascular: Negative for chest pain and palpitations.   Genitourinary: Positive for nocturia (5x nightly). Negative for frequency.        Nocturia   Musculoskeletal: Negative for arthralgias and joint swelling.   Neurological: Negative for memory problem.       Compared to one year ago, the patient feels his physical health is worse.  Compared to one year ago, the patient feels his mental health is the same.    Objective     Physical Exam  Vitals and nursing note reviewed.   Constitutional:       Appearance: He is well-developed.   HENT:      Head: Normocephalic.   Neck:      Thyroid: No thyromegaly.      Vascular: No carotid bruit.      Trachea: Trachea normal.   Cardiovascular:      Rate and Rhythm: Normal rate and regular rhythm.      Heart sounds: No murmur heard.  No friction rub. No gallop.    Pulmonary:      Effort: Pulmonary effort is normal. No respiratory distress.      Breath sounds: Normal breath sounds. No wheezing.   Chest:      Chest wall: No tenderness.   Musculoskeletal:      Cervical back: Neck supple.   Skin:     General: Skin is dry.      Findings: No rash.      Nails: There is no clubbing.  "  Neurological:      Mental Status: He is alert and oriented to person, place, and time.   Psychiatric:         Behavior: Behavior is cooperative.         Vitals:    10/13/21 1434   BP: 163/74   BP Location: Right arm   Patient Position: Sitting   Cuff Size: Adult   Pulse: 71   Resp: 18   Temp: 97.5 °F (36.4 °C)   TempSrc: Temporal   SpO2: 95%   Weight: 84.2 kg (185 lb 9.6 oz)   Height: 180.3 cm (71\")   PainSc: 0-No pain       Patient's Body mass index is 25.89 kg/m². indicating that he is overweight (BMI 25-29.9). Obesity-related health conditions include the following: hypertension and dyslipidemias. Obesity is unchanged. BMI is is above average; BMI management plan is completed. We discussed portion control and increasing exercise..      Assessment/Plan   Patient Self-Management and Personalized Health Advice  The patient has been provided with information about: designing advance directives and preventive services including:   · Annual Wellness Visit (AWV).    Visit Diagnoses:  Problem List Items Addressed This Visit        Medium    Anemia    Current Assessment & Plan     Patient given order for fasting labs.         Relevant Orders    CBC & Differential    Hypertension, benign    Current Assessment & Plan     Borderline poor control.  Patient tolerates metoprolol well without side effects I feel the benefits of the drug outweigh the risk.  Encouraged to watch salt, exercise more and lose weight.  If no improvement may need to increase or add drugs.         Mixed hyperlipidemia    Current Assessment & Plan     Patient given order for fasting labs.         Relevant Orders    Lipid Panel With / Chol / HDL Ratio    Comprehensive metabolic panel    Type 2 diabetes mellitus without complication, without long-term current use of insulin (HCC) - Primary    Current Assessment & Plan     Microalbumin order given for labs.  Monofilament foot exam was done and was WNL. No ulcers seen on the feet. Eye exam up to date by " Dr. Alvarenga  Patient encouraged to continue checking blood sugar daily.  Blood sugars reviewed and scanned to chart encourged to watch sugar intake, exercise more, lose weight.  Labs drawn today and will discuss results at next visit.  Patient tolerates glipizide well without side effects.  Benefits of drugs outweigh the risk         Relevant Orders    Hemoglobin A1c    Microalbumin / Creatinine Urine Ratio - Urine, Clean Catch       Low    Carotid artery disease (HCC)    Overview     Last carotid scan reviewed from May 2011 showed mild to moderate on the right and moderate to severe on the left         Current Assessment & Plan     Pt. Declines repeat carotid U/S         Hypomagnesemia    Relevant Orders    Magnesium    Vitamin D deficiency    Current Assessment & Plan     Patient given order for fasting labs.         Relevant Orders    Vitamin D 25 hydroxy       Unprioritized    Medicare annual wellness visit, subsequent    Current Assessment & Plan     POST done today         Need for influenza vaccination    Relevant Orders    Fluad Tri 65yr+ (Completed)    Nocturia    Current Assessment & Plan     Pt. Given order for U/A         Relevant Orders    Urinalysis without microscopic (no culture) - Urine, Clean Catch          Outpatient Encounter Medications as of 10/13/2021   Medication Sig Dispense Refill   • aspirin 81 MG tablet Take 81 mg by mouth Daily.     • atorvastatin (LIPITOR) 80 MG tablet TAKE 1 TABLET BY MOUTH AT  NIGHT 90 tablet 3   • Blood Glucose Monitoring Suppl (ONE TOUCH ULTRA 2) w/Device kit 1 Device Daily. Test 1 x daily 1 each 0   • cholecalciferol (VITAMIN D3) 10 MCG (400 UNIT) tablet Take 400 Units by mouth Daily.     • finasteride (PROSCAR) 5 MG tablet TAKE 1 TABLET BY MOUTH  DAILY 90 tablet 3   • furosemide (LASIX) 20 MG tablet TAKE 1 TABLET BY MOUTH  TWICE DAILY 180 tablet 3   • glipizide (glipiZIDE XL) 10 MG 24 hr tablet Take 1 tablet by mouth Daily. 90 tablet 3   • glucose blood (ONE TOUCH  ULTRA TEST) test strip Test 1 x daily 100 each 5   • metoprolol succinate XL (TOPROL-XL) 25 MG 24 hr tablet TAKE 1 TABLET BY MOUTH  DAILY 90 tablet 3   • multivitamin with minerals (RA VISION-DIANA PRESERVE PO) Take 1 tablet by mouth Daily.     • omeprazole (priLOSEC) 40 MG capsule TAKE 1 CAPSULE BY MOUTH  DAILY 90 capsule 3   • OneTouch Delica Lancets 33G misc 1 strip Daily. Test 1 x daily 100 each 5   • sertraline (ZOLOFT) 50 MG tablet TAKE 1 TABLET BY MOUTH  DAILY 90 tablet 0   • acetaminophen-codeine (TYLENOL #3) 300-30 MG per tablet Take 1 tablet by mouth Every 6 (Six) Hours As Needed for Moderate Pain . 40 tablet 0   • meloxicam (MOBIC) 15 MG tablet Take 1 tablet by mouth Daily. 30 tablet 12   • tamsulosin (FLOMAX) 0.4 MG capsule 24 hr capsule Take 1 capsule by mouth Daily.       No facility-administered encounter medications on file as of 10/13/2021.       Reviewed use of high risk medication in the elderly: yes  Reviewed for potential of harmful drug interactions in the elderly: yes    Follow Up:  No follow-ups on file.     An After Visit Summary and PPPS with all of these plans were given to the patient.

## 2021-10-13 NOTE — ASSESSMENT & PLAN NOTE
Borderline poor control.  Patient tolerates metoprolol well without side effects I feel the benefits of the drug outweigh the risk.  Encouraged to watch salt, exercise more and lose weight.  If no improvement may need to increase or add drugs.

## 2021-10-17 DIAGNOSIS — M48.061 SPINAL STENOSIS OF LUMBAR REGION, UNSPECIFIED WHETHER NEUROGENIC CLAUDICATION PRESENT: ICD-10-CM

## 2021-10-18 RX ORDER — PREDNISONE 10 MG/1
TABLET ORAL
Qty: 35 TABLET | Refills: 0 | OUTPATIENT
Start: 2021-10-18

## 2021-10-19 LAB
25(OH)D3+25(OH)D2 SERPL-MCNC: 33.8 NG/ML (ref 30–100)
ALBUMIN SERPL-MCNC: 4.5 G/DL (ref 3.6–4.6)
ALBUMIN/CREAT UR: 66 MG/G CREAT (ref 0–29)
ALBUMIN/GLOB SERPL: 1.7 {RATIO} (ref 1.2–2.2)
ALP SERPL-CCNC: 103 IU/L (ref 44–121)
ALT SERPL-CCNC: 16 IU/L (ref 0–44)
APPEARANCE UR: CLEAR
AST SERPL-CCNC: 15 IU/L (ref 0–40)
BASOPHILS # BLD AUTO: 0.1 X10E3/UL (ref 0–0.2)
BASOPHILS NFR BLD AUTO: 1 %
BILIRUB SERPL-MCNC: 0.5 MG/DL (ref 0–1.2)
BILIRUB UR QL STRIP: NEGATIVE
BUN SERPL-MCNC: 25 MG/DL (ref 8–27)
BUN/CREAT SERPL: 18 (ref 10–24)
CALCIUM SERPL-MCNC: 10 MG/DL (ref 8.6–10.2)
CHLORIDE SERPL-SCNC: 104 MMOL/L (ref 96–106)
CHOLEST SERPL-MCNC: 139 MG/DL (ref 100–199)
CHOLEST/HDLC SERPL: 3.5 RATIO (ref 0–5)
CO2 SERPL-SCNC: 26 MMOL/L (ref 20–29)
COLOR UR: YELLOW
CREAT SERPL-MCNC: 1.37 MG/DL (ref 0.76–1.27)
CREAT UR-MCNC: 62 MG/DL
EOSINOPHIL # BLD AUTO: 0.6 X10E3/UL (ref 0–0.4)
EOSINOPHIL NFR BLD AUTO: 6 %
ERYTHROCYTE [DISTWIDTH] IN BLOOD BY AUTOMATED COUNT: 13 % (ref 11.6–15.4)
GLOBULIN SER CALC-MCNC: 2.6 G/DL (ref 1.5–4.5)
GLUCOSE SERPL-MCNC: 119 MG/DL (ref 65–99)
GLUCOSE UR QL: NEGATIVE
HBA1C MFR BLD: 6.4 % (ref 4.8–5.6)
HCT VFR BLD AUTO: 43.7 % (ref 37.5–51)
HDLC SERPL-MCNC: 40 MG/DL
HGB BLD-MCNC: 13.8 G/DL (ref 13–17.7)
HGB UR QL STRIP: NEGATIVE
IMM GRANULOCYTES # BLD AUTO: 0.2 X10E3/UL (ref 0–0.1)
IMM GRANULOCYTES NFR BLD AUTO: 2 %
KETONES UR QL STRIP: NEGATIVE
LDLC SERPL CALC-MCNC: 74 MG/DL (ref 0–99)
LEUKOCYTE ESTERASE UR QL STRIP: NEGATIVE
LYMPHOCYTES # BLD AUTO: 1.9 X10E3/UL (ref 0.7–3.1)
LYMPHOCYTES NFR BLD AUTO: 21 %
MAGNESIUM SERPL-MCNC: 1.8 MG/DL (ref 1.6–2.3)
MCH RBC QN AUTO: 28.5 PG (ref 26.6–33)
MCHC RBC AUTO-ENTMCNC: 31.6 G/DL (ref 31.5–35.7)
MCV RBC AUTO: 90 FL (ref 79–97)
MICROALBUMIN UR-MCNC: 40.8 UG/ML
MONOCYTES # BLD AUTO: 0.6 X10E3/UL (ref 0.1–0.9)
MONOCYTES NFR BLD AUTO: 6 %
NEUTROPHILS # BLD AUTO: 5.6 X10E3/UL (ref 1.4–7)
NEUTROPHILS NFR BLD AUTO: 64 %
NITRITE UR QL STRIP: NEGATIVE
PH UR STRIP: 6 [PH] (ref 5–7.5)
PLATELET # BLD AUTO: 262 X10E3/UL (ref 150–450)
POTASSIUM SERPL-SCNC: 5.3 MMOL/L (ref 3.5–5.2)
PROT SERPL-MCNC: 7.1 G/DL (ref 6–8.5)
PROT UR QL STRIP: NORMAL
RBC # BLD AUTO: 4.85 X10E6/UL (ref 4.14–5.8)
SODIUM SERPL-SCNC: 144 MMOL/L (ref 134–144)
SP GR UR: 1.01 (ref 1–1.03)
TRIGL SERPL-MCNC: 142 MG/DL (ref 0–149)
UROBILINOGEN UR STRIP-MCNC: 0.2 MG/DL (ref 0.2–1)
VLDLC SERPL CALC-MCNC: 25 MG/DL (ref 5–40)
WBC # BLD AUTO: 8.9 X10E3/UL (ref 3.4–10.8)

## 2021-10-25 ENCOUNTER — OFFICE VISIT (OUTPATIENT)
Dept: CARDIOLOGY | Facility: CLINIC | Age: 86
End: 2021-10-25

## 2021-10-25 VITALS
HEIGHT: 71 IN | SYSTOLIC BLOOD PRESSURE: 181 MMHG | WEIGHT: 184.75 LBS | OXYGEN SATURATION: 95 % | DIASTOLIC BLOOD PRESSURE: 65 MMHG | HEART RATE: 61 BPM | BODY MASS INDEX: 25.86 KG/M2

## 2021-10-25 DIAGNOSIS — N18.30 STAGE 3 CHRONIC KIDNEY DISEASE, UNSPECIFIED WHETHER STAGE 3A OR 3B CKD (HCC): ICD-10-CM

## 2021-10-25 DIAGNOSIS — I10 HYPERTENSION, BENIGN: ICD-10-CM

## 2021-10-25 DIAGNOSIS — I25.5 ISCHEMIC CARDIOMYOPATHY: Primary | ICD-10-CM

## 2021-10-25 DIAGNOSIS — R06.02 SHORTNESS OF BREATH: ICD-10-CM

## 2021-10-25 DIAGNOSIS — E11.9 TYPE 2 DIABETES MELLITUS WITHOUT COMPLICATION, WITHOUT LONG-TERM CURRENT USE OF INSULIN (HCC): ICD-10-CM

## 2021-10-25 DIAGNOSIS — E78.2 MIXED HYPERLIPIDEMIA: ICD-10-CM

## 2021-10-25 PROCEDURE — 99214 OFFICE O/P EST MOD 30 MIN: CPT | Performed by: INTERNAL MEDICINE

## 2021-10-25 PROCEDURE — 93000 ELECTROCARDIOGRAM COMPLETE: CPT | Performed by: INTERNAL MEDICINE

## 2021-10-25 NOTE — PROGRESS NOTES
Encounter Date:10/25/2021  Last seen 4/14/2021      Patient ID: Mikael Shanks is a 86 y.o. male.    Chief Complaint:  Congestive heart failure  Status post stent  Hypertension  Diabetes  Renal dysfunction     History of Present Illness     Since I have last seen, the patient has been without any chest discomfort ,shortness of breath, palpitations, dizziness or syncope.  Denies having any headache ,abdominal pain ,nausea, vomiting , diarrhea constipation, loss of weight or loss of appetite.  Denies having any excessive bruising ,hematuria or blood in the stool.    Review of all systems negative except as indicated.    Reviewed ROS.  Assessment and Plan      ]]]]]]]]]]]]]]]]]]]]]]]  Impression  ========  -Bilateral arm discomfort and chest discomfort suggestive of angina pectoris.-Improved     -Status post stent 2003 at [facility].     - Ischemic cardiomyopathy with ejection fraction of 25%.-Improved and 60 %     Cardiac catheterization 9/4/2020 revealed  Left ventricular size and contractility is normal with ejection fraction of 60%.  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  Left anterior descending artery has luminal irregularities.  Circumflex coronary artery has luminal irregularities.  Right coronary artery stent is patent with 20% plaquing.      Echocardiogram 8/31/2020  Thickened aortic valve with adequate opening motion.  Left atrial enlargement  Left ventricular size and contractility is normal with ejection fraction of 55 %'     -Status post acute on chronic systolic congestive heart failure-improved significant left ventricle dysfunction with ejection fraction of 25%.     -Status post acute respiratory failure with hypoxia-improved.       -Hypertension diabetes renal dysfunction.  BUN 14 creatinine 1.5.  Dyslipidemia     -History of anemia     -History of premature atrial contractions     -History of severe  hypomagnesemia.-Improved     -Allergic to penicillin.   =======  Plan  =======  Status post stent.  Patient is not having any angina pectoris or congestive heart failure.  EKG showed normal sinus rhythm normal ECG-10/25/2021     Ischemic cardiomyopathy-improved.   Latest ejection fraction 55%.  No need for ICD     Hypertension-180/65 Dyslipidemia-continue atorvastatin     Patient is not on ACE inhibitor due to pre-existing renal dysfunction.     Medications were reviewed and updated.  Follow-up in the office in 6 months.  Further plan will depend on patient's progress.  ]]]]]]]]]]]]]                       Diagnosis Plan   1. Ischemic cardiomyopathy  ECG 12 Lead   2. Type 2 diabetes mellitus without complication, without long-term current use of insulin (HCC)  ECG 12 Lead   3. Shortness of breath  ECG 12 Lead   4. Mixed hyperlipidemia  ECG 12 Lead   5. Hypertension, benign  ECG 12 Lead   6. Stage 3 chronic kidney disease, unspecified whether stage 3a or 3b CKD (HCC)  ECG 12 Lead   LAB RESULTS (LAST 7 DAYS)    CBC        BMP        CMP         BNP        TROPONIN        CoAg        Creatinine Clearance  Estimated Creatinine Clearance: 45.9 mL/min (A) (by C-G formula based on SCr of 1.37 mg/dL (H)).    ABG        Radiology  No radiology results for the last day                The following portions of the patient's history were reviewed and updated as appropriate: allergies, current medications, past family history, past medical history, past social history, past surgical history and problem list.    Review of Systems   Constitutional: Negative for fever and malaise/fatigue.   HENT: Negative for ear pain and nosebleeds.    Eyes: Negative for blurred vision and double vision.   Cardiovascular: Negative for chest pain, dyspnea on exertion and palpitations.   Respiratory: Negative for cough and shortness of breath.    Skin: Negative for rash.   Musculoskeletal: Negative for joint pain.   Gastrointestinal: Negative for  abdominal pain, nausea and vomiting.   Neurological: Negative for focal weakness and headaches.   Psychiatric/Behavioral: Negative for depression. The patient is not nervous/anxious.    All other systems reviewed and are negative.        Current Outpatient Medications:   •  acetaminophen-codeine (TYLENOL #3) 300-30 MG per tablet, Take 1 tablet by mouth Every 6 (Six) Hours As Needed for Moderate Pain ., Disp: 40 tablet, Rfl: 0  •  aspirin 81 MG tablet, Take 81 mg by mouth Daily., Disp: , Rfl:   •  atorvastatin (LIPITOR) 80 MG tablet, TAKE 1 TABLET BY MOUTH AT  NIGHT, Disp: 90 tablet, Rfl: 3  •  Blood Glucose Monitoring Suppl (ONE TOUCH ULTRA 2) w/Device kit, 1 Device Daily. Test 1 x daily, Disp: 1 each, Rfl: 0  •  cholecalciferol (VITAMIN D3) 10 MCG (400 UNIT) tablet, Take 400 Units by mouth Daily., Disp: , Rfl:   •  finasteride (PROSCAR) 5 MG tablet, TAKE 1 TABLET BY MOUTH  DAILY, Disp: 90 tablet, Rfl: 3  •  furosemide (LASIX) 20 MG tablet, TAKE 1 TABLET BY MOUTH  TWICE DAILY, Disp: 180 tablet, Rfl: 3  •  glipizide (glipiZIDE XL) 10 MG 24 hr tablet, Take 1 tablet by mouth Daily., Disp: 90 tablet, Rfl: 3  •  glucose blood (ONE TOUCH ULTRA TEST) test strip, Test 1 x daily, Disp: 100 each, Rfl: 5  •  metoprolol succinate XL (TOPROL-XL) 25 MG 24 hr tablet, TAKE 1 TABLET BY MOUTH  DAILY, Disp: 90 tablet, Rfl: 3  •  omeprazole (priLOSEC) 40 MG capsule, TAKE 1 CAPSULE BY MOUTH  DAILY, Disp: 90 capsule, Rfl: 3  •  OneTouch Delica Lancets 33G misc, 1 strip Daily. Test 1 x daily, Disp: 100 each, Rfl: 5  •  sertraline (ZOLOFT) 50 MG tablet, TAKE 1 TABLET BY MOUTH  DAILY, Disp: 90 tablet, Rfl: 0    Allergies   Allergen Reactions   • Penicillins Hives       Family History   Problem Relation Age of Onset   • Cancer Mother    • Heart disease Father    • Cancer Brother        Past Surgical History:   Procedure Laterality Date   • BACK SURGERY     • CARDIAC CATHETERIZATION N/A 12/5/2019    Procedure: Left Heart Cath and coronary  "angiogram;  Surgeon: Dayday Ruiz MD;  Location:  [facility] CATH INVASIVE LOCATION;  Service: Cardiovascular   • CARDIAC CATHETERIZATION N/A 2019    Procedure: Right and Left Heart Cath;  Surgeon: Dayday Ruiz MD;  Location: [facility] CATH INVASIVE LOCATION;  Service: Cardiovascular   • CARDIAC CATHETERIZATION N/A 2020    Procedure: Left and Right Heart Cath with Coronary Angiography;  Surgeon: Dayday Ruiz MD;  Location: [facility] CATH INVASIVE LOCATION;  Service: Cardiovascular;  Laterality: N/A;   • CAROTID ENDARTERECTOMY Left    • CHOLECYSTECTOMY     • COLONOSCOPY  2018    No repeat   • CORONARY ANGIOPLASTY WITH STENT PLACEMENT         Past Medical History:   Diagnosis Date   • Carotid artery disease (HCC)    • GERD (gastroesophageal reflux disease)    • Osteoarthritis    • Prostatic hypertrophy    • Pulmonary valve disorder    • Sleep apnea        Family History   Problem Relation Age of Onset   • Cancer Mother    • Heart disease Father    • Cancer Brother        Social History     Socioeconomic History   • Marital status:    Tobacco Use   • Smoking status: Former Smoker     Packs/day: 5.00     Types: Cigarettes     Start date: 1953     Quit date: 1960     Years since quittin.1   • Smokeless tobacco: Never Used   Vaping Use   • Vaping Use: Never used   Substance and Sexual Activity   • Alcohol use: Not Currently     Comment: very rare   • Drug use: No   • Sexual activity: Never           ECG 12 Lead    Date/Time: 10/25/2021 1:44 PM  Performed by: Dayday Ruiz MD  Authorized by: Dayday Ruiz MD   Comparison: compared with previous ECG   Similar to previous ECG  Comparison to previous ECG: Normal sinus rhythm normal ECG 60/min normal axis normal intervals no ectopy no significant change from 2021                Objective:       Physical Exam    BP (!) 181/65   Pulse 61   Ht 180.3 cm (71\")   Wt 83.8 kg (184 lb 12 oz)   SpO2 95%   BMI 25.77 kg/m²   The " patient is alert, oriented and in no distress.    Vital signs as noted above.    Head and neck revealed no carotid bruits or jugular venous distension.  No thyromegaly or lymphadenopathy is present.    Lungs clear.  No wheezing.  Breath sounds are normal bilaterally.    Heart normal first and second heart sounds.  No murmur..  No pericardial rub is present.  No gallop is present.    Abdomen soft and nontender.  No organomegaly is present.    Extremities revealed good peripheral pulses without any pedal edema.    Skin warm and dry.    Musculoskeletal system is grossly normal.    CNS grossly normal.  Reviewed and unchanged from last visit.

## 2021-12-07 DIAGNOSIS — F32.A DEPRESSION, UNSPECIFIED DEPRESSION TYPE: ICD-10-CM

## 2021-12-27 DIAGNOSIS — F32.A DEPRESSION, UNSPECIFIED DEPRESSION TYPE: ICD-10-CM

## 2021-12-29 ENCOUNTER — OFFICE VISIT (OUTPATIENT)
Dept: FAMILY MEDICINE CLINIC | Facility: CLINIC | Age: 86
End: 2021-12-29

## 2021-12-29 VITALS
BODY MASS INDEX: 26.2 KG/M2 | SYSTOLIC BLOOD PRESSURE: 186 MMHG | HEIGHT: 70 IN | RESPIRATION RATE: 18 BRPM | TEMPERATURE: 97.9 F | DIASTOLIC BLOOD PRESSURE: 85 MMHG | HEART RATE: 67 BPM | WEIGHT: 183 LBS | OXYGEN SATURATION: 100 %

## 2021-12-29 DIAGNOSIS — R35.0 BENIGN PROSTATIC HYPERPLASIA WITH URINARY FREQUENCY: ICD-10-CM

## 2021-12-29 DIAGNOSIS — F32.A DEPRESSION, UNSPECIFIED DEPRESSION TYPE: ICD-10-CM

## 2021-12-29 DIAGNOSIS — D64.9 ANEMIA, UNSPECIFIED TYPE: ICD-10-CM

## 2021-12-29 DIAGNOSIS — E21.5 PARATHYROID ABNORMALITY: ICD-10-CM

## 2021-12-29 DIAGNOSIS — M19.90 OSTEOARTHRITIS, UNSPECIFIED OSTEOARTHRITIS TYPE, UNSPECIFIED SITE: ICD-10-CM

## 2021-12-29 DIAGNOSIS — E11.21 DIABETIC NEPHROPATHY ASSOCIATED WITH TYPE 2 DIABETES MELLITUS: ICD-10-CM

## 2021-12-29 DIAGNOSIS — K29.90 GASTRITIS AND GASTRODUODENITIS: ICD-10-CM

## 2021-12-29 DIAGNOSIS — N18.31 TYPE 2 DIABETES MELLITUS WITH STAGE 3A CHRONIC KIDNEY DISEASE, WITHOUT LONG-TERM CURRENT USE OF INSULIN: ICD-10-CM

## 2021-12-29 DIAGNOSIS — N18.30 CHRONIC KIDNEY DISEASE, STAGE III (MODERATE): ICD-10-CM

## 2021-12-29 DIAGNOSIS — G95.19 NEUROGENIC CLAUDICATION: ICD-10-CM

## 2021-12-29 DIAGNOSIS — R80.9 PROTEINURIA, UNSPECIFIED TYPE: ICD-10-CM

## 2021-12-29 DIAGNOSIS — I45.5 SINUS PAUSE: ICD-10-CM

## 2021-12-29 DIAGNOSIS — I34.0 MITRAL VALVE INSUFFICIENCY, UNSPECIFIED ETIOLOGY: ICD-10-CM

## 2021-12-29 DIAGNOSIS — M79.10 MUSCLE PAIN: ICD-10-CM

## 2021-12-29 DIAGNOSIS — T82.855S STENOSIS OF CORONARY ARTERY STENT, SEQUELA: ICD-10-CM

## 2021-12-29 DIAGNOSIS — I25.5 ISCHEMIC CARDIOMYOPATHY: ICD-10-CM

## 2021-12-29 DIAGNOSIS — M48.061 SPINAL STENOSIS OF LUMBAR REGION, UNSPECIFIED WHETHER NEUROGENIC CLAUDICATION PRESENT: ICD-10-CM

## 2021-12-29 DIAGNOSIS — E11.9 TYPE 2 DIABETES MELLITUS WITHOUT COMPLICATION, WITHOUT LONG-TERM CURRENT USE OF INSULIN: ICD-10-CM

## 2021-12-29 DIAGNOSIS — I65.23 BILATERAL CAROTID ARTERY STENOSIS: ICD-10-CM

## 2021-12-29 DIAGNOSIS — G47.33 OSA (OBSTRUCTIVE SLEEP APNEA): Chronic | ICD-10-CM

## 2021-12-29 DIAGNOSIS — E87.5 HYPERKALEMIA: ICD-10-CM

## 2021-12-29 DIAGNOSIS — D51.0 PERNICIOUS ANEMIA: ICD-10-CM

## 2021-12-29 DIAGNOSIS — I65.29 OCCLUSION OF CAROTID ARTERY, UNSPECIFIED LATERALITY: ICD-10-CM

## 2021-12-29 DIAGNOSIS — F43.21 GRIEF: ICD-10-CM

## 2021-12-29 DIAGNOSIS — M25.552 LEFT HIP PAIN: ICD-10-CM

## 2021-12-29 DIAGNOSIS — R35.1 NOCTURIA: ICD-10-CM

## 2021-12-29 DIAGNOSIS — N40.0 PROSTATIC HYPERTROPHY: ICD-10-CM

## 2021-12-29 DIAGNOSIS — Z80.0 FAMILY HISTORY OF COLON CANCER: ICD-10-CM

## 2021-12-29 DIAGNOSIS — Z00.01 ENCOUNTER FOR GENERAL ADULT MEDICAL EXAMINATION WITH ABNORMAL FINDINGS: ICD-10-CM

## 2021-12-29 DIAGNOSIS — I25.10 ATHEROSCLEROSIS OF NATIVE CORONARY ARTERY OF NATIVE HEART WITHOUT ANGINA PECTORIS: ICD-10-CM

## 2021-12-29 DIAGNOSIS — Z00.00 MEDICARE ANNUAL WELLNESS VISIT, SUBSEQUENT: ICD-10-CM

## 2021-12-29 DIAGNOSIS — N18.31 STAGE 3A CHRONIC KIDNEY DISEASE: ICD-10-CM

## 2021-12-29 DIAGNOSIS — I10 HYPERTENSION, BENIGN: ICD-10-CM

## 2021-12-29 DIAGNOSIS — F41.1 GENERALIZED ANXIETY DISORDER: ICD-10-CM

## 2021-12-29 DIAGNOSIS — R06.02 SHORTNESS OF BREATH: ICD-10-CM

## 2021-12-29 DIAGNOSIS — E78.2 MIXED HYPERLIPIDEMIA: ICD-10-CM

## 2021-12-29 DIAGNOSIS — R31.9 HEMATURIA, UNSPECIFIED TYPE: ICD-10-CM

## 2021-12-29 DIAGNOSIS — I49.1 PAC (PREMATURE ATRIAL CONTRACTION): ICD-10-CM

## 2021-12-29 DIAGNOSIS — R53.83 LETHARGY: ICD-10-CM

## 2021-12-29 DIAGNOSIS — D55.9 ANEMIA DUE TO ENZYME DISORDER, UNSPECIFIED: ICD-10-CM

## 2021-12-29 DIAGNOSIS — E55.9 VITAMIN D DEFICIENCY: ICD-10-CM

## 2021-12-29 DIAGNOSIS — N40.1 BENIGN PROSTATIC HYPERPLASIA WITH URINARY FREQUENCY: ICD-10-CM

## 2021-12-29 DIAGNOSIS — I50.22 CHRONIC SYSTOLIC CONGESTIVE HEART FAILURE: ICD-10-CM

## 2021-12-29 DIAGNOSIS — K29.70 GASTRITIS AND GASTRODUODENITIS: ICD-10-CM

## 2021-12-29 DIAGNOSIS — I37.9 PULMONARY VALVE DISORDER: ICD-10-CM

## 2021-12-29 DIAGNOSIS — H83.3X3 NOISE-INDUCED HEARING LOSS OF BOTH EARS: ICD-10-CM

## 2021-12-29 DIAGNOSIS — E11.22 TYPE 2 DIABETES MELLITUS WITH STAGE 3A CHRONIC KIDNEY DISEASE, WITHOUT LONG-TERM CURRENT USE OF INSULIN: ICD-10-CM

## 2021-12-29 DIAGNOSIS — I73.9 ASYMPTOMATIC PERIPHERAL VASCULAR DISEASE: Primary | ICD-10-CM

## 2021-12-29 DIAGNOSIS — M72.0 DUPUYTREN'S CONTRACTURE OF BOTH HANDS: ICD-10-CM

## 2021-12-29 DIAGNOSIS — R20.2 PARESTHESIA: ICD-10-CM

## 2021-12-29 DIAGNOSIS — E83.42 HYPOMAGNESEMIA: ICD-10-CM

## 2021-12-29 DIAGNOSIS — Z23 NEED FOR INFLUENZA VACCINATION: ICD-10-CM

## 2021-12-29 PROCEDURE — 96372 THER/PROPH/DIAG INJ SC/IM: CPT | Performed by: FAMILY MEDICINE

## 2021-12-29 PROCEDURE — 99214 OFFICE O/P EST MOD 30 MIN: CPT | Performed by: FAMILY MEDICINE

## 2021-12-29 PROCEDURE — 99397 PER PM REEVAL EST PAT 65+ YR: CPT | Performed by: FAMILY MEDICINE

## 2021-12-29 RX ORDER — GLIPIZIDE 10 MG/1
10 TABLET, FILM COATED, EXTENDED RELEASE ORAL DAILY
Qty: 90 TABLET | Refills: 3 | Status: SHIPPED | OUTPATIENT
Start: 2021-12-29 | End: 2022-03-30 | Stop reason: SDUPTHER

## 2021-12-29 RX ORDER — ATORVASTATIN CALCIUM 80 MG/1
80 TABLET, FILM COATED ORAL
Qty: 90 TABLET | Refills: 3 | Status: SHIPPED | OUTPATIENT
Start: 2021-12-29 | End: 2022-11-01 | Stop reason: SDUPTHER

## 2021-12-29 RX ORDER — PREDNISONE 10 MG/1
10 TABLET ORAL TAKE AS DIRECTED
Qty: 35 TABLET | Refills: 0 | Status: SHIPPED | OUTPATIENT
Start: 2021-12-29 | End: 2022-01-13

## 2021-12-29 RX ORDER — FINASTERIDE 5 MG/1
5 TABLET, FILM COATED ORAL DAILY
Qty: 90 TABLET | Refills: 3 | Status: SHIPPED | OUTPATIENT
Start: 2021-12-29 | End: 2022-11-01 | Stop reason: SDUPTHER

## 2021-12-29 RX ORDER — FUROSEMIDE 20 MG/1
20 TABLET ORAL 2 TIMES DAILY
Qty: 180 TABLET | Refills: 3 | Status: SHIPPED | OUTPATIENT
Start: 2021-12-29 | End: 2022-11-01 | Stop reason: SDUPTHER

## 2021-12-29 RX ORDER — METOPROLOL SUCCINATE 25 MG/1
25 TABLET, EXTENDED RELEASE ORAL DAILY
Qty: 90 TABLET | Refills: 3 | Status: SHIPPED | OUTPATIENT
Start: 2021-12-29 | End: 2022-03-30 | Stop reason: SDUPTHER

## 2021-12-29 RX ORDER — CYANOCOBALAMIN 1000 UG/ML
1000 INJECTION, SOLUTION INTRAMUSCULAR; SUBCUTANEOUS
Status: SHIPPED | OUTPATIENT
Start: 2021-12-29

## 2021-12-29 RX ORDER — ENALAPRIL MALEATE 5 MG/1
5 TABLET ORAL DAILY
Qty: 90 TABLET | Refills: 3 | Status: SHIPPED | OUTPATIENT
Start: 2021-12-29 | End: 2022-03-30 | Stop reason: SDUPTHER

## 2021-12-29 RX ORDER — OMEPRAZOLE 40 MG/1
40 CAPSULE, DELAYED RELEASE ORAL DAILY
Qty: 90 CAPSULE | Refills: 3 | Status: SHIPPED | OUTPATIENT
Start: 2021-12-29 | End: 2022-11-01 | Stop reason: SDUPTHER

## 2021-12-29 RX ADMIN — CYANOCOBALAMIN 1000 MCG: 1000 INJECTION, SOLUTION INTRAMUSCULAR; SUBCUTANEOUS at 15:41

## 2022-01-03 ENCOUNTER — HOSPITAL ENCOUNTER (OUTPATIENT)
Dept: ULTRASOUND IMAGING | Facility: HOSPITAL | Age: 87
Discharge: HOME | End: 2022-01-03

## 2022-01-03 PROCEDURE — 93922 UPR/L XTREMITY ART 2 LEVELS: CPT

## 2022-01-03 PROCEDURE — 93880 EXTRACRANIAL BILAT STUDY: CPT

## 2022-01-03 PROCEDURE — 93923 UPR/LXTR ART STDY 3+ LVLS: CPT

## 2022-01-12 NOTE — PROGRESS NOTES
Neurosurgical Consultation      Mikael Shanks is a 86 y.o. male is being seen for consultation today at the request of Xander Robison MD for neurogenic claudication and spinal stenosis of the lumbar region.     Chief Complaint   Patient presents with   • Leg Pain     left upper leg pain        Previous treatment: PT, injections, steroids    HPI: This is an 86-year-old gentleman with a history of myocardial infarction coronary artery stenting on aspirin with diabetes and a most recent hemoglobin A1c of 6.4 as well as chronic kidney disease who presents to the neurosurgery clinic for predominantly leg pain.  He began having left sided leg pain starting in July.  His left leg pain does not correlate with a single nerve root distribution and includes the anterior and posterior thigh as well occasionally into the posterior calf.  He describes the pain as an aching sensation as well as a cramping sensation.  It started out that this pain predominantly occurred when he was standing or walking for an extended period of time however it now occurs even when he is sitting or lying down.  He attempted Tylenol 3 for management of his symptoms without significant improvement.  He completed a full course of physical therapy and did have some improvement with this but the home exercises no longer provide significant relief.  He has had course of oral steroids without significant improvement and also has had transforaminal lumbar injections.  He comments that the injections did not provide significant relief however the pain management note suggests short-lived improvement.  Upon close discussion with him he does note that the leg pain and cramping is worsened by walking and standing for an extended period of time.  He is now requiring a walker at home secondary to his leg giving out.  He does have a history of spine surgery in 2015 that addressed right sided back pain more so than leg pain.  He did have improvement with the  surgery.    Past Medical History:   Diagnosis Date   • Carotid artery disease (HCC)    • GERD (gastroesophageal reflux disease)    • Osteoarthritis    • Prostatic hypertrophy    • Pulmonary valve disorder    • Sleep apnea         Past Surgical History:   Procedure Laterality Date   • BACK SURGERY     • CARDIAC CATHETERIZATION N/A 12/5/2019    Procedure: Left Heart Cath and coronary angiogram;  Surgeon: Dayday Ruiz MD;  Location: [facility] CATH INVASIVE LOCATION;  Service: Cardiovascular   • CARDIAC CATHETERIZATION N/A 12/5/2019    Procedure: Right and Left Heart Cath;  Surgeon: Dayday Ruiz MD;  Location: [facility] CATH INVASIVE LOCATION;  Service: Cardiovascular   • CARDIAC CATHETERIZATION N/A 9/4/2020    Procedure: Left and Right Heart Cath with Coronary Angiography;  Surgeon: Dayday Ruiz MD;  Location: [facility] CATH INVASIVE LOCATION;  Service: Cardiovascular;  Laterality: N/A;   • CAROTID ENDARTERECTOMY Left    • CHOLECYSTECTOMY     • COLONOSCOPY  08/14/2018    No repeat   • CORONARY ANGIOPLASTY WITH STENT PLACEMENT          Current Outpatient Medications on File Prior to Visit   Medication Sig Dispense Refill   • acetaminophen-codeine (TYLENOL #3) 300-30 MG per tablet Take 1 tablet by mouth Every 6 (Six) Hours As Needed for Moderate Pain . 40 tablet 0   • aspirin 81 MG tablet Take 81 mg by mouth Daily.     • atorvastatin (LIPITOR) 80 MG tablet Take 1 tablet by mouth every night at bedtime. 90 tablet 3   • Blood Glucose Monitoring Suppl (ONE TOUCH ULTRA 2) w/Device kit 1 Device Daily. Test 1 x daily 1 each 0   • cholecalciferol (VITAMIN D3) 10 MCG (400 UNIT) tablet Take 400 Units by mouth Daily.     • enalapril (VASOTEC) 5 MG tablet Take 1 tablet by mouth Daily. 90 tablet 3   • finasteride (PROSCAR) 5 MG tablet Take 1 tablet by mouth Daily. 90 tablet 3   • furosemide (LASIX) 20 MG tablet Take 1 tablet by mouth 2 (Two) Times a Day. 180 tablet 3   • glipizide (glipiZIDE XL) 10 MG 24 hr tablet Take 1  tablet by mouth Daily. 90 tablet 3   • glucose blood (ONE TOUCH ULTRA TEST) test strip Test 1 x daily 100 each 5   • metoprolol succinate XL (TOPROL-XL) 25 MG 24 hr tablet Take 1 tablet by mouth Daily. 90 tablet 3   • omeprazole (priLOSEC) 40 MG capsule Take 1 capsule by mouth Daily. 90 capsule 3   • OneTouch Delica Lancets 33G misc 1 strip Daily. Test 1 x daily 100 each 5   • predniSONE (DELTASONE) 10 MG tablet Take 1 tablet by mouth Take As Directed for 15 days. 5 tab x 1day, 4 tab x 2 day, 3 tab x 3 day, 2 tab x 4 day, 1 tab x 5 day 35 tablet 0   • sertraline (ZOLOFT) 50 MG tablet TAKE 1 TABLET BY MOUTH  DAILY 30 tablet 11     Current Facility-Administered Medications on File Prior to Visit   Medication Dose Route Frequency Provider Last Rate Last Admin   • cyanocobalamin injection 1,000 mcg  1,000 mcg Intramuscular Q28 Days Xander Robison MD   1,000 mcg at 21 1541        Allergies   Allergen Reactions   • Penicillins Hives        Social History     Socioeconomic History   • Marital status:    Tobacco Use   • Smoking status: Former Smoker     Packs/day: 5.00     Years: 7.00     Pack years: 35.00     Types: Cigarettes     Start date: 1953     Quit date: 1960     Years since quittin.4   • Smokeless tobacco: Never Used   Vaping Use   • Vaping Use: Never used   Substance and Sexual Activity   • Alcohol use: Not Currently     Comment: very rare   • Drug use: No   • Sexual activity: Never          Review of Systems   Constitutional: Positive for activity change.   HENT: Negative.    Eyes: Negative.    Respiratory: Negative.  Negative for chest tightness and shortness of breath.    Cardiovascular: Negative.  Negative for chest pain.   Gastrointestinal: Negative.    Endocrine: Negative.    Genitourinary: Negative.    Musculoskeletal: Positive for gait problem ( uses a cane) and myalgias.        Left upper leg pain   Skin: Negative.    Allergic/Immunologic: Negative.    Neurological:  "Positive for weakness. Negative for numbness.   Hematological: Negative.    Psychiatric/Behavioral: Positive for sleep disturbance.        Physical Examination:     Vitals:    01/13/22 1327   BP: 170/74   Pulse: 70   Resp: 16   Temp: 97.5 °F (36.4 °C)   SpO2: 96%   Weight: 82.6 kg (182 lb)   Height: 177.8 cm (70\")        Physical Exam  Eyes:      General: Lids are normal.      Extraocular Movements: Extraocular movements intact.      Pupils: Pupils are equal, round, and reactive to light.   Neurological:      Deep Tendon Reflexes:      Reflex Scores:       Patellar reflexes are 2+ on the right side.       Achilles reflexes are 1+ on the right side and 1+ on the left side.  Psychiatric:         Speech: Speech normal.          Neurological Exam  Mental Status  Awake, alert and oriented to person, place and time. Speech is normal. Language is fluent with no aphasia. Attention and concentration are normal.    Cranial Nerves  CN II: Visual acuity is normal. Visual fields full to confrontation.  CN III, IV, VI: Extraocular movements intact bilaterally. Normal lids and orbits bilaterally. Pupils equal round and reactive to light bilaterally.  CN V: Facial sensation is normal.  CN VII: Full and symmetric facial movement.  CN IX, X: Palate elevates symmetrically. Normal gag reflex.  CN XI: Shoulder shrug strength is normal.  CN XII: Tongue midline without atrophy or fasciculations.    Motor  Normal muscle bulk throughout. No fasciculations present. Normal muscle tone. Strength is 5/5 in all four extremities except as noted.  4 out of 5 strength in the left knee extension..    Sensory  Left: Loss of sensation in the L3, L4 and L5 dermatome.    Reflexes                                           Right                      Left  Patellar                                2+                         Tr  Achilles                                1+                         1+    Right pathological reflexes: Uzma's absent.  Left " pathological reflexes: Uzma's absent.    Coordination  Right: Finger-to-nose normal.  Left: Finger-to-nose normal.       Result Review  The following data was reviewed by: Geronimo Gonzales MD on 01/13/2022:    Data reviewed: Radiologic studies MRI from July 12, 2021 shows severe disc desiccation worse at L1-2, L2-3 and L4-5.  He has slight hyperlordosis and mild multilevel retrolisthesis.  He has severe canal occluding stenosis at L4-L5 with a bulging disc and ligamentum flavum hypertrophy.     Assessment/plan:  This is an 86-year-old gentleman with significant health history who has history consistent with progressive neurogenic claudication.  His imaging is consistent with severe L4-L5 central canal stenosis as well as bilateral foraminal stenosis.  He does have a history of prior lumbar spine surgery on the right side at an unspecified level.  I recommend a L4-L5 bilateral laminectomy, medial facetectomy, foraminotomies and possible microdiscectomy.  I have ordered him a flexion-extension film to confirm he does not have any instability in his spine as this may instigated need for surgical fixation.  He has significant health history and will require extensive cardiac clearance prior to surgical intervention.  He will need to be off his aspirin 1 week prior to surgery and cannot restart it for 1 week.  If he is cleared we will work to have him scheduled.    Addendum January 17, 2022:  Patient underwent flexion-extension films of the lumbar spine as requested.  He does have dynamic instability at L3-L4.  This may explain some of his left-sided L3 radiculopathy.  I still believe based on his age and health status a bigger surgery is not tolerable and recommend a decompression at L4-L5 to provide relief for neurogenic claudication.      Diagnoses and all orders for this visit:    1. Spinal stenosis, lumbar region, with neurogenic claudication (Primary)  -     XR Spine Lumbar Complete With Flex & Ext; Future  -      Case Request; Standing  -     Case Request         No follow-ups on file.            Geronimo Gonzales MD

## 2022-01-13 ENCOUNTER — OFFICE VISIT (OUTPATIENT)
Dept: NEUROSURGERY | Facility: CLINIC | Age: 87
End: 2022-01-13

## 2022-01-13 ENCOUNTER — HOSPITAL ENCOUNTER (OUTPATIENT)
Dept: GENERAL RADIOLOGY | Facility: HOSPITAL | Age: 87
Discharge: HOME | End: 2022-01-13
Admitting: NEUROLOGICAL SURGERY

## 2022-01-13 VITALS
SYSTOLIC BLOOD PRESSURE: 170 MMHG | HEART RATE: 70 BPM | TEMPERATURE: 97.5 F | OXYGEN SATURATION: 96 % | DIASTOLIC BLOOD PRESSURE: 74 MMHG | BODY MASS INDEX: 26.05 KG/M2 | WEIGHT: 182 LBS | RESPIRATION RATE: 16 BRPM | HEIGHT: 70 IN

## 2022-01-13 DIAGNOSIS — M48.062 SPINAL STENOSIS, LUMBAR REGION, WITH NEUROGENIC CLAUDICATION: ICD-10-CM

## 2022-01-13 DIAGNOSIS — M48.062 SPINAL STENOSIS, LUMBAR REGION, WITH NEUROGENIC CLAUDICATION: Primary | ICD-10-CM

## 2022-01-13 PROCEDURE — 72114 X-RAY EXAM L-S SPINE BENDING: CPT

## 2022-01-13 PROCEDURE — 99204 OFFICE O/P NEW MOD 45 MIN: CPT | Performed by: NEUROLOGICAL SURGERY

## 2022-01-17 ENCOUNTER — TELEPHONE (OUTPATIENT)
Dept: CARDIOLOGY | Facility: CLINIC | Age: 87
End: 2022-01-17

## 2022-01-17 ENCOUNTER — TELEPHONE (OUTPATIENT)
Dept: NEUROSURGERY | Facility: CLINIC | Age: 87
End: 2022-01-17

## 2022-01-24 RX ORDER — MULTIPLE VITAMINS W/ MINERALS TAB 9MG-400MCG
1 TAB ORAL DAILY
COMMUNITY

## 2022-01-25 ENCOUNTER — HOSPITAL ENCOUNTER (OUTPATIENT)
Dept: CARDIOLOGY | Facility: HOSPITAL | Age: 87
Discharge: HOME | End: 2022-01-25

## 2022-01-25 ENCOUNTER — LAB (OUTPATIENT)
Dept: LAB | Facility: HOSPITAL | Age: 87
End: 2022-01-25

## 2022-01-25 LAB
ABO GROUP BLD: NORMAL
ANION GAP SERPL CALCULATED.3IONS-SCNC: 6.9 MMOL/L (ref 5–15)
BLD GP AB SCN SERPL QL: NEGATIVE
BUN SERPL-MCNC: 20 MG/DL (ref 8–23)
BUN/CREAT SERPL: 15.9 (ref 7–25)
CALCIUM SPEC-SCNC: 9.6 MG/DL (ref 8.6–10.5)
CHLORIDE SERPL-SCNC: 105 MMOL/L (ref 98–107)
CO2 SERPL-SCNC: 28.1 MMOL/L (ref 22–29)
CREAT SERPL-MCNC: 1.26 MG/DL (ref 0.76–1.27)
DEPRECATED RDW RBC AUTO: 42.8 FL (ref 37–54)
ERYTHROCYTE [DISTWIDTH] IN BLOOD BY AUTOMATED COUNT: 13.1 % (ref 12.3–15.4)
GFR SERPL CREATININE-BSD FRML MDRD: 54 ML/MIN/1.73
GLUCOSE SERPL-MCNC: 98 MG/DL (ref 65–99)
HCT VFR BLD AUTO: 42.1 % (ref 37.5–51)
HGB BLD-MCNC: 13.6 G/DL (ref 13–17.7)
MCH RBC QN AUTO: 29.1 PG (ref 26.6–33)
MCHC RBC AUTO-ENTMCNC: 32.3 G/DL (ref 31.5–35.7)
MCV RBC AUTO: 90.1 FL (ref 79–97)
MRSA DNA SPEC QL NAA+PROBE: NORMAL
PLATELET # BLD AUTO: 177 10*3/MM3 (ref 140–450)
PMV BLD AUTO: 11.3 FL (ref 6–12)
POTASSIUM SERPL-SCNC: 4.8 MMOL/L (ref 3.5–5.2)
RBC # BLD AUTO: 4.67 10*6/MM3 (ref 4.14–5.8)
RH BLD: POSITIVE
SODIUM SERPL-SCNC: 140 MMOL/L (ref 136–145)
T&S EXPIRATION DATE: NORMAL
WBC NRBC COR # BLD: 8.65 10*3/MM3 (ref 3.4–10.8)

## 2022-01-25 PROCEDURE — 86901 BLOOD TYPING SEROLOGIC RH(D): CPT | Performed by: NEUROLOGICAL SURGERY

## 2022-01-25 PROCEDURE — 86901 BLOOD TYPING SEROLOGIC RH(D): CPT

## 2022-01-25 PROCEDURE — 85027 COMPLETE CBC AUTOMATED: CPT

## 2022-01-25 PROCEDURE — 86900 BLOOD TYPING SEROLOGIC ABO: CPT

## 2022-01-25 PROCEDURE — 87641 MR-STAPH DNA AMP PROBE: CPT

## 2022-01-25 PROCEDURE — 80048 BASIC METABOLIC PNL TOTAL CA: CPT

## 2022-01-25 PROCEDURE — 86900 BLOOD TYPING SEROLOGIC ABO: CPT | Performed by: NEUROLOGICAL SURGERY

## 2022-01-25 PROCEDURE — 93010 ELECTROCARDIOGRAM REPORT: CPT | Performed by: INTERNAL MEDICINE

## 2022-01-25 PROCEDURE — 86850 RBC ANTIBODY SCREEN: CPT | Performed by: NEUROLOGICAL SURGERY

## 2022-01-25 PROCEDURE — 93005 ELECTROCARDIOGRAM TRACING: CPT | Performed by: NEUROLOGICAL SURGERY

## 2022-01-26 LAB — QT INTERVAL: 376 MS

## 2022-01-28 ENCOUNTER — LAB (OUTPATIENT)
Dept: LAB | Facility: HOSPITAL | Age: 87
End: 2022-01-28

## 2022-01-28 PROCEDURE — U0004 COV-19 TEST NON-CDC HGH THRU: HCPCS

## 2022-01-28 PROCEDURE — U0005 INFEC AGEN DETEC AMPLI PROBE: HCPCS

## 2022-01-28 PROCEDURE — C9803 HOPD COVID-19 SPEC COLLECT: HCPCS

## 2022-01-29 LAB — SARS-COV-2 ORF1AB RESP QL NAA+PROBE: NOT DETECTED

## 2022-01-30 ENCOUNTER — ANESTHESIA EVENT (OUTPATIENT)
Dept: PERIOP | Facility: HOSPITAL | Age: 87
End: 2022-01-30

## 2022-01-31 ENCOUNTER — ANESTHESIA (OUTPATIENT)
Dept: PERIOP | Facility: HOSPITAL | Age: 87
End: 2022-01-31

## 2022-01-31 ENCOUNTER — APPOINTMENT (OUTPATIENT)
Dept: GENERAL RADIOLOGY | Facility: HOSPITAL | Age: 87
End: 2022-01-31

## 2022-01-31 ENCOUNTER — HOSPITAL ENCOUNTER (OUTPATIENT)
Facility: HOSPITAL | Age: 87
Discharge: HOME HEALTH | End: 2022-02-01
Attending: NEUROLOGICAL SURGERY | Admitting: NEUROLOGICAL SURGERY

## 2022-01-31 DIAGNOSIS — M48.062 SPINAL STENOSIS, LUMBAR REGION, WITH NEUROGENIC CLAUDICATION: ICD-10-CM

## 2022-01-31 DIAGNOSIS — Z98.890 STATUS POST LUMBAR SPINE OPERATIVE PROCEDURE FOR DECOMPRESSION OF SPINAL CORD: Primary | ICD-10-CM

## 2022-01-31 LAB
GLUCOSE BLDC GLUCOMTR-MCNC: 125 MG/DL (ref 70–105)
GLUCOSE BLDC GLUCOMTR-MCNC: 161 MG/DL (ref 70–105)
GLUCOSE BLDC GLUCOMTR-MCNC: 230 MG/DL (ref 70–105)

## 2022-01-31 PROCEDURE — G0378 HOSPITAL OBSERVATION PER HR: HCPCS

## 2022-01-31 PROCEDURE — 63710000001 HYDROCODONE-ACETAMINOPHEN 7.5-325 MG TABLET: Performed by: NEUROLOGICAL SURGERY

## 2022-01-31 PROCEDURE — 99219 PR INITIAL OBSERVATION CARE/DAY 50 MINUTES: CPT | Performed by: HOSPITALIST

## 2022-01-31 PROCEDURE — 25010000002 NEOSTIGMINE 5 MG/5ML SOLUTION

## 2022-01-31 PROCEDURE — 25010000002 ONDANSETRON PER 1 MG

## 2022-01-31 PROCEDURE — 82962 GLUCOSE BLOOD TEST: CPT

## 2022-01-31 PROCEDURE — 25010000002 PROPOFOL 1000 MG/100ML EMULSION

## 2022-01-31 PROCEDURE — 63710000001 CYCLOBENZAPRINE 10 MG TABLET: Performed by: NEUROLOGICAL SURGERY

## 2022-01-31 PROCEDURE — A9270 NON-COVERED ITEM OR SERVICE: HCPCS | Performed by: HOSPITALIST

## 2022-01-31 PROCEDURE — A9270 NON-COVERED ITEM OR SERVICE: HCPCS | Performed by: NEUROLOGICAL SURGERY

## 2022-01-31 PROCEDURE — 63710000001 PHENOL 1.4 % LIQUID 177 ML SPRAY BOTTLE: Performed by: NURSE PRACTITIONER

## 2022-01-31 PROCEDURE — 25010000002 HEPARIN (PORCINE) 1000-0.9 UT/500ML-% SOLUTION: Performed by: ANESTHESIOLOGY

## 2022-01-31 PROCEDURE — 25010000002 FENTANYL CITRATE (PF) 100 MCG/2ML SOLUTION

## 2022-01-31 PROCEDURE — 25010000002 PROPOFOL 10 MG/ML EMULSION

## 2022-01-31 PROCEDURE — A9270 NON-COVERED ITEM OR SERVICE: HCPCS | Performed by: NURSE PRACTITIONER

## 2022-01-31 PROCEDURE — 76000 FLUOROSCOPY <1 HR PHYS/QHP: CPT

## 2022-01-31 PROCEDURE — 72020 X-RAY EXAM OF SPINE 1 VIEW: CPT

## 2022-01-31 PROCEDURE — 63710000001 ATORVASTATIN 40 MG TABLET: Performed by: HOSPITALIST

## 2022-01-31 PROCEDURE — C1889 IMPLANT/INSERT DEVICE, NOC: HCPCS | Performed by: NEUROLOGICAL SURGERY

## 2022-01-31 PROCEDURE — 25010000002 DEXAMETHASONE SODIUM PHOSPHATE 20 MG/5ML SOLUTION

## 2022-01-31 PROCEDURE — 63047 LAM FACETEC & FORAMOT LUMBAR: CPT | Performed by: NEUROLOGICAL SURGERY

## 2022-01-31 PROCEDURE — 25010000002 HYDROMORPHONE PER 4 MG

## 2022-01-31 DEVICE — FLOSEAL HEMOSTATIC MATRIX, 10ML
Type: IMPLANTABLE DEVICE | Site: SPINE LUMBAR | Status: FUNCTIONAL
Brand: FLOSEAL HEMOSTATIC MATRIX

## 2022-01-31 RX ORDER — FENTANYL CITRATE 50 UG/ML
INJECTION, SOLUTION INTRAMUSCULAR; INTRAVENOUS AS NEEDED
Status: DISCONTINUED | OUTPATIENT
Start: 2022-01-31 | End: 2022-01-31 | Stop reason: SURG

## 2022-01-31 RX ORDER — HEPARIN SODIUM 200 [USP'U]/100ML
INJECTION, SOLUTION INTRAVENOUS
Status: COMPLETED | OUTPATIENT
Start: 2022-01-31 | End: 2022-01-31

## 2022-01-31 RX ORDER — LIDOCAINE HYDROCHLORIDE 10 MG/ML
INJECTION, SOLUTION EPIDURAL; INFILTRATION; INTRACAUDAL; PERINEURAL AS NEEDED
Status: DISCONTINUED | OUTPATIENT
Start: 2022-01-31 | End: 2022-01-31 | Stop reason: SURG

## 2022-01-31 RX ORDER — LIDOCAINE HYDROCHLORIDE 10 MG/ML
0.5 INJECTION, SOLUTION INFILTRATION; PERINEURAL ONCE AS NEEDED
Status: DISCONTINUED | OUTPATIENT
Start: 2022-01-31 | End: 2022-01-31 | Stop reason: HOSPADM

## 2022-01-31 RX ORDER — HYDROCODONE BITARTRATE AND ACETAMINOPHEN 7.5; 325 MG/1; MG/1
1 TABLET ORAL EVERY 4 HOURS PRN
Status: DISCONTINUED | OUTPATIENT
Start: 2022-01-31 | End: 2022-02-01 | Stop reason: HOSPADM

## 2022-01-31 RX ORDER — NEOSTIGMINE METHYLSULFATE 5 MG/5 ML
SYRINGE (ML) INTRAVENOUS AS NEEDED
Status: DISCONTINUED | OUTPATIENT
Start: 2022-01-31 | End: 2022-01-31 | Stop reason: SURG

## 2022-01-31 RX ORDER — INSULIN LISPRO 100 [IU]/ML
0-7 INJECTION, SOLUTION INTRAVENOUS; SUBCUTANEOUS
Status: DISCONTINUED | OUTPATIENT
Start: 2022-01-31 | End: 2022-02-01 | Stop reason: HOSPADM

## 2022-01-31 RX ORDER — POLYETHYLENE GLYCOL 3350 17 G/17G
17 POWDER, FOR SOLUTION ORAL DAILY PRN
Status: DISCONTINUED | OUTPATIENT
Start: 2022-01-31 | End: 2022-02-01 | Stop reason: HOSPADM

## 2022-01-31 RX ORDER — DEXAMETHASONE SODIUM PHOSPHATE 4 MG/ML
INJECTION, SOLUTION INTRA-ARTICULAR; INTRALESIONAL; INTRAMUSCULAR; INTRAVENOUS; SOFT TISSUE AS NEEDED
Status: DISCONTINUED | OUTPATIENT
Start: 2022-01-31 | End: 2022-01-31 | Stop reason: SURG

## 2022-01-31 RX ORDER — SODIUM CHLORIDE 9 MG/ML
9 INJECTION, SOLUTION INTRAVENOUS CONTINUOUS PRN
Status: DISCONTINUED | OUTPATIENT
Start: 2022-01-31 | End: 2022-01-31 | Stop reason: HOSPADM

## 2022-01-31 RX ORDER — EPHEDRINE SULFATE 5 MG/ML
INJECTION INTRAVENOUS AS NEEDED
Status: DISCONTINUED | OUTPATIENT
Start: 2022-01-31 | End: 2022-01-31 | Stop reason: SURG

## 2022-01-31 RX ORDER — DEXTROSE MONOHYDRATE 25 G/50ML
25 INJECTION, SOLUTION INTRAVENOUS
Status: DISCONTINUED | OUTPATIENT
Start: 2022-01-31 | End: 2022-02-01 | Stop reason: HOSPADM

## 2022-01-31 RX ORDER — AMOXICILLIN 250 MG
1 CAPSULE ORAL NIGHTLY PRN
Status: DISCONTINUED | OUTPATIENT
Start: 2022-01-31 | End: 2022-02-01 | Stop reason: HOSPADM

## 2022-01-31 RX ORDER — PROPOFOL 10 MG/ML
INJECTION, EMULSION INTRAVENOUS AS NEEDED
Status: DISCONTINUED | OUTPATIENT
Start: 2022-01-31 | End: 2022-01-31 | Stop reason: SURG

## 2022-01-31 RX ORDER — INSULIN LISPRO 100 [IU]/ML
0-7 INJECTION, SOLUTION INTRAVENOUS; SUBCUTANEOUS AS NEEDED
Status: DISCONTINUED | OUTPATIENT
Start: 2022-01-31 | End: 2022-02-01 | Stop reason: HOSPADM

## 2022-01-31 RX ORDER — LABETALOL HYDROCHLORIDE 5 MG/ML
5 INJECTION, SOLUTION INTRAVENOUS
Status: DISCONTINUED | OUTPATIENT
Start: 2022-01-31 | End: 2022-01-31 | Stop reason: HOSPADM

## 2022-01-31 RX ORDER — ONDANSETRON 2 MG/ML
4 INJECTION INTRAMUSCULAR; INTRAVENOUS ONCE AS NEEDED
Status: DISCONTINUED | OUTPATIENT
Start: 2022-01-31 | End: 2022-01-31 | Stop reason: HOSPADM

## 2022-01-31 RX ORDER — BISACODYL 5 MG/1
5 TABLET, DELAYED RELEASE ORAL DAILY PRN
Status: DISCONTINUED | OUTPATIENT
Start: 2022-01-31 | End: 2022-02-01 | Stop reason: HOSPADM

## 2022-01-31 RX ORDER — METOPROLOL SUCCINATE 25 MG/1
25 TABLET, EXTENDED RELEASE ORAL DAILY
Status: DISCONTINUED | OUTPATIENT
Start: 2022-02-01 | End: 2022-02-01 | Stop reason: HOSPADM

## 2022-01-31 RX ORDER — FENTANYL CITRATE 50 UG/ML
25 INJECTION, SOLUTION INTRAMUSCULAR; INTRAVENOUS
Status: DISCONTINUED | OUTPATIENT
Start: 2022-01-31 | End: 2022-01-31 | Stop reason: HOSPADM

## 2022-01-31 RX ORDER — OXYCODONE HYDROCHLORIDE 5 MG/1
10 TABLET ORAL EVERY 4 HOURS PRN
Status: DISCONTINUED | OUTPATIENT
Start: 2022-01-31 | End: 2022-01-31 | Stop reason: HOSPADM

## 2022-01-31 RX ORDER — NICOTINE POLACRILEX 4 MG
15 LOZENGE BUCCAL
Status: DISCONTINUED | OUTPATIENT
Start: 2022-01-31 | End: 2022-02-01 | Stop reason: HOSPADM

## 2022-01-31 RX ORDER — MIDAZOLAM HYDROCHLORIDE 1 MG/ML
0.5 INJECTION INTRAMUSCULAR; INTRAVENOUS
Status: DISCONTINUED | OUTPATIENT
Start: 2022-01-31 | End: 2022-01-31 | Stop reason: HOSPADM

## 2022-01-31 RX ORDER — FINASTERIDE 5 MG/1
5 TABLET, FILM COATED ORAL DAILY
Status: DISCONTINUED | OUTPATIENT
Start: 2022-02-01 | End: 2022-02-01 | Stop reason: HOSPADM

## 2022-01-31 RX ORDER — CLINDAMYCIN PHOSPHATE 600 MG/50ML
600 INJECTION, SOLUTION INTRAVENOUS ONCE
Status: COMPLETED | OUTPATIENT
Start: 2022-01-31 | End: 2022-01-31

## 2022-01-31 RX ORDER — GLYCOPYRROLATE 0.2 MG/ML
INJECTION INTRAMUSCULAR; INTRAVENOUS AS NEEDED
Status: DISCONTINUED | OUTPATIENT
Start: 2022-01-31 | End: 2022-01-31 | Stop reason: SURG

## 2022-01-31 RX ORDER — PROMETHAZINE HYDROCHLORIDE 25 MG/1
25 TABLET ORAL ONCE AS NEEDED
Status: DISCONTINUED | OUTPATIENT
Start: 2022-01-31 | End: 2022-01-31 | Stop reason: HOSPADM

## 2022-01-31 RX ORDER — SODIUM CHLORIDE 0.9 % (FLUSH) 0.9 %
10 SYRINGE (ML) INJECTION EVERY 12 HOURS SCHEDULED
Status: DISCONTINUED | OUTPATIENT
Start: 2022-01-31 | End: 2022-01-31 | Stop reason: HOSPADM

## 2022-01-31 RX ORDER — LIDOCAINE HYDROCHLORIDE AND EPINEPHRINE 10; 10 MG/ML; UG/ML
INJECTION, SOLUTION INFILTRATION; PERINEURAL AS NEEDED
Status: DISCONTINUED | OUTPATIENT
Start: 2022-01-31 | End: 2022-01-31 | Stop reason: HOSPADM

## 2022-01-31 RX ORDER — PROPOFOL 10 MG/ML
VIAL (ML) INTRAVENOUS AS NEEDED
Status: DISCONTINUED | OUTPATIENT
Start: 2022-01-31 | End: 2022-01-31 | Stop reason: SURG

## 2022-01-31 RX ORDER — OXYCODONE HYDROCHLORIDE 5 MG/1
5 TABLET ORAL ONCE AS NEEDED
Status: DISCONTINUED | OUTPATIENT
Start: 2022-01-31 | End: 2022-01-31 | Stop reason: HOSPADM

## 2022-01-31 RX ORDER — SODIUM CHLORIDE 0.9 % (FLUSH) 0.9 %
10 SYRINGE (ML) INJECTION AS NEEDED
Status: DISCONTINUED | OUTPATIENT
Start: 2022-01-31 | End: 2022-01-31 | Stop reason: HOSPADM

## 2022-01-31 RX ORDER — EPHEDRINE SULFATE 5 MG/ML
5 INJECTION INTRAVENOUS ONCE AS NEEDED
Status: DISCONTINUED | OUTPATIENT
Start: 2022-01-31 | End: 2022-01-31 | Stop reason: HOSPADM

## 2022-01-31 RX ORDER — PANTOPRAZOLE SODIUM 40 MG/1
40 TABLET, DELAYED RELEASE ORAL EVERY MORNING
Refills: 3 | Status: DISCONTINUED | OUTPATIENT
Start: 2022-02-01 | End: 2022-02-01 | Stop reason: HOSPADM

## 2022-01-31 RX ORDER — SODIUM CHLORIDE, SODIUM LACTATE, POTASSIUM CHLORIDE, CALCIUM CHLORIDE 600; 310; 30; 20 MG/100ML; MG/100ML; MG/100ML; MG/100ML
100 INJECTION, SOLUTION INTRAVENOUS CONTINUOUS
Status: DISCONTINUED | OUTPATIENT
Start: 2022-01-31 | End: 2022-02-01 | Stop reason: HOSPADM

## 2022-01-31 RX ORDER — HYDROMORPHONE HCL 110MG/55ML
0.5 PATIENT CONTROLLED ANALGESIA SYRINGE INTRAVENOUS
Status: DISCONTINUED | OUTPATIENT
Start: 2022-01-31 | End: 2022-01-31 | Stop reason: HOSPADM

## 2022-01-31 RX ORDER — SODIUM CHLORIDE, SODIUM LACTATE, POTASSIUM CHLORIDE, CALCIUM CHLORIDE 600; 310; 30; 20 MG/100ML; MG/100ML; MG/100ML; MG/100ML
20 INJECTION, SOLUTION INTRAVENOUS CONTINUOUS
Status: DISCONTINUED | OUTPATIENT
Start: 2022-01-31 | End: 2022-02-01 | Stop reason: HOSPADM

## 2022-01-31 RX ORDER — BISACODYL 10 MG
10 SUPPOSITORY, RECTAL RECTAL DAILY PRN
Status: DISCONTINUED | OUTPATIENT
Start: 2022-01-31 | End: 2022-02-01 | Stop reason: HOSPADM

## 2022-01-31 RX ORDER — ACETAMINOPHEN 500 MG
1000 TABLET ORAL ONCE
Status: COMPLETED | OUTPATIENT
Start: 2022-01-31 | End: 2022-01-31

## 2022-01-31 RX ORDER — OLANZAPINE 10 MG/2ML
1 INJECTION, POWDER, LYOPHILIZED, FOR SOLUTION INTRAMUSCULAR AS NEEDED
Status: DISCONTINUED | OUTPATIENT
Start: 2022-01-31 | End: 2022-02-01 | Stop reason: HOSPADM

## 2022-01-31 RX ORDER — HYDROMORPHONE HCL 110MG/55ML
PATIENT CONTROLLED ANALGESIA SYRINGE INTRAVENOUS AS NEEDED
Status: DISCONTINUED | OUTPATIENT
Start: 2022-01-31 | End: 2022-01-31 | Stop reason: SURG

## 2022-01-31 RX ORDER — DIPHENHYDRAMINE HYDROCHLORIDE 50 MG/ML
12.5 INJECTION INTRAMUSCULAR; INTRAVENOUS
Status: DISCONTINUED | OUTPATIENT
Start: 2022-01-31 | End: 2022-01-31 | Stop reason: HOSPADM

## 2022-01-31 RX ORDER — PHENYLEPHRINE HCL IN 0.9% NACL 1 MG/10 ML
SYRINGE (ML) INTRAVENOUS AS NEEDED
Status: DISCONTINUED | OUTPATIENT
Start: 2022-01-31 | End: 2022-01-31 | Stop reason: SURG

## 2022-01-31 RX ORDER — NALOXONE HCL 0.4 MG/ML
0.4 VIAL (ML) INJECTION AS NEEDED
Status: DISCONTINUED | OUTPATIENT
Start: 2022-01-31 | End: 2022-01-31 | Stop reason: HOSPADM

## 2022-01-31 RX ORDER — OXYCODONE HCL 10 MG/1
10 TABLET, FILM COATED, EXTENDED RELEASE ORAL EVERY 12 HOURS SCHEDULED
Status: COMPLETED | OUTPATIENT
Start: 2022-01-31 | End: 2022-01-31

## 2022-01-31 RX ORDER — CYCLOBENZAPRINE HCL 10 MG
10 TABLET ORAL 3 TIMES DAILY PRN
Status: DISCONTINUED | OUTPATIENT
Start: 2022-01-31 | End: 2022-02-01 | Stop reason: HOSPADM

## 2022-01-31 RX ORDER — ONDANSETRON 2 MG/ML
INJECTION INTRAMUSCULAR; INTRAVENOUS AS NEEDED
Status: DISCONTINUED | OUTPATIENT
Start: 2022-01-31 | End: 2022-01-31 | Stop reason: SURG

## 2022-01-31 RX ORDER — SODIUM CHLORIDE 0.9 % (FLUSH) 0.9 %
10 SYRINGE (ML) INJECTION AS NEEDED
Status: DISCONTINUED | OUTPATIENT
Start: 2022-01-31 | End: 2022-02-01 | Stop reason: HOSPADM

## 2022-01-31 RX ORDER — IPRATROPIUM BROMIDE AND ALBUTEROL SULFATE 2.5; .5 MG/3ML; MG/3ML
3 SOLUTION RESPIRATORY (INHALATION) ONCE AS NEEDED
Status: DISCONTINUED | OUTPATIENT
Start: 2022-01-31 | End: 2022-01-31 | Stop reason: HOSPADM

## 2022-01-31 RX ORDER — KETAMINE HCL IN NACL, ISO-OSM 100MG/10ML
SYRINGE (ML) INJECTION AS NEEDED
Status: DISCONTINUED | OUTPATIENT
Start: 2022-01-31 | End: 2022-01-31 | Stop reason: SURG

## 2022-01-31 RX ORDER — SODIUM CHLORIDE, SODIUM LACTATE, POTASSIUM CHLORIDE, CALCIUM CHLORIDE 600; 310; 30; 20 MG/100ML; MG/100ML; MG/100ML; MG/100ML
9 INJECTION, SOLUTION INTRAVENOUS CONTINUOUS PRN
Status: DISCONTINUED | OUTPATIENT
Start: 2022-01-31 | End: 2022-01-31 | Stop reason: HOSPADM

## 2022-01-31 RX ORDER — ROCURONIUM BROMIDE 10 MG/ML
INJECTION, SOLUTION INTRAVENOUS AS NEEDED
Status: DISCONTINUED | OUTPATIENT
Start: 2022-01-31 | End: 2022-01-31 | Stop reason: SURG

## 2022-01-31 RX ORDER — SODIUM CHLORIDE 0.9 % (FLUSH) 0.9 %
3 SYRINGE (ML) INJECTION EVERY 12 HOURS SCHEDULED
Status: DISCONTINUED | OUTPATIENT
Start: 2022-01-31 | End: 2022-02-01 | Stop reason: HOSPADM

## 2022-01-31 RX ORDER — ATORVASTATIN CALCIUM 40 MG/1
80 TABLET, FILM COATED ORAL DAILY
Status: DISCONTINUED | OUTPATIENT
Start: 2022-01-31 | End: 2022-02-01 | Stop reason: HOSPADM

## 2022-01-31 RX ORDER — CELECOXIB 200 MG/1
200 CAPSULE ORAL ONCE
Status: COMPLETED | OUTPATIENT
Start: 2022-01-31 | End: 2022-01-31

## 2022-01-31 RX ADMIN — EPHEDRINE SULFATE 15 MG: 5 INJECTION INTRAVENOUS at 07:59

## 2022-01-31 RX ADMIN — FENTANYL CITRATE 50 MCG: 50 INJECTION, SOLUTION INTRAMUSCULAR; INTRAVENOUS at 10:25

## 2022-01-31 RX ADMIN — FENTANYL CITRATE 50 MCG: 50 INJECTION, SOLUTION INTRAMUSCULAR; INTRAVENOUS at 09:17

## 2022-01-31 RX ADMIN — PROPOFOL 30 MG: 10 INJECTION, EMULSION INTRAVENOUS at 10:25

## 2022-01-31 RX ADMIN — ATORVASTATIN CALCIUM 80 MG: 40 TABLET, FILM COATED ORAL at 21:04

## 2022-01-31 RX ADMIN — HEPARIN SODIUM 1000 UNITS: 200 INJECTION, SOLUTION INTRAVENOUS at 08:19

## 2022-01-31 RX ADMIN — OXYCODONE HYDROCHLORIDE 10 MG: 10 TABLET, FILM COATED, EXTENDED RELEASE ORAL at 07:29

## 2022-01-31 RX ADMIN — Medication 100 MCG: at 08:04

## 2022-01-31 RX ADMIN — PROPOFOL 30 MG: 10 INJECTION, EMULSION INTRAVENOUS at 08:45

## 2022-01-31 RX ADMIN — SODIUM CHLORIDE, SODIUM LACTATE, POTASSIUM CHLORIDE, AND CALCIUM CHLORIDE: .6; .31; .03; .02 INJECTION, SOLUTION INTRAVENOUS at 09:58

## 2022-01-31 RX ADMIN — CYCLOBENZAPRINE 10 MG: 10 TABLET, FILM COATED ORAL at 18:38

## 2022-01-31 RX ADMIN — PHENOL 2 SPRAY: 1.4 SPRAY ORAL at 23:11

## 2022-01-31 RX ADMIN — GLYCOPYRROLATE 0.6 MG: 0.2 INJECTION INTRAMUSCULAR; INTRAVENOUS at 11:25

## 2022-01-31 RX ADMIN — Medication 4 MG: at 11:25

## 2022-01-31 RX ADMIN — Medication 10 MG: at 07:45

## 2022-01-31 RX ADMIN — SODIUM CHLORIDE, SODIUM LACTATE, POTASSIUM CHLORIDE, AND CALCIUM CHLORIDE: .6; .31; .03; .02 INJECTION, SOLUTION INTRAVENOUS at 07:39

## 2022-01-31 RX ADMIN — LIDOCAINE HYDROCHLORIDE 50 MG: 10 INJECTION, SOLUTION EPIDURAL; INFILTRATION; INTRACAUDAL; PERINEURAL at 07:45

## 2022-01-31 RX ADMIN — Medication 50 MCG: at 09:13

## 2022-01-31 RX ADMIN — EPHEDRINE SULFATE 15 MG: 5 INJECTION INTRAVENOUS at 07:56

## 2022-01-31 RX ADMIN — EPHEDRINE SULFATE 10 MG: 5 INJECTION INTRAVENOUS at 08:04

## 2022-01-31 RX ADMIN — ONDANSETRON 4 MG: 2 INJECTION INTRAMUSCULAR; INTRAVENOUS at 10:58

## 2022-01-31 RX ADMIN — ROCURONIUM BROMIDE 50 MG: 10 INJECTION INTRAVENOUS at 07:46

## 2022-01-31 RX ADMIN — Medication 100 MCG: at 08:36

## 2022-01-31 RX ADMIN — PROPOFOL 50 MG: 10 INJECTION, EMULSION INTRAVENOUS at 07:51

## 2022-01-31 RX ADMIN — SODIUM CHLORIDE, POTASSIUM CHLORIDE, SODIUM LACTATE AND CALCIUM CHLORIDE 20 ML/HR: 600; 310; 30; 20 INJECTION, SOLUTION INTRAVENOUS at 07:04

## 2022-01-31 RX ADMIN — Medication 3 ML: at 21:04

## 2022-01-31 RX ADMIN — FENTANYL CITRATE 100 MCG: 50 INJECTION, SOLUTION INTRAMUSCULAR; INTRAVENOUS at 07:45

## 2022-01-31 RX ADMIN — PROPOFOL 150 MG: 10 INJECTION, EMULSION INTRAVENOUS at 07:45

## 2022-01-31 RX ADMIN — ROCURONIUM BROMIDE 30 MG: 10 INJECTION INTRAVENOUS at 10:25

## 2022-01-31 RX ADMIN — ACETAMINOPHEN 1000 MG: 500 TABLET, FILM COATED ORAL at 07:28

## 2022-01-31 RX ADMIN — HYDROCODONE BITARTRATE AND ACETAMINOPHEN 1 TABLET: 7.5; 325 TABLET ORAL at 18:38

## 2022-01-31 RX ADMIN — EPHEDRINE SULFATE 5 MG: 5 INJECTION INTRAVENOUS at 09:13

## 2022-01-31 RX ADMIN — CELECOXIB 200 MG: 200 CAPSULE ORAL at 07:29

## 2022-01-31 RX ADMIN — HYDROMORPHONE HYDROCHLORIDE 0.2 MG: 2 INJECTION, SOLUTION INTRAMUSCULAR; INTRAVENOUS; SUBCUTANEOUS at 11:06

## 2022-01-31 RX ADMIN — CLINDAMYCIN PHOSPHATE 600 MG: 600 INJECTION, SOLUTION INTRAVENOUS at 08:00

## 2022-01-31 RX ADMIN — DEXAMETHASONE SODIUM PHOSPHATE 4 MG: 4 INJECTION, SOLUTION INTRAMUSCULAR; INTRAVENOUS at 08:17

## 2022-01-31 RX ADMIN — PROPOFOL 30 MG: 10 INJECTION, EMULSION INTRAVENOUS at 09:16

## 2022-01-31 RX ADMIN — HYDROMORPHONE HYDROCHLORIDE 0.2 MG: 2 INJECTION, SOLUTION INTRAMUSCULAR; INTRAVENOUS; SUBCUTANEOUS at 10:53

## 2022-01-31 RX ADMIN — PROPOFOL 100 MCG/KG/MIN: 10 INJECTION, EMULSION INTRAVENOUS at 07:50

## 2022-01-31 RX ADMIN — Medication 10 MG: at 08:44

## 2022-01-31 RX ADMIN — ROCURONIUM BROMIDE 20 MG: 10 INJECTION INTRAVENOUS at 09:07

## 2022-01-31 NOTE — ANESTHESIA PROCEDURE NOTES
Airway  Urgency: elective    Date/Time: 1/31/2022 7:48 AM  Airway not difficult    General Information and Staff    Patient location during procedure: OR  Anesthesiologist: Ernie Ross MD  CRNA: Alycia Hill CAA    Indications and Patient Condition  Indications for airway management: airway protection    Preoxygenated: yes  Mask difficulty assessment: 1 - vent by mask    Final Airway Details  Final airway type: endotracheal airway      Successful airway: ETT  Cuffed: yes   Successful intubation technique: direct laryngoscopy  Facilitating devices/methods: intubating stylet  Endotracheal tube insertion site: oral  Blade: Ruddy  Blade size: 3  ETT size (mm): 7.5  Cormack-Lehane Classification: grade I - full view of glottis  Placement verified by: chest auscultation and capnometry   Measured from: lips  ETT/EBT  to lips (cm): 23  Number of attempts at approach: 1  Assessment: lips, teeth, and gum same as pre-op and atraumatic intubation

## 2022-01-31 NOTE — ANESTHESIA PREPROCEDURE EVALUATION
Anesthesia Evaluation     Patient summary reviewed and Nursing notes reviewed   no history of anesthetic complications:  NPO Solid Status: > 8 hours  NPO Liquid Status: > 2 hours           Airway   Dental      Pulmonary    (+) a smoker Former, sleep apnea,   Cardiovascular     ECG reviewed  PT is on anticoagulation therapy  Patient on routine beta blocker    (+) hypertension, valvular problems/murmurs MR, CAD, cardiac stents dysrhythmias PAC, PVD, hyperlipidemia,  carotid artery disease      Neuro/Psych  (+) CVA, psychiatric history Anxiety and Depression,     GI/Hepatic/Renal/Endo    (+)  GERD,  renal disease CRI, diabetes mellitus,     Musculoskeletal     (+) back pain,   Abdominal    Substance History      OB/GYN          Other   arthritis,      ROS/Med Hx Other: Ischemic cardiomyopathy, pulmonary valve d/o, carotid stenosis, parathyroid abnormality, BPH, spinal stenosis, neurogenic claudication, diabetic nephropathy, gastritis, duodenitis, hematuria, hip pain, h/o anemia    Echo  · Estimated EF = 55%.  · Left ventricular systolic function is normal.     Indications  Ischemic cardiomyopathy     Technically satisfactory study.  Mitral valve is structurally normal.  Tricuspid valve is structurally normal.  Aortic valve is thickened with adequate opening motion.  Pulmonic valve could not be well visualized.  No evidence for mitral tricuspid or aortic regurgitation is seen by Doppler study.  Left atrium is enlarged.  Right atrium is normal in size.  Left ventricle is normal in size and contractility with ejection fraction of 55 %.  Right ventricle is normal in size.  Atrial septum is intact.  Aorta is normal.  No pericardial effusion or intracardiac thrombus is seen.     Impression  Thickened aortic valve with adequate opening motion.  Left atrial enlargement  Left ventricular size and contractility is normal with ejection fraction of 55 %'    Cath  FINDINGS:     1. HEMODYNAMICS:  Left ventricle end-diastolic  pressure is normal.  No gradient was noted across aortic valve.  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 LEFT VENTRICULOGRAPHY:  Left ventricular size and contractility is normal with ejection fraction of 60%.  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 CORONARY ANGIOGRAPHY:  Left main coronary artery normal.  Left anterior descending artery has luminal irregularities.  Circumflex coronary artery has luminal irregularities.  Right coronary artery stent is patent with 20% plaquing.     SUMMARY:   Left ventricular size and contractility is normal with ejection fraction of 60%.     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  Left anterior descending artery has luminal irregularities.  Circumflex coronary artery has luminal irregularities.  Right coronary artery stent is patent with 20% plaquing.     RECOMMENDATIONS:  Noncardiac work-up.      PSH  CHOLECYSTECTOMY BACK SURGERY  CARDIAC CATHETERIZATION CARDIAC CATHETERIZATION  CORONARY ANGIOPLASTY WITH STENT PLACEMENT CAROTID ENDARTERECTOMY  CARDIAC CATHETERIZATION COLONOSCOPY                Anesthesia Plan    ASA 3     general   total IV anesthesia(Patient identified; pre-operative vital signs, all relevant labs/studies, complete medical/surgical/anesthetic history, full medication list, full allergy list, and NPO status obtained/reviewed; physical assessment performed; anesthetic options, side effects, potential complications, risks, and benefits discussed; questions answered; written anesthesia consent obtained; patient cleared for procedure; anesthesia machine and equipment checked and functioning    ERAS)  intravenous induction     Anesthetic plan, all risks, benefits, and alternatives have been provided, discussed and informed consent has been obtained with: patient.    Plan discussed with CRNA and CAA.        CODE STATUS:

## 2022-01-31 NOTE — ANESTHESIA POSTPROCEDURE EVALUATION
Patient: Mikael Shanks    Procedure Summary     Date: 01/31/22 Room / Location: [facility] OR  / [facility] MAIN OR    Anesthesia Start: 0739 Anesthesia Stop: 1140    Procedure: LUMBAR LAMINECTOMY WITH/WITHOUT FUSION L4-L5 (Bilateral Spine Lumbar) Diagnosis:       Spinal stenosis, lumbar region, with neurogenic claudication      (Spinal stenosis, lumbar region, with neurogenic claudication [M48.062])    Surgeons: Geronimo Gonzales MD Provider: Ernie Ross MD    Anesthesia Type: general ASA Status: 3          Anesthesia Type: general    Vitals  Vitals Value Taken Time   /54 01/31/22 1312   Temp 98.2 °F (36.8 °C) 01/31/22 1140   Pulse 71 01/31/22 1312   Resp 14 01/31/22 1255   SpO2 93 % 01/31/22 1312   Vitals shown include unvalidated device data.        Post Anesthesia Care and Evaluation    Patient location during evaluation: PACU  Patient participation: complete - patient participated  Level of consciousness: awake  Pain scale: See nurse's notes for pain score.  Pain management: adequate  Airway patency: patent  Anesthetic complications: No anesthetic complications  PONV Status: none  Cardiovascular status: acceptable  Respiratory status: acceptable  Hydration status: acceptable    Comments: Patient seen and examined postoperatively; vital signs stable; SpO2 greater than or equal to 90%; cardiopulmonary status stable; nausea/vomiting adequately controlled; pain adequately controlled; no apparent anesthesia complications; patient discharged from anesthesia care when discharge criteria were met

## 2022-01-31 NOTE — ANESTHESIA PROCEDURE NOTES
Peripheral IV    Patient location during procedure: OR  Start time: 1/31/2022 7:48 AM  Line placed for Fluids/Medication Admin and Sedation.  Performed By   CRNA: Alycia Hill CAA  Preanesthetic Checklist  Completed: patient identified, IV checked, site marked, risks and benefits discussed, surgical consent, monitors and equipment checked, pre-op evaluation and timeout performed  Peripheral IV Prep   Patient position: supine   Prep: alcohol swabs  Patient monitoring: heart rate, cardiac monitor and continuous pulse ox  Peripheral IV Procedure   Laterality:right  Location:  Hand  Catheter size: 20 G         Post Assessment   Dressing Type: tape, transparent and gauze.    IV Dressing/Site: clean, dry and intact

## 2022-01-31 NOTE — ANESTHESIA PROCEDURE NOTES
Arterial Line      Patient reassessed immediately prior to procedure    Patient location during procedure: OR   Line placed for hemodynamic monitoring and ABGs/Labs/ISTAT.  Performed By   Anesthesiologist: Ernie Ross MD  Preanesthetic Checklist  Completed: patient identified, IV checked, site marked, risks and benefits discussed, surgical consent, monitors and equipment checked, pre-op evaluation and timeout performed  Arterial Line Prep   Sterile Tech: cap, gloves and mask  Prep: ChloraPrep  Patient monitoring: blood pressure monitoring, continuous pulse oximetry and EKG  Arterial Line Procedure   Laterality:left  Location:  radial artery  Catheter size: 20 G   Guidance: landmark technique and palpation technique  Number of attempts: 1  Successful placement: yes  Heparin (Porcine) in NaCl 1000-0.9 UT/500ML-% for arterial line pressure bag, 1,000 Units  Post Assessment   Dressing Type: occlusive dressing applied, secured with tape and wrist guard applied.   Complications no  Circ/Move/Sens Assessment: unchanged.   Patient Tolerance: patient tolerated the procedure well with no apparent complications  Additional Notes  Pre-procedure:  Patient identified; pre-procedure vital signs, all relevant labs/studies, complete medical/surgical/anesthetic history, full medication list, full allergy list, and NPO status obtained/reviewed; physical assessment performed; anesthetic options, side effects, potential complications, risks, and benefits discussed; questions answered; written anesthesia procedure consent obtained; patient cleared for procedure; IV access in situ    Procedure:  Arterial line placed after induction of general anesthesia; ASA monitor placed; patient positioned; hand hygiene performed; sterile technique maintained throughout the procedure; sterile prep and drape applied; insertion site determined by anatomical landmarks and palpation; needle placed into the skin and advanced into the target artery  with correct placement confirmed by return of arterial blood; wire slide into the target artery; arterial catheter threaded into the target artery over the needle/wire; needle/wire removed; correct catheter placement confirmed by transducing systemic arterial blood pressure; catheter secured with occlusive dressing and tape    Post-procedure:  Arterial catheter placed successfully; no apparent complications; vital signs stable throughout; minimal estimated blood loss

## 2022-02-01 ENCOUNTER — READMISSION MANAGEMENT (OUTPATIENT)
Dept: CALL CENTER | Facility: HOSPITAL | Age: 87
End: 2022-02-01

## 2022-02-01 VITALS
SYSTOLIC BLOOD PRESSURE: 125 MMHG | HEART RATE: 70 BPM | BODY MASS INDEX: 27.05 KG/M2 | DIASTOLIC BLOOD PRESSURE: 61 MMHG | OXYGEN SATURATION: 97 % | WEIGHT: 188.93 LBS | TEMPERATURE: 97.8 F | HEIGHT: 70 IN | RESPIRATION RATE: 18 BRPM

## 2022-02-01 LAB
GLUCOSE BLDC GLUCOMTR-MCNC: 123 MG/DL (ref 70–105)
GLUCOSE BLDC GLUCOMTR-MCNC: 137 MG/DL (ref 70–105)
GLUCOSE BLDC GLUCOMTR-MCNC: 140 MG/DL (ref 70–105)

## 2022-02-01 PROCEDURE — 63710000001 SERTRALINE 50 MG TABLET: Performed by: HOSPITALIST

## 2022-02-01 PROCEDURE — 63710000001 PANTOPRAZOLE 40 MG TABLET DELAYED-RELEASE: Performed by: HOSPITALIST

## 2022-02-01 PROCEDURE — 97535 SELF CARE MNGMENT TRAINING: CPT

## 2022-02-01 PROCEDURE — 97166 OT EVAL MOD COMPLEX 45 MIN: CPT

## 2022-02-01 PROCEDURE — A9270 NON-COVERED ITEM OR SERVICE: HCPCS | Performed by: NEUROLOGICAL SURGERY

## 2022-02-01 PROCEDURE — 82962 GLUCOSE BLOOD TEST: CPT

## 2022-02-01 PROCEDURE — 25010000002 MORPHINE PER 10 MG: Performed by: NURSE PRACTITIONER

## 2022-02-01 PROCEDURE — 63710000001 CYCLOBENZAPRINE 10 MG TABLET: Performed by: NEUROLOGICAL SURGERY

## 2022-02-01 PROCEDURE — 25010000002 KETOROLAC TROMETHAMINE PER 15 MG: Performed by: NURSE PRACTITIONER

## 2022-02-01 PROCEDURE — G0378 HOSPITAL OBSERVATION PER HR: HCPCS

## 2022-02-01 PROCEDURE — 63710000001 FINASTERIDE 5 MG TABLET: Performed by: HOSPITALIST

## 2022-02-01 PROCEDURE — A9270 NON-COVERED ITEM OR SERVICE: HCPCS | Performed by: HOSPITALIST

## 2022-02-01 PROCEDURE — 63710000001 BISACODYL 5 MG TABLET DELAYED-RELEASE: Performed by: NEUROLOGICAL SURGERY

## 2022-02-01 PROCEDURE — 99225 PR SBSQ OBSERVATION CARE/DAY 25 MINUTES: CPT | Performed by: HOSPITALIST

## 2022-02-01 PROCEDURE — 63710000001 METOPROLOL SUCCINATE XL 25 MG TABLET SUSTAINED-RELEASE 24 HOUR: Performed by: HOSPITALIST

## 2022-02-01 PROCEDURE — 97162 PT EVAL MOD COMPLEX 30 MIN: CPT

## 2022-02-01 PROCEDURE — 63710000001 HYDROCODONE-ACETAMINOPHEN 7.5-325 MG TABLET: Performed by: NEUROLOGICAL SURGERY

## 2022-02-01 PROCEDURE — 99024 POSTOP FOLLOW-UP VISIT: CPT | Performed by: NEUROLOGICAL SURGERY

## 2022-02-01 RX ORDER — CYCLOBENZAPRINE HCL 10 MG
10 TABLET ORAL 3 TIMES DAILY PRN
Qty: 90 TABLET | Refills: 0 | Status: SHIPPED | OUTPATIENT
Start: 2022-02-01 | End: 2022-03-30 | Stop reason: SDUPTHER

## 2022-02-01 RX ORDER — HYDROCODONE BITARTRATE AND ACETAMINOPHEN 7.5; 325 MG/1; MG/1
1 TABLET ORAL EVERY 4 HOURS PRN
Qty: 40 TABLET | Refills: 0 | Status: SHIPPED | OUTPATIENT
Start: 2022-02-01 | End: 2022-02-08

## 2022-02-01 RX ORDER — MORPHINE SULFATE 2 MG/ML
2 INJECTION, SOLUTION INTRAMUSCULAR; INTRAVENOUS ONCE
Status: COMPLETED | OUTPATIENT
Start: 2022-02-01 | End: 2022-02-01

## 2022-02-01 RX ORDER — KETOROLAC TROMETHAMINE 30 MG/ML
15 INJECTION, SOLUTION INTRAMUSCULAR; INTRAVENOUS ONCE
Status: COMPLETED | OUTPATIENT
Start: 2022-02-01 | End: 2022-02-01

## 2022-02-01 RX ORDER — BISACODYL 5 MG/1
5 TABLET, DELAYED RELEASE ORAL DAILY PRN
Qty: 30 TABLET | Refills: 0 | Status: SHIPPED | OUTPATIENT
Start: 2022-02-01 | End: 2022-11-01

## 2022-02-01 RX ADMIN — CYCLOBENZAPRINE 10 MG: 10 TABLET, FILM COATED ORAL at 09:09

## 2022-02-01 RX ADMIN — PANTOPRAZOLE SODIUM 40 MG: 40 TABLET, DELAYED RELEASE ORAL at 09:09

## 2022-02-01 RX ADMIN — Medication 3 ML: at 09:10

## 2022-02-01 RX ADMIN — SERTRALINE 50 MG: 50 TABLET, FILM COATED ORAL at 09:09

## 2022-02-01 RX ADMIN — BISACODYL 5 MG: 5 TABLET, COATED ORAL at 09:09

## 2022-02-01 RX ADMIN — KETOROLAC TROMETHAMINE 15 MG: 30 INJECTION, SOLUTION INTRAMUSCULAR; INTRAVENOUS at 11:15

## 2022-02-01 RX ADMIN — HYDROCODONE BITARTRATE AND ACETAMINOPHEN 1 TABLET: 7.5; 325 TABLET ORAL at 00:19

## 2022-02-01 RX ADMIN — HYDROCODONE BITARTRATE AND ACETAMINOPHEN 1 TABLET: 7.5; 325 TABLET ORAL at 09:09

## 2022-02-01 RX ADMIN — FINASTERIDE 5 MG: 5 TABLET, FILM COATED ORAL at 09:09

## 2022-02-01 RX ADMIN — MORPHINE SULFATE 2 MG: 2 INJECTION, SOLUTION INTRAMUSCULAR; INTRAVENOUS at 03:22

## 2022-02-01 RX ADMIN — METOPROLOL SUCCINATE 25 MG: 25 TABLET, FILM COATED, EXTENDED RELEASE ORAL at 09:09

## 2022-02-01 NOTE — PROGRESS NOTES
[facility] Daily Progress Note    Patient Name: Mikael Shanks  : 1935  MRN: [id number]  Primary Care Physician:  Xander Robison MD  Date of admission: 2022      Subjective      Chief Complaint: Back pain      Patient Reports     22: Back pain controlled.  Patient thinks that he had some gas causing back pain afebrile.    Review of Systems   All other systems reviewed and are negative.         Objective      Vitals:   Temp:  [97.4 °F (36.3 °C)-98.2 °F (36.8 °C)] 97.5 °F (36.4 °C)  Heart Rate:  [66-75] 75  Resp:  [12-18] 18  BP: (112-136)/(44-65) 136/63  Flow (L/min):  [2] 2    Physical Exam  HENT:      Head: Normocephalic.      Mouth/Throat:      Mouth: Mucous membranes are moist.   Eyes:      General: No scleral icterus.     Extraocular Movements: Extraocular movements intact.      Pupils: Pupils are equal, round, and reactive to light.   Cardiovascular:      Rate and Rhythm: Normal rate and regular rhythm.   Pulmonary:      Effort: Pulmonary effort is normal.   Abdominal:      General: Bowel sounds are normal.   Musculoskeletal:      Cervical back: Normal range of motion.      Comments: Decreased ROM hips   Skin:     General: Skin is warm.   Neurological:      Mental Status: He is alert. Mental status is at baseline.   Psychiatric:         Mood and Affect: Mood normal.           Result Review    Result Review:  I have personally reviewed the results from the time of this admission to 2022 15:01 EST and agree with these findings:  [x]  Laboratory  []  Microbiology  [x]  Radiology  []  EKG/Telemetry   []  Cardiology/Vascular   []  Pathology  [x]  Old records  []  Other:      Wounds (last 24 hours)     LDA Wound     Row Name 22 1115 22 0710 22 0322       Wound 22 0838 lumbar spine Incision    Wound - Properties Group Placement Date: 22  - Placement Time: 838  - Location: lumbar spine  -MC Primary Wound Type: Incision   -MC    Dressing Appearance dry; intact; no drainage  -DV dry; intact; no drainage  -DV dry; intact; no drainage  -JL    Closure ALEJANDRA  -DV ALEJANDRA  -DV --    Base dressing in place, unable to visualize  -DV dressing in place, unable to visualize  -DV --    Drainage Amount none  -DV none  -DV --    Retired Wound - Properties Group Date first assessed: 01/31/22  - Time first assessed: 0838 - Location: lumbar spine  -MC Primary Wound Type: Incision  -MC    Row Name 01/31/22 2303 01/31/22 2024 01/31/22 1609       Wound 01/31/22 0838 lumbar spine Incision    Wound - Properties Group Placement Date: 01/31/22  - Placement Time: 0838 - Location: lumbar spine  -MC Primary Wound Type: Incision  -MC    Dressing Appearance dry; intact; no drainage  -JL dry; intact; no drainage  -JL dry; intact; no drainage  -KENDALL    Closure ALEJANDRA  -JL AELJANDRA  -JL --    Base dressing in place, unable to visualize  -JL dressing in place, unable to visualize  -JL --    Retired Wound - Properties Group Date first assessed: 01/31/22  - Time first assessed: 0838 - Location: lumbar spine  - Primary Wound Type: Incision  -MC          User Key  (r) = Recorded By, (t) = Taken By, (c) = Cosigned By    Initials Name Provider Type    Lexie White, RN Registered Nurse    Paul Murrell RN Registered Nurse    Laura Nelson LPN Licensed Nurse    Yeni Ames RN Registered Nurse                  Assessment/Plan      Brief Patient Summary:    86-year-old male s/p L4-L5 bilateral laminectomy, medial facetectomy for central canal decompression and lysis of extensive epidural scar on 1/31/2022.  Consult for medical management.        atorvastatin, 80 mg, Oral, Daily  finasteride, 5 mg, Oral, Daily  insulin lispro, 0-7 Units, Subcutaneous, TID AC  metoprolol succinate XL, 25 mg, Oral, Daily  pantoprazole, 40 mg, Oral, QAM  sertraline, 50 mg, Oral, Daily  sodium chloride, 3 mL, Intravenous, Q12H       lactated ringers, 20 mL/hr, Last Rate: 20 mL/hr  (01/31/22 0704)  lactated ringers, 100 mL/hr         Active Hospital Problems:  Active Hospital Problems    Diagnosis    • **Spinal stenosis of lumbar region      MRI done 7/12/2021 still shows severe degenerative changes of L4-L5 moderate to severe spinal stenosis     • Benign prostatic hyperplasia    • Mitral valve insufficiency    • Neurogenic claudication (HCC)    • Anxiety disorder, unspecified    • Mixed hyperlipidemia    • BILL (obstructive sleep apnea)    • Depression    • Hypertension, benign    • Bilateral carotid artery stenosis      Ultrasound done May 2011 plaquing on the right and severe on the left     • Spinal stenosis, lumbar region, with neurogenic claudication    • Type 2 diabetes mellitus with stage 3a chronic kidney disease, without long-term current use of insulin (HCC)          L4-L5 central canal stenosis with neurogenic claudication:  -s/p L4-L5 bilateral laminectomy, medial facetectomy for central canal decompression and lysis of extensive epidural scar on 1/31/2022.  -Management per attending neurosurgeon     Diabetes mellitus:  -Continue ISS  -Hold oral antidiabetic medications     CAD s/p stent:  -Denies chest pain  -On aspirin, Lipitor, enalapril  -Follows with Dr. Ruiz     Hypertension:  -Continue Toprol, enalapril     GERD:  -Continue PPI     Anxiety/depression:  -Continue Zoloft     BPH:  -Continue finasteride      DVT prophylaxis:  Mechanical DVT prophylaxis orders are present.    CODE STATUS:    Code Status (Patient has no pulse and is not breathing): CPR (Attempt to Resuscitate)  Medical Interventions (Patient has pulse or is breathing): Full Support      Disposition:  I expect patient to be discharged defer.    This patient has been examined wearing appropriate Personal Protective Equipment and discussed with nursing. 02/01/22      Electronically signed by Jr Merlos DO, 02/01/22, 15:01 EST.  [facility] Hospitalist Team

## 2022-02-01 NOTE — DISCHARGE SUMMARY
Mikael Shanks  1935    Patient Care Team:  Xander Robison MD as PCP - General  Delio Lund MD as Consulting Physician (Nephrology)  Dayday Ruiz MD as Consulting Physician (Cardiology)    Date of Admit: 1/31/2022    Date of Discharge:  2/1/2022    Discharge Diagnosis:  Spinal stenosis of lumbar region    Hypertension, benign    Depression    BILL (obstructive sleep apnea)    Mixed hyperlipidemia    Anxiety disorder, unspecified    Neurogenic claudication (HCC)    Bilateral carotid artery stenosis    Benign prostatic hyperplasia    Mitral valve insufficiency    Type 2 diabetes mellitus with stage 3a chronic kidney disease, without long-term current use of insulin (HCC)    Spinal stenosis, lumbar region, with neurogenic claudication      Procedures Performed  Procedure(s):  LUMBAR LAMINECTOMY WITH/WITHOUT FUSION L4-L5       Complications: none    Consultants:   Consults     Date and Time Order Name Status Description    1/31/2022  5:23 PM Inpatient Hospitalist Consult Completed           Condition on Discharge: stable    Discharge disposition: home    HPI: Mikael Shanks is a 86 y.o. male who  presented to the neurosurgery clinic with signs and symptoms of neurogenic claudication.  He did have a prior spinal surgery in 2015 at reportedly the L4-L5 level.  His imaging was concerning for severe central canal stenosis at L4-L5.  Flexion-extension film did show some multilevel instability.     Hospital Course: Patient admitted for above procedure. The patient was transferred to [facility] following recovery. Patient doing well POD 1.  He has been up ambulating.   He does have resolution of his leg pain and is extremely happy with that.  His incisional pain has significantly improved from this morning.  His dressing is CDI.  He is urinating with no issues and taking in good p.o. Dr. Gonzales personally examined patient this afternoon.He was doing extremely well.  His pain is well controlled.  His  oxygen saturation has been in the low 90s without supplementation.   It was requested to have nursing  provide him with an at home incentive spirometer that he use on a regular basis.  The dressing can be removed tomorrow.  He can begin showering tomorrow. D/C instructions were reviewed.   He will be discharged on pain medicine including spasm medications and stool softeners.  The  will work on home health physical and occupational therapy. He has been deemed safe for discharge at this time. He is to follow up with Dr. Gonzales in 2-3 weeks.     Vitals:    02/01/22 1450   BP: 125/61   Pulse: 70   Resp:    Temp: 97.8 °F (36.6 °C)   SpO2: 97%         Lab Results (last 24 hours)     Procedure Component Value Units Date/Time    POC Glucose Once [[id number]]  (Abnormal) Collected: 02/01/22 1640    Specimen: Blood Updated: 02/01/22 1641     Glucose 137 mg/dL      Comment: Serial Number: [id number]:  [id number]       POC Glucose Once [[id number]]  (Abnormal) Collected: 02/01/22 1208    Specimen: Blood Updated: 02/01/22 1210     Glucose 123 mg/dL      Comment: Serial Number: [id number]:  [id number]       POC Glucose Once [[id number]]  (Abnormal) Collected: 02/01/22 0812    Specimen: Blood Updated: 02/01/22 0813     Glucose 140 mg/dL      Comment: Serial Number: [id number]:  [id number]       POC Glucose Once [[id number]]  (Abnormal) Collected: 01/31/22 2113    Specimen: Blood Updated: 01/31/22 2114     Glucose 230 mg/dL      Comment: Serial Number: [id number]:  [id number]             Imaging Results (Last 24 Hours)     ** No results found for the last 24 hours. **            Discharge Physical Exam:      General Appearance:  Alert, cooperative, in no acute distress   Head:  Normocephalic, without obvious abnormality, atraumatic   Back:    Incisional dressing is clean dry and intact.  No obvious swelling noted   Abdomen:   nontender, nondistended   Extremities/MSK: Moves all extremities well,     Skin: No bleeding, bruising or rash   Neurologic: Cranial nerves 2 - 12 grossly intact, good strength bilateral lower extremity.          Discharge Medications  Inspect has been reviewed and narcotic consent is on file in the patient's chart.     Your medication list      START taking these medications      Instructions Last Dose Given Next Dose Due   bisacodyl 5 MG EC tablet  Commonly known as: DULCOLAX      Take 1 tablet by mouth Daily As Needed for Constipation.       cyclobenzaprine 10 MG tablet  Commonly known as: FLEXERIL      Take 1 tablet by mouth 3 (Three) Times a Day As Needed for Muscle Spasms.       HYDROcodone-acetaminophen 7.5-325 MG per tablet  Commonly known as: NORCO      Take 1 tablet by mouth Every 4 (Four) Hours As Needed for Moderate Pain  for up to 6 days.          CHANGE how you take these medications      Instructions Last Dose Given Next Dose Due   enalapril 5 MG tablet  Commonly known as: VASOTEC  What changed: additional instructions      Take 1 tablet by mouth Daily.       finasteride 5 MG tablet  Commonly known as: PROSCAR  What changed: additional instructions      Take 1 tablet by mouth Daily.       furosemide 20 MG tablet  Commonly known as: LASIX  What changed: additional instructions      Take 1 tablet by mouth 2 (Two) Times a Day.       glipizide 10 MG 24 hr tablet  Commonly known as: glipiZIDE XL  What changed: additional instructions      Take 1 tablet by mouth Daily.       metoprolol succinate XL 25 MG 24 hr tablet  Commonly known as: TOPROL-XL  What changed: additional instructions      Take 1 tablet by mouth Daily.       omeprazole 40 MG capsule  Commonly known as: priLOSEC  What changed: additional instructions      Take 1 capsule by mouth Daily.       sertraline 50 MG tablet  Commonly known as: ZOLOFT  What changed: additional instructions      TAKE 1 TABLET BY MOUTH  DAILY          CONTINUE taking these medications      Instructions Last Dose Given Next Dose Due   aspirin  81 MG tablet      Take 81 mg by mouth Daily. LD 1/29       atorvastatin 80 MG tablet  Commonly known as: LIPITOR      Take 1 tablet by mouth every night at bedtime.       cholecalciferol 10 MCG (400 UNIT) tablet  Commonly known as: VITAMIN D3      Take 400 Units by mouth Daily. Hold DOS       glucose blood test strip  Commonly known as: ONE TOUCH ULTRA TEST      Test 1 x daily       multivitamin with minerals tablet tablet      Take 1 tablet by mouth Daily. LD 1/24       ONE TOUCH ULTRA 2 w/Device kit      1 Device Daily. Test 1 x daily       OneTouch Delica Lancets 33G misc      1 strip Daily. Test 1 x daily             Where to Get Your Medications      These medications were sent to [address] [place] [address]    Hours: Mon-Fri 7:00AM-7:00PM Phone: [phone]   · bisacodyl 5 MG EC tablet  · cyclobenzaprine 10 MG tablet  · HYDROcodone-acetaminophen 7.5-325 MG per tablet         Discharge Diet:   Diet Instructions     Diet:      Diet Texture / Consistency: Regular          Activity at Discharge:   Activity Instructions     Discharge Activity      [facility]   [address] [facility]  [address] [place] [address], [place]  [address]   P: [phone]    Ervin Gonzales MD  F: [phone]        INSTRUCTION & CARE AFTER YOUR LUMBAR DECOMPRESSION    No lifting anything heavier than a gallon of milk    No driving for 2-3 days. We recommend that you limit riding in a car because of the risk of an accident. If you are a passenger, wear your seatbelt.    You may bend over or reach over your head as long as you don't lift anything heavy. Avoid harsh movements around your lower back to include twisting and bending.     Walk as much as possible. Change your position every two hours.    Remove the bandage on the second day after surgery and leave the incision open to air. If you notice any redness, swelling or drainage, call the office. You may have some mild  irritation from rubbing from your clothes, this is normal. You may cover with a padded dressing to prevent this irritation but change this twice a day and when it gets wet.    You may shower 48 hours after surgery. Don't let the water beat directly on the incision. Gently pat the incision with mild soap and water, pat dry. Do not submerge in water, to include hot pools, tubs, pools, lakes, ocean x 4 weeks.    Call as soon as possible after leaving the hospital to schedule an appointment in two weeks.    Your prescription for pain medication may be refilled on your follow up visit. Due to changes in Federal Law in order to have this medication refilled you must contact the office four days prior to the due date and make arrangements to pick the prescription up in the office. If you have a pain management contract, we will not resume control of these medications, you will need to notify your pain management provider that you had surgery.    Don't be alarmed if you experience some of your pre-operative symptoms after going home. This is not uncommon and normally goes away in a few days but may last longer. Pain, aching and stiffness in your back and down your legs is common. If you have any questions or concerns don't hesitate to call the office.    You can take any anti-inflammatory medications, to include Aleve, Ibuprofen, Mobic, Celebrex, Naproxen, Aspirin.     You may take Tylenol (acetaminophen) but use this sparingly since your pain medication also contains acetaminophen.     Constipation is common after surgery. Your bowels are slow due to anesthesia, opioid pain medications and lack of movement. We do not want you to strain to use the restroom. Use a mild stool softener or laxative as needed. Drink plenty of liquids to include water and juice.     Nausea is common with pain medications. To reduce this chance, do not take your medication on an empty stomach.    Thank you          Call for: questions or  concerns    Follow-up Appointments  Future Appointments   Date Time Provider Department Center   3/1/2022 10:15 AM Seipel, Joseph F, MD [facility]   3/24/2022  8:30 AM [facility]   3/30/2022  1:30 PM Xander Robison MD [facility]   4/25/2022  3:30 PM Dayday Ruiz MD [facility]      Follow-up Information     Xander Robison MD .    Specialty: Family Medicine  Contact information:  [address] [place] [address] DR BROUSSARD  [address] [place] [address] [phone]                         This patient was examined wearing appropriate personal protective equipment.     I discussed the discharge instructions with patient.     Catalina Rogers, RAMONITA  02/01/22  16:44 EST

## 2022-02-01 NOTE — PROGRESS NOTES
Neurosurgery Progress Note      Patient: Mikael Shanks    YOB: 1935    Medical Record Number: [id number]    Attending Physician: Geronimo Gonzales MD    Date of Admission: 1/31/2022  5:41 AM    Status Post: LUMBAR LAMINECTOMY WITH/WITHOUT FUSION L4-L5    Post Operative Day Number: 1    Admitting Dx: Spinal stenosis, lumbar region, with neurogenic claudication [M48.062]    General Appearance:  86 y.o. male lying in bed in no acute distress.  He is awake alert and oriented and reports incisional back pain.  He reports that he was up ambulating yesterday afternoon and had some increased incisional pain over the course of last night that was relieved with IV pain medication.  He also reports that his leg pain is completely resolved.  He is on 2 L nasal cannula and sats are in the high 90s.  He reports no shortness of air.    Current Problem List:   Patient Active Problem List   Diagnosis   • Type 2 diabetes mellitus without complication, without long-term current use of insulin ([facility])   • PAC (premature atrial contraction)   • Gastritis and gastroduodenitis   • Atherosclerosis of native coronary artery of native heart without angina pectoris   • Stage 3a chronic kidney disease (CMS/HCC)   • Hypertension, benign   • Spinal stenosis of lumbar region   • Anemia   • Asymptomatic peripheral vascular disease (HCC)   • Prostatic hypertrophy   • Pernicious anemia   • Depression   • Pulmonary valve disorder   • Hearing loss   • Family history of colon cancer   • BILL (obstructive sleep apnea)   • Stenosis of coronary artery stent   • Mixed hyperlipidemia   • Anxiety disorder, unspecified   • Grief   • Ischemic cardiomyopathy   • Hematuria   • Neurogenic claudication ([facility])   • Proteinuria   • Encounter for general adult medical examination with abnormal findings   • Carotid artery occlusion   • 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[facility])   • Bilateral carotid artery stenosis   • Left hip pain   • Benign prostatic hyperplasia   •  Mitral valve insufficiency   • Type 2 diabetes mellitus with stage 3a chronic kidney disease, without long-term current use of insulin ([facility])   • Hyperkalemia   • 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, unspecified ([facility])   • Diabetic nephropathy associated with type 2 diabetes mellitus ([facility])   • Spinal stenosis, lumbar region, with neurogenic claudication           Current Medications:  Scheduled Meds:atorvastatin, 80 mg, Oral, Daily  finasteride, 5 mg, Oral, Daily  insulin lispro, 0-7 Units, Subcutaneous, TID AC  metoprolol succinate XL, 25 mg, Oral, Daily  pantoprazole, 40 mg, Oral, QAM  sertraline, 50 mg, Oral, Daily  sodium chloride, 3 mL, Intravenous, Q12H      Continuous Infusions:lactated ringers, 20 mL/hr, Last Rate: 20 mL/hr (01/31/22 0704)  lactated ringers, 100 mL/hr      PRN Meds:.bisacodyl  •  bisacodyl  •  cyclobenzaprine  •  dextrose  •  dextrose  •  glucagon (human recombinant)  •  HYDROcodone-acetaminophen  •  insulin lispro **AND** insulin lispro  •  magnesium hydroxide  •  phenol  •  polyethylene glycol  •  senna-docusate sodium  •  sodium chloride      Diagnostic Tests:    Lab Results (last 24 hours)     Procedure Component Value Units Date/Time    POC Glucose Once [[id number]]  (Abnormal) Collected: 02/01/22 0812    Specimen: Blood Updated: 02/01/22 0813     Glucose 140 mg/dL      Comment: Serial Number: [id number]:  [id number]       POC Glucose Once [[id number]]  (Abnormal) Collected: 01/31/22 2113    Specimen: Blood Updated: 01/31/22 2114     Glucose 230 mg/dL      Comment: Serial Number: [id number]:  [id number]       POC Glucose Once [[id number]]  (Abnormal) Collected: 01/31/22 1210    Specimen: Blood Updated: 01/31/22 1211     Glucose 161 mg/dL      Comment: Serial Number: [id number]:  [id number]             Imaging Results (Last 24 Hours)     Procedure Component Value Units Date/Time    FL C Arm During Surgery [[id number]] Collected: 01/31/22 1544     Updated: 01/31/22 1548     Narrative:      DATE OF EXAM:  1/31/2022 8:30 AM     PROCEDURE:  XR SPINE LUMBAR 1 VW-, FL C ARM DURING SURGERY-     INDICATIONS:  LUMBAR LAMINECTOMY WITH/WITHOUT FUSION L4-L5; M48.062-Spinal stenosis,  lumbar region with neurogenic claudication     COMPARISON:  06/10/2021     TECHNIQUE:   A single view of the lumbar spine was obtained.     FINDINGS:  The fluoroscopy time for the study was 10 seconds.     Surgical instrumentation is noted at the posterior margin of the L4/5  level.        Impression:      Surgical instrumentation is noted at the posterior margin of the L4/5  level on this limited intraoperative fluoroscopic view.     Electronically Signed By-Loi Castillo MD On:1/31/2022 3:46 PM  This report was finalized on [id number] by  Loi Castillo MD.    XR Spine Lumbar 1 View [[id number]] Collected: 01/31/22 1544     Updated: 01/31/22 1548    Narrative:      DATE OF EXAM:  1/31/2022 8:30 AM     PROCEDURE:  XR SPINE LUMBAR 1 VW-, FL C ARM DURING SURGERY-     INDICATIONS:  LUMBAR LAMINECTOMY WITH/WITHOUT FUSION L4-L5; M48.062-Spinal stenosis,  lumbar region with neurogenic claudication     COMPARISON:  06/10/2021     TECHNIQUE:   A single view of the lumbar spine was obtained.     FINDINGS:  The fluoroscopy time for the study was 10 seconds.     Surgical instrumentation is noted at the posterior margin of the L4/5  level.        Impression:      Surgical instrumentation is noted at the posterior margin of the L4/5  level on this limited intraoperative fluoroscopic view.     Electronically Signed By-Loi Castillo MD On:1/31/2022 3:46 PM  This report was finalized on [id number] by  Loi Castillo MD.          Physical Exam:   General Appearance:  Alert, cooperative, in no acute distress   Head:  Normocephalic, without obvious abnormality, atraumatic   Back:    Incisional dressing is clean dry and intact.  No obvious swelling noted   Abdomen:   nontender, nondistended   Extremities/MSK: Moves all  extremities well,    Skin: No bleeding, bruising or rash   Neurologic: Cranial nerves 2 - 12 grossly intact, good strength bilateral lower extremity.         Vitals:    01/31/22 2024 02/01/22 0020 02/01/22 0605 02/01/22 0830   BP: 125/58 132/60 112/44 136/63   BP Location: Right arm Left arm  Left arm   Patient Position:    Lying   Pulse: 68 74 66 75   Resp: 16 18 18 18   Temp: 97.5 °F (36.4 °C) 97.5 °F (36.4 °C) 97.4 °F (36.3 °C) 97.5 °F (36.4 °C)   TempSrc: Oral Oral Oral Oral   SpO2: 97% 96% 95% 97%   Weight:       Height:             Assessment: 86-year-old male POD 1 lumbar decompression.  Patient doing fairly well.  He has been up ambulating yesterday.  He does have resolution of his leg pain and is extremely happy with that.  He is experiencing moderate to somewhat severe incisional back pain.  His oral medication was not covering the pain and a one-time dose of IV pain medication was ordered last night through our service that did relieve the pain.  His dressing is CDI.  Patient is receiving minimal supplemental oxygen via nasal cannula with sats in the high 90s.  He is urinating with no issues and taking in good p.o.  We will adjust his pain medications and reevaluate for discharge later this afternoon.    Plan:    Toradol x1 dose  Continue Pain management efforts  Continue mobilization efforts  Continue incisional care  Continue DVT Prophylaxis    Discharge Plan: Hopeful this afternoon pending pain control.    This patient was examined wearing appropriate personal protective equipment.     Patient findings were discussed with patient, nursing staff and Dr. Gonzales.    Date: 2/1/2022    RAMONITA Stoddard  09:47 EST

## 2022-02-01 NOTE — PLAN OF CARE
Goal Outcome Evaluation:  Plan of Care Reviewed With: patient, daughter           Outcome Summary: Pt is an 87 YO M admitted with lumbar stenosis, and neurogenic claudication. Pt state pain has resolved following L4-L5 laminectomy and central canal decompression. Pt states he lives at home alone, typically independent with all ADLs, ambulation without AD but owns RWx and cane. This date pt ambulates with RWx for approx 110 feet with CGA without LOB. Recommendation is pt to return home following d/c following up with HHPT for safe home mobility.

## 2022-02-01 NOTE — PLAN OF CARE
Goal Outcome Evaluation:  Plan of Care Reviewed With: patient, daughter        Progress: improving  Outcome Summary: Pt with hx PVD, Dm2, admitted w/ lumbar spinal stenosis & neurogenic claudication, leg pain. Much improved since L4-L5 bilateral laminectomy & central canal decompression via medial facetectomy was done, yesterday. Also in surgical note was lysis of old scar tissue. Pt has mildly limited cognition for recall of spinal precautions. He has good family support but lives alone & was (I) in all aspects of his life. He tolerated training well for use of sock aide, reacher, LHS, LHSH, but ST memory deficit is concerning for home follow through. Recommend [facility] OT, RN at d/c and OP PT once healed well enough to be cleared for exercise & stretching.

## 2022-02-01 NOTE — PLAN OF CARE
Goal Outcome Evaluation:  Plan of Care Reviewed With: patient        Progress: improving  Outcome Summary: Pt doing well. Ambulated in the parham before bed and ambulated to the bathroom multiple times throughout the night. Had some bad back pain in the night that the norco didn't help with, so I got a one time dose of morphine that helped him get some sleep. Will continue to monitor.

## 2022-02-01 NOTE — THERAPY EVALUATION
Patient Name: Mikael Shanks  : 1935    MRN: [id number]                              Today's Date: 2022       Admit Date: 2022    Visit Dx:     ICD-10-CM ICD-9-CM   1. Spinal stenosis, lumbar region, with neurogenic claudication  M48.062 724.03     Patient Active Problem List   Diagnosis   • Type 2 diabetes mellitus without complication, without long-term current use of insulin ([facility])   • PAC (premature atrial contraction)   • Gastritis and gastroduodenitis   • Atherosclerosis of native coronary artery of native heart without angina pectoris   • Stage 3a chronic kidney disease (CMS/HCC)   • Hypertension, benign   • Spinal stenosis of lumbar region   • Anemia   • Asymptomatic peripheral vascular disease ([facility])   • Prostatic hypertrophy   • Pernicious anemia   • Depression   • Pulmonary valve disorder   • Hearing loss   • Family history of colon cancer   • BILL (obstructive sleep apnea)   • Stenosis of coronary artery stent   • Mixed hyperlipidemia   • Anxiety disorder, unspecified   • Grief   • Ischemic cardiomyopathy   • Hematuria   • Neurogenic claudication ([facility])   • Proteinuria   • Encounter for general adult medical examination with abnormal findings   • Carotid artery occlusion   • 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[facility])   • Bilateral carotid artery stenosis   • Left hip pain   • Benign prostatic hyperplasia   • Mitral valve insufficiency   • Type 2 diabetes mellitus with stage 3a chronic kidney disease, without long-term current use of insulin ([facility])   • Hyperkalemia   • 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, unspecified ([facility])   • Diabetic nephropathy associated with type 2 diabetes mellitus ([facility])   • Spinal stenosis, lumbar region, with neurogenic claudication     Past Medical History:   Diagnosis Date   • Carotid artery disease ([facility])    • Coronary artery disease    • Diabetes mellitus ([facility])    • GERD (gastroesophageal reflux disease)    • [place] of hearing)    • Hyperlipidemia    • Hypertension    •  Osteoarthritis    • Prostatic hypertrophy    • Pulmonary valve disorder    • Sleep apnea     cpap   doesnt use    • Stroke (HCC)      Past Surgical History:   Procedure Laterality Date   • BACK SURGERY     • CARDIAC CATHETERIZATION N/A 12/5/2019    Procedure: Left Heart Cath and coronary angiogram;  Surgeon: Dayday Ruiz MD;  Location: [facility] CATH INVASIVE LOCATION;  Service: Cardiovascular   • CARDIAC CATHETERIZATION N/A 12/5/2019    Procedure: Right and Left Heart Cath;  Surgeon: Dayday Ruiz MD;  Location: [facility] CATH INVASIVE LOCATION;  Service: Cardiovascular   • CARDIAC CATHETERIZATION N/A 9/4/2020    Procedure: Left and Right Heart Cath with Coronary Angiography;  Surgeon: Dayday Ruiz MD;  Location: [facility] CATH INVASIVE LOCATION;  Service: Cardiovascular;  Laterality: N/A;   • CAROTID ENDARTERECTOMY Left    • CHOLECYSTECTOMY     • COLONOSCOPY  08/14/2018    No repeat   • CORONARY ANGIOPLASTY WITH STENT PLACEMENT        General Information     Row Name 02/01/22 1540          Physical Therapy Time and Intention    Document Type evaluation  -EL     Mode of Treatment individual therapy; physical therapy  -EL     Row Name 02/01/22 1540          General Information    Patient Profile Reviewed yes  -EL     Prior Level of Function independent:; all household mobility; ADL's  -EL     Existing Precautions/Restrictions oxygen therapy device and L/min  -EL     Row Name 02/01/22 1540          Living Environment    Lives With alone  -EL     Row Name 02/01/22 1540          Home Main Entrance    Number of Stairs, Main Entrance none  -EL     Row Name 02/01/22 1540          Stairs Within Home, Primary    Number of Stairs, Within Home, Primary none  -EL     Row Name 02/01/22 1540          Cognition    Orientation Status (Cognition) oriented x 4  -EL     Row Name 02/01/22 1540          Safety Issues, Functional Mobility    Impairments Affecting Function (Mobility) endurance/activity tolerance; shortness of breath   -EL           User Key  (r) = Recorded By, (t) = Taken By, (c) = Cosigned By    Initials Name Provider Type    Lisandro León, MARGARET Physical Therapist               Mobility     Row Name 02/01/22 1541          Bed Mobility    Bed Mobility bed mobility (all) activities  -EL     All Activities, [place] (Bed Mobility) verbal cues  -EL     Assistive Device (Bed Mobility) head of bed elevated  -EL     Row Name 02/01/22 1541          Bed-Chair Transfer    Bed-Chair [place] (Transfers) verbal cues; contact guard  -EL     Assistive Device (Bed-Chair Transfers) walker, front-wheeled  -EL     Row Name 02/01/22 1541          Sit-Stand Transfer    Sit-Stand [place] (Transfers) contact guard  -EL     Assistive Device (Sit-Stand Transfers) walker, front-wheeled  -EL     Row Name 02/01/22 1541          Gait/Stairs (Locomotion)    [place] Level (Gait) contact guard  -EL     Assistive Device (Gait) walker, front-wheeled  -EL     Distance in Feet (Gait) 110  -EL     Deviations/Abnormal Patterns (Gait) gait speed decreased  -EL           User Key  (r) = Recorded By, (t) = Taken By, (c) = Cosigned By    Initials Name Provider Type    Lisandro León, PT Physical Therapist               Obj/Interventions     Row Name 02/01/22 1543          Range of Motion Comprehensive    General Range of Motion bilateral lower extremity ROM WFL  -EL     Comment, General Range of Motion BLE WFL, limitations set by spinal precautions.  -     Row Name 02/01/22 1543          Strength Comprehensive (MMT)    General Manual Muscle Testing (MMT) Assessment lower extremity strength deficits identified  -EL     Comment, General Manual Muscle Testing (MMT) Assessment BLE 4/5 gross  -EL     Row Name 02/01/22 1543          Balance    Balance Assessment sitting static balance; standing static balance; standing dynamic balance  -EL     Static Sitting Balance WFL  -EL     Static Standing Balance WFL; supported  -EL     Dynamic Standing Balance  supported; mild impairment  -EL     Row Name 02/01/22 1543          Sensory Assessment (Somatosensory)    Sensory Assessment (Somatosensory) sensation intact  -EL           User Key  (r) = Recorded By, (t) = Taken By, (c) = Cosigned By    Initials Name Provider Type    Lisandro León, PT Physical Therapist               Goals/Plan     Row Name 02/01/22 1548          Bed Mobility Goal 1 (PT)    Activity/Assistive Device (Bed Mobility Goal 1, PT) bed mobility activities, all  -EL     [place] Level/Cues Needed (Bed Mobility Goal 1, PT) independent  -EL     Time Frame (Bed Mobility Goal 1, PT) long term goal (LTG); 2 weeks  -EL     Row Name 02/01/22 1548          Transfer Goal 1 (PT)    Activity/Assistive Device (Transfer Goal 1, PT) transfers, all  -EL     [place] Level/Cues Needed (Transfer Goal 1, PT) independent  -EL     Time Frame (Transfer Goal 1, PT) long term goal (LTG); 2 weeks  -EL     Row Name 02/01/22 1548          Gait Training Goal 1 (PT)    Activity/Assistive Device (Gait Training Goal 1, PT) gait (walking locomotion)  -EL     [place] Level (Gait Training Goal 1, PT) independent  -EL     Distance (Gait Training Goal 1, PT) 300  -EL     Time Frame (Gait Training Goal 1, PT) long term goal (LTG); 2 weeks  -EL           User Key  (r) = Recorded By, (t) = Taken By, (c) = Cosigned By    Initials Name Provider Type    Lisandro León, PT Physical Therapist               Clinical Impression     Row Name 02/01/22 1544          Pain    Additional Documentation Pain Scale: FACES Pre/Post-Treatment (Group)  -EL     Row Name 02/01/22 1542          Pain Scale: FACES Pre/Post-Treatment    Pain: FACES Scale, Pretreatment 0-->no hurt  -EL     Posttreatment Pain Rating 0-->no hurt  -EL     Row Name 02/01/22 1545          Plan of Care Review    Plan of Care Reviewed With patient; daughter  -EL     Outcome Summary Pt is an 87 YO M admitted with lumbar stenosis, and neurogenic claudication. Pt state pain has  resolved following L4-L5 laminectomy and central canal decompression. Pt states he lives at home alone, typically independent with all ADLs, ambulation without AD but owns RWx and cane. This date pt ambulates with RWx for approx 110 feet with CGA without LOB. Recommendation is pt to return home following d/c following up with HHPT for safe home mobility.  -EL     Row Name 02/01/22 1544          Therapy Assessment/Plan (PT)    Rehab Potential (PT) good, to achieve stated therapy goals  -EL     Criteria for Skilled Interventions Met (PT) yes  -EL     Predicted Duration of Therapy Intervention (PT) until d/c  -EL     Row Name 02/01/22 1544          Vital Signs    Pre SpO2 (%) 94  -EL     O2 Delivery Pre Treatment supplemental O2  -EL     Intra SpO2 (%) 90  -EL     O2 Delivery Intra Treatment room air  -EL     Post SpO2 (%) 92  -EL     O2 Delivery Post Treatment supplemental O2  -EL     Pre Patient Position Sitting  -EL     Intra Patient Position Standing  -EL     Post Patient Position Sitting  -EL     Row Name 02/01/22 1544          Positioning and Restraints    Pre-Treatment Position sitting in chair/recliner  -EL     Post Treatment Position chair  -EL     In Chair notified nsg; sitting; call light within reach; encouraged to call for assist; with family/caregiver; with nsg  -EL           User Key  (r) = Recorded By, (t) = Taken By, (c) = Cosigned By    Initials Name Provider Type    Lisandro León, PT Physical Therapist               Outcome Measures     Row Name 02/01/22 1549 02/01/22 1434       How much help from another person do you currently need...    Turning from your back to your side while in flat bed without using bedrails? 4  -EL 4  -MH    Moving from lying on back to sitting on the side of a flat bed without bedrails? 4  -EL 4  -MH    Moving to and from a bed to a chair (including a wheelchair)? 3  -EL 4  -MH    Standing up from a chair using your arms (e.g., wheelchair, bedside chair)? 3  -EL 4  -MH     Climbing 3-5 steps with a railing? 3  -EL 3  -    To walk in hospital room? 3  -EL 4  -    AM-PAC 6 Clicks Score (PT) 20  -EL 23  -    Row Name 02/01/22 1549 02/01/22 1434       Functional Assessment    Outcome Measure Options AM-PAC 6 Clicks Basic Mobility (PT)  - AM-PAC 6 Clicks Daily Activity (OT); AM-PAC 6 Clicks Basic Mobility (PT)  -          User Key  (r) = Recorded By, (t) = Taken By, (c) = Cosigned By    Initials Name Provider Type     Bere Benitez, OT Occupational Therapist    Lisandro León, PT Physical Therapist                             Physical Therapy Education                 Title: PT OT SLP Therapies (Done)     Topic: Physical Therapy (Done)     Point: Mobility training (Done)     Learning Progress Summary           Patient Acceptance, E,TB, VU by  at 2/1/2022 1549                   Point: Precautions (Done)     Learning Progress Summary           Patient Acceptance, E,TB, VU by  at 2/1/2022 1549                               User Key     Initials Effective Dates Name Provider Type [facility] 06/23/20 -  Lisandro Figueroa, PT Physical Therapist PT              PT Recommendation and Plan  Planned Therapy Interventions (PT): balance training, neuromuscular re-education, bed mobility training, transfer training, gait training, patient/family education, strengthening  Plan of Care Reviewed With: patient, daughter  Outcome Summary: Pt is an 85 YO M admitted with lumbar stenosis, and neurogenic claudication. Pt state pain has resolved following L4-L5 laminectomy and central canal decompression. Pt states he lives at home alone, typically independent with all ADLs, ambulation without AD but owns RWx and cane. This date pt ambulates with RWx for approx 110 feet with CGA without LOB. Recommendation is pt to return home following d/c following up with HHPT for safe home mobility.     Time Calculation:    PT Charges     Row Name 02/01/22 2690             Time Calculation    Start Time 1436   -EL      Stop Time 1452  -EL      Time Calculation (min) 14 min  -EL      PT Received On 02/01/22  -EL      PT - Next Appointment 02/03/22  -EL      PT Goal Re-Cert Due Date 02/15/22  -EL            User Key  (r) = Recorded By, (t) = Taken By, (c) = Cosigned By    Initials Name Provider Type    Lisandro León, PT Physical Therapist              Therapy Charges for Today     Code Description Service Date Service Provider Modifiers Qty    [id number] HC PT EVAL MOD COMPLEXITY 3 2/1/2022 Lisandro Figueroa, PT GP 1          PT G-Codes  Outcome Measure Options: AM-PAC 6 Clicks Basic Mobility (PT)  AM-PAC 6 Clicks Score (PT): 20  AM-PAC 6 Clicks Score (OT): 22    Lisandro Figueroa PT  2/1/2022

## 2022-02-01 NOTE — THERAPY EVALUATION
Patient Name: Mikael Shanks  : 1935    MRN: [id number]                              Today's Date: 2022       Admit Date: 2022    Visit Dx:     ICD-10-CM ICD-9-CM   1. Spinal stenosis, lumbar region, with neurogenic claudication  M48.062 724.03     Patient Active Problem List   Diagnosis   • Type 2 diabetes mellitus without complication, without long-term current use of insulin (Formerly [facility])   • PAC (premature atrial contraction)   • Gastritis and gastroduodenitis   • Atherosclerosis of native coronary artery of native heart without angina pectoris   • Stage 3a chronic kidney disease (CMS/HCC)   • Hypertension, benign   • Spinal stenosis of lumbar region   • Anemia   • Asymptomatic peripheral vascular disease (Formerly [facility])   • Prostatic hypertrophy   • Pernicious anemia   • Depression   • Pulmonary valve disorder   • Hearing loss   • Family history of colon cancer   • BILL (obstructive sleep apnea)   • Stenosis of coronary artery stent   • Mixed hyperlipidemia   • Anxiety disorder, unspecified   • Grief   • Ischemic cardiomyopathy   • Hematuria   • Neurogenic claudication ([facility]ly [facility])   • Proteinuria   • Encounter for general adult medical examination with abnormal findings   • Carotid artery occlusion   • 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Formerly [facility])   • Bilateral carotid artery stenosis   • Left hip pain   • Benign prostatic hyperplasia   • Mitral valve insufficiency   • Type 2 diabetes mellitus with stage 3a chronic kidney disease, without long-term current use of insulin (Formerly [facility])   • Hyperkalemia   • 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, unspecified ([facility]ly [facility])   • Diabetic nephropathy associated with type 2 diabetes mellitus ([facility])   • Spinal stenosis, lumbar region, with neurogenic claudication     Past Medical History:   Diagnosis Date   • Carotid artery disease ([facility]ly [facility])    • Coronary artery disease    • Diabetes mellitus ([facility])    • GERD (gastroesophageal reflux disease)    • [place] (hard of hearing)    • Hyperlipidemia    • Hypertension    •  Osteoarthritis    • Prostatic hypertrophy    • Pulmonary valve disorder    • Sleep apnea     cpap   doesnt use    • Stroke (HCC)      Past Surgical History:   Procedure Laterality Date   • BACK SURGERY     • CARDIAC CATHETERIZATION N/A 12/5/2019    Procedure: Left Heart Cath and coronary angiogram;  Surgeon: Dayday Ruiz MD;  Location:  [facility];  Service: Cardiovascular   • CARDIAC CATHETERIZATION N/A 12/5/2019    Procedure: Right and Left Heart Cath;  Surgeon: Dayday Ruiz MD;  Location:  [facility];  Service: Cardiovascular   • CARDIAC CATHETERIZATION N/A 9/4/2020    Procedure: Left and Right Heart Cath with Coronary Angiography;  Surgeon: Dayday Ruiz MD;  Location:  [facility];  Service: Cardiovascular;  Laterality: N/A;   • CAROTID ENDARTERECTOMY Left    • CHOLECYSTECTOMY     • COLONOSCOPY  08/14/2018    No repeat   • CORONARY ANGIOPLASTY WITH STENT PLACEMENT        General Information     Row Name 02/01/22 1421 02/01/22 1323       General Information    Prior Level of Function -- independent:; all household mobility; driving; ADL's; cooking; cleaning  -    Existing Precautions/Restrictions --  does not use O2 at home. Sats low 90's after toileting & short walk in [place] on room air.  - fall; oxygen therapy device and L/min  -    Barriers to Rehab none identified  - --    Row Name 02/01/22 1323          Living Environment    Lives With alone  -     Row Name 02/01/22 1323          Home Main Entrance    Number of Stairs, Main Entrance other (see comments)  ramp  -     Row Name 02/01/22 1323          Stairs Within Home, Primary    Number of Stairs, Within Home, Primary none  -     Stairs Comment, Within Home, Primary lives in a trailor  -     Row Name 02/01/22 1323          Cognition    Orientation Status (Cognition) oriented x 4  -     Row Name 02/01/22 1421 02/01/22 1323       Safety Issues, Functional Mobility    Safety  Issues Affecting Function (Mobility) safety precautions follow-through/compliance  did not recall safety precautions after toileting.  - safety precaution awareness  -    Impairments Affecting Function (Mobility) endurance/activity tolerance; shortness of breath  - endurance/activity tolerance  -          User Key  (r) = Recorded By, (t) = Taken By, (c) = Cosigned By    Initials Name Provider Type     Bere Benitez, OT Occupational Therapist                 Mobility/ADL's     Row Name 02/01/22 1423          Bed Mobility    Bed Mobility bed mobility (all) activities  -     All Activities, [place] (Bed Mobility) verbal cues  -     Assistive Device (Bed Mobility) head of bed elevated  -     Row Name 02/01/22 1423          Transfers    Transfers sit-stand transfer; toilet transfer; bed-chair transfer  -     Comment (Transfers) uncontrolled sit.  -     Bed-Chair [place] (Transfers) verbal cues; contact guard  -     Assistive Device (Bed-Chair Transfers) walker, front-wheeled  -     Sit-Stand [place] (Transfers) modified independence  -     [place] Level (Toilet Transfer) set up; standby assist  -     Row Name 02/01/22 1423          Functional Mobility    Functional Mobility- Ind. Level set up required  -     Functional Mobility- Device rolling walker  -     Functional Mobility- Comment has been walking around the entire unit X2 when he walks. OT allowed him to walk 150' then return to monitor O2 which was 90% on room air.  -     Row Name 02/01/22 1423          Activities of Daily Living    BADL Assessment/Intervention lower body dressing; toileting; grooming; feeding  -     Row Name 02/01/22 1423          Lower Body Dressing Assessment/Training    [place] Level (Lower Body Dressing) minimum assist (75% patient effort); verbal cues; nonverbal cues (demo/gesture); don; socks; shoes/slippers  -     Position (Lower Body Dressing) supported sitting  -      Comment (Lower Body Dressing) used sock aid, reacher, & LHSH to doff, don socks & don shoes. Demo use of LHS for LBB. Pt demo understanding but no recall yet after time has elapsed.  -     Row Name 02/01/22 1423          Toileting Assessment/Training    [place] Level (Toileting) adjust/manage clothing; perform perineal hygiene; modified [place]  -[facility] Name 02/01/22 1423          Grooming Assessment/Training    [place] Level (Grooming) wash face, hands; modified [place]  -[facility] Name 02/01/22 1423          Self-Feeding Assessment/Training    [place] Level (Feeding) feeding skills; independent  -     Position (Self-Feeding) edge of bed sitting  -           User Key  (r) = Recorded By, (t) = Taken By, (c) = Cosigned By    Initials Name Provider Type     Bere Benitez, OT Occupational Therapist               Obj/Interventions     Row Name 02/01/22 1426          Sensory Assessment (Somatosensory)    Sensory Subjective Reports numbness  PVD & DM2, mild numbness in feet.  -[facility] Name 02/01/22 1426          Vision Assessment/Intervention    Visual Impairment/Limitations corrective lenses full-time  not available  -[facility] Name 02/01/22 1426          Range of Motion Comprehensive    Comment, General Range of Motion shld flex 50% EOB, 75% when not painful sitting. Hip flex limited to 50% due to spinal prec.  -[facility] Name 02/01/22 1426          Strength Comprehensive (MMT)    Comment, General Manual Muscle Testing (MMT) Assessment BUE 4-/5; BLE 4-/5  -[facility] Name 02/01/22 1426          Balance    Balance Assessment sitting static balance; sitting dynamic balance; standing static balance; standing dynamic balance  -     Static Sitting Balance WFL  -     Dynamic Sitting Balance WFL  -     Static Standing Balance WFL; supported  -     Dynamic Standing Balance mild impairment; supported  -           User Key  (r) = Recorded By, (t) = Taken By, (c) = Cosigned By     Initials Name Provider Type     Bere Benitez, OT Occupational Therapist               Goals/Plan     Row Name 02/01/22 1433          Bathing Goal 1 (OT)    Activity/Device (Bathing Goal 1, OT) bathing skills, all; shower chair; long-handled sponge  -     [place] Level/Cues Needed (Bathing Goal 1, OT) set-up required  -     Time Frame (Bathing Goal 1, OT) 2 weeks  -     Row Name 02/01/22 1433          Dressing Goal 1 (OT)    Activity/Device (Dressing Goal 1, OT) lower body dressing; long-handled shoe horn; reacher; elastic laces; sock-aid  -     [place]/Cues Needed (Dressing Goal 1, OT) modified independence  -     Time Frame (Dressing Goal 1, OT) 2 weeks  -     Row Name 02/01/22 1433          Therapy Assessment/Plan (OT)    Planned Therapy Interventions (OT) activity tolerance training; BADL retraining; adaptive equipment training; occupation/activity based interventions; patient/caregiver education/training; functional balance retraining; transfer/mobility retraining  -           User Key  (r) = Recorded By, (t) = Taken By, (c) = Cosigned By    Initials Name Provider Type     Bere Benitez OT Occupational Therapist               Clinical Impression     Row Name 02/01/22 1428          Plan of Care Review    Plan of Care Reviewed With patient; daughter  -     Progress improving  -     Outcome Summary Pt admitted w/ lumbar spinal stenosis & neurogenic claudication, leg pain. Much improved since L4-L5 bilateral laminectomy & central canal decompression via medial facetectomy was done yesterday. Alos in surgical note was completed lysis of old scar tissue. Pt has mildly limited cognition for recall of spinal precautions. He has good family support but lives alone & was (I) in all aspects of his life. He tolerated training well for use of sock aide, reacher, LHS, LHSH, but ST memory deficit is concerning for home follow through. Recommend [facility] OT, RN at d/c and OP PT once healed well  enough to be cleared for exercise & stretching.  -     Row Name 02/01/22 1428          Therapy Assessment/Plan (OT)    Rehab Potential (OT) good, to achieve stated therapy goals  -     Criteria for Skilled Therapeutic Interventions Met (OT) skilled treatment is necessary  -     Therapy Frequency (OT) 5 times/wk  -     Predicted Duration of Therapy Intervention (OT) until D/C  -     Row Name 02/01/22 1428          Therapy Plan Review/Discharge Plan (OT)    Anticipated Discharge Disposition (OT) home with home health  -     Row Name 02/01/22 1428          Vital Signs    O2 Delivery Pre Treatment supplemental O2  -     Intra SpO2 (%) 90  -     O2 Delivery Intra Treatment room air  -     O2 Delivery Post Treatment supplemental O2  -     Pre Patient Position Supine  -     Intra Patient Position Standing  -     Post Patient Position Sitting  -     Row Name 02/01/22 1428          Positioning and Restraints    Pre-Treatment Position in bed  -     Post Treatment Position chair  -     In Chair sitting; call light within reach; encouraged to call for assist; with family/caregiver  -           User Key  (r) = Recorded By, (t) = Taken By, (c) = Cosigned By    Initials Name Provider Type     Bere Benitez, OT Occupational Therapist               Outcome Measures     Row Name 02/01/22 1432          How much help from another is currently needed...    Putting on and taking off regular lower body clothing? 3  -MH     Bathing (including washing, rinsing, and drying) 3  -MH     Toileting (which includes using toilet bed pan or urinal) 4  -MH     Putting on and taking off regular upper body clothing 4  -MH     Taking care of personal grooming (such as brushing teeth) 4  -MH     Eating meals 4  -MH     AM-PAC 6 Clicks Score (OT) 22  -     Row Name 02/01/22 1434          How much help from another person do you currently need...    Turning from your back to your side while in flat bed without using  bedrails? 4  -MH     Moving from lying on back to sitting on the side of a flat bed without bedrails? 4  -MH     Moving to and from a bed to a chair (including a wheelchair)? 4  -MH     Standing up from a chair using your arms (e.g., wheelchair, bedside chair)? 4  -MH     Climbing 3-5 steps with a railing? 3  -MH     To walk in hospital room? 4  -MH     AM-PAC 6 Clicks Score (PT) 23  -MH     Row Name 02/01/22 1434          Functional Assessment    Outcome Measure Options AM-PAC 6 Clicks Daily Activity (OT); AM-PAC 6 Clicks Basic Mobility (PT)  -           User Key  (r) = Recorded By, (t) = Taken By, (c) = Cosigned By    Initials Name Provider Type    Bere Manley OT Occupational Therapist                Occupational Therapy Education                 Title: PT OT SLP Therapies (Done)     Topic: Occupational Therapy (Done)     Point: ADL training (Done)     Description:   Instruct learner(s) on proper safety adaptation and remediation techniques during self care or transfers.   Instruct in proper use of assistive devices.              Learning Progress Summary           Patient Acceptance, E,TB,D,H, VU,DU,NL,NR by  at 2/1/2022 1435   Family Acceptance, E,TB,D,H, VU,DU,NL,NR by  at 2/1/2022 1435                   Point: Home exercise program (Done)     Description:   Instruct learner(s) on appropriate technique for monitoring, assisting and/or progressing therapeutic exercises/activities.              Learning Progress Summary           Patient Acceptance, E,TB,D,H, VU,DU,NL,NR by  at 2/1/2022 1435   Family Acceptance, E,TB,D,H, VU,DU,NL,NR by  at 2/1/2022 1435                   Point: Precautions (Done)     Description:   Instruct learner(s) on prescribed precautions during self-care and functional transfers.              Learning Progress Summary           Patient Acceptance, E,TB,D,H, VU,DU,NL,NR by  at 2/1/2022 1435   Family Acceptance, E,TB,D,H, VU,DU,NL,NR by  at 2/1/2022 1435                    Point: Body mechanics (Done)     Description:   Instruct learner(s) on proper positioning and spine alignment during self-care, functional mobility activities and/or exercises.              Learning Progress Summary           Patient Acceptance, E,TB,D,H, VU,DU,NL,NR by  at 2/1/2022 1435   Family Acceptance, E,TB,D,H, VU,DU,NL,NR by  at 2/1/2022 1435                               User Key     Initials Effective Dates Name Provider Type Discipline     06/16/21 -  Bere Benitez, MICHAEL Occupational Therapist OT              OT Recommendation and Plan  Planned Therapy Interventions (OT): activity tolerance training, BADL retraining, adaptive equipment training, occupation/activity based interventions, patient/caregiver education/training, functional balance retraining, transfer/mobility retraining  Therapy Frequency (OT): 5 times/wk  Plan of Care Review  Plan of Care Reviewed With: patient, daughter  Progress: improving  Outcome Summary: Pt admitted w/ lumbar spinal stenosis & neurogenic claudication, leg pain. Much improved since L4-L5 bilateral laminectomy & central canal decompression via medial facetectomy was done yesterday. Alos in surgical note was completed lysis of old scar tissue. Pt has mildly limited cognition for recall of spinal precautions. He has good family support but lives alone & was (I) in all aspects of his life. He tolerated training well for use of sock aide, reacher, LHS, LHSH, but ST memory deficit is concerning for home follow through. Recommend [facility] OT, RN at d/c and OP PT once healed well enough to be cleared for exercise & stretching.     Time Calculation:    Time Calculation- OT     Row Name 02/01/22 1437             Time Calculation-     OT Start Time 1341  -      OT Stop Time 1419  -      OT Time Calculation (min) 38 min  -      Total Timed Code Minutes- OT 23 minute(s)  -      OT Received On 02/01/22  -      OT - Next Appointment 02/02/22  -      OT Goal Re-Cert Due  Date 02/15/22  -            User Key  (r) = Recorded By, (t) = Taken By, (c) = Cosigned By    Initials Name Provider Type     Bere Benitez OT Occupational Therapist              Therapy Charges for Today     Code Description Service Date Service Provider Modifiers Qty    [id number]  OT SELF CARE/MGMT/TRAIN EA 15 MIN 2/1/2022 Bere Benitez OT GO 2    [id number]  OT EVAL MOD COMPLEXITY 2 2/1/2022 Bere Benitez OT GO 1               Bere Benitez OT  2/1/2022

## 2022-02-01 NOTE — CASE MANAGEMENT/SOCIAL WORK
Discharge Planning Assessment  [facility]     Patient Name: Mikael Shanks  MRN: [id number]  Today's Date: 2/1/2022    Admit Date: 1/31/2022     Discharge Needs Assessment     Row Name 02/01/22 1454       Living Environment    Lives With alone    Current Living Arrangements home/apartment/condo    Primary Care Provided by self    Provides Primary Care For no one    Family Caregiver if Needed child(elizabeth), adult    Family Caregiver Names lilibeth cervantes    Quality of Family Relationships supportive    Able to Return to Prior Arrangements yes       Resource/Environmental Concerns    Resource/Environmental Concerns none    Transportation Concerns car, none       Transition Planning    Patient/Family Anticipates Transition to home    Patient/Family Anticipated Services at Transition none    Transportation Anticipated family or friend will provide       Discharge Needs Assessment    Readmission Within the Last 30 Days no previous admission in last 30 days    Equipment Currently Used at Home cpap; walker, rolling; cane, straight; glucometer    Concerns to be Addressed discharge planning    Anticipated Changes Related to Illness none    Equipment Needed After Discharge none    Outpatient/Agency/Support Group Needs homecare agency    Discharge Facility/Level of Care Needs home with home health    Provided Post Acute Provider List? Yes    Post Acute Provider List Home Health; Inpatient Rehab    Delivered To Support Person    Method of Delivery In person               Discharge Plan     Row Name 02/01/22 1457       Plan    Plan Home with hh vs ip rehab pending PT  eval and clinical course    Plan Comments Spoke with patient and family at bedside.  Confirmed pcp and pharmacy.  Home with hh or IP rehab pending PT , OT eval.  Family can transport patient at discharge.              Continued Care and Services - Admitted Since 1/31/2022    Coordination has not been started for this encounter.          Demographic Summary     Row Name  02/01/22 1453       General Information    Admission Type observation    Arrived From PACU/recovery room    Referral Source admission list    Reason for Consult discharge planning    Preferred Language English               Functional Status     Row Name 02/01/22 1454       Functional Status    Usual Activity Tolerance good    Current Activity Tolerance moderate       Functional Status, IADL    Medications independent    Meal Preparation independent    Housekeeping independent    Laundry independent    Shopping independent       Mental Status    General Appearance WDL WDL       Mental Status Summary    Recent Changes in Mental Status/Cognitive Functioning no changes                         Myla Allen RN   Met with patient in room wearing PPE: mask, face shield/goggles, gloves, gown.      Maintained distance greater than six feet and spent less than 15 minutes in the room.

## 2022-02-01 NOTE — PROGRESS NOTES
Spoke to patient demographics verified  Agrees to [facility] services   Understands [facility] will call to arrange his HHC visit

## 2022-02-01 NOTE — DISCHARGE PLACEMENT REQUEST
"Angela Bales (86 y.o. Male)             Date of Birth Social Security Number Address Home Phone MRN    1935  [id number]  [address] [facility]  [place] [address] [phone] [id number]    Christianity Marital Status             None        Admission Date Admission Type Admitting Provider Attending Provider Department, Room/Bed    1/31/22 Elective Geronimo Gonzales MD Snyder, Brian N, MD [facility] SURGICAL INPATIENT, 4104/1    Discharge Date Discharge Disposition Discharge Destination                         Attending Provider: Geronimo Gonzales MD    Allergies: Penicillins    Isolation: None   Infection: None   Code Status: CPR   Advance Care Planning Activity    Ht: 177.8 cm (70\")   Wt: 85.7 kg (188 lb 15 oz)    Admission Cmt: None   Principal Problem: Spinal stenosis of lumbar region [M48.061] More...                 Active Insurance as of 1/31/2022     Primary Coverage     Payor Plan Insurance Group Employer/Plan Group    MEDICARE MEDICARE A & B      Payor Plan Address Payor Plan Phone Number Payor Plan Fax Number Effective Dates    [address] [phone]  7/1/2000 - None Entered    [facility] [id number]       Subscriber Name Subscriber Birth Date Member ID       ANGELA BALES 1935 [id number]           Secondary Coverage     Payor Plan Insurance Group Employer/Plan Group    [employer]     Payor Plan Address Payor Plan Phone Number Payor Plan Fax Number Effective Dates    [address] [phone]  7/1/2000 - None Entered    [facility] [id number]       Subscriber Name Subscriber Birth Date Member ID       ANGELA BALES 1935 [id number]                 Emergency Contacts      (Rel.) Home Phone Work Phone Mobile Phone    Sadia Cintron (Daughter) -- -- [phone]    TAN BALES (Brother) -- -- [phone]                "

## 2022-02-02 ENCOUNTER — TRANSITIONAL CARE MANAGEMENT TELEPHONE ENCOUNTER (OUTPATIENT)
Dept: CALL CENTER | Facility: HOSPITAL | Age: 87
End: 2022-02-02

## 2022-02-02 ENCOUNTER — HOME HEALTH ADMISSION (OUTPATIENT)
Dept: HOME HEALTH SERVICES | Facility: HOME HEALTHCARE | Age: 87
End: 2022-02-02

## 2022-02-02 NOTE — CASE MANAGEMENT/SOCIAL WORK
Case Management Discharge Note      Final Note: HOME WITH [facility]    Provided Post Acute Provider List?: Yes  Post Acute Provider List: Home Health, Inpatient Rehab  Delivered To: Support Person  Method of Delivery: In person    Selected Continued Care - Discharged on 2/1/2022 Admission date: 1/31/2022 - [place]charge [place] [facility]        Home Medical Care Coordination complete.    Service Provider Selected Services Address Phone Fax Patient Preferred    [facility]  [facility] [address] [facility] [id number] [phone] --                       Final Discharge Disposition Code: 06 - home with home health care

## 2022-02-02 NOTE — OUTREACH NOTE
Call Center TCM Note      Responses   [facility] patient discharged from? [facility]   Does the patient have one of the following disease processes/diagnoses(primary or secondary)? General Surgery   TCM attempt successful? No   Unsuccessful attempts Attempt 1   Call Status Left message          Natalia Chisholm MA    2/2/2022, 13:34 EST

## 2022-02-02 NOTE — OUTREACH NOTE
Call Center TCM Note      Responses   [facility] patient discharged from? [place]   Does the patient have one of the following disease processes/diagnoses(primary or secondary)? General Surgery   TCM attempt successful? Yes   Discharge diagnosis LUMBAR LAMINECTOMY WITH/WITHOUT FUSION L4-L5   Meds reviewed with patient/caregiver? Yes   Is the patient having any side effects they believe may be caused by any medication additions or changes? No   Does the patient have all medications related to this admission filled (includes all antibiotics, pain medications, etc.) Yes   Is the patient taking all medications as directed (includes completed medication regime)? Yes   Does the patient have a follow up appointment scheduled with their surgeon? No   What is preventing the patient from scheduling follow up appointments? Haven't had time   Has the patient kept scheduled appointments due by today? N/A   What is the Home health agency?  [facility]    Has home health visited the patient within 72 hours of discharge? Yes   Psychosocial issues? No   Did the patient receive a copy of their discharge instructions? Yes   Nursing interventions Reviewed instructions with patient   What is the patient's perception of their health status since discharge? Improving   Nursing interventions Nurse provided patient education   Is the patient /caregiver able to teach back basic post-op care? Continue use of incentive spirometry at least 1 week post discharge,  Take showers only when approved by MD-sponge bathe until then,  Do not remove steri-strips,  Lifting as instructed by MD in discharge instructions,  No tub bath, swimming, or hot tub until instructed by MD,  Practice 'cough and deep breath',  Drive as instructed by MD in discharge instructions,  Keep incision areas clean,dry and protected   Is the patient/caregiver able to teach back signs and symptoms of incisional infection? Increased redness, swelling or pain at the  incisonal site,  Pus or odor from incision,  Fever,  Increased drainage or bleeding,  Incisional warmth   Is the patient/caregiver able to teach back steps to recovery at home? Set small, achievable goals for return to baseline health,  Practice good oral hygiene,  Rest and rebuild strength, gradually increase activity,  Eat a well-balance diet,  Make a list of questions for surgeon's appointment   If the patient is a current smoker, are they able to teach back resources for cessation? Not a smoker   Is the patient/caregiver able to teach back the hierarchy of who to call/visit for symptoms/problems? PCP, Specialist, Home health nurse, Urgent Care, ED, 911 Yes   TCM call completed? Yes   Wrap up additional comments Pt doing fair s/p lumbar laminectomy. Moderate post op pain but all meds in place. HH seeing pt. No questions. Pt will call to sched POST OP appt           Natalia Chisholm MA    2/2/2022, 15:52 EST

## 2022-02-02 NOTE — OUTREACH NOTE
Prep Survey      Responses   [facility] patient discharged from? [place]   Is LACE score < 7 ? No   Emergency Room discharge w/ pulse ox? No   Eligibility [facility]   Date of Admission 01/31/22   Date of Discharge 02/01/22   Discharge Disposition Home or Self Care   Discharge diagnosis LUMBAR LAMINECTOMY WITH/WITHOUT FUSION L4-L5   Does the patient have one of the following disease processes/diagnoses(primary or secondary)? General Surgery   Does the patient have Home health ordered? Yes   What is the Home health agency?  [facility]    Is there a DME ordered? No   Prep survey completed? Yes          Sophia Perez RN

## 2022-02-03 ENCOUNTER — TELEPHONE (OUTPATIENT)
Dept: FAMILY MEDICINE CLINIC | Facility: CLINIC | Age: 87
End: 2022-02-03

## 2022-02-03 ENCOUNTER — HOME CARE VISIT (OUTPATIENT)
Dept: HOME HEALTH SERVICES | Facility: HOME HEALTHCARE | Age: 87
End: 2022-02-03

## 2022-02-03 NOTE — TELEPHONE ENCOUNTER
Called and left a message that I was calling to check on him and schedule a follow up visit with JARROD.

## 2022-02-04 ENCOUNTER — HOME CARE VISIT (OUTPATIENT)
Dept: HOME HEALTH SERVICES | Facility: HOME HEALTHCARE | Age: 87
End: 2022-02-04

## 2022-02-07 ENCOUNTER — HOME CARE VISIT (OUTPATIENT)
Dept: HOME HEALTH SERVICES | Facility: HOME HEALTHCARE | Age: 87
End: 2022-02-07

## 2022-02-07 VITALS
OXYGEN SATURATION: 95 % | DIASTOLIC BLOOD PRESSURE: 80 MMHG | SYSTOLIC BLOOD PRESSURE: 130 MMHG | HEART RATE: 78 BPM | RESPIRATION RATE: 18 BRPM | TEMPERATURE: 98.1 F

## 2022-02-07 PROCEDURE — G0151 HHCP-SERV OF PT,EA 15 MIN: HCPCS

## 2022-02-08 NOTE — HOME HEALTH
1w1.2w2.1w4 monday and Wednesday  PT EVAL  Patient is a 86  year old  male,referred for skilled PT interventions after recent hospital stay due to L4-L5 laminectomy  SOCIAL SUPPORT/HOME LIVING SITUATION. Lives alone, home is uncluttered, ramp to get in the home, ambulates with a RW  PLOF. Px reports being independent with a cane prior to hospitalization  Vital Signs. Please see [facility]/eval on this visit  Homebound status. Due to dec act tolerance, weakness, decline in transfers and ambulation. Patient requires a taxing effort to leave home, putting patient at risk for falls or injury  Skilled PT needed to improve patient's functional mobility, improve safety for transfers and ambulation and return patient to PLOF.  Patient req Min A for transfers, Min A for gait with a RW with dec vignesh, dec step length.

## 2022-02-09 ENCOUNTER — HOME CARE VISIT (OUTPATIENT)
Dept: HOME HEALTH SERVICES | Facility: HOME HEALTHCARE | Age: 87
End: 2022-02-09

## 2022-02-09 VITALS
HEART RATE: 83 BPM | TEMPERATURE: 97.8 F | DIASTOLIC BLOOD PRESSURE: 70 MMHG | OXYGEN SATURATION: 96 % | RESPIRATION RATE: 16 BRPM | SYSTOLIC BLOOD PRESSURE: 126 MMHG

## 2022-02-09 PROCEDURE — G0157 HHC PT ASSISTANT EA 15: HCPCS

## 2022-02-09 PROCEDURE — G0180 MD CERTIFICATION HHA PATIENT: HCPCS | Performed by: NEUROLOGICAL SURGERY

## 2022-02-10 ENCOUNTER — READMISSION MANAGEMENT (OUTPATIENT)
Dept: CALL CENTER | Facility: HOSPITAL | Age: 87
End: 2022-02-10

## 2022-02-10 NOTE — HOME HEALTH
pt up and feeling fair today, no new c/o's and pt is motivated to improve and return to plf.     pt is using his walker at all times for safety and reports he returns to the surgeon for f/u on 2/15.   incision is clean and dry with no noted issues.       pt was compliant throughout PT session but with limitations from weakness.   pt was challenged today to ambulate long distance and eventually fatigued and needed to sit.  pt was minimnally unsteady upon standing but was able to self correct,  with repeated attempts needed to stand.    pt was encouraged to use the walker at all times and was educated on proper use of the walker.     pt was fatigued to end PT session but was pleased to have participated and agrees to attempt review of hep as able

## 2022-02-10 NOTE — OUTREACH NOTE
General Surgery Week 2 Survey      Responses   [facility] patient discharged from? [place]   Does the patient have one of the following disease processes/diagnoses(primary or secondary)? General Surgery   Week 2 attempt successful? Yes   Call start time 0720   Call end time 0724   Discharge diagnosis LUMBAR LAMINECTOMY WITH/WITHOUT FUSION L4-L5   Is the patient taking all medications as directed (includes completed medication regime)? Yes   Does the patient have a follow up appointment scheduled with their surgeon? Yes   Has the patient kept scheduled appointments due by today? N/A   Comments Apptment is on Tuesday   What is the Home health agency?  [facility]    Has home health visited the patient within 72 hours of discharge? Yes   Home health comments PT   Did the patient receive a copy of their discharge instructions? Yes   Nursing interventions Reviewed instructions with patient   What is the patient's perception of their health status since discharge? Improving   Nursing interventions Nurse provided patient education   Is the patient /caregiver able to teach back basic post-op care? Drive as instructed by MD in discharge instructions,  No tub bath, swimming, or hot tub until instructed by MD,  Keep incision areas clean,dry and protected   Is the patient/caregiver able to teach back signs and symptoms of incisional infection? Fever,  Increased redness, swelling or pain at the incisonal site,  Increased drainage or bleeding   Is the patient/caregiver able to teach back steps to recovery at home? Set small, achievable goals for return to baseline health,  Rest and rebuild strength, gradually increase activity,  Eat a well-balance diet   If the patient is a current smoker, are they able to teach back resources for cessation? Not a smoker   Is the patient/caregiver able to teach back the hierarchy of who to call/visit for symptoms/problems? PCP, Specialist, Home health nurse, Urgent Care, ED, 911 Yes    Week 2 call completed? Yes   Wrap up additional comments Pt states he is doing well postop. States he has some aches and pains this morning but attributes this to the PT session he had yesterday. States Post op appointment is next Tuesday.           Natalia La RN

## 2022-02-14 ENCOUNTER — HOME CARE VISIT (OUTPATIENT)
Dept: HOME HEALTH SERVICES | Facility: HOME HEALTHCARE | Age: 87
End: 2022-02-14

## 2022-02-14 VITALS
SYSTOLIC BLOOD PRESSURE: 136 MMHG | DIASTOLIC BLOOD PRESSURE: 70 MMHG | TEMPERATURE: 97.8 F | OXYGEN SATURATION: 97 % | RESPIRATION RATE: 17 BRPM | HEART RATE: 78 BPM

## 2022-02-14 PROCEDURE — G0157 HHC PT ASSISTANT EA 15: HCPCS

## 2022-02-15 ENCOUNTER — OFFICE VISIT (OUTPATIENT)
Dept: NEUROSURGERY | Facility: CLINIC | Age: 87
End: 2022-02-15

## 2022-02-15 ENCOUNTER — TELEPHONE (OUTPATIENT)
Dept: FAMILY MEDICINE CLINIC | Facility: CLINIC | Age: 87
End: 2022-02-15

## 2022-02-15 VITALS
DIASTOLIC BLOOD PRESSURE: 63 MMHG | TEMPERATURE: 97.1 F | BODY MASS INDEX: 26.92 KG/M2 | HEIGHT: 70 IN | HEART RATE: 56 BPM | SYSTOLIC BLOOD PRESSURE: 147 MMHG | OXYGEN SATURATION: 99 % | WEIGHT: 188 LBS | RESPIRATION RATE: 18 BRPM

## 2022-02-15 DIAGNOSIS — M48.062 SPINAL STENOSIS, LUMBAR REGION, WITH NEUROGENIC CLAUDICATION: Primary | ICD-10-CM

## 2022-02-15 PROCEDURE — 99024 POSTOP FOLLOW-UP VISIT: CPT | Performed by: NEUROLOGICAL SURGERY

## 2022-02-15 RX ORDER — GLIMEPIRIDE 1 MG/1
TABLET ORAL
COMMUNITY
End: 2022-02-15

## 2022-02-15 NOTE — HOME HEALTH
pt up and prepared for PT today.   pt does report he lost his balance 2 night prior during the night, falling to the floor and hitting his head on the floor.     pt reports no pain from this,  presents today with skin tear on the L wrist.   pt reports he did not seek medical treatment.  pt was advised to go to ER for xrays and refuses today,  but reports he an appt with md tomorrow and will discuss at that time.      note send to PCP to inform of the fall.         pt was tolerant of the activities performed today but with weakness and fatigue,  low endurance and was mild guarded of the L hip.    pt was educated on proper form with hep review,  struggled at times with weakness and decreased rom due to this.   pt remains reliant on the walker and continues to have moderate wb through the UEs to alleviate wb through LLE as able.  advised pt to use the walker at all times and encouraged use of the grabber to retrieve objects from the floor

## 2022-02-16 ENCOUNTER — READMISSION MANAGEMENT (OUTPATIENT)
Dept: CALL CENTER | Facility: HOSPITAL | Age: 87
End: 2022-02-16

## 2022-02-16 ENCOUNTER — HOME CARE VISIT (OUTPATIENT)
Dept: HOME HEALTH SERVICES | Facility: HOME HEALTHCARE | Age: 87
End: 2022-02-16

## 2022-02-16 VITALS
DIASTOLIC BLOOD PRESSURE: 70 MMHG | RESPIRATION RATE: 18 BRPM | OXYGEN SATURATION: 97 % | TEMPERATURE: 97.8 F | HEART RATE: 82 BPM | SYSTOLIC BLOOD PRESSURE: 128 MMHG

## 2022-02-16 PROCEDURE — G0157 HHC PT ASSISTANT EA 15: HCPCS

## 2022-02-17 NOTE — HOME HEALTH
pt sitting up and feeling fair today,  no issues and no pain reported today.  pt was seen by the surgeon yesterday and reports satisfactory progress.   pt returns in 3 months.  incision is clean and dry with no noted issues   pt feels he is improving with PT intervention and has been attempting hep review as able, admits needing instruction/guidance     pt was compliant throughout PT session but struggled at times with weakness and fatigue.  pt was minimally unsteady upon standing but is now better able to self correct.    dynamic balance remains slightly unsteady but has improved,  pt still needs the walker for safety.   pt was educated on back safety including proper posture and mechanics and to avoid forward flexed posture, rotation and excessive bending

## 2022-02-21 ENCOUNTER — HOME CARE VISIT (OUTPATIENT)
Dept: HOME HEALTH SERVICES | Facility: HOME HEALTHCARE | Age: 87
End: 2022-02-21

## 2022-02-21 VITALS
OXYGEN SATURATION: 94 % | HEART RATE: 66 BPM | DIASTOLIC BLOOD PRESSURE: 70 MMHG | TEMPERATURE: 97.2 F | SYSTOLIC BLOOD PRESSURE: 130 MMHG

## 2022-02-21 PROCEDURE — G0151 HHCP-SERV OF PT,EA 15 MIN: HCPCS

## 2022-02-21 NOTE — HOME HEALTH
Patient was seen today, agreeable with PT. Patient compliant with plan of care. Tolerated all management well with sufficient rest breaks provided.. PT today received thera ex, gait training, balance ex, HEP teaching and transfer training.    HEP as follows:  seated hip flexion, LAQs, ankle DF, PF x 10-15 reps each  sit to stand x 10 reps from regular chair  ambulation with RW inside the home  standing thera ex: Marches, heel raises, hip abduction, mini squats      Plan next visit: HEP teaching, gait training

## 2022-02-23 ENCOUNTER — HOME CARE VISIT (OUTPATIENT)
Dept: HOME HEALTH SERVICES | Facility: HOME HEALTHCARE | Age: 87
End: 2022-02-23

## 2022-03-02 ENCOUNTER — HOME CARE VISIT (OUTPATIENT)
Dept: HOME HEALTH SERVICES | Facility: HOME HEALTHCARE | Age: 87
End: 2022-03-02

## 2022-03-02 VITALS
HEART RATE: 74 BPM | OXYGEN SATURATION: 96 % | SYSTOLIC BLOOD PRESSURE: 128 MMHG | TEMPERATURE: 97.6 F | DIASTOLIC BLOOD PRESSURE: 70 MMHG

## 2022-03-02 PROCEDURE — G0151 HHCP-SERV OF PT,EA 15 MIN: HCPCS

## 2022-03-02 NOTE — HOME HEALTH
Pt reports LE sore from walking in grocery store yesterday. Pt willing to attempt PT activities. No med changes. No adverse reaction reported.   Pt instructed on performance of supported standing marches, heel raises, side to side wt shifts, seated marches, LAQ, seated heel and toe raises x 10 reps.     Must be completed and at least every 30 days.    Clinical condition of patient at initial or last assessment:  Patient req Min A for transfers, Min A for gait with a RW with dec vignesh, dec step length    Current clinical condition of patient:  Pt I with transfers, supervision for ambulation on level surface x 100 ft with use of wheeled walker.     Overall progress towards measurable treatment goals:  Pt making progress.     Effectiveness of current plan:  Pt with improved mobility.    Plan for continuing or discontinuing service:  Pt has 3 more PT visits.     Changes to goals or care plan update to be completed in guideline section and communicated to MD:  NA    Statement of expectation of continued progress toward goals:  Pt with good prognosis of meeting goals.     Why is it necessary for therapy to continue?  Skilled PT required to restore safe mobility.    Plan for next visit strengthening, balance and gait training.

## 2022-03-09 ENCOUNTER — HOME CARE VISIT (OUTPATIENT)
Dept: HOME HEALTH SERVICES | Facility: HOME HEALTHCARE | Age: 87
End: 2022-03-09

## 2022-03-16 ENCOUNTER — HOME CARE VISIT (OUTPATIENT)
Dept: HOME HEALTH SERVICES | Facility: HOME HEALTHCARE | Age: 87
End: 2022-03-16

## 2022-03-16 VITALS
SYSTOLIC BLOOD PRESSURE: 130 MMHG | DIASTOLIC BLOOD PRESSURE: 66 MMHG | OXYGEN SATURATION: 97 % | RESPIRATION RATE: 17 BRPM | HEART RATE: 83 BPM | TEMPERATURE: 97.8 F

## 2022-03-16 PROCEDURE — G0157 HHC PT ASSISTANT EA 15: HCPCS

## 2022-03-17 NOTE — HOME HEALTH
pt sitting up and is prepared for PT session.   pt states he has had no falls/no med changes.     pt states he is motivated to improve and return to plf and is concerned regarding continued weakness and limited ability to wb through the LLE.      pt was compliant throughout PT session but struggled at times with weakness and fatigue as well as low endurance.    pt tolerated ther ex with only minimal c/o soreness through the R hip but mostly well managed.  pt remains guarded of the RLE with noted wb deficits.  pt was educated today on proper form with hep review, not yet ind but improved.

## 2022-03-23 ENCOUNTER — HOME CARE VISIT (OUTPATIENT)
Dept: HOME HEALTH SERVICES | Facility: HOME HEALTHCARE | Age: 87
End: 2022-03-23

## 2022-03-23 VITALS
OXYGEN SATURATION: 97 % | HEART RATE: 68 BPM | DIASTOLIC BLOOD PRESSURE: 80 MMHG | SYSTOLIC BLOOD PRESSURE: 130 MMHG | TEMPERATURE: 97.9 F

## 2022-03-23 DIAGNOSIS — D64.9 ANEMIA, UNSPECIFIED TYPE: ICD-10-CM

## 2022-03-23 DIAGNOSIS — E11.9 TYPE 2 DIABETES MELLITUS WITHOUT COMPLICATION, WITHOUT LONG-TERM CURRENT USE OF INSULIN: ICD-10-CM

## 2022-03-23 DIAGNOSIS — E78.2 MIXED HYPERLIPIDEMIA: Primary | ICD-10-CM

## 2022-03-23 PROCEDURE — G0151 HHCP-SERV OF PT,EA 15 MIN: HCPCS

## 2022-03-30 ENCOUNTER — OFFICE VISIT (OUTPATIENT)
Dept: FAMILY MEDICINE CLINIC | Facility: CLINIC | Age: 87
End: 2022-03-30

## 2022-03-30 VITALS
BODY MASS INDEX: 25.06 KG/M2 | WEIGHT: 179 LBS | DIASTOLIC BLOOD PRESSURE: 62 MMHG | OXYGEN SATURATION: 93 % | HEIGHT: 71 IN | HEART RATE: 75 BPM | TEMPERATURE: 97.7 F | SYSTOLIC BLOOD PRESSURE: 130 MMHG | RESPIRATION RATE: 20 BRPM

## 2022-03-30 DIAGNOSIS — N18.31 TYPE 2 DIABETES MELLITUS WITH STAGE 3A CHRONIC KIDNEY DISEASE, WITHOUT LONG-TERM CURRENT USE OF INSULIN: ICD-10-CM

## 2022-03-30 DIAGNOSIS — E78.2 MIXED HYPERLIPIDEMIA: ICD-10-CM

## 2022-03-30 DIAGNOSIS — E87.5 HYPERKALEMIA: ICD-10-CM

## 2022-03-30 DIAGNOSIS — E11.22 TYPE 2 DIABETES MELLITUS WITH STAGE 3A CHRONIC KIDNEY DISEASE, WITHOUT LONG-TERM CURRENT USE OF INSULIN: ICD-10-CM

## 2022-03-30 DIAGNOSIS — I10 HYPERTENSION, BENIGN: Primary | ICD-10-CM

## 2022-03-30 DIAGNOSIS — I25.5 ISCHEMIC CARDIOMYOPATHY: ICD-10-CM

## 2022-03-30 DIAGNOSIS — E11.9 TYPE 2 DIABETES MELLITUS WITHOUT COMPLICATION, WITHOUT LONG-TERM CURRENT USE OF INSULIN: ICD-10-CM

## 2022-03-30 DIAGNOSIS — N18.31 STAGE 3A CHRONIC KIDNEY DISEASE: ICD-10-CM

## 2022-03-30 DIAGNOSIS — D64.9 ANEMIA, UNSPECIFIED TYPE: ICD-10-CM

## 2022-03-30 DIAGNOSIS — M48.061 SPINAL STENOSIS OF LUMBAR REGION, UNSPECIFIED WHETHER NEUROGENIC CLAUDICATION PRESENT: ICD-10-CM

## 2022-03-30 LAB
EXPIRATION DATE: ABNORMAL
GLUCOSE BLDC GLUCOMTR-MCNC: 119 MG/DL (ref 70–130)
HBA1C MFR BLD: 5.9 %
Lab: 924

## 2022-03-30 PROCEDURE — 83036 HEMOGLOBIN GLYCOSYLATED A1C: CPT | Performed by: FAMILY MEDICINE

## 2022-03-30 PROCEDURE — 3044F HG A1C LEVEL LT 7.0%: CPT | Performed by: FAMILY MEDICINE

## 2022-03-30 PROCEDURE — 99214 OFFICE O/P EST MOD 30 MIN: CPT | Performed by: FAMILY MEDICINE

## 2022-03-30 RX ORDER — ENALAPRIL MALEATE 5 MG/1
5 TABLET ORAL DAILY
Qty: 90 TABLET | Refills: 3
Start: 2022-03-30 | End: 2022-11-01 | Stop reason: SDUPTHER

## 2022-03-30 RX ORDER — CYCLOBENZAPRINE HCL 10 MG
10 TABLET ORAL 3 TIMES DAILY PRN
Qty: 30 TABLET | Refills: 0 | Status: SHIPPED | OUTPATIENT
Start: 2022-03-30 | End: 2022-08-01

## 2022-03-30 RX ORDER — METOPROLOL SUCCINATE 25 MG/1
25 TABLET, EXTENDED RELEASE ORAL DAILY
Qty: 90 TABLET | Refills: 3
Start: 2022-03-30 | End: 2022-11-01 | Stop reason: SDUPTHER

## 2022-03-30 RX ORDER — GLIPIZIDE 10 MG/1
10 TABLET, FILM COATED, EXTENDED RELEASE ORAL DAILY
Qty: 90 TABLET | Refills: 3
Start: 2022-03-30 | End: 2023-01-25 | Stop reason: SDUPTHER

## 2022-04-25 ENCOUNTER — OFFICE VISIT (OUTPATIENT)
Dept: CARDIOLOGY | Facility: CLINIC | Age: 87
End: 2022-04-25

## 2022-04-25 VITALS
HEART RATE: 73 BPM | OXYGEN SATURATION: 98 % | DIASTOLIC BLOOD PRESSURE: 52 MMHG | SYSTOLIC BLOOD PRESSURE: 150 MMHG | BODY MASS INDEX: 25.48 KG/M2 | WEIGHT: 178 LBS | HEIGHT: 70 IN

## 2022-04-25 DIAGNOSIS — N18.30 STAGE 3 CHRONIC KIDNEY DISEASE, UNSPECIFIED WHETHER STAGE 3A OR 3B CKD: ICD-10-CM

## 2022-04-25 DIAGNOSIS — I25.5 ISCHEMIC CARDIOMYOPATHY: Primary | ICD-10-CM

## 2022-04-25 DIAGNOSIS — E11.9 TYPE 2 DIABETES MELLITUS WITHOUT COMPLICATION, WITHOUT LONG-TERM CURRENT USE OF INSULIN: ICD-10-CM

## 2022-04-25 DIAGNOSIS — I10 HYPERTENSION, BENIGN: ICD-10-CM

## 2022-04-25 DIAGNOSIS — E78.2 MIXED HYPERLIPIDEMIA: ICD-10-CM

## 2022-04-25 DIAGNOSIS — R06.02 SHORTNESS OF BREATH: ICD-10-CM

## 2022-04-25 PROCEDURE — 99214 OFFICE O/P EST MOD 30 MIN: CPT | Performed by: INTERNAL MEDICINE

## 2022-04-25 NOTE — PROGRESS NOTES
Encounter Date:04/25/2022  Last seen 10/25/2021      Patient ID: Mikael Shanks is a 86 y.o. male.    Chief Complaint:  Congestive heart failure  Status post stent  Hypertension  Diabetes  Renal dysfunction     History of Present Illness     Since I have last seen, the patient has been without any chest discomfort ,shortness of breath, palpitations, dizziness or syncope.  Denies having any headache ,abdominal pain ,nausea, vomiting , diarrhea constipation, loss of weight or loss of appetite.  Denies having any excessive bruising ,hematuria or blood in the stool.    Review of all systems negative except as indicated.    Reviewed ROS.    Assessment and Plan      ]]]]]]]]]]]]]]]]]]]]]]]  Impression  ========   -Status post stent 2003 at [facility].     - Ischemic cardiomyopathy with ejection fraction of 25%.-Improved and 60 %     Cardiac catheterization 9/4/2020 revealed  Left ventricular size and contractility is normal with ejection fraction of 60%.  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  Left anterior descending artery has luminal irregularities.  Circumflex coronary artery has luminal irregularities.  Right coronary artery stent is patent with 20% plaquing.      Echocardiogram 8/31/2020  Thickened aortic valve with adequate opening motion.  Left atrial enlargement  Left ventricular size and contractility is normal with ejection fraction of 55 %'     -Status post acute on chronic systolic congestive heart failure-improved significant left ventricle dysfunction with ejection fraction of 25%.     -Status post acute respiratory failure with hypoxia-improved.       -Hypertension diabetes renal dysfunction.  BUN 14 creatinine 1.5.  Dyslipidemia     -History of anemia     -History of premature atrial contractions     -History of severe hypomagnesemia.-Improved     -Allergic to penicillin.   =======  Plan  =======  Status post stent.  Patient is not having  any angina pectoris or congestive heart failure.    Ischemic cardiomyopathy-improved.  Latest ejection fraction 55%.  No need for ICD     Hypertension- 150/50    Dyslipidemia-continue atorvastatin     Patient is not on ACE inhibitor due to pre-existing renal dysfunction.     Medications were reviewed and updated.  Follow-up in the office in 6 months.  Further plan will depend on patient's progress.  ]]]]]]]]]]]]]                    Diagnosis Plan   1. Ischemic cardiomyopathy     2. Type 2 diabetes mellitus without complication, without long-term current use of insulin (HCC)     3. Hypertension, benign     4. Mixed hyperlipidemia     5. Stage 3 chronic kidney disease, unspecified whether stage 3a or 3b CKD (HCC)     6. Shortness of breath     LAB RESULTS (LAST 7 DAYS)    CBC        BMP        CMP         BNP        TROPONIN        CoAg        Creatinine Clearance  CrCl cannot be calculated (Patient's most recent lab result is older than the maximum 30 days allowed.).    ABG        Radiology  No radiology results for the last day                The following portions of the patient's history were reviewed and updated as appropriate: allergies, current medications, past family history, past medical history, past social history, past surgical history and problem list.    Review of Systems   Constitutional: Negative for malaise/fatigue.   Cardiovascular: Negative for chest pain, leg swelling, palpitations and syncope.   Respiratory: Negative for shortness of breath.    Skin: Negative for rash.   Gastrointestinal: Negative for nausea and vomiting.   Neurological: Negative for dizziness, light-headedness and numbness.   All other systems reviewed and are negative.        Current Outpatient Medications:   •  aspirin 81 MG tablet, Take 81 mg by mouth Daily. LD 1/29, Disp: , Rfl:   •  atorvastatin (LIPITOR) 80 MG tablet, Take 1 tablet by mouth every night at bedtime., Disp: 90 tablet, Rfl: 3  •  bisacodyl (DULCOLAX) 5 MG EC  tablet, Take 1 tablet by mouth Daily As Needed for Constipation., Disp: 30 tablet, Rfl: 0  •  Blood Glucose Monitoring Suppl (ONE TOUCH ULTRA 2) w/Device kit, 1 Device Daily. Test 1 x daily, Disp: 1 each, Rfl: 0  •  cholecalciferol (VITAMIN D3) 10 MCG (400 UNIT) tablet, Take 400 Units by mouth Daily. Hold DOS, Disp: , Rfl:   •  cyclobenzaprine (FLEXERIL) 10 MG tablet, Take 1 tablet by mouth 3 (Three) Times a Day As Needed for Muscle Spasms., Disp: 30 tablet, Rfl: 0  •  enalapril (VASOTEC) 5 MG tablet, Take 1 tablet by mouth Daily., Disp: 90 tablet, Rfl: 3  •  finasteride (PROSCAR) 5 MG tablet, Take 1 tablet by mouth Daily. (Patient taking differently: Take 5 mg by mouth Daily. Take DOS), Disp: 90 tablet, Rfl: 3  •  furosemide (LASIX) 20 MG tablet, Take 1 tablet by mouth 2 (Two) Times a Day. (Patient taking differently: Take 20 mg by mouth 2 (Two) Times a Day. Hold DOS), Disp: 180 tablet, Rfl: 3  •  glipizide (glipiZIDE XL) 10 MG 24 hr tablet, Take 1 tablet by mouth Daily., Disp: 90 tablet, Rfl: 3  •  glucose blood (ONE TOUCH ULTRA TEST) test strip, Test 1 x daily, Disp: 100 each, Rfl: 5  •  metoprolol succinate XL (TOPROL-XL) 25 MG 24 hr tablet, Take 1 tablet by mouth Daily., Disp: 90 tablet, Rfl: 3  •  multivitamin with minerals tablet tablet, Take 1 tablet by mouth Daily. LD 1/24, Disp: , Rfl:   •  omeprazole (priLOSEC) 40 MG capsule, Take 1 capsule by mouth Daily. (Patient taking differently: Take 40 mg by mouth Daily. Take DOS), Disp: 90 capsule, Rfl: 3  •  OneTouch Delica Lancets 33G misc, 1 strip Daily. Test 1 x daily, Disp: 100 each, Rfl: 5  •  sertraline (ZOLOFT) 50 MG tablet, TAKE 1 TABLET BY MOUTH  DAILY (Patient taking differently: Take 50 mg by mouth Daily. Take DOS), Disp: 30 tablet, Rfl: 11    Current Facility-Administered Medications:   •  cyanocobalamin injection 1,000 mcg, 1,000 mcg, Intramuscular, Q28 Days, Xander Robison MD, 1,000 mcg at 12/29/21 1541    Allergies   Allergen Reactions   •  Penicillins Hives       Family History   Problem Relation Age of Onset   • Cancer Mother    • Heart disease Father    • Cancer Brother        Past Surgical History:   Procedure Laterality Date   • BACK SURGERY     • CARDIAC CATHETERIZATION N/A 12/5/2019    Procedure: Left Heart Cath and coronary angiogram;  Surgeon: Dayday Ruiz MD;  Location: [facility] CATH INVASIVE LOCATION;  Service: Cardiovascular   • CARDIAC CATHETERIZATION N/A 12/5/2019    Procedure: Right and Left Heart Cath;  Surgeon: Dayday Ruiz MD;  Location: [facility] CATH INVASIVE LOCATION;  Service: Cardiovascular   • CARDIAC CATHETERIZATION N/A 9/4/2020    Procedure: Left and Right Heart Cath with Coronary Angiography;  Surgeon: Dayday Ruiz MD;  Location: [facility] CATH INVASIVE LOCATION;  Service: Cardiovascular;  Laterality: N/A;   • CAROTID ENDARTERECTOMY Left    • CHOLECYSTECTOMY     • COLONOSCOPY  08/14/2018    No repeat   • CORONARY ANGIOPLASTY WITH STENT PLACEMENT     • LUMBAR LAMINECTOMY Bilateral 1/31/2022    Procedure: LUMBAR LAMINECTOMY WITH/WITHOUT FUSION L4-L5;  Surgeon: Geronimo Gonzales MD;  Location: [facility] MAIN OR;  Service: Neurosurgery;  Laterality: Bilateral;       Past Medical History:   Diagnosis Date   • Carotid artery disease (HCC)    • Coronary artery disease    • Diabetes mellitus (HCC)    • GERD (gastroesophageal reflux disease)    • [place] IRA (hard of hearing)    • Hyperlipidemia    • Hypertension    • Osteoarthritis    • Prostatic hypertrophy    • Pulmonary valve disorder    • Sleep apnea     cpap   doesnt use    • Stroke (HCC)        Family History   Problem Relation Age of Onset   • Cancer Mother    • Heart disease Father    • Cancer Brother        Social History     Socioeconomic History   • Marital status:    Tobacco Use   • Smoking status: Former Smoker     Packs/day: 5.00     Years: 7.00     Pack years: 35.00     Types: Cigarettes     Start date: 8/31/1953     Quit date: 8/31/1960     Years since quitting:  "61.6   • Smokeless tobacco: Never Used   Vaping Use   • Vaping Use: Never used   Substance and Sexual Activity   • Alcohol use: Not Currently     Comment: very rare   • Drug use: No   • Sexual activity: Never         Procedures      Objective:       Physical Exam    /52 (BP Location: Right arm, Patient Position: Sitting, Cuff Size: Adult)   Pulse 73   Ht 177.8 cm (70\")   Wt 80.7 kg (178 lb)   SpO2 98%   BMI 25.54 kg/m²   The patient is alert, oriented and in no distress.    Vital signs as noted above.    Head and neck revealed no carotid bruits or jugular venous distension.  No thyromegaly or lymphadenopathy is present.    Lungs clear.  No wheezing.  Breath sounds are normal bilaterally.    Heart normal first and second heart sounds.  No murmur..  No pericardial rub is present.  No gallop is present.    Abdomen soft and nontender.  No organomegaly is present.    Extremities revealed good peripheral pulses without any pedal edema.    Skin warm and dry.    Musculoskeletal system is grossly normal.    CNS grossly normal.    Reviewed and updated.        "

## 2022-05-16 NOTE — PROGRESS NOTES
Neurosurgical Consultation      Mikael Shanks is a 86 y.o. male is here today for a scheduled post op follow-up.    Chief Complaint   Patient presents with   • Back Pain        Previous treatment: L4-L5 bilateral laminectomy, medial facetectomy for central canal decompression, lysis of extensive epidural scar on 1/31/22    HPI: This is an 86-year-old gentleman who underwent a revision L4-L5 laminectomy and decompression on January 31, 2022.  He is now approximately 3-1/2 months removed from the surgery.  He notes that his quality of life has improved significantly.  Unfortunately beginning approximately 5 days to week ago he began having some point tenderness to the right of his incision as well as some pain into his left leg.  The pain is a muscle ache on the anterior thigh.  It is focal in nature.  His incision is well-healed.  He has gone from walking with a cane to walking with a walker.  He does comment that he has discontinued physical therapy and has been relatively inactive over the last few weeks.    Past Medical History:   Diagnosis Date   • Carotid artery disease (HCC)    • Coronary artery disease    • Diabetes mellitus (HCC)    • GERD (gastroesophageal reflux disease)    • [place] (hard of hearing)    • Hyperlipidemia    • Hypertension    • Osteoarthritis    • Prostatic hypertrophy    • Pulmonary valve disorder    • Sleep apnea     cpap   doesnt use    • Stroke (HCC)         Past Surgical History:   Procedure Laterality Date   • BACK SURGERY     • CARDIAC CATHETERIZATION N/A 12/5/2019    Procedure: Left Heart Cath and coronary angiogram;  Surgeon: Dayday Ruiz MD;  Location: [facility] CATH INVASIVE LOCATION;  Service: Cardiovascular   • CARDIAC CATHETERIZATION N/A 12/5/2019    Procedure: Right and Left Heart Cath;  Surgeon: Dayday Ruiz MD;  Location: [facility] CATH INVASIVE LOCATION;  Service: Cardiovascular   • CARDIAC CATHETERIZATION N/A 9/4/2020    Procedure: Left and Right Heart Cath with Coronary  Angiography;  Surgeon: Dayday Ruiz MD;  Location: [facility] CATH INVASIVE LOCATION;  Service: Cardiovascular;  Laterality: N/A;   • CAROTID ENDARTERECTOMY Left    • CHOLECYSTECTOMY     • COLONOSCOPY  08/14/2018    No repeat   • CORONARY ANGIOPLASTY WITH STENT PLACEMENT     • LUMBAR LAMINECTOMY Bilateral 1/31/2022    Procedure: LUMBAR LAMINECTOMY WITH/WITHOUT FUSION L4-L5;  Surgeon: Geronimo Gonzales MD;  Location: [facility] MAIN OR;  Service: Neurosurgery;  Laterality: Bilateral;        Current Outpatient Medications on File Prior to Visit   Medication Sig Dispense Refill   • aspirin 81 MG tablet Take 81 mg by mouth Daily. LD 1/29     • atorvastatin (LIPITOR) 80 MG tablet Take 1 tablet by mouth every night at bedtime. 90 tablet 3   • bisacodyl (DULCOLAX) 5 MG EC tablet Take 1 tablet by mouth Daily As Needed for Constipation. 30 tablet 0   • Blood Glucose Monitoring Suppl (ONE TOUCH ULTRA 2) w/Device kit 1 Device Daily. Test 1 x daily 1 each 0   • cholecalciferol (VITAMIN D3) 10 MCG (400 UNIT) tablet Take 400 Units by mouth Daily. Hold DOS     • cyclobenzaprine (FLEXERIL) 10 MG tablet Take 1 tablet by mouth 3 (Three) Times a Day As Needed for Muscle Spasms. 30 tablet 0   • enalapril (VASOTEC) 5 MG tablet Take 1 tablet by mouth Daily. 90 tablet 3   • finasteride (PROSCAR) 5 MG tablet Take 1 tablet by mouth Daily. (Patient taking differently: Take 5 mg by mouth Daily. Take DOS) 90 tablet 3   • furosemide (LASIX) 20 MG tablet Take 1 tablet by mouth 2 (Two) Times a Day. (Patient taking differently: Take 20 mg by mouth 2 (Two) Times a Day. Hold DOS) 180 tablet 3   • glipizide (glipiZIDE XL) 10 MG 24 hr tablet Take 1 tablet by mouth Daily. 90 tablet 3   • glucose blood (ONE TOUCH ULTRA TEST) test strip Test 1 x daily 100 each 5   • metoprolol succinate XL (TOPROL-XL) 25 MG 24 hr tablet Take 1 tablet by mouth Daily. 90 tablet 3   • multivitamin with minerals tablet tablet Take 1 tablet by mouth Daily. LD 1/24     •  "omeprazole (priLOSEC) 40 MG capsule Take 1 capsule by mouth Daily. (Patient taking differently: Take 40 mg by mouth Daily. Take DOS) 90 capsule 3   • OneTouch Delica Lancets 33G misc 1 strip Daily. Test 1 x daily 100 each 5   • sertraline (ZOLOFT) 50 MG tablet TAKE 1 TABLET BY MOUTH  DAILY (Patient taking differently: Take 50 mg by mouth Daily. Take DOS) 30 tablet 11     Current Facility-Administered Medications on File Prior to Visit   Medication Dose Route Frequency Provider Last Rate Last Admin   • cyanocobalamin injection 1,000 mcg  1,000 mcg Intramuscular Q28 Days Xander Robison MD   1,000 mcg at 21 1541        Allergies   Allergen Reactions   • Penicillins Hives        Social History     Socioeconomic History   • Marital status:    Tobacco Use   • Smoking status: Former Smoker     Packs/day: 5.00     Years: 7.00     Pack years: 35.00     Types: Cigarettes     Start date: 1953     Quit date: 1960     Years since quittin.7   • Smokeless tobacco: Never Used   Vaping Use   • Vaping Use: Never used   Substance and Sexual Activity   • Alcohol use: Not Currently     Comment: very rare   • Drug use: No   • Sexual activity: Never          Review of Systems   Constitutional: Positive for activity change.   HENT: Negative.    Eyes: Negative.    Respiratory: Negative.  Negative for chest tightness and shortness of breath.    Cardiovascular: Negative.  Negative for chest pain.   Gastrointestinal: Negative.    Endocrine: Negative.    Genitourinary: Negative.    Musculoskeletal: Positive for back pain, gait problem and myalgias.        Left upper leg   Skin: Negative.    Neurological: Positive for weakness ( left leg).   Hematological: Negative.    Psychiatric/Behavioral: Positive for sleep disturbance.        Physical Examination:     Vitals:    22 1325   BP: 152/68   Pulse: 64   Resp: 18   Temp: 98.4 °F (36.9 °C)   SpO2: 99%   Weight: 80.7 kg (178 lb)   Height: 177.8 cm (70\")    "     Physical Exam  Eyes:      General: Lids are normal.      Extraocular Movements: Extraocular movements intact.      Pupils: Pupils are equal, round, and reactive to light.   Neurological:      Deep Tendon Reflexes: Strength normal.   Psychiatric:         Speech: Speech normal.          Neurological Exam  Mental Status  Awake, alert and oriented to person, place and time. Speech is normal. Language is fluent with no aphasia. Attention and concentration are normal.    Cranial Nerves  CN II: Visual acuity is normal. Visual fields full to confrontation.  CN III, IV, VI: Extraocular movements intact bilaterally. Normal lids and orbits bilaterally. Pupils equal round and reactive to light bilaterally.  CN V: Facial sensation is normal.  CN VII: Full and symmetric facial movement.  CN IX, X: Palate elevates symmetrically. Normal gag reflex.  CN XI: Shoulder shrug strength is normal.  CN XII: Tongue midline without atrophy or fasciculations.    Motor  Normal muscle bulk throughout. No fasciculations present. Normal muscle tone. Strength is 5/5 throughout all four extremities.  Left leg with giveaway strength.    Gait   Unable to rise from chair without using arms.     Incision is well-healed without any signs of breakdown or infection.    Result Review  The following data was reviewed by: Geronimo Gonzales MD on 05/17/2022:    Data reviewed: Radiologic studies Flexion-extension films of the lumbar spine today show the known severe disc desiccation and collapse at L4-5 as well as the known spondylolisthesis at L2-3 and L3-4.  It does not appear to be changed from preoperative pictures.     Assessment/plan:  This is an 86-year-old gentleman with a history of neurogenic claudication status post L4-L5 revision laminectomy approximately 3-1/2 months ago.  Postoperative flexion-extension films are stable compared to preoperative pictures.  He does comment that he has had a diminished amount of physical activity.  I am referring  him back to physical therapy.  Hopefully this will improve his focal back tenderness and left leg pain and muscle weakness.  I also recommend utilization of the prescribed Flexeril to help with symptoms.  He will follow-up with me in 3 to 4 weeks if he does not get relief with the physical therapy.  He should call with any questions or concerns.    Diagnoses and all orders for this visit:    1. Neurogenic claudication (HCC) (Primary)  -     Ambulatory Referral to Physical Therapy Evaluate and treat         No follow-ups on file.            Geronimo Gonzales MD

## 2022-05-17 ENCOUNTER — HOSPITAL ENCOUNTER (OUTPATIENT)
Dept: GENERAL RADIOLOGY | Facility: HOSPITAL | Age: 87
Discharge: HOME | End: 2022-05-17
Admitting: NEUROLOGICAL SURGERY

## 2022-05-17 ENCOUNTER — OFFICE VISIT (OUTPATIENT)
Dept: NEUROSURGERY | Facility: CLINIC | Age: 87
End: 2022-05-17

## 2022-05-17 VITALS
DIASTOLIC BLOOD PRESSURE: 68 MMHG | HEIGHT: 70 IN | OXYGEN SATURATION: 99 % | BODY MASS INDEX: 25.48 KG/M2 | WEIGHT: 178 LBS | HEART RATE: 64 BPM | TEMPERATURE: 98.4 F | SYSTOLIC BLOOD PRESSURE: 152 MMHG | RESPIRATION RATE: 18 BRPM

## 2022-05-17 DIAGNOSIS — G95.19 NEUROGENIC CLAUDICATION: Primary | ICD-10-CM

## 2022-05-17 DIAGNOSIS — M48.062 SPINAL STENOSIS, LUMBAR REGION, WITH NEUROGENIC CLAUDICATION: ICD-10-CM

## 2022-05-17 PROCEDURE — 72114 X-RAY EXAM L-S SPINE BENDING: CPT

## 2022-05-17 PROCEDURE — 99214 OFFICE O/P EST MOD 30 MIN: CPT | Performed by: NEUROLOGICAL SURGERY

## 2022-06-16 ENCOUNTER — TREATMENT (OUTPATIENT)
Dept: PHYSICAL THERAPY | Facility: CLINIC | Age: 87
End: 2022-06-16

## 2022-06-16 DIAGNOSIS — M25.559 PAIN, HIP: ICD-10-CM

## 2022-06-16 DIAGNOSIS — M79.605 PAIN OF LEFT LOWER EXTREMITY: Primary | ICD-10-CM

## 2022-06-16 PROCEDURE — 97110 THERAPEUTIC EXERCISES: CPT | Performed by: PHYSICAL THERAPIST

## 2022-06-16 PROCEDURE — 97161 PT EVAL LOW COMPLEX 20 MIN: CPT | Performed by: PHYSICAL THERAPIST

## 2022-06-16 PROCEDURE — 97140 MANUAL THERAPY 1/> REGIONS: CPT | Performed by: PHYSICAL THERAPIST

## 2022-06-16 NOTE — PROGRESS NOTES
Physical Therapy Initial Evaluation and Plan of Care    Patient: Mikael Shanks   : 1935  Diagnosis/ICD-10 Code:  Pain of left lower extremity [M79.605]  Referring practitioner: Geronimo Gonzales MD  Date of Initial Visit: 2022  Today's Date: 2022  Patient seen for 1 sessions             Subjective Evaluation    History of Present Illness  Mechanism of injury: Patient is an 86 y.o. WM who presents with L leg pain.  He had back surgery in 2022 for laminectomy which eliminated his constant pain, however he continues to have pain when bearing weight on L leg.  Pain ranges from 0/10 at rest to 10/10 with walking.  Standing is tolerable but painful.  Patient lives alone and has no stairs.  His daughters help care for him, shop, etc.  Personal goals:  Walk without cane in community, work on balance to prevent falls.      Patient Goals  Patient goals for therapy: decreased pain, improved balance and return to sport/leisure activities             Objective WOMAC score indicates 41% impairment with limitations in standing, walking, stooping, don/doff socks, shower transfer.   Ambulates 75' with standard cane on R independently.  360 deg turn independent without LOB but discontinuous steps.  mCTSIB = 2/4 (30,30,NT,NT) indicating visual preference and vestibular deficit.  SLS = 7 sec on R, 8 sec on L.  Repeated sit to stand = 7 reps in 30 sec using arms.  MMT:  4/5 L hip flexion, L knee extension and flexion.  Flexibility:  Min/mod tight L calf, ham and piriformis with pain.         Assessment & Plan     Assessment  Impairments: abnormal gait, abnormal or restricted ROM, activity intolerance, impaired balance, impaired physical strength and lacks appropriate home exercise program  Functional Limitations: walking, uncomfortable because of pain and standing  Goals  Plan Goals: Patient independent with HEP in 2 weeks  Tolerate 10 min Nustep in 2 weeks  Decrease L leg pain 25% in 2 weeks  Able to ambulate  independently without ' and minimal antalgia in 2 weeks  Improve function as evidenced by WOMAC score of 20% or less in 12 weeks (41% impairment initially)  Able to walk 500' independently without cane in 12 weeks  Perform 10 reps of repeated sit to stand without arms in 30 seconds in 12 weeks      Plan  Therapy options: will be seen for skilled therapy services  Planned modality interventions: thermotherapy (hydrocollator packs)  Other planned modality interventions: physical modalities PRN  Planned therapy interventions: home exercise program, gait training, functional ROM exercises, flexibility, balance/weight-bearing training, manual therapy, neuromuscular re-education, postural training, strengthening, stretching, therapeutic activities and transfer training  Frequency: 2x week  Duration in weeks: 12  Treatment plan discussed with: patient        Timed:         Manual Therapy:    15     mins  08611;     Therapeutic Exercise:    15     mins  60567;       Un-Timed:  Low Eval     15     Mins  11490        Timed Treatment:  30    mins   Total Treatment:     45   mins    PT SIGNATURE: Robin A Sprigler, PT   [place] license # [id number]  Electronically signed by Robin A Sprigler, PT, 06/16/22, 11:17 AM EDT    Initial Certification  Certification Period: 6/16/2022 through 9/13/2022  I certify that the therapy services are furnished while this patient is under my care.  The services outlined above are required by this patient, and will be reviewed every 90 days.     PHYSICIAN: Geronimo Gonzales MD ______________________________________________________________________________________________     DATE: _________________________________________________________________________________________________________________________________    Please sign and return via fax to [phone]. Thank you, [facility].

## 2022-06-20 ENCOUNTER — TREATMENT (OUTPATIENT)
Dept: PHYSICAL THERAPY | Facility: CLINIC | Age: 87
End: 2022-06-20

## 2022-06-20 DIAGNOSIS — M25.559 PAIN, HIP: ICD-10-CM

## 2022-06-20 DIAGNOSIS — M79.605 PAIN OF LEFT LOWER EXTREMITY: Primary | ICD-10-CM

## 2022-06-20 PROCEDURE — 97110 THERAPEUTIC EXERCISES: CPT | Performed by: PHYSICAL THERAPIST

## 2022-06-20 PROCEDURE — 97530 THERAPEUTIC ACTIVITIES: CPT | Performed by: PHYSICAL THERAPIST

## 2022-06-20 NOTE — PROGRESS NOTES
Physical Therapy Daily Progress Note    Patient: Mikael Shanks   : 1935  Diagnosis/ICD-10 Code:  Pain L lower leg  Referring practitioner: Geronimo Gonzales MD  Date of Initial Visit: Type: THERAPY  Noted: 2022  Today's Date: 2022  Patient seen for 2 sessions             Subjective Patient reports his allergies are really bothering him.  He is short of air and has been using his CPAP during the day as needed.    Objective   See Exercise, Manual, and Modality Logs for complete treatment. Able to tolerate 3 min of walking with the cane, walking 300 feet in the clinic.  Added seated ham stretch, CKC ball, EC/EO on foam.      Assessment/Plan  Patient independent with HEP in 2 weeks  Tolerate 10 min Nustep in 2 weeks  Decrease L leg pain 25% in 2 weeks  Able to ambulate independently without ' and minimal antalgia in 2 weeks  Improve function as evidenced by WOMAC score of 20% or less in 12 weeks (41% impairment initially)  Able to walk 500' independently without cane in 12 weeks  Perform 10 reps of repeated sit to stand without arms in 30 seconds in 12 weeks     Progress per Plan of Care expiring 22           Timed:         Manual Therapy:         mins  37829;     Therapeutic Exercise:    15     mins  89329;     Neuromuscular Desi:        mins  84136;    Therapeutic Activity:     15     mins  73743;     Gait Training:           mins  67365;     Ultrasound:          mins  12688;    Ionto                                   mins   44473  Self Care                            mins   44080      Un-Timed:  Electrical Stimulation:         mins  07945 (MC );  Dry Needling          mins self-pay  Traction          mins 95548  Low Eval          Mins  94386  Mod Eval          Mins  42312  High Eval                            Mins  09718  Canalith Repos                   mins  97120    Timed Treatment:   30   mins   Total Treatment:     30   mins    Robin A Sprigler, PT  Physical Therapist

## 2022-06-23 LAB
ALBUMIN SERPL-MCNC: 3.3 G/DL (ref 3.6–4.6)
ALBUMIN/GLOB SERPL: 1.1 {RATIO} (ref 1.2–2.2)
ALP SERPL-CCNC: 120 IU/L (ref 44–121)
ALT SERPL-CCNC: 7 IU/L (ref 0–44)
AST SERPL-CCNC: 13 IU/L (ref 0–40)
BASOPHILS # BLD AUTO: 0.2 X10E3/UL (ref 0–0.2)
BASOPHILS NFR BLD AUTO: 1 %
BILIRUB SERPL-MCNC: 0.3 MG/DL (ref 0–1.2)
BUN SERPL-MCNC: 23 MG/DL (ref 8–27)
BUN/CREAT SERPL: 16 (ref 10–24)
CALCIUM SERPL-MCNC: 9.4 MG/DL (ref 8.6–10.2)
CHLORIDE SERPL-SCNC: 101 MMOL/L (ref 96–106)
CHOLEST SERPL-MCNC: 128 MG/DL (ref 100–199)
CHOLEST/HDLC SERPL: 4 RATIO (ref 0–5)
CO2 SERPL-SCNC: 22 MMOL/L (ref 20–29)
CREAT SERPL-MCNC: 1.43 MG/DL (ref 0.76–1.27)
EGFRCR SERPLBLD CKD-EPI 2021: 48 ML/MIN/1.73
EOSINOPHIL # BLD AUTO: 1.5 X10E3/UL (ref 0–0.4)
EOSINOPHIL NFR BLD AUTO: 11 %
ERYTHROCYTE [DISTWIDTH] IN BLOOD BY AUTOMATED COUNT: 12.5 % (ref 11.6–15.4)
GLOBULIN SER CALC-MCNC: 3 G/DL (ref 1.5–4.5)
GLUCOSE SERPL-MCNC: 96 MG/DL (ref 65–99)
HBA1C MFR BLD: 6.5 % (ref 4.8–5.6)
HCT VFR BLD AUTO: 38.9 % (ref 37.5–51)
HDLC SERPL-MCNC: 32 MG/DL
HGB BLD-MCNC: 12.5 G/DL (ref 13–17.7)
IMM GRANULOCYTES # BLD AUTO: 0.6 X10E3/UL (ref 0–0.1)
IMM GRANULOCYTES NFR BLD AUTO: 5 %
LDLC SERPL CALC-MCNC: 69 MG/DL (ref 0–99)
LYMPHOCYTES # BLD AUTO: 1.7 X10E3/UL (ref 0.7–3.1)
LYMPHOCYTES NFR BLD AUTO: 13 %
MCH RBC QN AUTO: 29.1 PG (ref 26.6–33)
MCHC RBC AUTO-ENTMCNC: 32.1 G/DL (ref 31.5–35.7)
MCV RBC AUTO: 91 FL (ref 79–97)
MONOCYTES # BLD AUTO: 1 X10E3/UL (ref 0.1–0.9)
MONOCYTES NFR BLD AUTO: 8 %
NEUTROPHILS # BLD AUTO: 8.3 X10E3/UL (ref 1.4–7)
NEUTROPHILS NFR BLD AUTO: 62 %
PLATELET # BLD AUTO: 430 X10E3/UL (ref 150–450)
POTASSIUM SERPL-SCNC: 5.2 MMOL/L (ref 3.5–5.2)
PROT SERPL-MCNC: 6.3 G/DL (ref 6–8.5)
RBC # BLD AUTO: 4.29 X10E6/UL (ref 4.14–5.8)
SODIUM SERPL-SCNC: 139 MMOL/L (ref 134–144)
TRIGL SERPL-MCNC: 158 MG/DL (ref 0–149)
VLDLC SERPL CALC-MCNC: 27 MG/DL (ref 5–40)
WBC # BLD AUTO: 13.4 X10E3/UL (ref 3.4–10.8)

## 2022-06-29 ENCOUNTER — OFFICE VISIT (OUTPATIENT)
Dept: FAMILY MEDICINE CLINIC | Facility: CLINIC | Age: 87
End: 2022-06-29

## 2022-06-29 ENCOUNTER — HOSPITAL ENCOUNTER (OUTPATIENT)
Dept: GENERAL RADIOLOGY | Facility: HOSPITAL | Age: 87
Discharge: HOME | End: 2022-06-29
Admitting: FAMILY MEDICINE

## 2022-06-29 VITALS
SYSTOLIC BLOOD PRESSURE: 124 MMHG | DIASTOLIC BLOOD PRESSURE: 70 MMHG | HEIGHT: 71 IN | OXYGEN SATURATION: 99 % | BODY MASS INDEX: 23.18 KG/M2 | HEART RATE: 92 BPM | TEMPERATURE: 97.5 F | WEIGHT: 165.6 LBS | RESPIRATION RATE: 20 BRPM

## 2022-06-29 DIAGNOSIS — D64.9 ANEMIA, UNSPECIFIED TYPE: ICD-10-CM

## 2022-06-29 DIAGNOSIS — D51.0 PERNICIOUS ANEMIA: ICD-10-CM

## 2022-06-29 DIAGNOSIS — J40 BRONCHITIS: ICD-10-CM

## 2022-06-29 DIAGNOSIS — J45.909 ASTHMA, UNSPECIFIED ASTHMA SEVERITY, UNSPECIFIED WHETHER COMPLICATED, UNSPECIFIED WHETHER PERSISTENT: ICD-10-CM

## 2022-06-29 DIAGNOSIS — N18.31 STAGE 3A CHRONIC KIDNEY DISEASE: ICD-10-CM

## 2022-06-29 DIAGNOSIS — I10 HYPERTENSION, BENIGN: ICD-10-CM

## 2022-06-29 DIAGNOSIS — E87.5 HYPERKALEMIA: ICD-10-CM

## 2022-06-29 DIAGNOSIS — E11.9 TYPE 2 DIABETES MELLITUS WITHOUT COMPLICATION, WITHOUT LONG-TERM CURRENT USE OF INSULIN: ICD-10-CM

## 2022-06-29 DIAGNOSIS — D55.9 ANEMIA DUE TO ENZYME DISORDER, UNSPECIFIED: ICD-10-CM

## 2022-06-29 DIAGNOSIS — E78.2 MIXED HYPERLIPIDEMIA: Primary | ICD-10-CM

## 2022-06-29 PROCEDURE — 99214 OFFICE O/P EST MOD 30 MIN: CPT | Performed by: FAMILY MEDICINE

## 2022-06-29 PROCEDURE — 71046 X-RAY EXAM CHEST 2 VIEWS: CPT

## 2022-06-29 RX ORDER — FERROUS SULFATE 325(65) MG
325 TABLET ORAL
Qty: 30 TABLET | Refills: 12 | Status: SHIPPED | OUTPATIENT
Start: 2022-06-29 | End: 2022-08-01

## 2022-06-29 RX ORDER — MONTELUKAST SODIUM 10 MG/1
10 TABLET ORAL NIGHTLY
Qty: 90 TABLET | Refills: 3 | Status: SHIPPED | OUTPATIENT
Start: 2022-06-29 | End: 2022-11-01 | Stop reason: SDUPTHER

## 2022-06-29 RX ORDER — DOXYCYCLINE 100 MG/1
100 CAPSULE ORAL 2 TIMES DAILY
Qty: 20 CAPSULE | Refills: 0 | Status: SHIPPED | OUTPATIENT
Start: 2022-06-29 | End: 2022-08-01

## 2022-07-06 ENCOUNTER — TREATMENT (OUTPATIENT)
Dept: PHYSICAL THERAPY | Facility: CLINIC | Age: 87
End: 2022-07-06

## 2022-07-06 DIAGNOSIS — M79.605 PAIN OF LEFT LOWER EXTREMITY: Primary | ICD-10-CM

## 2022-07-06 DIAGNOSIS — M25.559 PAIN, HIP: ICD-10-CM

## 2022-07-06 PROCEDURE — 97110 THERAPEUTIC EXERCISES: CPT | Performed by: PHYSICAL THERAPIST

## 2022-07-06 PROCEDURE — 97530 THERAPEUTIC ACTIVITIES: CPT | Performed by: PHYSICAL THERAPIST

## 2022-07-06 PROCEDURE — 97140 MANUAL THERAPY 1/> REGIONS: CPT | Performed by: PHYSICAL THERAPIST

## 2022-07-06 NOTE — PROGRESS NOTES
Physical Therapy Daily Progress Note      Patient: Mikael Shanks   : 1935  Diagnosis/ICD-10 Code:  Pain of left lower extremity [M79.605]  Referring practitioner: Geronimo Gonzales MD  Date of Initial Visit: Type: THERAPY  Noted: 2022  Today's Date: 2022  Patient seen for 3 sessions         Mikael Shanks reports: his L LE continues to be significantly sore stating the exercises make his soreness increase and he has done them diligently every day so he hasn't had a day without soreness since starting them. Pt. States he struggles with the sit to stands without use of his hands and usually can only complete 2-3 reps without hands.    Objective   See Exercise, Manual, and Modality Logs for complete treatment.     Assessment/Plan   Pt. Tolerates stretches well requiring gentle range initially to facilitate relaxation. Pt. becomes SOA very easy throughout session requiring seated rest breaks intermittently to recover breathing. Pt. Struggles to balance on l LE for SLS and for hip 3 way activity's this date and is encouraged to work on this as tolerated.    Patient independent with HEP in 2 weeks  Tolerate 10 min Nustep in 2 weeks  Decrease L leg pain 25% in 2 weeks  Able to ambulate independently without ' and minimal antalgia in 2 weeks  Improve function as evidenced by WOMAC score of 20% or less in 12 weeks (41% impairment initially)  Able to walk 500' independently without cane in 12 weeks  Perform 10 reps of repeated sit to stand without arms in 30 seconds in 12 weeks     Progress strengthening /stabilization /functional activity           Timed:         Manual Therapy:    15     mins  02817;     Therapeutic Exercise:    15     mins  06007;     Therapeutic Activity:     10     mins  49565;       Timed Treatment:   40   mins   Total Treatment:     40   mins    Sybil Rizvi PTA  Physical Therapist Assistant License #[id number]

## 2022-07-08 ENCOUNTER — TREATMENT (OUTPATIENT)
Dept: PHYSICAL THERAPY | Facility: CLINIC | Age: 87
End: 2022-07-08

## 2022-07-08 DIAGNOSIS — M25.559 PAIN, HIP: ICD-10-CM

## 2022-07-08 DIAGNOSIS — M79.605 PAIN OF LEFT LOWER EXTREMITY: Primary | ICD-10-CM

## 2022-07-08 PROCEDURE — 97110 THERAPEUTIC EXERCISES: CPT | Performed by: PHYSICAL THERAPIST

## 2022-07-08 PROCEDURE — 97140 MANUAL THERAPY 1/> REGIONS: CPT | Performed by: PHYSICAL THERAPIST

## 2022-07-08 NOTE — PROGRESS NOTES
Physical Therapy Daily Progress Note      Patient: Mikael Shanks   : 1935  Diagnosis/ICD-10 Code:  Pain of left lower extremity [M79.605]  Referring practitioner: Geronimo Gonzales MD  Date of Initial Visit: Type: THERAPY  Noted: 2022  Today's Date: 2022  Patient seen for 4 sessions         Mikael Shanks reports: his L quad or femur is hurting really bad today stating his hip isn't really bothering him but rather theres an intense pull at his anterior L thigh whenever he sits and stands or walks.    Objective   See Exercise, Manual, and Modality Logs for complete treatment.     Assessment/Plan   Pt. Presents with much more pronounced antalgic gait this date and struggles to complete standing activities due to pain in the L LE. Ultimately treatment was stopped and Pt. Was encouraged to go home and rest his leg due to the notable grimaces and audible wincing when attempting to complete activities. Heat was applied prior to treatment at L quadriceps to try and provide relief.    Patient independent with HEP in 2 weeks  Tolerate 10 min Nustep in 2 weeks  Decrease L leg pain 25% in 2 weeks  Able to ambulate independently without ' and minimal antalgia in 2 weeks  Improve function as evidenced by WOMAC score of 20% or less in 12 weeks (41% impairment initially)  Able to walk 500' independently without cane in 12 weeks  Perform 10 reps of repeated sit to stand without arms in 30 seconds in 12 weeks        Progress strengthening /stabilization /functional activity           Timed:         Manual Therapy:    15     mins  53907;     Therapeutic Exercise:    15     mins  59259;       Timed Treatment:  30    mins   Total Treatment:     40   mins    Sybil Rizvi PTA  Physical Therapist Assistant License #[id number]

## 2022-07-11 ENCOUNTER — TELEPHONE (OUTPATIENT)
Dept: FAMILY MEDICINE CLINIC | Facility: CLINIC | Age: 87
End: 2022-07-11

## 2022-07-26 LAB
ALBUMIN SERPL-MCNC: 3.6 G/DL (ref 3.6–4.6)
ALBUMIN/GLOB SERPL: 1.4 {RATIO} (ref 1.2–2.2)
ALP SERPL-CCNC: 85 IU/L (ref 44–121)
ALT SERPL-CCNC: 10 IU/L (ref 0–44)
AST SERPL-CCNC: 12 IU/L (ref 0–40)
BASOPHILS # BLD AUTO: 0.2 X10E3/UL (ref 0–0.2)
BASOPHILS NFR BLD AUTO: 2 %
BILIRUB SERPL-MCNC: 0.4 MG/DL (ref 0–1.2)
BUN SERPL-MCNC: 14 MG/DL (ref 8–27)
BUN/CREAT SERPL: 11 (ref 10–24)
CALCIUM SERPL-MCNC: 9.3 MG/DL (ref 8.6–10.2)
CHLORIDE SERPL-SCNC: 107 MMOL/L (ref 96–106)
CO2 SERPL-SCNC: 25 MMOL/L (ref 20–29)
CREAT SERPL-MCNC: 1.28 MG/DL (ref 0.76–1.27)
EGFRCR SERPLBLD CKD-EPI 2021: 54 ML/MIN/1.73
EOSINOPHIL # BLD AUTO: 0.8 X10E3/UL (ref 0–0.4)
EOSINOPHIL NFR BLD AUTO: 7 %
ERYTHROCYTE [DISTWIDTH] IN BLOOD BY AUTOMATED COUNT: 13.1 % (ref 11.6–15.4)
FOLATE SERPL-MCNC: 12.1 NG/ML
GLOBULIN SER CALC-MCNC: 2.5 G/DL (ref 1.5–4.5)
GLUCOSE SERPL-MCNC: 103 MG/DL (ref 65–99)
HCT VFR BLD AUTO: 37.9 % (ref 37.5–51)
HGB BLD-MCNC: 12.2 G/DL (ref 13–17.7)
IMM GRANULOCYTES # BLD AUTO: 0.2 X10E3/UL (ref 0–0.1)
IMM GRANULOCYTES NFR BLD AUTO: 2 %
IRON SATN MFR SERPL: 24 % (ref 15–55)
IRON SERPL-MCNC: 46 UG/DL (ref 38–169)
LYMPHOCYTES # BLD AUTO: 2.3 X10E3/UL (ref 0.7–3.1)
LYMPHOCYTES NFR BLD AUTO: 22 %
MCH RBC QN AUTO: 29.2 PG (ref 26.6–33)
MCHC RBC AUTO-ENTMCNC: 32.2 G/DL (ref 31.5–35.7)
MCV RBC AUTO: 91 FL (ref 79–97)
MONOCYTES # BLD AUTO: 0.8 X10E3/UL (ref 0.1–0.9)
MONOCYTES NFR BLD AUTO: 8 %
NEUTROPHILS # BLD AUTO: 6.2 X10E3/UL (ref 1.4–7)
NEUTROPHILS NFR BLD AUTO: 59 %
PLATELET # BLD AUTO: 219 X10E3/UL (ref 150–450)
POTASSIUM SERPL-SCNC: 4.5 MMOL/L (ref 3.5–5.2)
PROT SERPL-MCNC: 6.1 G/DL (ref 6–8.5)
RBC # BLD AUTO: 4.18 X10E6/UL (ref 4.14–5.8)
RETICS/RBC NFR AUTO: 2.2 % (ref 0.6–2.6)
SODIUM SERPL-SCNC: 143 MMOL/L (ref 134–144)
TIBC SERPL-MCNC: 194 UG/DL (ref 250–450)
UIBC SERPL-MCNC: 148 UG/DL (ref 111–343)
WBC # BLD AUTO: 10.4 X10E3/UL (ref 3.4–10.8)

## 2022-08-01 ENCOUNTER — OFFICE VISIT (OUTPATIENT)
Dept: FAMILY MEDICINE CLINIC | Facility: CLINIC | Age: 87
End: 2022-08-01

## 2022-08-01 VITALS
BODY MASS INDEX: 23.83 KG/M2 | TEMPERATURE: 97.8 F | HEIGHT: 71 IN | RESPIRATION RATE: 20 BRPM | SYSTOLIC BLOOD PRESSURE: 120 MMHG | HEART RATE: 65 BPM | DIASTOLIC BLOOD PRESSURE: 76 MMHG | WEIGHT: 170.2 LBS

## 2022-08-01 DIAGNOSIS — D55.9 ANEMIA DUE TO ENZYME DISORDER, UNSPECIFIED: ICD-10-CM

## 2022-08-01 DIAGNOSIS — J45.909 ASTHMA, UNSPECIFIED ASTHMA SEVERITY, UNSPECIFIED WHETHER COMPLICATED, UNSPECIFIED WHETHER PERSISTENT: ICD-10-CM

## 2022-08-01 DIAGNOSIS — N18.31 STAGE 3A CHRONIC KIDNEY DISEASE: Primary | ICD-10-CM

## 2022-08-01 DIAGNOSIS — I10 HYPERTENSION, BENIGN: ICD-10-CM

## 2022-08-01 PROBLEM — R05.9 COUGH: Status: ACTIVE | Noted: 2022-08-01

## 2022-08-01 PROCEDURE — 99214 OFFICE O/P EST MOD 30 MIN: CPT | Performed by: FAMILY MEDICINE

## 2022-08-01 RX ORDER — ALBUTEROL SULFATE 90 UG/1
2 AEROSOL, METERED RESPIRATORY (INHALATION) EVERY 4 HOURS PRN
Qty: 18 G | Refills: 2 | Status: SHIPPED | OUTPATIENT
Start: 2022-08-01 | End: 2023-01-25 | Stop reason: SDUPTHER

## 2022-08-01 NOTE — ASSESSMENT & PLAN NOTE
Slightly worse; Patient declines any further workup at this time; Hgb 12.2 down from 12.5, total Iron 194 down from 238, Folate 12.1 up from 9.9, retic ct 2.2

## 2022-08-01 NOTE — ASSESSMENT & PLAN NOTE
Worsening;-- Advised patient to start using Albuterol inhaler.  Chest x-ray done June 30 was normal

## 2022-08-01 NOTE — PROGRESS NOTES
Subjective   Mikael Shanks is a 87 y.o. male.   Chief Complaint   Patient presents with   • Anemia   • Asthma     Anemia  Presents for follow-up visit. Symptoms include bruises/bleeds easily. There are no compliance problems.  Compliance with medications is %.   Asthma  He complains of cough, frequent throat clearing and shortness of breath. This is a recurrent problem. The current episode started more than 1 year ago. The problem occurs daily. The problem has been waxing and waning. The cough is productive of sputum. His symptoms are aggravated by lying down. Relieved by: Singilair. He reports minimal improvement on treatment. Risk factors for lung disease include smoking/tobacco exposure. His past medical history is significant for asthma and bronchitis.        The following portions of the patient's history were reviewed and updated as appropriate: allergies, current medications, past family history, past medical history, past social history, past surgical history and problem list.    Past Medical History:   Diagnosis Date   • Carotid artery disease (HCC)    • Coronary artery disease    • Diabetes mellitus (HCC)    • GERD (gastroesophageal reflux disease)    • [place] (hard of hearing)    • Hyperlipidemia    • Hypertension    • Osteoarthritis    • Prostatic hypertrophy    • Pulmonary valve disorder    • Sleep apnea     cpap   doesnt use    • Stroke (HCC)        Past Surgical History:   Procedure Laterality Date   • BACK SURGERY     • CARDIAC CATHETERIZATION N/A 12/5/2019    Procedure: Left Heart Cath and coronary angiogram;  Surgeon: Dayday Ruiz MD;  Location: [facility] CATH INVASIVE LOCATION;  Service: Cardiovascular   • CARDIAC CATHETERIZATION N/A 12/5/2019    Procedure: Right and Left Heart Cath;  Surgeon: Dayday Ruiz MD;  Location: [facility] CATH INVASIVE LOCATION;  Service: Cardiovascular   • CARDIAC CATHETERIZATION N/A 9/4/2020    Procedure: Left and Right Heart Cath with Coronary Angiography;   "Surgeon: Dayday Ruiz MD;  Location: [facility] CATH INVASIVE LOCATION;  Service: Cardiovascular;  Laterality: N/A;   • CAROTID ENDARTERECTOMY Left    • CHOLECYSTECTOMY     • COLONOSCOPY  2018    No repeat   • CORONARY ANGIOPLASTY WITH STENT PLACEMENT     • LUMBAR LAMINECTOMY Bilateral 2022    Procedure: LUMBAR LAMINECTOMY WITH/WITHOUT FUSION L4-L5;  Surgeon: Geronimo Gonzales MD;  Location: [facility] MAIN OR;  Service: Neurosurgery;  Laterality: Bilateral;        Family History   Problem Relation Age of Onset   • Cancer Mother    • Heart disease Father    • Cancer Brother         Social History     Socioeconomic History   • Marital status:    Tobacco Use   • Smoking status: Former Smoker     Packs/day: 5.00     Years: 7.00     Pack years: 35.00     Types: Cigarettes     Start date: 1953     Quit date: 1960     Years since quittin.9   • Smokeless tobacco: Never Used   Vaping Use   • Vaping Use: Never used   Substance and Sexual Activity   • Alcohol use: Not Currently     Comment: very rare   • Drug use: No   • Sexual activity: Never         Review of Systems   HENT: Negative for nosebleeds.    Respiratory: Positive for cough and shortness of breath.         Asthma     Gastrointestinal: Negative for blood in stool and indigestion.   Genitourinary: Negative for hematuria.   Hematological: Bruises/bleeds easily.       Objective   Visit Vitals  /76 (BP Location: Right arm, Patient Position: Sitting, Cuff Size: Large Adult)   Pulse 65   Temp 97.8 °F (36.6 °C) (Skin)   Resp 20   Ht 179.1 cm (70.5\")   Wt 77.2 kg (170 lb 3.2 oz)   BMI 24.08 kg/m²     Physical Exam  Vitals and nursing note reviewed.   Constitutional:       Appearance: Normal appearance. He is well-developed.   HENT:      Head: Normocephalic.   Neck:      Thyroid: No thyromegaly.      Vascular: No carotid bruit.      Trachea: Trachea normal.   Cardiovascular:      Rate and Rhythm: Normal rate and regular rhythm.      Heart " sounds: No murmur heard.    No friction rub. No gallop.   Pulmonary:      Effort: Pulmonary effort is normal. No respiratory distress.      Breath sounds: Normal breath sounds. No wheezing.   Chest:      Chest wall: No tenderness.   Abdominal:      Palpations: Abdomen is soft.      Tenderness: There is no abdominal tenderness.   Musculoskeletal:      Cervical back: Neck supple.   Skin:     General: Skin is dry.      Findings: No rash.      Nails: There is no clubbing.   Neurological:      Mental Status: He is alert and oriented to person, place, and time.   Psychiatric:         Behavior: Behavior is cooperative.         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  Chest x-ray done June 30 was normal           Relevant Medications    albuterol sulfate  (90 Base) MCG/ACT inhaler    Hypertension, benign    Current Assessment & Plan     Doing well; Encouraged to watch salt, exercise more and lose weight.  Patient tolerated enalapril, metoprolol well without side effects. I feel the benefits of the medication outweigh the risks.  Decrease enalapril to 1/2 tab daily         Stage 3a chronic kidney disease (CMS/HCC) - Primary    Current Assessment & Plan     Improved and close to goal; Creatinine 1.28 down from 1.43 eGFR 54 up from 48;  Advised to avoid anti-inflammatory medications

## 2022-09-29 ENCOUNTER — DOCUMENTATION (OUTPATIENT)
Dept: PHYSICAL THERAPY | Facility: CLINIC | Age: 87
End: 2022-09-29

## 2022-09-29 DIAGNOSIS — M25.559 PAIN, HIP: ICD-10-CM

## 2022-09-29 DIAGNOSIS — M79.605 PAIN OF LEFT LOWER EXTREMITY: Primary | ICD-10-CM

## 2022-09-29 NOTE — PROGRESS NOTES
Discharge Summary  Discharge Summary from Physical Therapy Report        Goals: Partially Met    Discharge Plan: Continue with current home exercise program as instructed    Comments patient failed to return for treatment.  Last seen 7/8/2022  Patient seen for 4 sessions            Mikael Shanks reports: his L quad or femur is hurting really bad today stating his hip isn't really bothering him but rather theres an intense pull at his anterior L thigh whenever he sits and stands or walks.     Objective   See Exercise, Manual, and Modality Logs for complete treatment.      Assessment/Plan   Pt. Presents with much more pronounced antalgic gait this date and struggles to complete standing activities due to pain in the L LE. Ultimately treatment was stopped and Pt. Was encouraged to go home and rest his leg due to the notable grimaces and audible wincing when attempting to complete activities. Heat was applied prior to treatment at L quadriceps to try and provide relief.     Patient independent with HEP in 2 weeks  Tolerate 10 min Nustep in 2 weeks  Decrease L leg pain 25% in 2 weeks  Able to ambulate independently without ' and minimal antalgia in 2 weeks  Improve function as evidenced by WOMAC score of 20% or less in 12 weeks (41% impairment initially)  Able to walk 500' independently without cane in 12 weeks  Perform 10 reps of repeated sit to stand without arms in 30 seconds in 12 weeks          Date of Discharge 9/29/22        Robin A Sprigler, PT  Physical Therapist

## 2022-10-26 ENCOUNTER — OFFICE VISIT (OUTPATIENT)
Dept: CARDIOLOGY | Facility: CLINIC | Age: 87
End: 2022-10-26

## 2022-10-26 VITALS
BODY MASS INDEX: 25.2 KG/M2 | HEART RATE: 61 BPM | HEIGHT: 70 IN | SYSTOLIC BLOOD PRESSURE: 160 MMHG | DIASTOLIC BLOOD PRESSURE: 70 MMHG | WEIGHT: 176 LBS

## 2022-10-26 DIAGNOSIS — E11.9 TYPE 2 DIABETES MELLITUS WITHOUT COMPLICATION, WITHOUT LONG-TERM CURRENT USE OF INSULIN: ICD-10-CM

## 2022-10-26 DIAGNOSIS — R06.02 SHORTNESS OF BREATH: ICD-10-CM

## 2022-10-26 DIAGNOSIS — I10 HYPERTENSION, BENIGN: ICD-10-CM

## 2022-10-26 DIAGNOSIS — I25.5 ISCHEMIC CARDIOMYOPATHY: Primary | ICD-10-CM

## 2022-10-26 DIAGNOSIS — E78.2 MIXED HYPERLIPIDEMIA: ICD-10-CM

## 2022-10-26 DIAGNOSIS — N18.30 STAGE 3 CHRONIC KIDNEY DISEASE, UNSPECIFIED WHETHER STAGE 3A OR 3B CKD: ICD-10-CM

## 2022-10-26 PROCEDURE — 93000 ELECTROCARDIOGRAM COMPLETE: CPT | Performed by: INTERNAL MEDICINE

## 2022-10-26 PROCEDURE — 99214 OFFICE O/P EST MOD 30 MIN: CPT | Performed by: INTERNAL MEDICINE

## 2022-10-26 NOTE — PROGRESS NOTES
Encounter Date:10/26/2022  Last seen 4/25/2022      Patient ID: Mikael Shanks is a 87 y.o. male.    Chief Complaint:    Congestive heart failure  Status post stent  Hypertension  Diabetes  Renal dysfunction     History of Present Illness     Since I have last seen, the patient has been without any chest discomfort ,shortness of breath, palpitations, dizziness or syncope.  Denies having any headache ,abdominal pain ,nausea, vomiting , diarrhea constipation, loss of weight or loss of appetite.  Denies having any excessive bruising ,hematuria or blood in the stool.    Review of all systems negative except as indicated.    Reviewed ROS.  Assessment and Plan      ]]]]]]]]]]]]]]]]]]]]]]]  Impression  ========   -Status post stent 2003 at [facility].     - Ischemic cardiomyopathy with ejection fraction of 25%.-Improved and 60 %     Cardiac catheterization 9/4/2020 revealed  Left ventricular size and contractility is normal with ejection fraction of 60%.  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  Left anterior descending artery has luminal irregularities.  Circumflex coronary artery has luminal irregularities.  Right coronary artery stent is patent with 20% plaquing.      Echocardiogram 8/31/2020  Thickened aortic valve with adequate opening motion.  Left atrial enlargement  Left ventricular size and contractility is normal with ejection fraction of 55 %'     -Status post acute on chronic systolic congestive heart failure-improved significant left ventricle dysfunction with ejection fraction of 25%.     -Status post acute respiratory failure with hypoxia-improved.       -Hypertension diabetes renal dysfunction.  BUN 14 creatinine 1.5.  Dyslipidemia     -History of anemia     -History of premature atrial contractions     -History of severe hypomagnesemia.-Improved     -Allergic to penicillin.   =======  Plan  =======  Status post stent.  Patient is not having  any angina pectoris or congestive heart failure.  EKG showed normal sinus rhythm normal ECG- 10/26/2022     Ischemic cardiomyopathy-improved.  Latest ejection fraction 55%.-8/31/2020  No need for ICD     Hypertension- 160/70    Dyslipidemia-continue atorvastatin     Patient is not on ACE inhibitor due to pre-existing renal dysfunction.     Medications were reviewed and updated.    Follow-up in the office in 6 months.  Further plan will depend on patient's progress.  ]]]]]]]]]]]]]                         Diagnosis Plan   1. Ischemic cardiomyopathy        2. Type 2 diabetes mellitus without complication, without long-term current use of insulin (HCC)        3. Hypertension, benign        4. Shortness of breath        5. Stage 3 chronic kidney disease, unspecified whether stage 3a or 3b CKD (HCC)        6. Mixed hyperlipidemia        LAB RESULTS (LAST 7 DAYS)    CBC        BMP        CMP         BNP        TROPONIN        CoAg        Creatinine Clearance  CrCl cannot be calculated (Patient's most recent lab result is older than the maximum 30 days allowed.).    ABG        Radiology  No radiology results for the last day                The following portions of the patient's history were reviewed and updated as appropriate: allergies, current medications, past family history, past medical history, past social history, past surgical history and problem list.    Review of Systems   Constitutional: Negative for malaise/fatigue.   Cardiovascular: Negative for chest pain, leg swelling, palpitations and syncope.   Respiratory: Negative for shortness of breath.    Skin: Negative for rash.   Gastrointestinal: Negative for nausea and vomiting.   Neurological: Negative for dizziness, light-headedness and numbness.   All other systems reviewed and are negative.        Current Outpatient Medications:   •  albuterol sulfate  (90 Base) MCG/ACT inhaler, Inhale 2 puffs Every 4 (Four) Hours As Needed for Wheezing., Disp: 18 g,  Rfl: 2  •  aspirin 81 MG tablet, Take 81 mg by mouth Daily. LD 1/29, Disp: , Rfl:   •  atorvastatin (LIPITOR) 80 MG tablet, Take 1 tablet by mouth every night at bedtime., Disp: 90 tablet, Rfl: 3  •  bisacodyl (DULCOLAX) 5 MG EC tablet, Take 1 tablet by mouth Daily As Needed for Constipation., Disp: 30 tablet, Rfl: 0  •  Blood Glucose Monitoring Suppl (ONE TOUCH ULTRA 2) w/Device kit, 1 Device Daily. Test 1 x daily, Disp: 1 each, Rfl: 0  •  cholecalciferol (VITAMIN D3) 10 MCG (400 UNIT) tablet, Take 400 Units by mouth Daily. Hold DOS, Disp: , Rfl:   •  enalapril (VASOTEC) 5 MG tablet, Take 1 tablet by mouth Daily., Disp: 90 tablet, Rfl: 3  •  finasteride (PROSCAR) 5 MG tablet, Take 1 tablet by mouth Daily. (Patient taking differently: Take 1 tablet by mouth Daily. Take DOS), Disp: 90 tablet, Rfl: 3  •  furosemide (LASIX) 20 MG tablet, Take 1 tablet by mouth 2 (Two) Times a Day. (Patient taking differently: Take 1 tablet by mouth 2 (Two) Times a Day. Hold DOS), Disp: 180 tablet, Rfl: 3  •  glipizide (glipiZIDE XL) 10 MG 24 hr tablet, Take 1 tablet by mouth Daily., Disp: 90 tablet, Rfl: 3  •  glucose blood (ONE TOUCH ULTRA TEST) test strip, Test 1 x daily, Disp: 100 each, Rfl: 5  •  metoprolol succinate XL (TOPROL-XL) 25 MG 24 hr tablet, Take 1 tablet by mouth Daily., Disp: 90 tablet, Rfl: 3  •  montelukast (Singulair) 10 MG tablet, Take 1 tablet by mouth Every Night., Disp: 90 tablet, Rfl: 3  •  multivitamin with minerals tablet tablet, Take 1 tablet by mouth Daily. LD 1/24, Disp: , Rfl:   •  omeprazole (priLOSEC) 40 MG capsule, Take 1 capsule by mouth Daily. (Patient taking differently: Take 1 capsule by mouth Daily. Take DOS), Disp: 90 capsule, Rfl: 3  •  OneTouch Delica Lancets 33G misc, 1 strip Daily. Test 1 x daily, Disp: 100 each, Rfl: 5  •  sertraline (ZOLOFT) 50 MG tablet, TAKE 1 TABLET BY MOUTH  DAILY (Patient taking differently: Take 1 tablet by mouth Daily. Take DOS), Disp: 30 tablet, Rfl: 11    Current  Facility-Administered Medications:   •  cyanocobalamin injection 1,000 mcg, 1,000 mcg, Intramuscular, Q28 Days, Xander Robison MD, 1,000 mcg at 12/29/21 1541    Allergies   Allergen Reactions   • Penicillins Hives       Family History   Problem Relation Age of Onset   • Cancer Mother    • Heart disease Father    • Cancer Brother        Past Surgical History:   Procedure Laterality Date   • BACK SURGERY     • CARDIAC CATHETERIZATION N/A 12/5/2019    Procedure: Left Heart Cath and coronary angiogram;  Surgeon: Dayday Ruiz MD;  Location: [facility] CATH INVASIVE LOCATION;  Service: Cardiovascular   • CARDIAC CATHETERIZATION N/A 12/5/2019    Procedure: Right and Left Heart Cath;  Surgeon: Dayday Ruiz MD;  Location: [facility] CATH INVASIVE LOCATION;  Service: Cardiovascular   • CARDIAC CATHETERIZATION N/A 9/4/2020    Procedure: Left and Right Heart Cath with Coronary Angiography;  Surgeon: Dayday Ruiz MD;  Location: [facility] CATH INVASIVE LOCATION;  Service: Cardiovascular;  Laterality: N/A;   • CAROTID ENDARTERECTOMY Left    • CHOLECYSTECTOMY     • COLONOSCOPY  08/14/2018    No repeat   • CORONARY ANGIOPLASTY WITH STENT PLACEMENT     • LUMBAR LAMINECTOMY Bilateral 1/31/2022    Procedure: LUMBAR LAMINECTOMY WITH/WITHOUT FUSION L4-L5;  Surgeon: Geronimo Gonzales MD;  Location: [facility] MAIN OR;  Service: Neurosurgery;  Laterality: Bilateral;       Past Medical History:   Diagnosis Date   • Carotid artery disease (HCC)    • Coronary artery disease    • Diabetes mellitus (HCC)    • GERD (gastroesophageal reflux disease)    • [place] (hard of hearing)    • Hyperlipidemia    • Hypertension    • Osteoarthritis    • Prostatic hypertrophy    • Pulmonary valve disorder    • Sleep apnea     cpap   doesnt use    • Stroke (HCC)        Family History   Problem Relation Age of Onset   • Cancer Mother    • Heart disease Father    • Cancer Brother        Social History     Socioeconomic History   • Marital status:    Tobacco  "Use   • Smoking status: Former     Packs/day: 5.00     Years: 7.00     Pack years: 35.00     Types: Cigarettes     Start date: 1953     Quit date: 1960     Years since quittin.1   • Smokeless tobacco: Never   Vaping Use   • Vaping Use: Never used   Substance and Sexual Activity   • Alcohol use: Not Currently     Comment: very rare   • Drug use: No   • Sexual activity: Never           ECG 12 Lead    Date/Time: 10/26/2022 12:57 PM  Performed by: Dayday Ruiz MD  Authorized by: Dayday Ruiz MD   Comparison: compared with previous ECG   Similar to previous ECG  Comparison to previous ECG: Rhythm normal ECG 60/min normal axis normal intervals no ectopy no significant change from 2022                Objective:       Physical Exam    /70 (BP Location: Left arm, Patient Position: Sitting, Cuff Size: Adult)   Pulse 61   Ht 177.8 cm (70\")   Wt 79.8 kg (176 lb)   BMI 25.25 kg/m²   The patient is alert, oriented and in no distress.    Vital signs as noted above.    Head and neck revealed no carotid bruits or jugular venous distension.  No thyromegaly or lymphadenopathy is present.    Lungs clear.  No wheezing.  Breath sounds are normal bilaterally.    Heart normal first and second heart sounds.  No murmur..  No pericardial rub is present.  No gallop is present.    Abdomen soft and nontender.  No organomegaly is present.    Extremities revealed good peripheral pulses without any pedal edema.    Skin warm and dry.    Musculoskeletal system is grossly normal.    CNS grossly normal.    Reviewed and updated.        "

## 2022-11-01 ENCOUNTER — OFFICE VISIT (OUTPATIENT)
Dept: FAMILY MEDICINE CLINIC | Facility: CLINIC | Age: 87
End: 2022-11-01

## 2022-11-01 VITALS
DIASTOLIC BLOOD PRESSURE: 64 MMHG | BODY MASS INDEX: 27.57 KG/M2 | SYSTOLIC BLOOD PRESSURE: 137 MMHG | TEMPERATURE: 97.5 F | WEIGHT: 192.6 LBS | RESPIRATION RATE: 16 BRPM | OXYGEN SATURATION: 95 % | HEART RATE: 63 BPM | HEIGHT: 70 IN

## 2022-11-01 DIAGNOSIS — K29.90 GASTRITIS AND GASTRODUODENITIS: ICD-10-CM

## 2022-11-01 DIAGNOSIS — J45.909 ASTHMA, UNSPECIFIED ASTHMA SEVERITY, UNSPECIFIED WHETHER COMPLICATED, UNSPECIFIED WHETHER PERSISTENT: ICD-10-CM

## 2022-11-01 DIAGNOSIS — M48.062 SPINAL STENOSIS, LUMBAR REGION, WITH NEUROGENIC CLAUDICATION: Primary | ICD-10-CM

## 2022-11-01 DIAGNOSIS — F32.A DEPRESSION, UNSPECIFIED DEPRESSION TYPE: ICD-10-CM

## 2022-11-01 DIAGNOSIS — E78.2 MIXED HYPERLIPIDEMIA: ICD-10-CM

## 2022-11-01 DIAGNOSIS — I50.22 CHRONIC SYSTOLIC CONGESTIVE HEART FAILURE: ICD-10-CM

## 2022-11-01 DIAGNOSIS — I10 HYPERTENSION, BENIGN: ICD-10-CM

## 2022-11-01 DIAGNOSIS — K29.70 GASTRITIS AND GASTRODUODENITIS: ICD-10-CM

## 2022-11-01 DIAGNOSIS — N40.0 PROSTATIC HYPERTROPHY: ICD-10-CM

## 2022-11-01 DIAGNOSIS — M62.3 IMMOBILITY SYNDROME: ICD-10-CM

## 2022-11-01 DIAGNOSIS — J30.2 SEASONAL ALLERGIES: ICD-10-CM

## 2022-11-01 DIAGNOSIS — N18.30 CHRONIC KIDNEY DISEASE, STAGE III (MODERATE): ICD-10-CM

## 2022-11-01 PROCEDURE — 99214 OFFICE O/P EST MOD 30 MIN: CPT | Performed by: FAMILY MEDICINE

## 2022-11-01 RX ORDER — FUROSEMIDE 20 MG/1
20 TABLET ORAL 2 TIMES DAILY
Qty: 180 TABLET | Refills: 3 | Status: SHIPPED | OUTPATIENT
Start: 2022-11-01 | End: 2023-03-01

## 2022-11-01 RX ORDER — ATORVASTATIN CALCIUM 80 MG/1
80 TABLET, FILM COATED ORAL
Qty: 90 TABLET | Refills: 0 | Status: SHIPPED | OUTPATIENT
Start: 2022-11-01 | End: 2023-01-25 | Stop reason: SDUPTHER

## 2022-11-01 RX ORDER — MONTELUKAST SODIUM 10 MG/1
10 TABLET ORAL NIGHTLY
Qty: 90 TABLET | Refills: 3 | Status: SHIPPED | OUTPATIENT
Start: 2022-11-01

## 2022-11-01 RX ORDER — METOPROLOL SUCCINATE 25 MG/1
25 TABLET, EXTENDED RELEASE ORAL DAILY
Qty: 90 TABLET | Refills: 3
Start: 2022-11-01 | End: 2022-12-22

## 2022-11-01 RX ORDER — ENALAPRIL MALEATE 5 MG/1
5 TABLET ORAL DAILY
Qty: 90 TABLET | Refills: 3
Start: 2022-11-01 | End: 2022-11-22

## 2022-11-01 RX ORDER — OMEPRAZOLE 40 MG/1
40 CAPSULE, DELAYED RELEASE ORAL DAILY
Qty: 90 CAPSULE | Refills: 3 | Status: SHIPPED | OUTPATIENT
Start: 2022-11-01 | End: 2023-01-25 | Stop reason: SDUPTHER

## 2022-11-01 RX ORDER — FINASTERIDE 5 MG/1
5 TABLET, FILM COATED ORAL DAILY
Qty: 90 TABLET | Refills: 3 | Status: SHIPPED | OUTPATIENT
Start: 2022-11-01 | End: 2023-01-25 | Stop reason: SDUPTHER

## 2022-11-01 NOTE — PROGRESS NOTES
Subjective   Mikael Shanks is a 87 y.o. male.     History of Present Illness    Immobility Syndrome      Back Pain  This is a chronic problem. The current episode started more than 1 year ago. The problem occurs intermittently. The problem has been gradually worsening since onset. The pain is present in the lumbar spine. The pain radiates to the left thigh. The pain is moderate. Pertinent negatives include no fever.   URI   This is a recurrent problem. The current episode started 1 to 4 weeks ago. The problem has been gradually worsening. Associated symptoms include congestion and coughing. Pertinent negatives include no diarrhea, ear pain, nausea, rash, sore throat, vomiting or wheezing.        The following portions of the patient's history were reviewed and updated as appropriate: current medications, past family history, past medical history, past social history, past surgical history and problem list.    Family History   Problem Relation Age of Onset   • Cancer Mother    • Heart disease Father    • Cancer Brother        Social History     Tobacco Use   • Smoking status: Former     Packs/day: 5.00     Years: 7.00     Pack years: 35.00     Types: Cigarettes     Start date: 1953     Quit date: 1960     Years since quittin.2   • Smokeless tobacco: Never   Vaping Use   • Vaping Use: Never used   Substance Use Topics   • Alcohol use: Not Currently     Comment: very rare   • Drug use: No       Past Surgical History:   Procedure Laterality Date   • BACK SURGERY     • CARDIAC CATHETERIZATION N/A 2019    Procedure: Left Heart Cath and coronary angiogram;  Surgeon: Dayday Riuz MD;  Location: [facility] CATH INVASIVE LOCATION;  Service: Cardiovascular   • CARDIAC CATHETERIZATION N/A 2019    Procedure: Right and Left Heart Cath;  Surgeon: Dayday Ruiz MD;  Location: [facility] CATH INVASIVE LOCATION;  Service: Cardiovascular   • CARDIAC CATHETERIZATION N/A 2020    Procedure: Left and Right  Heart Cath with Coronary Angiography;  Surgeon: Dayday Ruiz MD;  Location: [facility] CATH INVASIVE LOCATION;  Service: Cardiovascular;  Laterality: N/A;   • CAROTID ENDARTERECTOMY Left    • CHOLECYSTECTOMY     • COLONOSCOPY  08/14/2018    No repeat   • CORONARY ANGIOPLASTY WITH STENT PLACEMENT     • LUMBAR LAMINECTOMY Bilateral 1/31/2022    Procedure: LUMBAR LAMINECTOMY WITH/WITHOUT FUSION L4-L5;  Surgeon: Geronimo Gonzales MD;  Location: [facility] MAIN OR;  Service: Neurosurgery;  Laterality: Bilateral;       Patient Active Problem List   Diagnosis   • Type 2 diabetes mellitus without complication, without long-term current use of insulin ([facility])   • PAC (premature atrial contraction)   • Gastritis and gastroduodenitis   • Atherosclerosis of native coronary artery of native heart without angina pectoris   • Stage 3a chronic kidney disease (CMS/HCC)   • Hypertension, benign   • Spinal stenosis of lumbar region   • Asymptomatic peripheral vascular disease ([facility])   • Prostatic hypertrophy   • Pernicious anemia   • Depression   • Pulmonary valve disorder   • Hearing loss   • Family history of colon cancer   • BILL (obstructive sleep apnea)   • Stenosis of coronary artery stent   • Mixed hyperlipidemia   • Anxiety disorder, unspecified   • Grief   • Ischemic cardiomyopathy   • Hematuria   • Neurogenic claudication ([facility])   • Proteinuria   • Encounter for general adult medical examination with abnormal findings   • Carotid artery occlusion   • 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[facility])   • Bilateral carotid artery stenosis   • Left hip pain   • Benign prostatic hyperplasia   • Mitral valve insufficiency   • Type 2 diabetes mellitus with stage 3a chronic kidney disease, without long-term current use of insulin ([facility])   • Hyperkalemia   • 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 18 g 2   • aspirin 81 MG tablet Take 81 mg by mouth Daily. LD 1/29     • Blood Glucose Monitoring Suppl (ONE TOUCH ULTRA 2) w/Device kit 1 Device Daily. Test 1 x daily 1 each 0   • cholecalciferol (VITAMIN D3) 10 MCG (400 UNIT) tablet Take 400 Units by mouth Daily. Hold DOS     • glipizide (glipiZIDE XL) 10 MG 24 hr tablet Take 1 tablet by mouth Daily. 90 tablet 3   • glucose blood (ONE TOUCH ULTRA TEST) test strip Test 1 x daily 100 each 5   • multivitamin with minerals tablet tablet Take 1 tablet by mouth Daily. LD 1/24     • OneTouch Delica Lancets 33G misc 1 strip Daily. Test 1 x daily 100 each 5     Current Facility-Administered Medications on File Prior to Visit   Medication Dose Route Frequency Provider Last Rate Last Admin   • cyanocobalamin injection 1,000 mcg  1,000 mcg Intramuscular Q28 Days Xander Robison MD   1,000 mcg at 12/29/21 1541       Allergies   Allergen Reactions   • Penicillins Hives       Review of Systems   Constitutional: Negative for fatigue and fever.   HENT: Positive for congestion. Negative for ear pain, sore throat and voice change.    Respiratory: Positive for cough. Negative for shortness of breath and wheezing.    Gastrointestinal: Negative for diarrhea, nausea and vomiting.   Musculoskeletal: Positive for back pain. Negative for myalgias.   Skin: Negative for rash.   Neurological: Negative for headache.       Objective   Visit Vitals  /64 (BP Location: Left arm, Patient Position: Sitting, Cuff Size: Adult)   Pulse 63   Temp 97.5 °F (36.4 °C)   Resp 16   Ht 177.8 cm (70\")   Wt 87.4 kg (192 lb 9.6 oz)   SpO2 95%   BMI 27.64 kg/m²     Physical Exam  Vitals and nursing note reviewed.   Constitutional:     "   Appearance: He is well-developed.   HENT:      Head: Normocephalic.   Neck:      Thyroid: No thyromegaly.      Vascular: No carotid bruit.      Trachea: Trachea normal.   Cardiovascular:      Rate and Rhythm: Normal rate and regular rhythm.      Heart sounds: No murmur heard.    No friction rub. No gallop.   Pulmonary:      Effort: Pulmonary effort is normal. No respiratory distress.      Breath sounds: Normal breath sounds. No wheezing.   Chest:      Chest wall: No tenderness.   Musculoskeletal:      Cervical back: Neck supple.      Lumbar back: Decreased range of motion.   Skin:     General: Skin is dry.      Findings: No rash.      Nails: There is no clubbing.   Neurological:      Mental Status: He is alert and oriented to person, place, and time.   Psychiatric:         Behavior: Behavior is cooperative.           Assessment & Plan .  Problem List Items Addressed This Visit        Medium    Asthma    Current Assessment & Plan     Doing well with singular and albuteral             Spinal stenosis, lumbar region, with neurogenic claudication - Primary    Current Assessment & Plan     Doing well-however does have pain in left anterior thigh. Declines more tests or meds            Unprioritized    Immobility syndrome    Current Assessment & Plan     Offered physical therapy=patient declines-states he has went to PT x 3 times with no change.          Seasonal allergies    Current Assessment & Plan     Worsening- Patient is currently taking Singulair-advised to add Flonase and Claritin.

## 2022-11-22 DIAGNOSIS — I10 HYPERTENSION, BENIGN: ICD-10-CM

## 2022-11-22 RX ORDER — ENALAPRIL MALEATE 5 MG/1
5 TABLET ORAL DAILY
Qty: 90 TABLET | Refills: 0 | Status: SHIPPED | OUTPATIENT
Start: 2022-11-22 | End: 2023-01-25 | Stop reason: SDUPTHER

## 2022-12-17 DIAGNOSIS — I10 HYPERTENSION, BENIGN: ICD-10-CM

## 2022-12-20 ENCOUNTER — TELEPHONE (OUTPATIENT)
Dept: FAMILY MEDICINE CLINIC | Facility: CLINIC | Age: 87
End: 2022-12-20

## 2022-12-20 NOTE — TELEPHONE ENCOUNTER
Called and left a message for Mr. Shanks- Advised him he is due for a PE--We received a refill request and noticed he had no appt scheduled.

## 2022-12-21 ENCOUNTER — TELEPHONE (OUTPATIENT)
Dept: FAMILY MEDICINE CLINIC | Facility: CLINIC | Age: 87
End: 2022-12-21

## 2022-12-22 RX ORDER — METOPROLOL SUCCINATE 25 MG/1
25 TABLET, EXTENDED RELEASE ORAL DAILY
Qty: 90 TABLET | Refills: 0 | Status: SHIPPED | OUTPATIENT
Start: 2022-12-22 | End: 2023-01-25 | Stop reason: SDUPTHER

## 2023-01-09 ENCOUNTER — TELEPHONE (OUTPATIENT)
Dept: FAMILY MEDICINE CLINIC | Facility: CLINIC | Age: 88
End: 2023-01-09

## 2023-01-24 DIAGNOSIS — I10 HYPERTENSION, BENIGN: ICD-10-CM

## 2023-01-25 ENCOUNTER — OFFICE VISIT (OUTPATIENT)
Dept: FAMILY MEDICINE CLINIC | Facility: CLINIC | Age: 88
End: 2023-01-25
Payer: MEDICARE

## 2023-01-25 VITALS
RESPIRATION RATE: 15 BRPM | TEMPERATURE: 97.7 F | BODY MASS INDEX: 25.05 KG/M2 | WEIGHT: 175 LBS | HEART RATE: 71 BPM | OXYGEN SATURATION: 96 % | HEIGHT: 70 IN

## 2023-01-25 DIAGNOSIS — J45.909 ASTHMA, UNSPECIFIED ASTHMA SEVERITY, UNSPECIFIED WHETHER COMPLICATED, UNSPECIFIED WHETHER PERSISTENT: ICD-10-CM

## 2023-01-25 DIAGNOSIS — E78.2 MIXED HYPERLIPIDEMIA: Primary | ICD-10-CM

## 2023-01-25 DIAGNOSIS — E11.22 TYPE 2 DIABETES MELLITUS WITH STAGE 3A CHRONIC KIDNEY DISEASE, WITHOUT LONG-TERM CURRENT USE OF INSULIN: ICD-10-CM

## 2023-01-25 DIAGNOSIS — N18.31 TYPE 2 DIABETES MELLITUS WITH STAGE 3A CHRONIC KIDNEY DISEASE, WITHOUT LONG-TERM CURRENT USE OF INSULIN: ICD-10-CM

## 2023-01-25 DIAGNOSIS — K29.90 GASTRITIS AND GASTRODUODENITIS: ICD-10-CM

## 2023-01-25 DIAGNOSIS — N18.30 CHRONIC KIDNEY DISEASE, STAGE III (MODERATE): ICD-10-CM

## 2023-01-25 DIAGNOSIS — F32.A DEPRESSION, UNSPECIFIED DEPRESSION TYPE: ICD-10-CM

## 2023-01-25 DIAGNOSIS — D55.9 ANEMIA DUE TO ENZYME DISORDER, UNSPECIFIED: ICD-10-CM

## 2023-01-25 DIAGNOSIS — E11.9 TYPE 2 DIABETES MELLITUS WITHOUT COMPLICATION, WITHOUT LONG-TERM CURRENT USE OF INSULIN: ICD-10-CM

## 2023-01-25 DIAGNOSIS — D51.0 PERNICIOUS ANEMIA: ICD-10-CM

## 2023-01-25 DIAGNOSIS — K29.70 GASTRITIS AND GASTRODUODENITIS: ICD-10-CM

## 2023-01-25 DIAGNOSIS — N40.0 PROSTATIC HYPERTROPHY: ICD-10-CM

## 2023-01-25 DIAGNOSIS — I10 HYPERTENSION, BENIGN: ICD-10-CM

## 2023-01-25 DIAGNOSIS — N18.31 STAGE 3A CHRONIC KIDNEY DISEASE: ICD-10-CM

## 2023-01-25 DIAGNOSIS — F41.1 GENERALIZED ANXIETY DISORDER: ICD-10-CM

## 2023-01-25 PROCEDURE — 99214 OFFICE O/P EST MOD 30 MIN: CPT | Performed by: FAMILY MEDICINE

## 2023-01-25 RX ORDER — FINASTERIDE 5 MG/1
5 TABLET, FILM COATED ORAL DAILY
Qty: 90 TABLET | Refills: 1 | Status: SHIPPED | OUTPATIENT
Start: 2023-01-25

## 2023-01-25 RX ORDER — OMEPRAZOLE 40 MG/1
40 CAPSULE, DELAYED RELEASE ORAL DAILY
Qty: 90 CAPSULE | Refills: 1 | Status: SHIPPED | OUTPATIENT
Start: 2023-01-25

## 2023-01-25 RX ORDER — ENALAPRIL MALEATE 5 MG/1
5 TABLET ORAL DAILY
Qty: 90 TABLET | Refills: 0 | Status: SHIPPED | OUTPATIENT
Start: 2023-01-25

## 2023-01-25 RX ORDER — ENALAPRIL MALEATE 5 MG/1
5 TABLET ORAL DAILY
Qty: 90 TABLET | Refills: 1 | Status: SHIPPED | OUTPATIENT
Start: 2023-01-25 | End: 2023-01-25 | Stop reason: SDUPTHER

## 2023-01-25 RX ORDER — METOPROLOL SUCCINATE 25 MG/1
25 TABLET, EXTENDED RELEASE ORAL DAILY
Qty: 90 TABLET | Refills: 1 | Status: SHIPPED | OUTPATIENT
Start: 2023-01-25

## 2023-01-25 RX ORDER — ALBUTEROL SULFATE 90 UG/1
2 AEROSOL, METERED RESPIRATORY (INHALATION) EVERY 4 HOURS PRN
Qty: 18 G | Refills: 2 | Status: SHIPPED | OUTPATIENT
Start: 2023-01-25 | End: 2023-02-15 | Stop reason: DRUGHIGH

## 2023-01-25 RX ORDER — GLIPIZIDE 10 MG/1
10 TABLET, FILM COATED, EXTENDED RELEASE ORAL DAILY
Qty: 90 TABLET | Refills: 1
Start: 2023-01-25 | End: 2023-01-30

## 2023-01-25 RX ORDER — ATORVASTATIN CALCIUM 80 MG/1
80 TABLET, FILM COATED ORAL
Qty: 90 TABLET | Refills: 1 | Status: SHIPPED | OUTPATIENT
Start: 2023-01-25

## 2023-01-25 NOTE — ASSESSMENT & PLAN NOTE
Patient requested a refill of medication.  Doing well with Proscar without side effects.  Benefits outweigh the risk

## 2023-01-25 NOTE — ASSESSMENT & PLAN NOTE
Improved and at goal   Encouraged to watch sugar intake, exercise more and lose weight.   compliant with medication.   Not monitoring sugar at home.   Follow up in 5 months  Care management needs are self-addressed. Self-management abilities addressed and patient is capable of managing his own disease.

## 2023-01-25 NOTE — ASSESSMENT & PLAN NOTE
Patient requested a refill of medication.  Doing well on Zoloft without side effects.  Benefits outweigh the risk

## 2023-01-25 NOTE — ASSESSMENT & PLAN NOTE
Doing well; Encouraged to watch salt, exercise more and lose weight.  Patient tolerated enalapril, metoprolol well without side effects. I feel the benefits of the medication outweigh the risks.

## 2023-01-25 NOTE — ASSESSMENT & PLAN NOTE
Patient requested a refill of medication  --Doing well on albuterol well without side effects.  Benefit the drugs outweigh the risk

## 2023-01-25 NOTE — ASSESSMENT & PLAN NOTE
Greatly Improved.   Encouraged to watch fatty intake, exercise more, and lose weight. compliant with medication  Patient tolerated atorvastatin well without side effects. I feel the benefits of the medication outweigh the risks.    Is  getting adequate diet and exercise  Goals developed at last visit were met   Follow up in 5  months  Care management needs are self-addressed. Self-management abilities addressed and patient is capable of managing his own disease.

## 2023-01-25 NOTE — PROGRESS NOTES
Subjective   Mikael Shanks is a 87 y.o. male.     Hypertension  This is a chronic problem. The current episode started more than 1 year ago. The problem is unchanged. Pertinent negatives include no chest pain, palpitations or shortness of breath. Risk factors for coronary artery disease include dyslipidemia and diabetes mellitus.   Hyperlipidemia  This is a chronic problem. The current episode started more than 1 year ago. The problem is controlled. Pertinent negatives include no chest pain, myalgias or shortness of breath. Current antihyperlipidemic treatment includes statins. Risk factors for coronary artery disease include dyslipidemia, diabetes mellitus and hypertension.   Diabetes  He presents for his follow-up diabetic visit. He has type 2 diabetes mellitus. His disease course has been stable. There are no hypoglycemic associated symptoms. There are no diabetic associated symptoms. Pertinent negatives for diabetes include no chest pain, no fatigue, no polyphagia, no polyuria and no weight loss. There are no hypoglycemic complications. There are no diabetic complications. Risk factors for coronary artery disease include dyslipidemia, diabetes mellitus and hypertension.        The following portions of the patient's history were reviewed and updated as appropriate: current medications, past family history, past medical history, past social history, past surgical history and problem list.    Family History   Problem Relation Age of Onset   • Cancer Mother    • Heart disease Father    • Cancer Brother        Social History     Tobacco Use   • Smoking status: Former     Packs/day: 5.00     Years: 7.00     Pack years: 35.00     Types: Cigarettes     Start date: 1953     Quit date: 1960     Years since quittin.4   • Smokeless tobacco: Never   Vaping Use   • Vaping Use: Never used   Substance Use Topics   • Alcohol use: Not Currently     Comment: very rare   • Drug use: No       Past Surgical History:    Procedure Laterality Date   • BACK SURGERY     • CARDIAC CATHETERIZATION N/A 12/5/2019    Procedure: Left Heart Cath and coronary angiogram;  Surgeon: Dayday Ruiz MD;  Location: [facility] CATH INVASIVE LOCATION;  Service: Cardiovascular   • CARDIAC CATHETERIZATION N/A 12/5/2019    Procedure: Right and Left Heart Cath;  Surgeon: Dayday Ruiz MD;  Location: [facility] CATH INVASIVE LOCATION;  Service: Cardiovascular   • CARDIAC CATHETERIZATION N/A 9/4/2020    Procedure: Left and Right Heart Cath with Coronary Angiography;  Surgeon: Dayday Ruiz MD;  Location: [facility] CATH INVASIVE LOCATION;  Service: Cardiovascular;  Laterality: N/A;   • CAROTID ENDARTERECTOMY Left    • CHOLECYSTECTOMY     • COLONOSCOPY  08/14/2018    No repeat   • CORONARY ANGIOPLASTY WITH STENT PLACEMENT     • LUMBAR LAMINECTOMY Bilateral 1/31/2022    Procedure: LUMBAR LAMINECTOMY WITH/WITHOUT FUSION L4-L5;  Surgeon: Geronimo Gonzales MD;  Location: [facility] MAIN OR;  Service: Neurosurgery;  Laterality: Bilateral;       Patient Active Problem List   Diagnosis   • Type 2 diabetes mellitus without complication, without long-term current use of insulin ([facility])   • PAC (premature atrial contraction)   • Gastritis and gastroduodenitis   • Atherosclerosis of native coronary artery of native heart without angina pectoris   • Stage 3a chronic kidney disease (CMS/HCC)   • Hypertension, benign   • Spinal stenosis of lumbar region   • Asymptomatic peripheral vascular disease (HCC)   • Prostatic hypertrophy   • Pernicious anemia   • Depression   • Pulmonary valve disorder   • Hearing loss   • Family history of colon cancer   • BILL (obstructive sleep apnea)   • Stenosis of coronary artery stent   • Mixed hyperlipidemia   • Anxiety disorder, unspecified   • Grief   • Ischemic cardiomyopathy   • Hematuria   • Neurogenic claudication ([facility])   • Proteinuria   • Encounter for general adult medical examination with abnormal findings   • Carotid artery occlusion    • 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HCC)   • Bilateral carotid artery stenosis   • Left hip pain   • Benign prostatic hyperplasia   • Mitral valve insufficiency   • Type 2 diabetes mellitus with stage 3a chronic kidney disease, without long-term current use of insulin ([facility])   • Hyperkalemia   • 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 LD 1/29     • Blood Glucose Monitoring Suppl (ONE TOUCH ULTRA 2) w/Device kit 1 Device Daily. Test 1 x daily 1 each 0   • cholecalciferol (VITAMIN D3) 10 MCG (400 UNIT) tablet Take 400 Units by mouth Daily. Hold DOS     • enalapril (VASOTEC) 5 MG tablet TAKE 1 TABLET BY MOUTH DAILY 90 tablet 0   • furosemide (LASIX) 20 MG tablet Take 1 tablet by mouth 2 (Two) Times a Day. 180 tablet 3   • glucose blood (ONE TOUCH ULTRA TEST) test strip Test 1 x daily 100 each 5   • montelukast (Singulair) 10 MG tablet Take 1 tablet by mouth Every Night. 90 tablet 3   • multivitamin with minerals tablet tablet Take 1 tablet by mouth Daily. LD 1/24     • OneTouch Delica Lancets 33G misc 1 strip Daily. Test 1 x daily 100 each 5     Current Facility-Administered Medications on File Prior to Visit   Medication Dose Route Frequency Provider Last Rate Last Admin   • cyanocobalamin injection 1,000 mcg  1,000 mcg Intramuscular Q28 Days Xander Robison MD   1,000 mcg at 12/29/21 1541       Allergies   Allergen Reactions   • Penicillins Hives       Review of Systems   Constitutional: Negative for fatigue, unexpected weight gain and unexpected weight loss.   Eyes: Negative for visual disturbance.   Respiratory: Negative for shortness of breath.   "  Cardiovascular: Negative for chest pain, palpitations and leg swelling.   Gastrointestinal: Negative for nausea.   Endocrine: Negative for polyphagia and polyuria.   Genitourinary: Negative for frequency.   Musculoskeletal: Negative for myalgias.   Skin: Negative for dry skin and skin lesions.   Neurological: Negative for syncope, numbness and headache.       Objective   Visit Vitals  Pulse 71   Temp 97.7 °F (36.5 °C)   Resp 15   Ht 177.8 cm (70\")   Wt 79.4 kg (175 lb)   SpO2 96%   BMI 25.11 kg/m²     Physical Exam  Vitals and nursing note reviewed.   Constitutional:       Appearance: He is well-developed.   HENT:      Head: Normocephalic.   Neck:      Thyroid: No thyromegaly.      Vascular: No carotid bruit.      Trachea: Trachea normal.   Cardiovascular:      Rate and Rhythm: Normal rate and regular rhythm.      Heart sounds: No murmur heard.    No friction rub. No gallop.   Pulmonary:      Effort: Pulmonary effort is normal. No respiratory distress.      Breath sounds: Normal breath sounds. No wheezing.   Chest:      Chest wall: No tenderness.   Musculoskeletal:      Cervical back: Neck supple.   Skin:     General: Skin is dry.      Findings: No rash.      Nails: There is no clubbing.   Neurological:      Mental Status: He is alert and oriented to person, place, and time.   Psychiatric:         Behavior: Behavior is cooperative.           Assessment & Plan .  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  Benefit the drugs outweigh the risk         Relevant Medications    albuterol sulfate  (90 Base) MCG/ACT inhaler    Depression    Current Assessment & Plan     Patient requested a refill of medication.  Doing well on Zoloft without side " effects.  Benefits outweigh the risk         Relevant Medications    sertraline (ZOLOFT) 50 MG tablet    Hypertension, benign    Current Assessment & Plan     Doing well; Encouraged to watch salt, exercise more and lose weight.  Patient tolerated enalapril, metoprolol well without side effects. I feel the benefits of the medication outweigh the risks.           Relevant Medications    metoprolol succinate XL (TOPROL-XL) 25 MG 24 hr tablet    Mixed hyperlipidemia - Primary    Current Assessment & Plan     Greatly Improved.   Encouraged to watch fatty intake, exercise more, and lose weight. compliant with medication  Patient tolerated atorvastatin well without side effects. I feel the benefits of the medication outweigh the risks.    Is  getting adequate diet and exercise  Goals developed at last visit were met   Follow up in 5  months  Care management needs are self-addressed. Self-management abilities addressed and patient is capable of managing his own disease.           Relevant Medications    atorvastatin (LIPITOR) 80 MG tablet    Pernicious anemia    Current Assessment & Plan     Slightly worse with hemoglobin down to 12.5 from 13.6.  Patient needs B12 injection         Stage 3a chronic kidney disease (CMS/HCC)    Current Assessment & Plan     Resolved at this time         Relevant Medications    finasteride (PROSCAR) 5 MG tablet    Type 2 diabetes mellitus with stage 3a chronic kidney disease, without long-term current use of insulin ([facility])    Relevant Medications    glipizide (glipiZIDE XL) 10 MG 24 hr tablet    Type 2 diabetes mellitus without complication, without long-term current use of insulin ([facility])    Current Assessment & Plan      Improved and at goal   Encouraged to watch sugar intake, exercise more and lose weight.   compliant with medication.   Not monitoring sugar at home.   Follow up in 5 months  Care management needs are self-addressed. Self-management abilities addressed and patient is capable of  managing his own disease.           Relevant Medications    glipizide (glipiZIDE XL) 10 MG 24 hr tablet       Low    Anxiety disorder, unspecified    Current Assessment & Plan     Patient requested a refill of medication.  Doing well with Zoloft.  Benefits outweigh the risk         Relevant Medications    sertraline (ZOLOFT) 50 MG tablet    Gastritis and gastroduodenitis    Overview     EGD done in August 2018 by Dr. Peraza showed a small hiatal hernia otherwise was normal esophagus.  Stomach did show some mild erythema congested consistent with gastritis         Current Assessment & Plan     Patient requested a refill of medication.  Doing well with Prilosec without side effects.  Benefits outweigh the risk         Relevant Medications    omeprazole (priLOSEC) 40 MG capsule       Unprioritized    Prostatic hypertrophy    Current Assessment & Plan     Patient requested a refill of medication.  Doing well with Proscar without side effects.  Benefits outweigh the risk         Relevant Medications    finasteride (PROSCAR) 5 MG tablet   Other Visit Diagnoses     Chronic kidney disease, stage III (moderate) (HCC)        Relevant Medications    finasteride (PROSCAR) 5 MG tablet

## 2023-01-25 NOTE — ASSESSMENT & PLAN NOTE
Patient requested a refill of medication.  Doing well with Prilosec without side effects.  Benefits outweigh the risk

## 2023-01-26 DIAGNOSIS — E11.22 TYPE 2 DIABETES MELLITUS WITH STAGE 3A CHRONIC KIDNEY DISEASE, WITHOUT LONG-TERM CURRENT USE OF INSULIN: ICD-10-CM

## 2023-01-26 DIAGNOSIS — N18.31 TYPE 2 DIABETES MELLITUS WITH STAGE 3A CHRONIC KIDNEY DISEASE, WITHOUT LONG-TERM CURRENT USE OF INSULIN: ICD-10-CM

## 2023-01-31 DIAGNOSIS — N18.31 TYPE 2 DIABETES MELLITUS WITH STAGE 3A CHRONIC KIDNEY DISEASE, WITHOUT LONG-TERM CURRENT USE OF INSULIN: ICD-10-CM

## 2023-01-31 DIAGNOSIS — E11.22 TYPE 2 DIABETES MELLITUS WITH STAGE 3A CHRONIC KIDNEY DISEASE, WITHOUT LONG-TERM CURRENT USE OF INSULIN: ICD-10-CM

## 2023-02-01 ENCOUNTER — TELEPHONE (OUTPATIENT)
Dept: FAMILY MEDICINE CLINIC | Facility: CLINIC | Age: 88
End: 2023-02-01
Payer: MEDICARE

## 2023-02-02 RX ORDER — GLIPIZIDE 10 MG/1
10 TABLET, FILM COATED, EXTENDED RELEASE ORAL DAILY
Qty: 90 TABLET | Refills: 1 | Status: SHIPPED | OUTPATIENT
Start: 2023-02-02 | End: 2023-02-02 | Stop reason: SDUPTHER

## 2023-02-02 RX ORDER — GLIPIZIDE 10 MG/1
10 TABLET, FILM COATED, EXTENDED RELEASE ORAL DAILY
Qty: 90 TABLET | Refills: 1 | Status: SHIPPED | OUTPATIENT
Start: 2023-02-02 | End: 2023-03-01 | Stop reason: SDUPTHER

## 2023-02-15 DIAGNOSIS — J45.909 ASTHMA, UNSPECIFIED ASTHMA SEVERITY, UNSPECIFIED WHETHER COMPLICATED, UNSPECIFIED WHETHER PERSISTENT: Primary | ICD-10-CM

## 2023-02-15 RX ORDER — ALBUTEROL SULFATE 90 UG/1
2 AEROSOL, METERED RESPIRATORY (INHALATION) EVERY 4 HOURS PRN
Qty: 18 G | Refills: 5 | Status: SHIPPED | OUTPATIENT
Start: 2023-02-15

## 2023-02-21 ENCOUNTER — OFFICE VISIT (OUTPATIENT)
Dept: FAMILY MEDICINE CLINIC | Facility: CLINIC | Age: 88
End: 2023-02-21
Payer: MEDICARE

## 2023-02-21 VITALS
OXYGEN SATURATION: 98 % | DIASTOLIC BLOOD PRESSURE: 76 MMHG | HEIGHT: 71 IN | BODY MASS INDEX: 25.02 KG/M2 | RESPIRATION RATE: 18 BRPM | TEMPERATURE: 97.3 F | SYSTOLIC BLOOD PRESSURE: 124 MMHG | HEART RATE: 75 BPM | WEIGHT: 178.7 LBS

## 2023-02-21 VITALS
HEART RATE: 75 BPM | BODY MASS INDEX: 25.03 KG/M2 | RESPIRATION RATE: 18 BRPM | HEIGHT: 71 IN | WEIGHT: 178.8 LBS | DIASTOLIC BLOOD PRESSURE: 76 MMHG | OXYGEN SATURATION: 98 % | TEMPERATURE: 97.3 F | SYSTOLIC BLOOD PRESSURE: 124 MMHG

## 2023-02-21 DIAGNOSIS — E21.5 PARATHYROID ABNORMALITY: ICD-10-CM

## 2023-02-21 DIAGNOSIS — I65.29 OCCLUSION OF CAROTID ARTERY, UNSPECIFIED LATERALITY: ICD-10-CM

## 2023-02-21 DIAGNOSIS — Z00.00 MEDICARE ANNUAL WELLNESS VISIT, SUBSEQUENT: ICD-10-CM

## 2023-02-21 DIAGNOSIS — Z00.00 MEDICARE ANNUAL WELLNESS VISIT, SUBSEQUENT: Primary | ICD-10-CM

## 2023-02-21 DIAGNOSIS — I25.5 ISCHEMIC CARDIOMYOPATHY: ICD-10-CM

## 2023-02-21 DIAGNOSIS — Z23 NEED FOR PNEUMOCOCCAL VACCINATION: ICD-10-CM

## 2023-02-21 DIAGNOSIS — I25.10 ATHEROSCLEROSIS OF NATIVE CORONARY ARTERY OF NATIVE HEART WITHOUT ANGINA PECTORIS: ICD-10-CM

## 2023-02-21 DIAGNOSIS — R35.1 NOCTURIA: ICD-10-CM

## 2023-02-21 DIAGNOSIS — E78.2 MIXED HYPERLIPIDEMIA: ICD-10-CM

## 2023-02-21 DIAGNOSIS — Z71.85 IMMUNIZATION COUNSELING: ICD-10-CM

## 2023-02-21 DIAGNOSIS — D55.9 ANEMIA DUE TO ENZYME DISORDER, UNSPECIFIED: ICD-10-CM

## 2023-02-21 DIAGNOSIS — I10 HYPERTENSION, BENIGN: ICD-10-CM

## 2023-02-21 DIAGNOSIS — E11.9 TYPE 2 DIABETES MELLITUS WITHOUT COMPLICATION, WITHOUT LONG-TERM CURRENT USE OF INSULIN: ICD-10-CM

## 2023-02-21 DIAGNOSIS — I73.9 ASYMPTOMATIC PERIPHERAL VASCULAR DISEASE: Primary | ICD-10-CM

## 2023-02-21 PROCEDURE — 90677 PCV20 VACCINE IM: CPT | Performed by: FAMILY MEDICINE

## 2023-02-21 PROCEDURE — 99214 OFFICE O/P EST MOD 30 MIN: CPT | Performed by: FAMILY MEDICINE

## 2023-02-21 PROCEDURE — G0009 ADMIN PNEUMOCOCCAL VACCINE: HCPCS | Performed by: FAMILY MEDICINE

## 2023-02-21 PROCEDURE — G0444 DEPRESSION SCREEN ANNUAL: HCPCS | Performed by: FAMILY MEDICINE

## 2023-02-21 PROCEDURE — 99497 ADVNCD CARE PLAN 30 MIN: CPT | Performed by: FAMILY MEDICINE

## 2023-02-21 PROCEDURE — G0439 PPPS, SUBSEQ VISIT: HCPCS | Performed by: FAMILY MEDICINE

## 2023-02-21 NOTE — ASSESSMENT & PLAN NOTE
Doing well.  Patient tolerated Metoprolol and Enalapril well without side effects. I feel the benefits of the medication outweigh the risks.  Encouraged to watch salt, exercise more and lose weight.

## 2023-02-21 NOTE — PROGRESS NOTES
Subjective   Mikael Shanks is a 87 y.o. male.   Chief Complaint   Patient presents with   • Claudication   • Coronary Artery Disease   • Congestive Heart Failure   • Hypertension   • Carotid Artery Disease     I have identified multiple medical problems which are significant and separately identifiable that require added work above and beyond the wellness visit. These are not trivial or insignificant and are billed with a 25-modifier    History of Present Illness  Follow up PVD  Coronary Artery Disease  Presents for follow-up visit. Symptoms include shortness of breath. Pertinent negatives include no chest pain or palpitations. His past medical history is significant for CHF. The symptoms have been stable. Compliance with diet is good. Compliance with exercise is variable. Compliance with medications is good.   Congestive Heart Failure  Presents for follow-up visit. Associated symptoms include nocturia (4-5 x nightly) and shortness of breath. Pertinent negatives include no chest pain, fatigue or palpitations. The symptoms have been stable. His past medical history is significant for CAD. Compliance with total regimen is %. Compliance with diet is 51-75%. Compliance with exercise is 26-50%. Compliance with medications is %.   Hypertension  This is a chronic problem. The current episode started more than 1 year ago. The problem is unchanged. The problem is controlled. Associated symptoms include shortness of breath. Pertinent negatives include no chest pain or palpitations. There are no associated agents to hypertension. Risk factors for coronary artery disease include dyslipidemia and diabetes mellitus. The current treatment provides significant improvement. There are no compliance problems.  Hypertensive end-organ damage includes CAD/MI and heart failure.   Carotid Artery Disease  This is a chronic problem. The current episode started more than 1 year ago. The problem occurs constantly. The problem has  been unchanged. Pertinent negatives include no chest pain, fatigue or joint swelling. Nothing aggravates the symptoms. He has tried nothing for the symptoms. The treatment provided no relief.                  Review of Systems   Constitutional: Negative for fatigue.   HENT: Positive for hearing loss.    Respiratory: Positive for shortness of breath.    Cardiovascular: Negative for chest pain and palpitations.   Genitourinary: Positive for frequency and nocturia (4-5 x nightly).        Nocturia   Musculoskeletal: Negative for joint swelling.   Neurological: Negative for memory problem.       Objective     Physical Exam  Vitals and nursing note reviewed.   Constitutional:       Appearance: He is well-developed.   HENT:      Head: Normocephalic.   Neck:      Thyroid: No thyromegaly.      Vascular: No carotid bruit.      Trachea: Trachea normal.   Cardiovascular:      Rate and Rhythm: Normal rate and regular rhythm.      Heart sounds: No murmur heard.    No friction rub. No gallop.   Pulmonary:      Effort: Pulmonary effort is normal. No respiratory distress.      Breath sounds: Normal breath sounds. No wheezing.   Chest:      Chest wall: No tenderness.   Musculoskeletal:      Cervical back: Neck supple.   Skin:     General: Skin is dry.      Findings: No rash.      Nails: There is no clubbing.   Neurological:      Mental Status: He is alert and oriented to person, place, and time.   Psychiatric:         Behavior: Behavior is cooperative.         Assessment & Plan   Problem List Items Addressed This Visit        High    Asymptomatic peripheral vascular disease (HCC) - Primary    Overview     Arterial U/S done           Current Assessment & Plan     Discussed repeat Arterial U/S, pt. Declines.         Atherosclerosis of native coronary artery of native heart without angina pectoris    Overview     Dr. Ruiz done 9/4/2020 of 60% 20% blockage of the right coronary artery stent         Current Assessment & Plan      Stable, continue ASA.  Patient tolerated ASA well without side effects. I feel the benefits of the medication outweigh the risks.         Ischemic cardiomyopathy    Overview     Dr. Patel done cardiac cath done September 4, 2020 showing normal ejection fraction right coronary artery stent is patent with 20% blockage left anterior descending has luminal irregularities.  ECHO done at the same time.         Current Assessment & Plan     Stable.  Pt. Declines ECHO         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HCC)    Current Assessment & Plan     Will order labs today and patient will return for results and shared decision making.           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           Relevant Orders    CBC & Differential (Completed)    Folate (Completed)    Vitamin B12 (Completed)    Methylmalonic Acid, Serum    Hypertension, benign    Current Assessment & Plan     Doing well.  Patient tolerated Metoprolol and Enalapril well without side effects. I feel the benefits of the medication outweigh the risks.  Encouraged to watch salt, exercise more and lose weight.           Mixed hyperlipidemia    Current Assessment & Plan     Will order labs today and patient will return for results and shared decision making.             Relevant Orders    Lipid Panel With / Chol / HDL Ratio (Completed)    Comprehensive Metabolic Panel (Completed)    Type 2 diabetes mellitus without complication, without long-term current use of insulin (HCC)    Current Assessment & Plan     Will order labs today and patient will return for results and shared decision making.           Relevant Orders    Hemoglobin A1c (Completed)    Microalbumin / Creatinine Urine Ratio - Urine, Clean Catch (Completed)       Unprioritized    Carotid artery occlusion    Overview      U/A Carotids-mild-12-         Current Assessment & Plan     Discussed repeat U/S, pt. Declines.         Medicare annual wellness visit, subsequent    Current Assessment & Plan     Completed today         Nocturia    Current Assessment & Plan     Will order labs today and patient will return for results and shared decision making.           Relevant Orders    Urinalysis With Microscopic - Urine, Clean Catch (Completed)

## 2023-02-21 NOTE — PROGRESS NOTES
The ABCs of the Annual Wellness Visit  Subsequent Medicare Wellness Visit        Subjective    History of Present Illness:  Mikael Shanks is a 87 y.o. male who presents for a Subsequent Medicare Wellness Visit.    The following portions of the patient's history were reviewed and   updated as appropriate: allergies, current medications, past family history, past medical history, past social history, past surgical history and problem list.      Recent Hospitalizations:  He was not admitted to the hospital during the last year.       Current Medical Providers:  Patient Care Team:  Xander Robison MD as PCP - General  Delio Lund MD as Consulting Physician (Nephrology)  Dayday Ruiz MD as Consulting Physician (Cardiology)    Outpatient Medications Prior to Visit   Medication Sig Dispense Refill   • albuterol sulfate  (90 Base) MCG/ACT inhaler Inhale 2 puffs Every 4 (Four) Hours As Needed for Wheezing. 18 g 5   • aspirin 81 MG tablet Take 81 mg by mouth Daily. LD 1/29     • atorvastatin (LIPITOR) 80 MG tablet Take 1 tablet by mouth every night at bedtime. 90 tablet 1   • Blood Glucose Monitoring Suppl (ONE TOUCH ULTRA 2) w/Device kit 1 Device Daily. Test 1 x daily 1 each 0   • cholecalciferol (VITAMIN D3) 10 MCG (400 UNIT) tablet Take 400 Units by mouth Daily. Hold DOS     • enalapril (VASOTEC) 5 MG tablet TAKE 1 TABLET BY MOUTH DAILY 90 tablet 0   • finasteride (PROSCAR) 5 MG tablet Take 1 tablet by mouth Daily. 90 tablet 1   • glipizide (glipiZIDE XL) 10 MG 24 hr tablet Take 1 tablet by mouth Daily. 90 tablet 1   • glucose blood (ONE TOUCH ULTRA TEST) test strip Test 1 x daily 100 each 5   • metoprolol succinate XL (TOPROL-XL) 25 MG 24 hr tablet Take 1 tablet by mouth Daily. 90 tablet 1   • montelukast (Singulair) 10 MG tablet Take 1 tablet by mouth Every Night. 90 tablet 3   • multivitamin with minerals tablet tablet Take 1 tablet by mouth Daily. LD 1/24     • omeprazole (priLOSEC) 40  MG capsule Take 1 capsule by mouth Daily. 90 capsule 1   • OneTouch Delica Lancets 33G misc 1 strip Daily. Test 1 x daily 100 each 5   • sertraline (ZOLOFT) 50 MG tablet Take 1 tablet by mouth Daily. 90 tablet 1   • furosemide (LASIX) 20 MG tablet Take 1 tablet by mouth 2 (Two) Times a Day. 180 tablet 3     Facility-Administered Medications Prior to Visit   Medication Dose Route Frequency Provider Last Rate Last Admin   • cyanocobalamin injection 1,000 mcg  1,000 mcg Intramuscular Q28 Days Xander Robison MD   1,000 mcg at 12/29/21 1541       No opioid medication identified on active medication list. I have reviewed chart for other potential  high risk medication/s and harmful drug interactions in the elderly.          Aspirin is on active medication list. Aspirin use is indicated based on review of current medical condition/s. Pros and cons of this therapy have been discussed today. Benefits of this medication outweigh potential harm.  Patient has been encouraged to continue taking this medication.  .      Patient Active Problem List   Diagnosis   • Type 2 diabetes mellitus without complication, without long-term current use of insulin (HCC)   • PAC (premature atrial contraction)   • Gastritis and gastroduodenitis   • Atherosclerosis of native coronary artery of native heart without angina pectoris   • Stage 3a chronic kidney disease (CMS/HCC)   • Hypertension, benign   • Spinal stenosis of lumbar region   • Asymptomatic peripheral vascular disease (HCC)   • Prostatic hypertrophy   • Pernicious anemia   • Depression   • Pulmonary valve disorder   • Hearing loss   • Family history of colon cancer   • BILL (obstructive sleep apnea)   • Stenosis of coronary artery stent   • Mixed hyperlipidemia   • Anxiety disorder, unspecified   • Grief   • Ischemic cardiomyopathy   • Hematuria   • Neurogenic claudication (HCC)   • Proteinuria   • Encounter for general adult medical examination with abnormal findings   • Carotid  "artery occlusion   • 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HCC)   • Bilateral carotid artery stenosis   • Left hip pain   • Benign prostatic hyperplasia   • Mitral valve insufficiency   • Type 2 diabetes mellitus with stage 3a chronic kidney disease, without long-term current use of insulin (HCC)   • Hyperkalemia   • 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   Objective          Vitals:    02/21/23 1425   BP: 124/76   BP Location: Left arm   Patient Position: Sitting   Cuff Size: Adult   Pulse: 75   Resp: 18   Temp: 97.3 °F (36.3 °C)   TempSrc: Temporal   SpO2: 98%   Weight: 81.1 kg (178 lb 11.2 oz)   Height: 179.1 cm (70.5\")   PainSc: 0-No pain     Estimated body mass index is 25.28 kg/m² as calculated from the following:    Height as of this encounter: 179.1 cm (70.5\").    Weight as of this encounter: 81.1 kg (178 lb 11.2 oz).    BMI is >= 25 and <30. (Overweight) The following options were offered after discussion;: none (medical contraindication)      Does the patient have evidence of cognitive impairment? No           Family History   Problem Relation Age of Onset   • Cancer Mother    • Heart disease Father    • Cancer Brother        Compared to one year ago, the patient feels his physical   health is the same.    Compared to one year ago, the patient feels his mental   health is the same.    HEALTH RISK ASSESSMENT    Smoking Status:  Social History     Tobacco Use   Smoking Status Former   • Packs/day: 5.00   • Years: 7.00   • Pack years: 35.00   • Types: " Cigarettes   • Start date: 1953   • Quit date: 1960   • Years since quittin.5   Smokeless Tobacco Never     Alcohol Consumption:  Social History     Substance and Sexual Activity   Alcohol Use Not Currently    Comment: very rare     Fall Risk Screen:    STEADI Fall Risk Assessment was completed, and patient is at MODERATE risk for falls. Assessment completed on:2023    Depression Screening:  Depression screen reviewed and discussed with patient. Recommendations were not needed. Medications were not discussed, counseling was not discussed. We spent 5 minutes with the screen and discussion of depression and options.     PHQ-2/PHQ-9 Depression Screening 2023   Retired PHQ-9 Total Score -   Retired Total Score -   Little Interest or Pleasure in Doing Things 0-->not at all   Feeling Down, Depressed or Hopeless 0-->not at all   Trouble Falling or Staying Asleep, or Sleeping Too Much 0-->not at all   Feeling Tired or Having Little Energy 0-->not at all   Poor Appetite or Overeating 0-->not at all   Feeling Bad about Yourself - or that You are a Failure or Have Let Yourself or Your Family Down 0-->not at all   Trouble Concentrating on Things, Such as Reading the Newspaper or Watching Television 0-->not at all   Moving or Speaking So Slowly that Other People Could Have Noticed? Or the Opposite - Being So Fidgety 0-->not at all   Thoughts that You Would be Better Off Dead or of Hurting Yourself in Some Way 0-->not at all   PHQ-9: Brief Depression Severity Measure Score 0   If You Checked Off Any Problems, How Difficult Have These Problems Made It For You to Do Your Work, Take Care of Things at Home, or Get Along with Other People? not difficult at all       Health Habits and Functional and Cognitive Screening:  Functional & Cognitive Status 2023   Do you have difficulty preparing food and eating? No   Do you have difficulty bathing yourself, getting dressed or grooming yourself? No   Do you have  difficulty using the toilet? No   Do you have difficulty moving around from place to place? No   Do you have trouble with steps or getting out of a bed or a chair? No   Current Diet Well Balanced Diet   Dental Exam Up to date   Eye Exam Up to date   Exercise (times per week) 0 times per week   Current Exercises Include No Regular Exercise   Current Exercise Activities Include -   Do you need help using the phone?  No   Are you deaf or do you have serious difficulty hearing?  No   Do you need help with transportation? No   Do you need help shopping? No   Do you need help preparing meals?  No   Do you need help with housework?  No   Do you need help with laundry? No   Do you need help taking your medications? No   Do you need help managing money? No   Do you ever drive or ride in a car without wearing a seat belt? No   Have you felt unusual stress, anger or loneliness in the last month? No   Who do you live with? Alone   If you need help, do you have trouble finding someone available to you? No   Have you been bothered in the last four weeks by sexual problems? No   Do you have difficulty concentrating, remembering or making decisions? No       Age-appropriate Screening Schedule:  Refer to the list below for future screening recommendations based on patient's age, sex and/or medical conditions. Orders for these recommended tests are listed in the plan section. The patient has been provided with a written plan.    Health Maintenance   Topic Date Due   • DIABETIC EYE EXAM  10/15/2021   • TDAP/TD VACCINES (2 - Tdap) 05/13/2022   • COVID-19 Vaccine (4 - Booster for Moderna series) 11/10/2022   • HEMOGLOBIN A1C  08/23/2023   • ANNUAL WELLNESS VISIT  02/21/2024   • LIPID PANEL  02/23/2024   • URINE MICROALBUMIN  02/23/2024   • INFLUENZA VACCINE  Completed   • Pneumococcal Vaccine 65+  Completed   • ZOSTER VACCINE  Completed       Immunizations:  Mikael Shanks is due for TDAPand Pneu 20    Colorectal Screening 8-  with Dr. Peraza-no recall  Mikael Shanks last colonoscopy was 8-  Last Completed Colonoscopy     This patient has no relevant Health Maintenance data.             Assessment & Plan   CMS Preventative Services Quick Reference    Risk Factors Identified During Encounter      Fall Risk-High or Moderate: Moderate  Hearing Problem: hearing aids  Immunizations Discussed/Encouraged: Tdap and Prevnar 20 (Pneumococcal 20-valent conjugate)  The above risks/problems have been discussed with the patient.  Follow up actions/plans if indicated are seen below in the Assessment/Plan Section.  Pertinent information has been shared with the patient in the After Visit Summary.    I have identified multiple medical problems which are significant and separately identifiable that require added work above and beyond the wellness visit. These are not trivial or insignificant and are billed with a 25-modifier  These are in a separate E/M note      Sit-to-Stand Exercise    The sit-to-stand exercise (also known as the chair stand or chair rise exercise) strengthens your lower body and helps you maintain or improve your mobility and independence. The goal is to do the sit-to-stand exercise without using your hands. This will be easier as you become stronger. You should always talk with your health care provider before starting any exercise program, especially if you have had recent surgery.  Do the exercise exactly as told by your health care provider and adjust it as directed. It is normal to feel mild stretching, pulling, tightness, or discomfort as you do this exercise, but you should stop right away if you feel sudden pain or your pain gets worse. Do not begin doing this exercise until told by your health care provider.  What the sit-to-stand exercise does  The sit-to-stand exercise helps to strengthen the muscles in your thighs and the muscles in the center of your body that give you stability (core muscles). This exercise is  especially helpful if:  · You have had knee or hip surgery.  · You have trouble getting up from a chair, out of a car, or off the toilet.  How to do the sit-to-stand exercise  1. Sit toward the front edge of a sturdy chair without armrests. Your knees should be bent and your feet should be flat on the floor and shoulder-width apart.  2. Place your hands lightly on each side of the seat. Keep your back and neck as straight as possible, with your chest slightly forward.  3. Breathe in slowly. Lean forward and slightly shift your weight to the front of your feet.  4. Breathe out as you slowly stand up. Use your hands as little as possible.  5. Stand and pause for a full breath in and out.  6. Breathe in as you sit down slowly. Tighten your core and abdominal muscles to control your lowering as much as possible.  7. Breathe out slowly.  8. Do this exercise 10-15 times. If needed, do it fewer times until you build up strength.  9. Rest for 1 minute, then do another set of 10-15 repetitions.  To change the difficulty of the sit-to-stand exercise  · If the exercise is too difficult, use a chair with sturdy armrests, and push off the armrests to help you come to the standing position. You can also use the armrests to help slowly lower yourself back to sitting. As this gets easier, try to use your arms less. You can also place a firm cushion or pillow on the chair to make the surface higher.  · If this exercise is too easy, do not use your arms to help raise or lower yourself. You can also wear a weighted vest, use hand weights, increase your repetitions, or try a lower chair.  General tips  · You may feel tired when starting an exercise routine. This is normal.  · You may have muscle soreness that lasts a few days. This is normal. As you get stronger, you may not feel muscle soreness.  · Use smooth, steady movements.  · Do not  hold your breath during strength exercises. This can cause unsafe changes in your blood  pressure.  · Breathe in slowly through your nose, and breathe out slowly through your mouth.  Summary  · Strengthening your lower body is an important step to help you move safely and independently.  · The sit-to-stand exercise helps strengthen the muscles in your thighs and core.  · You should always talk with your health care provider before starting any exercise program, especially if you have had recent surgery.  This information is not intended to replace advice given to you by your health care provider. Make sure you discuss any questions you have with your health care provider.  Document Revised: 10/16/2019 Document Reviewed: 02/08/2018  [employer] Patient Education © 2021 [employer]      Fall Prevention in the Home, Adult  Falls can cause injuries and can happen to people of all ages. There are many things you can do to make your home safe and to help prevent falls. Ask for help when making these changes.  What actions can I take to prevent falls?  General Instructions  1. Use good lighting in all rooms. Replace any light bulbs that burn out.  2. Turn on the lights in dark areas. Use night-lights.  3. Keep items that you use often in easy-to-reach places. Lower the shelves around your home if needed.  4. Set up your furniture so you have a clear path. Avoid moving your furniture around.  5. Do not have throw rugs or other things on the floor that can make you trip.  6. Avoid walking on wet floors.  7. If any of your floors are uneven, fix them.  8. Add color or contrast paint or tape to clearly davi and help you see:  ? Grab bars or handrails.  ? First and last steps of staircases.  ? Where the edge of each step is.  9. If you use a stepladder:  ? Make sure that it is fully opened. Do not climb a closed stepladder.  ? Make sure the sides of the stepladder are locked in place.  ? Ask someone to hold the stepladder while you use it.  10. Know where your pets are when moving through your home.  What can I do  in the bathroom?         · Keep the floor dry. Clean up any water on the floor right away.  · Remove soap buildup in the tub or shower.  · Use nonskid mats or decals on the floor of the tub or shower.  · Attach bath mats securely with double-sided, nonslip rug tape.  · If you need to sit down in the shower, use a plastic, nonslip stool.  · Install grab bars by the toilet and in the tub and shower. Do not use towel bars as grab bars.  What can I do in the bedroom?  · Make sure that you have a light by your bed that is easy to reach.  · Do not use any sheets or blankets for your bed that hang to the floor.  · Have a firm chair with side arms that you can use for support when you get dressed.  What can I do in the kitchen?  · Clean up any spills right away.  · If you need to reach something above you, use a step stool with a grab bar.  · Keep electrical cords out of the way.  · Do not use floor polish or wax that makes floors slippery.  What can I do with my stairs?  · Do not leave any items on the stairs.  · Make sure that you have a light switch at the top and the bottom of the stairs.  · Make sure that there are handrails on both sides of the stairs. Fix handrails that are broken or loose.  · Install nonslip stair treads on all your stairs.  · Avoid having throw rugs at the top or bottom of the stairs.  · Choose a carpet that does not hide the edge of the steps on the stairs.  · Check carpeting to make sure that it is firmly attached to the stairs. Fix carpet that is loose or worn.  What can I do on the outside of my home?  · Use bright outdoor lighting.  · Fix the edges of walkways and driveways and fix any cracks.  · Remove anything that might make you trip as you walk through a door, such as a raised step or threshold.  · Trim any bushes or trees on paths to your home.  · Check to see if handrails are loose or broken and that both sides of all steps have handrails.  · Install guardrails along the edges of any  raised decks and porches.  · Clear paths of anything that can make you trip, such as tools or rocks.  · Have leaves, snow, or ice cleared regularly.  · Use sand or salt on paths during winter.  · Clean up any spills in your garage right away. This includes grease or oil spills.  What other actions can I take?  1. Wear shoes that:  ? Have a low heel. Do not wear high heels.  ? Have rubber bottoms.  ? Feel good on your feet and fit well.  ? Are closed at the toe. Do not wear open-toe sandals.  2. Use tools that help you move around if needed. These include:  ? Canes.  ? Walkers.  ? Scooters.  ? Crutches.  3. Review your medicines with your doctor. Some medicines can make you feel dizzy. This can increase your chance of falling.  Ask your doctor what else you can do to help prevent falls.  Where to find more information  · Centers for Disease Control and Prevention, STEADI: www.cdc.gov  · National [place] on Aging: www.seferino.nih.gov  Contact a doctor if:  · You are afraid of falling at home.  · You feel weak, drowsy, or dizzy at home.  · You fall at home.  Summary  · There are many simple things that you can do to make your home safe and to help prevent falls.  · Ways to make your home safe include removing things that can make you trip and installing grab bars in the bathroom.  · Ask for help when making these changes in your home.  This information is not intended to replace advice given to you by your health care provider. Make sure you discuss any questions you have with your health care provider.  Document Revised: 07/21/2021 Document Reviewed: 07/21/2021  [employer] Patient Education © 2021 [employer]            Advance Care Planning    Advance Directive is on file.  ACP discussion was held with the patient during this visit. Patient has an advance directive in EMR which is still valid.   Discussion with Patient regarding advanced directives. POST form discussed at length and reviewed with patient. POST reviewed  and updated today. I spent 17 minutes  with patient reviewing information and documenting  in the chart.  Patient states he does not want CPR. Reviewed medical interventions with patient and the differences between each: Comfort, Limited and Full. Patient opted for Limited. Discussed the use of antibiotics at the end of life. He chose to use antibiotics consistent with treatment goals. Discussed artificially administered nutrition, patient is aware that if he is alert and oriented they can change their mind at any time. However, they have elected to have no artificial nutrition. Patient has identified his Brother Tamra Shanks as his healthcare representative. Advised to discuss with healthcare representative if they can comply with wishes. Patient encouraged to have a meeting to discuss his decision regarding advanced care directives and goals of care with extended family and significant  friends  In regard to the POST form:The patient opted to complete POST while in the office and copy scanned into the chart. and advised to give to family members, place in an easily accessible place and take with them if going to the hospital or Emergency room.      Follow Up:   Future Appointments       Provider Department Center    3/1/2023 2:30 PM Xander Robison MD [facility] FAMILY MEDICINE [facility]    5/2/2023 3:20 PM Dayday Ruiz MD [facility] CARDIOLOGY [facility]          An After Visit Summary and PPPS were made available to the patient.      Diagnoses and all orders for this visit:    1. Medicare annual wellness visit, subsequent (Primary)  Assessment & Plan:  Done--post also completed and updated today      2. Immunization counseling  Assessment & Plan:  Advised to check coverage of T-Dap and Pneu 20 given today.      3. Need for pneumococcal vaccination  -     Pneumococcal Conjugate Vaccine 20-[facility]

## 2023-02-21 NOTE — ASSESSMENT & PLAN NOTE
Stable, continue ASA.  Patient tolerated ASA well without side effects. I feel the benefits of the medication outweigh the risks.

## 2023-02-24 LAB
ALBUMIN SERPL-MCNC: 4.2 G/DL (ref 3.6–4.6)
ALBUMIN/CREAT UR: 69 MG/G CREAT (ref 0–29)
ALBUMIN/GLOB SERPL: 1.8 {RATIO} (ref 1.2–2.2)
ALP SERPL-CCNC: 91 IU/L (ref 44–121)
ALT SERPL-CCNC: 52 IU/L (ref 0–44)
APPEARANCE UR: CLEAR
AST SERPL-CCNC: 21 IU/L (ref 0–40)
BACTERIA #/AREA URNS HPF: NORMAL /[HPF]
BASOPHILS # BLD AUTO: 0.1 X10E3/UL (ref 0–0.2)
BASOPHILS NFR BLD AUTO: 1 %
BILIRUB SERPL-MCNC: 0.5 MG/DL (ref 0–1.2)
BILIRUB UR QL STRIP: NEGATIVE
BUN SERPL-MCNC: 28 MG/DL (ref 8–27)
BUN/CREAT SERPL: 20 (ref 10–24)
CALCIUM SERPL-MCNC: 9.7 MG/DL (ref 8.6–10.2)
CASTS URNS QL MICRO: NORMAL /LPF
CHLORIDE SERPL-SCNC: 102 MMOL/L (ref 96–106)
CHOLEST SERPL-MCNC: 144 MG/DL (ref 100–199)
CHOLEST/HDLC SERPL: 3 RATIO (ref 0–5)
CO2 SERPL-SCNC: 23 MMOL/L (ref 20–29)
COLOR UR: YELLOW
CREAT SERPL-MCNC: 1.39 MG/DL (ref 0.76–1.27)
CREAT UR-MCNC: 89 MG/DL
EGFRCR SERPLBLD CKD-EPI 2021: 49 ML/MIN/1.73
EOSINOPHIL # BLD AUTO: 0.5 X10E3/UL (ref 0–0.4)
EOSINOPHIL NFR BLD AUTO: 4 %
EPI CELLS #/AREA URNS HPF: NORMAL /HPF (ref 0–10)
ERYTHROCYTE [DISTWIDTH] IN BLOOD BY AUTOMATED COUNT: 12.9 % (ref 11.6–15.4)
FOLATE SERPL-MCNC: 18.4 NG/ML
GLOBULIN SER CALC-MCNC: 2.4 G/DL (ref 1.5–4.5)
GLUCOSE SERPL-MCNC: 149 MG/DL (ref 70–99)
GLUCOSE UR QL STRIP: NEGATIVE
HBA1C MFR BLD: 6.4 % (ref 4.8–5.6)
HCT VFR BLD AUTO: 40.5 % (ref 37.5–51)
HDLC SERPL-MCNC: 48 MG/DL
HGB BLD-MCNC: 13.3 G/DL (ref 13–17.7)
HGB UR QL STRIP: NEGATIVE
IMM GRANULOCYTES # BLD AUTO: 0.4 X10E3/UL (ref 0–0.1)
IMM GRANULOCYTES NFR BLD AUTO: 3 %
INTACT PTH: NORMAL
KETONES UR QL STRIP: NEGATIVE
LDLC SERPL CALC-MCNC: 79 MG/DL (ref 0–99)
LEUKOCYTE ESTERASE UR QL STRIP: NEGATIVE
LYMPHOCYTES # BLD AUTO: 1.8 X10E3/UL (ref 0.7–3.1)
LYMPHOCYTES NFR BLD AUTO: 14 %
MCH RBC QN AUTO: 29.9 PG (ref 26.6–33)
MCHC RBC AUTO-ENTMCNC: 32.8 G/DL (ref 31.5–35.7)
MCV RBC AUTO: 91 FL (ref 79–97)
MICRO URNS: ABNORMAL
MICROALBUMIN UR-MCNC: 61.6 UG/ML
MONOCYTES # BLD AUTO: 1 X10E3/UL (ref 0.1–0.9)
MONOCYTES NFR BLD AUTO: 8 %
NEUTROPHILS # BLD AUTO: 8.8 X10E3/UL (ref 1.4–7)
NEUTROPHILS NFR BLD AUTO: 70 %
NITRITE UR QL STRIP: NEGATIVE
PH UR STRIP: 5.5 [PH] (ref 5–7.5)
PLATELET # BLD AUTO: 250 X10E3/UL (ref 150–450)
POTASSIUM SERPL-SCNC: 5.5 MMOL/L (ref 3.5–5.2)
PROT SERPL-MCNC: 6.6 G/DL (ref 6–8.5)
PROT UR QL STRIP: ABNORMAL
PTH-INTACT SERPL-MCNC: 53 PG/ML (ref 15–65)
RBC # BLD AUTO: 4.45 X10E6/UL (ref 4.14–5.8)
RBC #/AREA URNS HPF: NORMAL /HPF (ref 0–2)
SODIUM SERPL-SCNC: 139 MMOL/L (ref 134–144)
SP GR UR STRIP: 1.02 (ref 1–1.03)
TRIGL SERPL-MCNC: 89 MG/DL (ref 0–149)
UROBILINOGEN UR STRIP-MCNC: 0.2 MG/DL (ref 0.2–1)
VIT B12 SERPL-MCNC: 307 PG/ML (ref 232–1245)
VLDLC SERPL CALC-MCNC: 17 MG/DL (ref 5–40)
WBC # BLD AUTO: 12.6 X10E3/UL (ref 3.4–10.8)
WBC #/AREA URNS HPF: NORMAL /HPF (ref 0–5)

## 2023-03-01 ENCOUNTER — OFFICE VISIT (OUTPATIENT)
Dept: FAMILY MEDICINE CLINIC | Facility: CLINIC | Age: 88
End: 2023-03-01
Payer: MEDICARE

## 2023-03-01 VITALS
WEIGHT: 176.8 LBS | SYSTOLIC BLOOD PRESSURE: 137 MMHG | HEIGHT: 71 IN | RESPIRATION RATE: 15 BRPM | OXYGEN SATURATION: 97 % | BODY MASS INDEX: 24.75 KG/M2 | TEMPERATURE: 97.3 F | DIASTOLIC BLOOD PRESSURE: 75 MMHG | HEART RATE: 69 BPM

## 2023-03-01 DIAGNOSIS — F43.21 GRIEF: ICD-10-CM

## 2023-03-01 DIAGNOSIS — E87.5 HYPERKALEMIA: ICD-10-CM

## 2023-03-01 DIAGNOSIS — Z80.0 FAMILY HISTORY OF COLON CANCER: ICD-10-CM

## 2023-03-01 DIAGNOSIS — M25.552 LEFT HIP PAIN: ICD-10-CM

## 2023-03-01 DIAGNOSIS — F32.A DEPRESSION, UNSPECIFIED DEPRESSION TYPE: ICD-10-CM

## 2023-03-01 DIAGNOSIS — G95.19 NEUROGENIC CLAUDICATION: ICD-10-CM

## 2023-03-01 DIAGNOSIS — T82.855S STENOSIS OF CORONARY ARTERY STENT, SEQUELA: ICD-10-CM

## 2023-03-01 DIAGNOSIS — M62.3 IMMOBILITY SYNDROME: ICD-10-CM

## 2023-03-01 DIAGNOSIS — J30.2 SEASONAL ALLERGIES: ICD-10-CM

## 2023-03-01 DIAGNOSIS — Z00.01 ENCOUNTER FOR GENERAL ADULT MEDICAL EXAMINATION WITH ABNORMAL FINDINGS: ICD-10-CM

## 2023-03-01 DIAGNOSIS — I34.0 MITRAL VALVE INSUFFICIENCY, UNSPECIFIED ETIOLOGY: ICD-10-CM

## 2023-03-01 DIAGNOSIS — R35.1 NOCTURIA: ICD-10-CM

## 2023-03-01 DIAGNOSIS — J45.909 ASTHMA, UNSPECIFIED ASTHMA SEVERITY, UNSPECIFIED WHETHER COMPLICATED, UNSPECIFIED WHETHER PERSISTENT: ICD-10-CM

## 2023-03-01 DIAGNOSIS — R05.9 COUGH, UNSPECIFIED TYPE: ICD-10-CM

## 2023-03-01 DIAGNOSIS — I25.5 ISCHEMIC CARDIOMYOPATHY: ICD-10-CM

## 2023-03-01 DIAGNOSIS — E11.21 DIABETIC NEPHROPATHY ASSOCIATED WITH TYPE 2 DIABETES MELLITUS: ICD-10-CM

## 2023-03-01 DIAGNOSIS — K29.90 GASTRITIS AND GASTRODUODENITIS: ICD-10-CM

## 2023-03-01 DIAGNOSIS — D55.9 ANEMIA DUE TO ENZYME DISORDER, UNSPECIFIED: ICD-10-CM

## 2023-03-01 DIAGNOSIS — N18.31 TYPE 2 DIABETES MELLITUS WITH STAGE 3A CHRONIC KIDNEY DISEASE, WITHOUT LONG-TERM CURRENT USE OF INSULIN: ICD-10-CM

## 2023-03-01 DIAGNOSIS — I65.29 OCCLUSION OF CAROTID ARTERY, UNSPECIFIED LATERALITY: ICD-10-CM

## 2023-03-01 DIAGNOSIS — M48.062 SPINAL STENOSIS, LUMBAR REGION, WITH NEUROGENIC CLAUDICATION: ICD-10-CM

## 2023-03-01 DIAGNOSIS — K29.70 GASTRITIS AND GASTRODUODENITIS: ICD-10-CM

## 2023-03-01 DIAGNOSIS — I49.1 PAC (PREMATURE ATRIAL CONTRACTION): ICD-10-CM

## 2023-03-01 DIAGNOSIS — D51.0 PERNICIOUS ANEMIA: ICD-10-CM

## 2023-03-01 DIAGNOSIS — N40.0 PROSTATIC HYPERTROPHY: ICD-10-CM

## 2023-03-01 DIAGNOSIS — R31.9 HEMATURIA, UNSPECIFIED TYPE: ICD-10-CM

## 2023-03-01 DIAGNOSIS — G47.33 OSA (OBSTRUCTIVE SLEEP APNEA): Chronic | ICD-10-CM

## 2023-03-01 DIAGNOSIS — N18.31 STAGE 3A CHRONIC KIDNEY DISEASE: Primary | ICD-10-CM

## 2023-03-01 DIAGNOSIS — Z00.00 MEDICARE ANNUAL WELLNESS VISIT, SUBSEQUENT: ICD-10-CM

## 2023-03-01 DIAGNOSIS — E11.22 TYPE 2 DIABETES MELLITUS WITH STAGE 3A CHRONIC KIDNEY DISEASE, WITHOUT LONG-TERM CURRENT USE OF INSULIN: ICD-10-CM

## 2023-03-01 DIAGNOSIS — I37.9 PULMONARY VALVE DISORDER: ICD-10-CM

## 2023-03-01 DIAGNOSIS — I10 HYPERTENSION, BENIGN: ICD-10-CM

## 2023-03-01 DIAGNOSIS — Z23 NEED FOR PNEUMOCOCCAL VACCINATION: ICD-10-CM

## 2023-03-01 DIAGNOSIS — I65.23 BILATERAL CAROTID ARTERY STENOSIS: ICD-10-CM

## 2023-03-01 DIAGNOSIS — N40.1 BENIGN PROSTATIC HYPERPLASIA WITH URINARY FREQUENCY: ICD-10-CM

## 2023-03-01 DIAGNOSIS — R80.9 PROTEINURIA, UNSPECIFIED TYPE: ICD-10-CM

## 2023-03-01 DIAGNOSIS — M48.061 SPINAL STENOSIS OF LUMBAR REGION, UNSPECIFIED WHETHER NEUROGENIC CLAUDICATION PRESENT: ICD-10-CM

## 2023-03-01 DIAGNOSIS — H83.3X3 NOISE-INDUCED HEARING LOSS OF BOTH EARS: ICD-10-CM

## 2023-03-01 DIAGNOSIS — Z71.85 IMMUNIZATION COUNSELING: ICD-10-CM

## 2023-03-01 DIAGNOSIS — E11.9 TYPE 2 DIABETES MELLITUS WITHOUT COMPLICATION, WITHOUT LONG-TERM CURRENT USE OF INSULIN: ICD-10-CM

## 2023-03-01 DIAGNOSIS — M54.16 LUMBAR RADICULOPATHY: ICD-10-CM

## 2023-03-01 DIAGNOSIS — I73.9 ASYMPTOMATIC PERIPHERAL VASCULAR DISEASE: ICD-10-CM

## 2023-03-01 DIAGNOSIS — E21.5 PARATHYROID ABNORMALITY: ICD-10-CM

## 2023-03-01 DIAGNOSIS — E78.2 MIXED HYPERLIPIDEMIA: ICD-10-CM

## 2023-03-01 DIAGNOSIS — R35.0 BENIGN PROSTATIC HYPERPLASIA WITH URINARY FREQUENCY: ICD-10-CM

## 2023-03-01 DIAGNOSIS — I25.10 ATHEROSCLEROSIS OF NATIVE CORONARY ARTERY OF NATIVE HEART WITHOUT ANGINA PECTORIS: ICD-10-CM

## 2023-03-01 DIAGNOSIS — F41.1 GENERALIZED ANXIETY DISORDER: ICD-10-CM

## 2023-03-01 DIAGNOSIS — J40 BRONCHITIS: ICD-10-CM

## 2023-03-01 PROCEDURE — 99397 PER PM REEVAL EST PAT 65+ YR: CPT | Performed by: FAMILY MEDICINE

## 2023-03-01 PROCEDURE — 99214 OFFICE O/P EST MOD 30 MIN: CPT | Performed by: FAMILY MEDICINE

## 2023-03-01 RX ORDER — GLIPIZIDE 5 MG/1
5 TABLET, FILM COATED, EXTENDED RELEASE ORAL DAILY
Qty: 90 TABLET | Refills: 1 | Status: SHIPPED | OUTPATIENT
Start: 2023-03-01

## 2023-03-01 NOTE — ASSESSMENT & PLAN NOTE
A1c and Microalbumin/Creatinine ratio  done 2-, read by me, reviewed with pt.  A1c was 6.4 down from 6.5, Microalbumin/Creatinine ratio was 69 up from 66  Improved,   Encouraged to watch sugar intake, exercise more and lose weight.   compliant with medication. Advised to decrease Glipizide to 5mg daily to avoid hypoglycemia  Not monitoring sugar at home.   Follow up in 3months  Care management needs are self-addressed.  Self-management abilities addressed and patient is capable of managing his own disease.

## 2023-03-01 NOTE — ASSESSMENT & PLAN NOTE
Doing well; Patient tolerated zoloft well without side effects. I feel the benefits of the medication outweigh the risks.  Advised patient to decrease Zoloft to 1/2 tablet daily.

## 2023-03-01 NOTE — ASSESSMENT & PLAN NOTE
Probably 2nd to LDD-he recently had an injection in his hips which helped--follow conservatively   Statement Selected

## 2023-03-01 NOTE — ASSESSMENT & PLAN NOTE
U/A done 2-, read by me, reviewed with pt.  U/A showed 1+ protein; repeat UA in 3 months.  Discussed 24-hour urine he prefers to hold on this which I feel is reasonable

## 2023-03-01 NOTE — ASSESSMENT & PLAN NOTE
Mild-possibly due to allergies or possibly due to vasotec.  He declines further evaluation or trial off the Vasotec

## 2023-03-01 NOTE — ASSESSMENT & PLAN NOTE
Doing well on Singulair and albuterol without side effects.  Benefits outweigh the risk; Advised to avoid dust and smoke exposure

## 2023-03-01 NOTE — ASSESSMENT & PLAN NOTE
Improving;  Patient tolerated zoloft well without side effects. I feel the benefits of the medication outweigh the risks.

## 2023-03-01 NOTE — ASSESSMENT & PLAN NOTE
Doing well; Patient tolerated proscar well without side effects. I feel the benefits of the medication outweigh the risks.

## 2023-03-01 NOTE — PROGRESS NOTES
Subjective   Mikael Shanks is a 87 y.o. male here for his annual physical with me. Mikael is here for coordination of medical care, to discuss health maintenance, disease prevention as well as to followup on medical problems. Patient has been followed by me since 1988. Patient's last CPE was 12-29-21. Activity level is never. Exercises 0 per week. Appetite is good. Feels fairly well with few complaints. Energy level is fair. Sleeps fairly well. Patient's last colonoscopy was 2018 and declines any further due to age..Patient is doing routine self skin exam monthly. Patient is doing routine self-breast exams monthly. Patient is checking testicles monthly.    Lab Results   Component Value Date    PSA 0.8 12/15/2016        Hyperlipidemia  This is a chronic problem. The problem is controlled. Pertinent negatives include no chest pain, myalgias or shortness of breath. Current antihyperlipidemic treatment includes statins. Risk factors for coronary artery disease include dyslipidemia, diabetes mellitus and hypertension.   Diabetes  He presents for his follow-up diabetic visit. He has type 2 diabetes mellitus. His disease course has been improving. There are no hypoglycemic associated symptoms. Pertinent negatives for hypoglycemia include no dizziness, nervousness/anxiousness or speech difficulty. There are no diabetic associated symptoms. Pertinent negatives for diabetes include no blurred vision, no chest pain, no fatigue, no polydipsia, no polyphagia, no polyuria, no weakness and no weight loss.   Chronic Kidney Disease  This is a chronic problem. The current episode started more than 1 year ago. The problem has been gradually worsening. Pertinent negatives include no abdominal pain, chest pain, chills, congestion, coughing, fatigue, fever, joint swelling, myalgias, nausea, neck pain, rash, sore throat, vomiting or weakness.        The following portions of the patient's history were reviewed and updated as  appropriate: current medications, past family history, past medical history, past social history, past surgical history and problem list.    Past Medical History:   Diagnosis Date   • Carotid artery disease (HCC)    • Coronary artery disease    • Diabetes mellitus (HCC)    • GERD (gastroesophageal reflux disease)    • Chinik (hard of hearing)    • Hyperlipidemia    • Hypertension    • Osteoarthritis    • Prostatic hypertrophy    • Pulmonary valve disorder    • Sleep apnea     cpap   doesnt use    • Stroke (HCC)        Family History   Problem Relation Age of Onset   • Cancer Mother    • Heart disease Father    • Cancer Brother        Social History     Socioeconomic History   • Marital status:    Tobacco Use   • Smoking status: Former     Packs/day: 5.00     Years: 7.00     Pack years: 35.00     Types: Cigarettes     Start date: 1953     Quit date: 1960     Years since quittin.5   • Smokeless tobacco: Never   Vaping Use   • Vaping Use: Never used   Substance and Sexual Activity   • Alcohol use: Not Currently     Comment: very rare   • Drug use: No   • Sexual activity: Never       Social History     Social History Narrative     3 x.  First wife  for 17 years have 5 children,  to 2nd wife for 11 years, they had 1 child together, 3rd wife  for 16 years she passed in  from breast cancer.   Had been with his girlfriend Debra since  until she passed on 2021 from COPD.  Retired from [employer] in .  Caffeine none, Always wears seatbelts.  Hobbies TV.  Exercise none.        Past Surgical History:   Procedure Laterality Date   • BACK SURGERY     • CARDIAC CATHETERIZATION N/A 2019    Procedure: Left Heart Cath and coronary angiogram;  Surgeon: Dayday Ruiz MD;  Location: [facility] INVAS[facility] LOC[facility];  Service: Cardiovascular   • CARDIAC CATHETERIZATION N/A 2019    Procedure: Right and Left Heart Cath;  Surgeon: Dayday Ruiz MD;  Location:  [facility] CATH INVASIVE LOCATION;  Service: Cardiovascular   • CARDIAC CATHETERIZATION N/A 9/4/2020    Procedure: Left and Right Heart Cath with Coronary Angiography;  Surgeon: Dayday Ruiz MD;  Location: [facility] CATH INVASIVE LOCATION;  Service: Cardiovascular;  Laterality: N/A;   • CAROTID ENDARTERECTOMY Left    • CHOLECYSTECTOMY     • COLONOSCOPY  08/14/2018    No repeat   • CORONARY ANGIOPLASTY WITH STENT PLACEMENT     • LUMBAR LAMINECTOMY Bilateral 1/31/2022    Procedure: LUMBAR LAMINECTOMY WITH/WITHOUT FUSION L4-L5;  Surgeon: Geronimo Gonzales MD;  Location: [facility] MAIN OR;  Service: Neurosurgery;  Laterality: Bilateral;       Current Outpatient Medications on File Prior to Visit   Medication Sig Dispense Refill   • albuterol sulfate  (90 Base) MCG/ACT inhaler Inhale 2 puffs Every 4 (Four) Hours As Needed for Wheezing. 18 g 5   • aspirin 81 MG tablet Take 1 tablet by mouth Daily. LD 1/29     • atorvastatin (LIPITOR) 80 MG tablet Take 1 tablet by mouth every night at bedtime. 90 tablet 1   • Blood Glucose Monitoring Suppl (ONE TOUCH ULTRA 2) w/Device kit 1 Device Daily. Test 1 x daily 1 each 0   • cholecalciferol (VITAMIN D3) 10 MCG (400 UNIT) tablet Take 1 tablet by mouth Daily. Hold DOS     • enalapril (VASOTEC) 5 MG tablet TAKE 1 TABLET BY MOUTH DAILY (Patient taking differently: Take 2.5 mg by mouth Daily.) 90 tablet 0   • finasteride (PROSCAR) 5 MG tablet Take 1 tablet by mouth Daily. 90 tablet 1   • glucose blood (ONE TOUCH ULTRA TEST) test strip Test 1 x daily 100 each 5   • metoprolol succinate XL (TOPROL-XL) 25 MG 24 hr tablet Take 1 tablet by mouth Daily. 90 tablet 1   • montelukast (Singulair) 10 MG tablet Take 1 tablet by mouth Every Night. 90 tablet 3   • multivitamin with minerals tablet tablet Take 1 tablet by mouth Daily. LD 1/24     • omeprazole (priLOSEC) 40 MG capsule Take 1 capsule by mouth Daily. 90 capsule 1   • OneTouch Delica Lancets 33G misc 1 strip Daily. Test 1 x daily 100 each  5   • sertraline (ZOLOFT) 50 MG tablet Take 1 tablet by mouth Daily. 90 tablet 1     Current Facility-Administered Medications on File Prior to Visit   Medication Dose Route Frequency Provider Last Rate Last Admin   • cyanocobalamin injection 1,000 mcg  1,000 mcg Intramuscular Q28 Days Xander Robison MD   1,000 mcg at 12/29/21 1541       Allergies   Allergen Reactions   • Penicillins Hives       Review of Systems   Constitutional: Negative for appetite change, chills, fatigue, fever, unexpected weight gain and unexpected weight loss.   HENT: Negative for congestion, dental problem, ear discharge, ear pain, hearing loss, nosebleeds, postnasal drip, rhinorrhea, sinus pressure, sneezing, sore throat, tinnitus and voice change.         Last dental exam-unknown   Eyes: Negative for blurred vision, double vision, pain, redness and visual disturbance.        Last vision exam-Long time-  Recommended he see eye Dr.   Respiratory: Negative for cough, shortness of breath, wheezing and stridor.    Cardiovascular: Negative for chest pain, palpitations and leg swelling.   Gastrointestinal: Negative for abdominal pain, anal bleeding, blood in stool, constipation, diarrhea, nausea, rectal pain, vomiting, GERD and indigestion.        BM weekly x 14   Endocrine: Negative for cold intolerance, heat intolerance, polydipsia, polyphagia and polyuria.   Genitourinary: Negative for difficulty urinating, dysuria, frequency, hematuria and urgency.   Musculoskeletal: Negative for back pain, joint swelling, myalgias, neck pain and neck stiffness.   Skin: Negative for color change, dry skin and rash.   Neurological: Negative for dizziness, syncope, speech difficulty, weakness, light-headedness, headache and memory problem.   Hematological: Does not bruise/bleed easily.   Psychiatric/Behavioral: Negative for decreased concentration, sleep disturbance, depressed mood and stress. The patient is not nervous/anxious.        Objective   Visit  "Vitals  /75 (BP Location: Left arm, Patient Position: Sitting, Cuff Size: Adult)   Pulse 69   Temp 97.3 °F (36.3 °C)   Resp 15   Ht 179.1 cm (70.5\")   Wt 80.2 kg (176 lb 12.8 oz)   SpO2 97%   BMI 25.01 kg/m²     Physical Exam  Constitutional:       General: He is not in acute distress.     Appearance: He is well-developed. He is not diaphoretic.   HENT:      Head: Normocephalic.      Right Ear: Tympanic membrane, ear canal and external ear normal. Decreased hearing (hearing aids-mild-moderate) noted.      Left Ear: Tympanic membrane, ear canal and external ear normal. Decreased hearing (hearing aids-moderate) noted.      Nose: Nose normal.      Mouth/Throat:      Lips: Pink.      Mouth: Mucous membranes are moist.      Dentition: Abnormal dentition.      Pharynx: Oropharynx is clear. No oropharyngeal exudate.      Comments: Bg's bilateral  Eyes:      General: No scleral icterus.        Right eye: No discharge.         Left eye: No discharge.      Conjunctiva/sclera: Conjunctivae normal.      Pupils: Pupils are equal, round, and reactive to light.      Comments: Arcus senalis-bilateral   Cardiovascular:      Rate and Rhythm: Normal rate and regular rhythm.      Pulses:           Dorsalis pedis pulses are 0 on the right side and 0 on the left side.        Posterior tibial pulses are 0 on the right side and 0 on the left side.      Heart sounds: Normal heart sounds. No murmur heard.  Pulmonary:      Effort: Pulmonary effort is normal.      Breath sounds: Normal breath sounds. No wheezing.   Chest:      Chest wall: No tenderness.   Abdominal:      General: Bowel sounds are normal. There is no distension.      Palpations: Abdomen is soft. There is no mass.      Tenderness: There is no abdominal tenderness.      Hernia: No hernia is present.   Genitourinary:     Penis: No tenderness.       Comments: Patient declined Genite and prostate exam.  Musculoskeletal:         General: No tenderness or deformity. Normal " range of motion.      Cervical back: Normal range of motion and neck supple.   Lymphadenopathy:      Cervical: No cervical adenopathy.   Skin:     General: Skin is warm and dry.      Findings: No erythema or rash.      Comments: Varicosities mild bilateral lower legs.  Scar lower back.  Seb. Cyst right upper back.    RUQ-scar   Neurological:      General: No focal deficit present.      Mental Status: He is alert and oriented to person, place, and time.      Cranial Nerves: No cranial nerve deficit.      Sensory: Sensation is intact. No sensory deficit.      Motor: Motor function is intact. No abnormal muscle tone.      Coordination: Coordination is intact. Coordination normal.      Gait: Gait is intact.      Deep Tendon Reflexes: Reflexes normal.   Psychiatric:         Behavior: Behavior normal.         Thought Content: Thought content normal.         Judgment: Judgment normal.         Assessment & Plan   Problem List Items Addressed This Visit        High    Asymptomatic peripheral vascular disease (HCC)    Overview     Arterial U/S done           Current Assessment & Plan     Ultrasound done show rt leg-nl, lt leg-0.9  Follow conservatively         Atherosclerosis of native coronary artery of native heart without angina pectoris    Overview     Dr. Ruiz done 9/4/2020 of 60% 20% blockage of the right coronary artery stent         Current Assessment & Plan     Doing well on aspirin 81 mg.  He has no side effects and benefits outweigh the risk         Grief    Overview     Death of girlfriend 1-4-2021         Current Assessment & Plan     Gradually recovering         Ischemic cardiomyopathy    Overview     Dr. Patel done cardiac cath done September 4, 2020 showing normal ejection fraction right coronary artery stent is patent with 20% blockage left anterior descending has luminal irregularities.  ECHO done at the same time.         Current Assessment & Plan     Doing well         Parathyroid abnormality  (HCC)    Current Assessment & Plan     Improving; PTH calcium done 2-, read by me, reviewed with pt.  PTH was 53 down from 116.3         Stenosis of coronary artery stent    Current Assessment & Plan     Doing well            Medium    RESOLVED: 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, unspecified (HCC)    Current Assessment & Plan     CBC, Folate done 2-, read by me, reviewed with pt.  Folate was 18.4 up from 12.1, HGB was 13.3 up from 12.2, HCT 40.5 up from 37.9, WBC 12.6 up from 10.4         Asthma    Current Assessment & Plan     Doing well on Singulair and albuterol without side effects.  Benefits outweigh the risk; Advised to avoid dust and smoke exposure         Depression    Current Assessment & Plan     Doing well; Patient tolerated zoloft well without side effects. I feel the benefits of the medication outweigh the risks.  Advised patient to decrease Zoloft to 1/2 tablet daily.          Hypertension, benign    Current Assessment & Plan     Doing well; Encouraged to watch salt, exercise more and lose weight.  Patient tolerated vasotec, metoprolol well without side effects. I feel the benefits of the medication outweigh the risks.           Mixed hyperlipidemia    Current Assessment & Plan     Lipid and CMP done 2-, read by me, reviewed with pt.  Trig. 89 down from 158, Tot. Chol. 144 up from 128, HDL 48 up from 32, LDL 79 up from 69.  Improved and close to goal  Encouraged to watch fatty intake, exercise more, and lose weight.   compliant with medication  Patient tolerated lipitor well without side effects. I feel the benefits of the medication outweigh the risks.  Is not getting adequate diet and exercise  Goals developed at last visit were  met   Follow up in 3  months  Care management needs are self-addressed. . Self-management abilities addressed and patient is capable of managing his own disease.           Relevant Orders    Lipid Panel With / Chol / HDL Ratio    Comprehensive Metabolic  Panel    Pernicious anemia    Current Assessment & Plan     Doing well; Vit. B12 done 2-, read by me, reviewed with pt.  B12 was 307 down from 459         Relevant Orders    CBC & Differential    RESOLVED: Spinal stenosis, lumbar region, with neurogenic claudication    Stage 3a chronic kidney disease (CMS/HCC) - Primary    Current Assessment & Plan     Slightly worse CMP done 2-, read by me, reviewed with pt.  Creatinine was 1.39 up from 1.28, EGFR was 49 down from 54  Advised to avoid arthritis medications.          RESOLVED: Type 2 diabetes mellitus with stage 3a chronic kidney disease, without long-term current use of insulin ([facility])    Relevant Medications    glipizide (glipiZIDE XL) 5 MG ER tablet    Type 2 diabetes mellitus without complication, without long-term current use of insulin ([facility])    Current Assessment & Plan     A1c and Microalbumin/Creatinine ratio  done 2-, read by me, reviewed with pt.  A1c was 6.4 down from 6.5, Microalbumin/Creatinine ratio was 69 up from 66  Improved,   Encouraged to watch sugar intake, exercise more and lose weight.   compliant with medication. Advised to decrease Glipizide to 5mg daily to avoid hypoglycemia  Not monitoring sugar at home.   Follow up in 3months  Care management needs are self-addressed.  Self-management abilities addressed and patient is capable of managing his own disease.           Relevant Medications    glipizide (glipiZIDE XL) 5 MG ER tablet    Other Relevant Orders    Hemoglobin A1c       Low    BILL (obstructive sleep apnea) (Chronic)    Current Assessment & Plan     Doing well- Patient only uses his cpap occassionally         Anxiety disorder, unspecified    Current Assessment & Plan     Improving;  Patient tolerated zoloft well without side effects. I feel the benefits of the medication outweigh the risks.           Bilateral carotid artery stenosis    Overview     Ultrasound done May 2011 plaquing on the right and severe on the  left         Current Assessment & Plan     Doing well--declines repeat ultrasound         Gastritis and gastroduodenitis    Overview     EGD done in August 2018 by Dr. Peraza showed a small hiatal hernia otherwise was normal esophagus.  Stomach did show some mild erythema congested consistent with gastritis         Current Assessment & Plan     Improving; Patient tolerated omeprazole well without side effects. I feel the benefits of the medication outweigh the risks.           Spinal stenosis of lumbar region    Overview     MRI done 7/12/2021 still shows severe degenerative changes of L4-L5 moderate to severe spinal stenosis         Current Assessment & Plan     Doing well            Unprioritized    Benign prostatic hyperplasia    Current Assessment & Plan     Doing well; Patient tolerated proscar well without side effects. I feel the benefits of the medication outweigh the risks.           RESOLVED: Bronchitis    RESOLVED: Carotid artery occlusion    Overview     U/A Carotids-mild-12-         Cough    Current Assessment & Plan     Mild-possibly due to allergies or possibly due to vasotec.  He declines further evaluation or trial off the Vasotec         RESOLVED: Diabetic nephropathy associated with type 2 diabetes mellitus (HCC)    Relevant Medications    glipizide (glipiZIDE XL) 5 MG ER tablet    Encounter for general adult medical examination with abnormal findings    Overview                Current Assessment & Plan     Encouraged to do self-breast exam, self-testicle exams, and self derm exams. Congratulated on using seat belts.  Encouraged to do annual physical exams.  Immunization status reviewed.           Family history of colon cancer    Current Assessment & Plan     Patient declines to repeat colonoscopy due to age.          Hearing loss    Current Assessment & Plan     Slightly decreased with hearing aids; Advised to avoid noise exposure and wear hearing protection         RESOLVED: Hematuria     Current Assessment & Plan     U/A with microscope done 2-, read by me, reviewed with pt.  No blood x 2         Hyperkalemia    Current Assessment & Plan     Slightly worse- CMP done 2-, read by me, reviewed with pt.  Potassium was 5.5 up from 4.5  Advised patient to decrease banana intake.          Immobility syndrome    Current Assessment & Plan     Slightly worse- Patient is now using a walker.  He declines physical therapy         Immunization counseling    Left hip pain    Current Assessment & Plan     Probably 2nd to LDD-he recently had an injection in his hips which helped--follow conservatively         RESOLVED: Lumbar radiculopathy    Mitral valve insufficiency    Current Assessment & Plan     Stable and he declines repeat echo         Neurogenic claudication (HCC)    Current Assessment & Plan     Improving         Nocturia    Current Assessment & Plan     U/A done 2-, read by me, reviewed with pt.  U/A showed 1+ protein         PAC (premature atrial contraction)    Current Assessment & Plan     Mild and will follow conservatively         RESOLVED: Prostatic hypertrophy    Proteinuria    Current Assessment & Plan     U/A done 2-, read by me, reviewed with pt.  U/A showed 1+ protein; repeat UA in 3 months.  Discussed 24-hour urine he prefers to hold on this which I feel is reasonable         Relevant Orders    Urinalysis without microscopic (no culture) - Urine, Clean Catch    Pulmonary valve disorder    Current Assessment & Plan     Stable--he did declines repeat echocardiogram         Seasonal allergies    Current Assessment & Plan     Doing well on Singulair        Other Visit Diagnoses     Need for pneumococcal vaccination        Medicare annual wellness visit, subsequent                   Encouraged to check his skin, testicles and breasts monthly. Reviewed eating a balanced diet and regularly checking skin and breasts.

## 2023-03-01 NOTE — ASSESSMENT & PLAN NOTE
Improving; Patient tolerated omeprazole well without side effects. I feel the benefits of the medication outweigh the risks.

## 2023-03-01 NOTE — ASSESSMENT & PLAN NOTE
Slightly worse- CMP done 2-, read by me, reviewed with pt.  Potassium was 5.5 up from 4.5  Advised patient to decrease banana intake.

## 2023-03-01 NOTE — ASSESSMENT & PLAN NOTE
CBC, Folate done 2-, read by me, reviewed with pt.  Folate was 18.4 up from 12.1, HGB was 13.3 up from 12.2, HCT 40.5 up from 37.9, WBC 12.6 up from 10.4

## 2023-03-01 NOTE — ASSESSMENT & PLAN NOTE
Slightly worse CMP done 2-, read by me, reviewed with pt.  Creatinine was 1.39 up from 1.28, EGFR was 49 down from 54  Advised to avoid arthritis medications.

## 2023-03-05 LAB — METHYLMALONATE SERPL-SCNC: 807 NMOL/L (ref 0–378)

## 2023-03-07 ENCOUNTER — TELEPHONE (OUTPATIENT)
Dept: FAMILY MEDICINE CLINIC | Facility: CLINIC | Age: 88
End: 2023-03-07
Payer: MEDICARE

## 2023-03-07 NOTE — TELEPHONE ENCOUNTER
LMOM that patient needs follow up from labs drawn on 2- to see Dr. Robison and start B12 injections.

## 2023-04-30 NOTE — PROGRESS NOTES
Encounter Date:05/02/2023    Last seen 10/26/2022      Patient ID: Mikael Shanks is a 87 y.o. male.    Chief Complaint:    Congestive heart failure  Status post stent  Hypertension  Diabetes  Renal dysfunction     History of Present Illness     Since I have last seen, the patient has been without any chest discomfort ,shortness of breath, palpitations, dizziness or syncope.  Denies having any headache ,abdominal pain ,nausea, vomiting , diarrhea constipation, loss of weight or loss of appetite.  Denies having any excessive bruising ,hematuria or blood in the stool.    Review of all systems negative except as indicated.    Reviewed ROS.  Assessment and Plan      ]]]]]]]]]]]]]]]]]]]]]]]  Impression  ========   -Status post stent 2003 at [facility].     - Ischemic cardiomyopathy with ejection fraction of 25%.-Improved and 60 %     Cardiac catheterization 9/4/2020 revealed  Left ventricular size and contractility is normal with ejection fraction of 60%.  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  Left anterior descending artery has luminal irregularities.  Circumflex coronary artery has luminal irregularities.  Right coronary artery stent is patent with 20% plaquing.      Echocardiogram 8/31/2020  Thickened aortic valve with adequate opening motion.  Left atrial enlargement  Left ventricular size and contractility is normal with ejection fraction of 55 %'     -Status post acute on chronic systolic congestive heart failure-improved significant left ventricle dysfunction with ejection fraction of 25%.     -Status post acute respiratory failure with hypoxia-improved.       -Hypertension diabetes renal dysfunction.  BUN 14 creatinine 1.5.  Dyslipidemia     -History of anemia     -History of premature atrial contractions     -History of severe hypomagnesemia.-Improved     -Allergic to penicillin.   =======  Plan  =======  Status post stent.  Patient is not  having any angina pectoris or congestive heart failure.  EKG showed normal sinus rhythm premature ventricular contractions-5/2/2023    Ischemic cardiomyopathy-improved.  Latest ejection fraction 55%.-8/31/2020  No need for ICD     Hypertension-  160/66  Monitor blood pressure as an outpatient and maintain blood pressure log.     Dyslipidemia-continue atorvastatin     Patient is not on ACE inhibitor due to pre-existing renal dysfunction.     Medications were reviewed and updated.     Follow-up in the office in 6 months.    Further plan will depend on patient's progress.  ]]]]]]]]]]]]]                      Diagnosis Plan   1. Ischemic cardiomyopathy        2. Type 2 diabetes mellitus without complication, without long-term current use of insulin        3. Shortness of breath        4. Stage 3 chronic kidney disease, unspecified whether stage 3a or 3b CKD        5. Hypertension, benign        6. Mixed hyperlipidemia        LAB RESULTS (LAST 7 DAYS)    CBC        BMP        CMP         BNP        TROPONIN        CoAg        Creatinine Clearance  CrCl cannot be calculated (Patient's most recent lab result is older than the maximum 30 days allowed.).    ABG        Radiology  No radiology results for the last day                The following portions of the patient's history were reviewed and updated as appropriate: allergies, current medications, past family history, past medical history, past social history, past surgical history and problem list.    Review of Systems   Constitutional: Negative for malaise/fatigue.   Cardiovascular: Negative for chest pain, dyspnea on exertion, leg swelling and palpitations.   Respiratory: Negative for cough and shortness of breath.    Gastrointestinal: Negative for abdominal pain, nausea and vomiting.   Neurological: Negative for dizziness, focal weakness, headaches, light-headedness and numbness.   All other systems reviewed and are negative.        Current Outpatient Medications:   •   albuterol sulfate  (90 Base) MCG/ACT inhaler, Inhale 2 puffs Every 4 (Four) Hours As Needed for Wheezing., Disp: 18 g, Rfl: 5  •  aspirin 81 MG tablet, Take 1 tablet by mouth Daily. LD 1/29, Disp: , Rfl:   •  atorvastatin (LIPITOR) 80 MG tablet, Take 1 tablet by mouth every night at bedtime., Disp: 90 tablet, Rfl: 1  •  Blood Glucose Monitoring Suppl (ONE TOUCH ULTRA 2) w/Device kit, 1 Device Daily. Test 1 x daily, Disp: 1 each, Rfl: 0  •  cholecalciferol (VITAMIN D3) 10 MCG (400 UNIT) tablet, Take 1 tablet by mouth Daily. Hold DOS, Disp: , Rfl:   •  enalapril (VASOTEC) 5 MG tablet, TAKE 1 TABLET BY MOUTH DAILY (Patient taking differently: Take 2.5 mg by mouth Daily.), Disp: 90 tablet, Rfl: 0  •  finasteride (PROSCAR) 5 MG tablet, Take 1 tablet by mouth Daily., Disp: 90 tablet, Rfl: 1  •  glipizide (glipiZIDE XL) 5 MG ER tablet, Take 1 tablet by mouth Daily., Disp: 90 tablet, Rfl: 1  •  glucose blood (ONE TOUCH ULTRA TEST) test strip, Test 1 x daily, Disp: 100 each, Rfl: 5  •  metoprolol succinate XL (TOPROL-XL) 25 MG 24 hr tablet, Take 1 tablet by mouth Daily., Disp: 90 tablet, Rfl: 1  •  montelukast (Singulair) 10 MG tablet, Take 1 tablet by mouth Every Night., Disp: 90 tablet, Rfl: 3  •  multivitamin with minerals tablet tablet, Take 1 tablet by mouth Daily. LD 1/24, Disp: , Rfl:   •  omeprazole (priLOSEC) 40 MG capsule, Take 1 capsule by mouth Daily., Disp: 90 capsule, Rfl: 1  •  OneTouch Delica Lancets 33G misc, 1 strip Daily. Test 1 x daily, Disp: 100 each, Rfl: 5  •  sertraline (ZOLOFT) 50 MG tablet, Take 1 tablet by mouth Daily., Disp: 90 tablet, Rfl: 1    Current Facility-Administered Medications:   •  cyanocobalamin injection 1,000 mcg, 1,000 mcg, Intramuscular, Q28 Days, Xander Robison MD, 1,000 mcg at 12/29/21 1541    Allergies   Allergen Reactions   • Penicillins Hives       Family History   Problem Relation Age of Onset   • Cancer Mother    • Heart disease Father    • Cancer Brother         Past Surgical History:   Procedure Laterality Date   • BACK SURGERY     • CARDIAC CATHETERIZATION N/A 2019    Procedure: Left Heart Cath and coronary angiogram;  Surgeon: Dayday Ruiz MD;  Location: [facility] CATH INVASIVE LOCATION;  Service: Cardiovascular   • CARDIAC CATHETERIZATION N/A 2019    Procedure: Right and Left Heart Cath;  Surgeon: Dayday Ruiz MD;  Location: [facility] CATH INVASIVE LOCATION;  Service: Cardiovascular   • CARDIAC CATHETERIZATION N/A 2020    Procedure: Left and Right Heart Cath with Coronary Angiography;  Surgeon: Dayday Ruiz MD;  Location: [facility] CATH INVASIVE LOCATION;  Service: Cardiovascular;  Laterality: N/A;   • CAROTID ENDARTERECTOMY Left    • CHOLECYSTECTOMY     • COLONOSCOPY  2018    No repeat   • CORONARY ANGIOPLASTY WITH STENT PLACEMENT     • LUMBAR LAMINECTOMY Bilateral 2022    Procedure: LUMBAR LAMINECTOMY WITH/WITHOUT FUSION L4-L5;  Surgeon: Geronimo Gonzales MD;  Location: [facility] MAIN OR;  Service: Neurosurgery;  Laterality: Bilateral;       Past Medical History:   Diagnosis Date   • Carotid artery disease    • Coronary artery disease    • Diabetes mellitus    • GERD (gastroesophageal reflux disease)    • [place] (hard of hearing)    • Hyperlipidemia    • Hypertension    • Osteoarthritis    • Prostatic hypertrophy    • Pulmonary valve disorder    • Sleep apnea     cpap   doesnt use    • Stroke        Family History   Problem Relation Age of Onset   • Cancer Mother    • Heart disease Father    • Cancer Brother        Social History     Socioeconomic History   • Marital status:    Tobacco Use   • Smoking status: Former     Packs/day: 5.00     Years: 7.00     Pack years: 35.00     Types: Cigarettes     Start date: 1953     Quit date: 1960     Years since quittin.7   • Smokeless tobacco: Never   Vaping Use   • Vaping Use: Never used   Substance and Sexual Activity   • Alcohol use: Not Currently     Comment: very rare   •  Drug use: No   • Sexual activity: Never           ECG 12 Lead    Date/Time: 5/2/2023 3:29 PM  Performed by: Dayday Ruiz MD  Authorized by: Dayday Ruiz MD   Comparison: compared with previous ECG   Similar to previous ECG  Comparison to previous ECG: Sinus rhythm premature ventricular contractions 77/min nonspecific ST-T wave changes right axis deviation no significant change from previous EKG.                Objective:       Physical Exam    There were no vitals taken for this visit.  The patient is alert, oriented and in no distress.    Vital signs as noted above.    Head and neck revealed no carotid bruits or jugular venous distension.  No thyromegaly or lymphadenopathy is present.    Lungs clear.  No wheezing.  Breath sounds are normal bilaterally.    Heart normal first and second heart sounds.  No murmur..  No pericardial rub is present.  No gallop is present.    Abdomen soft and nontender.  No organomegaly is present.    Extremities revealed good peripheral pulses without any pedal edema.    Skin warm and dry.    Musculoskeletal system is grossly normal.    CNS grossly normal.    Reviewed and updated.

## 2023-05-02 ENCOUNTER — OFFICE VISIT (OUTPATIENT)
Dept: CARDIOLOGY | Facility: CLINIC | Age: 88
End: 2023-05-02
Payer: MEDICARE

## 2023-05-02 VITALS
OXYGEN SATURATION: 97 % | SYSTOLIC BLOOD PRESSURE: 160 MMHG | DIASTOLIC BLOOD PRESSURE: 66 MMHG | HEART RATE: 80 BPM | HEIGHT: 71 IN | BODY MASS INDEX: 24.64 KG/M2 | WEIGHT: 176 LBS

## 2023-05-02 DIAGNOSIS — I25.5 ISCHEMIC CARDIOMYOPATHY: Primary | ICD-10-CM

## 2023-05-02 DIAGNOSIS — E11.9 TYPE 2 DIABETES MELLITUS WITHOUT COMPLICATION, WITHOUT LONG-TERM CURRENT USE OF INSULIN: ICD-10-CM

## 2023-05-02 DIAGNOSIS — N18.30 STAGE 3 CHRONIC KIDNEY DISEASE, UNSPECIFIED WHETHER STAGE 3A OR 3B CKD: ICD-10-CM

## 2023-05-02 DIAGNOSIS — R06.02 SHORTNESS OF BREATH: ICD-10-CM

## 2023-05-02 DIAGNOSIS — E78.2 MIXED HYPERLIPIDEMIA: ICD-10-CM

## 2023-05-02 DIAGNOSIS — I10 HYPERTENSION, BENIGN: ICD-10-CM

## 2023-05-02 RX ORDER — FUROSEMIDE 20 MG/1
TABLET ORAL
COMMUNITY
Start: 2023-04-05

## 2023-06-02 LAB
ALBUMIN SERPL-MCNC: 4 G/DL (ref 3.6–4.6)
ALBUMIN/GLOB SERPL: 1.6 {RATIO} (ref 1.2–2.2)
ALP SERPL-CCNC: 85 IU/L (ref 44–121)
ALT SERPL-CCNC: 17 IU/L (ref 0–44)
AST SERPL-CCNC: 14 IU/L (ref 0–40)
BASOPHILS # BLD AUTO: 0.1 X10E3/UL (ref 0–0.2)
BASOPHILS NFR BLD AUTO: 1 %
BILIRUB SERPL-MCNC: 0.3 MG/DL (ref 0–1.2)
BUN SERPL-MCNC: 23 MG/DL (ref 8–27)
BUN/CREAT SERPL: 18 (ref 10–24)
CALCIUM SERPL-MCNC: 9.8 MG/DL (ref 8.6–10.2)
CHLORIDE SERPL-SCNC: 107 MMOL/L (ref 96–106)
CHOLEST SERPL-MCNC: 124 MG/DL (ref 100–199)
CHOLEST/HDLC SERPL: 3.2 RATIO (ref 0–5)
CO2 SERPL-SCNC: 23 MMOL/L (ref 20–29)
CREAT SERPL-MCNC: 1.3 MG/DL (ref 0.76–1.27)
EGFRCR SERPLBLD CKD-EPI 2021: 53 ML/MIN/1.73
EOSINOPHIL # BLD AUTO: 0.5 X10E3/UL (ref 0–0.4)
EOSINOPHIL NFR BLD AUTO: 5 %
ERYTHROCYTE [DISTWIDTH] IN BLOOD BY AUTOMATED COUNT: 13 % (ref 11.6–15.4)
GLOBULIN SER CALC-MCNC: 2.5 G/DL (ref 1.5–4.5)
GLUCOSE SERPL-MCNC: 138 MG/DL (ref 70–99)
HBA1C MFR BLD: 6.4 % (ref 4.8–5.6)
HCT VFR BLD AUTO: 39.2 % (ref 37.5–51)
HDLC SERPL-MCNC: 39 MG/DL
HGB BLD-MCNC: 12.6 G/DL (ref 13–17.7)
IMM GRANULOCYTES # BLD AUTO: 0.2 X10E3/UL (ref 0–0.1)
IMM GRANULOCYTES NFR BLD AUTO: 2 %
LDLC SERPL CALC-MCNC: 61 MG/DL (ref 0–99)
LYMPHOCYTES # BLD AUTO: 1.5 X10E3/UL (ref 0.7–3.1)
LYMPHOCYTES NFR BLD AUTO: 15 %
MCH RBC QN AUTO: 29 PG (ref 26.6–33)
MCHC RBC AUTO-ENTMCNC: 32.1 G/DL (ref 31.5–35.7)
MCV RBC AUTO: 90 FL (ref 79–97)
MONOCYTES # BLD AUTO: 0.7 X10E3/UL (ref 0.1–0.9)
MONOCYTES NFR BLD AUTO: 7 %
NEUTROPHILS # BLD AUTO: 6.6 X10E3/UL (ref 1.4–7)
NEUTROPHILS NFR BLD AUTO: 70 %
PLATELET # BLD AUTO: 219 X10E3/UL (ref 150–450)
POTASSIUM SERPL-SCNC: 5.2 MMOL/L (ref 3.5–5.2)
PROT SERPL-MCNC: 6.5 G/DL (ref 6–8.5)
RBC # BLD AUTO: 4.34 X10E6/UL (ref 4.14–5.8)
SODIUM SERPL-SCNC: 143 MMOL/L (ref 134–144)
SPECIMEN STATUS: NORMAL
TRIGL SERPL-MCNC: 139 MG/DL (ref 0–149)
VLDLC SERPL CALC-MCNC: 24 MG/DL (ref 5–40)
WBC # BLD AUTO: 9.5 X10E3/UL (ref 3.4–10.8)

## 2023-06-05 ENCOUNTER — OFFICE VISIT (OUTPATIENT)
Dept: FAMILY MEDICINE CLINIC | Facility: CLINIC | Age: 88
End: 2023-06-05
Payer: MEDICARE

## 2023-06-05 VITALS
SYSTOLIC BLOOD PRESSURE: 140 MMHG | RESPIRATION RATE: 15 BRPM | HEART RATE: 77 BPM | DIASTOLIC BLOOD PRESSURE: 71 MMHG | HEIGHT: 71 IN | BODY MASS INDEX: 25.28 KG/M2 | WEIGHT: 180.6 LBS | TEMPERATURE: 98.6 F | OXYGEN SATURATION: 92 %

## 2023-06-05 DIAGNOSIS — K29.90 GASTRITIS AND GASTRODUODENITIS: ICD-10-CM

## 2023-06-05 DIAGNOSIS — F32.A DEPRESSION, UNSPECIFIED DEPRESSION TYPE: ICD-10-CM

## 2023-06-05 DIAGNOSIS — I10 HYPERTENSION, BENIGN: Primary | ICD-10-CM

## 2023-06-05 DIAGNOSIS — N18.30 CHRONIC KIDNEY DISEASE, STAGE III (MODERATE): ICD-10-CM

## 2023-06-05 DIAGNOSIS — N18.31 TYPE 2 DIABETES MELLITUS WITH STAGE 3A CHRONIC KIDNEY DISEASE, WITHOUT LONG-TERM CURRENT USE OF INSULIN: ICD-10-CM

## 2023-06-05 DIAGNOSIS — N18.31 STAGE 3A CHRONIC KIDNEY DISEASE: ICD-10-CM

## 2023-06-05 DIAGNOSIS — N40.0 PROSTATIC HYPERTROPHY: ICD-10-CM

## 2023-06-05 DIAGNOSIS — D51.0 PERNICIOUS ANEMIA: ICD-10-CM

## 2023-06-05 DIAGNOSIS — J45.909 ASTHMA, UNSPECIFIED ASTHMA SEVERITY, UNSPECIFIED WHETHER COMPLICATED, UNSPECIFIED WHETHER PERSISTENT: ICD-10-CM

## 2023-06-05 DIAGNOSIS — K29.70 GASTRITIS AND GASTRODUODENITIS: ICD-10-CM

## 2023-06-05 DIAGNOSIS — E11.22 TYPE 2 DIABETES MELLITUS WITH STAGE 3A CHRONIC KIDNEY DISEASE, WITHOUT LONG-TERM CURRENT USE OF INSULIN: ICD-10-CM

## 2023-06-05 DIAGNOSIS — E78.2 MIXED HYPERLIPIDEMIA: ICD-10-CM

## 2023-06-05 DIAGNOSIS — E11.9 TYPE 2 DIABETES MELLITUS WITHOUT COMPLICATION, WITHOUT LONG-TERM CURRENT USE OF INSULIN: ICD-10-CM

## 2023-06-05 RX ORDER — OMEPRAZOLE 40 MG/1
40 CAPSULE, DELAYED RELEASE ORAL DAILY
Qty: 90 CAPSULE | Refills: 4 | Status: SHIPPED | OUTPATIENT
Start: 2023-06-05

## 2023-06-05 RX ORDER — ENALAPRIL MALEATE 2.5 MG/1
2.5 TABLET ORAL DAILY
Qty: 90 TABLET | Refills: 4 | Status: SHIPPED | OUTPATIENT
Start: 2023-06-05

## 2023-06-05 RX ORDER — FINASTERIDE 5 MG/1
5 TABLET, FILM COATED ORAL DAILY
Qty: 90 TABLET | Refills: 4 | Status: SHIPPED | OUTPATIENT
Start: 2023-06-05

## 2023-06-05 RX ORDER — MONTELUKAST SODIUM 10 MG/1
10 TABLET ORAL NIGHTLY
Qty: 90 TABLET | Refills: 4 | Status: SHIPPED | OUTPATIENT
Start: 2023-06-05

## 2023-06-05 RX ORDER — GLIPIZIDE 5 MG/1
5 TABLET, FILM COATED, EXTENDED RELEASE ORAL DAILY
Qty: 90 TABLET | Refills: 4 | Status: SHIPPED | OUTPATIENT
Start: 2023-06-05

## 2023-06-05 RX ORDER — ATORVASTATIN CALCIUM 80 MG/1
80 TABLET, FILM COATED ORAL
Qty: 90 TABLET | Refills: 4 | Status: SHIPPED | OUTPATIENT
Start: 2023-06-05

## 2023-06-05 RX ORDER — METOPROLOL SUCCINATE 25 MG/1
25 TABLET, EXTENDED RELEASE ORAL DAILY
Qty: 90 TABLET | Refills: 4 | Status: SHIPPED | OUTPATIENT
Start: 2023-06-05

## 2023-06-05 NOTE — ASSESSMENT & PLAN NOTE
Asymptomatic but possibly because of worsening anemia;  Patient tolerated omeprazole well without side effects. I feel the benefits of the medication outweigh the risks.

## 2023-06-05 NOTE — ASSESSMENT & PLAN NOTE
Doing well  Patient tolerated singulair well without side effects. I feel the benefits of the medication outweigh the risks.

## 2023-06-05 NOTE — ASSESSMENT & PLAN NOTE
Stable,   Encouraged to watch sugar intake, exercise more and lose weight.   compliant with medication. Patient tolerated glipizide well without side effects. I feel the benefits of the medication outweigh the risks.    Not monitoring sugar at home.   Follow up in 3 months  Care management needs are self-addressed.  Self-management abilities addressed and patient is capable of managing his own disease.

## 2023-06-05 NOTE — ASSESSMENT & PLAN NOTE
Stable   Encouraged to watch fatty intake, exercise more, and lose weight.   compliant with medication   Patient tolerated lipitor well without side effects. I feel the benefits of the medication outweigh the risks.    Is not getting adequate diet and exercise  Goals developed at last visit were met   Follow up in 3  months  Care management needs are self-addressed. Self-management abilities addressed and patient is capable of managing his own disease.

## 2023-06-05 NOTE — PROGRESS NOTES
Subjective   Mikael Shanks is a 87 y.o. male.     Hypertension  This is a chronic problem. The current episode started more than 1 year ago. The problem has been gradually improving since onset. The problem is controlled. Pertinent negatives include no chest pain, palpitations or shortness of breath.   Hyperlipidemia  This is a chronic problem. The current episode started more than 1 year ago. The problem is controlled. Pertinent negatives include no chest pain, myalgias or shortness of breath. Current antihyperlipidemic treatment includes statins.   Diabetes  He presents for his follow-up diabetic visit. He has type 2 diabetes mellitus. No MedicAlert identification noted. The initial diagnosis of diabetes was made 30 years ago. Pertinent negatives for diabetes include no chest pain, no fatigue, no polyphagia, no polyuria and no weight loss.      The following portions of the patient's history were reviewed and updated as appropriate: current medications, past family history, past medical history, past social history, past surgical history, and problem list.    Family History   Problem Relation Age of Onset    Cancer Mother     Heart disease Father     Cancer Brother        Social History     Tobacco Use    Smoking status: Former     Packs/day: 5.00     Years: 7.00     Pack years: 35.00     Types: Cigarettes     Start date: 1953     Quit date: 1960     Years since quittin.8    Smokeless tobacco: Never   Vaping Use    Vaping Use: Never used   Substance Use Topics    Alcohol use: Not Currently     Comment: very rare    Drug use: No       Past Surgical History:   Procedure Laterality Date    BACK SURGERY      CARDIAC CATHETERIZATION N/A 2019    Procedure: Left Heart Cath and coronary angiogram;  Surgeon: Dayday Ruiz MD;  Location: [facility] INVASIVE LOCATION;  Service: Cardiovascular    CARDIAC CATHETERIZATION N/A 2019    Procedure: Right and Left Heart Cath;  Surgeon: Joseph  MD Dayday;  Location: [facility] CATH INVASIVE LOCATION;  Service: Cardiovascular    CARDIAC CATHETERIZATION N/A 9/4/2020    Procedure: Left and Right Heart Cath with Coronary Angiography;  Surgeon: Dayday Ruiz MD;  Location: [facility] CATH INVASIVE LOCATION;  Service: Cardiovascular;  Laterality: N/A;    CAROTID ENDARTERECTOMY Left     CHOLECYSTECTOMY      COLONOSCOPY  08/14/2018    No repeat    CORONARY ANGIOPLASTY WITH STENT PLACEMENT      LUMBAR LAMINECTOMY Bilateral 1/31/2022    Procedure: LUMBAR LAMINECTOMY WITH/WITHOUT FUSION L4-L5;  Surgeon: Geronimo Gonzales MD;  Location: [facility] MAIN OR;  Service: Neurosurgery;  Laterality: Bilateral;       Patient Active Problem List   Diagnosis    Type 2 diabetes mellitus without complication, without long-term current use of insulin    PAC (premature atrial contraction)    Gastritis and gastroduodenitis    Atherosclerosis of native coronary artery of native heart without angina pectoris    Stage 3a chronic kidney disease    Hypertension, benign    Spinal stenosis of lumbar region    Asymptomatic peripheral vascular disease    Pernicious anemia    Depression    Pulmonary valve disorder    Hearing loss    Family history of colon cancer    BILL (obstructive sleep apnea)    Stenosis of coronary artery stent    Mixed hyperlipidemia    Anxiety disorder, unspecified    Grief    Ischemic cardiomyopathy    Neurogenic claudication    Proteinuria    Encounter for general adult medical examination with abnormal findings    Parathyroid abnormality    Bilateral carotid artery stenosis    Left hip pain    Benign prostatic hyperplasia    Mitral valve insufficiency    Hyperkalemia    Asthma    Cough    Immobility syndrome    Seasonal allergies    Immunization counseling    Nocturia       Current Outpatient Medications on File Prior to Visit   Medication Sig Dispense Refill    albuterol sulfate  (90 Base) MCG/ACT inhaler Inhale 2 puffs Every 4 (Four) Hours As Needed for Wheezing.  "18 g 5    aspirin 81 MG tablet Take 1 tablet by mouth Daily. LD 1/29      Blood Glucose Monitoring Suppl (ONE TOUCH ULTRA 2) w/Device kit 1 Device Daily. Test 1 x daily 1 each 0    cholecalciferol (VITAMIN D3) 10 MCG (400 UNIT) tablet Take 1 tablet by mouth Daily. Hold DOS      glucose blood (ONE TOUCH ULTRA TEST) test strip Test 1 x daily 100 each 5    multivitamin with minerals tablet tablet Take 1 tablet by mouth Daily. LD 1/24      OneTouch Delica Lancets 33G misc 1 strip Daily. Test 1 x daily 100 each 5    sertraline (ZOLOFT) 50 MG tablet Take 1 tablet by mouth Daily. 90 tablet 1    furosemide (LASIX) 20 MG tablet  (Patient not taking: Reported on 6/5/2023)       Current Facility-Administered Medications on File Prior to Visit   Medication Dose Route Frequency Provider Last Rate Last Admin    cyanocobalamin injection 1,000 mcg  1,000 mcg Intramuscular Q28 Days Xander Robison MD   1,000 mcg at 12/29/21 1541       Allergies   Allergen Reactions    Penicillins Hives       Review of Systems   Constitutional:  Negative for fatigue, unexpected weight gain and unexpected weight loss.   Eyes:  Negative for visual disturbance.   Respiratory:  Negative for shortness of breath.    Cardiovascular:  Negative for chest pain, palpitations and leg swelling.   Gastrointestinal:  Negative for nausea.   Endocrine: Negative for polyphagia and polyuria.   Genitourinary:  Negative for frequency.   Musculoskeletal:  Negative for myalgias.   Skin:  Negative for dry skin and skin lesions.   Neurological:  Negative for syncope, numbness and headache.     Objective   Visit Vitals  /71 (BP Location: Left arm, Patient Position: Sitting, Cuff Size: Adult)   Pulse 77   Temp 98.6 °F (37 °C)   Resp 15   Ht 179.1 cm (70.5\")   Wt 81.9 kg (180 lb 9.6 oz)   SpO2 92%   BMI 25.55 kg/m²     Physical Exam  Vitals and nursing note reviewed.   Constitutional:       Appearance: He is well-developed.   HENT:      Head: Normocephalic.   Neck:      " Thyroid: No thyromegaly.      Vascular: No carotid bruit.      Trachea: Trachea normal.   Cardiovascular:      Rate and Rhythm: Normal rate and regular rhythm.      Heart sounds: No murmur heard.    No friction rub. No gallop.   Pulmonary:      Effort: Pulmonary effort is normal. No respiratory distress.      Breath sounds: Normal breath sounds. No wheezing.   Chest:      Chest wall: No tenderness.   Musculoskeletal:      Cervical back: Neck supple.   Skin:     General: Skin is dry.      Findings: No rash.      Nails: There is no clubbing.   Neurological:      Mental Status: He is alert and oriented to person, place, and time.   Psychiatric:         Behavior: Behavior is cooperative.         Assessment & Plan .  Problem List Items Addressed This Visit          Medium    Asthma    Current Assessment & Plan     Doing well  Patient tolerated singulair well without side effects. I feel the benefits of the medication outweigh the risks.           Relevant Medications    montelukast (Singulair) 10 MG tablet    Depression    Current Assessment & Plan     Doing well; Advised patient to gradually wean off Zoloft         Hypertension, benign - Primary    Current Assessment & Plan     Doing well; Encouraged to watch salt, exercise more and lose weight.  Patient tolerated enalapril, metoprolol well without side effects. I feel the benefits of the medication outweigh the risks.           Relevant Medications    enalapril (VASOTEC) 2.5 MG tablet    metoprolol succinate XL (TOPROL-XL) 25 MG 24 hr tablet    Mixed hyperlipidemia    Current Assessment & Plan     Stable   Encouraged to watch fatty intake, exercise more, and lose weight.   compliant with medication   Patient tolerated lipitor well without side effects. I feel the benefits of the medication outweigh the risks.    Is not getting adequate diet and exercise  Goals developed at last visit were met   Follow up in 3  months  Care management needs are self-addressed.  Self-management abilities addressed and patient is capable of managing his own disease.           Relevant Medications    atorvastatin (LIPITOR) 80 MG tablet    Other Relevant Orders    Lipid Panel With / Chol / HDL Ratio    Comprehensive Metabolic Panel    Pernicious anemia    Current Assessment & Plan     Slightly worse but may be due to iron deficiency anemia from blood draw loss from the use of aspirin instead of low B12; Advised patient to decrease aspirin to 1 a week.  He declines aggressive work-up at present--will recheck lab in 3 months         Relevant Orders    CBC & Differential    Stage 3a chronic kidney disease    Current Assessment & Plan     Improved with GFR up to 53 from 49.  Avoid anti-inflammatory pills         Relevant Medications    finasteride (PROSCAR) 5 MG tablet    Type 2 diabetes mellitus without complication, without long-term current use of insulin    Current Assessment & Plan     Stable,   Encouraged to watch sugar intake, exercise more and lose weight.   compliant with medication. Patient tolerated glipizide well without side effects. I feel the benefits of the medication outweigh the risks.    Not monitoring sugar at home.   Follow up in 3 months  Care management needs are self-addressed.  Self-management abilities addressed and patient is capable of managing his own disease.           Relevant Medications    glipizide (glipiZIDE XL) 5 MG ER tablet    Other Relevant Orders    Hemoglobin A1c       Low    Gastritis and gastroduodenitis    Overview     EGD done in August 2018 by Dr. Peraza showed a small hiatal hernia otherwise was normal esophagus.  Stomach did show some mild erythema congested consistent with gastritis         Current Assessment & Plan     Asymptomatic but possibly because of worsening anemia;  Patient tolerated omeprazole well without side effects. I feel the benefits of the medication outweigh the risks.           Relevant Medications    omeprazole (priLOSEC) 40 MG  capsule     Other Visit Diagnoses       Chronic kidney disease, stage III (moderate)        Relevant Medications    finasteride (PROSCAR) 5 MG tablet    Prostatic hypertrophy        Relevant Medications    finasteride (PROSCAR) 5 MG tablet    Type 2 diabetes mellitus with stage 3a chronic kidney disease, without long-term current use of insulin        Relevant Medications    glipizide (glipiZIDE XL) 5 MG ER tablet

## 2023-06-05 NOTE — ASSESSMENT & PLAN NOTE
Slightly worse but may be due to iron deficiency anemia from blood draw loss from the use of aspirin instead of low B12; Advised patient to decrease aspirin to 1 a week.  He declines aggressive work-up at present--will recheck lab in 3 months

## 2023-06-15 NOTE — ASSESSMENT & PLAN NOTE
Lipid and CMP done 2-, read by me, reviewed with pt.  Trig. 89 down from 158, Tot. Chol. 144 up from 128, HDL 48 up from 32, LDL 79 up from 69.  Improved and close to goal  Encouraged to watch fatty intake, exercise more, and lose weight.   compliant with medication  Patient tolerated lipitor well without side effects. I feel the benefits of the medication outweigh the risks.  Is not getting adequate diet and exercise  Goals developed at last visit were  met   Follow up in 3  months  Care management needs are self-addressed. . Self-management abilities addressed and patient is capable of managing his own disease.     Bactrim Pregnancy And Lactation Text: This medication is Pregnancy Category D and is known to cause fetal risk.  It is also excreted in breast milk.

## 2023-09-12 LAB
ALBUMIN SERPL-MCNC: 4.3 G/DL (ref 3.7–4.7)
ALBUMIN/GLOB SERPL: 2.2 {RATIO} (ref 1.2–2.2)
ALP SERPL-CCNC: 87 IU/L (ref 44–121)
ALT SERPL-CCNC: 23 IU/L (ref 0–44)
AST SERPL-CCNC: 16 IU/L (ref 0–40)
BASOPHILS # BLD AUTO: 0.1 X10E3/UL (ref 0–0.2)
BASOPHILS NFR BLD AUTO: 1 %
BILIRUB SERPL-MCNC: 0.4 MG/DL (ref 0–1.2)
BUN SERPL-MCNC: 21 MG/DL (ref 8–27)
BUN/CREAT SERPL: 15 (ref 10–24)
CALCIUM SERPL-MCNC: 9.4 MG/DL (ref 8.6–10.2)
CHLORIDE SERPL-SCNC: 107 MMOL/L (ref 96–106)
CHOLEST SERPL-MCNC: 114 MG/DL (ref 100–199)
CHOLEST/HDLC SERPL: 2.9 RATIO (ref 0–5)
CO2 SERPL-SCNC: 24 MMOL/L (ref 20–29)
CREAT SERPL-MCNC: 1.38 MG/DL (ref 0.76–1.27)
EGFRCR SERPLBLD CKD-EPI 2021: 49 ML/MIN/1.73
EOSINOPHIL # BLD AUTO: 0.3 X10E3/UL (ref 0–0.4)
EOSINOPHIL NFR BLD AUTO: 3 %
ERYTHROCYTE [DISTWIDTH] IN BLOOD BY AUTOMATED COUNT: 13.1 % (ref 11.6–15.4)
GLOBULIN SER CALC-MCNC: 2 G/DL (ref 1.5–4.5)
GLUCOSE SERPL-MCNC: 78 MG/DL (ref 70–99)
HBA1C MFR BLD: 6.4 % (ref 4.8–5.6)
HCT VFR BLD AUTO: 39.3 % (ref 37.5–51)
HDLC SERPL-MCNC: 40 MG/DL
HGB BLD-MCNC: 12.5 G/DL (ref 13–17.7)
IMM GRANULOCYTES # BLD AUTO: 0.1 X10E3/UL (ref 0–0.1)
IMM GRANULOCYTES NFR BLD AUTO: 1 %
LDLC SERPL CALC-MCNC: 49 MG/DL (ref 0–99)
LYMPHOCYTES # BLD AUTO: 1.2 X10E3/UL (ref 0.7–3.1)
LYMPHOCYTES NFR BLD AUTO: 13 %
MCH RBC QN AUTO: 28.7 PG (ref 26.6–33)
MCHC RBC AUTO-ENTMCNC: 31.8 G/DL (ref 31.5–35.7)
MCV RBC AUTO: 90 FL (ref 79–97)
MONOCYTES # BLD AUTO: 0.7 X10E3/UL (ref 0.1–0.9)
MONOCYTES NFR BLD AUTO: 7 %
NEUTROPHILS # BLD AUTO: 7.4 X10E3/UL (ref 1.4–7)
NEUTROPHILS NFR BLD AUTO: 75 %
PLATELET # BLD AUTO: 206 X10E3/UL (ref 150–450)
POTASSIUM SERPL-SCNC: 6.1 MMOL/L (ref 3.5–5.2)
PROT SERPL-MCNC: 6.3 G/DL (ref 6–8.5)
RBC # BLD AUTO: 4.36 X10E6/UL (ref 4.14–5.8)
SODIUM SERPL-SCNC: 142 MMOL/L (ref 134–144)
TRIGL SERPL-MCNC: 143 MG/DL (ref 0–149)
VLDLC SERPL CALC-MCNC: 25 MG/DL (ref 5–40)
WBC # BLD AUTO: 10 X10E3/UL (ref 3.4–10.8)

## 2023-09-18 ENCOUNTER — OFFICE VISIT (OUTPATIENT)
Dept: FAMILY MEDICINE CLINIC | Facility: CLINIC | Age: 88
End: 2023-09-18
Payer: MEDICARE

## 2023-09-18 VITALS
BODY MASS INDEX: 24.3 KG/M2 | HEART RATE: 62 BPM | SYSTOLIC BLOOD PRESSURE: 135 MMHG | RESPIRATION RATE: 17 BRPM | OXYGEN SATURATION: 96 % | WEIGHT: 173.6 LBS | DIASTOLIC BLOOD PRESSURE: 70 MMHG | TEMPERATURE: 97.1 F | HEIGHT: 71 IN

## 2023-09-18 DIAGNOSIS — F32.A DEPRESSION, UNSPECIFIED DEPRESSION TYPE: ICD-10-CM

## 2023-09-18 DIAGNOSIS — E78.2 MIXED HYPERLIPIDEMIA: ICD-10-CM

## 2023-09-18 DIAGNOSIS — Z23 NEED FOR INFLUENZA VACCINATION: ICD-10-CM

## 2023-09-18 DIAGNOSIS — H60.63 CHRONIC NON-INFECTIVE OTITIS EXTERNA OF BOTH EARS, UNSPECIFIED TYPE: ICD-10-CM

## 2023-09-18 DIAGNOSIS — I10 HYPERTENSION, BENIGN: ICD-10-CM

## 2023-09-18 DIAGNOSIS — L30.9 DERMATITIS: Primary | ICD-10-CM

## 2023-09-18 DIAGNOSIS — N18.31 STAGE 3A CHRONIC KIDNEY DISEASE: ICD-10-CM

## 2023-09-18 DIAGNOSIS — D51.0 PERNICIOUS ANEMIA: ICD-10-CM

## 2023-09-18 RX ORDER — CYANOCOBALAMIN 1000 UG/ML
1000 INJECTION, SOLUTION INTRAMUSCULAR; SUBCUTANEOUS
Status: DISCONTINUED | OUTPATIENT
Start: 2023-09-18 | End: 2023-09-18

## 2023-09-18 RX ORDER — CYANOCOBALAMIN 1000 UG/ML
1000 INJECTION, SOLUTION INTRAMUSCULAR; SUBCUTANEOUS ONCE
Status: COMPLETED | OUTPATIENT
Start: 2023-09-18 | End: 2023-09-18

## 2023-09-18 RX ORDER — TRIAMCINOLONE ACETONIDE 1 MG/G
1 CREAM TOPICAL 2 TIMES DAILY
Qty: 60 G | Refills: 1 | Status: SHIPPED | OUTPATIENT
Start: 2023-09-18

## 2023-09-18 RX ADMIN — CYANOCOBALAMIN 1000 MCG: 1000 INJECTION, SOLUTION INTRAMUSCULAR; SUBCUTANEOUS at 14:23

## 2023-09-18 NOTE — PROGRESS NOTES
Subjective   Mkiael Shanks is a 88 y.o. male.     Hypertension  This is a chronic problem. The current episode started more than 1 year ago. Pertinent negatives include no chest pain, malaise/fatigue, palpitations or shortness of breath.   Hyperlipidemia  This is a chronic problem. The current episode started more than 1 year ago. Pertinent negatives include no chest pain, focal weakness, leg pain, myalgias or shortness of breath.   Anemia  Presents for follow-up visit. There has been no abdominal pain, anorexia, bruising/bleeding easily, confusion, malaise/fatigue, palpitations or weight loss. Signs of blood loss that are not present include hematemesis, hematochezia and melena.   Depression  Visit Type: follow-up  Patient is not experiencing: confusion, decreased concentration, excessive worry, nervousness/anxiety, palpitations, shortness of breath, weight gain and weight loss.   Severity: mild   Sleep quality: fair    Rash  This is a new problem. The current episode started more than 1 month ago. The affected locations include the neck. Pertinent negatives include no anorexia, fatigue or shortness of breath.   Earache   There is pain in both ears. This is a new (itching) problem. Associated symptoms include a rash. Pertinent negatives include no abdominal pain.      The following portions of the patient's history were reviewed and updated as appropriate: allergies, current medications, past family history, past medical history, past social history, past surgical history, and problem list.    Family History   Problem Relation Age of Onset    Cancer Mother     Heart disease Father     Cancer Brother        Social History     Tobacco Use    Smoking status: Former     Packs/day: 5.00     Years: 7.00     Pack years: 35.00     Types: Cigarettes     Start date: 1953     Quit date: 1960     Years since quittin.1     Passive exposure: Past    Smokeless tobacco: Never   Vaping Use    Vaping Use: Never used    Substance Use Topics    Alcohol use: Not Currently     Comment: very rare    Drug use: No       Past Surgical History:   Procedure Laterality Date    BACK SURGERY      CARDIAC CATHETERIZATION N/A 12/5/2019    Procedure: Left Heart Cath and coronary angiogram;  Surgeon: Dayday Ruiz MD;  Location: [facility] CATH INVASIVE LOCATION;  Service: Cardiovascular    CARDIAC CATHETERIZATION N/A 12/5/2019    Procedure: Right and Left Heart Cath;  Surgeon: Dayday Ruiz MD;  Location: [facility] CATH INVASIVE LOCATION;  Service: Cardiovascular    CARDIAC CATHETERIZATION N/A 9/4/2020    Procedure: Left and Right Heart Cath with Coronary Angiography;  Surgeon: Dayday Ruiz MD;  Location: [facility] CATH INVASIVE LOCATION;  Service: Cardiovascular;  Laterality: N/A;    CAROTID ENDARTERECTOMY Left     CHOLECYSTECTOMY      COLONOSCOPY  08/14/2018    No repeat    CORONARY ANGIOPLASTY WITH STENT PLACEMENT      LUMBAR LAMINECTOMY Bilateral 1/31/2022    Procedure: LUMBAR LAMINECTOMY WITH/WITHOUT FUSION L4-L5;  Surgeon: Geronimo Gonzales MD;  Location: [facility] MAIN OR;  Service: Neurosurgery;  Laterality: Bilateral;       Patient Active Problem List   Diagnosis    Type 2 diabetes mellitus without complication, without long-term current use of insulin    PAC (premature atrial contraction)    Gastritis and gastroduodenitis    Atherosclerosis of native coronary artery of native heart without angina pectoris    Stage 3a chronic kidney disease    Hypertension, benign    Spinal stenosis of lumbar region    Asymptomatic peripheral vascular disease    Pernicious anemia    Depression    Pulmonary valve disorder    Hearing loss    Family history of colon cancer    BILL (obstructive sleep apnea)    Stenosis of coronary artery stent    Mixed hyperlipidemia    Anxiety disorder, unspecified    Grief    Ischemic cardiomyopathy    Neurogenic claudication    Proteinuria    Encounter for general adult medical examination with abnormal findings     Parathyroid abnormality    Bilateral carotid artery stenosis    Left hip pain    Benign prostatic hyperplasia    Mitral valve insufficiency    Hyperkalemia    Asthma    Cough    Immobility syndrome    Seasonal allergies    Immunization counseling    Nocturia    Chronic non-infective otitis externa of both ears    Dermatitis       Current Outpatient Medications on File Prior to Visit   Medication Sig Dispense Refill    albuterol sulfate  (90 Base) MCG/ACT inhaler Inhale 2 puffs Every 4 (Four) Hours As Needed for Wheezing. 18 g 5    atorvastatin (LIPITOR) 80 MG tablet Take 1 tablet by mouth every night at bedtime. 90 tablet 4    Blood Glucose Monitoring Suppl (ONE TOUCH ULTRA 2) w/Device kit 1 Device Daily. Test 1 x daily 1 each 0    cholecalciferol (VITAMIN D3) 10 MCG (400 UNIT) tablet Take 1 tablet by mouth Daily. Hold DOS      enalapril (VASOTEC) 2.5 MG tablet Take 1 tablet by mouth Daily. 90 tablet 4    finasteride (PROSCAR) 5 MG tablet Take 1 tablet by mouth Daily. 90 tablet 4    glipizide (glipiZIDE XL) 5 MG ER tablet Take 1 tablet by mouth Daily. 90 tablet 4    glucose blood (ONE TOUCH ULTRA TEST) test strip Test 1 x daily 100 each 5    metoprolol succinate XL (TOPROL-XL) 25 MG 24 hr tablet Take 1 tablet by mouth Daily. 90 tablet 4    montelukast (Singulair) 10 MG tablet Take 1 tablet by mouth Every Night. 90 tablet 4    multivitamin with minerals tablet tablet Take 1 tablet by mouth Daily. LD 1/24      omeprazole (priLOSEC) 40 MG capsule Take 1 capsule by mouth Daily. 90 capsule 4    OneTouch Delica Lancets 33G misc 1 strip Daily. Test 1 x daily 100 each 5     Current Facility-Administered Medications on File Prior to Visit   Medication Dose Route Frequency Provider Last Rate Last Admin    cyanocobalamin injection 1,000 mcg  1,000 mcg Intramuscular Q28 Days Xander Robison MD   1,000 mcg at 12/29/21 1541       Allergies   Allergen Reactions    Penicillins Hives       Review of Systems  "  Constitutional:  Negative for fatigue, malaise/fatigue, unexpected weight gain and unexpected weight loss.   HENT:  Positive for ear pain. Negative for nosebleeds.    Respiratory:  Negative for shortness of breath.    Cardiovascular:  Negative for chest pain, palpitations and leg swelling.   Gastrointestinal:  Negative for abdominal pain, anorexia, blood in stool, hematemesis, hematochezia, melena, nausea and indigestion.   Genitourinary:  Negative for hematuria.   Musculoskeletal:  Negative for myalgias.   Skin:  Positive for rash. Negative for dry skin.   Neurological:  Negative for focal weakness, headache and confusion.   Hematological:  Does not bruise/bleed easily.   Psychiatric/Behavioral:  Negative for decreased concentration. The patient is not nervous/anxious.      Objective   Visit Vitals  /70 (BP Location: Right arm, Patient Position: Sitting, Cuff Size: Adult)   Pulse 62   Temp 97.1 °F (36.2 °C) (Temporal)   Resp 17   Ht 179.1 cm (70.5\")   Wt 78.7 kg (173 lb 9.6 oz)   SpO2 96%   BMI 24.56 kg/m²     Physical Exam  Vitals and nursing note reviewed.   Constitutional:       Appearance: Normal appearance. He is well-developed.   HENT:      Head: Normocephalic.      Right Ear: Tympanic membrane, ear canal and external ear normal. No middle ear effusion. There is no impacted cerumen.      Left Ear: Tympanic membrane, ear canal and external ear normal.  No middle ear effusion. There is no impacted cerumen.      Nose: No septal deviation, mucosal edema or congestion.      Right Sinus: No maxillary sinus tenderness or frontal sinus tenderness.      Left Sinus: No maxillary sinus tenderness or frontal sinus tenderness.      Mouth/Throat:      Mouth: No oral lesions.      Pharynx: No oropharyngeal exudate.      Tonsils: No tonsillar abscesses.   Eyes:      General:         Right eye: No discharge.         Left eye: No discharge.      Conjunctiva/sclera: Conjunctivae normal.      Pupils: Pupils are equal, " round, and reactive to light.   Neck:      Thyroid: No thyromegaly.      Vascular: No carotid bruit.      Trachea: Trachea normal.   Cardiovascular:      Rate and Rhythm: Normal rate and regular rhythm.      Heart sounds: Normal heart sounds. No murmur heard.    No friction rub. No gallop.   Pulmonary:      Effort: Pulmonary effort is normal. No respiratory distress.      Breath sounds: Normal breath sounds. No wheezing or rales.   Chest:      Chest wall: No tenderness.   Abdominal:      General: Bowel sounds are normal. There is no distension.      Palpations: Abdomen is soft. There is no mass.      Tenderness: There is no abdominal tenderness. There is no guarding or rebound.      Hernia: No hernia is present.   Musculoskeletal:         General: No tenderness. Normal range of motion.      Cervical back: Normal range of motion and neck supple.   Skin:     General: Skin is dry.      Findings: No rash.      Nails: There is no clubbing.      Comments: Dermatis on post scalp   Neurological:      Mental Status: He is alert and oriented to person, place, and time.   Psychiatric:         Behavior: Behavior is cooperative.       Assessment & Plan .  Problem List Items Addressed This Visit          Medium    Depression    Current Assessment & Plan     Doing well with Zoloft this is refilled         Relevant Medications    sertraline (ZOLOFT) 50 MG tablet    Hypertension, benign    Current Assessment & Plan     Doing well on  Vasotec and metoprolol without side effect.  Benefits of drugs outweigh the risk         Relevant Orders    CBC & Differential    Comprehensive Metabolic Panel    Mixed hyperlipidemia    Current Assessment & Plan     Doing well with an LDL of 49.  He tolerates atorvastatin 80 mg well without side effects.  Benefits outweigh the risk         Relevant Orders    Lipid Panel With / Chol / HDL Ratio    Pernicious anemia    Current Assessment & Plan     Stable with hemoglobin of 12.5.  B12 injections given  today         Stage 3a chronic kidney disease    Current Assessment & Plan     Slightly worse with GFR down to 49 from 53.  Avoid anti-inflammatory pills and repeat with next labs            Unprioritized    Chronic non-infective otitis externa of both ears    Overview     Very mild with normal exam today.- use cortisporin prn         Relevant Medications    neomycin-polymyxin-hydrocortisone (CORTISPORIN) 3.5-52766-3 otic solution    Dermatitis - Primary    Overview     Mild posterier scalp - kenalog         Relevant Medications    triamcinolone (KENALOG) 0.1 % cream     Other Visit Diagnoses       Need for influenza vaccination        Relevant Orders    Fluzone High-Dose 65+yrs (Completed)

## 2023-09-23 NOTE — ASSESSMENT & PLAN NOTE
Doing well with an LDL of 49.  He tolerates atorvastatin 80 mg well without side effects.  Benefits outweigh the risk

## 2023-09-23 NOTE — ASSESSMENT & PLAN NOTE
Slightly worse with GFR down to 49 from 53.  Avoid anti-inflammatory pills and repeat with next labs

## 2023-10-18 ENCOUNTER — CLINICAL SUPPORT (OUTPATIENT)
Dept: FAMILY MEDICINE CLINIC | Facility: CLINIC | Age: 88
End: 2023-10-18
Payer: MEDICARE

## 2023-10-18 DIAGNOSIS — D51.0 PERNICIOUS ANEMIA: Primary | ICD-10-CM

## 2023-10-18 RX ORDER — CYANOCOBALAMIN 1000 UG/ML
1000 INJECTION, SOLUTION INTRAMUSCULAR; SUBCUTANEOUS ONCE
Status: COMPLETED | OUTPATIENT
Start: 2023-10-18 | End: 2023-10-18

## 2023-10-18 RX ADMIN — CYANOCOBALAMIN 1000 MCG: 1000 INJECTION, SOLUTION INTRAMUSCULAR; SUBCUTANEOUS at 13:27

## 2023-10-22 NOTE — PROGRESS NOTES
Encounter Date:11/06/2023    Last seen 5/2/2023      Patient ID: Mikael Shanks is a 88 y.o. male.    Chief Complaint:     Congestive heart failure  Status post stent  Hypertension  Diabetes  Renal dysfunction     History of Present Illness     Since I have last seen, the patient has been without any chest discomfort ,shortness of breath, palpitations, dizziness or syncope.  Denies having any headache ,abdominal pain ,nausea, vomiting , diarrhea constipation, loss of weight or loss of appetite.  Denies having any excessive bruising ,hematuria or blood in the stool.    Review of all systems negative except as indicated.    Reviewed ROS..  Assessment and Plan      ]]]]]]]]]]]]]]]]]]]]]]]  History  ========   -Status post stent 2003 at [facility].     - Ischemic cardiomyopathy with ejection fraction of 25%.-Improved and 60 %     Cardiac catheterization 9/4/2020 revealed  Left ventricular size and contractility is normal with ejection fraction of 60%.  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  Left anterior descending artery has luminal irregularities.  Circumflex coronary artery has luminal irregularities.  Right coronary artery stent is patent with 20% plaquing.      Echocardiogram 8/31/2020  Thickened aortic valve with adequate opening motion.  Left atrial enlargement  Left ventricular size and contractility is normal with ejection fraction of 55 %'     -Status post acute on chronic systolic congestive heart failure-improved significant left ventricle dysfunction with ejection fraction of 25%.     -Status post acute respiratory failure with hypoxia-improved.       -Hypertension diabetes renal dysfunction.  BUN 14 creatinine 1.5.  Dyslipidemia     -History of anemia     -History of premature atrial contractions     -History of severe hypomagnesemia.-Improved     -Allergic to penicillin.   =======  Plan  =======  Status post stent.  Patient is not having  any angina pectoris or congestive heart failure.  EKG showed normal sinus rhythm premature ventricular contractions-11/6/2023.     Ischemic cardiomyopathy-improved.  Latest ejection fraction 55%.-8/31/2020  No need for ICD     Hypertension-  160/66  Maintain blood pressure log.  Monitor blood pressure as an outpatient and maintain blood pressure log.     Dyslipidemia-continue atorvastatin     Patient is not on ACE inhibitor due to pre-existing renal dysfunction.     Medications were reviewed and updated.     Follow-up in the office in 6 months.     Further plan will depend on patient's progress.    Reviewed and updated-11/6/2023  ]]]]]]]]]]]]]              Diagnosis Plan   1. Ischemic cardiomyopathy        2. Type 2 diabetes mellitus without complication, without long-term current use of insulin        3. Hypertension, benign        4. Stage 3 chronic kidney disease, unspecified whether stage 3a or 3b CKD        5. Shortness of breath        6. Mixed hyperlipidemia        LAB RESULTS (LAST 7 DAYS)    CBC        BMP        CMP         BNP        TROPONIN        CoAg        Creatinine Clearance  CrCl cannot be calculated (Patient's most recent lab result is older than the maximum 30 days allowed.).    ABG        Radiology  No radiology results for the last day                The following portions of the patient's history were reviewed and updated as appropriate: allergies, current medications, past family history, past medical history, past social history, past surgical history, and problem list.    Review of Systems   Constitutional: Positive for malaise/fatigue.   Cardiovascular:  Negative for chest pain, dyspnea on exertion, leg swelling and palpitations.   Respiratory:  Positive for shortness of breath. Negative for cough.    Gastrointestinal:  Negative for abdominal pain, nausea and vomiting.   Neurological:  Negative for dizziness, focal weakness, headaches, light-headedness and numbness.   All other systems  reviewed and are negative.      Current Outpatient Medications:     albuterol sulfate  (90 Base) MCG/ACT inhaler, Inhale 2 puffs Every 4 (Four) Hours As Needed for Wheezing., Disp: 18 g, Rfl: 5    atorvastatin (LIPITOR) 80 MG tablet, Take 1 tablet by mouth every night at bedtime., Disp: 90 tablet, Rfl: 4    Blood Glucose Monitoring Suppl (ONE TOUCH ULTRA 2) w/Device kit, 1 Device Daily. Test 1 x daily, Disp: 1 each, Rfl: 0    cholecalciferol (VITAMIN D3) 10 MCG (400 UNIT) tablet, Take 1 tablet by mouth Daily. Hold DOS, Disp: , Rfl:     enalapril (VASOTEC) 2.5 MG tablet, Take 1 tablet by mouth Daily., Disp: 90 tablet, Rfl: 4    finasteride (PROSCAR) 5 MG tablet, Take 1 tablet by mouth Daily., Disp: 90 tablet, Rfl: 4    glipizide (glipiZIDE XL) 5 MG ER tablet, Take 1 tablet by mouth Daily., Disp: 90 tablet, Rfl: 4    glucose blood (ONE TOUCH ULTRA TEST) test strip, Test 1 x daily, Disp: 100 each, Rfl: 5    metoprolol succinate XL (TOPROL-XL) 25 MG 24 hr tablet, Take 1 tablet by mouth Daily., Disp: 90 tablet, Rfl: 4    montelukast (Singulair) 10 MG tablet, Take 1 tablet by mouth Every Night., Disp: 90 tablet, Rfl: 4    multivitamin with minerals tablet tablet, Take 1 tablet by mouth Daily. LD 1/24, Disp: , Rfl:     neomycin-polymyxin-hydrocortisone (CORTISPORIN) 3.5-28107-2 otic solution, Administer 4 drops into both ears 3 (Three) Times a Day. Use only with itching, Disp: 10 mL, Rfl: 1    omeprazole (priLOSEC) 40 MG capsule, Take 1 capsule by mouth Daily., Disp: 90 capsule, Rfl: 4    OneTouch Delica Lancets 33G misc, 1 strip Daily. Test 1 x daily, Disp: 100 each, Rfl: 5    sertraline (ZOLOFT) 50 MG tablet, Take 1 tablet by mouth Daily., Disp: 90 tablet, Rfl: 0    triamcinolone (KENALOG) 0.1 % cream, Apply 1 application  topically to the appropriate area as directed 2 (Two) Times a Day., Disp: 60 g, Rfl: 1    Allergies   Allergen Reactions    Penicillins Hives       Family History   Problem Relation Age of Onset     Cancer Mother     Heart disease Father     Cancer Brother        Past Surgical History:   Procedure Laterality Date    BACK SURGERY      CARDIAC CATHETERIZATION N/A 2019    Procedure: Left Heart Cath and coronary angiogram;  Surgeon: Dayday Ruiz MD;  Location: [facility] CATH INVASIVE LOCATION;  Service: Cardiovascular    CARDIAC CATHETERIZATION N/A 2019    Procedure: Right and Left Heart Cath;  Surgeon: Dayday Ruiz MD;  Location: [facility] CATH INVASIVE LOCATION;  Service: Cardiovascular    CARDIAC CATHETERIZATION N/A 2020    Procedure: Left and Right Heart Cath with Coronary Angiography;  Surgeon: Dayday Ruiz MD;  Location: [facility] CATH INVASIVE LOCATION;  Service: Cardiovascular;  Laterality: N/A;    CAROTID ENDARTERECTOMY Left     CHOLECYSTECTOMY      COLONOSCOPY  2018    No repeat    CORONARY ANGIOPLASTY WITH STENT PLACEMENT      LUMBAR LAMINECTOMY Bilateral 2022    Procedure: LUMBAR LAMINECTOMY WITH/WITHOUT FUSION L4-L5;  Surgeon: Geronimo Gonzales MD;  Location: [facility] MAIN OR;  Service: Neurosurgery;  Laterality: Bilateral;       Past Medical History:   Diagnosis Date    Carotid artery disease     Coronary artery disease     Diabetes mellitus     GERD (gastroesophageal reflux disease)     [place] (hard of hearing)     Hyperlipidemia     Hypertension     Osteoarthritis     Prostatic hypertrophy     Pulmonary valve disorder     Sleep apnea     cpap   doesnt use     Stroke        Family History   Problem Relation Age of Onset    Cancer Mother     Heart disease Father     Cancer Brother        Social History     Socioeconomic History    Marital status:    Tobacco Use    Smoking status: Former     Packs/day: 5.00     Years: 7.00     Additional pack years: 0.00     Total pack years: 35.00     Types: Cigarettes     Start date: 1953     Quit date: 1960     Years since quittin.1     Passive exposure: Past    Smokeless tobacco: Never   Vaping Use    Vaping Use:  Never used   Substance and Sexual Activity    Alcohol use: Not Currently     Comment: very rare    Drug use: No    Sexual activity: Never           ECG 12 Lead    Date/Time: 11/6/2023 1:56 PM  Performed by: Dayday Ruiz MD    Authorized by: Dayday Ruiz MD  Comparison: compared with previous ECG   Similar to previous ECG  Comparison to previous ECG: Normal sinus rhythm nonspecific ST-T wave changes premature ventricular contraction normal axis normal intervals no significant change from previous EKG.            Objective:       Physical Exam    There were no vitals taken for this visit.  The patient is alert, oriented and in no distress.    Vital signs as noted above.    Head and neck revealed no carotid bruits or jugular venous distension.  No thyromegaly or lymphadenopathy is present.    Lungs clear.  No wheezing.  Breath sounds are normal bilaterally.    Heart normal first and second heart sounds.  No murmur..  No pericardial rub is present.  No gallop is present.    Abdomen soft and nontender.  No organomegaly is present.    Extremities revealed good peripheral pulses without any pedal edema.    Skin warm and dry.    Musculoskeletal system is grossly normal.    CNS grossly normal.    Reviewed and updated.

## 2023-11-02 DIAGNOSIS — I50.22 CHRONIC SYSTOLIC CONGESTIVE HEART FAILURE: ICD-10-CM

## 2023-11-02 RX ORDER — FUROSEMIDE 20 MG/1
20 TABLET ORAL 2 TIMES DAILY
Qty: 180 TABLET | Refills: 0 | Status: SHIPPED | OUTPATIENT
Start: 2023-11-02

## 2023-11-06 ENCOUNTER — OFFICE VISIT (OUTPATIENT)
Dept: CARDIOLOGY | Facility: CLINIC | Age: 88
End: 2023-11-06
Payer: MEDICARE

## 2023-11-06 VITALS
OXYGEN SATURATION: 96 % | DIASTOLIC BLOOD PRESSURE: 75 MMHG | SYSTOLIC BLOOD PRESSURE: 152 MMHG | WEIGHT: 172.5 LBS | HEIGHT: 70 IN | HEART RATE: 68 BPM | BODY MASS INDEX: 24.69 KG/M2

## 2023-11-06 DIAGNOSIS — I10 HYPERTENSION, BENIGN: ICD-10-CM

## 2023-11-06 DIAGNOSIS — I25.5 ISCHEMIC CARDIOMYOPATHY: Primary | ICD-10-CM

## 2023-11-06 DIAGNOSIS — E11.9 TYPE 2 DIABETES MELLITUS WITHOUT COMPLICATION, WITHOUT LONG-TERM CURRENT USE OF INSULIN: ICD-10-CM

## 2023-11-06 DIAGNOSIS — R06.02 SHORTNESS OF BREATH: ICD-10-CM

## 2023-11-06 DIAGNOSIS — E78.2 MIXED HYPERLIPIDEMIA: ICD-10-CM

## 2023-11-06 DIAGNOSIS — N18.30 STAGE 3 CHRONIC KIDNEY DISEASE, UNSPECIFIED WHETHER STAGE 3A OR 3B CKD: ICD-10-CM

## 2023-11-06 PROCEDURE — 1159F MED LIST DOCD IN RCRD: CPT | Performed by: INTERNAL MEDICINE

## 2023-11-06 PROCEDURE — 93000 ELECTROCARDIOGRAM COMPLETE: CPT | Performed by: INTERNAL MEDICINE

## 2023-11-06 PROCEDURE — 1160F RVW MEDS BY RX/DR IN RCRD: CPT | Performed by: INTERNAL MEDICINE

## 2023-11-06 PROCEDURE — 99214 OFFICE O/P EST MOD 30 MIN: CPT | Performed by: INTERNAL MEDICINE

## 2023-11-14 LAB
ALBUMIN SERPL-MCNC: 4.2 G/DL (ref 3.7–4.7)
ALBUMIN/GLOB SERPL: 1.7 {RATIO} (ref 1.2–2.2)
ALP SERPL-CCNC: 108 IU/L (ref 44–121)
ALT SERPL-CCNC: 20 IU/L (ref 0–44)
AST SERPL-CCNC: 18 IU/L (ref 0–40)
BASOPHILS # BLD AUTO: 0.1 X10E3/UL (ref 0–0.2)
BASOPHILS NFR BLD AUTO: 1 %
BILIRUB SERPL-MCNC: 0.4 MG/DL (ref 0–1.2)
BUN SERPL-MCNC: 26 MG/DL (ref 8–27)
BUN/CREAT SERPL: 17 (ref 10–24)
CALCIUM SERPL-MCNC: 9.7 MG/DL (ref 8.6–10.2)
CHLORIDE SERPL-SCNC: 102 MMOL/L (ref 96–106)
CHOLEST SERPL-MCNC: 124 MG/DL (ref 100–199)
CHOLEST/HDLC SERPL: 3.1 RATIO (ref 0–5)
CO2 SERPL-SCNC: 24 MMOL/L (ref 20–29)
CREAT SERPL-MCNC: 1.56 MG/DL (ref 0.76–1.27)
EGFRCR SERPLBLD CKD-EPI 2021: 42 ML/MIN/1.73
EOSINOPHIL # BLD AUTO: 0.3 X10E3/UL (ref 0–0.4)
EOSINOPHIL NFR BLD AUTO: 3 %
ERYTHROCYTE [DISTWIDTH] IN BLOOD BY AUTOMATED COUNT: 13 % (ref 11.6–15.4)
GLOBULIN SER CALC-MCNC: 2.5 G/DL (ref 1.5–4.5)
GLUCOSE SERPL-MCNC: 208 MG/DL (ref 70–99)
HCT VFR BLD AUTO: 41.1 % (ref 37.5–51)
HDLC SERPL-MCNC: 40 MG/DL
HGB BLD-MCNC: 13.3 G/DL (ref 13–17.7)
IMM GRANULOCYTES # BLD AUTO: 0.2 X10E3/UL (ref 0–0.1)
IMM GRANULOCYTES NFR BLD AUTO: 2 %
LDLC SERPL CALC-MCNC: 63 MG/DL (ref 0–99)
LYMPHOCYTES # BLD AUTO: 1.3 X10E3/UL (ref 0.7–3.1)
LYMPHOCYTES NFR BLD AUTO: 11 %
MCH RBC QN AUTO: 29.4 PG (ref 26.6–33)
MCHC RBC AUTO-ENTMCNC: 32.4 G/DL (ref 31.5–35.7)
MCV RBC AUTO: 91 FL (ref 79–97)
MONOCYTES # BLD AUTO: 0.7 X10E3/UL (ref 0.1–0.9)
MONOCYTES NFR BLD AUTO: 6 %
NEUTROPHILS # BLD AUTO: 9 X10E3/UL (ref 1.4–7)
NEUTROPHILS NFR BLD AUTO: 77 %
PLATELET # BLD AUTO: 269 X10E3/UL (ref 150–450)
POTASSIUM SERPL-SCNC: 5.6 MMOL/L (ref 3.5–5.2)
PROT SERPL-MCNC: 6.7 G/DL (ref 6–8.5)
RBC # BLD AUTO: 4.53 X10E6/UL (ref 4.14–5.8)
SODIUM SERPL-SCNC: 142 MMOL/L (ref 134–144)
TRIGL SERPL-MCNC: 112 MG/DL (ref 0–149)
VLDLC SERPL CALC-MCNC: 21 MG/DL (ref 5–40)
WBC # BLD AUTO: 11.7 X10E3/UL (ref 3.4–10.8)

## 2023-12-18 ENCOUNTER — OFFICE VISIT (OUTPATIENT)
Dept: FAMILY MEDICINE CLINIC | Facility: CLINIC | Age: 88
End: 2023-12-18
Payer: MEDICARE

## 2023-12-18 VITALS
DIASTOLIC BLOOD PRESSURE: 80 MMHG | TEMPERATURE: 97.5 F | SYSTOLIC BLOOD PRESSURE: 146 MMHG | HEIGHT: 71 IN | HEART RATE: 73 BPM | RESPIRATION RATE: 18 BRPM | BODY MASS INDEX: 24.53 KG/M2 | WEIGHT: 175.2 LBS | OXYGEN SATURATION: 99 %

## 2023-12-18 DIAGNOSIS — I25.10 ATHEROSCLEROSIS OF NATIVE CORONARY ARTERY OF NATIVE HEART WITHOUT ANGINA PECTORIS: ICD-10-CM

## 2023-12-18 DIAGNOSIS — J45.909 ASTHMA, UNSPECIFIED ASTHMA SEVERITY, UNSPECIFIED WHETHER COMPLICATED, UNSPECIFIED WHETHER PERSISTENT: ICD-10-CM

## 2023-12-18 DIAGNOSIS — N18.31 STAGE 3A CHRONIC KIDNEY DISEASE: Primary | ICD-10-CM

## 2023-12-18 DIAGNOSIS — D51.0 PERNICIOUS ANEMIA: ICD-10-CM

## 2023-12-18 DIAGNOSIS — N18.31 TYPE 2 DIABETES MELLITUS WITH STAGE 3A CHRONIC KIDNEY DISEASE, WITHOUT LONG-TERM CURRENT USE OF INSULIN: ICD-10-CM

## 2023-12-18 DIAGNOSIS — E11.9 TYPE 2 DIABETES MELLITUS WITHOUT COMPLICATION, WITHOUT LONG-TERM CURRENT USE OF INSULIN: ICD-10-CM

## 2023-12-18 DIAGNOSIS — I10 HYPERTENSION, BENIGN: ICD-10-CM

## 2023-12-18 DIAGNOSIS — E87.5 HYPERKALEMIA: ICD-10-CM

## 2023-12-18 DIAGNOSIS — E11.22 TYPE 2 DIABETES MELLITUS WITH STAGE 3A CHRONIC KIDNEY DISEASE, WITHOUT LONG-TERM CURRENT USE OF INSULIN: ICD-10-CM

## 2023-12-18 DIAGNOSIS — B35.6 TINEA CRURIS: ICD-10-CM

## 2023-12-18 DIAGNOSIS — E78.2 MIXED HYPERLIPIDEMIA: ICD-10-CM

## 2023-12-18 DIAGNOSIS — D72.829 LEUKOCYTOSIS, UNSPECIFIED TYPE: ICD-10-CM

## 2023-12-18 DIAGNOSIS — F32.A DEPRESSION, UNSPECIFIED DEPRESSION TYPE: ICD-10-CM

## 2023-12-18 DIAGNOSIS — N18.30 CHRONIC KIDNEY DISEASE, STAGE III (MODERATE): ICD-10-CM

## 2023-12-18 DIAGNOSIS — N40.0 PROSTATIC HYPERTROPHY: ICD-10-CM

## 2023-12-18 RX ORDER — FINASTERIDE 5 MG/1
5 TABLET, FILM COATED ORAL DAILY
Qty: 90 TABLET | Refills: 4 | Status: SHIPPED | OUTPATIENT
Start: 2023-12-18

## 2023-12-18 RX ORDER — MONTELUKAST SODIUM 10 MG/1
10 TABLET ORAL NIGHTLY
Qty: 90 TABLET | Refills: 4 | Status: SHIPPED | OUTPATIENT
Start: 2023-12-18

## 2023-12-18 RX ORDER — ENALAPRIL MALEATE 2.5 MG/1
TABLET ORAL
Qty: 45 TABLET | Refills: 4 | Status: SHIPPED | OUTPATIENT
Start: 2023-12-18

## 2023-12-18 RX ORDER — ATORVASTATIN CALCIUM 80 MG/1
80 TABLET, FILM COATED ORAL
Qty: 90 TABLET | Refills: 4 | Status: SHIPPED | OUTPATIENT
Start: 2023-12-18

## 2023-12-18 RX ORDER — GLIPIZIDE 5 MG/1
5 TABLET, FILM COATED, EXTENDED RELEASE ORAL DAILY
Qty: 90 TABLET | Refills: 4 | Status: SHIPPED | OUTPATIENT
Start: 2023-12-18

## 2023-12-18 RX ORDER — CLOTRIMAZOLE AND BETAMETHASONE DIPROPIONATE 10; .64 MG/G; MG/G
1 CREAM TOPICAL 2 TIMES DAILY
Qty: 60 G | Refills: 1 | Status: SHIPPED | OUTPATIENT
Start: 2023-12-18

## 2023-12-18 RX ORDER — METOPROLOL SUCCINATE 25 MG/1
25 TABLET, EXTENDED RELEASE ORAL DAILY
Qty: 90 TABLET | Refills: 4 | Status: SHIPPED | OUTPATIENT
Start: 2023-12-18

## 2023-12-18 NOTE — ASSESSMENT & PLAN NOTE
Lipid and CMP done 11-, read by me, reviewed with pt.  Trig. 112 down from 143, Tot. Chol. 124 up from 114, HDL 40 same as last, LDL 63 up from 49  Doing well  Encouraged to watch fatty intake, exercise more, and lose weight.   Compliant with Lipitor  Is getting adequate diet and exercise  Goals developed at last visit were met   Follow up in 3  months  Care management needs are self-addressed.  Self-management abilities addressed and patient is capable of managing his own disease.

## 2023-12-18 NOTE — ASSESSMENT & PLAN NOTE
New dx.  Asymptomatic. CBC done 11-, read by me, reviewed with pt. WBC was 11.7 up from 10.  Will repeat with next labs.

## 2023-12-18 NOTE — ASSESSMENT & PLAN NOTE
Improved. CMP done 11-, read by me, reviewed with pt.  Potassium was 5.6 down from 6.1.  Advised to decrease Enalapril to 1/2 tablet of 2.5 mg daily.  May need to add HCTZ in the future.

## 2023-12-18 NOTE — ASSESSMENT & PLAN NOTE
Resolved x 1.  CBC done 11-, read by me, reviewed with pt.  CBC  HGB was normal x 1 HCT normal x 3,.  Will repeat CBC with next labs.

## 2023-12-18 NOTE — ASSESSMENT & PLAN NOTE
Borderline poor control, but advised to decrease Enalapril to 1/2 tablet of 2.5 mg daily due to Hyperkalemia, may need to add HCTZ in the future.  Patient tolerated Enalapril and Metoprolol well without side effects. I feel the benefits of the medication outweigh the risks.

## 2023-12-18 NOTE — ASSESSMENT & PLAN NOTE
Worse.  CMP done 11-, read by me, reviewed with pt.  Creatinine was 1.56 up from 1.38, EGFR was 42 down from 49.  Advised to decrease Enalapril to 1/2 tablet of 2.5 mg daily.  Advised to avoid anti-inflammitories

## 2023-12-18 NOTE — PROGRESS NOTES
Subjective   Mikael Shanks is a 88 y.o. male.     Hypertension  This is a chronic problem. The current episode started more than 1 year ago. The problem is unchanged. The problem is controlled. Pertinent negatives include no chest pain, palpitations or shortness of breath.   Hyperlipidemia  This is a chronic problem. The current episode started more than 1 year ago. The problem is controlled. Pertinent negatives include no chest pain, myalgias or shortness of breath.   Anemia  Presents for follow-up visit. There has been no palpitations or weight loss. Compliance with medications is %.        The following portions of the patient's history were reviewed and updated as appropriate: allergies, current medications, past family history, past medical history, past social history, past surgical history, and problem list.    Family History   Problem Relation Age of Onset    Cancer Mother     Heart disease Father     Cancer Brother        Social History     Tobacco Use    Smoking status: Former     Packs/day: 5.00     Years: 7.00     Additional pack years: 0.00     Total pack years: 35.00     Types: Cigarettes     Start date: 1953     Quit date: 1960     Years since quittin.3     Passive exposure: Past    Smokeless tobacco: Never   Vaping Use    Vaping Use: Never used   Substance Use Topics    Alcohol use: Not Currently     Comment: very rare    Drug use: No       Past Surgical History:   Procedure Laterality Date    BACK SURGERY      CARDIAC CATHETERIZATION N/A 2019    Procedure: Left Heart Cath and coronary angiogram;  Surgeon: Dayday Ruiz MD;  Location: [facility] CATH INVASIVE LOCATION;  Service: Cardiovascular    CARDIAC CATHETERIZATION N/A 2019    Procedure: Right and Left Heart Cath;  Surgeon: Dayday Ruiz MD;  Location: [facility] CATH INVASIVE LOCATION;  Service: Cardiovascular    CARDIAC CATHETERIZATION N/A 2020    Procedure: Left and Right Heart Cath with Coronary  Angiography;  Surgeon: Dayday Ruiz MD;  Location: [facility] CATH INVASIVE LOCATION;  Service: Cardiovascular;  Laterality: N/A;    CAROTID ENDARTERECTOMY Left     CHOLECYSTECTOMY      COLONOSCOPY  08/14/2018    No repeat    CORONARY ANGIOPLASTY WITH STENT PLACEMENT      LUMBAR LAMINECTOMY Bilateral 1/31/2022    Procedure: LUMBAR LAMINECTOMY WITH/WITHOUT FUSION L4-L5;  Surgeon: Geronimo Gonzales MD;  Location: [facility] MAIN OR;  Service: Neurosurgery;  Laterality: Bilateral;       Patient Active Problem List   Diagnosis    Type 2 diabetes mellitus without complication, without long-term current use of insulin    PAC (premature atrial contraction)    Gastritis and gastroduodenitis    Atherosclerosis of native coronary artery of native heart without angina pectoris    Stage 3a chronic kidney disease    Hypertension, benign    Spinal stenosis of lumbar region    Asymptomatic peripheral vascular disease    Pernicious anemia    Depression    Pulmonary valve disorder    Hearing loss    Family history of colon cancer    BILL (obstructive sleep apnea)    Stenosis of coronary artery stent    Mixed hyperlipidemia    Anxiety disorder, unspecified    Grief    Ischemic cardiomyopathy    Neurogenic claudication    Proteinuria    Encounter for general adult medical examination with abnormal findings    Parathyroid abnormality    Bilateral carotid artery stenosis    Left hip pain    Benign prostatic hyperplasia    Mitral valve insufficiency    Hyperkalemia    Asthma    Cough    Immobility syndrome    Seasonal allergies    Immunization counseling    Nocturia    Chronic non-infective otitis externa of both ears    Dermatitis    Leukocytosis    Tinea cruris       Current Outpatient Medications on File Prior to Visit   Medication Sig Dispense Refill    albuterol sulfate  (90 Base) MCG/ACT inhaler Inhale 2 puffs Every 4 (Four) Hours As Needed for Wheezing. 18 g 5    Blood Glucose Monitoring Suppl (ONE TOUCH ULTRA 2) w/Device kit 1  "Device Daily. Test 1 x daily 1 each 0    cholecalciferol (VITAMIN D3) 10 MCG (400 UNIT) tablet Take 1 tablet by mouth Daily. Hold DOS      glucose blood (ONE TOUCH ULTRA TEST) test strip Test 1 x daily 100 each 5    multivitamin with minerals tablet tablet Take 1 tablet by mouth Daily. LD 1/24      omeprazole (priLOSEC) 40 MG capsule Take 1 capsule by mouth Daily. 90 capsule 4    OneTouch Delica Lancets 33G misc 1 strip Daily. Test 1 x daily 100 each 5     No current facility-administered medications on file prior to visit.       Allergies   Allergen Reactions    Penicillins Hives       Review of Systems   Constitutional:  Negative for fatigue, unexpected weight gain and unexpected weight loss.   HENT:  Negative for nosebleeds.    Respiratory:  Negative for shortness of breath.    Cardiovascular:  Negative for chest pain, palpitations and leg swelling.   Gastrointestinal:  Negative for blood in stool, nausea and indigestion.   Genitourinary:  Negative for hematuria.   Musculoskeletal:  Negative for myalgias.   Skin:  Negative for dry skin.   Neurological:  Negative for headache.       Objective   Visit Vitals  /80 (BP Location: Left arm, Patient Position: Standing, Cuff Size: Adult)   Pulse 73   Temp 97.5 °F (36.4 °C) (Temporal)   Resp 18   Ht 179.1 cm (70.5\")   Wt 79.5 kg (175 lb 3.2 oz)   SpO2 99%   BMI 24.78 kg/m²     Physical Exam  Vitals and nursing note reviewed.   Constitutional:       Appearance: He is well-developed.   HENT:      Head: Normocephalic.   Neck:      Thyroid: No thyromegaly.      Vascular: No carotid bruit.      Trachea: Trachea normal.   Cardiovascular:      Rate and Rhythm: Normal rate and regular rhythm.      Heart sounds: No murmur heard.     No friction rub. No gallop.   Pulmonary:      Effort: Pulmonary effort is normal. No respiratory distress.      Breath sounds: Normal breath sounds. No wheezing.   Chest:      Chest wall: No tenderness.   Musculoskeletal:      Cervical back: Neck " supple.   Skin:     General: Skin is dry.      Findings: No rash.      Nails: There is no clubbing.   Neurological:      Mental Status: He is alert and oriented to person, place, and time.   Psychiatric:         Behavior: Behavior is cooperative.           Assessment & Plan .  Problem List Items Addressed This Visit          High    Atherosclerosis of native coronary artery of native heart without angina pectoris    Overview     Dr. Ruiz done 9/4/2020 of 60% 20% blockage of the right coronary artery stent         Current Assessment & Plan     Keep risk factors low         Relevant Medications    metoprolol succinate XL (TOPROL-XL) 25 MG 24 hr tablet       Medium    Hypertension, benign    Current Assessment & Plan     Borderline poor control, but advised to decrease Enalapril to 1/2 tablet of 2.5 mg daily due to Hyperkalemia, may need to add HCTZ in the future.  Patient tolerated Enalapril and Metoprolol well without side effects. I feel the benefits of the medication outweigh the risks.          Relevant Medications    enalapril (VASOTEC) 2.5 MG tablet    metoprolol succinate XL (TOPROL-XL) 25 MG 24 hr tablet    Mixed hyperlipidemia    Current Assessment & Plan     Lipid and CMP done 11-, read by me, reviewed with pt.  Trig. 112 down from 143, Tot. Chol. 124 up from 114, HDL 40 same as last, LDL 63 up from 49  Doing well  Encouraged to watch fatty intake, exercise more, and lose weight.   Compliant with Lipitor  Is getting adequate diet and exercise  Goals developed at last visit were met   Follow up in 3  months  Care management needs are self-addressed.  Self-management abilities addressed and patient is capable of managing his own disease.           Relevant Medications    atorvastatin (LIPITOR) 80 MG tablet    Other Relevant Orders    Lipid Panel With / Chol / HDL Ratio    Comprehensive Metabolic Panel    Pernicious anemia    Current Assessment & Plan     Resolved x 1.  CBC done 11-, read by me,  reviewed with pt.  CBC  HGB was normal x 1 HCT normal x 3,.  Will repeat CBC with next labs.         Stage 3a chronic kidney disease - Primary    Current Assessment & Plan     Worse.  CMP done 11-, read by me, reviewed with pt.  Creatinine was 1.56 up from 1.38, EGFR was 42 down from 49.  Advised to decrease Enalapril to 1/2 tablet of 2.5 mg daily.  Advised to avoid anti-inflammitories         Type 2 diabetes mellitus without complication, without long-term current use of insulin    Relevant Orders    Hemoglobin A1c       Unprioritized    Hyperkalemia    Current Assessment & Plan     Improved. CMP done 11-, read by me, reviewed with pt.  Potassium was 5.6 down from 6.1.  Advised to decrease Enalapril to 1/2 tablet of 2.5 mg daily.  May need to add HCTZ in the future.         Leukocytosis    Overview      Asymptomatic.         Current Assessment & Plan     New dx.  Asymptomatic. CBC done 11-, read by me, reviewed with pt. WBC was 11.7 up from 10.  Will repeat with next labs.         Relevant Orders    CBC & Differential    Tinea cruris    Current Assessment & Plan     New dx.  Posterior neck.  Will treat with Lotrisone.         Relevant Medications    clotrimazole-betamethasone (LOTRISONE) 1-0.05 % cream

## 2024-01-04 DIAGNOSIS — I50.22 CHRONIC SYSTOLIC CONGESTIVE HEART FAILURE: ICD-10-CM

## 2024-01-04 RX ORDER — FUROSEMIDE 20 MG/1
20 TABLET ORAL 2 TIMES DAILY
Qty: 180 TABLET | Refills: 0 | Status: SHIPPED | OUTPATIENT
Start: 2024-01-04

## 2024-03-15 LAB
ALBUMIN SERPL-MCNC: 4.3 G/DL (ref 3.7–4.7)
ALBUMIN/GLOB SERPL: 1.9 {RATIO} (ref 1.2–2.2)
ALP SERPL-CCNC: 95 IU/L (ref 44–121)
ALT SERPL-CCNC: 38 IU/L (ref 0–44)
AST SERPL-CCNC: 29 IU/L (ref 0–40)
BASOPHILS # BLD AUTO: 0.1 X10E3/UL (ref 0–0.2)
BASOPHILS NFR BLD AUTO: 1 %
BILIRUB SERPL-MCNC: 0.5 MG/DL (ref 0–1.2)
BUN SERPL-MCNC: 25 MG/DL (ref 8–27)
BUN/CREAT SERPL: 18 (ref 10–24)
CALCIUM SERPL-MCNC: 9.7 MG/DL (ref 8.6–10.2)
CHLORIDE SERPL-SCNC: 102 MMOL/L (ref 96–106)
CHOLEST SERPL-MCNC: 145 MG/DL (ref 100–199)
CHOLEST/HDLC SERPL: 3.2 RATIO (ref 0–5)
CO2 SERPL-SCNC: 24 MMOL/L (ref 20–29)
CREAT SERPL-MCNC: 1.4 MG/DL (ref 0.76–1.27)
EGFRCR SERPLBLD CKD-EPI 2021: 48 ML/MIN/1.73
EOSINOPHIL # BLD AUTO: 0.4 X10E3/UL (ref 0–0.4)
EOSINOPHIL NFR BLD AUTO: 4 %
ERYTHROCYTE [DISTWIDTH] IN BLOOD BY AUTOMATED COUNT: 12.8 % (ref 11.6–15.4)
GLOBULIN SER CALC-MCNC: 2.3 G/DL (ref 1.5–4.5)
GLUCOSE SERPL-MCNC: 142 MG/DL (ref 70–99)
HBA1C MFR BLD: 6.1 % (ref 4.8–5.6)
HCT VFR BLD AUTO: 43.3 % (ref 37.5–51)
HDLC SERPL-MCNC: 45 MG/DL
HGB BLD-MCNC: 13.7 G/DL (ref 13–17.7)
IMM GRANULOCYTES # BLD AUTO: 0.1 X10E3/UL (ref 0–0.1)
IMM GRANULOCYTES NFR BLD AUTO: 1 %
LDLC SERPL CALC-MCNC: 74 MG/DL (ref 0–99)
LYMPHOCYTES # BLD AUTO: 1.9 X10E3/UL (ref 0.7–3.1)
LYMPHOCYTES NFR BLD AUTO: 18 %
MCH RBC QN AUTO: 28.9 PG (ref 26.6–33)
MCHC RBC AUTO-ENTMCNC: 31.6 G/DL (ref 31.5–35.7)
MCV RBC AUTO: 91 FL (ref 79–97)
MONOCYTES # BLD AUTO: 0.7 X10E3/UL (ref 0.1–0.9)
MONOCYTES NFR BLD AUTO: 7 %
NEUTROPHILS # BLD AUTO: 7 X10E3/UL (ref 1.4–7)
NEUTROPHILS NFR BLD AUTO: 69 %
PLATELET # BLD AUTO: 233 X10E3/UL (ref 150–450)
POTASSIUM SERPL-SCNC: 5.2 MMOL/L (ref 3.5–5.2)
PROT SERPL-MCNC: 6.6 G/DL (ref 6–8.5)
RBC # BLD AUTO: 4.74 X10E6/UL (ref 4.14–5.8)
SODIUM SERPL-SCNC: 141 MMOL/L (ref 134–144)
TRIGL SERPL-MCNC: 147 MG/DL (ref 0–149)
VLDLC SERPL CALC-MCNC: 26 MG/DL (ref 5–40)
WBC # BLD AUTO: 10.2 X10E3/UL (ref 3.4–10.8)

## 2024-03-19 ENCOUNTER — OFFICE VISIT (OUTPATIENT)
Dept: FAMILY MEDICINE CLINIC | Facility: CLINIC | Age: 89
End: 2024-03-19
Payer: MEDICARE

## 2024-03-19 VITALS
BODY MASS INDEX: 24.68 KG/M2 | TEMPERATURE: 97.3 F | HEIGHT: 70 IN | SYSTOLIC BLOOD PRESSURE: 146 MMHG | HEART RATE: 76 BPM | OXYGEN SATURATION: 96 % | DIASTOLIC BLOOD PRESSURE: 70 MMHG | WEIGHT: 172.4 LBS | RESPIRATION RATE: 14 BRPM

## 2024-03-19 DIAGNOSIS — E11.9 TYPE 2 DIABETES MELLITUS WITHOUT COMPLICATION, WITHOUT LONG-TERM CURRENT USE OF INSULIN: ICD-10-CM

## 2024-03-19 DIAGNOSIS — E87.5 HYPERKALEMIA: ICD-10-CM

## 2024-03-19 DIAGNOSIS — I10 HYPERTENSION, BENIGN: Primary | ICD-10-CM

## 2024-03-19 DIAGNOSIS — D51.0 PERNICIOUS ANEMIA: ICD-10-CM

## 2024-03-19 DIAGNOSIS — I25.10 ATHEROSCLEROSIS OF NATIVE CORONARY ARTERY OF NATIVE HEART WITHOUT ANGINA PECTORIS: ICD-10-CM

## 2024-03-19 DIAGNOSIS — D72.829 LEUKOCYTOSIS, UNSPECIFIED TYPE: ICD-10-CM

## 2024-03-19 DIAGNOSIS — N18.31 STAGE 3A CHRONIC KIDNEY DISEASE: ICD-10-CM

## 2024-03-19 DIAGNOSIS — B35.6 TINEA CRURIS: ICD-10-CM

## 2024-03-19 DIAGNOSIS — E78.2 MIXED HYPERLIPIDEMIA: ICD-10-CM

## 2024-03-19 RX ORDER — CLOTRIMAZOLE AND BETAMETHASONE DIPROPIONATE 10; .64 MG/G; MG/G
1 CREAM TOPICAL 2 TIMES DAILY
Qty: 60 G | Refills: 1 | Status: SHIPPED | OUTPATIENT
Start: 2024-03-19

## 2024-03-19 NOTE — ASSESSMENT & PLAN NOTE
Improved, Hgb A1c 6.1 down from 6.4  Encouraged to watch sugar intake, exercise more and lose weight.   compliant with medication. Patient tolerated glipizide well without side effects. I feel the benefits of the medication outweigh the risks.    Not monitoring sugar at home.   Follow up in 4 months  Care management needs are self-addressed.  Self-management abilities addressed and patient is capable of managing his own disease.

## 2024-03-19 NOTE — ASSESSMENT & PLAN NOTE
Borderline good control; home blood pressures reviewed and scanned into chart.  Encouraged to watch salt, exercise more and lose weight.  Patient tolerated enalapril, metoprolol well without side effects. I feel the benefits of the medication outweigh the risks.

## 2024-03-19 NOTE — ASSESSMENT & PLAN NOTE
Improved.   Encouraged to watch fatty intake, exercise more, and lose weight.   compliant with medication   Patient tolerated lipitor well without side effects. I feel the benefits of the medication outweigh the risks.    Is getting adequate diet and exercise  Goals developed at last visit were met   Follow up in 4  months  Care management needs are self-addressed. Self-management abilities addressed and patient is capable of managing his own disease.

## 2024-03-19 NOTE — PROGRESS NOTES
Subjective   Mikael Shanks is a 88 y.o. male.     Hyperlipidemia  This is a chronic problem. The current episode started more than 1 year ago. The problem is controlled. Pertinent negatives include no chest pain, myalgias or shortness of breath. Current antihyperlipidemic treatment includes statins.   Hypertension  This is a chronic problem. The current episode started more than 1 year ago. The problem has been improved since onset. The problem is controlled. Pertinent negatives include no chest pain, palpitations or shortness of breath.   Diabetes  He presents for his follow-up diabetic visit. He has type 2 diabetes mellitus. His disease course has been stable. Pertinent negatives for diabetes include no chest pain, no fatigue, no polyphagia, no polyuria and no weight loss. Symptoms are stable.        The following portions of the patient's history were reviewed and updated as appropriate: allergies, current medications, past family history, past medical history, past social history, past surgical history, and problem list.    Family History   Problem Relation Age of Onset    Cancer Mother     Heart disease Father     Cancer Brother        Social History     Tobacco Use    Smoking status: Former     Current packs/day: 0.00     Average packs/day: 5.0 packs/day for 7.0 years (35.0 ttl pk-yrs)     Types: Cigarettes     Start date: 1953     Quit date: 1960     Years since quittin.5     Passive exposure: Past    Smokeless tobacco: Never   Vaping Use    Vaping status: Never Used   Substance Use Topics    Alcohol use: Not Currently     Comment: very rare    Drug use: No       Past Surgical History:   Procedure Laterality Date    BACK SURGERY      CARDIAC CATHETERIZATION N/A 2019    Procedure: Left Heart Cath and coronary angiogram;  Surgeon: Dayday Ruiz MD;  Location: [facility] INVASIVE LOCATION;  Service: Cardiovascular    CARDIAC CATHETERIZATION N/A 2019    Procedure: Right and Left  Heart Cath;  Surgeon: Dayday Ruiz MD;  Location: [facility] CATH INVASIVE LOCATION;  Service: Cardiovascular    CARDIAC CATHETERIZATION N/A 9/4/2020    Procedure: Left and Right Heart Cath with Coronary Angiography;  Surgeon: Dayday Ruiz MD;  Location: [facility] CATH INVASIVE LOCATION;  Service: Cardiovascular;  Laterality: N/A;    CAROTID ENDARTERECTOMY Left     CHOLECYSTECTOMY      COLONOSCOPY  08/14/2018    No repeat    CORONARY ANGIOPLASTY WITH STENT PLACEMENT      LUMBAR LAMINECTOMY Bilateral 1/31/2022    Procedure: LUMBAR LAMINECTOMY WITH/WITHOUT FUSION L4-L5;  Surgeon: Geronimo Gonzales MD;  Location: [facility] MAIN OR;  Service: Neurosurgery;  Laterality: Bilateral;       Patient Active Problem List   Diagnosis    Type 2 diabetes mellitus without complication, without long-term current use of insulin    PAC (premature atrial contraction)    Gastritis and gastroduodenitis    Atherosclerosis of native coronary artery of native heart without angina pectoris    Stage 3a chronic kidney disease    Hypertension, benign    Spinal stenosis of lumbar region    Asymptomatic peripheral vascular disease    Pernicious anemia    Depression    Pulmonary valve disorder    Hearing loss    Family history of colon cancer    BILL (obstructive sleep apnea)    Stenosis of coronary artery stent    Mixed hyperlipidemia    Anxiety disorder, unspecified    Grief    Ischemic cardiomyopathy    Neurogenic claudication    Proteinuria    Encounter for general adult medical examination with abnormal findings    Parathyroid abnormality    Bilateral carotid artery stenosis    Left hip pain    Benign prostatic hyperplasia    Mitral valve insufficiency    Hyperkalemia    Asthma    Cough    Immobility syndrome    Seasonal allergies    Immunization counseling    Nocturia    Chronic non-infective otitis externa of both ears    Dermatitis    Leukocytosis    Tinea cruris       Current Outpatient Medications on File Prior to Visit   Medication Sig  Dispense Refill    albuterol sulfate  (90 Base) MCG/ACT inhaler Inhale 2 puffs Every 4 (Four) Hours As Needed for Wheezing. 18 g 5    atorvastatin (LIPITOR) 80 MG tablet Take 1 tablet by mouth every night at bedtime. 90 tablet 4    Blood Glucose Monitoring Suppl (ONE TOUCH ULTRA 2) w/Device kit 1 Device Daily. Test 1 x daily 1 each 0    cholecalciferol (VITAMIN D3) 10 MCG (400 UNIT) tablet Take 1 tablet by mouth Daily. Hold DOS      enalapril (VASOTEC) 2.5 MG tablet Take half tablet daily 45 tablet 4    finasteride (PROSCAR) 5 MG tablet Take 1 tablet by mouth Daily. 90 tablet 4    glipizide (glipiZIDE XL) 5 MG ER tablet Take 1 tablet by mouth Daily. 90 tablet 4    glucose blood (ONE TOUCH ULTRA TEST) test strip Test 1 x daily 100 each 5    metoprolol succinate XL (TOPROL-XL) 25 MG 24 hr tablet Take 1 tablet by mouth Daily. 90 tablet 4    montelukast (Singulair) 10 MG tablet Take 1 tablet by mouth Every Night. 90 tablet 4    multivitamin with minerals tablet tablet Take 1 tablet by mouth Daily. LD 1/24      omeprazole (priLOSEC) 40 MG capsule Take 1 capsule by mouth Daily. 90 capsule 4    OneTouch Delica Lancets 33G misc 1 strip Daily. Test 1 x daily 100 each 5    sertraline (ZOLOFT) 50 MG tablet Take 1 tablet by mouth Daily. 90 tablet 0     No current facility-administered medications on file prior to visit.       Allergies   Allergen Reactions    Penicillins Hives       Review of Systems   Constitutional:  Negative for fatigue, unexpected weight gain and unexpected weight loss.   Eyes:  Negative for visual disturbance.   Respiratory:  Negative for shortness of breath.    Cardiovascular:  Negative for chest pain, palpitations and leg swelling.   Gastrointestinal:  Negative for nausea.   Endocrine: Negative for polyphagia and polyuria.   Genitourinary:  Negative for frequency.   Musculoskeletal:  Negative for myalgias.   Skin:  Negative for dry skin and skin lesions.   Neurological:  Negative for syncope,  "numbness and headache.       Objective   Visit Vitals  /70 (BP Location: Left arm, Patient Position: Sitting, Cuff Size: Adult)   Pulse 76   Temp 97.3 °F (36.3 °C)   Resp 14   Ht 177.8 cm (70\")   Wt 78.2 kg (172 lb 6.4 oz)   SpO2 96%   BMI 24.74 kg/m²     Physical Exam  Vitals and nursing note reviewed.   Constitutional:       Appearance: He is well-developed.   HENT:      Head: Normocephalic.   Neck:      Thyroid: No thyromegaly.      Vascular: No carotid bruit.      Trachea: Trachea normal.   Cardiovascular:      Rate and Rhythm: Normal rate and regular rhythm.      Heart sounds: No murmur heard.     No friction rub. No gallop.   Pulmonary:      Effort: Pulmonary effort is normal. No respiratory distress.      Breath sounds: Normal breath sounds. No wheezing.   Chest:      Chest wall: No tenderness.   Musculoskeletal:      Cervical back: Neck supple.   Skin:     General: Skin is dry.      Findings: No rash.      Nails: There is no clubbing.   Neurological:      Mental Status: He is alert and oriented to person, place, and time.   Psychiatric:         Behavior: Behavior is cooperative.           Assessment & Plan .  Problem List Items Addressed This Visit          High    Atherosclerosis of native coronary artery of native heart without angina pectoris    Overview     Dr. Ruiz done 9/4/2020 of 60% 20% blockage of the right coronary artery stent         Current Assessment & Plan     Doing well--keep risk factors low.            Medium    Hypertension, benign - Primary    Current Assessment & Plan     Borderline good control; home blood pressures reviewed and scanned into chart.  Encouraged to watch salt, exercise more and lose weight.  Patient tolerated enalapril, metoprolol well without side effects. I feel the benefits of the medication outweigh the risks.           Mixed hyperlipidemia    Current Assessment & Plan     Improved.   Encouraged to watch fatty intake, exercise more, and lose weight.   compliant " with medication   Patient tolerated lipitor well without side effects. I feel the benefits of the medication outweigh the risks.    Is getting adequate diet and exercise  Goals developed at last visit were met   Follow up in 4  months  Care management needs are self-addressed. Self-management abilities addressed and patient is capable of managing his own disease.           Pernicious anemia    Current Assessment & Plan     Resolved x 2- Will repeat with next labs         Stage 3a chronic kidney disease    Current Assessment & Plan     Improving; Creatinine 1.40 down from 1.56. Avoid anti-inflammatory medications         Type 2 diabetes mellitus without complication, without long-term current use of insulin    Current Assessment & Plan     Improved, Hgb A1c 6.1 down from 6.4  Encouraged to watch sugar intake, exercise more and lose weight.   compliant with medication. Patient tolerated glipizide well without side effects. I feel the benefits of the medication outweigh the risks.    Not monitoring sugar at home.   Follow up in 4 months  Care management needs are self-addressed.  Self-management abilities addressed and patient is capable of managing his own disease.              Unprioritized    Hyperkalemia    Current Assessment & Plan     Resolved x 1 Will repeat with next labs         Leukocytosis    Overview      Asymptomatic.         Current Assessment & Plan     Resolved x 1- Will repeat with next labs         Tinea cruris    Current Assessment & Plan     Patient requested a refill of lotrisone.          Relevant Medications    clotrimazole-betamethasone (LOTRISONE) 1-0.05 % cream

## 2024-03-22 DIAGNOSIS — F32.A DEPRESSION, UNSPECIFIED DEPRESSION TYPE: ICD-10-CM

## 2024-05-26 ENCOUNTER — DOCUMENTATION (OUTPATIENT)
Dept: FAMILY MEDICINE CLINIC | Facility: CLINIC | Age: 89
End: 2024-05-26
Payer: MEDICARE

## 2024-05-26 DIAGNOSIS — R07.81 RIB PAIN: Primary | ICD-10-CM

## 2024-05-26 DIAGNOSIS — R05.1 ACUTE COUGH: ICD-10-CM

## 2024-05-26 RX ORDER — METHOCARBAMOL 750 MG/1
750 TABLET, FILM COATED ORAL 2 TIMES DAILY PRN
Qty: 20 TABLET | Refills: 0 | Status: SHIPPED | OUTPATIENT
Start: 2024-05-26 | End: 2024-05-30

## 2024-05-26 RX ORDER — LIDOCAINE 50 MG/G
1 PATCH TOPICAL EVERY 24 HOURS
Qty: 10 EACH | Refills: 0 | Status: SHIPPED | OUTPATIENT
Start: 2024-05-26

## 2024-05-26 RX ORDER — BENZONATATE 100 MG/1
200 CAPSULE ORAL 2 TIMES DAILY PRN
Qty: 40 CAPSULE | Refills: 0 | Status: SHIPPED | OUTPATIENT
Start: 2024-05-26 | End: 2024-05-30

## 2024-05-29 ENCOUNTER — OFFICE VISIT (OUTPATIENT)
Dept: FAMILY MEDICINE CLINIC | Facility: CLINIC | Age: 89
End: 2024-05-29
Payer: MEDICARE

## 2024-05-29 ENCOUNTER — HOSPITAL ENCOUNTER (OUTPATIENT)
Dept: GENERAL RADIOLOGY | Facility: HOSPITAL | Age: 89
Discharge: HOME | End: 2024-05-29
Admitting: NURSE PRACTITIONER
Payer: MEDICARE

## 2024-05-29 VITALS
BODY MASS INDEX: 22.71 KG/M2 | RESPIRATION RATE: 18 BRPM | DIASTOLIC BLOOD PRESSURE: 48 MMHG | OXYGEN SATURATION: 100 % | SYSTOLIC BLOOD PRESSURE: 142 MMHG | HEART RATE: 65 BPM | HEIGHT: 70 IN | WEIGHT: 158.6 LBS

## 2024-05-29 DIAGNOSIS — E11.9 TYPE 2 DIABETES MELLITUS WITHOUT COMPLICATION, WITHOUT LONG-TERM CURRENT USE OF INSULIN: ICD-10-CM

## 2024-05-29 DIAGNOSIS — S22.42XA CLOSED FRACTURE OF MULTIPLE RIBS OF LEFT SIDE, INITIAL ENCOUNTER: Primary | ICD-10-CM

## 2024-05-29 DIAGNOSIS — R82.998 LEUKOCYTES IN URINE: ICD-10-CM
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
## 2024-05-29 DIAGNOSIS — R35.1 NOCTURIA: ICD-10-CM

## 2024-05-29 DIAGNOSIS — R07.9 CHEST PAIN, UNSPECIFIED TYPE: ICD-10-CM

## 2024-05-29 LAB
BILIRUB BLD-MCNC: NEGATIVE MG/DL
CLARITY, POC: CLEAR
COLOR UR: YELLOW
EXPIRATION DATE: ABNORMAL
GLUCOSE UR STRIP-MCNC: NEGATIVE MG/DL
HBA1C MFR BLD: 6 % (ref 4.5–5.7)
KETONES UR QL: NEGATIVE
LEUKOCYTE EST, POC: ABNORMAL
Lab: ABNORMAL
NITRITE UR-MCNC: NEGATIVE MG/ML
PH UR: 6 [PH] (ref 5–8)
PROT UR STRIP-MCNC: ABNORMAL MG/DL
RBC # UR STRIP: NEGATIVE /UL
SP GR UR: 1.02 (ref 1–1.03)
UROBILINOGEN UR QL: NORMAL

## 2024-05-29 PROCEDURE — 71101 X-RAY EXAM UNILAT RIBS/CHEST: CPT

## 2024-05-29 PROCEDURE — 3044F HG A1C LEVEL LT 7.0%: CPT | Performed by: NURSE PRACTITIONER

## 2024-05-29 PROCEDURE — 81003 URINALYSIS AUTO W/O SCOPE: CPT | Performed by: NURSE PRACTITIONER

## 2024-05-29 PROCEDURE — 1159F MED LIST DOCD IN RCRD: CPT | Performed by: NURSE PRACTITIONER

## 2024-05-29 PROCEDURE — 1126F AMNT PAIN NOTED NONE PRSNT: CPT | Performed by: NURSE PRACTITIONER

## 2024-05-29 PROCEDURE — 99214 OFFICE O/P EST MOD 30 MIN: CPT | Performed by: NURSE PRACTITIONER

## 2024-05-29 PROCEDURE — 83036 HEMOGLOBIN GLYCOSYLATED A1C: CPT | Performed by: NURSE PRACTITIONER

## 2024-05-29 PROCEDURE — 1160F RVW MEDS BY RX/DR IN RCRD: CPT | Performed by: NURSE PRACTITIONER

## 2024-05-29 RX ORDER — MELATONIN
2000 DAILY
COMMUNITY

## 2024-05-29 RX ORDER — TRAMADOL HYDROCHLORIDE 50 MG/1
50 TABLET ORAL EVERY 6 HOURS PRN
Qty: 10 TABLET | Refills: 0 | Status: SHIPPED | OUTPATIENT
Start: 2024-05-29

## 2024-05-29 NOTE — PROGRESS NOTES
I called and spoke to him about xray report showing displaced left sixth & seventh rib fractures and nondisplaced left eighth rib fracture.    He is scheduled to follow up with PCP Dr. Robison tomorrow for recheck.  Discussed rest and splinting.

## 2024-05-29 NOTE — PROGRESS NOTES
"Chief Complaint  Fall (While on cruise ship 5/22/24.  Rib fractures.)    Subjective          Mikael is a 88 y.o. male  who presents to [facility]     Patient Care Team:  Xander Robison MD as PCP - General  Delio Lund MD as Consulting Physician (Nephrology)  Dayday Ruiz MD as Consulting Physician (Cardiology)     History of Present Illness  Mikael is here today for follow up from a fall.    He was on a cruise ship and had a fall on 5/22/2024.  He states he was standing on a stool to get suitcase down and fell backwards and landed on the floor.  He had left sided chest pain.  Medical staff responded to his cabin and he was taken for an evaluation by physician.  He had a chest xray done which showed - \"Left 5th and 6th rib nonsegmental fractures\"  He was given an IM injection of Morphine 5 mg and prescribed Codeine + Paracetamol 8 mg + 500 mg tablets 2 tablets every 6 hours as needed #10.  He was discharged from medical center back to his cabin with instructions to call 911 if he developed shortness of breath and/or difficulty breathing.  The cruise was over and he disembarked the next day 5/23/2024.  His cruise ended in [place] and he came home by car.    Mikael is accompanied by a friend Adán to the office today.   She is concerned due to his pain and complaints of cough, shortness of breath and chest wall pain.  She reports he was called in medications over the weekend: benzonatate 200 mg twice daily as needed, Lidocaine 5% patches and methocarbamol 750 mg twice daily as needed for muscle spasms.    He is out of Codeine + Paracetamol 8 mg + 500 mg tablets    Mikael Shanks  has a past medical history of Carotid artery disease, Coronary artery disease, Diabetes mellitus, GERD (gastroesophageal reflux disease), [place] (hard of hearing), Hyperlipidemia, Hypertension, Osteoarthritis, Prostatic hypertrophy, Pulmonary valve disorder, Sleep apnea, and Stroke.  "     Review of Systems   Constitutional:  Positive for fatigue. Negative for fever.   HENT:  Negative for ear pain and sore throat.    Respiratory:  Positive for cough (productive) and shortness of breath.    Cardiovascular:  Positive for chest pain (left side).   Gastrointestinal:  Negative for abdominal pain, diarrhea, nausea and vomiting.   Genitourinary:  Negative for dysuria.        + nocturia x3   Musculoskeletal:  Positive for arthralgias (left anterior rib).   Skin:  Negative for rash.        Family History   Problem Relation Age of Onset    Cancer Mother     Heart disease Father     Cancer Brother         Past Surgical History:   Procedure Laterality Date    BACK SURGERY      CARDIAC CATHETERIZATION N/A 12/5/2019    Procedure: Left Heart Cath and coronary angiogram;  Surgeon: Dayday Ruiz MD;  Location: [facility] CATH INVASIVE LOCATION;  Service: Cardiovascular    CARDIAC CATHETERIZATION N/A 12/5/2019    Procedure: Right and Left Heart Cath;  Surgeon: Dayday Ruiz MD;  Location: [facility] CATH INVASIVE LOCATION;  Service: Cardiovascular    CARDIAC CATHETERIZATION N/A 9/4/2020    Procedure: Left and Right Heart Cath with Coronary Angiography;  Surgeon: Dayday Ruiz MD;  Location: [facility] CATH INVASIVE LOCATION;  Service: Cardiovascular;  Laterality: N/A;    CAROTID ENDARTERECTOMY Left     CHOLECYSTECTOMY      COLONOSCOPY  08/14/2018    No repeat    CORONARY ANGIOPLASTY WITH STENT PLACEMENT      LUMBAR LAMINECTOMY Bilateral 1/31/2022    Procedure: LUMBAR LAMINECTOMY WITH/WITHOUT FUSION L4-L5;  Surgeon: Geronimo Gonzales MD;  Location: [facility] MAIN OR;  Service: Neurosurgery;  Laterality: Bilateral;        Social History     Socioeconomic History    Marital status:    Tobacco Use    Smoking status: Former     Current packs/day: 0.00     Average packs/day: 5.0 packs/day for 7.0 years (35.0 ttl pk-yrs)     Types: Cigarettes     Start date: 8/31/1953     Quit date: 8/31/1960     Years since quitting:  63.7     Passive exposure: Past    Smokeless tobacco: Never   Vaping Use    Vaping status: Never Used   Substance and Sexual Activity    Alcohol use: Not Currently     Comment: very rare    Drug use: No    Sexual activity: Never        Immunization History   Administered Date(s) Administered    ABRYSVO (RSV, 60+ or pregnant women 32-36 wks) 12/06/2023    COVID-19 (MODERNA) 1st,2nd,3rd Dose Monovalent 01/15/2021, 02/12/2021    COVID-19 (MODERNA) BIVALENT 12+YRS 09/15/2022, 04/21/2023    COVID-19 (MODERNA) Monovalent Original Booster 10/25/2021, 04/04/2022    COVID-19 F23 (MODERNA) 12YRS+ (SPIKEVAX) 09/26/2023, 03/05/2024    FLUAD TRI 65YR+ 09/27/2020, 10/13/2021    Fluzone (or Fluarix & Flulaval for VFC) >6mos 12/06/2019    Fluzone High Dose =>65 Years ([employer] ONLY) 09/15/2022    Fluzone High-Dose 65+yrs 09/18/2023    Pneumococcal Conjugate 13-Valent (PCV13) 09/21/2016    Pneumococcal Conjugate 20-Valent (PCV20) 02/21/2023    Pneumococcal Polysaccharide (PPSV23) 10/13/2021    Shingrix 12/28/2020, 03/01/2021    Td (TDVAX) 05/13/2012       Objective       Current Outpatient Medications:     albuterol sulfate  (90 Base) MCG/ACT inhaler, Inhale 2 puffs Every 4 (Four) Hours As Needed for Wheezing., Disp: 18 g, Rfl: 5    atorvastatin (LIPITOR) 80 MG tablet, Take 1 tablet by mouth every night at bedtime., Disp: 90 tablet, Rfl: 4    benzonatate (Tessalon Perles) 100 MG capsule, Take 2 capsules by mouth 2 (Two) Times a Day As Needed for Cough., Disp: 40 capsule, Rfl: 0    Blood Glucose Monitoring Suppl (ONE TOUCH ULTRA 2) w/Device kit, 1 Device Daily. Test 1 x daily, Disp: 1 each, Rfl: 0    enalapril (VASOTEC) 2.5 MG tablet, Take half tablet daily, Disp: 45 tablet, Rfl: 4    finasteride (PROSCAR) 5 MG tablet, Take 1 tablet by mouth Daily., Disp: 90 tablet, Rfl: 4    glipizide (glipiZIDE XL) 5 MG ER tablet, Take 1 tablet by mouth Daily., Disp: 90 tablet, Rfl: 4    glucose blood (ONE TOUCH ULTRA TEST) test strip, Test 1  "x daily, Disp: 100 each, Rfl: 5    methocarbamol (ROBAXIN) 750 MG tablet, Take 1 tablet by mouth 2 (Two) Times a Day As Needed for Muscle Spasms., Disp: 20 tablet, Rfl: 0    metoprolol succinate XL (TOPROL-XL) 25 MG 24 hr tablet, Take 1 tablet by mouth Daily., Disp: 90 tablet, Rfl: 4    montelukast (Singulair) 10 MG tablet, Take 1 tablet by mouth Every Night., Disp: 90 tablet, Rfl: 4    omeprazole (priLOSEC) 40 MG capsule, Take 1 capsule by mouth Daily., Disp: 90 capsule, Rfl: 4    OneTouch Delica Lancets 33G misc, 1 strip Daily. Test 1 x daily, Disp: 100 each, Rfl: 5    sertraline (ZOLOFT) 50 MG tablet, TAKE 1 TABLET BY MOUTH DAILY, Disp: 90 tablet, Rfl: 3    Cholecalciferol 25 MCG (1000 UT) tablet, Take 2 tablets by mouth Daily., Disp: , Rfl:     clotrimazole-betamethasone (LOTRISONE) 1-0.05 % cream, Apply 1 Application topically to the appropriate area as directed 2 (Two) Times a Day. (Patient not taking: Reported on 5/29/2024), Disp: 60 g, Rfl: 1    lidocaine (LIDODERM) 5 %, Place 1 patch on the skin as directed by provider Daily. Remove & Discard patch within 12 hours or as directed by MD (Patient not taking: Reported on 5/29/2024), Disp: 10 each, Rfl: 0    Multiple Vitamins-Minerals (PRESERVISION AREDS 2 PO), Take 1 Capful by mouth 2 (Two) Times a Day., Disp: , Rfl:     multivitamin with minerals tablet tablet, Take 1 tablet by mouth Daily. LD 1/24, Disp: , Rfl:     traMADol (ULTRAM) 50 MG tablet, Take 1 tablet by mouth Every 6 (Six) Hours As Needed for Moderate Pain., Disp: 10 tablet, Rfl: 0    Vital Signs:      /48 (BP Location: Right arm, Patient Position: Sitting, Cuff Size: Large Adult)   Pulse 65   Resp 18   Ht 177.8 cm (70\")   Wt 71.9 kg (158 lb 9.6 oz)   SpO2 100%   BMI 22.76 kg/m²     Vitals:    05/29/24 1129   BP: 142/48   BP Location: Right arm   Patient Position: Sitting   Cuff Size: Large Adult   Pulse: 65   Resp: 18   SpO2: 100%   Weight: 71.9 kg (158 lb 9.6 oz)   Height: 177.8 cm " "(70\")      Physical Exam  Vitals reviewed. Exam conducted with a chaperone present (accompanied by friend Adán).   Constitutional:       General: He is not in acute distress.     Appearance: Normal appearance. He is not toxic-appearing or diaphoretic.       HENT:      Head: Normocephalic and atraumatic.      Right Ear: Tympanic membrane, ear canal and external ear normal. Decreased hearing noted.      Left Ear: Tympanic membrane, ear canal and external ear normal. Decreased hearing noted.      Mouth/Throat:      Mouth: Mucous membranes are moist.      Pharynx: Oropharynx is clear. No oropharyngeal exudate.   Eyes:      General: No scleral icterus.     Conjunctiva/sclera: Conjunctivae normal.   Cardiovascular:      Rate and Rhythm: Normal rate and regular rhythm.      Heart sounds: Normal heart sounds.   Pulmonary:      Effort: Pulmonary effort is normal. No respiratory distress.      Breath sounds: Normal breath sounds. No wheezing or rhonchi.   Chest:      Chest wall: Tenderness present.   Musculoskeletal:      Cervical back: Neck supple.      Right lower leg: No edema.      Left lower leg: No edema.   Lymphadenopathy:      Cervical: No cervical adenopathy.   Skin:     General: Skin is warm and dry.   Neurological:      Mental Status: He is alert and oriented to person, place, and time.   Psychiatric:         Mood and Affect: Mood normal.         Behavior: Behavior normal.        Result Review :   The following data was reviewed by: RAMONITA Grey on 05/29/2024:  Data reviewed : Clinic note from [facility] on 5/22/2024          Office Visit on 05/29/2024   Component Date Value Ref Range Status    Color 05/29/2024 Yellow  Yellow, Straw, Dark Yellow, Amalia Final    Clarity, UA 05/29/2024 Clear  Clear Final    Glucose, UA 05/29/2024 Negative  Negative mg/dL Final    Bilirubin 05/29/2024 Negative  Negative Final    Ketones, UA 05/29/2024 Negative  Negative Final    Specific Gravity  " 05/29/2024 1.025  1.005 - 1.030 Final    Blood, UA 05/29/2024 Negative  Negative Final    pH, Urine 05/29/2024 6.0  5.0 - 8.0 Final    Protein, POC 05/29/2024 30 mg/dL (A)  Negative mg/dL Final    Urobilinogen, UA 05/29/2024 Normal  Normal, 0.2 E.U./dL Final    Nitrite, UA 05/29/2024 Negative  Negative Final    Leukocytes 05/29/2024 Trace (A)  Negative Final    Hemoglobin A1C 05/29/2024 6.0 (A)  4.5 - 5.7 % Final    Lot Number 05/29/2024 [id number]   Final    Expiration Date 05/29/2024 01/29/2026   Final     PHQ-2 Depression Screening  Little interest or pleasure in doing things? 0-->not at all   Feeling down, depressed, or hopeless? 0-->not at all   PHQ-2 Total Score 0        Assessment and Plan    Diagnoses and all orders for this visit:    1. Closed fracture of multiple ribs of left side, initial encounter (Primary)  Comments:  Left 5th and 6th rib nonsegmental fractures per xray report from cruise ship on 5/22/2024  Orders:  -     XR Ribs Left With PA Chest    2. Chest pain, unspecified type  Comments:  Left sided anterior  Orders:  -     XR Ribs Left With PA Chest  -     traMADol (ULTRAM) 50 MG tablet; Take 1 tablet by mouth Every 6 (Six) Hours As Needed for Moderate Pain.  Dispense: 10 tablet; Refill: 0    3. Fall, initial encounter  -     XR Ribs Left With PA Chest    4. Type 2 diabetes mellitus without complication, without long-term current use of insulin  Overview:  Hgba1c 6% on 5/29/2024    Assessment & Plan:  Chronic stable problem.  Continue glucotrol XL 5 mg daily    Orders:  -     POC Glycosylated Hemoglobin (Hb A1C)  -     Microalbumin / Creatinine Urine Ratio - Urine, Clean Catch    5. Nocturia  Assessment & Plan:  UA with leukocytes and protein.  Urine sent for C&S    Orders:  -     POCT urinalysis dipstick, multipro    6. Leukocytes in urine  -     Urine Culture - Urine, Urine, Random Void         Ordered stat xray of ribs and PA chest  Begin Tramadol as needed for pain.  Discussed Tramadol is a  narcotic and can be addicting and can cause drowsiness.    Urine sent for C&S    Follow up with Dr. Robison tomorrow for recheck or go to ER for worsening shortness of breath or difficulty breathing.     He verbalized understanding.      Follow Up   Return in about 1 day (around 5/30/2024) for follow up tomorrow with Dr. Robison.  Patient was given instructions and counseling regarding his condition or for health maintenance advice. Please see specific information pulled into the AVS if appropriate.    There are no Patient Instructions on file for this visit.

## 2024-05-30 ENCOUNTER — OFFICE VISIT (OUTPATIENT)
Dept: FAMILY MEDICINE CLINIC | Facility: CLINIC | Age: 89
End: 2024-05-30
Payer: MEDICARE

## 2024-05-30 VITALS
DIASTOLIC BLOOD PRESSURE: 68 MMHG | SYSTOLIC BLOOD PRESSURE: 159 MMHG | BODY MASS INDEX: 22.9 KG/M2 | HEART RATE: 84 BPM | WEIGHT: 160 LBS | TEMPERATURE: 97.8 F | OXYGEN SATURATION: 92 % | HEIGHT: 70 IN | RESPIRATION RATE: 16 BRPM

## 2024-05-30 DIAGNOSIS — E78.2 MIXED HYPERLIPIDEMIA: ICD-10-CM

## 2024-05-30 DIAGNOSIS — I10 HYPERTENSION, BENIGN: ICD-10-CM

## 2024-05-30 DIAGNOSIS — R07.81 RIB PAIN ON LEFT SIDE: ICD-10-CM

## 2024-05-30 DIAGNOSIS — I10 HYPERTENSION, BENIGN: Primary | ICD-10-CM

## 2024-05-30 PROCEDURE — 99214 OFFICE O/P EST MOD 30 MIN: CPT | Performed by: FAMILY MEDICINE

## 2024-05-30 PROCEDURE — 1160F RVW MEDS BY RX/DR IN RCRD: CPT | Performed by: FAMILY MEDICINE

## 2024-05-30 PROCEDURE — G2211 COMPLEX E/M VISIT ADD ON: HCPCS | Performed by: FAMILY MEDICINE

## 2024-05-30 PROCEDURE — 1159F MED LIST DOCD IN RCRD: CPT | Performed by: FAMILY MEDICINE

## 2024-05-30 PROCEDURE — 1126F AMNT PAIN NOTED NONE PRSNT: CPT | Performed by: FAMILY MEDICINE

## 2024-05-30 NOTE — PROGRESS NOTES
Subjective   Mikael Shanks is a 88 y.o. male.     History of Present Illness  He is in for follow-up on multiple rib fractures left ribs are tender  Fall  The accident occurred More than 1 week ago.    Patient fell while on a [employer] Cruise on May 22, 2024    The following portions of the patient's history were reviewed and updated as appropriate: allergies, current medications, past family history, past medical history, past social history, past surgical history, and problem list.    Family History   Problem Relation Age of Onset    Cancer Mother     Heart disease Father     Cancer Brother        Social History     Tobacco Use    Smoking status: Former     Current packs/day: 0.00     Average packs/day: 5.0 packs/day for 7.0 years (35.0 ttl pk-yrs)     Types: Cigarettes     Start date: 1953     Quit date: 1960     Years since quittin.7     Passive exposure: Past    Smokeless tobacco: Never   Vaping Use    Vaping status: Never Used   Substance Use Topics    Alcohol use: Not Currently     Comment: very rare    Drug use: No       Past Surgical History:   Procedure Laterality Date    BACK SURGERY      CARDIAC CATHETERIZATION N/A 2019    Procedure: Left Heart Cath and coronary angiogram;  Surgeon: Dayday Ruiz MD;  [facility];  Service: Cardiovascular    CARDIAC CATHETERIZATION N/A 2019    Procedure: Right and Left Heart Cath;  Surgeon: Dayday Ruiz MD;  [facility];  Service: Cardiovascular    CARDIAC CATHETERIZATION N/A 2020    Procedure: Left and Right Heart Cath with Coronary Angiography;  Surgeon: Dayday Ruiz MD;  [facility];  Service: Cardiovascular;  Laterality: N/A;    CAROTID ENDARTERECTOMY Left     CHOLECYSTECTOMY      COLONOSCOPY  2018    No repeat    CORONARY ANGIOPLASTY WITH STENT PLACEMENT      LUMBAR LAMINECTOMY Bilateral 2022    Procedure: LUMBAR LAMINECTOMY  WITH/WITHOUT FUSION L4-L5;  Surgeon: Geronimo Gonzales MD;  Location: [facility] MAIN OR;  Service: Neurosurgery;  Laterality: Bilateral;       Patient Active Problem List   Diagnosis    Type 2 diabetes mellitus without complication, without long-term current use of insulin    PAC (premature atrial contraction)    Gastritis and gastroduodenitis    Atherosclerosis of native coronary artery of native heart without angina pectoris    Stage 3a chronic kidney disease    Hypertension, benign    Spinal stenosis of lumbar region    Asymptomatic peripheral vascular disease    Pernicious anemia    Depression    Pulmonary valve disorder    Hearing loss    Family history of colon cancer    BILL (obstructive sleep apnea)    Stenosis of coronary artery stent    Mixed hyperlipidemia    Anxiety disorder, unspecified    Grief    Ischemic cardiomyopathy    Neurogenic claudication    Proteinuria    Encounter for general adult medical examination with abnormal findings    Parathyroid abnormality    Bilateral carotid artery stenosis    Left hip pain    Benign prostatic hyperplasia    Mitral valve insufficiency    Hyperkalemia    Asthma    Cough    Immobility syndrome    Seasonal allergies    Immunization counseling    Nocturia    Chronic non-infective otitis externa of both ears    Dermatitis    Leukocytosis    Tinea cruris    Rib pain on left side    Closed fracture of multiple ribs of left side with routine healing    Situational stress       Current Outpatient Medications on File Prior to Visit   Medication Sig Dispense Refill    albuterol sulfate  (90 Base) MCG/ACT inhaler Inhale 2 puffs Every 4 (Four) Hours As Needed for Wheezing. 18 g 5    atorvastatin (LIPITOR) 80 MG tablet Take 1 tablet by mouth every night at bedtime. 90 tablet 4    Blood Glucose Monitoring Suppl (ONE TOUCH ULTRA 2) w/Device kit 1 Device Daily. Test 1 x daily 1 each 0    Cholecalciferol 25 MCG (1000 UT) tablet Take 2 tablets by mouth Daily.      enalapril  "(VASOTEC) 2.5 MG tablet Take half tablet daily 45 tablet 4    finasteride (PROSCAR) 5 MG tablet Take 1 tablet by mouth Daily. 90 tablet 4    glipizide (glipiZIDE XL) 5 MG ER tablet Take 1 tablet by mouth Daily. 90 tablet 4    glucose blood (ONE TOUCH ULTRA TEST) test strip Test 1 x daily 100 each 5    lidocaine (LIDODERM) 5 % Place 1 patch on the skin as directed by provider Daily. Remove & Discard patch within 12 hours or as directed by MD 10 each 0    metoprolol succinate XL (TOPROL-XL) 25 MG 24 hr tablet Take 1 tablet by mouth Daily. 90 tablet 4    montelukast (Singulair) 10 MG tablet Take 1 tablet by mouth Every Night. 90 tablet 4    Multiple Vitamins-Minerals (PRESERVISION AREDS 2 PO) Take 1 Capful by mouth 2 (Two) Times a Day.      multivitamin with minerals tablet tablet Take 1 tablet by mouth Daily. LD 1/24      omeprazole (priLOSEC) 40 MG capsule Take 1 capsule by mouth Daily. 90 capsule 4    OneTouch Delica Lancets 33G misc 1 strip Daily. Test 1 x daily 100 each 5    sertraline (ZOLOFT) 50 MG tablet TAKE 1 TABLET BY MOUTH DAILY 90 tablet 3    traMADol (ULTRAM) 50 MG tablet Take 1 tablet by mouth Every 6 (Six) Hours As Needed for Moderate Pain. 10 tablet 0    clotrimazole-betamethasone (LOTRISONE) 1-0.05 % cream Apply 1 Application topically to the appropriate area as directed 2 (Two) Times a Day. (Patient not taking: Reported on 5/30/2024) 60 g 1     No current facility-administered medications on file prior to visit.       Allergies   Allergen Reactions    Penicillins Hives       Review of Systems    Objective   Visit Vitals  /68 (BP Location: Left arm, Patient Position: Sitting, Cuff Size: Adult)   Pulse 84   Temp 97.8 °F (36.6 °C)   Resp 16   Ht 177.8 cm (70\")   Wt 72.6 kg (160 lb)   SpO2 92%   BMI 22.96 kg/m²     Physical Exam  Vitals and nursing note reviewed.   Constitutional:       Appearance: He is well-developed.   HENT:      Head: Normocephalic.   Neck:      Thyroid: No thyromegaly.      " Vascular: No carotid bruit.      Trachea: Trachea normal.   Cardiovascular:      Rate and Rhythm: Normal rate and regular rhythm.      Heart sounds: No murmur heard.     No friction rub. No gallop.   Pulmonary:      Effort: Pulmonary effort is normal. No respiratory distress.      Breath sounds: Normal breath sounds. No wheezing.   Chest:      Chest wall: No tenderness.      Comments: Left ribs are tender  Musculoskeletal:      Cervical back: Neck supple.   Skin:     General: Skin is dry.      Findings: No rash.      Nails: There is no clubbing.   Neurological:      Mental Status: He is alert and oriented to person, place, and time.   Psychiatric:         Behavior: Behavior is cooperative.           Assessment & Plan .  Problem List Items Addressed This Visit          Medium    Hypertension, benign - Primary    Current Assessment & Plan     Worsening probably due to pain.  He tolerates Vasotec and metoprolol well without side effects.  Benefits outweigh the risk encouraged to watch salt, exercise more and lose weight.              Unprioritized    Rib pain on left side    Current Assessment & Plan     New diagnosis- Patient fell while on a [employer] Cruise.  Patient denies coughing up blood. Advised patient to cough and take deep breaths hourly.  Recommended that he wear a rib belt.   Follow up in 1 week.

## 2024-05-30 NOTE — ASSESSMENT & PLAN NOTE
New diagnosis- Patient fell while on a [employer] Cruise.  Patient denies coughing up blood. Advised patient to cough and take deep breaths hourly.  Recommended that he wear a rib belt.   Follow up in 1 week.

## 2024-05-30 NOTE — ASSESSMENT & PLAN NOTE
Worsening probably due to pain.  He tolerates Vasotec and metoprolol well without side effects.  Benefits outweigh the risk encouraged to watch salt, exercise more and lose weight.

## 2024-06-01 PROBLEM — F43.9 SITUATIONAL STRESS: Status: ACTIVE | Noted: 2024-06-01

## 2024-06-01 PROBLEM — S22.42XD CLOSED FRACTURE OF MULTIPLE RIBS OF LEFT SIDE WITH ROUTINE HEALING: Status: ACTIVE | Noted: 2024-06-01

## 2024-06-01 NOTE — ASSESSMENT & PLAN NOTE
He is return here or emergency room if he develops hemoptysis or increased shortness of breath or fever.  He is given tramadol for pain.  I did discuss the risks especially sedation in the elderly but I feel benefits outweigh the risk due to his pain

## 2024-06-02 DIAGNOSIS — N39.0 URINARY TRACT INFECTION WITHOUT HEMATURIA, SITE UNSPECIFIED: Primary | ICD-10-CM

## 2024-06-02 LAB
BACTERIA UR CULT: ABNORMAL
OTHER ANTIBIOTIC SUSC ISLT: ABNORMAL

## 2024-06-02 RX ORDER — CEFUROXIME AXETIL 500 MG/1
500 TABLET ORAL 2 TIMES DAILY
Qty: 14 TABLET | Refills: 0 | Status: SHIPPED | OUTPATIENT
Start: 2024-06-02 | End: 2024-06-09

## 2024-06-03 RX ORDER — ATORVASTATIN CALCIUM 80 MG/1
80 TABLET, FILM COATED ORAL
Qty: 90 TABLET | Refills: 0 | Status: SHIPPED | OUTPATIENT
Start: 2024-06-03

## 2024-06-03 RX ORDER — METOPROLOL SUCCINATE 25 MG/1
25 TABLET, EXTENDED RELEASE ORAL DAILY
Qty: 90 TABLET | Refills: 0 | Status: SHIPPED | OUTPATIENT
Start: 2024-06-03

## 2024-06-03 NOTE — PROGRESS NOTES
Let him know urine culture is positive for infection   I will send in a prescription for the antibiotic Cefuroxime   Follow up with Dr. Robison as scheduled for urine recheck.

## 2024-06-10 ENCOUNTER — OFFICE VISIT (OUTPATIENT)
Dept: FAMILY MEDICINE CLINIC | Facility: CLINIC | Age: 89
End: 2024-06-10
Payer: MEDICARE

## 2024-06-10 VITALS
HEART RATE: 54 BPM | SYSTOLIC BLOOD PRESSURE: 136 MMHG | DIASTOLIC BLOOD PRESSURE: 78 MMHG | BODY MASS INDEX: 22.79 KG/M2 | OXYGEN SATURATION: 99 % | WEIGHT: 159.2 LBS | TEMPERATURE: 97.7 F | HEIGHT: 70 IN | RESPIRATION RATE: 14 BRPM

## 2024-06-10 DIAGNOSIS — J45.909 ASTHMA, UNSPECIFIED ASTHMA SEVERITY, UNSPECIFIED WHETHER COMPLICATED, UNSPECIFIED WHETHER PERSISTENT: ICD-10-CM

## 2024-06-10 DIAGNOSIS — R05.9 COUGH, UNSPECIFIED TYPE: ICD-10-CM

## 2024-06-10 DIAGNOSIS — R07.81 RIB PAIN ON LEFT SIDE: Primary | ICD-10-CM

## 2024-06-10 PROCEDURE — 1126F AMNT PAIN NOTED NONE PRSNT: CPT | Performed by: FAMILY MEDICINE

## 2024-06-10 PROCEDURE — 99214 OFFICE O/P EST MOD 30 MIN: CPT | Performed by: FAMILY MEDICINE

## 2024-06-10 PROCEDURE — 1159F MED LIST DOCD IN RCRD: CPT | Performed by: FAMILY MEDICINE

## 2024-06-10 PROCEDURE — 1160F RVW MEDS BY RX/DR IN RCRD: CPT | Performed by: FAMILY MEDICINE

## 2024-06-10 PROCEDURE — G2211 COMPLEX E/M VISIT ADD ON: HCPCS | Performed by: FAMILY MEDICINE

## 2024-06-10 NOTE — ASSESSMENT & PLAN NOTE
Greatly improving- 3 weeks from fall on the cruise. Continue taking deep breaths and coughing.  Advised to watch for changes, coughing up blood or fever, if this occur contact the office.

## 2024-06-10 NOTE — PROGRESS NOTES
Subjective   Mikael Shanks is a 88 y.o. male.     Rib Injury  This is a recurrent problem. The current episode started 1 to 4 weeks ago. The problem occurs intermittently. The problem has been gradually improving. Pertinent negatives include no coughing, fatigue, fever, myalgias, nausea, rash, sore throat or vomiting. The symptoms are aggravated by coughing.        The following portions of the patient's history were reviewed and updated as appropriate: allergies, current medications, past family history, past medical history, past social history, past surgical history, and problem list.    Family History   Problem Relation Age of Onset    Cancer Mother     Heart disease Father     Cancer Brother        Social History     Tobacco Use    Smoking status: Former     Current packs/day: 0.00     Average packs/day: 5.0 packs/day for 7.0 years (35.0 ttl pk-yrs)     Types: Cigarettes     Start date: 1953     Quit date: 1960     Years since quittin.8     Passive exposure: Past    Smokeless tobacco: Never   Vaping Use    Vaping status: Never Used   Substance Use Topics    Alcohol use: Not Currently     Comment: very rare    Drug use: No       Past Surgical History:   Procedure Laterality Date    BACK SURGERY      CARDIAC CATHETERIZATION N/A 2019    Procedure: Left Heart Cath and coronary angiogram;  Surgeon: Dayday Ruiz MD;  Location: [facility] CATH INVASIVE LOCATION;  Service: Cardiovascular    CARDIAC CATHETERIZATION N/A 2019    Procedure: Right and Left Heart Cath;  Surgeon: Dayday Ruiz MD;  Location: [facility] CATH INVASIVE LOCATION;  Service: Cardiovascular    CARDIAC CATHETERIZATION N/A 2020    Procedure: Left and Right Heart Cath with Coronary Angiography;  Surgeon: Dayday Ruiz MD;  Location: [facility] CATH INVASIVE LOCATION;  Service: Cardiovascular;  Laterality: N/A;    CAROTID ENDARTERECTOMY Left     CHOLECYSTECTOMY      COLONOSCOPY  2018    No repeat    CORONARY  ANGIOPLASTY WITH STENT PLACEMENT      LUMBAR LAMINECTOMY Bilateral 1/31/2022    Procedure: LUMBAR LAMINECTOMY WITH/WITHOUT FUSION L4-L5;  Surgeon: Geronimo Gonzales MD;  Location: [facility] MAIN OR;  Service: Neurosurgery;  Laterality: Bilateral;       Patient Active Problem List   Diagnosis    Type 2 diabetes mellitus without complication, without long-term current use of insulin    PAC (premature atrial contraction)    Gastritis and gastroduodenitis    Atherosclerosis of native coronary artery of native heart without angina pectoris    Stage 3a chronic kidney disease    Hypertension, benign    Spinal stenosis of lumbar region    Asymptomatic peripheral vascular disease    Pernicious anemia    Depression    Pulmonary valve disorder    Hearing loss    Family history of colon cancer    BILL (obstructive sleep apnea)    Stenosis of coronary artery stent    Mixed hyperlipidemia    Anxiety disorder, unspecified    Grief    Ischemic cardiomyopathy    Neurogenic claudication    Proteinuria    Encounter for general adult medical examination with abnormal findings    Parathyroid abnormality    Bilateral carotid artery stenosis    Left hip pain    Benign prostatic hyperplasia    Mitral valve insufficiency    Hyperkalemia    Asthma    Cough    Immobility syndrome    Seasonal allergies    Immunization counseling    Nocturia    Chronic non-infective otitis externa of both ears    Dermatitis    Leukocytosis    Tinea cruris    Rib pain on left side    Closed fracture of multiple ribs of left side with routine healing    Situational stress       Current Outpatient Medications on File Prior to Visit   Medication Sig Dispense Refill    albuterol sulfate  (90 Base) MCG/ACT inhaler Inhale 2 puffs Every 4 (Four) Hours As Needed for Wheezing. 18 g 5    atorvastatin (LIPITOR) 80 MG tablet TAKE 1 TABLET BY MOUTH EVERY  NIGHT AT BEDTIME 90 tablet 0    Blood Glucose Monitoring Suppl (ONE TOUCH ULTRA 2) w/Device kit 1 Device Daily. Test 1  x daily 1 each 0    Cholecalciferol 25 MCG (1000 UT) tablet Take 2 tablets by mouth Daily.      enalapril (VASOTEC) 2.5 MG tablet Take half tablet daily 45 tablet 4    finasteride (PROSCAR) 5 MG tablet Take 1 tablet by mouth Daily. 90 tablet 4    glipizide (glipiZIDE XL) 5 MG ER tablet Take 1 tablet by mouth Daily. 90 tablet 4    glucose blood (ONE TOUCH ULTRA TEST) test strip Test 1 x daily 100 each 5    lidocaine (LIDODERM) 5 % Place 1 patch on the skin as directed by provider Daily. Remove & Discard patch within 12 hours or as directed by MD 10 each 0    metoprolol succinate XL (TOPROL-XL) 25 MG 24 hr tablet TAKE 1 TABLET BY MOUTH DAILY 90 tablet 0    montelukast (Singulair) 10 MG tablet Take 1 tablet by mouth Every Night. 90 tablet 4    Multiple Vitamins-Minerals (PRESERVISION AREDS 2 PO) Take 1 Capful by mouth 2 (Two) Times a Day.      multivitamin with minerals tablet tablet Take 1 tablet by mouth Daily. LD 1/24      omeprazole (priLOSEC) 40 MG capsule Take 1 capsule by mouth Daily. 90 capsule 4    OneTouch Delica Lancets 33G misc 1 strip Daily. Test 1 x daily 100 each 5    sertraline (ZOLOFT) 50 MG tablet TAKE 1 TABLET BY MOUTH DAILY 90 tablet 3    clotrimazole-betamethasone (LOTRISONE) 1-0.05 % cream Apply 1 Application topically to the appropriate area as directed 2 (Two) Times a Day. (Patient not taking: Reported on 5/30/2024) 60 g 1    traMADol (ULTRAM) 50 MG tablet Take 1 tablet by mouth Every 6 (Six) Hours As Needed for Moderate Pain. (Patient not taking: Reported on 6/10/2024) 10 tablet 0     No current facility-administered medications on file prior to visit.       Allergies   Allergen Reactions    Penicillins Hives       Review of Systems   Constitutional:  Negative for fatigue and fever.   HENT:  Negative for ear pain, sore throat and voice change.    Respiratory:  Positive for shortness of breath. Negative for cough and wheezing.    Gastrointestinal:  Negative for diarrhea, nausea and vomiting.  "  Musculoskeletal:  Negative for myalgias.   Skin:  Negative for rash.   Neurological:  Negative for headache.       Objective   Visit Vitals  /78 (BP Location: Left arm, Patient Position: Sitting, Cuff Size: Adult)   Pulse 54   Temp 97.7 °F (36.5 °C)   Resp 14   Ht 177.8 cm (70\")   Wt 72.2 kg (159 lb 3.2 oz)   SpO2 99%   BMI 22.84 kg/m²     Physical Exam  Vitals and nursing note reviewed.   Constitutional:       Appearance: He is well-developed.   HENT:      Head: Normocephalic.   Neck:      Thyroid: No thyromegaly.      Vascular: No carotid bruit.      Trachea: Trachea normal.   Cardiovascular:      Rate and Rhythm: Normal rate and regular rhythm.      Heart sounds: No murmur heard.     No friction rub. No gallop.   Pulmonary:      Effort: Pulmonary effort is normal. No respiratory distress.      Breath sounds: Normal breath sounds. No wheezing.   Chest:      Chest wall: No tenderness.      Comments: Left ribs are tender  Musculoskeletal:      Cervical back: Neck supple.   Skin:     General: Skin is dry.      Findings: No rash.      Nails: There is no clubbing.   Neurological:      Mental Status: He is alert and oriented to person, place, and time.   Psychiatric:         Behavior: Behavior is cooperative.           Assessment & Plan .  Problem List Items Addressed This Visit          Medium    Asthma    Current Assessment & Plan     Doing well at the moment but due to his rib fractures he is high risk for atelectasis and pneumonia.  He is to deep breathing cough every hour.  He tolerates his albuterol and Singulair-benefits outweigh the risk                  Unprioritized    Cough    Current Assessment & Plan     Improved         Rib pain on left side - Primary    Current Assessment & Plan     Greatly improving- 3 weeks from fall on the cruise. Continue taking deep breaths and coughing.  Advised to watch for changes, coughing up blood or fever, if this occur contact the office.                  "

## 2024-06-15 NOTE — ASSESSMENT & PLAN NOTE
Doing well at the moment but due to his rib fractures he is high risk for atelectasis and pneumonia.  He is to deep breathing cough every hour.  He tolerates his albuterol and Singulair-benefits outweigh the risk

## 2024-06-18 DIAGNOSIS — K29.70 GASTRITIS AND GASTRODUODENITIS: ICD-10-CM

## 2024-06-18 DIAGNOSIS — K29.90 GASTRITIS AND GASTRODUODENITIS: ICD-10-CM

## 2024-06-19 RX ORDER — OMEPRAZOLE 40 MG/1
40 CAPSULE, DELAYED RELEASE ORAL DAILY
Qty: 90 CAPSULE | Refills: 0 | Status: SHIPPED | OUTPATIENT
Start: 2024-06-19

## 2024-06-20 ENCOUNTER — OFFICE VISIT (OUTPATIENT)
Dept: CARDIOLOGY | Facility: CLINIC | Age: 89
End: 2024-06-20
Payer: MEDICARE

## 2024-06-20 VITALS
DIASTOLIC BLOOD PRESSURE: 73 MMHG | SYSTOLIC BLOOD PRESSURE: 171 MMHG | BODY MASS INDEX: 22.62 KG/M2 | WEIGHT: 158 LBS | HEIGHT: 70 IN | HEART RATE: 64 BPM | OXYGEN SATURATION: 97 %

## 2024-06-20 DIAGNOSIS — I10 HYPERTENSION, BENIGN: Primary | ICD-10-CM

## 2024-06-20 DIAGNOSIS — E78.2 MIXED HYPERLIPIDEMIA: ICD-10-CM

## 2024-06-20 DIAGNOSIS — I73.9 ASYMPTOMATIC PERIPHERAL VASCULAR DISEASE: ICD-10-CM

## 2024-06-20 DIAGNOSIS — E11.9 TYPE 2 DIABETES MELLITUS WITHOUT COMPLICATION, WITHOUT LONG-TERM CURRENT USE OF INSULIN: ICD-10-CM

## 2024-06-20 DIAGNOSIS — N18.31 STAGE 3A CHRONIC KIDNEY DISEASE: ICD-10-CM

## 2024-06-20 NOTE — PROGRESS NOTES
Encounter Date:06/20/2024    Last seen-11/26/2023      Patient ID: Mikael Shanks is a 88 y.o. male.    Chief Complaint:     Congestive heart failure  Status post stent  Hypertension  Diabetes  Renal dysfunction     History of Present Illness     Since I have last seen, the patient has been without any chest discomfort ,shortness of breath, palpitations, dizziness or syncope.  Denies having any headache ,abdominal pain ,nausea, vomiting , diarrhea constipation, loss of weight or loss of appetite.  Denies having any excessive bruising ,hematuria or blood in the stool.    Review of all systems negative except as indicated.    Reviewed ROS.  Assessment and Plan      ]]]]]]]]]]]]]]]]]]]]]]]  History  ========   -Status post stent 2003 at [facility].     - Ischemic cardiomyopathy with ejection fraction of 25%.-Improved and 60 %     Cardiac catheterization 9/4/2020 revealed  Left ventricular size and contractility is normal with ejection fraction of 60%.  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  Left anterior descending artery has luminal irregularities.  Circumflex coronary artery has luminal irregularities.  Right coronary artery stent is patent with 20% plaquing.      Echocardiogram 8/31/2020  Thickened aortic valve with adequate opening motion.  Left atrial enlargement  Left ventricular size and contractility is normal with ejection fraction of 55 %'     -Status post acute on chronic systolic congestive heart failure-improved significant left ventricle dysfunction with ejection fraction of 25%.     -Status post acute respiratory failure with hypoxia-improved.       -Hypertension diabetes renal dysfunction.  BUN 14 creatinine 1.5.  Dyslipidemia     -History of anemia     -History of premature atrial contractions     -History of severe hypomagnesemia.-Improved     -Allergic to penicillin.   =======  Plan  =======  Status post stent.  Patient is not having  any angina pectoris or congestive heart failure.  EKG showed normal sinus rhythm premature ventricular contractions-11/6/2023.  EKG 6/30/2024-sinus rhythm premature atrial and ventricular contractions.     Ischemic cardiomyopathy-improved.  Latest ejection fraction 55%.-8/31/2020  No need for ICD     Hypertension-178/70  Maintain blood pressure log.  Increase metoprolol to twice daily.Dyslipidemia-continue atorvastatin     Patient is not on ACE inhibitor due to pre-existing renal dysfunction.     Medications were reviewed and updated.     Follow-up in the office in 6 months.     Further plan will depend on patient's progress.     Reviewed and updated-6/20/2024.  ]]]]]]]]]]]]]               Diagnosis Plan   1. Hypertension, benign        2. Mixed hyperlipidemia        3. Type 2 diabetes mellitus without complication, without long-term current use of insulin        4. Stage 3a chronic kidney disease        5. Asymptomatic peripheral vascular disease        LAB RESULTS (LAST 7 DAYS)    CBC        BMP        CMP         BNP        TROPONIN        CoAg        Creatinine Clearance  CrCl cannot be calculated (Patient's most recent lab result is older than the maximum 30 days allowed.).    ABG        Radiology  No radiology results for the last day                The following portions of the patient's history were reviewed and updated as appropriate: allergies, current medications, past family history, past medical history, past social history, past surgical history, and problem list.    ROS      Current Outpatient Medications:     albuterol sulfate  (90 Base) MCG/ACT inhaler, Inhale 2 puffs Every 4 (Four) Hours As Needed for Wheezing., Disp: 18 g, Rfl: 5    atorvastatin (LIPITOR) 80 MG tablet, TAKE 1 TABLET BY MOUTH EVERY  NIGHT AT BEDTIME, Disp: 90 tablet, Rfl: 0    Blood Glucose Monitoring Suppl (ONE TOUCH ULTRA 2) w/Device kit, 1 Device Daily. Test 1 x daily, Disp: 1 each, Rfl: 0    Cholecalciferol 25 MCG (1000  UT) tablet, Take 2 tablets by mouth Daily., Disp: , Rfl:     clotrimazole-betamethasone (LOTRISONE) 1-0.05 % cream, Apply 1 Application topically to the appropriate area as directed 2 (Two) Times a Day. (Patient not taking: Reported on 5/30/2024), Disp: 60 g, Rfl: 1    enalapril (VASOTEC) 2.5 MG tablet, Take half tablet daily, Disp: 45 tablet, Rfl: 4    finasteride (PROSCAR) 5 MG tablet, Take 1 tablet by mouth Daily., Disp: 90 tablet, Rfl: 4    glipizide (glipiZIDE XL) 5 MG ER tablet, Take 1 tablet by mouth Daily., Disp: 90 tablet, Rfl: 4    glucose blood (ONE TOUCH ULTRA TEST) test strip, Test 1 x daily, Disp: 100 each, Rfl: 5    lidocaine (LIDODERM) 5 %, Place 1 patch on the skin as directed by provider Daily. Remove & Discard patch within 12 hours or as directed by MD, Disp: 10 each, Rfl: 0    metoprolol succinate XL (TOPROL-XL) 25 MG 24 hr tablet, TAKE 1 TABLET BY MOUTH DAILY, Disp: 90 tablet, Rfl: 0    montelukast (Singulair) 10 MG tablet, Take 1 tablet by mouth Every Night., Disp: 90 tablet, Rfl: 4    Multiple Vitamins-Minerals (PRESERVISION AREDS 2 PO), Take 1 Capful by mouth 2 (Two) Times a Day., Disp: , Rfl:     multivitamin with minerals tablet tablet, Take 1 tablet by mouth Daily. LD 1/24, Disp: , Rfl:     omeprazole (priLOSEC) 40 MG capsule, TAKE 1 CAPSULE BY MOUTH DAILY, Disp: 90 capsule, Rfl: 0    OneTouch Delica Lancets 33G misc, 1 strip Daily. Test 1 x daily, Disp: 100 each, Rfl: 5    sertraline (ZOLOFT) 50 MG tablet, TAKE 1 TABLET BY MOUTH DAILY, Disp: 90 tablet, Rfl: 3    traMADol (ULTRAM) 50 MG tablet, Take 1 tablet by mouth Every 6 (Six) Hours As Needed for Moderate Pain. (Patient not taking: Reported on 6/10/2024), Disp: 10 tablet, Rfl: 0    Allergies   Allergen Reactions    Penicillins Hives       Family History   Problem Relation Age of Onset    Cancer Mother     Heart disease Father     Cancer Brother        Past Surgical History:   Procedure Laterality Date    BACK SURGERY      CARDIAC  CATHETERIZATION N/A 2019    Procedure: Left Heart Cath and coronary angiogram;  Surgeon: Dayday Ruiz MD;  Location: [facility] CATH INVASIVE LOCATION;  Service: Cardiovascular    CARDIAC CATHETERIZATION N/A 2019    Procedure: Right and Left Heart Cath;  Surgeon: Dayday Ruiz MD;  Location: [facility] CATH INVASIVE LOCATION;  Service: Cardiovascular    CARDIAC CATHETERIZATION N/A 2020    Procedure: Left and Right Heart Cath with Coronary Angiography;  Surgeon: Dayday Ruiz MD;  Location: [facility] CATH INVASIVE LOCATION;  Service: Cardiovascular;  Laterality: N/A;    CAROTID ENDARTERECTOMY Left     CHOLECYSTECTOMY      COLONOSCOPY  2018    No repeat    CORONARY ANGIOPLASTY WITH STENT PLACEMENT      LUMBAR LAMINECTOMY Bilateral 2022    Procedure: LUMBAR LAMINECTOMY WITH/WITHOUT FUSION L4-L5;  Surgeon: Geronimo Gonzales MD;  Location: [facility] MAIN OR;  Service: Neurosurgery;  Laterality: Bilateral;       Past Medical History:   Diagnosis Date    Carotid artery disease     Coronary artery disease     Diabetes mellitus     GERD (gastroesophageal reflux disease)     [place] (hard of hearing)     Hyperlipidemia     Hypertension     Osteoarthritis     Prostatic hypertrophy     Pulmonary valve disorder     Sleep apnea     cpap   doesnt use     Stroke        Family History   Problem Relation Age of Onset    Cancer Mother     Heart disease Father     Cancer Brother        Social History     Socioeconomic History    Marital status:    Tobacco Use    Smoking status: Former     Current packs/day: 0.00     Average packs/day: 5.0 packs/day for 7.0 years (35.0 ttl pk-yrs)     Types: Cigarettes     Start date: 1953     Quit date: 1960     Years since quittin.8     Passive exposure: Past    Smokeless tobacco: Never   Vaping Use    Vaping status: Never Used   Substance and Sexual Activity    Alcohol use: Not Currently     Comment: very rare    Drug use: No    Sexual activity: Never            ECG 12 Lead    Date/Time: 6/20/2024 11:49 AM  Performed by: Dayday Ruiz MD    Authorized by: Dayday Ruiz MD  Comparison: compared with previous ECG   Similar to previous ECG  Comparison to previous ECG: Sinus rhythm baseline artifact premature atrial and ventricular contractions normal axis nonspecific ST changes no significant change from previous EKG.        Objective:       Physical Exam    There were no vitals taken for this visit.  The patient is alert, oriented and in no distress.    Vital signs as noted above.    Head and neck revealed no carotid bruits or jugular venous distension.  No thyromegaly or lymphadenopathy is present.    Lungs clear.  No wheezing.  Breath sounds are normal bilaterally.    Heart normal first and second heart sounds.  Grade 2/6 systolic murmur..  No pericardial rub is present.  No gallop is present.    Abdomen soft and nontender.  No organomegaly is present.    Extremities revealed good peripheral pulses without any pedal edema.    Skin warm and dry.    Musculoskeletal system is grossly normal.    CNS grossly normal.    Reviewed and updated.

## 2024-06-25 DIAGNOSIS — I10 HYPERTENSION, BENIGN: ICD-10-CM

## 2024-06-25 DIAGNOSIS — J45.909 ASTHMA, UNSPECIFIED ASTHMA SEVERITY, UNSPECIFIED WHETHER COMPLICATED, UNSPECIFIED WHETHER PERSISTENT: ICD-10-CM

## 2024-06-25 DIAGNOSIS — B35.6 TINEA CRURIS: ICD-10-CM

## 2024-06-25 RX ORDER — CLOTRIMAZOLE AND BETAMETHASONE DIPROPIONATE 10; .64 MG/G; MG/G
1 CREAM TOPICAL 2 TIMES DAILY
Qty: 60 G | Refills: 1 | Status: SHIPPED | OUTPATIENT
Start: 2024-06-25

## 2024-06-25 RX ORDER — ENALAPRIL MALEATE 2.5 MG/1
TABLET ORAL
Qty: 90 TABLET | Refills: 3 | Status: SHIPPED | OUTPATIENT
Start: 2024-06-25

## 2024-06-25 RX ORDER — METOPROLOL SUCCINATE 25 MG/1
50 TABLET, EXTENDED RELEASE ORAL DAILY
Qty: 90 TABLET | Refills: 3 | Status: SHIPPED | OUTPATIENT
Start: 2024-06-25

## 2024-06-25 RX ORDER — ALBUTEROL SULFATE 90 UG/1
2 AEROSOL, METERED RESPIRATORY (INHALATION) EVERY 4 HOURS PRN
Qty: 18 G | Refills: 5 | Status: SHIPPED | OUTPATIENT
Start: 2024-06-25

## 2024-07-24 ENCOUNTER — OFFICE VISIT (OUTPATIENT)
Dept: FAMILY MEDICINE CLINIC | Facility: CLINIC | Age: 89
End: 2024-07-24
Payer: MEDICARE

## 2024-07-24 VITALS
OXYGEN SATURATION: 97 % | RESPIRATION RATE: 18 BRPM | BODY MASS INDEX: 31.41 KG/M2 | TEMPERATURE: 97.5 F | HEIGHT: 60 IN | SYSTOLIC BLOOD PRESSURE: 138 MMHG | DIASTOLIC BLOOD PRESSURE: 80 MMHG | WEIGHT: 160 LBS | HEART RATE: 64 BPM

## 2024-07-24 DIAGNOSIS — E11.9 TYPE 2 DIABETES MELLITUS WITHOUT COMPLICATION, WITHOUT LONG-TERM CURRENT USE OF INSULIN: ICD-10-CM

## 2024-07-24 DIAGNOSIS — J45.909 ASTHMA, UNSPECIFIED ASTHMA SEVERITY, UNSPECIFIED WHETHER COMPLICATED, UNSPECIFIED WHETHER PERSISTENT: ICD-10-CM

## 2024-07-24 DIAGNOSIS — R35.1 NOCTURIA: ICD-10-CM

## 2024-07-24 DIAGNOSIS — Z00.00 MEDICARE ANNUAL WELLNESS VISIT, SUBSEQUENT: Primary | ICD-10-CM

## 2024-07-24 DIAGNOSIS — E21.5 PARATHYROID ABNORMALITY: ICD-10-CM

## 2024-07-24 DIAGNOSIS — F32.A DEPRESSION, UNSPECIFIED DEPRESSION TYPE: ICD-10-CM

## 2024-07-24 DIAGNOSIS — I73.9 ASYMPTOMATIC PERIPHERAL VASCULAR DISEASE: Primary | ICD-10-CM

## 2024-07-24 DIAGNOSIS — Z71.89 ADVANCED CARE PLANNING/COUNSELING DISCUSSION: ICD-10-CM

## 2024-07-24 DIAGNOSIS — Z71.85 IMMUNIZATION COUNSELING: ICD-10-CM

## 2024-07-24 DIAGNOSIS — E78.2 MIXED HYPERLIPIDEMIA: ICD-10-CM

## 2024-07-24 DIAGNOSIS — I34.0 MITRAL VALVE INSUFFICIENCY, UNSPECIFIED ETIOLOGY: ICD-10-CM

## 2024-07-24 DIAGNOSIS — I10 HYPERTENSION, BENIGN: ICD-10-CM

## 2024-07-24 DIAGNOSIS — Z13.31 DEPRESSION SCREENING: ICD-10-CM

## 2024-07-24 DIAGNOSIS — R53.83 LETHARGY: ICD-10-CM

## 2024-07-24 DIAGNOSIS — I37.9 PULMONARY VALVE DISORDER: ICD-10-CM

## 2024-07-24 DIAGNOSIS — I51.7 LEFT ATRIAL ENLARGEMENT: ICD-10-CM

## 2024-07-24 DIAGNOSIS — I65.23 BILATERAL CAROTID ARTERY STENOSIS: ICD-10-CM

## 2024-07-24 DIAGNOSIS — D51.0 PERNICIOUS ANEMIA: ICD-10-CM

## 2024-07-24 PROCEDURE — 1126F AMNT PAIN NOTED NONE PRSNT: CPT | Performed by: FAMILY MEDICINE

## 2024-07-24 PROCEDURE — 99214 OFFICE O/P EST MOD 30 MIN: CPT | Performed by: FAMILY MEDICINE

## 2024-07-24 RX ORDER — METOPROLOL SUCCINATE 50 MG/1
50 TABLET, EXTENDED RELEASE ORAL DAILY
Qty: 90 TABLET | Refills: 3 | Status: SHIPPED | OUTPATIENT
Start: 2024-07-24

## 2024-07-24 NOTE — ASSESSMENT & PLAN NOTE
Improved, situational due to step-daughters.  He tolerated Zoloft well without side effects.  Benefits outweigh the risk

## 2024-07-24 NOTE — ASSESSMENT & PLAN NOTE
Doing well. Switch to Metoprolol succinate 50 mg daily from 25 mg twice daily.  Encouraged to watch salt, exercise more and lose weight.

## 2024-07-24 NOTE — PROGRESS NOTES
Subjective   Mikael Shanks is a 89 y.o. male.   Chief Complaint   Patient presents with    Asthma    Depression    Heart Problem    Hypertension    Carotid Artery Disease    Peripheral Vascular Disease     I have identified multiple medical problems which are significant and separately identifiable that require added work above and beyond the wellness visit. These are not trivial or insignificant and are billed with a 25-modifier    History of Present Illness  Follow up PVD.    Follow up carotid artery stenosis.  Asthma  He complains of shortness of breath (asthma). This is a chronic problem. The current episode started more than 1 year ago. The problem occurs intermittently. The problem has been unchanged. Pertinent negatives include no chest pain. His past medical history is significant for asthma.   Depression  Presents for follow-up visit. Symptoms include shortness of breath (asthma). Patient is not experiencing: palpitations and chest pain.Symptoms occur occasionally.  The severity of symptoms is mild.  The quality of sleep is good. His past medical history is significant for asthma and depression. The treatment provides significant relief. Compliance with medications is %.   Heart Problem  This is a chronic (Pilmonary valve disease, Mitral valve) problem. The current episode started more than 1 year ago. The problem occurs constantly. The problem has been unchanged. Pertinent negatives include no chest pain, fatigue or joint swelling.   Hypertension  This is a chronic problem. The current episode started more than 1 year ago. The problem is unchanged. The problem is controlled. Associated symptoms include shortness of breath (asthma). Pertinent negatives include no chest pain or palpitations.                  Review of Systems   Constitutional:  Negative for fatigue.   HENT:  Positive for hearing loss (hearing aids).    Respiratory:  Positive for shortness of breath (asthma).    Cardiovascular:   Negative for chest pain and palpitations.   Genitourinary:  Positive for nocturia (3 x nightly). Negative for frequency.        Nocturia   Musculoskeletal:  Negative for joint swelling.   Neurological:  Negative for memory problem.       Objective     Physical Exam  Vitals and nursing note reviewed.   Constitutional:       Appearance: He is well-developed.   HENT:      Head: Normocephalic.   Neck:      Thyroid: No thyromegaly.      Vascular: No carotid bruit.      Trachea: Trachea normal.   Cardiovascular:      Rate and Rhythm: Normal rate and regular rhythm.      Heart sounds: No murmur heard.     No friction rub. No gallop.   Pulmonary:      Effort: Pulmonary effort is normal. No respiratory distress.      Breath sounds: Normal breath sounds. No wheezing.   Chest:      Chest wall: No tenderness.   Musculoskeletal:      Cervical back: Neck supple.   Skin:     General: Skin is dry.      Findings: No rash.      Nails: There is no clubbing.   Neurological:      Mental Status: He is alert and oriented to person, place, and time.   Psychiatric:         Behavior: Behavior is cooperative.         Assessment & Plan   Problem List Items Addressed This Visit          High    Asymptomatic peripheral vascular disease - Primary    Overview     Arterial U/S done 1-3-22         Current Assessment & Plan     Asymptomatic, pt.declines work up and will follow conservatively         Parathyroid abnormality    Current Assessment & Plan     Will order labs today and patient will return for results and shared decision making.           Relevant Orders    PTH, Intact & Calcium (Completed)       Medium    Asthma    Current Assessment & Plan     Stable, discussed PFT's, pt. Declines.  Advised to avoid dust and smoke exposure               Depression    Current Assessment & Plan     Improved, situational due to step-daughters.  He tolerated Zoloft well without side effects.  Benefits outweigh the risk         Hypertension, benign    Current  Assessment & Plan     Doing well. Switch to Metoprolol succinate 50 mg daily from 25 mg twice daily.  Encouraged to watch salt, exercise more and lose weight.           Relevant Medications    metoprolol succinate XL (TOPROL-XL) 50 MG 24 hr tablet    Mixed hyperlipidemia    Current Assessment & Plan      Will order labs today and patient will return for results and shared decision making.         Relevant Orders    Lipid Panel With / Chol / HDL Ratio (Completed)    Comprehensive Metabolic Panel (Completed)    Pernicious anemia    Current Assessment & Plan     Will order labs today and patient will return for results and shared decision making.         Relevant Orders    CBC & Differential (Completed)    Vitamin B12 (Completed)    Methylmalonic Acid, Serum    Type 2 diabetes mellitus without complication, without long-term current use of insulin    Overview     Hgba1c 6% on 5/29/2024         Current Assessment & Plan     Will order labs today and patient will return for results and shared decision making.         Relevant Orders    Hemoglobin A1c (Completed)    Microalbumin / Creatinine Urine Ratio - Urine, Clean Catch       Low    Bilateral carotid artery stenosis    Overview     Carotid U/S done 1-3-2022         Current Assessment & Plan     Stable, pt. Declines work up.            Unprioritized    Left atrial enlargement    Overview     Found on ECHO done 8-         Current Assessment & Plan     New dx.  Follow conservatively--he declines echocardiogram         Relevant Medications    metoprolol succinate XL (TOPROL-XL) 50 MG 24 hr tablet    Lethargy    Current Assessment & Plan     Will order labs today and patient will return for results and shared decision making.         Relevant Orders    TSH (Completed)    RESOLVED: Mitral valve insufficiency    Current Assessment & Plan     Resolved on ECHO done 8-, thus delete         Relevant Medications    metoprolol succinate XL (TOPROL-XL) 50 MG 24 hr  tablet    Nocturia    Current Assessment & Plan     Will order labs today and patient will return for results and shared decision making.         Relevant Orders    Urinalysis without microscopic (no culture) - Urine, Clean Catch    Pulmonary valve disorder    Overview     ECHO 8-         Current Assessment & Plan     Not well visualized on ECHO done 8-.  He declines repeating         Relevant Medications    metoprolol succinate XL (TOPROL-XL) 50 MG 24 hr tablet

## 2024-07-24 NOTE — PROGRESS NOTES
The ABCs of the Annual Wellness Visit  Subsequent Medicare Wellness Visit        Subjective    History of Present Illness:  Mikael Shanks is a 89 y.o. male who presents for a Subsequent Medicare Wellness Visit.    The following portions of the patient's history were reviewed and   updated as appropriate: allergies, current medications, past family history, past medical history, past social history, past surgical history, and problem list.      Recent Hospitalizations:  He was not admitted to the hospital during the last year.       Current Medical Providers:  Patient Care Team:  Xander Robison MD as PCP - General  Delio Lund MD as Consulting Physician (Nephrology)  Dayday Ruiz MD as Consulting Physician (Cardiology)  [facility]        Outpatient Medications Prior to Visit   Medication Sig Dispense Refill    albuterol sulfate  (90 Base) MCG/ACT inhaler Inhale 2 puffs Every 4 (Four) Hours As Needed for Wheezing. 18 g 5    atorvastatin (LIPITOR) 80 MG tablet TAKE 1 TABLET BY MOUTH EVERY  NIGHT AT BEDTIME 90 tablet 0    Blood Glucose Monitoring Suppl (ONE TOUCH ULTRA 2) w/Device kit 1 Device Daily. Test 1 x daily 1 each 0    Cholecalciferol 25 MCG (1000 UT) tablet Take 2 tablets by mouth Daily.      clotrimazole-betamethasone (LOTRISONE) 1-0.05 % cream Apply 1 Application topically to the appropriate area as directed 2 (Two) Times a Day. 60 g 1    enalapril (VASOTEC) 2.5 MG tablet TAKE 1 TABLET BY MOUTH DAILY 90 tablet 3    finasteride (PROSCAR) 5 MG tablet Take 1 tablet by mouth Daily. 90 tablet 4    glipizide (glipiZIDE XL) 5 MG ER tablet Take 1 tablet by mouth Daily. 90 tablet 4    glucose blood (ONE TOUCH ULTRA TEST) test strip Test 1 x daily 100 each 5    lidocaine (LIDODERM) 5 % Place 1 patch on the skin as directed by provider Daily. Remove & Discard patch within 12 hours or as directed by MD 10 each 0    montelukast (Singulair) 10 MG tablet Take 1 tablet by mouth  Every Night. 90 tablet 4    Multiple Vitamins-Minerals (PRESERVISION AREDS 2 PO) Take 1 Capful by mouth 2 (Two) Times a Day.      multivitamin with minerals tablet tablet Take 1 tablet by mouth Daily. LD 1/24      omeprazole (priLOSEC) 40 MG capsule TAKE 1 CAPSULE BY MOUTH DAILY 90 capsule 0    OneTouch Delica Lancets 33G misc 1 strip Daily. Test 1 x daily 100 each 5    sertraline (ZOLOFT) 50 MG tablet TAKE 1 TABLET BY MOUTH DAILY 90 tablet 3    metoprolol succinate XL (TOPROL-XL) 25 MG 24 hr tablet Take 2 tablets by mouth Daily. 90 tablet 3    traMADol (ULTRAM) 50 MG tablet Take 1 tablet by mouth Every 6 (Six) Hours As Needed for Moderate Pain. (Patient not taking: Reported on 7/24/2024) 10 tablet 0     No facility-administered medications prior to visit.       Opioid medication/s are on active medication list.  and I have evaluated his active treatment plan and pain score trends (see table).  Vitals:    07/24/24 1337   PainSc: 0-No pain     I have reviewed the chart for potential of high risk medication and harmful drug interactions in the elderly.          Aspirin is not on active medication list.  Aspirin use is not indicated based on review of current medical condition/s. Risk of harm outweighs potential benefits.  .    Patient Active Problem List   Diagnosis    Type 2 diabetes mellitus without complication, without long-term current use of insulin    PAC (premature atrial contraction)    Gastritis and gastroduodenitis    Atherosclerosis of native coronary artery of native heart without angina pectoris    Stage 3a chronic kidney disease    Hypertension, benign    Spinal stenosis of lumbar region    Asymptomatic peripheral vascular disease    Pernicious anemia    Depression    Pulmonary valve disorder    Hearing loss    Family history of colon cancer    BILL (obstructive sleep apnea)    Stenosis of coronary artery stent    Mixed hyperlipidemia    Anxiety disorder, unspecified    Grief    Ischemic cardiomyopathy  "   Neurogenic claudication    Proteinuria    Encounter for general adult medical examination with abnormal findings    Parathyroid abnormality    Bilateral carotid artery stenosis    Left hip pain    Benign prostatic hyperplasia    Hyperkalemia    Asthma    Cough    Immobility syndrome    Seasonal allergies    Immunization counseling    Nocturia    Chronic non-infective otitis externa of both ears    Dermatitis    Leukocytosis    Tinea cruris    Rib pain on left side    Closed fracture of multiple ribs of left side with routine healing    Situational stress    Medicare annual wellness visit, subsequent    Depression screening    Advanced care planning/counseling discussion    Left atrial enlargement    Lethargy            Above medical problems all reviewed and significant problems identified and reviewed in the E and M visit.   Objective          Vitals:    07/24/24 1337   BP: 138/80   BP Location: Left arm   Patient Position: Sitting   Cuff Size: Adult   Pulse: 64   Resp: 18   Temp: 97.5 °F (36.4 °C)   TempSrc: Temporal   SpO2: 97%   Weight: 72.6 kg (160 lb)   Height: 70.5 cm (27.76\")   PainSc: 0-No pain     Estimated body mass index is 146.02 kg/m² as calculated from the following:    Height as of this encounter: 70.5 cm (27.76\").    Weight as of this encounter: 72.6 kg (160 lb).    BMI is within normal parameters. No other follow-up for BMI required.      Does the patient have evidence of cognitive impairment? No           Family History   Problem Relation Age of Onset    Cancer Mother     Heart disease Father     Cancer Brother        Compared to one year ago, the patient feels his physical   health is the same.    Compared to one year ago, the patient feels his mental   health is the same.    HEALTH RISK ASSESSMENT    Smoking Status:  Social History     Tobacco Use   Smoking Status Former    Current packs/day: 0.00    Average packs/day: 5.0 packs/day for 7.0 years (35.0 ttl pk-yrs)    Types: Cigarettes    " Start date: 1953    Quit date: 1960    Years since quittin.9    Passive exposure: Past   Smokeless Tobacco Never     Alcohol Consumption:  Social History     Substance and Sexual Activity   Alcohol Use Not Currently    Comment: very rare     Fall Risk Screen:    EM[facility] Fall Risk Assessment was completed, and patient is at MODERATE risk for falls. Assessment completed on:2024  He is encouraged to do physical therapy  Depression Screening:  Depression screen reviewed and discussed with patient. Recommendations were not needed. Medications were not discussed, counseling was not discussed in this note, discussed in problem note. We spent 2 minutes with the screen and discussion of depression and options.         2024     1:17 PM   PHQ-2/PHQ-9 Depression Screening   Little Interest or Pleasure in Doing Things 0-->not at all   Feeling Down, Depressed or Hopeless 0-->not at all   PHQ-9: Brief Depression Severity Measure Score 0       Health Habits and Functional and Cognitive Screenin/24/2024     1:00 PM   Functional & Cognitive Status   Do you have difficulty preparing food and eating? No   Do you have difficulty bathing yourself, getting dressed or grooming yourself? No   Do you have difficulty using the toilet? No   Do you have difficulty moving around from place to place? No   Do you have trouble with steps or getting out of a bed or a chair? Yes   Current Diet Well Balanced Diet   Dental Exam Up to date   Eye Exam Up to date   Exercise (times per week) 0 times per week   Current Exercises Include No Regular Exercise   Do you need help using the phone?  No   Are you deaf or do you have serious difficulty hearing?  Yes   Do you need help to go to places out of walking distance? No   Do you need help shopping? No   Do you need help preparing meals?  No   Do you need help with housework?  No   Do you need help with laundry? No   Do you need help taking your medications? No   Do you need  help managing money? No   Do you ever drive or ride in a car without wearing a seat belt? No   Have you felt unusual stress, anger or loneliness in the last month? No   Who do you live with? Alone   If you need help, do you have trouble finding someone available to you? No   Have you been bothered in the last four weeks by sexual problems? No   Do you have difficulty concentrating, remembering or making decisions? No     He uses hearing aids  Age-appropriate Screening Schedule:  Refer to the list below for future screening recommendations based on patient's age, sex and/or medical conditions. Orders for these recommended tests are listed in the plan section. The patient has been provided with a written plan.    Health Maintenance   Topic Date Due    DIABETIC EYE EXAM  10/15/2021    TDAP/TD VACCINES (2 - Tdap) 05/13/2022    BMI FOLLOWUP  05/17/2023    URINE MICROALBUMIN  02/23/2024    INFLUENZA VACCINE  08/01/2024    HEMOGLOBIN A1C  01/25/2025    ANNUAL WELLNESS VISIT  07/24/2025    LIPID PANEL  07/25/2025    COVID-19 Vaccine  Completed    RSV Vaccine - Adults  Completed    Pneumococcal Vaccine 65+  Completed    ZOSTER VACCINE  Completed       Immunizations:  Mikael Shanks is due for Td    Colorectal Screening  Mikael Shanks last colonoscopy was 8-,  no recall  Last Completed Colonoscopy       This patient has no relevant Health Maintenance data.               Assessment & Plan   CMS Preventative Services Quick Reference    Risk Factors Identified During Encounter      Fall Risk-High or Moderate: Discussed Fall Prevention in the home  Hearing Problem:  has hearing aids  Immunizations Discussed/Encouraged: Td  The above risks/problems have been discussed with the patient.  Follow up actions/plans if indicated are seen below in the Assessment/Plan Section.  Pertinent information has been shared with the patient in the After Visit Summary.    I have identified multiple medical problems which are significant  and separately identifiable that require added work above and beyond the wellness visit. These are not trivial or insignificant and are billed with a 25-modifier  These are in a separate E/M note      Sit-to-Stand Exercise    The sit-to-stand exercise (also known as the chair stand or chair rise exercise) strengthens your lower body and helps you maintain or improve your mobility and independence. The goal is to do the sit-to-stand exercise without using your hands. This will be easier as you become stronger. You should always talk with your health care provider before starting any exercise program, especially if you have had recent surgery.  Do the exercise exactly as told by your health care provider and adjust it as directed. It is normal to feel mild stretching, pulling, tightness, or discomfort as you do this exercise, but you should stop right away if you feel sudden pain or your pain gets worse. Do not begin doing this exercise until told by your health care provider.  What the sit-to-stand exercise does  The sit-to-stand exercise helps to strengthen the muscles in your thighs and the muscles in the center of your body that give you stability (core muscles). This exercise is especially helpful if:  You have had knee or hip surgery.  You have trouble getting up from a chair, out of a car, or off the toilet.  How to do the sit-to-stand exercise  Sit toward the front edge of a sturdy chair without armrests. Your knees should be bent and your feet should be flat on the floor and shoulder-width apart.  Place your hands lightly on each side of the seat. Keep your back and neck as straight as possible, with your chest slightly forward.  Breathe in slowly. Lean forward and slightly shift your weight to the front of your feet.  Breathe out as you slowly stand up. Use your hands as little as possible.  Stand and pause for a full breath in and out.  Breathe in as you sit down slowly. Tighten your core and abdominal  muscles to control your lowering as much as possible.  Breathe out slowly.  Do this exercise 10-15 times. If needed, do it fewer times until you build up strength.  Rest for 1 minute, then do another set of 10-15 repetitions.  To change the difficulty of the sit-to-stand exercise  If the exercise is too difficult, use a chair with sturdy armrests, and push off the armrests to help you come to the standing position. You can also use the armrests to help slowly lower yourself back to sitting. As this gets easier, try to use your arms less. You can also place a firm cushion or pillow on the chair to make the surface higher.  If this exercise is too easy, do not use your arms to help raise or lower yourself. You can also wear a weighted vest, use hand weights, increase your repetitions, or try a lower chair.  General tips  You may feel tired when starting an exercise routine. This is normal.  You may have muscle soreness that lasts a few days. This is normal. As you get stronger, you may not feel muscle soreness.  Use smooth, steady movements.  Do not  hold your breath during strength exercises. This can cause unsafe changes in your blood pressure.  Breathe in slowly through your nose, and breathe out slowly through your mouth.  Summary  Strengthening your lower body is an important step to help you move safely and independently.  The sit-to-stand exercise helps strengthen the muscles in your thighs and core.  You should always talk with your health care provider before starting any exercise program, especially if you have had recent surgery.  This information is not intended to replace advice given to you by your health care provider. Make sure you discuss any questions you have with your health care provider.  Document Revised: 10/16/2019 Document Reviewed: 02/08/2018  Elsevier Patient Education © 2021 [employer]      Fall Prevention in the Home, Adult  Falls can cause injuries and can happen to people of all ages.  There are many things you can do to make your home safe and to help prevent falls. Ask for help when making these changes.  What actions can I take to prevent falls?  General Instructions  Use good lighting in all rooms. Replace any light bulbs that burn out.  Turn on the lights in dark areas. Use night-lights.  Keep items that you use often in easy-to-reach places. Lower the shelves around your home if needed.  Set up your furniture so you have a clear path. Avoid moving your furniture around.  Do not have throw rugs or other things on the floor that can make you trip.  Avoid walking on wet floors.  If any of your floors are uneven, fix them.  Add color or contrast paint or tape to clearly davi and help you see:  Grab bars or handrails.  First and last steps of staircases.  Where the edge of each step is.  If you use a stepladder:  Make sure that it is fully opened. Do not climb a closed stepladder.  Make sure the sides of the stepladder are locked in place.  Ask someone to hold the stepladder while you use it.  Know where your pets are when moving through your home.  What can I do in the bathroom?         Keep the floor dry. Clean up any water on the floor right away.  Remove soap buildup in the tub or shower.  Use nonskid mats or decals on the floor of the tub or shower.  Attach bath mats securely with double-sided, nonslip rug tape.  If you need to sit down in the shower, use a plastic, nonslip stool.  Install grab bars by the toilet and in the tub and shower. Do not use towel bars as grab bars.  What can I do in the bedroom?  Make sure that you have a light by your bed that is easy to reach.  Do not use any sheets or blankets for your bed that hang to the floor.  Have a firm chair with side arms that you can use for support when you get dressed.  What can I do in the kitchen?  Clean up any spills right away.  If you need to reach something above you, use a step stool with a grab bar.  Keep electrical cords out  of the way.  Do not use floor polish or wax that makes floors slippery.  What can I do with my stairs?  Do not leave any items on the stairs.  Make sure that you have a light switch at the top and the bottom of the stairs.  Make sure that there are handrails on both sides of the stairs. Fix handrails that are broken or loose.  Install nonslip stair treads on all your stairs.  Avoid having throw rugs at the top or bottom of the stairs.  Choose a carpet that does not hide the edge of the steps on the stairs.  Check carpeting to make sure that it is firmly attached to the stairs. Fix carpet that is loose or worn.  What can I do on the outside of my home?  Use bright outdoor lighting.  Fix the edges of walkways and driveways and fix any cracks.  Remove anything that might make you trip as you walk through a door, such as a raised step or threshold.  Trim any bushes or trees on paths to your home.  Check to see if handrails are loose or broken and that both sides of all steps have handrails.  Install guardrails along the edges of any raised decks and porches.  Clear paths of anything that can make you trip, such as tools or rocks.  Have leaves, snow, or ice cleared regularly.  Use sand or salt on paths during winter.  Clean up any spills in your garage right away. This includes grease or oil spills.  What other actions can I take?  Wear shoes that:  Have a low heel. Do not wear high heels.  Have rubber bottoms.  Feel good on your feet and fit well.  Are closed at the toe. Do not wear open-toe sandals.  Use tools that help you move around if needed. These include:  Canes.  Walkers.  Scooters.  Crutches.  Review your medicines with your doctor. Some medicines can make you feel dizzy. This can increase your chance of falling.  Ask your doctor what else you can do to help prevent falls.  Where to find more information  Centers for Disease Control and PreventionEMMY: www.cdc.gov  National [place] on Aging:  www.[place]fer[place].nih.gov  Contact a doctor if:  You are afraid of falling at home.  You feel weak, drowsy, or dizzy at home.  You fall at home.  Summary  There are many simple things that you can do to make your home safe and to help prevent falls.  Ways to make your home safe include removing things that can make you trip and installing grab bars in the bathroom.  Ask for help when making these changes in your home.  This information is not intended to replace advice given to you by your health care provider. Make sure you discuss any questions you have with your health care provider.  Document Revised: 07/21/2021 Document Reviewed: 07/21/2021  [employer] Patient Education © 2021 [employer]            Advance Care Planning    Advance Directive is on file.  ACP discussion was held with the patient during this visit. Patient has an advance directive in EMR which is still valid.   Discussion with Patient regarding advanced directives. POST form discussed at length and reviewed with patient. POST reviewed and updated today. I spent 19 minutes  with patient reviewing information and documenting  in the chart.  Patient states he does not want CPR. Reviewed medical interventions with patient and the differences between each: Comfort, Limited and Full. Patient opted for Limited. Discussed the use of antibiotics at the end of life. He chose to use antibiotics consistent with treatment goals. Discussed artificially administered nutrition, patient is aware that if he is alert and oriented they can change their mind at any time. However, they have elected to have no artificial nutrition. Patient has identified his  Brother  Tamra Shanks as his healthcare representative. Advised to discuss with healthcare representative if they can comply with wishes. Patient encouraged to have a meeting to discuss his decision regarding advanced care directives and goals of care with extended family and significant  friends  In regard to the POST  form:The patient opted to complete POST while in the office and copy scanned into the chart. and advised to give to family members, place in an easily accessible place and take with them if going to the hospital or Emergency room.      Follow Up:   Future Appointments         Provider Department Center    7/31/2024 1:30 PM Xander Robison MD [facility] FAMILY MEDICINE [facility]    1/6/2025 12:15 PM Dayday Ruiz MD [facility] CARDIOLOGY CARD CTR NA            An After Visit Summary and PPPS were made available to the patient.      Diagnoses and all orders for this visit:    1. Medicare annual wellness visit, subsequent (Primary)  Assessment & Plan:  Completed 7-24-24      2. Advanced care planning/counseling discussion  Assessment & Plan:  Completed 7-24-24 and scanned to chart      3. Depression screening  Assessment & Plan:  Depression addressed in Problem visit      4. Immunization counseling  Overview:  NOT UP TO DATE ON DT last done 5-13-12-advised to check with pharmacy for coverage  UTD  COVID 3-5-24, 9-26-23, 4-1-23, 9-15-22, 4-4-22, 10-25-21, 2-12-21, 1-15-21  PNEU 20=  2-21-23  FLU 9-18-23  Shingrix 3-1-21, 12-28-20    Assessment & Plan:  Patient due for TD and advised to check with pharmacy for coverage

## 2024-07-25 NOTE — PROGRESS NOTES
Subjective   Mikael Shanks is a 89 y.o. male here for his annual physical with me. Mikael is here for coordination of medical care, to discuss health maintenance, disease prevention as well as to followup on medical problems. Patient has been followed by me since 1988. Patient's last CPE was 3-1-23. Activity level is minimal. Exercises 0 per week. Appetite is good. Feels well with many complaints. Energy level is poor. Sleeps poorly. Patient's last colonoscopy was 2018 with Dr. Peraza. No recall due to age. Patient declines due to age. Patient is doing routine self skin exam  never . Patient is doing routine self-breast exams monthly. Patient is checking testicles monthly.    Lab Results   Component Value Date    PSA 0.8 12/15/2016        Diabetes  He presents for his follow-up diabetic visit. He has type 2 diabetes mellitus. His disease course has been worsening. Pertinent negatives for hypoglycemia include no dizziness, nervousness/anxiousness or speech difficulty. Pertinent negatives for diabetes include no blurred vision, no chest pain, no fatigue, no polydipsia, no polyphagia, no polyuria, no weakness and no weight loss.   Hyperlipidemia  This is a chronic problem. The current episode started more than 1 year ago. The problem is controlled. Exacerbating diseases include hypothyroidism. Factors aggravating his hyperlipidemia include fatty foods. Pertinent negatives include no chest pain, myalgias or shortness of breath. Current antihyperlipidemic treatment includes statins.   Hypothyroidism  This is a new problem. The current episode started in the past 7 days. The problem occurs constantly. The problem has been unchanged. Pertinent negatives include no abdominal pain, chest pain, chills, congestion, coughing, fatigue, fever, joint swelling, myalgias, nausea, neck pain, rash, sore throat, vomiting or weakness.   Heartburn  He reports no abdominal pain, no chest pain, no coughing, no nausea, no sore throat  or no wheezing. This is a chronic problem. The current episode started more than 1 year ago. The problem occurs occasionally. The problem has been unchanged. Pertinent negatives include no fatigue or weight loss.   Itching  This is a chronic (posterior neck) problem. The current episode started more than 1 year ago. The problem occurs constantly. Pertinent negatives include no abdominal pain, chest pain, chills, congestion, coughing, fatigue, fever, joint swelling, myalgias, nausea, neck pain, rash, sore throat, vomiting or weakness.        The following portions of the patient's history were reviewed and updated as appropriate: allergies, current medications, past family history, past medical history, past social history, past surgical history, and problem list.    Past Medical History:   Diagnosis Date    Carotid artery disease     Coronary artery disease     Diabetes mellitus     GERD (gastroesophageal reflux disease)     [place] (hard of hearing)     Hyperlipidemia     Hypertension     Osteoarthritis     Prostatic hypertrophy     Pulmonary valve disorder     Sleep apnea     cpap   doesnt use     Stroke        Family History   Problem Relation Age of Onset    Cancer Mother     Heart disease Father     Cancer Brother        Social History     Socioeconomic History    Marital status:    Tobacco Use    Smoking status: Former     Current packs/day: 0.00     Average packs/day: 5.0 packs/day for 7.0 years (35.0 ttl pk-yrs)     Types: Cigarettes     Start date: 1953     Quit date: 1960     Years since quittin.9     Passive exposure: Past    Smokeless tobacco: Never   Vaping Use    Vaping status: Never Used   Substance and Sexual Activity    Alcohol use: Not Currently     Comment: very rare    Drug use: No    Sexual activity: Never       Social History     Social History Narrative     3 x.  First wife  for 17 years have 5 children,  to 2nd wife for 11 years, they had 1 child  together, 3rd wife  for 16 years she passed in 2003 from breast cancer.   Had been with his girlfriend Debra since 2004 until she passed on 1-4-2021 from COPD.  Retired from [employer] in 1995.  Caffeine none, Always wears seatbelts.  Hobbies TV.  Exercise none.        Past Surgical History:   Procedure Laterality Date    BACK SURGERY      CARDIAC CATHETERIZATION N/A 12/5/2019    Procedure: Left Heart Cath and coronary angiogram;  Surgeon: Dayday Ruiz MD;  Location: [facility] CATH INVASIVE LOCATION;  Service: Cardiovascular    CARDIAC CATHETERIZATION N/A 12/5/2019    Procedure: Right and Left Heart Cath;  Surgeon: Dayday Ruiz MD;  Location: [facility] CATH INVASIVE LOCATION;  Service: Cardiovascular    CARDIAC CATHETERIZATION N/A 9/4/2020    Procedure: Left and Right Heart Cath with Coronary Angiography;  Surgeon: Dayday Ruiz MD;  Location: [facility] CATH INVASIVE LOCATION;  Service: Cardiovascular;  Laterality: N/A;    CAROTID ENDARTERECTOMY Left     CHOLECYSTECTOMY      COLONOSCOPY  08/14/2018    No repeat    CORONARY ANGIOPLASTY WITH STENT PLACEMENT      LUMBAR LAMINECTOMY Bilateral 1/31/2022    Procedure: LUMBAR LAMINECTOMY WITH/WITHOUT FUSION L4-L5;  Surgeon: Geronimo Gonzales MD;  Location: [facility] MAIN OR;  Service: Neurosurgery;  Laterality: Bilateral;       Current Outpatient Medications on File Prior to Visit   Medication Sig Dispense Refill    Blood Glucose Monitoring Suppl (ONE TOUCH ULTRA 2) w/Device kit 1 Device Daily. Test 1 x daily 1 each 0    Cholecalciferol 25 MCG (1000 UT) tablet Take 2 tablets by mouth Daily.      clotrimazole-betamethasone (LOTRISONE) 1-0.05 % cream Apply 1 Application topically to the appropriate area as directed 2 (Two) Times a Day. 60 g 1    glucose blood (ONE TOUCH ULTRA TEST) test strip Test 1 x daily 100 each 5    lidocaine (LIDODERM) 5 % Place 1 patch on the skin as directed by provider Daily. Remove & Discard patch within 12 hours or as directed by MD  10 each 0    Multiple Vitamins-Minerals (PRESERVISION AREDS 2 PO) Take 1 Capful by mouth 2 (Two) Times a Day.      multivitamin with minerals tablet tablet Take 1 tablet by mouth Daily. LD 1/24      OneTouch Delica Lancets 33G misc 1 strip Daily. Test 1 x daily 100 each 5     No current facility-administered medications on file prior to visit.       Allergies   Allergen Reactions    Penicillins Hives       Review of Systems   Constitutional:  Negative for appetite change, chills, fatigue, fever, unexpected weight gain and unexpected weight loss.   HENT:  Negative for congestion, dental problem, ear discharge, ear pain, hearing loss, nosebleeds, postnasal drip, rhinorrhea, sinus pressure, sneezing, sore throat, tinnitus and voice change.         Last dental exam dentist in [place], 7-2024.  pt. Got new top dentures.   Eyes:  Negative for blurred vision, double vision, pain, redness and visual disturbance.        Last [facility] exam  [facility]   Respiratory:  Negative for cough, shortness of breath, wheezing and stridor.    Cardiovascular:  Negative for chest pain, palpitations and leg swelling.   Gastrointestinal:  Negative for abdominal pain, anal bleeding, blood in stool, constipation, diarrhea, nausea, rectal pain, vomiting, GERD and indigestion.        21 BM weekly   Endocrine: Negative for cold intolerance, heat intolerance, polydipsia, polyphagia and polyuria.   Genitourinary:  Negative for difficulty urinating, dysuria, frequency, hematuria and urgency.   Musculoskeletal:  Negative for back pain, joint swelling, myalgias, neck pain and neck stiffness.   Skin:  Positive for itching. Negative for color change, dry skin and rash.        Itching on back of neck-cream does not help.  Lump of the upper right arm.   Neurological:  Negative for dizziness, syncope, speech difficulty, weakness, light-headedness, headache and memory problem.   Hematological:  Does not bruise/bleed easily.   Psychiatric/Behavioral:   "Negative for decreased concentration, sleep disturbance, depressed mood and stress. The patient is not nervous/anxious.        Objective   Visit Vitals  /68 (BP Location: Left arm, Patient Position: Sitting, Cuff Size: Adult)   Pulse 67   Temp 97.5 °F (36.4 °C) (Temporal)   Resp 18   Ht 179.1 cm (70.5\")   Wt 73.6 kg (162 lb 3.2 oz)   SpO2 97%   BMI 22.94 kg/m²     Physical Exam  Constitutional:       General: He is not in acute distress.     Appearance: He is well-developed. He is not diaphoretic.   HENT:      Head: Normocephalic.      Right Ear: Tympanic membrane, ear canal and external ear normal. Decreased hearing (hearing aids-severe without aids and moderate with) noted.      Left Ear: Tympanic membrane, ear canal and external ear normal. Decreased hearing (hearing aids-moderate with aids in severe without) noted.      Nose: Nose normal.      Mouth/Throat:      Lips: Pink.      Mouth: Mucous membranes are moist.      Dentition: Has dentures (, has his own teeth on the bottom).      Pharynx: Oropharynx is clear. No oropharyngeal exudate.   Eyes:      General: Lids are normal. No scleral icterus.        Right eye: No discharge.         Left eye: No discharge.      Extraocular Movements: Extraocular movements intact.      Conjunctiva/sclera: Conjunctivae normal.      Pupils: Pupils are equal, round, and reactive to light.      Comments: Bg's bilateral.  Arcus senilis bilateral.   Neck:      Trachea: Trachea normal.   Cardiovascular:      Rate and Rhythm: Normal rate and regular rhythm.      Pulses:           Dorsalis pedis pulses are 0 on the right side and 0 on the left side.        Posterior tibial pulses are 0 on the right side and 0 on the left side.      Heart sounds: Normal heart sounds. No murmur heard.  Pulmonary:      Effort: Pulmonary effort is normal.      Breath sounds: Normal breath sounds. No wheezing.   Chest:      Chest wall: No tenderness.   Breasts:     Right: Normal. No swelling, " bleeding, inverted nipple, mass, nipple discharge, skin change or tenderness.      Left: Normal. No swelling, bleeding, inverted nipple, mass, nipple discharge, skin change or tenderness.   Abdominal:      General: Bowel sounds are normal. There is no distension.      Palpations: Abdomen is soft. There is no mass.      Tenderness: There is no abdominal tenderness.      Hernia: No hernia is present.   Genitourinary:     Penis: Normal. No tenderness.       Testes: Normal.      Comments: Pt. Declined rectal and Prostate exam  Musculoskeletal:         General: No tenderness or deformity. Normal range of motion.      Cervical back: Full passive range of motion without pain, normal range of motion and neck supple.   Lymphadenopathy:      Cervical: No cervical adenopathy.   Skin:     General: Skin is warm and dry.      Findings: No erythema or rash.      Comments: RUQ scar.  Scar lower back.  Seb. Keratosis right upper back.  Varicosities mild bilateral lower legs.  Black comedone mid-back.  Mild erythematous rash of the posterior neck.   Neurological:      General: No focal deficit present.      Mental Status: He is alert and oriented to person, place, and time.      Cranial Nerves: Cranial nerves 2-12 are intact. No cranial nerve deficit.      Sensory: Sensation is intact. No sensory deficit.      Motor: Motor function is intact. No abnormal muscle tone.      Coordination: Coordination is intact. Coordination normal.      Gait: Gait is intact.      Deep Tendon Reflexes: Reflexes normal.   Psychiatric:         Behavior: Behavior normal.         Thought Content: Thought content normal.         Judgment: Judgment normal.       Assessment & Plan   Problem List Items Addressed This Visit          High    Atherosclerosis of native coronary artery of native heart without angina pectoris    Overview     Dr. Ruiz done 9/4/2020 of 60% 20% blockage of the right coronary artery stent         Current Assessment & Plan     Stable.   ASA risk outweighs benefits         Relevant Medications    metoprolol succinate XL (TOPROL-XL) 50 MG 24 hr tablet    Ischemic cardiomyopathy    Overview     Dr. Patel done cardiac cath done September 4, 2020 showing normal ejection fraction right coronary artery stent is patent with 20% blockage left anterior descending has luminal irregularities.  ECHO done at the same time.  Last EKG was done June 20, 2024 showed ectopic atrial rhythm with 1 PVC and nonspecific changes         Current Assessment & Plan     Stable.  ASA risk outweighs benefits         Relevant Medications    metoprolol succinate XL (TOPROL-XL) 50 MG 24 hr tablet    RESOLVED: Stenosis of coronary artery stent    Current Assessment & Plan     Duplicate, thus delete            Medium    Asthma    Current Assessment & Plan     Doing well.  Patient tolerated Albuterol and Singulair well without side effects. I feel the benefits of the medication outweigh the risks.   Advised to avoid dust and smoke exposure               Relevant Medications    albuterol sulfate  (90 Base) MCG/ACT inhaler    montelukast (Singulair) 10 MG tablet    Asymptomatic peripheral vascular disease    Overview     Arterial U/S done 1-3-22         Current Assessment & Plan     Stable, pt. Declined work up         Benign prostatic hyperplasia    Current Assessment & Plan     Doing well.  Patient tolerated Proscar well without side effects. I feel the benefits of the medication outweigh the risks.          Relevant Medications    finasteride (PROSCAR) 5 MG tablet    Depression    Current Assessment & Plan     Doing well.  Patient tolerated Zoloft well without side effects. I feel the benefits of the medication outweigh the risks. Will possibly decrease dose of Zoloft to 25 mg from 50 mg at next visit.         Relevant Medications    sertraline (ZOLOFT) 50 MG tablet    Hypertension, benign    Current Assessment & Plan     Doing well.  Patient tolerated Enalapril and  Metoprololwell without side effects. I feel the benefits of the medication outweigh the risks.   Encouraged to watch salt, exercise more and lose weight.           Relevant Medications    enalapril (VASOTEC) 2.5 MG tablet    metoprolol succinate XL (TOPROL-XL) 50 MG 24 hr tablet    Mixed hyperlipidemia    Current Assessment & Plan     Lipid and CMP  done 7-25-24, read by me, reviewed with pt.  Trig. 87 down from 147, Tot. Chol. 124 down from 145, HDL 45 same as last, LDL 62 down from 74  Doing well.  Encouraged to watch fatty intake, exercise more, and lose weight.   Compliant with medication.  Patient tolerated Lipitor well without side effects. I feel the benefits of the medication outweigh the risks.    Is getting adequate diet and exercise  Goals developed at last visit were met   Follow up in 4  months  Care management needs are self-addressed. Self-management abilities addressed and patient is capable of managing his own disease.           Relevant Medications    atorvastatin (LIPITOR) 80 MG tablet    Other Relevant Orders    Lipid Panel With / Chol / HDL Ratio    Comprehensive Metabolic Panel    Parathyroid abnormality    Current Assessment & Plan     Doing well.  PTH done 7-25-24, read by me, reviewed with pt.  PTH was 57 up from 53.  Will repeat with next labs.           Relevant Orders    PTH, Intact & Calcium    Pernicious anemia    Current Assessment & Plan     Slightly worse.  CBC, B12  done 7-25-24, read by me, reviewed with pt.  MCHC 30.9 down from 31.6, HGB was 12.8 down from 13.7, B12 was 390 up from 307.  Will draw b12 and MMA with next labs.  1 cc of B12 will be given today         Relevant Medications    cyanocobalamin injection 1,000 mcg    Other Relevant Orders    Vitamin B12    Methylmalonic Acid, Serum    Stage 3a chronic kidney disease    Current Assessment & Plan     Improved.  CMP done 7-25-24, read by me, reviewed with pt.  Creatinine was 1.31 down from 1.40, EGFR was 52 up from  48.  Avoid anti-inflammatory pills         Relevant Medications    finasteride (PROSCAR) 5 MG tablet    Type 2 diabetes mellitus without complication, without long-term current use of insulin - Primary    Overview     Hgba1c 6% on 5/29/2024         Current Assessment & Plan     A1c done 7-25-24, read by me, reviewed with pt.  A1c was 6.1 down from 6.0  Worsening but good control with no evidence of hypoglycemia.   Encouraged to watch sugar intake, exercise more and lose weight.   Compliant with  medication. Patient tolerated Glipizide well without side effects. I feel the benefits of the medication outweigh the risks.   Not monitoring sugar at home.   Follow up in 3 months  Care management needs are self-addressed.  Self-management abilities addressed and patient is capable of managing his own disease.           Relevant Medications    glipizide (glipiZIDE XL) 5 MG ER tablet    Other Relevant Orders    Hemoglobin A1c    Microalbumin / Creatinine Urine Ratio - Urine, Clean Catch       Low    Anxiety disorder, unspecified    Current Assessment & Plan     Doing well on Zoloft without side effects.  Benefits outweigh the risk         Relevant Medications    sertraline (ZOLOFT) 50 MG tablet    Bilateral carotid artery stenosis    Overview     Carotid U/S done 1-3-2022         Current Assessment & Plan     Stable, pt. Declines repeat carotid U/S.  Follow conservatively.         Closed fracture of multiple ribs of left side with routine healing    Overview     3 ribs are fractured on the left--2 are mildly displaced         Current Assessment & Plan     Healing well         Gastritis and gastroduodenitis    Overview     EGD done in August 2018 by Dr. Peraza showed a small hiatal hernia otherwise was normal esophagus.  Stomach did show some mild erythema congested consistent with gastritis         Current Assessment & Plan     Doing well.  Patient tolerated Prilosec well without side effects. I feel the benefits of the  medication outweigh the risks.          Relevant Medications    omeprazole (priLOSEC) 40 MG capsule    Grief    Overview     Death of girlfriend 1-4-2021         Current Assessment & Plan     Intermittent and mild         Hyperkalemia    Current Assessment & Plan     Worse, will repeat with next labs.  CMP done 7-25-24, read by me, reviewed with pt.  Potassium was 5.4 up from 5.2.  Avoid foods high in potassium         BILL (obstructive sleep apnea) (Chronic)    Current Assessment & Plan     Doing well with C-pap         Spinal stenosis of lumbar region    Overview     MRI done 7/12/2021 still shows severe degenerative changes of L4-L5 moderate to severe spinal stenosis         Current Assessment & Plan     Intermittent and mild         Subclinical hyperthyroidism    Current Assessment & Plan     New dx.  TSH done 7-25-24, read by me, reviewed with pt.  TSH was 5.140 up from 2.530.  Will repeat with next labs.           Relevant Medications    metoprolol succinate XL (TOPROL-XL) 50 MG 24 hr tablet    Other Relevant Orders    TSH       Unprioritized    Chronic non-infective otitis externa of both ears    Overview     Very mild with normal exam today.- use cortisporin prn         Cough    Current Assessment & Plan     Greatly improved         Dermatitis    Overview     Mild posterier scalp - kenalog         Elevated liver function tests    Current Assessment & Plan     New dx.  CMP done 7-25-24, read by me, reviewed with pt.  AST was 33 up from 29, ALT was 48 up from 38.  Will repeat with next labs.         Encounter for general adult medical examination with abnormal findings    Overview                Current Assessment & Plan     Encouraged to do self-breast exam, self-testicle exams, and self derm exams. Congratulated on using seat belts.  Encouraged to do annual physical exams.  Immunization status reviewed.           Family history of colon cancer    Current Assessment & Plan     Pt. Declines repeat colonoscopy          Hearing loss    Current Assessment & Plan     Stable with hearing aids.  Advised to wear hearing protection and avoid noise expsoure         Immobility syndrome    Current Assessment & Plan     Stable.  Advised to continue using walker.         Immunization counseling    Overview     NOT UP TO DATE ON DT last done 5-13-12-advised to check with pharmacy for coverage  UTD  COVID 3-5-24, 9-26-23, 4-1-23, 9-15-22, 4-4-22, 10-25-21, 2-12-21, 1-15-21  PNEU 20=  2-21-23  FLU 9-18-23  Shingrix 3-1-21, 12-28-20         Left atrial enlargement    Overview     Found on ECHO done 8-         Current Assessment & Plan     Stable, follow conservatively         Relevant Medications    metoprolol succinate XL (TOPROL-XL) 50 MG 24 hr tablet    Left hip pain    Current Assessment & Plan     Intermittent and mild         Lethargy    Current Assessment & Plan     Mild, pt. Declines work up or treatment.  TSH done 7-25-24, read by me, reviewed with pt.  TSH was 5.140 up from 2.530         Leukocytosis    Overview      Asymptomatic.         Current Assessment & Plan     Resolved x 2.  CBC done 7-25-24, read by me, reviewed with pt.  WBC was 9.2 down from 10.2.  Will repeat with next labs.           Relevant Medications    cyanocobalamin injection 1,000 mcg    Other Relevant Orders    CBC & Differential    Mitral valve insufficiency    Current Assessment & Plan     Will follow conservatively         Relevant Medications    metoprolol succinate XL (TOPROL-XL) 50 MG 24 hr tablet    Neurogenic claudication    Current Assessment & Plan     Will follow conservatively         Nocturia    Current Assessment & Plan     Patient unable to void at lab.  Will order U/A with next labs.         PAC (premature atrial contraction)    Current Assessment & Plan     Will follow conservatively         Relevant Medications    metoprolol succinate XL (TOPROL-XL) 50 MG 24 hr tablet    Proteinuria    Current Assessment & Plan     Patient unable to  void at lab.  Will order U/A with next labs.         Relevant Orders    Urinalysis With Microscopic - Urine, Clean Catch    Pruritus    Overview     Posterior neck         Current Assessment & Plan     New dx  Referral sent to dermatology         Relevant Orders    Ambulatory Referral to Dermatology (Completed)    Pulmonary valve disorder    Overview     ECHO 8-         Current Assessment & Plan     Will follow conservatively         Relevant Medications    metoprolol succinate XL (TOPROL-XL) 50 MG 24 hr tablet    RESOLVED: Rib pain on left side    Current Assessment & Plan     Resolved, thus delete         Seasonal allergies    Current Assessment & Plan     Stable.  Pt. Declines medication.  Will follow conservatively         Situational stress    Overview     Due to conflicts between his 2 stepdaughters--counseling was done on this         Current Assessment & Plan     Resolved, thus delete         RESOLVED: Tinea cruris    Current Assessment & Plan     Resolved, thus delete          Other Visit Diagnoses       Chronic kidney disease, stage III (moderate)        duplicate    Relevant Medications    finasteride (PROSCAR) 5 MG tablet    Prostatic hypertrophy        duplicate    Relevant Medications    finasteride (PROSCAR) 5 MG tablet    Type 2 diabetes mellitus with stage 3a chronic kidney disease, without long-term current use of insulin        duplicate    Relevant Medications    glipizide (glipiZIDE XL) 5 MG ER tablet                 Encouraged to check his skin, testicles and breasts monthly. Reviewed exercising regularly, eating a balanced diet, immunizations and if due, patient counselled to check with insurance company for coverage;, and regularly checking skin and breasts.

## 2024-07-26 LAB
ALBUMIN SERPL-MCNC: 4.3 G/DL (ref 3.7–4.7)
ALP SERPL-CCNC: 115 IU/L (ref 44–121)
ALT SERPL-CCNC: 48 IU/L (ref 0–44)
AST SERPL-CCNC: 33 IU/L (ref 0–40)
BASOPHILS # BLD AUTO: 0.1 X10E3/UL (ref 0–0.2)
BASOPHILS NFR BLD AUTO: 1 %
BILIRUB SERPL-MCNC: 0.4 MG/DL (ref 0–1.2)
BUN SERPL-MCNC: 24 MG/DL (ref 8–27)
BUN/CREAT SERPL: 18 (ref 10–24)
CALCIUM SERPL-MCNC: 9.6 MG/DL (ref 8.6–10.2)
CHLORIDE SERPL-SCNC: 104 MMOL/L (ref 96–106)
CHOLEST SERPL-MCNC: 124 MG/DL (ref 100–199)
CHOLEST/HDLC SERPL: 2.8 RATIO (ref 0–5)
CO2 SERPL-SCNC: 25 MMOL/L (ref 20–29)
CREAT SERPL-MCNC: 1.31 MG/DL (ref 0.76–1.27)
EGFRCR SERPLBLD CKD-EPI 2021: 52 ML/MIN/1.73
EOSINOPHIL # BLD AUTO: 0.5 X10E3/UL (ref 0–0.4)
EOSINOPHIL NFR BLD AUTO: 5 %
ERYTHROCYTE [DISTWIDTH] IN BLOOD BY AUTOMATED COUNT: 12.8 % (ref 11.6–15.4)
GLOBULIN SER CALC-MCNC: 2.2 G/DL (ref 1.5–4.5)
GLUCOSE SERPL-MCNC: 133 MG/DL (ref 70–99)
HBA1C MFR BLD: 6.1 % (ref 4.8–5.6)
HCT VFR BLD AUTO: 41.4 % (ref 37.5–51)
HDLC SERPL-MCNC: 45 MG/DL
HGB BLD-MCNC: 12.8 G/DL (ref 13–17.7)
IMM GRANULOCYTES # BLD AUTO: 0.2 X10E3/UL (ref 0–0.1)
IMM GRANULOCYTES NFR BLD AUTO: 2 %
INTACT PTH: NORMAL
LDLC SERPL CALC-MCNC: 62 MG/DL (ref 0–99)
LYMPHOCYTES # BLD AUTO: 1.5 X10E3/UL (ref 0.7–3.1)
LYMPHOCYTES NFR BLD AUTO: 17 %
MCH RBC QN AUTO: 28.7 PG (ref 26.6–33)
MCHC RBC AUTO-ENTMCNC: 30.9 G/DL (ref 31.5–35.7)
MCV RBC AUTO: 93 FL (ref 79–97)
MONOCYTES # BLD AUTO: 0.7 X10E3/UL (ref 0.1–0.9)
MONOCYTES NFR BLD AUTO: 7 %
NEUTROPHILS # BLD AUTO: 6.3 X10E3/UL (ref 1.4–7)
NEUTROPHILS NFR BLD AUTO: 68 %
PLATELET # BLD AUTO: 221 X10E3/UL (ref 150–450)
POTASSIUM SERPL-SCNC: 5.4 MMOL/L (ref 3.5–5.2)
PROT SERPL-MCNC: 6.5 G/DL (ref 6–8.5)
PTH-INTACT SERPL-MCNC: 57 PG/ML (ref 15–65)
RBC # BLD AUTO: 4.46 X10E6/UL (ref 4.14–5.8)
SODIUM SERPL-SCNC: 141 MMOL/L (ref 134–144)
SPECIMEN STATUS: NORMAL
TRIGL SERPL-MCNC: 87 MG/DL (ref 0–149)
TSH SERPL DL<=0.005 MIU/L-ACNC: 5.14 UIU/ML (ref 0.45–4.5)
VIT B12 SERPL-MCNC: 390 PG/ML (ref 232–1245)
VLDLC SERPL CALC-MCNC: 17 MG/DL (ref 5–40)
WBC # BLD AUTO: 9.2 X10E3/UL (ref 3.4–10.8)

## 2024-07-31 ENCOUNTER — OFFICE VISIT (OUTPATIENT)
Dept: FAMILY MEDICINE CLINIC | Facility: CLINIC | Age: 89
End: 2024-07-31
Payer: MEDICARE

## 2024-07-31 VITALS
OXYGEN SATURATION: 97 % | TEMPERATURE: 97.5 F | BODY MASS INDEX: 22.71 KG/M2 | RESPIRATION RATE: 18 BRPM | DIASTOLIC BLOOD PRESSURE: 68 MMHG | WEIGHT: 162.2 LBS | HEART RATE: 67 BPM | HEIGHT: 71 IN | SYSTOLIC BLOOD PRESSURE: 130 MMHG

## 2024-07-31 DIAGNOSIS — M48.061 SPINAL STENOSIS OF LUMBAR REGION, UNSPECIFIED WHETHER NEUROGENIC CLAUDICATION PRESENT: ICD-10-CM

## 2024-07-31 DIAGNOSIS — I34.0 MITRAL VALVE INSUFFICIENCY, UNSPECIFIED ETIOLOGY: ICD-10-CM

## 2024-07-31 DIAGNOSIS — H83.3X3 NOISE-INDUCED HEARING LOSS OF BOTH EARS: ICD-10-CM

## 2024-07-31 DIAGNOSIS — M25.552 LEFT HIP PAIN: ICD-10-CM

## 2024-07-31 DIAGNOSIS — I25.5 ISCHEMIC CARDIOMYOPATHY: ICD-10-CM

## 2024-07-31 DIAGNOSIS — I37.9 PULMONARY VALVE DISORDER: ICD-10-CM

## 2024-07-31 DIAGNOSIS — I25.10 ATHEROSCLEROSIS OF NATIVE CORONARY ARTERY OF NATIVE HEART WITHOUT ANGINA PECTORIS: ICD-10-CM

## 2024-07-31 DIAGNOSIS — F41.1 GENERALIZED ANXIETY DISORDER: ICD-10-CM

## 2024-07-31 DIAGNOSIS — N18.30 CHRONIC KIDNEY DISEASE, STAGE III (MODERATE): ICD-10-CM

## 2024-07-31 DIAGNOSIS — J30.2 SEASONAL ALLERGIES: ICD-10-CM

## 2024-07-31 DIAGNOSIS — Z00.01 ENCOUNTER FOR GENERAL ADULT MEDICAL EXAMINATION WITH ABNORMAL FINDINGS: ICD-10-CM

## 2024-07-31 DIAGNOSIS — E11.9 TYPE 2 DIABETES MELLITUS WITHOUT COMPLICATION, WITHOUT LONG-TERM CURRENT USE OF INSULIN: Primary | ICD-10-CM

## 2024-07-31 DIAGNOSIS — R07.81 RIB PAIN ON LEFT SIDE: ICD-10-CM

## 2024-07-31 DIAGNOSIS — S22.42XD CLOSED FRACTURE OF MULTIPLE RIBS OF LEFT SIDE WITH ROUTINE HEALING: ICD-10-CM

## 2024-07-31 DIAGNOSIS — L29.9 PRURITUS: ICD-10-CM

## 2024-07-31 DIAGNOSIS — Z71.85 IMMUNIZATION COUNSELING: ICD-10-CM

## 2024-07-31 DIAGNOSIS — E11.22 TYPE 2 DIABETES MELLITUS WITH STAGE 3A CHRONIC KIDNEY DISEASE, WITHOUT LONG-TERM CURRENT USE OF INSULIN: ICD-10-CM

## 2024-07-31 DIAGNOSIS — L30.9 DERMATITIS: ICD-10-CM

## 2024-07-31 DIAGNOSIS — K29.90 GASTRITIS AND GASTRODUODENITIS: ICD-10-CM

## 2024-07-31 DIAGNOSIS — D51.0 PERNICIOUS ANEMIA: ICD-10-CM

## 2024-07-31 DIAGNOSIS — I65.23 BILATERAL CAROTID ARTERY STENOSIS: ICD-10-CM

## 2024-07-31 DIAGNOSIS — Z80.0 FAMILY HISTORY OF COLON CANCER: ICD-10-CM

## 2024-07-31 DIAGNOSIS — E78.2 MIXED HYPERLIPIDEMIA: ICD-10-CM

## 2024-07-31 DIAGNOSIS — N40.1 BENIGN PROSTATIC HYPERPLASIA WITH URINARY FREQUENCY: ICD-10-CM

## 2024-07-31 DIAGNOSIS — B35.6 TINEA CRURIS: ICD-10-CM

## 2024-07-31 DIAGNOSIS — H60.63 CHRONIC NON-INFECTIVE OTITIS EXTERNA OF BOTH EARS, UNSPECIFIED TYPE: ICD-10-CM

## 2024-07-31 DIAGNOSIS — N40.0 PROSTATIC HYPERTROPHY: ICD-10-CM

## 2024-07-31 DIAGNOSIS — N18.31 STAGE 3A CHRONIC KIDNEY DISEASE: ICD-10-CM

## 2024-07-31 DIAGNOSIS — I49.1 PAC (PREMATURE ATRIAL CONTRACTION): ICD-10-CM

## 2024-07-31 DIAGNOSIS — R80.9 PROTEINURIA, UNSPECIFIED TYPE: ICD-10-CM

## 2024-07-31 DIAGNOSIS — E87.5 HYPERKALEMIA: ICD-10-CM

## 2024-07-31 DIAGNOSIS — T82.855S STENOSIS OF CORONARY ARTERY STENT, SEQUELA: ICD-10-CM

## 2024-07-31 DIAGNOSIS — F43.9 SITUATIONAL STRESS: ICD-10-CM

## 2024-07-31 DIAGNOSIS — I51.7 LEFT ATRIAL ENLARGEMENT: ICD-10-CM

## 2024-07-31 DIAGNOSIS — E05.90 SUBCLINICAL HYPERTHYROIDISM: ICD-10-CM

## 2024-07-31 DIAGNOSIS — R29.818 NEUROGENIC CLAUDICATION: ICD-10-CM

## 2024-07-31 DIAGNOSIS — R35.0 BENIGN PROSTATIC HYPERPLASIA WITH URINARY FREQUENCY: ICD-10-CM

## 2024-07-31 DIAGNOSIS — F43.21 GRIEF: ICD-10-CM

## 2024-07-31 DIAGNOSIS — M62.3 IMMOBILITY SYNDROME: ICD-10-CM

## 2024-07-31 DIAGNOSIS — G47.33 OSA (OBSTRUCTIVE SLEEP APNEA): Chronic | ICD-10-CM

## 2024-07-31 DIAGNOSIS — N18.31 TYPE 2 DIABETES MELLITUS WITH STAGE 3A CHRONIC KIDNEY DISEASE, WITHOUT LONG-TERM CURRENT USE OF INSULIN: ICD-10-CM

## 2024-07-31 DIAGNOSIS — E21.5 PARATHYROID ABNORMALITY: ICD-10-CM

## 2024-07-31 DIAGNOSIS — F32.A DEPRESSION, UNSPECIFIED DEPRESSION TYPE: ICD-10-CM

## 2024-07-31 DIAGNOSIS — J45.909 ASTHMA, UNSPECIFIED ASTHMA SEVERITY, UNSPECIFIED WHETHER COMPLICATED, UNSPECIFIED WHETHER PERSISTENT: ICD-10-CM

## 2024-07-31 DIAGNOSIS — R79.89 ELEVATED LIVER FUNCTION TESTS: ICD-10-CM

## 2024-07-31 DIAGNOSIS — D72.829 LEUKOCYTOSIS, UNSPECIFIED TYPE: ICD-10-CM

## 2024-07-31 DIAGNOSIS — I10 HYPERTENSION, BENIGN: ICD-10-CM

## 2024-07-31 DIAGNOSIS — R35.1 NOCTURIA: ICD-10-CM

## 2024-07-31 DIAGNOSIS — K29.70 GASTRITIS AND GASTRODUODENITIS: ICD-10-CM

## 2024-07-31 DIAGNOSIS — R05.9 COUGH, UNSPECIFIED TYPE: ICD-10-CM

## 2024-07-31 DIAGNOSIS — I73.9 ASYMPTOMATIC PERIPHERAL VASCULAR DISEASE: ICD-10-CM

## 2024-07-31 DIAGNOSIS — R53.83 LETHARGY: ICD-10-CM

## 2024-07-31 PROBLEM — T82.855A STENOSIS OF CORONARY ARTERY STENT: Status: RESOLVED | Noted: 2019-11-25 | Resolved: 2024-07-31

## 2024-07-31 LAB — METHYLMALONATE SERPL-SCNC: 770 NMOL/L (ref 0–378)

## 2024-07-31 RX ORDER — GLIPIZIDE 5 MG/1
5 TABLET, FILM COATED, EXTENDED RELEASE ORAL DAILY
Qty: 90 TABLET | Refills: 4 | Status: SHIPPED | OUTPATIENT
Start: 2024-07-31

## 2024-07-31 RX ORDER — METOPROLOL SUCCINATE 50 MG/1
50 TABLET, EXTENDED RELEASE ORAL DAILY
Qty: 90 TABLET | Refills: 4 | Status: SHIPPED | OUTPATIENT
Start: 2024-07-31

## 2024-07-31 RX ORDER — OMEPRAZOLE 40 MG/1
40 CAPSULE, DELAYED RELEASE ORAL DAILY
Qty: 90 CAPSULE | Refills: 4 | Status: SHIPPED | OUTPATIENT
Start: 2024-07-31

## 2024-07-31 RX ORDER — MONTELUKAST SODIUM 10 MG/1
10 TABLET ORAL NIGHTLY
Qty: 90 TABLET | Refills: 4 | Status: SHIPPED | OUTPATIENT
Start: 2024-07-31

## 2024-07-31 RX ORDER — ATORVASTATIN CALCIUM 80 MG/1
80 TABLET, FILM COATED ORAL
Qty: 90 TABLET | Refills: 4 | Status: SHIPPED | OUTPATIENT
Start: 2024-07-31

## 2024-07-31 RX ORDER — FINASTERIDE 5 MG/1
5 TABLET, FILM COATED ORAL DAILY
Qty: 90 TABLET | Refills: 4 | Status: SHIPPED | OUTPATIENT
Start: 2024-07-31

## 2024-07-31 RX ORDER — ALBUTEROL SULFATE 90 UG/1
2 AEROSOL, METERED RESPIRATORY (INHALATION) EVERY 4 HOURS PRN
Qty: 18 G | Refills: 5 | Status: SHIPPED | OUTPATIENT
Start: 2024-07-31

## 2024-07-31 RX ORDER — CYANOCOBALAMIN 1000 UG/ML
1000 INJECTION, SOLUTION INTRAMUSCULAR; SUBCUTANEOUS
Status: SHIPPED | OUTPATIENT
Start: 2024-07-31

## 2024-07-31 RX ORDER — ENALAPRIL MALEATE 2.5 MG/1
TABLET ORAL
Qty: 90 TABLET | Refills: 4 | Status: SHIPPED | OUTPATIENT
Start: 2024-07-31

## 2024-07-31 RX ADMIN — CYANOCOBALAMIN 1000 MCG: 1000 INJECTION, SOLUTION INTRAMUSCULAR; SUBCUTANEOUS at 15:43

## 2024-07-31 NOTE — ASSESSMENT & PLAN NOTE
Improved.  CMP done 7-25-24, read by me, reviewed with pt.  Creatinine was 1.31 down from 1.40, EGFR was 52 up from 48.  Avoid anti-inflammatory pills

## 2024-07-31 NOTE — ASSESSMENT & PLAN NOTE
Doing well.  Patient tolerated Proscar well without side effects. I feel the benefits of the medication outweigh the risks.

## 2024-07-31 NOTE — ASSESSMENT & PLAN NOTE
Mild, pt. Declines work up or treatment.  TSH done 7-25-24, read by me, reviewed with pt.  TSH was 5.140 up from 2.530

## 2024-07-31 NOTE — ASSESSMENT & PLAN NOTE
Lipid and CMP  done 7-25-24, read by me, reviewed with pt.  Trig. 87 down from 147, Tot. Chol. 124 down from 145, HDL 45 same as last, LDL 62 down from 74  Doing well.  Encouraged to watch fatty intake, exercise more, and lose weight.   Compliant with medication.  Patient tolerated Lipitor well without side effects. I feel the benefits of the medication outweigh the risks.    Is getting adequate diet and exercise  Goals developed at last visit were met   Follow up in 4  months  Care management needs are self-addressed. Self-management abilities addressed and patient is capable of managing his own disease.

## 2024-07-31 NOTE — ASSESSMENT & PLAN NOTE
A1c done 7-25-24, read by me, reviewed with pt.  A1c was 6.1 down from 6.0  Worsening but good control with no evidence of hypoglycemia.   Encouraged to watch sugar intake, exercise more and lose weight.   Compliant with  medication. Patient tolerated Glipizide well without side effects. I feel the benefits of the medication outweigh the risks.   Not monitoring sugar at home.   Follow up in 3 months  Care management needs are self-addressed.  Self-management abilities addressed and patient is capable of managing his own disease.

## 2024-07-31 NOTE — ASSESSMENT & PLAN NOTE
Doing well.  Patient tolerated Enalapril and Metoprololwell without side effects. I feel the benefits of the medication outweigh the risks.   Encouraged to watch salt, exercise more and lose weight.

## 2024-07-31 NOTE — ASSESSMENT & PLAN NOTE
Worse, will repeat with next labs.  CMP done 7-25-24, read by me, reviewed with pt.  Potassium was 5.4 up from 5.2.  Avoid foods high in potassium

## 2024-07-31 NOTE — ASSESSMENT & PLAN NOTE
Doing well.  PTH done 7-25-24, read by me, reviewed with pt.  PTH was 57 up from 53.  Will repeat with next labs.

## 2024-07-31 NOTE — ASSESSMENT & PLAN NOTE
Resolved x 2.  CBC done 7-25-24, read by me, reviewed with pt.  WBC was 9.2 down from 10.2.  Will repeat with next labs.

## 2024-07-31 NOTE — ASSESSMENT & PLAN NOTE
Doing well.  Patient tolerated Zoloft well without side effects. I feel the benefits of the medication outweigh the risks. Will possibly decrease dose of Zoloft to 25 mg from 50 mg at next visit.

## 2024-07-31 NOTE — ASSESSMENT & PLAN NOTE
Doing well.  Patient tolerated Albuterol and Singulair well without side effects. I feel the benefits of the medication outweigh the risks.   Advised to avoid dust and smoke exposure

## 2024-07-31 NOTE — ASSESSMENT & PLAN NOTE
Slightly worse.  CBC, B12  done 7-25-24, read by me, reviewed with pt.  MCHC 30.9 down from 31.6, HGB was 12.8 down from 13.7, B12 was 390 up from 307.  Will draw b12 and MMA with next labs.  1 cc of B12 will be given today

## 2024-08-01 PROBLEM — B35.6 TINEA CRURIS: Status: RESOLVED | Noted: 2023-12-18 | Resolved: 2024-08-01

## 2024-08-01 PROBLEM — R07.81 RIB PAIN ON LEFT SIDE: Status: RESOLVED | Noted: 2024-05-30 | Resolved: 2024-08-01

## 2024-08-01 PROBLEM — R79.89 ELEVATED LIVER FUNCTION TESTS: Status: ACTIVE | Noted: 2024-08-01

## 2024-08-01 NOTE — ASSESSMENT & PLAN NOTE
New dx.  TSH done 7-25-24, read by me, reviewed with pt.  TSH was 5.140 up from 2.530.  Will repeat with next labs.

## 2024-08-01 NOTE — ASSESSMENT & PLAN NOTE
New dx.  CMP done 7-25-24, read by me, reviewed with pt.  AST was 33 up from 29, ALT was 48 up from 38.  Will repeat with next labs.

## 2024-08-01 NOTE — ASSESSMENT & PLAN NOTE
Doing well.  Patient tolerated Prilosec well without side effects. I feel the benefits of the medication outweigh the risks.

## 2024-08-04 PROBLEM — D12.8 ADENOMATOUS POLYP OF RECTUM: Status: ACTIVE | Noted: 2024-08-04

## 2024-08-04 PROBLEM — E03.8 SUBCLINICAL HYPOTHYROIDISM: Status: ACTIVE | Noted: 2024-08-04

## 2024-11-20 ENCOUNTER — HOSPITAL ENCOUNTER (OUTPATIENT)
Dept: GENERAL RADIOLOGY | Facility: HOSPITAL | Age: 89
Discharge: HOME | End: 2024-11-20
Admitting: FAMILY MEDICINE
Payer: MEDICARE

## 2024-11-20 ENCOUNTER — OFFICE VISIT (OUTPATIENT)
Dept: FAMILY MEDICINE CLINIC | Facility: CLINIC | Age: 89
End: 2024-11-20
Payer: MEDICARE

## 2024-11-20 VITALS
OXYGEN SATURATION: 99 % | HEIGHT: 70 IN | SYSTOLIC BLOOD PRESSURE: 110 MMHG | WEIGHT: 152.2 LBS | BODY MASS INDEX: 21.79 KG/M2 | RESPIRATION RATE: 18 BRPM | HEART RATE: 66 BPM | TEMPERATURE: 96.9 F | DIASTOLIC BLOOD PRESSURE: 70 MMHG

## 2024-11-20 DIAGNOSIS — D72.829 LEUKOCYTOSIS, UNSPECIFIED TYPE: ICD-10-CM

## 2024-11-20 DIAGNOSIS — E78.2 MIXED HYPERLIPIDEMIA: ICD-10-CM

## 2024-11-20 DIAGNOSIS — D51.0 PERNICIOUS ANEMIA: ICD-10-CM

## 2024-11-20 DIAGNOSIS — R06.02 SHORTNESS OF BREATH: ICD-10-CM

## 2024-11-20 DIAGNOSIS — E11.9 TYPE 2 DIABETES MELLITUS WITHOUT COMPLICATION, WITHOUT LONG-TERM CURRENT USE OF INSULIN: ICD-10-CM

## 2024-11-20 DIAGNOSIS — I25.5 ISCHEMIC CARDIOMYOPATHY: Primary | ICD-10-CM

## 2024-11-20 DIAGNOSIS — J45.909 ASTHMA, UNSPECIFIED ASTHMA SEVERITY, UNSPECIFIED WHETHER COMPLICATED, UNSPECIFIED WHETHER PERSISTENT: ICD-10-CM

## 2024-11-20 DIAGNOSIS — J30.2 SEASONAL ALLERGIES: ICD-10-CM

## 2024-11-20 DIAGNOSIS — E05.90 SUBCLINICAL HYPERTHYROIDISM: ICD-10-CM

## 2024-11-20 PROBLEM — R06.00 DYSPNEA: Status: ACTIVE | Noted: 2024-11-20

## 2024-11-20 PROCEDURE — 71046 X-RAY EXAM CHEST 2 VIEWS: CPT

## 2024-11-20 RX ORDER — FUROSEMIDE 40 MG/1
40 TABLET ORAL DAILY
Start: 2024-11-20

## 2024-11-20 RX ORDER — MONTELUKAST SODIUM 10 MG/1
10 TABLET ORAL NIGHTLY
Start: 2024-11-20

## 2024-11-20 RX ORDER — FUROSEMIDE 40 MG/1
40 TABLET ORAL DAILY
COMMUNITY
End: 2024-11-20 | Stop reason: SDUPTHER

## 2024-11-20 RX ORDER — METOPROLOL SUCCINATE 100 MG/1
100 TABLET, EXTENDED RELEASE ORAL DAILY
COMMUNITY

## 2024-11-20 NOTE — PROGRESS NOTES
Subjective   Mikael Shanks is a 89 y.o. male.   Chief Complaint   Patient presents with   • Allergic Rhinitis   • Congestive Heart Failure   • Shortness of Breath     History of Present Illness  [facility]  Records are not available for review  Allergic Rhinitis  Presents for initial visit. He complains of congestion (nasal), headaches, rhinorrhea and sinus pressure. He reports no fever or sneezing. The problem occurs intermittently. The treatment provided no relief.   Congestive Heart Failure  Presents for follow-up visit. Associated symptoms include shortness of breath. The symptoms have been stable. Compliance with total regimen is %.   Shortness of Breath  This is a chronic problem. The current episode started 1 to 4 weeks ago. The problem occurs constantly. The problem has been improved. Associated symptoms include headaches and rhinorrhea. Pertinent negatives include no fever.        The following portions of the patient's history were reviewed and updated as appropriate: allergies, current medications, past family history, past medical history, past social history, past surgical history, and problem list.    Past Medical History:   Diagnosis Date   • Carotid artery disease    • Coronary artery disease    • Diabetes mellitus    • GERD (gastroesophageal reflux disease)    • [place] (hard of hearing)    • Hyperlipidemia    • Hypertension    • Osteoarthritis    • Prostatic hypertrophy    • Pulmonary valve disorder    • Sleep apnea     cpap   doesnt use    • Stroke        Past Surgical History:   Procedure Laterality Date   • BACK SURGERY     • CARDIAC CATHETERIZATION N/A 12/5/2019    Procedure: Left Heart Cath and coronary angiogram;  Surgeon: Dayday Ruiz MD;  Location: [facility] INVASIVE LOCATION;  Service: Cardiovascular   • CARDIAC CATHETERIZATION N/A 12/5/2019    Procedure: Right and Left Heart Cath;  Surgeon: Dayday Ruiz MD;  Location: [facility]  INVASIVE LOCATION;  Service: Cardiovascular   • CARDIAC CATHETERIZATION N/A 2020    Procedure: Left and Right Heart Cath with Coronary Angiography;  Surgeon: Dayday Ruiz MD;  Location: [facility] CATH INVASIVE LOCATION;  Service: Cardiovascular;  Laterality: N/A;   • CAROTID ENDARTERECTOMY Left    • CHOLECYSTECTOMY     • COLONOSCOPY  2018    No repeat   • CORONARY ANGIOPLASTY WITH STENT PLACEMENT     • LUMBAR LAMINECTOMY Bilateral 2022    Procedure: LUMBAR LAMINECTOMY WITH/WITHOUT FUSION L4-L5;  Surgeon: Geronimo Gonzales MD;  Location: [facility] MAIN OR;  Service: Neurosurgery;  Laterality: Bilateral;        Family History   Problem Relation Age of Onset   • Cancer Mother    • Heart disease Father    • Cancer Brother         Social History     Socioeconomic History   • Marital status:    Tobacco Use   • Smoking status: Former     Current packs/day: 0.00     Average packs/day: 5.0 packs/day for 7.0 years (35.0 ttl pk-yrs)     Types: Cigarettes     Start date: 1953     Quit date: 1960     Years since quittin.2     Passive exposure: Past   • Smokeless tobacco: Never   Vaping Use   • Vaping status: Never Used   Substance and Sexual Activity   • Alcohol use: Not Currently     Comment: very rare   • Drug use: No   • Sexual activity: Never       Outpatient Medications Prior to Visit   Medication Sig Dispense Refill   • albuterol sulfate  (90 Base) MCG/ACT inhaler Inhale 2 puffs Every 4 (Four) Hours As Needed for Wheezing. 18 g 5   • atorvastatin (LIPITOR) 80 MG tablet Take 1 tablet by mouth every night at bedtime. 90 tablet 4   • Cholecalciferol 25 MCG (1000 UT) tablet Take 2 tablets by mouth Daily.     • clotrimazole-betamethasone (LOTRISONE) 1-0.05 % cream Apply 1 Application topically to the appropriate area as directed 2 (Two) Times a Day. 60 g 1   • empagliflozin (JARDIANCE) 25 MG tablet tablet Take 1 tablet by mouth Daily.     • finasteride (PROSCAR) 5 MG tablet Take 1  tablet by mouth Daily. 90 tablet 4   • glipizide (glipiZIDE XL) 5 MG ER tablet Take 1 tablet by mouth Daily. 90 tablet 4   • lidocaine (LIDODERM) 5 % Place 1 patch on the skin as directed by provider Daily. Remove & Discard patch within 12 hours or as directed by MD 10 each 0   • metoprolol succinate XL (TOPROL-XL) 100 MG 24 hr tablet Take 1 tablet by mouth Daily. Take 1/2 pill daily     • metoprolol succinate XL (TOPROL-XL) 50 MG 24 hr tablet Take 1 tablet by mouth Daily. 90 tablet 4   • Multiple Vitamins-Minerals (PRESERVISION AREDS 2 PO) Take 1 Capful by mouth 2 (Two) Times a Day.     • omeprazole (priLOSEC) 40 MG capsule Take 1 capsule by mouth Daily. 90 capsule 4   • sacubitril-valsartan (ENTRESTO) 24-26 MG tablet Take 1 tablet by mouth 2 (Two) Times a Day.     • sertraline (ZOLOFT) 50 MG tablet Take 1 tablet by mouth Daily. 90 tablet 4   • furosemide (LASIX) 40 MG tablet Take 1 tablet by mouth Daily.     • montelukast (Singulair) 10 MG tablet Take 1 tablet by mouth Every Night. 90 tablet 4   • Blood Glucose Monitoring Suppl (ONE TOUCH ULTRA 2) w/Device kit 1 Device Daily. Test 1 x daily (Patient not taking: Reported on 11/20/2024) 1 each 0   • enalapril (VASOTEC) 2.5 MG tablet TAKE 1 TABLET BY MOUTH DAILY 90 tablet 4   • glucose blood (ONE TOUCH ULTRA TEST) test strip Test 1 x daily (Patient not taking: Reported on 11/20/2024) 100 each 5   • multivitamin with minerals tablet tablet Take 1 tablet by mouth Daily. LD 1/24     • OneTouch Delica Lancets 33G misc 1 strip Daily. Test 1 x daily (Patient not taking: Reported on 11/20/2024) 100 each 5     Facility-Administered Medications Prior to Visit   Medication Dose Route Frequency Provider Last Rate Last Admin   • cyanocobalamin injection 1,000 mcg  1,000 mcg Intramuscular Q28 Days Xander Robison MD   1,000 mcg at 07/31/24 1543        Review of Systems   Constitutional:  Negative for chills, diaphoresis and fever.   HENT:  Positive for congestion (nasal),  "rhinorrhea and sinus pressure. Negative for sneezing.    Respiratory:  Positive for shortness of breath.        Objective   Visit Vitals  /70 (BP Location: Left arm, Patient Position: Sitting, Cuff Size: Adult)   Pulse 66   Temp 96.9 °F (36.1 °C) (Temporal)   Resp 18   Ht 177.8 cm (70\")   Wt 69 kg (152 lb 3.2 oz)   SpO2 99%   BMI 21.84 kg/m²     Physical Exam  Vitals and nursing note reviewed.   Constitutional:       Appearance: He is well-developed.   HENT:      Head: Normocephalic.   Neck:      Thyroid: No thyromegaly.      Vascular: No carotid bruit.      Trachea: Trachea normal.   Cardiovascular:      Rate and Rhythm: Normal rate and regular rhythm.      Heart sounds: No murmur heard.     No friction rub. No gallop.   Pulmonary:      Effort: Pulmonary effort is normal. No respiratory distress.      Breath sounds: Normal breath sounds. No wheezing.   Chest:      Chest wall: No tenderness.   Musculoskeletal:      Cervical back: Neck supple.   Skin:     General: Skin is dry.      Findings: No rash.      Nails: There is no clubbing.   Neurological:      Mental Status: He is alert and oriented to person, place, and time.   Psychiatric:         Behavior: Behavior is cooperative.       Assessment & Plan   Problem List Items Addressed This Visit          High    Ischemic cardiomyopathy - Primary    Overview     Dr. Patel done cardiac cath done September 4, 2020 showing normal ejection fraction right coronary artery stent is patent with 20% blockage left anterior descending has luminal irregularities.  ECHO done at the same time.  Last EKG was done June 20, 2024 showed ectopic atrial rhythm with 1 PVC and nonspecific changes         Current Assessment & Plan     Worse since last seen in office and improved since [facility].  Advised BNP, pt. Agrees and will follow on 11-26-24         Relevant Medications    sacubitril-valsartan (ENTRESTO) 24-26 MG tablet    metoprolol succinate XL (TOPROL-XL) 100 MG 24 hr " tablet    Other Relevant Orders    BNP       Medium    Asthma    Current Assessment & Plan     Worse since last seen by me but improved since hospitalization.  He is continue albuterol and Singulair without side effects.  Benefits outweigh the risks               Relevant Medications    montelukast (Singulair) 10 MG tablet    Mixed hyperlipidemia    Current Assessment & Plan      Will order labs today and patient will return for results and shared decision making.           Relevant Orders    Lipid Panel With / Chol / HDL Ratio (Completed)    Comprehensive Metabolic Panel (Completed)    Pernicious anemia    Current Assessment & Plan     Will order labs today and patient will return for results and shared decision making.           Relevant Orders    Vitamin B12 (Completed)    Methylmalonic Acid, Serum    Type 2 diabetes mellitus without complication, without long-term current use of insulin    Overview     Hgba1c 6% on 5/29/2024         Current Assessment & Plan     Will order labs today and patient will return for results and shared decision making.           Relevant Medications    empagliflozin (JARDIANCE) 25 MG tablet tablet    Other Relevant Orders    Hemoglobin A1c (Completed)       Low    Subclinical hyperthyroidism    Current Assessment & Plan     Will order labs today and patient will return for results and shared decision making.           Relevant Medications    metoprolol succinate XL (TOPROL-XL) 100 MG 24 hr tablet    Other Relevant Orders    TSH (Completed)       Unprioritized    Dyspnea    Current Assessment & Plan     Worse, advised chest Xray, pt. Agrees and will follow up 11-26-24         Relevant Orders    XR Chest PA & Lateral (Completed)    Leukocytosis    Overview      Asymptomatic.         Current Assessment & Plan     Will order labs today and patient will return for results and shared decision making.           Relevant Orders    CBC & Differential (Completed)    Seasonal allergies     Current Assessment & Plan     Worse.  Continue Singulair and try OTC Claritin and Flonase

## 2024-11-20 NOTE — ASSESSMENT & PLAN NOTE
Worse since last seen in office and improved since [facility].  Advised BNP, pt. Agrees and will follow on 11-26-24

## 2024-11-21 LAB
ALBUMIN SERPL-MCNC: 3.9 G/DL (ref 3.7–4.7)
ALP SERPL-CCNC: 105 IU/L (ref 44–121)
ALT SERPL-CCNC: 34 IU/L (ref 0–44)
AST SERPL-CCNC: 34 IU/L (ref 0–40)
BASOPHILS # BLD AUTO: 0.2 X10E3/UL (ref 0–0.2)
BASOPHILS NFR BLD AUTO: 2 %
BILIRUB SERPL-MCNC: 0.4 MG/DL (ref 0–1.2)
BUN SERPL-MCNC: 30 MG/DL (ref 8–27)
BUN/CREAT SERPL: 16 (ref 10–24)
CALCIUM SERPL-MCNC: 9.5 MG/DL (ref 8.6–10.2)
CHLORIDE SERPL-SCNC: 98 MMOL/L (ref 96–106)
CHOLEST SERPL-MCNC: 132 MG/DL (ref 100–199)
CHOLEST/HDLC SERPL: 3.3 RATIO (ref 0–5)
CO2 SERPL-SCNC: 26 MMOL/L (ref 20–29)
CREAT SERPL-MCNC: 1.85 MG/DL (ref 0.76–1.27)
EGFRCR SERPLBLD CKD-EPI 2021: 34 ML/MIN/1.73
EOSINOPHIL # BLD AUTO: 0.2 X10E3/UL (ref 0–0.4)
EOSINOPHIL NFR BLD AUTO: 2 %
ERYTHROCYTE [DISTWIDTH] IN BLOOD BY AUTOMATED COUNT: 12.9 % (ref 11.6–15.4)
GLOBULIN SER CALC-MCNC: 3 G/DL (ref 1.5–4.5)
GLUCOSE SERPL-MCNC: 90 MG/DL (ref 70–99)
HBA1C MFR BLD: 6.5 % (ref 4.8–5.6)
HCT VFR BLD AUTO: 42.4 % (ref 37.5–51)
HDLC SERPL-MCNC: 40 MG/DL
HGB BLD-MCNC: 12.9 G/DL (ref 13–17.7)
IMM GRANULOCYTES # BLD AUTO: 0.3 X10E3/UL (ref 0–0.1)
IMM GRANULOCYTES NFR BLD AUTO: 3 %
LDLC SERPL CALC-MCNC: 73 MG/DL (ref 0–99)
LYMPHOCYTES # BLD AUTO: 1.1 X10E3/UL (ref 0.7–3.1)
LYMPHOCYTES NFR BLD AUTO: 11 %
MCH RBC QN AUTO: 27.4 PG (ref 26.6–33)
MCHC RBC AUTO-ENTMCNC: 30.4 G/DL (ref 31.5–35.7)
MCV RBC AUTO: 90 FL (ref 79–97)
MONOCYTES # BLD AUTO: 0.6 X10E3/UL (ref 0.1–0.9)
MONOCYTES NFR BLD AUTO: 6 %
NEUTROPHILS # BLD AUTO: 7.5 X10E3/UL (ref 1.4–7)
NEUTROPHILS NFR BLD AUTO: 76 %
PLATELET # BLD AUTO: 395 X10E3/UL (ref 150–450)
POTASSIUM SERPL-SCNC: 4.7 MMOL/L (ref 3.5–5.2)
PROT SERPL-MCNC: 6.9 G/DL (ref 6–8.5)
RBC # BLD AUTO: 4.71 X10E6/UL (ref 4.14–5.8)
SODIUM SERPL-SCNC: 138 MMOL/L (ref 134–144)
SPECIMEN STATUS: NORMAL
TRIGL SERPL-MCNC: 100 MG/DL (ref 0–149)
TSH SERPL DL<=0.005 MIU/L-ACNC: 3.51 UIU/ML (ref 0.45–4.5)
VIT B12 SERPL-MCNC: 573 PG/ML (ref 232–1245)
VLDLC SERPL CALC-MCNC: 19 MG/DL (ref 5–40)
WBC # BLD AUTO: 9.8 X10E3/UL (ref 3.4–10.8)

## 2024-11-22 NOTE — ASSESSMENT & PLAN NOTE
Worse since last seen by me but improved since hospitalization.  He is continue albuterol and Singulair without side effects.  Benefits outweigh the risks

## 2024-11-23 LAB — METHYLMALONATE SERPL-SCNC: 667 NMOL/L (ref 0–378)

## 2024-11-25 NOTE — ASSESSMENT & PLAN NOTE
Lipid and CMP done 11-, read by me, reviewed with pt.  Trig. 100 up from 87, Tot. Chol. 132 up from 124, HDL 40 down from 45, LDL 73 up from 62  Doing well.    Encouraged to watch fatty intake, exercise more, and lose weight.   Compliant with medication   Is getting adequate diet and exercise  Goals developed at last visit were met   Follow up in  3 months  Care management needs are self-addressed. Self-management abilities addressed and patient is capable of managing his own disease.

## 2024-11-25 NOTE — ASSESSMENT & PLAN NOTE
Improved.  CBC, B12 and MMA done 11-, read by me, reviewed with pt.  MMA was 667 down from 770, B12 was 573 up from 390, CBC showed HGB 12.9 up from 12.8, MCHC 30.4 down from 30.9, WBC was normal

## 2024-11-25 NOTE — ASSESSMENT & PLAN NOTE
Worse.  CMP done 11-, read by me, reviewed with pt.  Creatinine was 1.85 up from 1.31, EGFR was 34 down from 52.  This may be due to hypotension thus we will decrease Lasix

## 2024-11-25 NOTE — ASSESSMENT & PLAN NOTE
Resolved x 3, thus delete.  CBC done 11-, read by me, reviewed with pt.  CBC showed HGB 12.9 up from 12.8, MCHC 30.4 down from 30.9, WBC was normal

## 2024-11-25 NOTE — ASSESSMENT & PLAN NOTE
A1c done 11-, read by me, reviewed with pt.  A1c was 6.5 up from 6.1  Worsening.  Consider Farxiga this also would help renal functions  Encouraged to watch sugar intake, exercise more and lose weight.   Compliant with medication.   Monitoring sugar at home.   Follow up in 1 months  Care management needs are self-addressed.  Self-management abilities addressed and patient is capable of managing his own disease.

## 2024-11-26 ENCOUNTER — OFFICE VISIT (OUTPATIENT)
Dept: FAMILY MEDICINE CLINIC | Facility: CLINIC | Age: 89
End: 2024-11-26
Payer: MEDICARE

## 2024-11-26 VITALS
HEART RATE: 76 BPM | TEMPERATURE: 97.1 F | RESPIRATION RATE: 18 BRPM | DIASTOLIC BLOOD PRESSURE: 59 MMHG | WEIGHT: 152.2 LBS | SYSTOLIC BLOOD PRESSURE: 103 MMHG | HEIGHT: 70 IN | OXYGEN SATURATION: 99 % | BODY MASS INDEX: 21.79 KG/M2

## 2024-11-26 DIAGNOSIS — I95.9 HYPOTENSION, UNSPECIFIED HYPOTENSION TYPE: ICD-10-CM

## 2024-11-26 DIAGNOSIS — E05.90 SUBCLINICAL HYPERTHYROIDISM: ICD-10-CM

## 2024-11-26 DIAGNOSIS — D51.0 PERNICIOUS ANEMIA: ICD-10-CM

## 2024-11-26 DIAGNOSIS — D72.829 LEUKOCYTOSIS, UNSPECIFIED TYPE: ICD-10-CM

## 2024-11-26 DIAGNOSIS — R79.89 ELEVATED LIVER FUNCTION TESTS: ICD-10-CM

## 2024-11-26 DIAGNOSIS — E87.5 HYPERKALEMIA: ICD-10-CM

## 2024-11-26 DIAGNOSIS — N18.31 STAGE 3A CHRONIC KIDNEY DISEASE: ICD-10-CM

## 2024-11-26 DIAGNOSIS — I25.5 ISCHEMIC CARDIOMYOPATHY: Primary | ICD-10-CM

## 2024-11-26 DIAGNOSIS — E78.2 MIXED HYPERLIPIDEMIA: ICD-10-CM

## 2024-11-26 DIAGNOSIS — E03.8 SUBCLINICAL HYPOTHYROIDISM: ICD-10-CM

## 2024-11-26 DIAGNOSIS — E11.9 TYPE 2 DIABETES MELLITUS WITHOUT COMPLICATION, WITHOUT LONG-TERM CURRENT USE OF INSULIN: ICD-10-CM

## 2024-11-26 DIAGNOSIS — I10 HYPERTENSION, BENIGN: ICD-10-CM

## 2024-11-26 DIAGNOSIS — E53.8 VITAMIN B 12 DEFICIENCY: ICD-10-CM

## 2024-11-26 RX ORDER — CYANOCOBALAMIN 1000 UG/ML
1000 INJECTION, SOLUTION INTRAMUSCULAR; SUBCUTANEOUS
Status: SHIPPED | OUTPATIENT
Start: 2024-11-26

## 2024-11-26 RX ADMIN — CYANOCOBALAMIN 1000 MCG: 1000 INJECTION, SOLUTION INTRAMUSCULAR; SUBCUTANEOUS at 15:52

## 2024-11-26 NOTE — ASSESSMENT & PLAN NOTE
CBC, B12 and MMA done 11-, read by me, reviewed with pt.  MMA was 667 down from 770, B12 was 573 up from 390, CBC showed HGB 12.9 up from 12.8, MCHC 30.4 down from 30.9, WBC was normal.  Patient given B12 injection today.

## 2024-11-26 NOTE — ASSESSMENT & PLAN NOTE
Resolved x 1.  CMP done 11-, read by me, reviewed with pt.   AST was 34 up from 33, ALT was 34 down from 48

## 2024-11-26 NOTE — PROGRESS NOTES
Subjective   Mikael Shanks is a 89 y.o. male.   Chief Complaint   Patient presents with    Diabetes    Hyperlipidemia    Hypertension    Anemia    Hypothyroidism    Congestive Heart Failure     Diabetes  He presents for his follow-up diabetic visit. He has type 2 diabetes mellitus. His disease course has been worsening. Pertinent negatives for diabetes include no chest pain, no fatigue, no polyphagia, no polyuria and no weight loss.   Hyperlipidemia  This is a chronic problem. The current episode started more than 1 year ago. The problem is controlled. Exacerbating diseases include hypothyroidism. Factors aggravating his hyperlipidemia include fatty foods. Associated symptoms include shortness of breath. Pertinent negatives include no chest pain or myalgias. Current antihyperlipidemic treatment includes statins. The current treatment provides significant improvement of lipids.   Hypertension  This is a chronic problem. The current episode started more than 1 year ago. Associated symptoms include shortness of breath. Pertinent negatives include no chest pain or palpitations.   Anemia  Presents for follow-up visit. There has been no palpitations or weight loss. Past medical history includes hypothyroidism. Compliance with medications is %.   Hypothyroidism  Presents for follow-up visit. Patient reports no cold intolerance, dry skin, fatigue, heat intolerance, palpitations, weight gain or weight loss. The symptoms have been stable.   Congestive Heart Failure  Presents for follow-up visit. Associated symptoms include shortness of breath. Pertinent negatives include no chest pain, fatigue or palpitations. The symptoms have been stable. Compliance with total regimen is %. Compliance with diet is %. Compliance with exercise is %. Compliance with medications is %.        The following portions of the patient's history were reviewed and updated as appropriate: allergies, current medications,  past family history, past medical history, past social history, past surgical history, and problem list.    Past Medical History:   Diagnosis Date    Carotid artery disease     Coronary artery disease     Diabetes mellitus     GERD (gastroesophageal reflux disease)     [place] (hard of hearing)     Hyperlipidemia     Hypertension     Osteoarthritis     Prostatic hypertrophy     Pulmonary valve disorder     Sleep apnea     cpap   doesnt use     Stroke        Past Surgical History:   Procedure Laterality Date    BACK SURGERY      CARDIAC CATHETERIZATION N/A 2019    Procedure: Left Heart Cath and coronary angiogram;  Surgeon: Dayday Ruiz MD;  Location: [facility] CATH INVASIVE LOCATION;  Service: Cardiovascular    CARDIAC CATHETERIZATION N/A 2019    Procedure: Right and Left Heart Cath;  Surgeon: Dayday Ruiz MD;  Location: [facility] CATH INVASIVE LOCATION;  Service: Cardiovascular    CARDIAC CATHETERIZATION N/A 2020    Procedure: Left and Right Heart Cath with Coronary Angiography;  Surgeon: Dayday Ruiz MD;  Location: [facility] CATH INVASIVE LOCATION;  Service: Cardiovascular;  Laterality: N/A;    CAROTID ENDARTERECTOMY Left     CHOLECYSTECTOMY      COLONOSCOPY  2018    No repeat    CORONARY ANGIOPLASTY WITH STENT PLACEMENT      LUMBAR LAMINECTOMY Bilateral 2022    Procedure: LUMBAR LAMINECTOMY WITH/WITHOUT FUSION L4-L5;  Surgeon: Geronimo Gonzales MD;  Location: [facility] MAIN OR;  Service: Neurosurgery;  Laterality: Bilateral;        Family History   Problem Relation Age of Onset    Cancer Mother     Heart disease Father     Cancer Brother         Social History     Socioeconomic History    Marital status:    Tobacco Use    Smoking status: Former     Current packs/day: 0.00     Average packs/day: 5.0 packs/day for 7.0 years (35.0 ttl pk-yrs)     Types: Cigarettes     Start date: 1953     Quit date: 1960     Years since quittin.2     Passive exposure: Past    Smokeless  tobacco: Never   Vaping Use    Vaping status: Never Used   Substance and Sexual Activity    Alcohol use: Not Currently     Comment: very rare    Drug use: No    Sexual activity: Never       Outpatient Medications Prior to Visit   Medication Sig Dispense Refill    albuterol sulfate  (90 Base) MCG/ACT inhaler Inhale 2 puffs Every 4 (Four) Hours As Needed for Wheezing. 18 g 5    atorvastatin (LIPITOR) 80 MG tablet Take 1 tablet by mouth every night at bedtime. 90 tablet 4    Blood Glucose Monitoring Suppl (ONE TOUCH ULTRA 2) w/Device kit 1 Device Daily. Test 1 x daily 1 each 0    Cholecalciferol 25 MCG (1000 UT) tablet Take 2 tablets by mouth Daily.      empagliflozin (JARDIANCE) 25 MG tablet tablet Take 1 tablet by mouth Daily.      enalapril (VASOTEC) 2.5 MG tablet TAKE 1 TABLET BY MOUTH DAILY 90 tablet 4    finasteride (PROSCAR) 5 MG tablet Take 1 tablet by mouth Daily. 90 tablet 4    furosemide (LASIX) 40 MG tablet Take 1 tablet by mouth Daily.      glipizide (glipiZIDE XL) 5 MG ER tablet Take 1 tablet by mouth Daily. 90 tablet 4    glucose blood (ONE TOUCH ULTRA TEST) test strip Test 1 x daily 100 each 5    metoprolol succinate XL (TOPROL-XL) 100 MG 24 hr tablet Take 1 tablet by mouth Daily. Take 1/2 pill daily      montelukast (Singulair) 10 MG tablet Take 1 tablet by mouth Every Night.      Multiple Vitamins-Minerals (PRESERVISION AREDS 2 PO) Take 1 Capful by mouth 2 (Two) Times a Day.      multivitamin with minerals tablet tablet Take 1 tablet by mouth Daily. LD 1/24      omeprazole (priLOSEC) 40 MG capsule Take 1 capsule by mouth Daily. 90 capsule 4    OneTouch Delica Lancets 33G misc 1 strip Daily. Test 1 x daily 100 each 5    sacubitril-valsartan (ENTRESTO) 24-26 MG tablet Take 1 tablet by mouth 2 (Two) Times a Day.      sertraline (ZOLOFT) 50 MG tablet Take 1 tablet by mouth Daily. 90 tablet 4    clotrimazole-betamethasone (LOTRISONE) 1-0.05 % cream Apply 1 Application topically to the appropriate  "area as directed 2 (Two) Times a Day. (Patient not taking: Reported on 11/26/2024) 60 g 1    lidocaine (LIDODERM) 5 % Place 1 patch on the skin as directed by provider Daily. Remove & Discard patch within 12 hours or as directed by MD (Patient not taking: Reported on 11/26/2024) 10 each 0    metoprolol succinate XL (TOPROL-XL) 50 MG 24 hr tablet Take 1 tablet by mouth Daily. 90 tablet 4     Facility-Administered Medications Prior to Visit   Medication Dose Route Frequency Provider Last Rate Last Admin    cyanocobalamin injection 1,000 mcg  1,000 mcg Intramuscular Q28 Days Xander Robison MD   1,000 mcg at 07/31/24 7023        Review of Systems   Constitutional:  Negative for appetite change, fatigue, unexpected weight gain and unexpected weight loss.   Eyes:  Negative for visual disturbance.   Respiratory:  Positive for shortness of breath.    Cardiovascular:  Negative for chest pain, palpitations and leg swelling.   Gastrointestinal:  Negative for nausea and indigestion.   Endocrine: Negative for cold intolerance, heat intolerance, polyphagia and polyuria.   Genitourinary:  Negative for frequency.   Musculoskeletal:  Negative for myalgias.   Skin:  Negative for dry skin and skin lesions.   Neurological:  Negative for syncope, numbness and headache.       Objective   Visit Vitals  /59 (BP Location: Left arm, Patient Position: Sitting, Cuff Size: Adult)   Pulse 76   Temp 97.1 °F (36.2 °C) (Temporal)   Resp 18   Ht 177.8 cm (70\")   Wt 69 kg (152 lb 3.2 oz)   SpO2 99%   BMI 21.84 kg/m²     Physical Exam  Vitals and nursing note reviewed.   Constitutional:       Appearance: He is well-developed.   HENT:      Head: Normocephalic.   Neck:      Thyroid: No thyromegaly.      Vascular: No carotid bruit.      Trachea: Trachea normal.   Cardiovascular:      Rate and Rhythm: Normal rate and regular rhythm.      Heart sounds: No murmur heard.     No friction rub. No gallop.   Pulmonary:      Effort: Pulmonary effort is " normal. No respiratory distress.      Breath sounds: Normal breath sounds. No wheezing.   Chest:      Chest wall: No tenderness.   Musculoskeletal:      Cervical back: Neck supple.   Skin:     General: Skin is dry.      Findings: No rash.      Nails: There is no clubbing.   Neurological:      Mental Status: He is alert and oriented to person, place, and time.   Psychiatric:         Behavior: Behavior is cooperative.         Assessment & Plan   Problem List Items Addressed This Visit          High    Ischemic cardiomyopathy - Primary    Overview     Dr. Patel done cardiac cath done September 4, 2020 showing normal ejection fraction right coronary artery stent is patent with 20% blockage left anterior descending has luminal irregularities.  ECHO done at the same time.  Last EKG was done June 20, 2024 showed ectopic atrial rhythm with 1 PVC and nonspecific changes         Current Assessment & Plan     Improved.  Chest xary done 11-, results reviewed with pt. Advised to decrease Lasix 40 mg to 1/2 pill daily since this may be aggravating renal failure.  Patient tolerated Lasix well without side effects. I feel the benefits of the medication outweigh the risks.             Medium    Hypertension, benign    Current Assessment & Plan     Possibly too tight of control causing renal failure.  Decrease Lasix.  Encouraged to watch salt, exercise more and lose weight.           Mixed hyperlipidemia    Current Assessment & Plan     Lipid and CMP done 11-, read by me, reviewed with pt.  Trig. 100 up from 87, Tot. Chol. 132 up from 124, HDL 40 down from 45, LDL 73 up from 62  Doing well.    Encouraged to watch fatty intake, exercise more, and lose weight.   Compliant with medication   Is getting adequate diet and exercise  Goals developed at last visit were met   Follow up in  3 months  Care management needs are self-addressed. Self-management abilities addressed and patient is capable of managing his own  disease.           Pernicious anemia    Current Assessment & Plan     Improved.  CBC, B12 and MMA done 11-, read by me, reviewed with pt.  MMA was 667 down from 770, B12 was 573 up from 390, CBC showed HGB 12.9 up from 12.8, MCHC 30.4 down from 30.9, WBC was normal         Relevant Medications    cyanocobalamin injection 1,000 mcg    Stage 3a chronic kidney disease    Current Assessment & Plan     Worse.  CMP done 11-, read by me, reviewed with pt.  Creatinine was 1.85 up from 1.31, EGFR was 34 down from 52.  This may be due to hypotension thus we will decrease Lasix         Relevant Orders    Comprehensive Metabolic Panel    Subclinical hypothyroidism    Overview     New diagnosis--TSH is 5.14--will follow conservatively.  Repeat with next labs         Current Assessment & Plan     Improved         Type 2 diabetes mellitus without complication, without long-term current use of insulin    Overview     Hgba1c 6% on 5/29/2024         Current Assessment & Plan     A1c done 11-, read by me, reviewed with pt.  A1c was 6.5 up from 6.1  Worsening.  Consider Farxiga this also would help renal functions  Encouraged to watch sugar intake, exercise more and lose weight.   Compliant with medication.   Monitoring sugar at home.   Follow up in 1 months  Care management needs are self-addressed.  Self-management abilities addressed and patient is capable of managing his own disease.              Low    Hyperkalemia    Current Assessment & Plan     Resolved x 1.  CMP done 11-, read by me, reviewed with pt.  Potassium was 4.7 down from 5.4         RESOLVED: Subclinical hyperthyroidism    Overview     No medication         Current Assessment & Plan     Doing well x 1.  TSH done 11-, read by me, reviewed with pt.  TSH was 3.510 down from 5.140         Vitamin B 12 deficiency    Current Assessment & Plan     CBC, B12 and MMA done 11-, read by me, reviewed with pt.  MMA was 667 down from 770,  B12 was 573 up from 390, CBC showed HGB 12.9 up from 12.8, MCHC 30.4 down from 30.9, WBC was normal.  Patient given B12 injection today.         Relevant Medications    cyanocobalamin injection 1,000 mcg       Unprioritized    Elevated liver function tests    Current Assessment & Plan     Resolved x 1.  CMP done 11-, read by me, reviewed with pt.   AST was 34 up from 33, ALT was 34 down from 48         Hypotension    Current Assessment & Plan     New dx.  Patient switch from Enalapril to Entresto daily, advised to decrease Lasix to 1/2 of a 40 mg daily.  Follow in 1 month.         RESOLVED: Leukocytosis    Overview      Asymptomatic.         Current Assessment & Plan     Resolved x 3, thus delete.  CBC done 11-, read by me, reviewed with pt.  CBC showed HGB 12.9 up from 12.8, MCHC 30.4 down from 30.9, WBC was normal         Relevant Medications    cyanocobalamin injection 1,000 mcg

## 2024-11-26 NOTE — ASSESSMENT & PLAN NOTE
Improved.  Chest xary done 11-, results reviewed with pt. Advised to decrease Lasix 40 mg to 1/2 pill daily since this may be aggravating renal failure.  Patient tolerated Lasix well without side effects. I feel the benefits of the medication outweigh the risks.

## 2024-11-26 NOTE — ASSESSMENT & PLAN NOTE
Possibly too tight of control causing renal failure.  Decrease Lasix.  Encouraged to watch salt, exercise more and lose weight.

## 2024-11-26 NOTE — ASSESSMENT & PLAN NOTE
New dx.  Patient switch from Enalapril to Entresto daily, advised to decrease Lasix to 1/2 of a 40 mg daily.  Follow in 1 month.

## 2024-11-28 PROBLEM — E05.90 SUBCLINICAL HYPERTHYROIDISM: Status: RESOLVED | Noted: 2024-07-31 | Resolved: 2024-11-28

## 2024-12-03 ENCOUNTER — HOSPITAL ENCOUNTER (INPATIENT)
Facility: HOSPITAL | Age: 89
LOS: 9 days | Discharge: HOSPICE | DRG: 193 | End: 2024-12-12
Attending: EMERGENCY MEDICINE | Admitting: STUDENT IN AN ORGANIZED HEALTH CARE EDUCATION/TRAINING PROGRAM
Payer: MEDICARE

## 2024-12-03 ENCOUNTER — APPOINTMENT (OUTPATIENT)
Dept: GENERAL RADIOLOGY | Facility: HOSPITAL | Age: 89
DRG: 193 | End: 2024-12-03
Payer: MEDICARE

## 2024-12-03 ENCOUNTER — APPOINTMENT (OUTPATIENT)
Dept: CT IMAGING | Facility: HOSPITAL | Age: 89
DRG: 193 | End: 2024-12-03
Payer: MEDICARE

## 2024-12-03 DIAGNOSIS — J18.9 PNEUMONIA DUE TO INFECTIOUS ORGANISM, UNSPECIFIED LATERALITY, UNSPECIFIED PART OF LUNG: Primary | ICD-10-CM

## 2024-12-03 LAB
ALBUMIN SERPL-MCNC: 3.6 G/DL (ref 3.5–5.2)
ALBUMIN/GLOB SERPL: 1.2 G/DL
ALP SERPL-CCNC: 83 U/L (ref 39–117)
ALT SERPL W P-5'-P-CCNC: 41 U/L (ref 1–41)
ANION GAP SERPL CALCULATED.3IONS-SCNC: 12.5 MMOL/L (ref 5–15)
AST SERPL-CCNC: 41 U/L (ref 1–40)
B PARAPERT DNA SPEC QL NAA+PROBE: NOT DETECTED
B PERT DNA SPEC QL NAA+PROBE: NOT DETECTED
BASOPHILS # BLD AUTO: 0.11 10*3/MM3 (ref 0–0.2)
BASOPHILS NFR BLD AUTO: 0.5 % (ref 0–1.5)
BILIRUB SERPL-MCNC: 1 MG/DL (ref 0–1.2)
BUN SERPL-MCNC: 23 MG/DL (ref 8–23)
BUN/CREAT SERPL: 13.9 (ref 7–25)
C PNEUM DNA NPH QL NAA+NON-PROBE: NOT DETECTED
CALCIUM SPEC-SCNC: 9.4 MG/DL (ref 8.6–10.5)
CHLORIDE SERPL-SCNC: 98 MMOL/L (ref 98–107)
CO2 SERPL-SCNC: 24.5 MMOL/L (ref 22–29)
CREAT SERPL-MCNC: 1.65 MG/DL (ref 0.76–1.27)
D-LACTATE SERPL-SCNC: 1.4 MMOL/L (ref 0.5–2)
D-LACTATE SERPL-SCNC: 2.2 MMOL/L (ref 0.3–2)
DEPRECATED RDW RBC AUTO: 49.2 FL (ref 37–54)
EGFRCR SERPLBLD CKD-EPI 2021: 39.4 ML/MIN/1.73
EOSINOPHIL # BLD AUTO: 0.04 10*3/MM3 (ref 0–0.4)
EOSINOPHIL NFR BLD AUTO: 0.2 % (ref 0.3–6.2)
ERYTHROCYTE [DISTWIDTH] IN BLOOD BY AUTOMATED COUNT: 15 % (ref 12.3–15.4)
FLUAV SUBTYP SPEC NAA+PROBE: NOT DETECTED
FLUBV RNA ISLT QL NAA+PROBE: NOT DETECTED
GEN 5 1HR TROPONIN T REFLEX: 49 NG/L
GLOBULIN UR ELPH-MCNC: 2.9 GM/DL
GLUCOSE BLDC GLUCOMTR-MCNC: 115 MG/DL (ref 70–105)
GLUCOSE BLDC GLUCOMTR-MCNC: 151 MG/DL (ref 70–105)
GLUCOSE SERPL-MCNC: 140 MG/DL (ref 65–99)
HADV DNA SPEC NAA+PROBE: NOT DETECTED
HCOV 229E RNA SPEC QL NAA+PROBE: NOT DETECTED
HCOV HKU1 RNA SPEC QL NAA+PROBE: NOT DETECTED
HCOV NL63 RNA SPEC QL NAA+PROBE: NOT DETECTED
HCOV OC43 RNA SPEC QL NAA+PROBE: NOT DETECTED
HCT VFR BLD AUTO: 37.6 % (ref 37.5–51)
HGB BLD-MCNC: 11.7 G/DL (ref 13–17.7)
HMPV RNA NPH QL NAA+NON-PROBE: NOT DETECTED
HOLD SPECIMEN: NORMAL
HOLD SPECIMEN: NORMAL
HPIV1 RNA ISLT QL NAA+PROBE: NOT DETECTED
HPIV2 RNA SPEC QL NAA+PROBE: NOT DETECTED
HPIV3 RNA NPH QL NAA+PROBE: NOT DETECTED
HPIV4 P GENE NPH QL NAA+PROBE: NOT DETECTED
IMM GRANULOCYTES # BLD AUTO: 0.08 10*3/MM3 (ref 0–0.05)
IMM GRANULOCYTES NFR BLD AUTO: 0.4 % (ref 0–0.5)
LYMPHOCYTES # BLD AUTO: 0.68 10*3/MM3 (ref 0.7–3.1)
LYMPHOCYTES NFR BLD AUTO: 3.3 % (ref 19.6–45.3)
M PNEUMO IGG SER IA-ACNC: NOT DETECTED
MCH RBC QN AUTO: 28.1 PG (ref 26.6–33)
MCHC RBC AUTO-ENTMCNC: 31.1 G/DL (ref 31.5–35.7)
MCV RBC AUTO: 90.4 FL (ref 79–97)
MONOCYTES # BLD AUTO: 0.94 10*3/MM3 (ref 0.1–0.9)
MONOCYTES NFR BLD AUTO: 4.6 % (ref 5–12)
NEUTROPHILS NFR BLD AUTO: 18.68 10*3/MM3 (ref 1.7–7)
NEUTROPHILS NFR BLD AUTO: 91 % (ref 42.7–76)
NRBC BLD AUTO-RTO: 0 /100 WBC (ref 0–0.2)
NT-PROBNP SERPL-MCNC: 4624 PG/ML (ref 0–1800)
PLATELET # BLD AUTO: 167 10*3/MM3 (ref 140–450)
PMV BLD AUTO: 12 FL (ref 6–12)
POTASSIUM SERPL-SCNC: 4.5 MMOL/L (ref 3.5–5.2)
PROCALCITONIN SERPL-MCNC: 1.35 NG/ML (ref 0–0.25)
PROT SERPL-MCNC: 6.5 G/DL (ref 6–8.5)
RBC # BLD AUTO: 4.16 10*6/MM3 (ref 4.14–5.8)
RHINOVIRUS RNA SPEC NAA+PROBE: DETECTED
RSV RNA NPH QL NAA+NON-PROBE: NOT DETECTED
SARS-COV-2 RNA NPH QL NAA+NON-PROBE: NOT DETECTED
SODIUM SERPL-SCNC: 135 MMOL/L (ref 136–145)
TROPONIN T NUMERIC DELTA: 2 NG/L
TROPONIN T SERPL HS-MCNC: 47 NG/L
WBC NRBC COR # BLD AUTO: 20.53 10*3/MM3 (ref 3.4–10.8)
WHOLE BLOOD HOLD COAG: NORMAL
WHOLE BLOOD HOLD SPECIMEN: NORMAL

## 2024-12-03 PROCEDURE — 85025 COMPLETE CBC W/AUTO DIFF WBC: CPT | Performed by: EMERGENCY MEDICINE

## 2024-12-03 PROCEDURE — 87040 BLOOD CULTURE FOR BACTERIA: CPT | Performed by: EMERGENCY MEDICINE

## 2024-12-03 PROCEDURE — 83605 ASSAY OF LACTIC ACID: CPT

## 2024-12-03 PROCEDURE — 71045 X-RAY EXAM CHEST 1 VIEW: CPT

## 2024-12-03 PROCEDURE — 93005 ELECTROCARDIOGRAM TRACING: CPT | Performed by: EMERGENCY MEDICINE

## 2024-12-03 PROCEDURE — 71250 CT THORAX DX C-: CPT

## 2024-12-03 PROCEDURE — 84145 PROCALCITONIN (PCT): CPT | Performed by: EMERGENCY MEDICINE

## 2024-12-03 PROCEDURE — 36415 COLL VENOUS BLD VENIPUNCTURE: CPT

## 2024-12-03 PROCEDURE — 83880 ASSAY OF NATRIURETIC PEPTIDE: CPT | Performed by: EMERGENCY MEDICINE

## 2024-12-03 PROCEDURE — 82948 REAGENT STRIP/BLOOD GLUCOSE: CPT

## 2024-12-03 PROCEDURE — 94761 N-INVAS EAR/PLS OXIMETRY MLT: CPT

## 2024-12-03 PROCEDURE — 93005 ELECTROCARDIOGRAM TRACING: CPT

## 2024-12-03 PROCEDURE — 25810000003 SODIUM CHLORIDE 0.9 % SOLUTION

## 2024-12-03 PROCEDURE — 0202U NFCT DS 22 TRGT SARS-COV-2: CPT

## 2024-12-03 PROCEDURE — 84484 ASSAY OF TROPONIN QUANT: CPT | Performed by: EMERGENCY MEDICINE

## 2024-12-03 PROCEDURE — 63710000001 INSULIN LISPRO (HUMAN) PER 5 UNITS

## 2024-12-03 PROCEDURE — 99285 EMERGENCY DEPT VISIT HI MDM: CPT

## 2024-12-03 PROCEDURE — 80074 ACUTE HEPATITIS PANEL: CPT | Performed by: NURSE PRACTITIONER

## 2024-12-03 PROCEDURE — 25010000002 CEFTRIAXONE PER 250 MG: Performed by: EMERGENCY MEDICINE

## 2024-12-03 PROCEDURE — 80053 COMPREHEN METABOLIC PANEL: CPT | Performed by: EMERGENCY MEDICINE

## 2024-12-03 PROCEDURE — 74177 CT ABD & PELVIS W/CONTRAST: CPT

## 2024-12-03 PROCEDURE — 25510000001 IOPAMIDOL PER 1 ML: Performed by: STUDENT IN AN ORGANIZED HEALTH CARE EDUCATION/TRAINING PROGRAM

## 2024-12-03 RX ORDER — METOPROLOL SUCCINATE 50 MG/1
50 TABLET, EXTENDED RELEASE ORAL DAILY
Status: DISCONTINUED | OUTPATIENT
Start: 2024-12-04 | End: 2024-12-09

## 2024-12-03 RX ORDER — PANTOPRAZOLE SODIUM 40 MG/1
40 TABLET, DELAYED RELEASE ORAL
Status: DISCONTINUED | OUTPATIENT
Start: 2024-12-04 | End: 2024-12-12 | Stop reason: HOSPADM

## 2024-12-03 RX ORDER — AMOXICILLIN 250 MG
2 CAPSULE ORAL 2 TIMES DAILY PRN
Status: DISCONTINUED | OUTPATIENT
Start: 2024-12-03 | End: 2024-12-11

## 2024-12-03 RX ORDER — MONTELUKAST SODIUM 10 MG/1
10 TABLET ORAL NIGHTLY
Status: DISCONTINUED | OUTPATIENT
Start: 2024-12-03 | End: 2024-12-12 | Stop reason: HOSPADM

## 2024-12-03 RX ORDER — ACETAMINOPHEN 160 MG/5ML
650 SOLUTION ORAL EVERY 4 HOURS PRN
Status: DISCONTINUED | OUTPATIENT
Start: 2024-12-03 | End: 2024-12-12 | Stop reason: HOSPADM

## 2024-12-03 RX ORDER — SACUBITRIL AND VALSARTAN 24; 26 MG/1; MG/1
1 TABLET, FILM COATED ORAL DAILY
COMMUNITY

## 2024-12-03 RX ORDER — BISACODYL 10 MG
10 SUPPOSITORY, RECTAL RECTAL DAILY PRN
Status: DISCONTINUED | OUTPATIENT
Start: 2024-12-03 | End: 2024-12-11

## 2024-12-03 RX ORDER — DEXTROSE MONOHYDRATE 25 G/50ML
25 INJECTION, SOLUTION INTRAVENOUS
Status: DISCONTINUED | OUTPATIENT
Start: 2024-12-03 | End: 2024-12-12 | Stop reason: HOSPADM

## 2024-12-03 RX ORDER — ATORVASTATIN CALCIUM 40 MG/1
80 TABLET, FILM COATED ORAL NIGHTLY
Status: DISCONTINUED | OUTPATIENT
Start: 2024-12-03 | End: 2024-12-12 | Stop reason: HOSPADM

## 2024-12-03 RX ORDER — ALUMINA, MAGNESIA, AND SIMETHICONE 2400; 2400; 240 MG/30ML; MG/30ML; MG/30ML
15 SUSPENSION ORAL EVERY 6 HOURS PRN
Status: DISCONTINUED | OUTPATIENT
Start: 2024-12-03 | End: 2024-12-12 | Stop reason: HOSPADM

## 2024-12-03 RX ORDER — IBUPROFEN 600 MG/1
1 TABLET ORAL
Status: DISCONTINUED | OUTPATIENT
Start: 2024-12-03 | End: 2024-12-12 | Stop reason: HOSPADM

## 2024-12-03 RX ORDER — SODIUM CHLORIDE 9 MG/ML
100 INJECTION, SOLUTION INTRAVENOUS CONTINUOUS
Status: DISPENSED | OUTPATIENT
Start: 2024-12-03 | End: 2024-12-04

## 2024-12-03 RX ORDER — DOXYCYCLINE 100 MG/1
100 CAPSULE ORAL ONCE
Status: COMPLETED | OUTPATIENT
Start: 2024-12-03 | End: 2024-12-03

## 2024-12-03 RX ORDER — POLYETHYLENE GLYCOL 3350 17 G/17G
17 POWDER, FOR SOLUTION ORAL DAILY PRN
Status: DISCONTINUED | OUTPATIENT
Start: 2024-12-03 | End: 2024-12-11

## 2024-12-03 RX ORDER — IPRATROPIUM BROMIDE AND ALBUTEROL SULFATE 2.5; .5 MG/3ML; MG/3ML
3 SOLUTION RESPIRATORY (INHALATION) EVERY 4 HOURS PRN
Status: DISCONTINUED | OUTPATIENT
Start: 2024-12-03 | End: 2024-12-12 | Stop reason: HOSPADM

## 2024-12-03 RX ORDER — IOPAMIDOL 755 MG/ML
100 INJECTION, SOLUTION INTRAVASCULAR
Status: COMPLETED | OUTPATIENT
Start: 2024-12-03 | End: 2024-12-03

## 2024-12-03 RX ORDER — NICOTINE POLACRILEX 4 MG
15 LOZENGE BUCCAL
Status: DISCONTINUED | OUTPATIENT
Start: 2024-12-03 | End: 2024-12-12 | Stop reason: HOSPADM

## 2024-12-03 RX ORDER — BISACODYL 5 MG/1
5 TABLET, DELAYED RELEASE ORAL DAILY PRN
Status: DISCONTINUED | OUTPATIENT
Start: 2024-12-03 | End: 2024-12-11

## 2024-12-03 RX ORDER — AZITHROMYCIN 250 MG/1
500 TABLET, FILM COATED ORAL ONCE
Status: DISCONTINUED | OUTPATIENT
Start: 2024-12-03 | End: 2024-12-03 | Stop reason: ALTCHOICE

## 2024-12-03 RX ORDER — FUROSEMIDE 40 MG/1
20 TABLET ORAL DAILY
COMMUNITY
End: 2024-12-12 | Stop reason: HOSPADM

## 2024-12-03 RX ORDER — ACETAMINOPHEN 650 MG/1
650 SUPPOSITORY RECTAL EVERY 4 HOURS PRN
Status: DISCONTINUED | OUTPATIENT
Start: 2024-12-03 | End: 2024-12-12 | Stop reason: HOSPADM

## 2024-12-03 RX ORDER — CHOLECALCIFEROL (VITAMIN D3) 25 MCG
1000 TABLET ORAL DAILY
Status: DISCONTINUED | OUTPATIENT
Start: 2024-12-04 | End: 2024-12-12 | Stop reason: HOSPADM

## 2024-12-03 RX ORDER — CETIRIZINE HYDROCHLORIDE 10 MG/1
10 TABLET ORAL DAILY
Status: DISCONTINUED | OUTPATIENT
Start: 2024-12-04 | End: 2024-12-12 | Stop reason: HOSPADM

## 2024-12-03 RX ORDER — ALBUTEROL SULFATE 90 UG/1
2 INHALANT RESPIRATORY (INHALATION) EVERY 4 HOURS PRN
Status: DISCONTINUED | OUTPATIENT
Start: 2024-12-03 | End: 2024-12-12 | Stop reason: HOSPADM

## 2024-12-03 RX ORDER — FINASTERIDE 5 MG/1
5 TABLET, FILM COATED ORAL DAILY
Status: DISCONTINUED | OUTPATIENT
Start: 2024-12-04 | End: 2024-12-12 | Stop reason: HOSPADM

## 2024-12-03 RX ORDER — FUROSEMIDE 20 MG/1
20 TABLET ORAL DAILY
Status: DISCONTINUED | OUTPATIENT
Start: 2024-12-04 | End: 2024-12-06

## 2024-12-03 RX ORDER — INSULIN LISPRO 100 [IU]/ML
2-7 INJECTION, SOLUTION INTRAVENOUS; SUBCUTANEOUS
Status: DISCONTINUED | OUTPATIENT
Start: 2024-12-03 | End: 2024-12-12 | Stop reason: HOSPADM

## 2024-12-03 RX ORDER — FLUTICASONE PROPIONATE 50 MCG
1 SPRAY, SUSPENSION (ML) NASAL DAILY
COMMUNITY

## 2024-12-03 RX ORDER — LIDOCAINE 50 MG/G
1 PATCH TOPICAL EVERY 24 HOURS
COMMUNITY

## 2024-12-03 RX ORDER — SODIUM CHLORIDE 0.9 % (FLUSH) 0.9 %
10 SYRINGE (ML) INJECTION AS NEEDED
Status: DISCONTINUED | OUTPATIENT
Start: 2024-12-03 | End: 2024-12-12 | Stop reason: HOSPADM

## 2024-12-03 RX ORDER — NITROGLYCERIN 0.4 MG/1
0.4 TABLET SUBLINGUAL
Status: DISCONTINUED | OUTPATIENT
Start: 2024-12-03 | End: 2024-12-12 | Stop reason: HOSPADM

## 2024-12-03 RX ORDER — ACETAMINOPHEN 325 MG/1
650 TABLET ORAL EVERY 4 HOURS PRN
Status: DISCONTINUED | OUTPATIENT
Start: 2024-12-03 | End: 2024-12-12 | Stop reason: HOSPADM

## 2024-12-03 RX ORDER — LORATADINE 10 MG/1
10 TABLET ORAL DAILY
COMMUNITY

## 2024-12-03 RX ADMIN — CEFTRIAXONE 2000 MG: 2 INJECTION, POWDER, FOR SOLUTION INTRAMUSCULAR; INTRAVENOUS at 12:07

## 2024-12-03 RX ADMIN — DOXYCYCLINE 100 MG: 100 CAPSULE ORAL at 14:36

## 2024-12-03 RX ADMIN — ATORVASTATIN CALCIUM 80 MG: 40 TABLET, FILM COATED ORAL at 20:55

## 2024-12-03 RX ADMIN — SODIUM CHLORIDE 100 ML/HR: 9 INJECTION, SOLUTION INTRAVENOUS at 14:36

## 2024-12-03 RX ADMIN — Medication 10 ML: at 20:56

## 2024-12-03 RX ADMIN — ALUMINUM HYDROXIDE, MAGNESIUM HYDROXIDE, AND DIMETHICONE 15 ML: 400; 400; 40 SUSPENSION ORAL at 23:29

## 2024-12-03 RX ADMIN — IOPAMIDOL 100 ML: 755 INJECTION, SOLUTION INTRAVENOUS at 15:59

## 2024-12-03 RX ADMIN — MONTELUKAST 10 MG: 10 TABLET, FILM COATED ORAL at 20:55

## 2024-12-03 RX ADMIN — INSULIN LISPRO 2 UNITS: 100 INJECTION, SOLUTION INTRAVENOUS; SUBCUTANEOUS at 20:55

## 2024-12-03 NOTE — CASE MANAGEMENT/SOCIAL WORK
Discharge Planning Assessment   David     Patient Name: Mikael Shanks  MRN: [id number]  Today's Date: 12/3/2024    Admit Date: 12/3/2024    Plan: From Home Alone; Anticipate SNF for Rehab; PT/OT [place] Requested; No Precert Needed; Will Need PASRR   Discharge Needs Assessment       Row Name 12/03/24 1657       Living Environment    People in Home alone    Current Living Arrangements home    Potentially Unsafe Housing Conditions none    In the past 12 months has the electric, gas, oil, or water company threatened to shut off services in your home? No    Primary Care Provided by self    Provides Primary Care For no one, unable/limited ability to care for self    Family Caregiver if Needed child(elizabeth), adult    Family Caregiver Names Daughter-Sadia    Quality of Family Relationships supportive    Able to Return to Prior Arrangements yes       Resource/Environmental Concerns    Resource/Environmental Concerns none    Transportation Concerns rides, unreliable from others       Transportation Needs    In the past 12 months, has lack of transportation kept you from medical appointments or from getting medications? no    In the past 12 months, has lack of transportation kept you from meetings, work, or from getting things needed for daily living? No       Food Insecurity    Within the past 12 months, you worried that your food would run out before you got the money to buy more. Never true    Within the past 12 months, the food you bought just didn't last and you didn't have money to get more. Never true       Transition Planning    Patient/Family Anticipated Services at Transition none    Transportation Anticipated family or friend will provide;health plan transportation       Discharge Needs Assessment    Readmission Within the Last 30 Days no previous admission in last 30 days    Equipment Currently Used at Home walker, rolling;cane, straight;cpap    Concerns to be Addressed discharge planning    Do you want help  finding or keeping work or a job? I do not need or want help    Do you want help with school or training? For example, starting or completing job training or getting a high school diploma, GED or equivalent No    Anticipated Changes Related to Illness inability to care for self    Equipment Needed After Discharge none    Outpatient/Agency/Support Group Needs skilled nursing facility;inpatient rehabilitation facility    Discharge Facility/Level of Care Needs nursing facility, skilled    Provided Post Acute Provider List? Yes    Post Acute Provider List Nursing Home;Inpatient Rehab    Delivered To Patient;Support Person    Support Person Sadia-Daughter    Offered/Gave Vendor List yes    Patient's Choice of Community Agency(s) Pending                   Discharge Plan       Row Name 12/03/24 1972       Plan    Plan From Home Alone; Anticipate SNF for Rehab; PT/OT [place] Requested; No Precert Needed; Will Need PASRR    Patient/Family in Agreement with Plan yes    Plan Comments CM contacted by pt nurse, Crys, due to concerns verbalized to her by patient caregiver. Met with patient and daughter, Sadia, at bedside. Confirmed PCP and agreeable with [facility], updated in Epic. Denies financial concerns, family will provide dc transportation and states patient was independent with ADL's until approx. 3 weeks prior to admission. Currently requiring assist with all ADL's. Discussed dc plan, daughter anticipates SNF at dc. Provided list of SNF facilities, she and patient will discuss and provide CM with choices. SC sent to Dr. Smith to requrest PT/OT tennille for dc planning. SC sent to [facility] for PASRR.  DC Barriers: Hem/Onc and Pulm consults; IV ABX; Blood cultures                  Continued Care and Services - Admitted Since 12/3/2024    No active coordination exists for this encounter.          Demographic Summary       Row Name 12/03/24 8067       General Information    Admission Type inpatient    Arrived From home    Required Notices  Provided Important Message from Medicare    Referral Source emergency department    Reason for Consult discharge planning    Preferred Language English       Contact Information    Permission Granted to Share Info With                    Functional Status       Row Name 12/03/24 1657       Functional Status    Usual Activity Tolerance good    Current Activity Tolerance poor       Physical Activity    On average, how many days per week do you engage in moderate to strenuous exercise (like a brisk walk)? 0 days    On average, how many minutes do you engage in exercise at this level? 0 min    Number of minutes of exercise per week 0       Assessment of Health Literacy    How often do you have someone help you read hospital materials? Occasionally    How often do you have problems learning about your medical condition because of difficulty understanding written information? Occasionally    How often do you have a problem understanding what is told to you about your medical condition? Occasionally    How confident are you filling out medical forms by yourself? Quite a bit    Health Literacy Good       Functional Status, IADL    Medications independent    Meal Preparation assistive person    Housekeeping assistive person    Laundry assistive person    Shopping assistive person    If for any reason you need help with day-to-day activities such as bathing, preparing meals, shopping, managing finances, etc., do you get the help you need? I could use a little more help       Mental Status    General Appearance WDL WDL       Mental Status Summary    Recent Changes in Mental Status/Cognitive Functioning no changes       Employment/    Employment Status retired              Patient Forms       Row Name 12/03/24 8006       Patient Forms    Important Message from Medicare (IMM) Delivered  IMM per Reg    Delivered to Patient    Method of delivery In person             Met with patient in room wearing PPE:  mask    Maintained distance greater than six feet and spent less than 15 minutes in the room    Janice Moe RN    Phone [phone]  Fax [phone]

## 2024-12-03 NOTE — H&P
"[facility] Medicine Services  History & Physical    Patient Name: Mikael Shanks  : 1935  MRN: [id number]  Primary Care Physician:  Xander Robison MD  Date of admission: 12/3/2024  Date and Time of Service: 12/3/2024 at 1420    Subjective      Chief Complaint: shortness of breath    History of Present Illness: Mikael Shanks is a 89 y.o. male with a CMH of CAD, diabetes, GERD, hyperlipidemia, hypertension who presented to [facility] on 12/3/2024 with shortness of breath.  Patient states he came to the hospital for shortness of breath and right groin pain.  Patient is very hard of hearing and history was limited due to patient not having his hearing aids.  Patient gave permission to speak with his daughter, Sadia.  Sadia stated that the patient had a recent admission at the [facility] where they were concerned for congestive heart failure and they also performed a heart cath.  Patient does live alone, does not use home oxygen.    On ER evaluation, patient is on 3L NC. Patient's initial troponin was 47, with a repeat of 49.  BNP was 4624.0, creatinine 1.65, glucose 140, initial lactic was 2.2 and repeat was 1.4.  Procalcitonin 1.35, white blood cells 20.53 with increased neutrophils.  Chest x-ray revealed chronic findings without acute changes.  Chest CT results as follows: \" Large mass in the right hepatic lobe involving segments 7 and 8 which may relate to malignancy or metastasis measuring up to 9.5 cm. Recommend CT abdomen and pelvis with contrast for further assessment. Patchy areas of airspace disease involving left upper lobes and bilateral lower lobes concerning for multifocal pneumonia superimposed on background of chronic interstitial lung disease. \". Hematology and oncology, pulmonology have been consulted. Hospitalist team to admit for further medical management.       Review of Systems   Constitutional:  Positive for chills and fatigue.   Respiratory:  Positive for " shortness of breath.    Cardiovascular:  Negative for chest pain.   Genitourinary:  Positive for genital sores (groin pain at site of prior cardiac cath).       Personal History     Past Medical History:   Diagnosis Date    Carotid artery disease     Coronary artery disease     Diabetes mellitus     GERD (gastroesophageal reflux disease)     [place] (hard of hearing)     Hyperlipidemia     Hypertension     Osteoarthritis     Prostatic hypertrophy     Pulmonary valve disorder     Sleep apnea     cpap   doesnt use     Stroke        Past Surgical History:   Procedure Laterality Date    BACK SURGERY      CARDIAC CATHETERIZATION N/A 12/5/2019    Procedure: Left Heart Cath and coronary angiogram;  Surgeon: Dayday Ruiz MD;  Location: [facility] CATH INVASIVE LOCATION;  Service: Cardiovascular    CARDIAC CATHETERIZATION N/A 12/5/2019    Procedure: Right and Left Heart Cath;  Surgeon: Dayday Ruiz MD;  Location: [facility] CATH INVASIVE LOCATION;  Service: Cardiovascular    CARDIAC CATHETERIZATION N/A 9/4/2020    Procedure: Left and Right Heart Cath with Coronary Angiography;  Surgeon: Dayday Ruiz MD;  Location: [facility] CATH INVASIVE LOCATION;  Service: Cardiovascular;  Laterality: N/A;    CAROTID ENDARTERECTOMY Left     CHOLECYSTECTOMY      COLONOSCOPY  08/14/2018    No repeat    CORONARY ANGIOPLASTY WITH STENT PLACEMENT      LUMBAR LAMINECTOMY Bilateral 1/31/2022    Procedure: LUMBAR LAMINECTOMY WITH/WITHOUT FUSION L4-L5;  Surgeon: Geronimo Gonzales MD;  Location: [facility] MAIN OR;  Service: Neurosurgery;  Laterality: Bilateral;       Family History: family history includes Cancer in his brother and mother; Heart disease in his father. Otherwise pertinent FHx was reviewed and not pertinent to current issue.    Social History:  reports that he quit smoking about 64 years ago. His smoking use included cigarettes. He started smoking about 71 years ago. He has a 35 pack-year smoking history. He has been exposed to tobacco  smoke. He has never used smokeless tobacco. He reports that he does not currently use alcohol. He reports that he does not use drugs.    Home Medications:  Prior to Admission Medications       Prescriptions Last Dose Informant Patient Reported? Taking?    albuterol sulfate  (90 Base) MCG/ACT inhaler   No No    Inhale 2 puffs Every 4 (Four) Hours As Needed for Wheezing.    atorvastatin (LIPITOR) 80 MG tablet   No No    Take 1 tablet by mouth every night at bedtime.    Blood Glucose Monitoring Suppl (ONE TOUCH ULTRA 2) w/Device kit   No No    1 Device Daily. Test 1 x daily    Cholecalciferol 25 MCG (1000 UT) tablet   Yes No    Take 2 tablets by mouth Daily.    clotrimazole-betamethasone (LOTRISONE) 1-0.05 % cream   No No    Apply 1 Application topically to the appropriate area as directed 2 (Two) Times a Day.    Patient not taking:  Reported on 11/26/2024    cyanocobalamin injection 1,000 mcg   No No    empagliflozin (JARDIANCE) 25 MG tablet tablet   Yes No    Take 1 tablet by mouth Daily.    enalapril (VASOTEC) 2.5 MG tablet   No No    TAKE 1 TABLET BY MOUTH DAILY    finasteride (PROSCAR) 5 MG tablet   No No    Take 1 tablet by mouth Daily.    furosemide (LASIX) 40 MG tablet   No No    Take 1 tablet by mouth Daily.    glipizide (glipiZIDE XL) 5 MG ER tablet   No No    Take 1 tablet by mouth Daily.    glucose blood (ONE TOUCH ULTRA TEST) test strip   No No    Test 1 x daily    lidocaine (LIDODERM) 5 %   No No    Place 1 patch on the skin as directed by provider Daily. Remove & Discard patch within 12 hours or as directed by MD    Patient not taking:  Reported on 11/26/2024    metoprolol succinate XL (TOPROL-XL) 100 MG 24 hr tablet   Yes No    Take 1 tablet by mouth Daily. Take 1/2 pill daily    montelukast (Singulair) 10 MG tablet   No No    Take 1 tablet by mouth Every Night.    Multiple Vitamins-Minerals (PRESERVISION AREDS 2 PO)   Yes No    Take 1 Capful by mouth 2 (Two) Times a Day.    multivitamin with  minerals tablet tablet   Yes No    Take 1 tablet by mouth Daily. LD 1/24    omeprazole (priLOSEC) 40 MG capsule   No No    Take 1 capsule by mouth Daily.    OneTouch Delica Lancets 33G misc   No No    1 strip Daily. Test 1 x daily    sacubitril-valsartan (ENTRESTO) 24-26 MG tablet   Yes No    Take 1 tablet by mouth 2 (Two) Times a Day.    sertraline (ZOLOFT) 50 MG tablet   No No    Take 1 tablet by mouth Daily.              Allergies:  Allergies   Allergen Reactions    Penicillins Hives       Objective      Vitals:   Temp:  [98.1 °F (36.7 °C)] 98.1 °F (36.7 °C)  Heart Rate:  [] 99  Resp:  [28] 28  BP: ()/(56-83) 131/59  Body mass index is 21.83 kg/m².  Physical Exam  Vitals reviewed.   Constitutional:       Appearance: He is ill-appearing.   HENT:      Head: Normocephalic and atraumatic.      Nose: Nose normal.      Mouth/Throat:      Mouth: Mucous membranes are moist.   Eyes:      Extraocular Movements: Extraocular movements intact.      Pupils: Pupils are equal, round, and reactive to light.   Cardiovascular:      Rate and Rhythm: Normal rate and regular rhythm.      Pulses: Normal pulses.   Pulmonary:      Effort: Respiratory distress present.      Breath sounds: Normal breath sounds. No rales.      Comments: Pursed lip breathing    Abdominal:      General: Bowel sounds are normal.      Palpations: Abdomen is soft.      Tenderness: There is no abdominal tenderness.   Genitourinary:     Comments: Pain has pain in right groin upon palpation, at site of prior heart cath, no mass or hematoma felt, no bruising present   Musculoskeletal:      Cervical back: Normal range of motion and neck supple.   Skin:     General: Skin is warm.      Capillary Refill: Capillary refill takes less than 2 seconds.      Coloration: Skin is pale.   Neurological:      Mental Status: He is alert and oriented to person, place, and time.         Diagnostic Data:  Lab Results (last 24 hours)       Procedure Component Value Units  "Date/Time    Respiratory Panel PCR w/COVID-19(SARS-CoV-2) DARVIN/CLAUDIA/ARELIS/PAD/COR/CLIFFORD In-House, NP Swab in UTM/VTM, 2 HR TAT - Swab, Nasopharynx [[id number]] Collected: 12/03/24 1442    Specimen: Swab from Nasopharynx Updated: 12/03/24 1446    STAT Lactic Acid, Reflex [[id number]]  (Normal) Collected: 12/03/24 1401    Specimen: Blood Updated: 12/03/24 1428     Lactate 1.4 mmol/L     Procalcitonin [[id number]]  (Abnormal) Collected: 12/03/24 1148    Specimen: Blood Updated: 12/03/24 1339     Procalcitonin 1.35 ng/mL     Narrative:      As a Marker for Sepsis (Non-Neonates):    1. <0.5 ng/mL represents a low risk of severe sepsis and/or septic shock.  2. >2 ng/mL represents a high risk of severe sepsis and/or septic shock.    As a Marker for Lower Respiratory Tract Infections that require antibiotic therapy:    PCT on Admission    Antibiotic Therapy       6-12 Hrs later    >0.5                Strongly Recommended  >0.25 - <0.5        Recommended   0.1 - 0.25          Discouraged              Remeasure/reassess PCT  <0.1                Strongly Discouraged     Remeasure/reassess PCT    As 28 day mortality risk marker: \"Change in Procalcitonin Result\" (>80% or <=80%) if Day 0 (or Day 1) and Day 4 values are available. Refer to http://www.[employer]-pct-calculator.com    Change in PCT <=80%  A decrease of PCT levels below or equal to 80% defines a positive change in PCT test result representing a higher risk for 28-day all-cause mortality of patients diagnosed with severe sepsis for septic shock.    Change in PCT >80%  A decrease of PCT levels of more than 80% defines a negative change in PCT result representing a lower risk for 28-day all-cause mortality of patients diagnosed with severe sepsis or septic shock.       High Sensitivity Troponin T 2Hr [[id number]]  (Abnormal) Collected: 12/03/24 1148    Specimen: Blood Updated: 12/03/24 1213     HS Troponin T 49 ng/L      Troponin T Delta 2 ng/L     Narrative:      High Sensitive " Troponin T Reference Range:  <14.0 ng/L- Negative Female for AMI  <22.0 ng/L- Negative Male for AMI  >=14 - Abnormal Female indicating possible myocardial injury.  >=22 - Abnormal Male indicating possible myocardial injury.   Clinicians would have to utilize clinical acumen, EKG, Troponin, and serial changes to determine if it is an Acute Myocardial Infarction or myocardial injury due to an underlying chronic condition.         POC Lactate [[id number]]  (Abnormal) Collected: 12/03/24 1054    Specimen: Blood Updated: 12/03/24 1056     Lactate 2.2 mmol/L      Comment: Serial Number: [id number]:  [id number]       Blood Culture - Blood, Arm, Left [[id number]] Collected: 12/03/24 1053    Specimen: Blood from Arm, Left Updated: 12/03/24 1055    BNP [[id number]]  (Abnormal) Collected: 12/03/24 0955    Specimen: Blood Updated: 12/03/24 1054     proBNP 4,624.0 pg/mL     Narrative:      This assay is used as an aid in the diagnosis of individuals suspected of having heart failure. It can be used as an aid in the diagnosis of acute decompensated heart failure (ADHF) in patients presenting with signs and symptoms of ADHF to the emergency department (ED). In addition, NT-proBNP of <300 pg/mL indicates ADHF is not likely.    Age Range Result Interpretation  NT-proBNP Concentration (pg/mL:      <50             Positive            >450                   Gray                 300-450                    Negative             <300    50-75           Positive            >900                  Gray                300-900                  Negative            <300      >75             Positive            >1800                  Gray                300-1800                  Negative            <300    Comprehensive Metabolic Panel [[id number]]  (Abnormal) Collected: 12/03/24 0955    Specimen: Blood Updated: 12/03/24 1054     Glucose 140 mg/dL      BUN 23 mg/dL      Creatinine 1.65 mg/dL      Sodium 135 mmol/L      Potassium 4.5 mmol/L       Chloride 98 mmol/L      CO2 24.5 mmol/L      Calcium 9.4 mg/dL      Total Protein 6.5 g/dL      Albumin 3.6 g/dL      ALT (SGPT) 41 U/L      AST (SGOT) 41 U/L      Alkaline Phosphatase 83 U/L      Total Bilirubin 1.0 mg/dL      Globulin 2.9 gm/dL      A/G Ratio 1.2 g/dL      BUN/Creatinine Ratio 13.9     Anion Gap 12.5 mmol/L      eGFR 39.4 mL/min/1.73     Narrative:      GFR Normal >60  Chronic Kidney Disease <60  Kidney Failure <15    The GFR formula is only valid for adults with stable renal function between ages 18 and 70.    Blood Culture - Blood, Arm, Right [[id number]] Collected: 12/03/24 1047    Specimen: Blood from Arm, Right Updated: 12/03/24 1050    CBC & Differential [[id number]]  (Abnormal) Collected: 12/03/24 0955    Specimen: Blood Updated: 12/03/24 1035    Narrative:      The following orders were created for panel order CBC & Differential.  Procedure                               Abnormality         Status                     ---------                               -----------         ------                     CBC Auto Differential[[id number]]        Abnormal            Final result                 Please view results for these tests on the individual orders.    CBC Auto Differential [[id number]]  (Abnormal) Collected: 12/03/24 0955    Specimen: Blood Updated: 12/03/24 1035     WBC 20.53 10*3/mm3      RBC 4.16 10*6/mm3      Hemoglobin 11.7 g/dL      Hematocrit 37.6 %      MCV 90.4 fL      MCH 28.1 pg      MCHC 31.1 g/dL      RDW 15.0 %      RDW-SD 49.2 fl      MPV 12.0 fL      Platelets 167 10*3/mm3      Neutrophil % 91.0 %      Lymphocyte % 3.3 %      Monocyte % 4.6 %      Eosinophil % 0.2 %      Basophil % 0.5 %      Immature Grans % 0.4 %      Neutrophils, Absolute 18.68 10*3/mm3      Lymphocytes, Absolute 0.68 10*3/mm3      Monocytes, Absolute 0.94 10*3/mm3      Eosinophils, Absolute 0.04 10*3/mm3      Basophils, Absolute 0.11 10*3/mm3      Immature Grans, Absolute 0.08 10*3/mm3      nRBC 0.0 /100  WBC     High Sensitivity Troponin T [[id number]]  (Abnormal) Collected: 12/03/24 0955    Specimen: Blood Updated: 12/03/24 1028     HS Troponin T 47 ng/L     Narrative:      High Sensitive Troponin T Reference Range:  <14.0 ng/L- Negative Female for AMI  <22.0 ng/L- Negative Male for AMI  >=14 - Abnormal Female indicating possible myocardial injury.  >=22 - Abnormal Male indicating possible myocardial injury.   Clinicians would have to utilize clinical acumen, EKG, Troponin, and serial changes to determine if it is an Acute Myocardial Infarction or myocardial injury due to an underlying chronic condition.         [place] Draw [[id number]] Collected: 12/03/24 0955    Specimen: Blood Updated: 12/03/24 1015    Narrative:      The following orders were created for panel order [place] Draw.  Procedure                               Abnormality         Status                     ---------                               -----------         ------                     Green Top (Gel)[[id number]]                                  Final result               Lavender Top[[id number]]                                     Final result               Gold Top - SST[[id number]]                                   Final result               Light Blue Top[[id number]]                                   Final result                 Please view results for these tests on the individual orders.    Green Top (Gel) [[id number]] Collected: 12/03/24 0955    Specimen: Blood Updated: 12/03/24 1015     Extra Tube Hold for add-ons.     Comment: Auto resulted.       Lavender Top [[id number]] Collected: 12/03/24 0955    Specimen: Blood Updated: 12/03/24 1015     Extra Tube hold for add-on     Comment: Auto resulted       Gold Top - SST [[id number]] Collected: 12/03/24 0955    Specimen: Blood Updated: 12/03/24 1015     Extra Tube Hold for add-ons.     Comment: Auto resulted.       Light Blue Top [[id number]] Collected: 12/03/24 0955    Specimen: Blood Updated:  12/03/24 1015     Extra Tube Hold for add-ons.     Comment: Auto resulted                Imaging Results (Last 24 Hours)       Procedure Component Value Units Date/Time    CT Chest Without Contrast Diagnostic [[id number]] Collected: 12/03/24 1328     Updated: 12/03/24 1355    Narrative:      CT CHEST WO CONTRAST DIAGNOSTIC    Date of Exam: 12/3/2024 1:01 PM EST    Indication: volume overload vs pna.    Comparison: Portable chest 12/3/2024, CT abdomen and pelvis without contrast 3/16/2017    Technique: Axial CT images were obtained of the chest without contrast administration.  Sagittal and coronal reconstructions were performed.  Automated exposure control and iterative reconstruction methods were used.    Findings:  Heart is enlarged. Extensive aortic atherosclerotic calcifications. Aortic valve calcification noted. The ascending aorta mildly ectatic measuring 3.8 cm at the level of the main pulmonary artery. The main pulmonary artery is dilated up to 4.1 cm which   can be seen with pulmonary hypertension. No significant pericardial effusion. There is lipomatous hypertrophy of the interatrial septa. Negative for pleural effusion. Calcified mediastinal and right hilar nodes noted to granulomatous disease.    The trachea and mainstem bronchi are patent. Patchy areas of airspace disease involving the lower lobes and the left upper lobe which may relate to multifocal pneumonia. There is subpleural reticulation throughout the lungs consistent with chronic   interstitial lung disease. No pneumothorax. No suspicious pulmonary nodule.    Lack of contrast limits upper abdominal assessment. Within the right hepatic lobe at segment 7 and 8, there is a large heterogeneous mass measuring up to 9.5 x 9.4 cm which is new from prior study (2/78). Visualized portions of the spleen, adrenal   glands, and pancreas are without acute abnormality. The gallbladder is absent. No free fluid in the upper abdomen. Remote healed left lateral  rib fractures. No aggressive osseous lesion.      Impression:      Impression:  1. Large mass in the right hepatic lobe involving segments 7 and 8 which may relate to malignancy or metastasis measuring up to 9.5 cm. Recommend CT abdomen and pelvis with contrast for further assessment.  2. Patchy areas of airspace disease involving left upper lobes and bilateral lower lobes concerning for multifocal pneumonia superimposed on background of chronic interstitial lung disease.  3. Cardiomegaly and additional incidental findings above.        Electronically Signed: Dane Bain MD    12/3/2024 1:53 PM EST    Workstation ID: [id number]    XR Chest 1 View [[id number]] Collected: 12/03/24 1037     Updated: 12/03/24 1040    Narrative:      XR CHEST 1 VW    Date of Exam: 12/3/2024 10:27 AM EST    Indication: sob    Comparison: 11/20/2024  Findings:  There is borderline cardiomegaly. Pulmonary vessels appear within normal limits. There are chronic infiltrates of the left lower lobe. There is unchanged elevation of the right diaphragm.      Impression:      Impression:  Chronic findings, without significant change.      Electronically Signed: Jaclyn Reagan MD    12/3/2024 10:38 AM EST    Workstation ID: [id number]              Assessment & Plan        This is a 89 y.o. male with:    Active and Resolved Problems  Active Hospital Problems    Diagnosis  POA    **Multifocal pneumonia [J18.9]  Yes      Resolved Hospital Problems   No resolved problems to display.       Multifocal pneumonia  - CXR: no acute process  - Chest CT: multifocal pneumonia   - WBC 20.53, PCT 1.35  - Lactic 2.2, repeat 1.4  - Respiratory pathogen panel pending  - Continue doxycyline and ceftriaxone   - Blood cultures pending   - Duoneb q4h prn  - Symptomatic treatment  - Continue supplemental oxygen and wean as tolerated  - Pulmonology consult     Liver mass   - incidental finding on chest CT  - approximately 9.5 cm and suspicious for metastasis  - ordered follow  up CT abdomen with contrast, despite Cr 1.65, will do mild maintenance fluids   - Hem/Onc Consulted, appreciate further workup and input     Congestive Heart Failure  - BNP today 4624  - last ECHO showed EF of 55% in 2020  - repeat Echo ordered  - Monitor closely with IVF    CKD  - Cr 1.65 , at baseline   - Avoid nephrotoxic medications when possible   - Continue to monitor kidney fn on BMP   - Consider nephrology consult for further needs     Hyperlipidemia  - continue home statin     Diabetes Mellitus   -   - Hold oral antidiabetics  - Low SSI   - Consistent carb diet  - Hypoglycemia protocol     GERD  - Continue PPI    Hypertension   - BP stable  - Resume home medications once reconciled     Hypothyroidism  - continue synthroid  - last TSH 3.510 on 11/20/24    Anemia   - Hgb 11.7  - No overt s/sx of bleeding   - Continue to monitor CBC  - Transfuse if Hgb < 7        VTE Prophylaxis:  Mechanical VTE prophylaxis orders are present.        The patient desires to be as follows:    CODE STATUS:    Code Status (Patient has no pulse and is not breathing): No CPR (Do Not Attempt to Resuscitate)  Medical Interventions (Patient has pulse or is breathing): Full Support        Sadia Cintron, who can be contacted at [phone], is the designated person to make medical decisions on the patient's behalf if He is incapable of doing so. This was clarified with patient and/or next of kin on 12/3/2024 during the course of this H&P.    Admission Status:  I believe this patient meets inpatient status.    Expected Length of Stay: > 2 midnights     PDMP and Medication Dispenses via Sidebar reviewed and consistent with patient reported medications.    I discussed the patient's findings and my recommendations with patient.      Signature:     This document has been electronically signed by RAMONITA Sloan on December 3, 2024 15:08 [facility] Hospitalist Team

## 2024-12-03 NOTE — LETTER
December 12, 2024     Patient: Clint Johnson   YOB: 1958   Date of Visit: 11/21/2024 - 11/26/2024       To Whom It May Concern:    It is my medical opinion that Clint Johnson may return to work beginning 12/12/2024.         Sincerely,    Brittany Brunner, RN [facility]      No name on file    CC: No Recipients

## 2024-12-04 ENCOUNTER — APPOINTMENT (OUTPATIENT)
Dept: CARDIOLOGY | Facility: HOSPITAL | Age: 89
DRG: 193 | End: 2024-12-04
Payer: MEDICARE

## 2024-12-04 LAB
ANION GAP SERPL CALCULATED.3IONS-SCNC: 9.8 MMOL/L (ref 5–15)
AORTIC DIMENSIONLESS INDEX: 0.41 (DI)
AV MEAN PRESS GRAD SYS DOP V1V2: 7 MMHG
AV VMAX SYS DOP: 187 CM/SEC
BASOPHILS # BLD AUTO: 0.1 10*3/MM3 (ref 0–0.2)
BASOPHILS NFR BLD AUTO: 0.7 % (ref 0–1.5)
BH CV ECHO LEFT VENTRICLE GLOBAL LONGITUDINAL STRAIN: -10.8 %
BH CV ECHO MEAS - ACS: 1.3 CM
BH CV ECHO MEAS - AO MAX PG: 14 MMHG
BH CV ECHO MEAS - AO V2 VTI: 44.9 CM
BH CV ECHO MEAS - AVA(I,D): 1.28 CM2
BH CV ECHO MEAS - EDV(CUBED): 185.2 ML
BH CV ECHO MEAS - EDV(MOD-SP4): 148 ML
BH CV ECHO MEAS - EF(MOD-SP4): 37.6 %
BH CV ECHO MEAS - ESV(CUBED): 103.8 ML
BH CV ECHO MEAS - ESV(MOD-SP4): 92.3 ML
BH CV ECHO MEAS - FS: 17.5 %
BH CV ECHO MEAS - IVS/LVPW: 1 CM
BH CV ECHO MEAS - IVSD: 0.8 CM
BH CV ECHO MEAS - LA DIMENSION: 4.8 CM
BH CV ECHO MEAS - LAT PEAK E' VEL: 4.4 CM/SEC
BH CV ECHO MEAS - LV DIASTOLIC VOL/BSA (35-75): 81.1 CM2
BH CV ECHO MEAS - LV MASS(C)D: 170.2 GRAMS
BH CV ECHO MEAS - LV MAX PG: 2.31 MMHG
BH CV ECHO MEAS - LV MEAN PG: 1 MMHG
BH CV ECHO MEAS - LV SYSTOLIC VOL/BSA (12-30): 50.5 CM2
BH CV ECHO MEAS - LV V1 MAX: 76 CM/SEC
BH CV ECHO MEAS - LV V1 VTI: 18.3 CM
BH CV ECHO MEAS - LVIDD: 5.7 CM
BH CV ECHO MEAS - LVIDS: 4.7 CM
BH CV ECHO MEAS - LVOT AREA: 3.1 CM2
BH CV ECHO MEAS - LVOT DIAM: 2 CM
BH CV ECHO MEAS - LVPWD: 0.8 CM
BH CV ECHO MEAS - MED PEAK E' VEL: 3.5 CM/SEC
BH CV ECHO MEAS - MV A MAX VEL: 93.3 CM/SEC
BH CV ECHO MEAS - MV DEC TIME: 0.26 SEC
BH CV ECHO MEAS - MV E MAX VEL: 54.5 CM/SEC
BH CV ECHO MEAS - MV E/A: 0.58
BH CV ECHO MEAS - MV MAX PG: 5.1 MMHG
BH CV ECHO MEAS - MV MEAN PG: 2 MMHG
BH CV ECHO MEAS - MV V2 VTI: 37.9 CM
BH CV ECHO MEAS - MVA(VTI): 1.52 CM2
BH CV ECHO MEAS - PA ACC TIME: 0.05 SEC
BH CV ECHO MEAS - PA V2 MAX: 87.3 CM/SEC
BH CV ECHO MEAS - RV MAX PG: 1.18 MMHG
BH CV ECHO MEAS - RV V1 MAX: 54.3 CM/SEC
BH CV ECHO MEAS - RV V1 VTI: 10.9 CM
BH CV ECHO MEAS - SV(LVOT): 57.5 ML
BH CV ECHO MEAS - SV(MOD-SP4): 55.7 ML
BH CV ECHO MEAS - SVI(LVOT): 31.5 ML/M2
BH CV ECHO MEAS - SVI(MOD-SP4): 30.5 ML/M2
BH CV ECHO MEAS - TAPSE (>1.6): 1.37 CM
BH CV ECHO MEAS - TR MAX PG: 38.7 MMHG
BH CV ECHO MEAS - TR MAX VEL: 311 CM/SEC
BH CV ECHO MEASUREMENTS AVERAGE E/E' RATIO: 13.8
BH CV RIGHT GROIN PSA PROCEDURE SCRIPTING LRR: 1
BH CV XLRA - RV BASE: 3.9 CM
BH CV XLRA - RV MID: 3.8 CM
BH CV XLRA - TDI S': 7.8 CM/SEC
BH CV XLRA MEAS EXT ILIAC A PSV RIGHT: 102 CM/SEC
BUN SERPL-MCNC: 25 MG/DL (ref 8–23)
BUN/CREAT SERPL: 14.7 (ref 7–25)
CALCIUM SPEC-SCNC: 8.8 MG/DL (ref 8.6–10.5)
CHLORIDE SERPL-SCNC: 104 MMOL/L (ref 98–107)
CO2 SERPL-SCNC: 25.2 MMOL/L (ref 22–29)
CREAT SERPL-MCNC: 1.7 MG/DL (ref 0.76–1.27)
DEPRECATED RDW RBC AUTO: 51.8 FL (ref 37–54)
EGFRCR SERPLBLD CKD-EPI 2021: 38.1 ML/MIN/1.73
EOSINOPHIL # BLD AUTO: 0.24 10*3/MM3 (ref 0–0.4)
EOSINOPHIL NFR BLD AUTO: 1.7 % (ref 0.3–6.2)
ERYTHROCYTE [DISTWIDTH] IN BLOOD BY AUTOMATED COUNT: 15.3 % (ref 12.3–15.4)
GLUCOSE BLDC GLUCOMTR-MCNC: 105 MG/DL (ref 70–105)
GLUCOSE BLDC GLUCOMTR-MCNC: 106 MG/DL (ref 70–105)
GLUCOSE BLDC GLUCOMTR-MCNC: 190 MG/DL (ref 70–105)
GLUCOSE BLDC GLUCOMTR-MCNC: 207 MG/DL (ref 70–105)
GLUCOSE SERPL-MCNC: 109 MG/DL (ref 65–99)
HCT VFR BLD AUTO: 34.5 % (ref 37.5–51)
HGB BLD-MCNC: 10.6 G/DL (ref 13–17.7)
IMM GRANULOCYTES # BLD AUTO: 0.08 10*3/MM3 (ref 0–0.05)
IMM GRANULOCYTES NFR BLD AUTO: 0.6 % (ref 0–0.5)
LEFT ATRIUM VOLUME INDEX: 54.6 ML/M2
LYMPHOCYTES # BLD AUTO: 1.15 10*3/MM3 (ref 0.7–3.1)
LYMPHOCYTES NFR BLD AUTO: 8.2 % (ref 19.6–45.3)
MCH RBC QN AUTO: 28.3 PG (ref 26.6–33)
MCHC RBC AUTO-ENTMCNC: 30.7 G/DL (ref 31.5–35.7)
MCV RBC AUTO: 92 FL (ref 79–97)
MONOCYTES # BLD AUTO: 0.96 10*3/MM3 (ref 0.1–0.9)
MONOCYTES NFR BLD AUTO: 6.8 % (ref 5–12)
NEUTROPHILS NFR BLD AUTO: 11.5 10*3/MM3 (ref 1.7–7)
NEUTROPHILS NFR BLD AUTO: 82 % (ref 42.7–76)
NRBC BLD AUTO-RTO: 0 /100 WBC (ref 0–0.2)
PLATELET # BLD AUTO: 143 10*3/MM3 (ref 140–450)
PMV BLD AUTO: 12.2 FL (ref 6–12)
POTASSIUM SERPL-SCNC: 4.4 MMOL/L (ref 3.5–5.2)
PROX PFA PSV RIGHT: 88.9 CM/SEC
PROX SFA PSV RIGHT: 60.2 CM/SEC
QT INTERVAL: 385 MS
QTC INTERVAL: 507 MS
RBC # BLD AUTO: 3.75 10*6/MM3 (ref 4.14–5.8)
RIGHT GROIN CFA SYS: 114 CM/SEC
SINUS: 3.2 CM
SODIUM SERPL-SCNC: 139 MMOL/L (ref 136–145)
STJ: 2.6 CM
WBC NRBC COR # BLD AUTO: 14.03 10*3/MM3 (ref 3.4–10.8)

## 2024-12-04 PROCEDURE — 63710000001 PREDNISONE PER 1 MG: Performed by: INTERNAL MEDICINE

## 2024-12-04 PROCEDURE — 82948 REAGENT STRIP/BLOOD GLUCOSE: CPT

## 2024-12-04 PROCEDURE — 93356 MYOCRD STRAIN IMG SPCKL TRCK: CPT | Performed by: INTERNAL MEDICINE

## 2024-12-04 PROCEDURE — 94761 N-INVAS EAR/PLS OXIMETRY MLT: CPT

## 2024-12-04 PROCEDURE — 93926 LOWER EXTREMITY STUDY: CPT | Performed by: SURGERY

## 2024-12-04 PROCEDURE — 82378 CARCINOEMBRYONIC ANTIGEN: CPT | Performed by: NURSE PRACTITIONER

## 2024-12-04 PROCEDURE — 63710000001 INSULIN LISPRO (HUMAN) PER 5 UNITS

## 2024-12-04 PROCEDURE — 99222 1ST HOSP IP/OBS MODERATE 55: CPT | Performed by: INTERNAL MEDICINE

## 2024-12-04 PROCEDURE — 94799 UNLISTED PULMONARY SVC/PX: CPT

## 2024-12-04 PROCEDURE — 97166 OT EVAL MOD COMPLEX 45 MIN: CPT

## 2024-12-04 PROCEDURE — 80048 BASIC METABOLIC PNL TOTAL CA: CPT

## 2024-12-04 PROCEDURE — 97162 PT EVAL MOD COMPLEX 30 MIN: CPT

## 2024-12-04 PROCEDURE — 93356 MYOCRD STRAIN IMG SPCKL TRCK: CPT

## 2024-12-04 PROCEDURE — 25010000002 CEFTRIAXONE PER 250 MG: Performed by: INTERNAL MEDICINE

## 2024-12-04 PROCEDURE — 85025 COMPLETE CBC W/AUTO DIFF WBC: CPT

## 2024-12-04 PROCEDURE — 93306 TTE W/DOPPLER COMPLETE: CPT | Performed by: INTERNAL MEDICINE

## 2024-12-04 PROCEDURE — 93926 LOWER EXTREMITY STUDY: CPT

## 2024-12-04 PROCEDURE — 82105 ALPHA-FETOPROTEIN SERUM: CPT | Performed by: NURSE PRACTITIONER

## 2024-12-04 PROCEDURE — 93306 TTE W/DOPPLER COMPLETE: CPT

## 2024-12-04 PROCEDURE — 25810000003 SODIUM CHLORIDE 0.9 % SOLUTION

## 2024-12-04 PROCEDURE — 94660 CPAP INITIATION&MGMT: CPT

## 2024-12-04 RX ORDER — PREDNISONE 20 MG/1
20 TABLET ORAL
Status: COMPLETED | OUTPATIENT
Start: 2024-12-04 | End: 2024-12-08

## 2024-12-04 RX ORDER — GUAIFENESIN 600 MG/1
1200 TABLET, EXTENDED RELEASE ORAL EVERY 12 HOURS SCHEDULED
Status: DISCONTINUED | OUTPATIENT
Start: 2024-12-04 | End: 2024-12-12 | Stop reason: HOSPADM

## 2024-12-04 RX ADMIN — CETIRIZINE HYDROCHLORIDE 10 MG: 10 TABLET, FILM COATED ORAL at 09:39

## 2024-12-04 RX ADMIN — ATORVASTATIN CALCIUM 80 MG: 40 TABLET, FILM COATED ORAL at 21:27

## 2024-12-04 RX ADMIN — INSULIN LISPRO 2 UNITS: 100 INJECTION, SOLUTION INTRAVENOUS; SUBCUTANEOUS at 17:59

## 2024-12-04 RX ADMIN — SODIUM CHLORIDE 100 ML/HR: 9 INJECTION, SOLUTION INTRAVENOUS at 04:16

## 2024-12-04 RX ADMIN — FINASTERIDE 5 MG: 5 TABLET, FILM COATED ORAL at 09:39

## 2024-12-04 RX ADMIN — CEFTRIAXONE SODIUM 2000 MG: 2 INJECTION, POWDER, FOR SOLUTION INTRAMUSCULAR; INTRAVENOUS at 13:21

## 2024-12-04 RX ADMIN — EMPAGLIFLOZIN 12.5 MG: 25 TABLET, FILM COATED ORAL at 09:39

## 2024-12-04 RX ADMIN — GUAIFENESIN 1200 MG: 600 TABLET, MULTILAYER, EXTENDED RELEASE ORAL at 21:27

## 2024-12-04 RX ADMIN — Medication 10 ML: at 21:27

## 2024-12-04 RX ADMIN — FUROSEMIDE 20 MG: 20 TABLET ORAL at 09:40

## 2024-12-04 RX ADMIN — Medication 1000 UNITS: at 09:40

## 2024-12-04 RX ADMIN — MONTELUKAST 10 MG: 10 TABLET, FILM COATED ORAL at 21:27

## 2024-12-04 RX ADMIN — SACUBITRIL AND VALSARTAN 1 TABLET: 24; 26 TABLET, FILM COATED ORAL at 09:39

## 2024-12-04 RX ADMIN — SERTRALINE HYDROCHLORIDE 50 MG: 50 TABLET, FILM COATED ORAL at 09:39

## 2024-12-04 RX ADMIN — PANTOPRAZOLE SODIUM 40 MG: 40 TABLET, DELAYED RELEASE ORAL at 05:39

## 2024-12-04 RX ADMIN — PREDNISONE 20 MG: 20 TABLET ORAL at 13:21

## 2024-12-04 RX ADMIN — INSULIN LISPRO 3 UNITS: 100 INJECTION, SOLUTION INTRAVENOUS; SUBCUTANEOUS at 21:27

## 2024-12-04 RX ADMIN — GUAIFENESIN 1200 MG: 600 TABLET, MULTILAYER, EXTENDED RELEASE ORAL at 09:39

## 2024-12-04 RX ADMIN — METOPROLOL SUCCINATE 50 MG: 50 TABLET, EXTENDED RELEASE ORAL at 09:40

## 2024-12-04 NOTE — PROGRESS NOTES
[facility] MEDICINE SERVICE  DAILY PROGRESS NOTE    NAME: Mikael Shanks  : 1935  MRN: [id number]      LOS: 1 day     PROVIDER OF SERVICE: Timothy Duane Brammell, MD    Chief Complaint: Multifocal pneumonia    Subjective:     Interval History:  History taken from: patient    Patient with improved shortness of breath.  He does not have any chronic home oxygen.  He is very hard of hearing.  He denies any abdominal pain.  He is eating without issue.  Nurses unaware of any other additional acute issues.        Review of Systems:   Review of Systems    Objective:     Vital Signs  Temp:  [97.4 °F (36.3 °C)-98.1 °F (36.7 °C)] 97.4 °F (36.3 °C)  Heart Rate:  [] 74  Resp:  [12-20] 12  BP: ()/(56-71) 134/64  Flow (L/min) (Oxygen Therapy):  [2-4] 4   Body mass index is 21.07 kg/m².    Physical Exam  Physical Exam       Diagnostic Data    Results from last 7 days   Lab Units 24  0133 24  0955   WBC 10*3/mm3 14.03* 20.53*   HEMOGLOBIN g/dL 10.6* 11.7*   HEMATOCRIT % 34.5* 37.6   PLATELETS 10*3/mm3 143 167   GLUCOSE mg/dL 109* 140*   CREATININE mg/dL 1.70* 1.65*   BUN mg/dL 25* 23   SODIUM mmol/L 139 135*   POTASSIUM mmol/L 4.4 4.5   AST (SGOT) U/L  --  41*   ALT (SGPT) U/L  --  41   ALK PHOS U/L  --  83   BILIRUBIN mg/dL  --  1.0   ANION GAP mmol/L 9.8 12.5       CT Abdomen Pelvis With Contrast    Result Date: 12/3/2024  Impression: Heterogenous appearing mass in the right hepatic lobe at the hepatic dome measuring up to 9.3 cm remain suspicious for malignancy. Further characterization with MR abdomen with and without contrast is recommended. Unchanged appearing bilateral lower lobe opacities suspicious for pneumonia superimposed on chronic interstitial lung disease. Electronically Signed: Tesfaye Paulino  12/3/2024 4:55 PM EST  Workstation ID: [id number]    CT Chest Without Contrast Diagnostic    Result Date: 12/3/2024  Impression: 1. Large mass in the right hepatic lobe involving  segments 7 and 8 which may relate to malignancy or metastasis measuring up to 9.5 cm. Recommend CT abdomen and pelvis with contrast for further assessment. 2. Patchy areas of airspace disease involving left upper lobes and bilateral lower lobes concerning for multifocal pneumonia superimposed on background of chronic interstitial lung disease. 3. Cardiomegaly and additional incidental findings above. Electronically Signed: Dane Bain MD  12/3/2024 1:53 PM EST  Workstation ID: [id number]    XR Chest 1 View    Result Date: 12/3/2024  Impression: Chronic findings, without significant change. Electronically Signed: Jaclyn Reagan MD  12/3/2024 10:38 AM EST  Workstation ID: [id number]           Assessment:      Acute hypoxemic respiratory failure  Rhinovirus    probable  pneumonia  Liver mass  Type 2 diabetes  Hard of hearing  History of coronary artery disease  Chronic diastolic congestive heart failure  Chronic kidney disease  anemia       Plan: Complete antibiotic course.  Hematology oncology to review.  Appreciate pulmonary input.  Unclear discharge planning with patient living independently at present.      Active and Resolved Problems  Active Hospital Problems    Diagnosis  POA    **Multifocal pneumonia [J18.9]  Yes      Resolved Hospital Problems   No resolved problems to display.           VTE Prophylaxis:  Mechanical VTE prophylaxis orders are present.             Disposition Planning:     Barriers to Discharge:clinical improvement  Anticipated Date of Discharge: 12/6  Place of Discharge: home vs rehab      Time: 30 minutes     Code Status and Medical Interventions: No CPR (Do Not Attempt to Resuscitate); Full Support   Ordered at: 12/03/24 1453     Code Status (Patient has no pulse and is not breathing):    No CPR (Do Not Attempt to Resuscitate)     Medical Interventions (Patient has pulse or is breathing):    Full Support       Signature: Electronically signed by Timothy Duane Brammell, MD, 12/04/24, 10:43  EST.  [facility] Hospitalist Team

## 2024-12-04 NOTE — CONSULTS
Hematology/Oncology Inpatient Consultation    Patient name: Mikael Shanks  : 1935  MRN: [id number]  Referring Provider: Dr. Fall  Reason for Consultation: Liver mass    Chief complaint: Shortness of breath    History of present illness:      Mikael Shanks is a 89 y.o. male who presented to [facility] on 12/3/2024 with complaints of shortness of breath.  Past medical history of CAD, diabetes, GERD, hyperlipidemia, hypertension.  Presented with shortness of breath and right groin pain.  Due to being very hard of hearing and not having his hearing aids his HPI was obtained from family.  Family stated that the patient had a recent admission at the [facility] where they were concerned for congestive heart failure and also performed a heart cath.    Evaluation in the ED showed leukocytosis with a WBC of 20.53 and increased neutrophils.  Respiratory panel was positive for rhinovirus chest x-ray revealed chronic findings without acute changes.      CT of the chest without contrast  -Large mass in the right hepatic lobe involving segment 7 and 8 which may relate to malignancy or metastasis measuring up to 9.5 cm  -Patchy areas of airspace disease involving left upper lobes and bilateral lower lobes concerning for multifocal pneumonia superimposed on background of chronic interstitial lung disease  -Cardiomegaly    CT of the abdomen and pelvis with contrast  -Heterogenous appearing mass in the right hepatic lobe at the hepatic dome measuring up to 9.3 cm suspicious for malignancy.  Further characterization with MRI abdomen with and without recommended    24  Hematology/Oncology was consulted due to newly discovered liver mass.  Complains of intermittent right upper quadrant discomfort.  He denies nausea or vomiting.  Will been asked to see him due to a large 9.5 cm mass noted on the right upper quadrant.    He/She  has a past medical history of Carotid artery disease, Coronary artery  disease, Diabetes mellitus, GERD (gastroesophageal reflux disease), [place] (hard of hearing), Hyperlipidemia, Hypertension, Osteoarthritis, Prostatic hypertrophy, Pulmonary valve disorder, Sleep apnea, and Stroke.    PCP: Xander Robison MD    History:  Past Medical History:   Diagnosis Date    Carotid artery disease     Coronary artery disease     Diabetes mellitus     GERD (gastroesophageal reflux disease)     [place] (hard of hearing)     Hyperlipidemia     Hypertension     Osteoarthritis     Prostatic hypertrophy     Pulmonary valve disorder     Sleep apnea     cpap   doesnt use     Stroke    ,   Past Surgical History:   Procedure Laterality Date    BACK SURGERY      CARDIAC CATHETERIZATION N/A 12/5/2019    Procedure: Left Heart Cath and coronary angiogram;  Surgeon: Dayday Ruiz MD;  Location: [facility] CATH INVASIVE LOCATION;  Service: Cardiovascular    CARDIAC CATHETERIZATION N/A 12/5/2019    Procedure: Right and Left Heart Cath;  Surgeon: Dayday Ruiz MD;  Location: [facility] CATH INVASIVE LOCATION;  Service: Cardiovascular    CARDIAC CATHETERIZATION N/A 9/4/2020    Procedure: Left and Right Heart Cath with Coronary Angiography;  Surgeon: Dayday Ruiz MD;  Location: [facility] CATH INVASIVE LOCATION;  Service: Cardiovascular;  Laterality: N/A;    CAROTID ENDARTERECTOMY Left     CHOLECYSTECTOMY      COLONOSCOPY  08/14/2018    No repeat    CORONARY ANGIOPLASTY WITH STENT PLACEMENT      LUMBAR LAMINECTOMY Bilateral 1/31/2022    Procedure: LUMBAR LAMINECTOMY WITH/WITHOUT FUSION L4-L5;  Surgeon: Geronimo Gonzales MD;  Location: [facility] MAIN OR;  Service: Neurosurgery;  Laterality: Bilateral;   ,   Family History   Problem Relation Age of Onset    Cancer Mother     Heart disease Father     Cancer Brother    ,   Social History     Tobacco Use    Smoking status: Former     Current packs/day: 0.00     Average packs/day: 5.0 packs/day for 7.0 years (35.0 ttl pk-yrs)     Types: Cigarettes     Start date: 8/31/1953      Quit date: 1960     Years since quittin.3     Passive exposure: Past    Smokeless tobacco: Never   Vaping Use    Vaping status: Never Used   Substance Use Topics    Alcohol use: Not Currently     Comment: very rare    Drug use: No   ,   Facility-Administered Medications Prior to Admission   Medication Dose Route Frequency Provider Last Rate Last Admin    cyanocobalamin injection 1,000 mcg  1,000 mcg Intramuscular Q28 Days Xander Robison MD   1,000 mcg at 24 1552     Medications Prior to Admission   Medication Sig Dispense Refill Last Dose/Taking    albuterol sulfate  (90 Base) MCG/ACT inhaler Inhale 2 puffs Every 4 (Four) Hours As Needed for Wheezing. 18 g 5 Past Week    atorvastatin (LIPITOR) 80 MG tablet Take 1 tablet by mouth every night at bedtime. 90 tablet 4 Past Week    B Complex Vitamins (VITAMIN B COMPLEX PO) Take 1 capsule by mouth Daily.   Past Week    Cholecalciferol 25 MCG (1000 UT) tablet Take 1 tablet by mouth Daily.   Past Week    empagliflozin (JARDIANCE) 25 MG tablet tablet Take 0.5 tablets by mouth Daily.   Past Week    enalapril (VASOTEC) 2.5 MG tablet TAKE 1 TABLET BY MOUTH DAILY 90 tablet 4 Past Week    finasteride (PROSCAR) 5 MG tablet Take 1 tablet by mouth Daily. 90 tablet 4 Past Week    fluticasone (FLONASE) 50 MCG/ACT nasal spray Administer 1 spray into the nostril(s) as directed by provider Daily.   Past Week    furosemide (LASIX) 40 MG tablet Take 0.5 tablets by mouth Daily.   Past Week    glipizide (glipiZIDE XL) 5 MG ER tablet Take 1 tablet by mouth Daily. 90 tablet 4 Past Week    lidocaine (LIDODERM) 5 % Place 1 patch on the skin as directed by provider Daily. Remove & Discard patch within 12 hours or as directed by MD   Past Week    loratadine (CLARITIN) 10 MG tablet Take 1 tablet by mouth Daily.   Past Week    metoprolol succinate XL (TOPROL-XL) 50 MG 24 hr tablet Take 1 tablet by mouth Daily.   Past Week    montelukast (Singulair) 10 MG tablet Take 1  tablet by mouth Every Night.   Past Week    Multiple Vitamins-Minerals (PRESERVISION AREDS 2 PO) Take 1 Capful by mouth 2 (Two) Times a Day.   Past Week    omeprazole (priLOSEC) 40 MG capsule Take 1 capsule by mouth Daily. 90 capsule 4 Past Week    sacubitril-valsartan (Entresto) 24-26 MG tablet Take 1 tablet by mouth Daily.   Past Week    sertraline (ZOLOFT) 50 MG tablet Take 1 tablet by mouth Daily. 90 tablet 4 Past Week   , Scheduled Meds:  atorvastatin, 80 mg, Oral, Nightly  cefTRIAXone, 2,000 mg, Intravenous, Q24H  cetirizine, 10 mg, Oral, Daily  cholecalciferol, 1,000 Units, Oral, Daily  empagliflozin, 12.5 mg, Oral, Daily  finasteride, 5 mg, Oral, Daily  furosemide, 20 mg, Oral, Daily  guaiFENesin, 1,200 mg, Oral, Q12H  insulin lispro, 2-7 Units, Subcutaneous, 4x Daily AC & at Bedtime  metoprolol succinate XL, 50 mg, Oral, Daily  montelukast, 10 mg, Oral, Nightly  pantoprazole, 40 mg, Oral, Q AM  predniSONE, 20 mg, Oral, Daily With Breakfast  sacubitril-valsartan, 1 tablet, Oral, Daily  sertraline, 50 mg, Oral, Daily    , Continuous Infusions:     , PRN Meds:    acetaminophen **OR** acetaminophen **OR** acetaminophen    albuterol sulfate HFA    aluminum-magnesium hydroxide-simethicone    senna-docusate sodium **AND** polyethylene glycol **AND** bisacodyl **AND** bisacodyl    Calcium Replacement - Follow Nurse / BPA Driven Protocol    dextrose    dextrose    glucagon (human recombinant)    ipratropium-albuterol    Magnesium Standard Dose Replacement - Follow Nurse / BPA Driven Protocol    nitroglycerin    Phosphorus Replacement - Follow Nurse / BPA Driven Protocol    Potassium Replacement - Follow Nurse / BPA Driven Protocol    [COMPLETED] Insert Peripheral IV **AND** sodium chloride   Allergies:  Penicillins    Subjective     ROS:  Review of Systems   Constitutional:  Positive for fatigue. Negative for chills and fever.   HENT:  Negative for congestion, drooling, ear discharge, rhinorrhea, sinus pressure and  "tinnitus.    Eyes:  Negative for photophobia, pain and discharge.   Respiratory:  Negative for apnea, choking and stridor.    Cardiovascular:  Negative for palpitations.   Gastrointestinal:  Negative for abdominal distention, abdominal pain and anal bleeding.   Endocrine: Negative for polydipsia and polyphagia.   Genitourinary:  Negative for decreased urine volume, flank pain and genital sores.   Musculoskeletal:  Negative for gait problem, neck pain and neck stiffness.   Skin:  Negative for color change, rash and wound.   Neurological:  Positive for weakness. Negative for tremors, seizures, syncope, facial asymmetry and speech difficulty.   Hematological:  Negative for adenopathy.   Psychiatric/Behavioral:  Negative for agitation, confusion, hallucinations and self-injury. The patient is not hyperactive.         Objective   Vital Signs:   /64   Pulse 63   Temp 97.6 °F (36.4 °C) (Oral)   Resp 12   Ht 177.8 cm (70\")   Wt 66.2 kg (146 lb)   SpO2 97%   BMI 20.95 kg/m²     Physical Exam: (performed by MD)  Physical Exam  Vitals and nursing note reviewed.   Constitutional:       General: He is not in acute distress.     Appearance: He is ill-appearing. He is not diaphoretic.   HENT:      Head: Normocephalic and atraumatic.   Eyes:      General: No scleral icterus.        Right eye: No discharge.         Left eye: No discharge.      Conjunctiva/sclera: Conjunctivae normal.   Neck:      Thyroid: No thyromegaly.   Cardiovascular:      Rate and Rhythm: Normal rate and regular rhythm.      Heart sounds: Normal heart sounds.      No friction rub. No gallop.   Pulmonary:      Effort: Pulmonary effort is normal. No respiratory distress.      Breath sounds: No stridor. No wheezing.   Abdominal:      General: Bowel sounds are normal.      Palpations: Abdomen is soft. There is no mass.      Tenderness: There is abdominal tenderness. There is no guarding or rebound.   Musculoskeletal:         General: No tenderness. " Normal range of motion.      Cervical back: Normal range of motion and neck supple.   Lymphadenopathy:      Cervical: No cervical adenopathy.   Skin:     General: Skin is warm.      Findings: No erythema or rash.   Neurological:      Mental Status: He is alert and oriented to person, place, and time.      Motor: No abnormal muscle tone.   Psychiatric:         Behavior: Behavior normal.         Results Review:  Lab Results (last 48 hours)       Procedure Component Value Units Date/Time    POC Glucose 4x Daily Before Meals & at Bedtime [[id number]]  (Normal) Collected: 12/04/24 0748    Specimen: Blood Updated: 12/04/24 0750     Glucose 105 mg/dL      Comment: Serial Number: [id number]:  [id number]       Basic Metabolic Panel [[id number]]  (Abnormal) Collected: 12/04/24 0133    Specimen: Blood from Arm, Left Updated: 12/04/24 0203     Glucose 109 mg/dL      BUN 25 mg/dL      Creatinine 1.70 mg/dL      Sodium 139 mmol/L      Potassium 4.4 mmol/L      Chloride 104 mmol/L      CO2 25.2 mmol/L      Calcium 8.8 mg/dL      BUN/Creatinine Ratio 14.7     Anion Gap 9.8 mmol/L      eGFR 38.1 mL/min/1.73     Narrative:      GFR Normal >60  Chronic Kidney Disease <60  Kidney Failure <15    The GFR formula is only valid for adults with stable renal function between ages 18 and 70.    CBC & Differential [[id number]]  (Abnormal) Collected: 12/04/24 0133    Specimen: Blood from Arm, Left Updated: 12/04/24 0143    Narrative:      The following orders were created for panel order CBC & Differential.  Procedure                               Abnormality         Status                     ---------                               -----------         ------                     CBC Auto Differential[[id number]]        Abnormal            Final result                 Please view results for these tests on the individual orders.    CBC Auto Differential [[id number]]  (Abnormal) Collected: 12/04/24 0133    Specimen: Blood from Arm, Left Updated:  12/04/24 0143     WBC 14.03 10*3/mm3      RBC 3.75 10*6/mm3      Hemoglobin 10.6 g/dL      Hematocrit 34.5 %      MCV 92.0 fL      MCH 28.3 pg      MCHC 30.7 g/dL      RDW 15.3 %      RDW-SD 51.8 fl      MPV 12.2 fL      Platelets 143 10*3/mm3      Neutrophil % 82.0 %      Lymphocyte % 8.2 %      Monocyte % 6.8 %      Eosinophil % 1.7 %      Basophil % 0.7 %      Immature Grans % 0.6 %      Neutrophils, Absolute 11.50 10*3/mm3      Lymphocytes, Absolute 1.15 10*3/mm3      Monocytes, Absolute 0.96 10*3/mm3      Eosinophils, Absolute 0.24 10*3/mm3      Basophils, Absolute 0.10 10*3/mm3      Immature Grans, Absolute 0.08 10*3/mm3      nRBC 0.0 /100 WBC     POC Glucose Once [[id number]]  (Abnormal) Collected: 12/03/24 2011    Specimen: Blood Updated: 12/03/24 2012     Glucose 151 mg/dL      Comment: Serial Number: [id number]:  [id number]       POC Glucose 4x Daily Before Meals & at Bedtime [[id number]]  (Abnormal) Collected: 12/03/24 1723    Specimen: Blood Updated: 12/03/24 1725     Glucose 115 mg/dL      Comment: Serial Number: [id number]:  [id number]       Respiratory Panel PCR w/COVID-19(SARS-CoV-2) DARVIN/CLAUDIA/ARELIS/PAD/COR/CLIFFORD In-House, NP Swab in UTM/VTM, 2 HR TAT - Swab, Nasopharynx [[id number]]  (Abnormal) Collected: 12/03/24 1442    Specimen: Swab from Nasopharynx Updated: 12/03/24 1540     ADENOVIRUS, PCR Not Detected     Coronavirus 229E Not Detected     Coronavirus HKU1 Not Detected     Coronavirus NL63 Not Detected     Coronavirus OC43 Not Detected     COVID19 Not Detected     Human Metapneumovirus Not Detected     Human Rhinovirus/Enterovirus Detected     Influenza A PCR Not Detected     Influenza B PCR Not Detected     Parainfluenza Virus 1 Not Detected     Parainfluenza Virus 2 Not Detected     Parainfluenza Virus 3 Not Detected     Parainfluenza Virus 4 Not Detected     RSV, PCR Not Detected     Bordetella pertussis pcr Not Detected     Bordetella parapertussis PCR Not Detected     Chlamydophila  "pneumoniae PCR Not Detected     Mycoplasma pneumo by PCR Not Detected    Narrative:      In the setting of a positive respiratory panel with a viral infection PLUS a negative procalcitonin without other underlying concern for bacterial infection, consider observing off antibiotics or discontinuation of antibiotics and continue supportive care. If the respiratory panel is positive for atypical bacterial infection (Bordetella pertussis, Chlamydophila pneumoniae, or Mycoplasma pneumoniae), consider antibiotic de-escalation to target atypical bacterial infection.    STAT Lactic Acid, Reflex [[id number]]  (Normal) Collected: 12/03/24 1401    Specimen: Blood Updated: 12/03/24 1428     Lactate 1.4 mmol/L     Procalcitonin [[id number]]  (Abnormal) Collected: 12/03/24 1148    Specimen: Blood Updated: 12/03/24 1339     Procalcitonin 1.35 ng/mL     Narrative:      As a Marker for Sepsis (Non-Neonates):    1. <0.5 ng/mL represents a low risk of severe sepsis and/or septic shock.  2. >2 ng/mL represents a high risk of severe sepsis and/or septic shock.    As a Marker for Lower Respiratory Tract Infections that require antibiotic therapy:    PCT on Admission    Antibiotic Therapy       6-12 Hrs later    >0.5                Strongly Recommended  >0.25 - <0.5        Recommended   0.1 - 0.25          Discouraged              Remeasure/reassess PCT  <0.1                Strongly Discouraged     Remeasure/reassess PCT    As 28 day mortality risk marker: \"Change in Procalcitonin Result\" (>80% or <=80%) if Day 0 (or Day 1) and Day 4 values are available. Refer to http://www.[facility]-pct-calculator.com    Change in PCT <=80%  A decrease of PCT levels below or equal to 80% defines a positive change in PCT test result representing a higher risk for 28-day all-cause mortality of patients diagnosed with severe sepsis for septic shock.    Change in PCT >80%  A decrease of PCT levels of more than 80% defines a negative change in PCT result " representing a lower risk for 28-day all-cause mortality of patients diagnosed with severe sepsis or septic shock.       High Sensitivity Troponin T 2Hr [[id number]]  (Abnormal) Collected: 12/03/24 1148    Specimen: Blood Updated: 12/03/24 1213     HS Troponin T 49 ng/L      Troponin T Delta 2 ng/L     Narrative:      High Sensitive Troponin T Reference Range:  <14.0 ng/L- Negative Female for AMI  <22.0 ng/L- Negative Male for AMI  >=14 - Abnormal Female indicating possible myocardial injury.  >=22 - Abnormal Male indicating possible myocardial injury.   Clinicians would have to utilize clinical acumen, EKG, Troponin, and serial changes to determine if it is an Acute Myocardial Infarction or myocardial injury due to an underlying chronic condition.         POC Lactate [[id number]]  (Abnormal) Collected: 12/03/24 1054    Specimen: Blood Updated: 12/03/24 1056     Lactate 2.2 mmol/L      Comment: Serial Number: [id number]:  [id number]       Blood Culture - Blood, Arm, Left [[id number]] Collected: 12/03/24 1053    Specimen: Blood from Arm, Left Updated: 12/03/24 1055    BNP [[id number]]  (Abnormal) Collected: 12/03/24 0955    Specimen: Blood Updated: 12/03/24 1054     proBNP 4,624.0 pg/mL     Narrative:      This assay is used as an aid in the diagnosis of individuals suspected of having heart failure. It can be used as an aid in the diagnosis of acute decompensated heart failure (ADHF) in patients presenting with signs and symptoms of ADHF to the emergency department (ED). In addition, NT-proBNP of <300 pg/mL indicates ADHF is not likely.    Age Range Result Interpretation  NT-proBNP Concentration (pg/mL:      <50             Positive            >450                   Gray                 300-450                    Negative             <300    50-75           Positive            >900                  Gray                300-900                  Negative            <300      >75             Positive             >1800                  [place]                300-1800                  Negative            <300    Comprehensive Metabolic Panel [[id number]]  (Abnormal) Collected: 12/03/24 0955    Specimen: Blood Updated: 12/03/24 1054     Glucose 140 mg/dL      BUN 23 mg/dL      Creatinine 1.65 mg/dL      Sodium 135 mmol/L      Potassium 4.5 mmol/L      Chloride 98 mmol/L      CO2 24.5 mmol/L      Calcium 9.4 mg/dL      Total Protein 6.5 g/dL      Albumin 3.6 g/dL      ALT (SGPT) 41 U/L      AST (SGOT) 41 U/L      Alkaline Phosphatase 83 U/L      Total Bilirubin 1.0 mg/dL      Globulin 2.9 gm/dL      A/G Ratio 1.2 g/dL      BUN/Creatinine Ratio 13.9     Anion Gap 12.5 mmol/L      eGFR 39.4 mL/min/1.73     Narrative:      GFR Normal >60  Chronic Kidney Disease <60  Kidney Failure <15    The GFR formula is only valid for adults with stable renal function between ages 18 and 70.    Blood Culture - Blood, Arm, Right [[id number]] Collected: 12/03/24 1047    Specimen: Blood from Arm, Right Updated: 12/03/24 1050    CBC & Differential [[id number]]  (Abnormal) Collected: 12/03/24 0955    Specimen: Blood Updated: 12/03/24 1035    Narrative:      The following orders were created for panel order CBC & Differential.  Procedure                               Abnormality         Status                     ---------                               -----------         ------                     CBC Auto Differential[[id number]]        Abnormal            Final result                 Please view results for these tests on the individual orders.    CBC Auto Differential [[id number]]  (Abnormal) Collected: 12/03/24 0955    Specimen: Blood Updated: 12/03/24 1035     WBC 20.53 10*3/mm3      RBC 4.16 10*6/mm3      Hemoglobin 11.7 g/dL      Hematocrit 37.6 %      MCV 90.4 fL      MCH 28.1 pg      MCHC 31.1 g/dL      RDW 15.0 %      RDW-SD 49.2 fl      MPV 12.0 fL      Platelets 167 10*3/mm3      Neutrophil % 91.0 %      Lymphocyte % 3.3 %      Monocyte % 4.6 %       Eosinophil % 0.2 %      Basophil % 0.5 %      Immature Grans % 0.4 %      Neutrophils, Absolute 18.68 10*3/mm3      Lymphocytes, Absolute 0.68 10*3/mm3      Monocytes, Absolute 0.94 10*3/mm3      Eosinophils, Absolute 0.04 10*3/mm3      Basophils, Absolute 0.11 10*3/mm3      Immature Grans, Absolute 0.08 10*3/mm3      nRBC 0.0 /100 WBC     High Sensitivity Troponin T [[id number]]  (Abnormal) Collected: 12/03/24 0955    Specimen: Blood Updated: 12/03/24 1028     HS Troponin T 47 ng/L     Narrative:      High Sensitive Troponin T Reference Range:  <14.0 ng/L- Negative Female for AMI  <22.0 ng/L- Negative Male for AMI  >=14 - Abnormal Female indicating possible myocardial injury.  >=22 - Abnormal Male indicating possible myocardial injury.   Clinicians would have to utilize clinical acumen, EKG, Troponin, and serial changes to determine if it is an Acute Myocardial Infarction or myocardial injury due to an underlying chronic condition.         [place] Draw [[id number]] Collected: 12/03/24 0955    Specimen: Blood Updated: 12/03/24 1015    Narrative:      The following orders were created for panel order [place] Draw.  Procedure                               Abnormality         Status                     ---------                               -----------         ------                     Green Top (Gel)[[id number]]                                  Final result               Lavender Top[[id number]]                                     Final result               Gold Top - SST[[id number]]                                   Final result               Light Blue Top[[id number]]                                   Final result                 Please view results for these tests on the individual orders.    Green Top (Gel) [[id number]] Collected: 12/03/24 0955    Specimen: Blood Updated: 12/03/24 1015     Extra Tube Hold for add-ons.     Comment: Auto resulted.       Lavender Top [[id number]] Collected: 12/03/24 0955    Specimen:  Blood Updated: 12/03/24 1015     Extra Tube hold for add-on     Comment: Auto resulted       Gold Top - SST [[id number]] Collected: 12/03/24 0955    Specimen: Blood Updated: 12/03/24 1015     Extra Tube Hold for add-ons.     Comment: Auto resulted.       Light Blue Top [[id number]] Collected: 12/03/24 0955    Specimen: Blood Updated: 12/03/24 1015     Extra Tube Hold for add-ons.     Comment: Auto resulted                Pending Results:     Imaging Reviewed:   CT Abdomen Pelvis With Contrast    Result Date: 12/3/2024  Impression: Heterogenous appearing mass in the right hepatic lobe at the hepatic dome measuring up to 9.3 cm remain suspicious for malignancy. Further characterization with MR abdomen with and without contrast is recommended. Unchanged appearing bilateral lower lobe opacities suspicious for pneumonia superimposed on chronic interstitial lung disease. Electronically Signed: Tesfaye Paulino  12/3/2024 4:55 PM EST  Workstation ID: [id number]    CT Chest Without Contrast Diagnostic    Result Date: 12/3/2024  Impression: 1. Large mass in the right hepatic lobe involving segments 7 and 8 which may relate to malignancy or metastasis measuring up to 9.5 cm. Recommend CT abdomen and pelvis with contrast for further assessment. 2. Patchy areas of airspace disease involving left upper lobes and bilateral lower lobes concerning for multifocal pneumonia superimposed on background of chronic interstitial lung disease. 3. Cardiomegaly and additional incidental findings above. Electronically Signed: Dane Bain MD  12/3/2024 1:53 PM EST  Workstation ID: [id number]    XR Chest 1 View    Result Date: 12/3/2024  Impression: Chronic findings, without significant change. Electronically Signed: Jaclyn Reagan MD  12/3/2024 10:38 AM EST  Workstation ID: [id number]          Assessment & Plan   ASSESSMENT  Right hepatic lobe mass: 9.3 cm mass suspicious for malignancy.  Normocytic anemia: Hemoglobin 10.6, MCV 92.0.  Has a history  of pernicious anemia and is on B12 injection monthly with his PCP.    PLAN  Check AFP and CEA  Check iron studies and ferritin  Further recommendations per Dr. Aragon    Electronically signed by RAMONITA Cardozo, 12/04/24, 8:25 AM EST.    Thank you for this consult. We will be happy to follow along with you.       89-year-old male who have we have been consulted for a right liver mass right liver mass     Hematology/Oncology was consulted due to newly discovered liver mass.  Complains of intermittent right upper quadrant discomfort.  He denies nausea or vomiting.  Will been asked to see him due to a large 9.5 cm mass noted on the right upper quadrant.    Physical exam:  he is not in obvious distress  Abdominal exam is benign  Discussed the above possibilities with patient  He is interested in pursuing image guided biopsy for histologic diagnosis    Will check CEA, alpha-fetoprotein    Continue to follow      Electronically signed by Carmen Aragon MD, 12/06/24, 5:24 PM EST.

## 2024-12-04 NOTE — THERAPY EVALUATION
Patient Name: Mikael Shanks  : 1935    MRN: [id number]                              Today's Date: 2024       Admit Date: 12/3/2024    Visit Dx: No diagnosis found.  Patient Active Problem List   Diagnosis    Type 2 diabetes mellitus without complication, without long-term current use of insulin    PAC (premature atrial contraction)    Gastritis and gastroduodenitis    Atherosclerosis of native coronary artery of native heart without angina pectoris    Stage 3a chronic kidney disease    Hypertension, benign    Spinal stenosis of lumbar region    Asymptomatic peripheral vascular disease    Pernicious anemia    Depression    Pulmonary valve disorder    Hearing loss    Family history of colon cancer    BILL (obstructive sleep apnea)    Mixed hyperlipidemia    Anxiety disorder, unspecified    Grief    Ischemic cardiomyopathy    Neurogenic claudication    Proteinuria    Encounter for general adult medical examination with abnormal findings    Parathyroid abnormality    Bilateral carotid artery stenosis    Left hip pain    Benign prostatic hyperplasia    Mitral valve insufficiency    Hyperkalemia    Asthma    Cough    Immobility syndrome    Seasonal allergies    Immunization counseling    Nocturia    Chronic non-infective otitis externa of both ears    Dermatitis    Closed fracture of multiple ribs of left side with routine healing    Situational stress    Medicare annual wellness visit, subsequent    Depression screening    Advanced care planning/counseling discussion    Left atrial enlargement    Lethargy    Pruritus    Elevated liver function tests    Subclinical hypothyroidism    Adenomatous polyp of rectum    Dyspnea    Vitamin B 12 deficiency    Hypotension    Multifocal pneumonia     Past Medical History:   Diagnosis Date    Carotid artery disease     Coronary artery disease     Diabetes mellitus     GERD (gastroesophageal reflux disease)     [place] (hard of hearing)     Hyperlipidemia     Hypertension      Osteoarthritis     Prostatic hypertrophy     Pulmonary valve disorder     Sleep apnea     cpap   doesnt use     Stroke      Past Surgical History:   Procedure Laterality Date    BACK SURGERY      CARDIAC CATHETERIZATION N/A 12/5/2019    Procedure: Left Heart Cath and coronary angiogram;  Surgeon: Dayday Ruiz MD;  Location: [facility] CATH INVASIVE LOCATION;  Service: Cardiovascular    CARDIAC CATHETERIZATION N/A 12/5/2019    Procedure: Right and Left Heart Cath;  Surgeon: Dayday Ruiz MD;  Location: [facility] CATH INVASIVE LOCATION;  Service: Cardiovascular    CARDIAC CATHETERIZATION N/A 9/4/2020    Procedure: Left and Right Heart Cath with Coronary Angiography;  Surgeon: Dayday Ruiz MD;  Location: [facility] CATH INVASIVE LOCATION;  Service: Cardiovascular;  Laterality: N/A;    CAROTID ENDARTERECTOMY Left     CHOLECYSTECTOMY      COLONOSCOPY  08/14/2018    No repeat    CORONARY ANGIOPLASTY WITH STENT PLACEMENT      LUMBAR LAMINECTOMY Bilateral 1/31/2022    Procedure: LUMBAR LAMINECTOMY WITH/WITHOUT FUSION L4-L5;  Surgeon: Geronimo Gonzales MD;  Location: [facility] MAIN OR;  Service: Neurosurgery;  Laterality: Bilateral;      General Information       Row Name 12/04/24 1115          Physical Therapy Time and Intention    Document Type evaluation  -CL     Mode of Treatment individual therapy;physical therapy  -CL       Row Name 12/04/24 1115          General Information    Patient Profile Reviewed yes  -CL     Prior Level of Function independent:;all household mobility;ADL's;driving  -CL     Existing Precautions/Restrictions hip;oxygen therapy device and L/min  -CL     Barriers to Rehab hearing deficit  -CL       Row Name 12/04/24 1115          Living Environment    People in Home alone  -CL       Row Name 12/04/24 1115          Home Main Entrance    Number of Stairs, Main Entrance none;other (see comments)  ramp to enter  -CL       Row Name 12/04/24 1115          Stairs Within Home, Primary    Number of Stairs,  Within Home, Primary none  -CL       Row Name 12/04/24 1115          Cognition    Orientation Status (Cognition) oriented x 4  -CL               User Key  (r) = Recorded By, (t) = Taken By, (c) = Cosigned By      Initials Name Provider Type    CL Roro Galvan, PT Physical Therapist                   Mobility       Row Name 12/04/24 1116          Bed Mobility    Bed Mobility supine-sit  -CL     Supine-Sit [place] (Bed Mobility) supervision  -CL     Assistive Device (Bed Mobility) bed rails  -CL       Row Name 12/04/24 1116          Bed-Chair Transfer    Bed-Chair [place] (Transfers) minimum assist (75% patient effort)  -CL     Assistive Device (Bed-Chair Transfers) walker, front-wheeled  -CL       Row Name 12/04/24 1116          Sit-Stand Transfer    Sit-Stand [place] (Transfers) minimum assist (75% patient effort)  -CL       Row Name 12/04/24 1116          Gait/Stairs (Locomotion)    Barber Level (Gait) minimum assist (75% patient effort)  -CL     Assistive Device (Gait) walker, front-wheeled  -CL     Patient was able to Ambulate yes  -CL     Distance in Feet (Gait) 3  -CL     Right Sided Gait Deviations weight shift ability decreased  -CL     Comment, (Gait/Stairs) Holds RLE in ER during transfers and short distance gait  -CL               User Key  (r) = Recorded By, (t) = Taken By, (c) = Cosigned By      Initials Name Provider Type    Roro Araya, PT Physical Therapist                   Obj/Interventions       Row Name 12/04/24 1117          Range of Motion Comprehensive    General Range of Motion bilateral lower extremity ROM WFL  -CL       Row Name 12/04/24 1117          Strength Comprehensive (MMT)    Comment, General Manual Muscle Testing (MMT) Assessment Hip flex 3/5 R, 4/5 L, knee ext 4+/5 bilaterally  -CL       Row Name 12/04/24 1117          Balance    Balance Assessment sitting static balance;sitting dynamic balance;standing dynamic balance;standing static balance   -CL     Static Sitting Balance supervision  -CL     Dynamic Sitting Balance supervision  -CL     Position, Sitting Balance unsupported;sitting edge of bed  -CL     Static Standing Balance contact guard  -CL     Dynamic Standing Balance minimal assist  -CL     Position/Device Used, Standing Balance supported;walker, rolling  -CL       Row Name 12/04/24 1117          Sensory Assessment (Somatosensory)    Sensory Assessment (Somatosensory) sensation intact  -CL               User Key  (r) = Recorded By, (t) = Taken By, (c) = Cosigned By      Initials Name Provider Type    CL Roro Galvan, PT Physical Therapist                   Goals/Plan       Row Name 12/04/24 1122          Bed Mobility Goal 1 (PT)    Activity/Assistive Device (Bed Mobility Goal 1, PT) bed mobility activities, all  -CL     [place] Level/Cues Needed (Bed Mobility Goal 1, PT) independent  -CL     Time Frame (Bed Mobility Goal 1, PT) long term goal (LTG);2 weeks  -CL       Row Name 12/04/24 1122          Transfer Goal 1 (PT)    Activity/Assistive Device (Transfer Goal 1, PT) sit-to-stand/stand-to-sit;bed-to-chair/chair-to-bed  -CL     [place] Level/Cues Needed (Transfer Goal 1, PT) modified independence  -CL     Time Frame (Transfer Goal 1, PT) long term goal (LTG);2 weeks  -CL       Row Name 12/04/24 1122          Gait Training Goal 1 (PT)    Activity/Assistive Device (Gait Training Goal 1, PT) gait (walking locomotion);assistive device use;diminish gait deviation  -CL     [place] Level (Gait Training Goal 1, PT) modified independence  -CL     Distance (Gait Training Goal 1, PT) 150'  -CL     Time Frame (Gait Training Goal 1, PT) long term goal (LTG);2 weeks  -CL       Row Name 12/04/24 1122          Therapy Assessment/Plan (PT)    Planned Therapy Interventions (PT) balance training;bed mobility training;gait training;patient/family education;neuromuscular re-education;strengthening;transfer training  -CL               User Key   (r) = Recorded By, (t) = Taken By, (c) = Cosigned By      Initials Name Provider Type    CL Mandy, Roro SANCHEZ, PT Physical Therapist                   Clinical Impression       Row Name 12/04/24 1118          Pain    Pretreatment Pain Rating 0/10 - no pain  -CL     Posttreatment Pain Rating 0/10 - no pain  -CL     Pre/Posttreatment Pain Comment Pt denies pain but is reluctant to walk any distance due to R groin pain  -CL       Row Name 12/04/24 1118          Plan of Care Review    Plan of Care Reviewed With patient  -CL     Outcome Evaluation Mikael Shanks is an 89 y.o. male with medical comorbidities of CAD, DM, GERD, HLD and HTN who presents with SOA and R groin pain.  He is found to have multifocal PNA and CHF. At baseline, he lives alone in a H with ramped entrance. He is typically independent with no home O2 but reports a recent decline in function over past 3 weeks related to R groin pain.  He has been an active  but has not driven in 3 weeks and uses grocery delivery. At time of PT evaluation he is A&O x 4 but very [place].  He is currently on 4L O2.  Pt performed bed mobility with supervision but required CG-min A for transfers and short distance gait with RW, holding RLE in ER with decreased wt bearing.  Pt is currently below his baseline and lives alone.  He would benefit from SNF for continued PT prior to return to home.  -CL       Row Name 12/04/24 1118          Therapy Assessment/Plan (PT)    Rehab Potential (PT) good  -CL     Criteria for Skilled Interventions Met (PT) yes;skilled treatment is necessary  -CL     Therapy Frequency (PT) 5 times/wk  -CL     Predicted Duration of Therapy Intervention (PT) Until discharge  -CL       Row Name 12/04/24 1118          Vital Signs    Pre Systolic BP Rehab 146  -CL     Pre Treatment Diastolic BP 64  -CL     Intra Systolic BP Rehab 141  -CL     Intra Treatment Diastolic BP 66  -CL     Pretreatment Heart Rate (beats/min) 67  -CL     Intratreatment Heart Rate  (beats/min) 84  -CL     Pretreatment Resp Rate (breaths/min) 51  -CL     Pre SpO2 (%) 98  -CL     O2 Delivery Pre Treatment supplemental O2  4L  -CL     Intra SpO2 (%) 98  -CL     O2 Delivery Intra Treatment supplemental O2  -CL       Row Name 12/04/24 1118          Positioning and Restraints    Pre-Treatment Position in bed  -CL     Post Treatment Position chair  -CL     In Chair notified nsg;sitting;call light within reach;encouraged to call for assist;exit alarm on  -CL               User Key  (r) = Recorded By, (t) = Taken By, (c) = Cosigned By      Initials Name Provider Type    CL Roro Galvan, PT Physical Therapist                   Outcome Measures       Row Name 12/04/24 1123          How much help from another person do you currently need...    Turning from your back to your side while in flat bed without using bedrails? 4  -CL     Moving from lying on back to sitting on the side of a flat bed without bedrails? 3  -CL     Moving to and from a bed to a chair (including a wheelchair)? 3  -CL     Standing up from a chair using your arms (e.g., wheelchair, bedside chair)? 3  -CL     Climbing 3-5 steps with a railing? 2  -CL     To walk in hospital room? 3  -CL     AM-PAC 6 Clicks Score (PT) 18  -CL               User Key  (r) = Recorded By, (t) = Taken By, (c) = Cosigned By      Initials Name Provider Type    CL Roro Galvan, PT Physical Therapist                                 Physical Therapy Education       Title: PT OT SLP Therapies (In Progress)       Topic: Physical Therapy (Done)       Point: Mobility training (Done)       Learning Progress Summary            Patient Acceptance, E,TB, VU by CL at 12/4/2024 1123                      Point: Precautions (Done)       Learning Progress Summary            Patient Acceptance, E,TB, VU by CL at 12/4/2024 1123                                      User Key       Initials Effective Dates Name Provider Type Discipline    CL 03/02/22 -  Roro Galvan  B, PT Physical Therapist PT                  PT Recommendation and Plan  Planned Therapy Interventions (PT): balance training, bed mobility training, gait training, patient/family education, neuromuscular re-education, strengthening, transfer training  Outcome Evaluation: Mikael Shanks is an 89 y.o. male with medical comorbidities of CAD, DM, GERD, HLD and HTN who presents with SOA and R groin pain.  He is found to have multifocal PNA and CHF. At baseline, he lives alone in a [facility] with ramped entrance. He is typically independent with no home O2 but reports a recent decline in function over past 3 weeks related to R groin pain.  He has been an active  but has not driven in 3 weeks and uses grocery delivery. At time of PT evaluation he is A&O x 4 but very [place].  He is currently on 4L O2.  Pt performed bed mobility with supervision but required CG-min A for transfers and short distance gait with RW, holding RLE in ER with decreased wt bearing.  Pt is currently below his baseline and lives alone.  He would benefit from SNF for continued PT prior to return to home.     Time Calculation:   PT Evaluation Complexity  History, PT Evaluation Complexity: 3 or more personal factors and/or comorbidities  Examination of Body Systems (PT Eval Complexity): total of 3 or more elements  Clinical Presentation (PT Evaluation Complexity): evolving  Clinical Decision Making (PT Evaluation Complexity): moderate complexity  Overall Complexity (PT Evaluation Complexity): moderate complexity     PT Charges       Row Name 12/04/24 1123             Time Calculation    Start Time 0859  -CL      Stop Time 0920  -CL      Time Calculation (min) 21 min  -CL      PT Received On 12/04/24  -CL      PT - Next Appointment 12/05/24  -CL      PT Goal Re-Cert Due Date 12/18/24  -CL         Time Calculation- PT    Total Timed Code Minutes- PT 0 minute(s)  -CL                User Key  (r) = Recorded By, (t) = Taken By, (c) = Cosigned By      Initials  Name Provider Type    CL Roro Galvan, PT Physical Therapist                  Therapy Charges for Today       Code Description Service Date Service Provider Modifiers Qty    50710453200 HC PT EVAL MOD COMPLEXITY 4 12/4/2024 Roro Galvan, PT GP 1            PT G-Codes  AM-PAC 6 Clicks Score (PT): 18  PT Discharge Summary  Anticipated Discharge Disposition (PT): skilled nursing facility    Roro Galvan PT  12/4/2024

## 2024-12-04 NOTE — PLAN OF CARE
Goal Outcome Evaluation:  Plan of Care Reviewed With: patient        Progress: no change     Pt was transferred from the ED to PCU at the start of the shift. The pt has been A&OX4 and pleasant with interactions but is very [place]. Pt able to ambulate to the bathroom as an Ax1 with a walker however he did experience some SOA after. BSC was used for the remainder of the shift. Pt attempting to have a BM multiple times overnight and has had complaints of gas and indigestion. AM labs have resulted and his WBC count has decreased to 14.03. NS continuously infusing at 100ml/hr.

## 2024-12-04 NOTE — PLAN OF CARE
Goal Outcome Evaluation:  Plan of Care Reviewed With: patient           Outcome Evaluation: Mikael Shanks is an 89 y.o. male with medical comorbidities of CAD, DM, GERD, HLD and HTN who presents with SOA and R groin pain.  He is found to have multifocal PNA and CHF. At baseline, he lives alone in a H with ramped entrance. He is typically independent with no home O2 but reports a recent decline in function over past 3 weeks related to R groin pain.  He has been an active  but has not driven in 3 weeks and uses grocery delivery. At time of PT evaluation he is A&O x 4 but very [place].  He is currently on 4L O2.  Pt performed bed mobility with supervision but required CG-min A for transfers and short distance gait with RW, holding RLE in ER with decreased wt bearing.  Pt is currently below his baseline and lives alone.  He would benefit from SNF for continued PT prior to return to home.    Anticipated Discharge Disposition (PT): skilled nursing facility

## 2024-12-04 NOTE — THERAPY EVALUATION
Patient Name: Mikael Shanks  : 1935    MRN: [id number]                              Today's Date: 2024       Admit Date: 12/3/2024    Visit Dx: No diagnosis found.  Patient Active Problem List   Diagnosis    Type 2 diabetes mellitus without complication, without long-term current use of insulin    PAC (premature atrial contraction)    Gastritis and gastroduodenitis    Atherosclerosis of native coronary artery of native heart without angina pectoris    Stage 3a chronic kidney disease    Hypertension, benign    Spinal stenosis of lumbar region    Asymptomatic peripheral vascular disease    Pernicious anemia    Depression    Pulmonary valve disorder    Hearing loss    Family history of colon cancer    BILL (obstructive sleep apnea)    Mixed hyperlipidemia    Anxiety disorder, unspecified    Grief    Ischemic cardiomyopathy    Neurogenic claudication    Proteinuria    Encounter for general adult medical examination with abnormal findings    Parathyroid abnormality    Bilateral carotid artery stenosis    Left hip pain    Benign prostatic hyperplasia    Mitral valve insufficiency    Hyperkalemia    Asthma    Cough    Immobility syndrome    Seasonal allergies    Immunization counseling    Nocturia    Chronic non-infective otitis externa of both ears    Dermatitis    Closed fracture of multiple ribs of left side with routine healing    Situational stress    Medicare annual wellness visit, subsequent    Depression screening    Advanced care planning/counseling discussion    Left atrial enlargement    Lethargy    Pruritus    Elevated liver function tests    Subclinical hypothyroidism    Adenomatous polyp of rectum    Dyspnea    Vitamin B 12 deficiency    Hypotension    Multifocal pneumonia     Past Medical History:   Diagnosis Date    Carotid artery disease     Coronary artery disease     Diabetes mellitus     GERD (gastroesophageal reflux disease)     [place] (hard of hearing)     Hyperlipidemia     Hypertension      Osteoarthritis     Prostatic hypertrophy     Pulmonary valve disorder     Sleep apnea     cpap   doesnt use     Stroke      Past Surgical History:   Procedure Laterality Date    BACK SURGERY      CARDIAC CATHETERIZATION N/A 12/5/2019    Procedure: Left Heart Cath and coronary angiogram;  Surgeon: Dayday Ruiz MD;  Location: [facility] CATH INVASIVE LOCATION;  Service: Cardiovascular    CARDIAC CATHETERIZATION N/A 12/5/2019    Procedure: Right and Left Heart Cath;  Surgeon: Dayday Ruiz MD;  Location: [facility] CATH INVASIVE LOCATION;  Service: Cardiovascular    CARDIAC CATHETERIZATION N/A 9/4/2020    Procedure: Left and Right Heart Cath with Coronary Angiography;  Surgeon: Dayday Ruiz MD;  Location: [facility] CATH INVASIVE LOCATION;  Service: Cardiovascular;  Laterality: N/A;    CAROTID ENDARTERECTOMY Left     CHOLECYSTECTOMY      COLONOSCOPY  08/14/2018    No repeat    CORONARY ANGIOPLASTY WITH STENT PLACEMENT      LUMBAR LAMINECTOMY Bilateral 1/31/2022    Procedure: LUMBAR LAMINECTOMY WITH/WITHOUT FUSION L4-L5;  Surgeon: Geronimo Gonzales MD;  Location: [facility] MAIN OR;  Service: Neurosurgery;  Laterality: Bilateral;      General Information       Row Name 12/04/24 1307          OT Time and Intention    Subjective Information complains of;pain  -MS     Document Type evaluation  -MS     Mode of Treatment occupational therapy  -MS     Patient Effort good  -MS       Row Name 12/04/24 1304          General Information    Patient Profile Reviewed yes  -MS     Prior Level of Function independent:;ADL's;community mobility;all household mobility  -MS     Existing Precautions/Restrictions fall;oxygen therapy device and L/min  3L O2, no home O2, pain in R hip from previous fem cardiac cath  -MS     Barriers to Rehab hearing deficit;previous functional deficit  -MS       Row Name 12/04/24 1306          Occupational Profile    Reason for Services/Referral (Occupational Profile) Pt is an 89 y/o M admitted to [facility] 12/3/24  with PNA. CT chest indicates Large mass in the right hepatic lobe which may relate to malignancy or metastasis measuring up to 9.5 cm, BLL opacities suspicious for PNA. PMHx significant for DMII, CKDIII, depression, hearing loss, and anxiety disorder. At baseline pt resides alone in [facility] with ramp entry. Pt typically (I) with ADLs and (I) with mobility with RW. Pt has not driven recently, has groceries delivered, children provide transportation if needed, reports no recent falls. Pt noted to have recent cardiac cath through femoral artery, c/o pain in R groin since.  -MS       Row Name 12/04/24 1307          Living Environment    People in Home alone  -MS       Row Name 12/04/24 1307          Home Main Entrance    Number of Stairs, Main Entrance other (see comments)  ramp entry  -MS       Row Name 12/04/24 1307          Stairs Within Home, Primary    Number of Stairs, Within Home, Primary none  -MS       Row Name 12/04/24 1307          Cognition    Orientation Status (Cognition) oriented x 4  -MS       Row Name 12/04/24 1307          Safety Issues/Impairments Affecting Functional Mobility    Impairments Affecting Function (Mobility) endurance/activity tolerance;pain;strength  -MS               User Key  (r) = Recorded By, (t) = Taken By, (c) = Cosigned By      Initials Name Provider Type    Brandy Good, OT Occupational Therapist                     Mobility/ADL's       Row Name 12/04/24 1310          Bed Mobility    Bed Mobility supine-sit  -MS     Supine-Sit [place] (Bed Mobility) supervision  -MS     Assistive Device (Bed Mobility) bed rails  -MS       Row Name 12/04/24 1310          Transfers    Transfers sit-stand transfer;bed-chair transfer  -MS       Row Name 12/04/24 1310          Bed-Chair Transfer    Bed-Chair [place] (Transfers) minimum assist (75% patient effort)  -MS     Assistive Device (Bed-Chair Transfers) walker, front-wheeled  -MS       Row Name 12/04/24 1310          Sit-Stand  Transfer    Sit-Stand [place] (Transfers) minimum assist (75% patient effort)  -MS     Assistive Device (Sit-Stand Transfers) walker, front-wheeled  -MS       Row Name 12/04/24 1310          Functional Mobility    Patient was able to Ambulate yes  -MS               User Key  (r) = Recorded By, (t) = Taken By, (c) = Cosigned By      Initials Name Provider Type    MS Brandy Rivera OT Occupational Therapist                   Obj/Interventions       Row Name 12/04/24 1310          Sensory Assessment (Somatosensory)    Sensory Assessment (Somatosensory) UE sensation intact  -MS       Row Name 12/04/24 1310          Vision Assessment/Intervention    Visual Impairment/Limitations WFL  -MS       Row Name 12/04/24 1310          Range of Motion Comprehensive    General Range of Motion bilateral upper extremity ROM WFL  -MS       Row Name 12/04/24 1310          Strength Comprehensive (MMT)    Comment, General Manual Muscle Testing (MMT) Assessment BUE grossly 4-/5  -MS       Row Name 12/04/24 1310          Balance    Balance Assessment sitting static balance;sitting dynamic balance;standing static balance;standing dynamic balance  -MS     Static Sitting Balance supervision  -MS     Dynamic Sitting Balance supervision  -MS     Position, Sitting Balance unsupported;sitting edge of bed  -MS     Static Standing Balance contact guard  -MS     Dynamic Standing Balance minimal assist  -MS     Position/Device Used, Standing Balance supported;walker, front-wheeled  -MS               User Key  (r) = Recorded By, (t) = Taken By, (c) = Cosigned By      Initials Name Provider Type    MS Brandy Rivera OT Occupational Therapist                   Goals/Plan       Row Name 12/04/24 1314          Transfer Goal 1 (OT)    Activity/Assistive Device (Transfer Goal 1, OT) transfers, all  -MS     [place] Level/Cues Needed (Transfer Goal 1, OT) modified independence  -MS     Time Frame (Transfer Goal 1, OT) long term goal (LTG);2 weeks   -MS     Progress/Outcome (Transfer Goal 1, OT) new goal  -MS       Row Name 12/04/24 1314          Dressing Goal 1 (OT)    Activity/Device (Dressing Goal 1, OT) lower body dressing  -MS     [place]/Cues Needed (Dressing Goal 1, OT) modified independence  -MS     Time Frame (Dressing Goal 1, OT) long term goal (LTG);2 weeks  -MS     Progress/Outcome (Dressing Goal 1, OT) new goal  -MS       Row Name 12/04/24 1314          Toileting Goal 1 (OT)    Activity/Device (Toileting Goal 1, OT) toileting skills, all  -MS     [place] Level/Cues Needed (Toileting Goal 1, OT) modified independence  -MS     Time Frame (Toileting Goal 1, OT) long term goal (LTG);2 weeks  -MS     Progress/Outcome (Toileting Goal 1, OT) new goal  -MS       Row Name 12/04/24 1314          Problem Specific Goal 1 (OT)    Problem Specific Goal 1 (OT) increase standing activity tolerance needed for ADL routine >5 minutes without rest break  -MS     Time Frame (Problem Specific Goal 1, OT) long term goal (LTG);2 weeks  -MS     Progress/Outcome (Problem Specific Goal 1, OT) new goal  -MS       Row Name 12/04/24 1314          Therapy Assessment/Plan (OT)    Planned Therapy Interventions (OT) activity tolerance training;adaptive equipment training;BADL retraining;functional balance retraining;IADL retraining;neuromuscular control/coordination retraining;occupation/activity based interventions;passive ROM/stretching;patient/caregiver education/training;ROM/therapeutic exercise;transfer/mobility retraining;strengthening exercise  -MS               User Key  (r) = Recorded By, (t) = Taken By, (c) = Cosigned By      Initials Name Provider Type    Brandy Good, OT Occupational Therapist                   Clinical Impression       Row Name 12/04/24 1311          Pain Assessment    Pretreatment Pain Rating 0/10 - no pain  -MS     Posttreatment Pain Rating 0/10 - no pain  -MS     Pre/Posttreatment Pain Comment reports no pain, however declines  excessive movement d/t pain in R groin  -MS       Row Name 12/04/24 1311          Plan of Care Review    Plan of Care Reviewed With patient  -MS     Progress no change  -MS     Outcome Evaluation Pt is an 89 y/o M admitted to [facility] 12/3/24 with PNA. At baseline pt resides alone in [facility] with ramp entry. Pt typically (I) with ADLs and (I) with mobility with RW. Pt has not driven recently, has groceries delivered, children provide transportation if needed, reports no recent falls. Pt noted to have recent cardiac cath through femoral artery, c/o pain in R groin since. Pt cleared for OT by nursing, oriented x4 however hard of hearing. Pt on 3L O2 and in supine upon arrival. Pt completes bed mobility with supervision, comes to standing with min A with RW and takes small steps from bed to chair with min A with RW. Pt reports no pain, however declines excessive movement d/t pain in R groin which limits mobility. Pt appears to function below baseline and anticipate to require increased assist with ADL routine. OT recommend SNF when medically appropriate for dc to return to PLOF to max potential, will follow within acute setting.  -MS       Row Name 12/04/24 1311          Therapy Assessment/Plan (OT)    Rehab Potential (OT) good  -MS     Criteria for Skilled Therapeutic Interventions Met (OT) yes;meets criteria;skilled treatment is necessary  -MS     Therapy Frequency (OT) 3 times/wk  -MS     Predicted Duration of Therapy Intervention (OT) until d/c  -MS       Row Name 12/04/24 1311          Therapy Plan Review/Discharge Plan (OT)    Anticipated Discharge Disposition (OT) skilled nursing facility  -MS       Row Name 12/04/24 1311          Vital Signs    Pre Systolic BP Rehab 146  -MS     Pre Treatment Diastolic BP 64  -MS     Intra Systolic BP Rehab 141  -MS     Intra Treatment Diastolic BP 66  -MS     Pretreatment Heart Rate (beats/min) 74  -MS     Intratreatment Heart Rate (beats/min) 77  -MS     Posttreatment Heart Rate  (beats/min) 73  -MS     Pretreatment Resp Rate (breaths/min) 18  -MS     Intratreatment Resp Rate (breaths/min) 26  -MS     Posttreatment Resp Rate (breaths/min) 24  -MS     Pre SpO2 (%) 99  -MS     O2 Delivery Pre Treatment nasal cannula  -MS     Intra SpO2 (%) 98  -MS     O2 Delivery Intra Treatment nasal cannula  -MS     Post SpO2 (%) 99  -MS     O2 Delivery Post Treatment nasal cannula  -MS     Pre Patient Position Supine  -MS     Intra Patient Position Standing  -MS     Post Patient Position Sitting  -MS       Row Name 12/04/24 1311          Positioning and Restraints    Pre-Treatment Position in bed  -MS     Post Treatment Position chair  -MS     In Chair notified nsg;reclined;call light within reach;encouraged to call for assist;exit alarm on;legs elevated  -MS               User Key  (r) = Recorded By, (t) = Taken By, (c) = Cosigned By      Initials Name Provider Type    Brandy Good, OT Occupational Therapist                   Outcome Measures       Row Name 12/04/24 1315          How much help from another is currently needed...    Putting on and taking off regular lower body clothing? 2  -MS     Bathing (including washing, rinsing, and drying) 2  -MS     Toileting (which includes using toilet bed pan or urinal) 2  -MS     Putting on and taking off regular upper body clothing 4  -MS     Taking care of personal grooming (such as brushing teeth) 4  -MS     Eating meals 4  -MS     AM-PAC 6 Clicks Score (OT) 18  -MS       Row Name 12/04/24 1123          How much help from another person do you currently need...    Turning from your back to your side while in flat bed without using bedrails? 4  -CL     Moving from lying on back to sitting on the side of a flat bed without bedrails? 3  -CL     Moving to and from a bed to a chair (including a wheelchair)? 3  -CL     Standing up from a chair using your arms (e.g., wheelchair, bedside chair)? 3  -CL     Climbing 3-5 steps with a railing? 2  -CL     To walk in  hospital room? 3  -CL     AM-PAC 6 Clicks Score (PT) 18  -CL       Row Name 12/04/24 1315          Functional Assessment    Outcome Measure Options AM-PAC 6 Clicks Daily Activity (OT)  -MS               User Key  (r) = Recorded By, (t) = Taken By, (c) = Cosigned By      Initials Name Provider Type    MS Brandy Rivera, OT Occupational Therapist    CL Roro Galvan, PT Physical Therapist                    Occupational Therapy Education       Title: PT OT SLP Therapies (Done)       Topic: Occupational Therapy (Done)       Point: ADL training (Done)       Description:   Instruct learner(s) on proper safety adaptation and remediation techniques during self care or transfers.   Instruct in proper use of assistive devices.                  Learning Progress Summary            Patient Acceptance, E,TB, VU by MS at 12/4/2024 1315                      Point: Home exercise program (Done)       Description:   Instruct learner(s) on appropriate technique for monitoring, assisting and/or progressing therapeutic exercises/activities.                  Learning Progress Summary            Patient Acceptance, E,TB, VU by MS at 12/4/2024 1315                      Point: Precautions (Done)       Description:   Instruct learner(s) on prescribed precautions during self-care and functional transfers.                  Learning Progress Summary            Patient Acceptance, E,TB, VU by MS at 12/4/2024 1315                      Point: Body mechanics (Done)       Description:   Instruct learner(s) on proper positioning and spine alignment during self-care, functional mobility activities and/or exercises.                  Learning Progress Summary            Patient Acceptance, E,TB, VU by MS at 12/4/2024 1315                                      User Key       Initials Effective Dates Name Provider Type Discipline    MS 07/13/22 -  Brandy Rivera OT Occupational Therapist OT                  OT Recommendation and Plan  Planned  Therapy Interventions (OT): activity tolerance training, adaptive equipment training, BADL retraining, functional balance retraining, IADL retraining, neuromuscular control/coordination retraining, occupation/activity based interventions, passive ROM/stretching, patient/caregiver education/training, ROM/therapeutic exercise, transfer/mobility retraining, strengthening exercise  Therapy Frequency (OT): 3 times/wk  Plan of Care Review  Plan of Care Reviewed With: patient  Progress: no change  Outcome Evaluation: Pt is an 88 y/o M admitted to [facility] 12/3/24 with PNA. At baseline pt resides alone in [facility] with ramp entry. Pt typically (I) with ADLs and (I) with mobility with RW. Pt has not driven recently, has groceries delivered, children provide transportation if needed, reports no recent falls. Pt noted to have recent cardiac cath through femoral artery, c/o pain in R groin since. Pt cleared for OT by nursing, oriented x4 however hard of hearing. Pt on 3L O2 and in supine upon arrival. Pt completes bed mobility with supervision, comes to standing with min A with RW and takes small steps from bed to chair with min A with RW. Pt reports no pain, however declines excessive movement d/t pain in R groin which limits mobility. Pt appears to function below baseline and anticipate to require increased assist with ADL routine. OT recommend SNF when medically appropriate for dc to return to PLOF to max potential, will follow within acute setting.     Time Calculation:                   Brandy Rivera OT  12/4/2024

## 2024-12-04 NOTE — CASE MANAGEMENT/SOCIAL WORK
Social Work Assessment  [facility]     Patient Name: Mikael Shanks  MRN: [id number]  Today's Date: 12/4/2024    Admit Date: 12/3/2024       Discharge Plan       Row Name 12/04/24 0931       Plan    Plan From Home Alone; Anticipate SNF for Rehab; PT/OT Evals Requested; No Precert Needed; PASRR approved.    Plan Comments CM updated on status of PASRR.             TAZ Stapleton, LSW    Phone: [phone]  Fax: [phone]  [email or web address]

## 2024-12-04 NOTE — PLAN OF CARE
Goal Outcome Evaluation:  Plan of Care Reviewed With: patient        Progress: no change  Outcome Evaluation: Pt is an 88 y/o M admitted to [facility] 12/3/24 with PNA. At baseline pt resides alone in [facility] with ramp entry. Pt typically (I) with ADLs and (I) with mobility with RW. Pt has not driven recently, has groceries delivered, children provide transportation if needed, reports no recent falls. Pt noted to have recent cardiac cath through femoral artery, c/o pain in R groin since. Pt cleared for OT by nursing, oriented x4 however hard of hearing. Pt on 3L O2 and in supine upon arrival. Pt completes bed mobility with supervision, comes to standing with min A with RW and takes small steps from bed to chair with min A with RW. Pt reports no pain, however declines excessive movement d/t pain in R groin which limits mobility. Pt appears to function below baseline and anticipate to require increased assist with ADL routine. OT recommend SNF when medically appropriate for dc to return to PLOF to max potential, will follow within acute setting.    Anticipated Discharge Disposition (OT): skilled nursing facility

## 2024-12-04 NOTE — CONSULTS
Nutrition Services    Patient Name: Mikael Shanks  YOB: 1935  MRN: [id number]  Admission date: 12/3/2024    Comment:    Moderate chronic disease related malnutrition related to prolonged suboptimal intake and suspected hypermetabolism in the setting of multiple chronic diseases including cancer, CHF, CKD, DM, and anemia as evidenced by po intake meeting < 75% est energy requirement for > 1 month and evidence of moderate muscle and fat wasting per NFPE.     RD to add Boost Glucose Control BID (Provides 380 kcals, 32 g protein if consumed)       CLINICAL NUTRITION ASSESSMENT      Reason for Assessment 12/4: Malnutrition screening tool score of 5 and Unintentional weight loss consult     H&P      Past Medical History:   Diagnosis Date    Carotid artery disease     Coronary artery disease     Diabetes mellitus     GERD (gastroesophageal reflux disease)     [place] (hard of hearing)     Hyperlipidemia     Hypertension     Osteoarthritis     Prostatic hypertrophy     Pulmonary valve disorder     Sleep apnea     cpap   doesnt use     Stroke        Past Surgical History:   Procedure Laterality Date    BACK SURGERY      CARDIAC CATHETERIZATION N/A 12/5/2019    Procedure: Left Heart Cath and coronary angiogram;  Surgeon: Dayday Ruiz MD;  Location: [facility] CATH INVASIVE LOCATION;  Service: Cardiovascular    CARDIAC CATHETERIZATION N/A 12/5/2019    Procedure: Right and Left Heart Cath;  Surgeon: Dayday Ruiz MD;  Location: [facility] CATH INVASIVE LOCATION;  Service: Cardiovascular    CARDIAC CATHETERIZATION N/A 9/4/2020    Procedure: Left and Right Heart Cath with Coronary Angiography;  Surgeon: Dayday Ruiz MD;  Location: [facility] CATH INVASIVE LOCATION;  Service: Cardiovascular;  Laterality: N/A;    CAROTID ENDARTERECTOMY Left     CHOLECYSTECTOMY      COLONOSCOPY  08/14/2018    No repeat    CORONARY ANGIOPLASTY WITH STENT PLACEMENT      LUMBAR LAMINECTOMY Bilateral 1/31/2022    Procedure: LUMBAR  "LAMINECTOMY WITH/WITHOUT FUSION L4-L5;  Surgeon: Geronimo Gonzales MD;  Location: [facility] MAIN OR;  Service: Neurosurgery;  Laterality: Bilateral;        Current Problems     Multifocal pneumonia     Liver mass      Congestive Heart Failure    CKD     Hyperlipidemia     Diabetes Mellitus      GERD     Hypertension      Hypothyroidism     Anemia          Encounter Information        Trending Narrative     12/4: Pt presented to ED on 12/3 with complaints of SOB and R groin pain. Pt is Knik and does not wear hearing aids. Pt does not use supplemental O2 at home. Pt admitted r/t multifocal pneumonia and transferred to PCU today. RD visited pt at bedside. Pt is VERY Knik and difficult to have a conversation with. Pt reports that his appetite is \"not to bad but not up to par where it used to be.\" Pt reports that his intake has decreased quite a bit in the last month. NFPE completed, consistent with nutrition diagnosis of malnutrition using AND/ASPEN criteria. See MSA below.        Anthropometrics        Current Height, Weight Height: 177.8 cm (70\")  Weight: 66.6 kg (146 lb 13.2 oz) (12/04/24 0613)       Usual Body Weight (UBW) Unable to obtain from patient       Trending Weight Hx     This admission: 12/4: 146#             PTA: 12/4: 8% weight loss in the last 6 months     Wt Readings from Last 30 Encounters:   12/04/24 0613 66.6 kg (146 lb 13.2 oz)   12/03/24 2006 66.6 kg (146 lb 13.2 oz)   12/03/24 1037 69 kg (152 lb 1.9 oz)   11/26/24 1032 69 kg (152 lb 3.2 oz)   11/20/24 1417 69 kg (152 lb 3.2 oz)   07/31/24 1335 73.6 kg (162 lb 3.2 oz)   07/24/24 1337 72.6 kg (160 lb)   06/20/24 1130 71.7 kg (158 lb)   06/10/24 1508 72.2 kg (159 lb 3.2 oz)   05/30/24 1107 72.6 kg (160 lb)   05/29/24 1129 71.9 kg (158 lb 9.6 oz)   03/19/24 1305 78.2 kg (172 lb 6.4 oz)   12/18/23 1454 79.5 kg (175 lb 3.2 oz)   11/06/23 1348 78.2 kg (172 lb 8 oz)   09/18/23 1332 78.7 kg (173 lb 9.6 oz)   06/05/23 1417 81.9 kg (180 lb 9.6 oz)   05/02/23 1506 " 79.8 kg (176 lb)   03/01/23 1356 80.2 kg (176 lb 12.8 oz)   02/21/23 1437 81.1 kg (178 lb 12.8 oz)   02/21/23 1425 81.1 kg (178 lb 11.2 oz)   01/25/23 1423 79.4 kg (175 lb)   11/01/22 1003 87.4 kg (192 lb 9.6 oz)   10/26/22 1229 79.8 kg (176 lb)   08/01/22 1142 77.2 kg (170 lb 3.2 oz)   06/29/22 1409 75.1 kg (165 lb 9.6 oz)   05/17/22 1325 80.7 kg (178 lb)   04/25/22 1533 80.7 kg (178 lb)   03/30/22 1359 81.2 kg (179 lb)   02/15/22 1158 85.3 kg (188 lb)   01/31/22 1625 85.7 kg (188 lb 15 oz)   01/31/22 0629 83.6 kg (184 lb 3.2 oz)   01/24/22 1403 82.6 kg (182 lb)   01/13/22 1327 82.6 kg (182 lb)   12/29/21 1356 83 kg (183 lb)      BMI kg/m2 Body mass index is 21.07 kg/m².       Labs        Pertinent Labs Reviewed. Management per attending    Results from last 7 days   Lab Units 12/04/24  0133 12/03/24  0955   SODIUM mmol/L 139 135*   POTASSIUM mmol/L 4.4 4.5   CHLORIDE mmol/L 104 98   CO2 mmol/L 25.2 24.5   BUN mg/dL 25* 23   CREATININE mg/dL 1.70* 1.65*   CALCIUM mg/dL 8.8 9.4   BILIRUBIN mg/dL  --  1.0   ALK PHOS U/L  --  83   ALT (SGPT) U/L  --  41   AST (SGOT) U/L  --  41*   GLUCOSE mg/dL 109* 140*     Results from last 7 days   Lab Units 12/04/24  0133   HEMOGLOBIN g/dL 10.6*   HEMATOCRIT % 34.5*     Lab Results   Component Value Date    HGBA1C 6.5 (H) 11/20/2024        Medications    Scheduled Medications atorvastatin, 80 mg, Oral, Nightly  cetirizine, 10 mg, Oral, Daily  cholecalciferol, 1,000 Units, Oral, Daily  empagliflozin, 12.5 mg, Oral, Daily  finasteride, 5 mg, Oral, Daily  furosemide, 20 mg, Oral, Daily  insulin lispro, 2-7 Units, Subcutaneous, 4x Daily AC & at Bedtime  metoprolol succinate XL, 50 mg, Oral, Daily  montelukast, 10 mg, Oral, Nightly  pantoprazole, 40 mg, Oral, Q AM  sacubitril-valsartan, 1 tablet, Oral, Daily  sertraline, 50 mg, Oral, Daily        Infusions sodium chloride, 100 mL/hr, Last Rate: 100 mL/hr (12/04/24 7006)        PRN Medications   acetaminophen **OR** acetaminophen **OR**  acetaminophen    albuterol sulfate HFA    aluminum-magnesium hydroxide-simethicone    senna-docusate sodium **AND** polyethylene glycol **AND** bisacodyl **AND** bisacodyl    Calcium Replacement - Follow Nurse / BPA Driven Protocol    dextrose    dextrose    glucagon (human recombinant)    ipratropium-albuterol    Magnesium Standard Dose Replacement - Follow Nurse / BPA Driven Protocol    nitroglycerin    Phosphorus Replacement - Follow Nurse / BPA Driven Protocol    Potassium Replacement - Follow Nurse / BPA Driven Protocol    [COMPLETED] Insert Peripheral IV **AND** sodium chloride     Physical Findings        Trending Physical   Appearance, NFPE 12/4: NFPE completed, consistent with nutrition diagnosis of malnutrition using AND/ASPEN criteria. See MSA below.      --  Edema  No edema documented      Bowel Function Last documented BM 12/3     Tubes No feeding tube in place      Chewing/Swallowing No issues reported      Skin L medial arm skin tear      --  Current Nutrition Orders & Evaluation of Intake       Oral Nutrition     Food Allergies NKFA   Current PO Diet Diet: Cardiac, Diabetic; Healthy Heart (2-3 Na+); Consistent Carbohydrate; Fluid Consistency: Thin (IDDSI 0)   Supplement None    PO Evaluation     Trending % PO Intake 12/4: No meals documented    --  Nutritional Risk Screening        NRS-2002 Score          Nutrition Diagnosis         Nutrition Dx Problem 1 Moderate chronic disease related malnutrition related to prolonged suboptimal intake and suspected hypermetabolism in the setting of multiple chronic diseases including cancer, CHF, CKD, DM, and anemia as evidenced by po intake meeting < 75% est energy requirement for > 1 month and evidence of moderate muscle and fat wasting per NFPE.       Nutrition Dx Problem 2        Intervention Goal         Intervention Goal(s) Tolerate po diet  PO intake > 75%  Accept ONS     Nutrition Intervention        RD Action NFPE completed    Add Boost Glucose Control  BID (Provides 380 kcals, 32 g protein if consumed)        Nutrition Prescription          Diet Prescription Healthy Heart diet   Supplement Prescription Boost Glucose Control BID (Provides 380 kcals, 32 g protein if consumed)      --  Monitor/Evaluation        Monitor Per protocol, I&O, PO intake, Supplement intake, Pertinent labs, Weight, Skin status, GI status, Swallow function, Hemodynamic stability     Malnutrition Severity Assessment      Patient meets criteria for : Moderate (non-severe) Malnutrition  Malnutrition Type (Last 8 Hours)       Malnutrition Severity Assessment       Row Name 12/04/24 1415       Malnutrition Severity Assessment    Malnutrition Type Chronic Disease - Related Malnutrition      Row Name 12/04/24 1415       Insufficient Energy Intake     Insufficient Energy Intake Findings Moderate    Insufficient Energy Intake  <75% of est. energy requirement for > or equal to 1 month      Row Name 12/04/24 1415       Muscle Loss    Loss of Muscle Mass Findings Moderate    [place] Moderate - slight depression    Clavicle Bone Region Moderate - some protrusion in females, visible in males    Acromion Bone Region Moderate - acromion may slightly protrude    Dorsal Hand Region Moderate - slight depression    Patellar Region Moderate - patella more prominent, less muscle definition around patella      Row Name 12/04/24 1415       Fat Loss    Subcutaneous Fat Loss Findings Moderate    Orbital Region  Moderate -  somewhat hollowness, slightly dark circles    Upper Arm Region Moderate - some fat tissue, not ample      Row Name 12/04/24 1415       Criteria Met (Must meet criteria for severity in at least 2 of these categories: M Wasting, Fat Loss, Fluid, Secondary Signs, Wt. Status, Intake)    Patient meets criteria for  Moderate (non-severe) Malnutrition                           Electronically signed by:  Natalie Kidd RD  12/04/24 07:59 EST

## 2024-12-04 NOTE — CONSULTS
Group: [facility]         CONSULT NOTE    Patient Identification:  Mikael Shanks  89 y.o.  male  1935  [id number]            Requesting physician: Attending physician    Reason for Consultation: Pneumonia, hypoxia      History of Present Illness:  89-year-old male with history of CAD, HTN, HLD and GERD who presented 12/3/2024 with complaints of shortness of breath and right groin pain at the site of the recent cardiac catheter.  proBNP 4624, creatinine 1.7, WBC 14, hemoglobin 10.6    CT Abdomen Pelvis With Contrast    Result Date: 12/3/2024  Impression: Heterogenous appearing mass in the right hepatic lobe at the hepatic dome measuring up to 9.3 cm remain suspicious for malignancy. Further characterization with MR abdomen with and without contrast is recommended. Unchanged appearing bilateral lower lobe opacities suspicious for pneumonia superimposed on chronic interstitial lung disease. Electronically Signed: Tesfaye Paulino  12/3/2024 4:55 PM EST  Workstation ID: [id number]    CT Chest Without Contrast Diagnostic    Result Date: 12/3/2024  Impression: 1. Large mass in the right hepatic lobe involving segments 7 and 8 which may relate to malignancy or metastasis measuring up to 9.5 cm. Recommend CT abdomen and pelvis with contrast for further assessment. 2. Patchy areas of airspace disease involving left upper lobes and bilateral lower lobes concerning for multifocal pneumonia superimposed on background of chronic interstitial lung disease. 3. Cardiomegaly and additional incidental findings above. Electronically Signed: Dane Bain MD  12/3/2024 1:53 PM EST  Workstation ID: [id number]    XR Chest 1 View    Result Date: 12/3/2024  Impression: Chronic findings, without significant change. Electronically Signed: Jaclyn Reagan MD  12/3/2024 10:38 AM EST  Workstation ID: [id number]       Assessment:  Multifocal pneumonia  Hypoxemia  Human rhinovirus infection  BILL: Polysomnography 2019 AHI 78.3,  titrated for auto BiPAP 6-16 with PS 4 cm H2O  Liver mass  CHF  CKD  HTN  HLD  GERD    PFTs/spirometry 8/31/2020: Normal, FEV1/FVC 84%, FVC 89%, FEV1 107%      Recommendations:  Antibiotic: Rocephin  Oxygen supplement and titration to maintain saturation 90 to 95%: Currently on 4 L per nasal cannula  Mucinex  Encourage use of incentive spirometry well    Diuresis: Furosemide  Singulair      Consider CT-guided biopsy of the liver mass once hypoxia improved      I personally reviewed the radiological studies      Review of Sytems:  Constitutional: Positive for chills,  and positive for malaise/fatigue.   HENT: Negative.    Eyes: Negative.    Cardiovascular: Negative.    Respiratory: Positive for cough and shortness of breath.    Skin: Negative.    Musculoskeletal: Right groin pain at the site of the cardiac catheter  Gastrointestinal: Negative.    Genitourinary: Negative.    Neurological: Negative.    Psychiatric/Behavioral: Negative.    Past Medical History:  Past Medical History:   Diagnosis Date    Carotid artery disease     Coronary artery disease     Diabetes mellitus     GERD (gastroesophageal reflux disease)     [place]hard of hearing[place]     Hyperlipidemia     Hypertension     Osteoarthritis     Prostatic hypertrophy     Pulmonary valve disorder     Sleep apnea     cpap   doesnt use     Stroke        Past Surgical History:  Past Surgical History:   Procedure Laterality Date    BACK SURGERY      CARDIAC CATHETERIZATION N/A 12/5/2019    Procedure: Left Heart Cath and coronary angiogram;  Surgeon: Dayday Ruiz MD;  Location: [facility] CATH INVASIVE LOCATION;  Service: Cardiovascular    CARDIAC CATHETERIZATION N/A 12/5/2019    Procedure: Right and Left Heart Cath;  Surgeon: Dayday Ruiz MD;  Location: [facility] CATH INVASIVE LOCATION;  Service: Cardiovascular    CARDIAC CATHETERIZATION N/A 9/4/2020    Procedure: Left and Right Heart Cath with Coronary Angiography;  Surgeon: Dayday Ruiz MD;  Location: [facility]  CATH INVASIVE LOCATION;  Service: Cardiovascular;  Laterality: N/A;    CAROTID ENDARTERECTOMY Left     CHOLECYSTECTOMY      COLONOSCOPY  08/14/2018    No repeat    CORONARY ANGIOPLASTY WITH STENT PLACEMENT      LUMBAR LAMINECTOMY Bilateral 1/31/2022    Procedure: LUMBAR LAMINECTOMY WITH/WITHOUT FUSION L4-L5;  Surgeon: Geronimo oGnzales MD;  Location: [facility] MAIN OR;  Service: Neurosurgery;  Laterality: Bilateral;        Home Meds:  Facility-Administered Medications Prior to Admission   Medication Dose Route Frequency Provider Last Rate Last Admin    cyanocobalamin injection 1,000 mcg  1,000 mcg Intramuscular Q28 Days Xander Robison MD   1,000 mcg at 11/26/24 1552     Medications Prior to Admission   Medication Sig Dispense Refill Last Dose/Taking    albuterol sulfate  (90 Base) MCG/ACT inhaler Inhale 2 puffs Every 4 (Four) Hours As Needed for Wheezing. 18 g 5 Past Week    atorvastatin (LIPITOR) 80 MG tablet Take 1 tablet by mouth every night at bedtime. 90 tablet 4 Past Week    B Complex Vitamins (VITAMIN B COMPLEX PO) Take 1 capsule by mouth Daily.   Past Week    Cholecalciferol 25 MCG (1000 UT) tablet Take 1 tablet by mouth Daily.   Past Week    empagliflozin (JARDIANCE) 25 MG tablet tablet Take 0.5 tablets by mouth Daily.   Past Week    enalapril (VASOTEC) 2.5 MG tablet TAKE 1 TABLET BY MOUTH DAILY 90 tablet 4 Past Week    finasteride (PROSCAR) 5 MG tablet Take 1 tablet by mouth Daily. 90 tablet 4 Past Week    fluticasone (FLONASE) 50 MCG/ACT nasal spray Administer 1 spray into the nostril(s) as directed by provider Daily.   Past Week    furosemide (LASIX) 40 MG tablet Take 0.5 tablets by mouth Daily.   Past Week    glipizide (glipiZIDE XL) 5 MG ER tablet Take 1 tablet by mouth Daily. 90 tablet 4 Past Week    lidocaine (LIDODERM) 5 % Place 1 patch on the skin as directed by provider Daily. Remove & Discard patch within 12 hours or as directed by MD   Past Week    loratadine (CLARITIN) 10 MG tablet Take 1  "tablet by mouth Daily.   Past Week    metoprolol succinate XL (TOPROL-XL) 50 MG 24 hr tablet Take 1 tablet by mouth Daily.   Past Week    montelukast (Singulair) 10 MG tablet Take 1 tablet by mouth Every Night.   Past Week    Multiple Vitamins-Minerals (PRESERVISION AREDS 2 PO) Take 1 Capful by mouth 2 (Two) Times a Day.   Past Week    omeprazole (priLOSEC) 40 MG capsule Take 1 capsule by mouth Daily. 90 capsule 4 Past Week    sacubitril-valsartan (Entresto) 24-26 MG tablet Take 1 tablet by mouth Daily.   Past Week    sertraline (ZOLOFT) 50 MG tablet Take 1 tablet by mouth Daily. 90 tablet 4 Past Week       Allergies:  Allergies   Allergen Reactions    Penicillins Hives       Social History:   Social History     Socioeconomic History    Marital status:    Tobacco Use    Smoking status: Former     Current packs/day: 0.00     Average packs/day: 5.0 packs/day for 7.0 years (35.0 ttl pk-yrs)     Types: Cigarettes     Start date: 1953     Quit date: 1960     Years since quittin.3     Passive exposure: Past    Smokeless tobacco: Never   Vaping Use    Vaping status: Never Used   Substance and Sexual Activity    Alcohol use: Not Currently     Comment: very rare    Drug use: No    Sexual activity: Never       Family History:  Family History   Problem Relation Age of Onset    Cancer Mother     Heart disease Father     Cancer Brother        Physical Exam:  /61 (BP Location: Left arm, Patient Position: Lying)   Pulse 67   Temp 97.6 °F (36.4 °C) (Oral)   Resp 18   Ht 177.8 cm (70\")   Wt 66.6 kg (146 lb 13.2 oz)   SpO2 99%   BMI 21.07 kg/m²  Body mass index is 21.07 kg/m². 99% 66.6 kg (146 lb 13.2 oz)  General Appearance:  Alert   HEENT:  Normocephalic, without obvious abnormality, Conjunctiva/corneas clear,.   Nares normal, no drainage     Neck:  Supple, symmetrical, trachea midline. No JVD.  Lungs /Chest wall:   Bilateral basal rhonchi, respirations unlabored, symmetrical wall movement.   "   Heart:  Regular rate and rhythm, S1 S2 normal  Abdomen: Soft, non-tender, no masses, no organomegaly.    Extremities: No edema, no clubbing or cyanosis    LABS:  Lab Results   Component Value Date    CALCIUM 8.8 12/04/2024    PHOS 3.1 12/06/2019     Results from last 7 days   Lab Units 12/04/24  0133 12/03/24  1148 12/03/24  0955   SODIUM mmol/L 139  --  135*   POTASSIUM mmol/L 4.4  --  4.5   CHLORIDE mmol/L 104  --  98   CO2 mmol/L 25.2  --  24.5   BUN mg/dL 25*  --  23   CREATININE mg/dL 1.70*  --  1.65*   GLUCOSE mg/dL 109*  --  140*   CALCIUM mg/dL 8.8  --  9.4   WBC 10*3/mm3 14.03*  --  20.53*   HEMOGLOBIN g/dL 10.6*  --  11.7*   PLATELETS 10*3/mm3 143  --  167   ALT (SGPT) U/L  --   --  41   AST (SGOT) U/L  --   --  41*   PROBNP pg/mL  --   --  4,624.0*   PROCALCITONIN ng/mL  --  1.35*  --      Lab Results   Component Value Date    CKTOTAL 59 12/03/2019    TROPONINT 49 (H) 12/03/2024     Results from last 7 days   Lab Units 12/03/24  1148 12/03/24  0955   HSTROP T ng/L 49* 47*         Results from last 7 days   Lab Units 12/03/24  1401 12/03/24  1148 12/03/24  1054   PROCALCITONIN ng/mL  --  1.35*  --    LACTATE mmol/L 1.4  --  2.2*         Results from last 7 days   Lab Units 12/03/24  1442   ADENOVIRUS DETECTION BY PCR  Not Detected   CORONAVIRUS 229E  Not Detected   CORONAVIRUS HKU1  Not Detected   CORONAVIRUS NL63  Not Detected   CORONAVIRUS OC43  Not Detected   HUMAN METAPNEUMOVIRUS  Not Detected   HUMAN RHINOVIRUS/ENTEROVIRUS  Detected*   INFLUENZA B PCR  Not Detected   PARAINFLUENZA 1  Not Detected   PARAINFLUENZA VIRUS 2  Not Detected   PARAINFLUENZA VIRUS 3  Not Detected   PARAINFLUENZA VIRUS 4  Not Detected   BORDETELLA PERTUSSIS PCR  Not Detected   CHLAMYDOPHILA PNEUMONIAE PCR  Not Detected   MYCOPLAMA PNEUMO PCR  Not Detected   INFLUENZA A PCR  Not Detected   RSV, PCR  Not Detected             Lab Results   Component Value Date    TSH 3.510 11/20/2024     Estimated Creatinine Clearance: 27.8  mL/min (A) (by C-G formula based on SCr of 1.7 mg/dL (H)).         Imaging:  Imaging Results (Last 24 Hours)       Procedure Component Value Units Date/Time    CT Abdomen Pelvis With Contrast [[id number]] Collected: 12/03/24 1640     Updated: 12/03/24 1657    Narrative:      CT ABDOMEN PELVIS W CONTRAST    Date of Exam: 12/3/2024 3:42 PM EST    Indication: evaluation of hepatic mass found on chest CT.    Comparison: Chest CT [id number]. CT abdomen pelvis 3/16/2017.    Technique: Axial CT images were obtained of the abdomen and pelvis following the uneventful intravenous administration of iodinated contrast. Sagittal and coronal reconstructions were performed.  Automated exposure control and iterative reconstruction   methods were used.        Findings:  Lung Bases: No short-interval change in bilateral lower lobe opacities superimposed on chronic interstitial lung disease. Calcified granulomas. Coronary artery calcifications. Aortic valvular calcifications.    Liver: Heterogenous appearing mass in the right hepatic lobe at the hepatic dome measuring approximately 9.3 x 8.6 x 7.2 cm (AP by TV by CC). Calcified granulomas.     Gallbladder and biliary tree: Cholecystectomy     Spleen: Calcified granulomas. A few hypoattenuating foci, too small to characterize.     Pancreas: No appreciable parenchymal abnormality by CT. Mildly prominent pancreatic duct without visible mass.     Adrenal glands: No significant abnormality.     Kidneys and ureters: No significant abnormality.     Stomach and duodenum:No significant abnormality.    Small and large bowel: Colonic diverticulosis. No evidence of bowel obstruction. Prior appendectomy.    Peritoneal cavity: No free fluid or free air.    Bladder: No significant abnormality.     Pelvic organs: Enlarged prostate gland.     Vasculature: Calcified and noncalcified mural plaque in the descending aorta and its major branches.     Lymph nodes: No pathologic appearing lymph nodes by imaging  criteria.     Bones and soft tissues: Degenerative changes of the imaged spine. No acute osseous abnormality. Severe left hip osteoarthrosis.      Impression:      Impression:  Heterogenous appearing mass in the right hepatic lobe at the hepatic dome measuring up to 9.3 cm remain suspicious for malignancy. Further characterization with MR abdomen with and without contrast is recommended.    Unchanged appearing bilateral lower lobe opacities suspicious for pneumonia superimposed on chronic interstitial lung disease.        Electronically Signed: Tesfaye Paulino    12/3/2024 4:55 PM EST    Workstation ID: [id number]    CT Chest Without Contrast Diagnostic [[id number]] Collected: 12/03/24 1328     Updated: 12/03/24 1355    Narrative:      CT CHEST WO CONTRAST DIAGNOSTIC    Date of Exam: 12/3/2024 1:01 PM EST    Indication: volume overload vs pna.    Comparison: Portable chest 12/3/2024, CT abdomen and pelvis without contrast 3/16/2017    Technique: Axial CT images were obtained of the chest without contrast administration.  Sagittal and coronal reconstructions were performed.  Automated exposure control and iterative reconstruction methods were used.    Findings:  Heart is enlarged. Extensive aortic atherosclerotic calcifications. Aortic valve calcification noted. The ascending aorta mildly ectatic measuring 3.8 cm at the level of the main pulmonary artery. The main pulmonary artery is dilated up to 4.1 cm which   can be seen with pulmonary hypertension. No significant pericardial effusion. There is lipomatous hypertrophy of the interatrial septa. Negative for pleural effusion. Calcified mediastinal and right hilar nodes noted to granulomatous disease.    The trachea and mainstem bronchi are patent. Patchy areas of airspace disease involving the lower lobes and the left upper lobe which may relate to multifocal pneumonia. There is subpleural reticulation throughout the lungs consistent with chronic   interstitial lung  disease. No pneumothorax. No suspicious pulmonary nodule.    Lack of contrast limits upper abdominal assessment. Within the right hepatic lobe at segment 7 and 8, there is a large heterogeneous mass measuring up to 9.5 x 9.4 cm which is new from prior study (2/78). Visualized portions of the spleen, adrenal   glands, and pancreas are without acute abnormality. The gallbladder is absent. No free fluid in the upper abdomen. Remote healed left lateral rib fractures. No aggressive osseous lesion.      Impression:      Impression:  1. Large mass in the right hepatic lobe involving segments 7 and 8 which may relate to malignancy or metastasis measuring up to 9.5 cm. Recommend CT abdomen and pelvis with contrast for further assessment.  2. Patchy areas of airspace disease involving left upper lobes and bilateral lower lobes concerning for multifocal pneumonia superimposed on background of chronic interstitial lung disease.  3. Cardiomegaly and additional incidental findings above.        Electronically Signed: Dane Bain MD    12/3/2024 1:53 PM EST    Workstation ID: [id number]    XR Chest 1 View [[id number]] Collected: 12/03/24 1037     Updated: 12/03/24 1040    Narrative:      XR CHEST 1 VW    Date of Exam: 12/3/2024 10:27 AM EST    Indication: sob    Comparison: 11/20/2024  Findings:  There is borderline cardiomegaly. Pulmonary vessels appear within normal limits. There are chronic infiltrates of the left lower lobe. There is unchanged elevation of the right diaphragm.      Impression:      Impression:  Chronic findings, without significant change.      Electronically Signed: Jaclyn Reagan MD    12/3/2024 10:38 AM EST    Workstation ID: [id number]              Current Meds:   SCHEDULE  atorvastatin, 80 mg, Oral, Nightly  cetirizine, 10 mg, Oral, Daily  cholecalciferol, 1,000 Units, Oral, Daily  empagliflozin, 12.5 mg, Oral, Daily  finasteride, 5 mg, Oral, Daily  furosemide, 20 mg, Oral, Daily  insulin lispro, 2-7 Units,  Subcutaneous, 4x Daily AC & at Bedtime  metoprolol succinate XL, 50 mg, Oral, Daily  montelukast, 10 mg, Oral, Nightly  pantoprazole, 40 mg, Oral, Q AM  sacubitril-valsartan, 1 tablet, Oral, Daily  sertraline, 50 mg, Oral, Daily      Infusions  sodium chloride, 100 mL/hr, Last Rate: 100 mL/hr (12/04/24 0416)      PRNs    acetaminophen **OR** acetaminophen **OR** acetaminophen    albuterol sulfate HFA    aluminum-magnesium hydroxide-simethicone    senna-docusate sodium **AND** polyethylene glycol **AND** bisacodyl **AND** bisacodyl    Calcium Replacement - Follow Nurse / BPA Driven Protocol    dextrose    dextrose    glucagon (human recombinant)    ipratropium-albuterol    Magnesium Standard Dose Replacement - Follow Nurse / BPA Driven Protocol    nitroglycerin    Phosphorus Replacement - Follow Nurse / BPA Driven Protocol    Potassium Replacement - Follow Nurse / BPA Driven Protocol    [COMPLETED] Insert Peripheral IV **AND** sodium chloride        Joel Bhatti MD  12/4/2024  08:14 EST      Much of this encounter note is an electronic transcription/translation of spoken language to printed text using Dragon Software.

## 2024-12-04 NOTE — CASE MANAGEMENT/SOCIAL WORK
Continued Stay Note  [facility]     Patient Name: Mikael Shanks  MRN: [id number]  Today's Date: 12/4/2024    Admit Date: 12/3/2024    Plan: Return home with caregivers. Watch for O2 needs   Discharge Plan       Row Name 12/04/24 1542       Plan    Plan Return home with caregivers. Watch for O2 needs    Plan Comments CM met with patient bedside to discuss PT/OT recommendations for rehab. Patient does not want to go to SNF and denies the need for HHC at this time. He states he has caregiver two days a week but is unsure of the company (through [facility] he states). At this time he wants to return home. Discussed if he changes his mind or wants HHC to let CM know. Patient verbalized understanding. DC Barriers: Hem/Onc and Pulm consults; IV Abt, liver mass, 4lpm O2               Heather Gifford RN     [facility]  Office: [phone]  Cell: [phone]  Fax # [phone]

## 2024-12-05 PROBLEM — E44.0 MODERATE PROTEIN-CALORIE MALNUTRITION: Status: ACTIVE | Noted: 2024-12-05

## 2024-12-05 LAB
ALPHA-FETOPROTEIN: 3304 NG/ML (ref 0–8.3)
ANION GAP SERPL CALCULATED.3IONS-SCNC: 8.2 MMOL/L (ref 5–15)
APTT PPP: 34.3 SECONDS (ref 22.7–35.4)
BASOPHILS # BLD AUTO: 0.03 10*3/MM3 (ref 0–0.2)
BASOPHILS NFR BLD AUTO: 0.4 % (ref 0–1.5)
BUN SERPL-MCNC: 24 MG/DL (ref 8–23)
BUN/CREAT SERPL: 15.7 (ref 7–25)
CALCIUM SPEC-SCNC: 8.6 MG/DL (ref 8.6–10.5)
CEA SERPL-MCNC: 3.19 NG/ML
CHLORIDE SERPL-SCNC: 106 MMOL/L (ref 98–107)
CO2 SERPL-SCNC: 22.8 MMOL/L (ref 22–29)
CREAT SERPL-MCNC: 1.53 MG/DL (ref 0.76–1.27)
DEPRECATED RDW RBC AUTO: 52.5 FL (ref 37–54)
EGFRCR SERPLBLD CKD-EPI 2021: 43.2 ML/MIN/1.73
EOSINOPHIL # BLD AUTO: 0.03 10*3/MM3 (ref 0–0.4)
EOSINOPHIL NFR BLD AUTO: 0.4 % (ref 0.3–6.2)
ERYTHROCYTE [DISTWIDTH] IN BLOOD BY AUTOMATED COUNT: 15.2 % (ref 12.3–15.4)
FERRITIN SERPL-MCNC: 203 NG/ML (ref 30–400)
GLUCOSE BLDC GLUCOMTR-MCNC: 111 MG/DL (ref 70–105)
GLUCOSE BLDC GLUCOMTR-MCNC: 126 MG/DL (ref 70–105)
GLUCOSE BLDC GLUCOMTR-MCNC: 142 MG/DL (ref 70–105)
GLUCOSE BLDC GLUCOMTR-MCNC: 182 MG/DL (ref 70–105)
GLUCOSE SERPL-MCNC: 138 MG/DL (ref 65–99)
HAV IGM SERPL QL IA: NORMAL
HBV CORE IGM SERPL QL IA: NORMAL
HBV SURFACE AG SERPL QL IA: NORMAL
HCT VFR BLD AUTO: 35.7 % (ref 37.5–51)
HCV AB SER QL: NORMAL
HGB BLD-MCNC: 10.8 G/DL (ref 13–17.7)
IMM GRANULOCYTES # BLD AUTO: 0.1 10*3/MM3 (ref 0–0.05)
IMM GRANULOCYTES NFR BLD AUTO: 1.2 % (ref 0–0.5)
INR PPP: 1.16 (ref 0.9–1.1)
IRON 24H UR-MRATE: 25 MCG/DL (ref 59–158)
IRON SATN MFR SERPL: 12 % (ref 20–50)
LYMPHOCYTES # BLD AUTO: 0.54 10*3/MM3 (ref 0.7–3.1)
LYMPHOCYTES NFR BLD AUTO: 6.3 % (ref 19.6–45.3)
MCH RBC QN AUTO: 28.5 PG (ref 26.6–33)
MCHC RBC AUTO-ENTMCNC: 30.3 G/DL (ref 31.5–35.7)
MCV RBC AUTO: 94.2 FL (ref 79–97)
MONOCYTES # BLD AUTO: 0.51 10*3/MM3 (ref 0.1–0.9)
MONOCYTES NFR BLD AUTO: 6 % (ref 5–12)
NEUTROPHILS NFR BLD AUTO: 7.36 10*3/MM3 (ref 1.7–7)
NEUTROPHILS NFR BLD AUTO: 85.7 % (ref 42.7–76)
NRBC BLD AUTO-RTO: 0 /100 WBC (ref 0–0.2)
PLATELET # BLD AUTO: 147 10*3/MM3 (ref 140–450)
PMV BLD AUTO: 12.3 FL (ref 6–12)
POTASSIUM SERPL-SCNC: 4.8 MMOL/L (ref 3.5–5.2)
PROTHROMBIN TIME: 14.8 SECONDS (ref 11.7–14.2)
RBC # BLD AUTO: 3.79 10*6/MM3 (ref 4.14–5.8)
SODIUM SERPL-SCNC: 137 MMOL/L (ref 136–145)
TIBC SERPL-MCNC: 213 MCG/DL (ref 298–536)
TRANSFERRIN SERPL-MCNC: 143 MG/DL (ref 200–360)
WBC NRBC COR # BLD AUTO: 8.57 10*3/MM3 (ref 3.4–10.8)

## 2024-12-05 PROCEDURE — 85730 THROMBOPLASTIN TIME PARTIAL: CPT | Performed by: INTERNAL MEDICINE

## 2024-12-05 PROCEDURE — 63710000001 INSULIN LISPRO (HUMAN) PER 5 UNITS

## 2024-12-05 PROCEDURE — 82948 REAGENT STRIP/BLOOD GLUCOSE: CPT

## 2024-12-05 PROCEDURE — 84466 ASSAY OF TRANSFERRIN: CPT | Performed by: NURSE PRACTITIONER

## 2024-12-05 PROCEDURE — 94660 CPAP INITIATION&MGMT: CPT

## 2024-12-05 PROCEDURE — 85610 PROTHROMBIN TIME: CPT | Performed by: INTERNAL MEDICINE

## 2024-12-05 PROCEDURE — 63710000001 PREDNISONE PER 1 MG: Performed by: INTERNAL MEDICINE

## 2024-12-05 PROCEDURE — 25010000002 CEFTRIAXONE PER 250 MG: Performed by: INTERNAL MEDICINE

## 2024-12-05 PROCEDURE — 99232 SBSQ HOSP IP/OBS MODERATE 35: CPT | Performed by: INTERNAL MEDICINE

## 2024-12-05 PROCEDURE — 97530 THERAPEUTIC ACTIVITIES: CPT

## 2024-12-05 PROCEDURE — 94761 N-INVAS EAR/PLS OXIMETRY MLT: CPT

## 2024-12-05 PROCEDURE — 81256 HFE GENE: CPT | Performed by: NURSE PRACTITIONER

## 2024-12-05 PROCEDURE — 94618 PULMONARY STRESS TESTING: CPT

## 2024-12-05 PROCEDURE — 82728 ASSAY OF FERRITIN: CPT | Performed by: NURSE PRACTITIONER

## 2024-12-05 PROCEDURE — 83540 ASSAY OF IRON: CPT | Performed by: NURSE PRACTITIONER

## 2024-12-05 PROCEDURE — 85025 COMPLETE CBC W/AUTO DIFF WBC: CPT

## 2024-12-05 PROCEDURE — 80048 BASIC METABOLIC PNL TOTAL CA: CPT

## 2024-12-05 PROCEDURE — 94799 UNLISTED PULMONARY SVC/PX: CPT

## 2024-12-05 RX ADMIN — PREDNISONE 20 MG: 20 TABLET ORAL at 09:31

## 2024-12-05 RX ADMIN — PANTOPRAZOLE SODIUM 40 MG: 40 TABLET, DELAYED RELEASE ORAL at 05:00

## 2024-12-05 RX ADMIN — SACUBITRIL AND VALSARTAN 1 TABLET: 24; 26 TABLET, FILM COATED ORAL at 09:31

## 2024-12-05 RX ADMIN — Medication 10 ML: at 20:46

## 2024-12-05 RX ADMIN — ATORVASTATIN CALCIUM 80 MG: 40 TABLET, FILM COATED ORAL at 20:46

## 2024-12-05 RX ADMIN — GUAIFENESIN 1200 MG: 600 TABLET, MULTILAYER, EXTENDED RELEASE ORAL at 20:46

## 2024-12-05 RX ADMIN — FUROSEMIDE 20 MG: 20 TABLET ORAL at 09:31

## 2024-12-05 RX ADMIN — CEFTRIAXONE SODIUM 2000 MG: 2 INJECTION, POWDER, FOR SOLUTION INTRAMUSCULAR; INTRAVENOUS at 11:40

## 2024-12-05 RX ADMIN — Medication 1000 UNITS: at 09:31

## 2024-12-05 RX ADMIN — CETIRIZINE HYDROCHLORIDE 10 MG: 10 TABLET, FILM COATED ORAL at 09:32

## 2024-12-05 RX ADMIN — INSULIN LISPRO 2 UNITS: 100 INJECTION, SOLUTION INTRAVENOUS; SUBCUTANEOUS at 17:41

## 2024-12-05 RX ADMIN — FINASTERIDE 5 MG: 5 TABLET, FILM COATED ORAL at 09:31

## 2024-12-05 RX ADMIN — METOPROLOL SUCCINATE 50 MG: 50 TABLET, EXTENDED RELEASE ORAL at 09:31

## 2024-12-05 RX ADMIN — SERTRALINE HYDROCHLORIDE 50 MG: 50 TABLET, FILM COATED ORAL at 09:31

## 2024-12-05 RX ADMIN — MONTELUKAST 10 MG: 10 TABLET, FILM COATED ORAL at 20:46

## 2024-12-05 RX ADMIN — GUAIFENESIN 1200 MG: 600 TABLET, MULTILAYER, EXTENDED RELEASE ORAL at 09:30

## 2024-12-05 RX ADMIN — EMPAGLIFLOZIN 12.5 MG: 25 TABLET, FILM COATED ORAL at 09:31

## 2024-12-05 NOTE — PLAN OF CARE
Goal Outcome Evaluation:  Plan of Care Reviewed With: patient        Progress: improving        Pt doing well on 1L NC and breathing has been less labored compared to yesterday. He did wear his CPAP machine for a short period of time overnight. Pt made NPO at 0000 per Dr. Aragon in prep for possible liver biopsy today. Pt also received a CHG bath and discussed the procedure with the provider. He is currently resting with call light within reach and has no complaints at this time.

## 2024-12-05 NOTE — PROGRESS NOTES
Daily Progress Note          Assessment    Multifocal pneumonia  Hypoxemia  Human rhinovirus infection  BILL: Polysomnography 2019 AHI 78.3, titrated for auto BiPAP 6-16 with PS 4 cm H2O  Liver mass  CHF  CKD  HTN  HLD  GERD     PFTs/spirometry 8/31/2020: Normal, FEV1/FVC 84%, FVC 89%, FEV1 107%        Recommendations:  Respiratory status is gradually improving     antibiotic: Rocephin  Oxygen supplement and titration to maintain saturation 90 to 95%: Currently on 2 L per nasal cannula  Mucinex  Prednisone 20 mg daily  Encourage use of incentive spirometry well     Diuresis: Furosemide  Singulair     Oncology consulted for workup of the liver mass        I personally reviewed the radiological studies             LOS: 2 days     Subjective     Cough and shortness of breath    Objective     Vital signs for last 24 hours:  Vitals:    12/05/24 0047 12/05/24 0457 12/05/24 0818 12/05/24 0840   BP: 136/68 143/66  124/62   BP Location: Right arm Right arm  Right arm   Patient Position: Lying Lying  Lying   Pulse: 61 68 57 56   Resp: 22 16 16 15   Temp: 97.7 °F (36.5 °C) 97.5 °F (36.4 °C)  97.8 °F (36.6 °C)   TempSrc: Oral Oral  Oral   SpO2: 93% 95% 94% 93%   Weight:  68.6 kg (151 lb 3.8 oz)     Height:           Intake/Output last 3 shifts:  I/O last 3 completed shifts:  In: 600 [P.O.:600]  Out: 250 [Urine:250]  Intake/Output this shift:  I/O this shift:  In: -   Out: 300 [Urine:300]      Radiology  Imaging Results (Last 24 Hours)       ** No results found for the last 24 hours. **            Labs:  Results from last 7 days   Lab Units 12/05/24  0151   WBC 10*3/mm3 8.57   HEMOGLOBIN g/dL 10.8*   HEMATOCRIT % 35.7*   PLATELETS 10*3/mm3 147     Results from last 7 days   Lab Units 12/05/24  0151 12/04/24  0133 12/03/24  0955   SODIUM mmol/L 137   < > 135*   POTASSIUM mmol/L 4.8   < > 4.5   CHLORIDE mmol/L 106   < > 98   CO2 mmol/L 22.8   < > 24.5   BUN mg/dL 24*   < > 23   CREATININE mg/dL 1.53*   < > 1.65*   CALCIUM mg/dL 8.6    < > 9.4   BILIRUBIN mg/dL  --   --  1.0   ALK PHOS U/L  --   --  83   ALT (SGPT) U/L  --   --  41   AST (SGOT) U/L  --   --  41*   GLUCOSE mg/dL 138*   < > 140*    < > = values in this interval not displayed.         Results from last 7 days   Lab Units 12/03/24  0955   ALBUMIN g/dL 3.6     Results from last 7 days   Lab Units 12/03/24  1148 12/03/24  0955   HSTROP T ng/L 49* 47*                           Meds:   SCHEDULE  atorvastatin, 80 mg, Oral, Nightly  cefTRIAXone, 2,000 mg, Intravenous, Q24H  cetirizine, 10 mg, Oral, Daily  cholecalciferol, 1,000 Units, Oral, Daily  empagliflozin, 12.5 mg, Oral, Daily  finasteride, 5 mg, Oral, Daily  furosemide, 20 mg, Oral, Daily  guaiFENesin, 1,200 mg, Oral, Q12H  insulin lispro, 2-7 Units, Subcutaneous, 4x Daily AC & at Bedtime  metoprolol succinate XL, 50 mg, Oral, Daily  montelukast, 10 mg, Oral, Nightly  pantoprazole, 40 mg, Oral, Q AM  predniSONE, 20 mg, Oral, Daily With Breakfast  sacubitril-valsartan, 1 tablet, Oral, Daily  sertraline, 50 mg, Oral, Daily      Infusions     PRNs    acetaminophen **OR** acetaminophen **OR** acetaminophen    albuterol sulfate HFA    aluminum-magnesium hydroxide-simethicone    senna-docusate sodium **AND** polyethylene glycol **AND** bisacodyl **AND** bisacodyl    Calcium Replacement - Follow Nurse / BPA Driven Protocol    dextrose    dextrose    glucagon (human recombinant)    ipratropium-albuterol    Magnesium Standard Dose Replacement - Follow Nurse / BPA Driven Protocol    nitroglycerin    Phosphorus Replacement - Follow Nurse / BPA Driven Protocol    Potassium Replacement - Follow Nurse / BPA Driven Protocol    [COMPLETED] Insert Peripheral IV **AND** sodium chloride    Physical Exam:  General Appearance:  Alert   HEENT:  Normocephalic, without obvious abnormality, Conjunctiva/corneas clear,.   Nares normal, no drainage     Neck:  Supple, symmetrical, trachea midline.   Lungs /Chest wall:   Bilateral basal rhonchi, respirations  unlabored, symmetrical wall movement.     Heart:  Regular rate and rhythm, S1 S2 normal  Abdomen: Soft, non-tender, no masses, no organomegaly.    Extremities: No edema, no clubbing or cyanosis     ROS  Constitutional: Negative for chills, fever and malaise/fatigue.   HENT: Negative.    Eyes: Negative.    Cardiovascular: Negative.    Respiratory: Positive for cough and shortness of breath.    Skin: Negative.    Musculoskeletal: Negative.    Gastrointestinal: Negative.    Genitourinary: Negative.    Neurological: Generalized weakness      I reviewed the recent clinical results  I personally reviewed the latest radiological studies    Part of this note may be an electronic transcription/translation of spoken language to printed text using the Dragon Dictation System.

## 2024-12-05 NOTE — THERAPY TREATMENT NOTE
"Subjective: Pt agreeable to therapeutic plan of care.    Objective:     Precautions - Fall risk; [place]    Bed mobility - Supervision  Transfers - SBA  Ambulation - short distance x 2 bouts; pt with no c/o groin pain.    Therapeutic Exercise - 5 Reps B LE AROM lying supine - interrupted by arrival of meal     Vitals: WNL    Pain: 0 VAS   Location: n/a  Intervention for pain: Repositioned    Education: Provided education on the importance of mobility in the acute care setting. Pt with questions about incentive spirometry.  Questions answered and pt verbalized understanding.     Assessment: Mikael Shanks presents with functional mobility impairments which indicate the need for skilled intervention. Tolerating session today without incident. Pt with complete resolution of groin pain.  Pt reports that he was able to walk in the parham with RT today. Will continue to follow and progress as tolerated. Pt demonstrates improved bed mobility and transfers, may be safe to return home with HH PT.     Plan/Recommendations:   If medically appropriate, Low Intensity Therapy recommended post-acute care - This is recommended as therapy feels this patient would require 2-3 visits per week. OP or HH would be the best option depending on patient's home bound status. Consider, if the patient has other  \"skilled\" needs such as wounds, IV antibiotics, etc. Combined with \"low intensity\" could also equate to a SNF. If patient is medically complex, consider LTAC. Pt requires no DME at discharge.     Pt desires Home Health at discharge. Pt cooperative; agreeable to therapeutic recommendations and plan of care.         Basic Mobility 6-click:  Rollin = Total, A lot = 2, A little = 3; 4 = None  Supine>Sit:   1 = Total, A lot = 2, A little = 3; 4 = None   Sit>Stand with arms:  1 = Total, A lot = 2, A little = 3; 4 = None  Bed>Chair:   1 = Total, A lot = 2, A little = 3; 4 = None  Ambulate in room:  1 = Total, A lot = 2, A little = 3; 4 " = None  3-5 Steps with railin = Total, A lot = 2, A little = 3; 4 = None  Score: 20    Modified Hand: N/A = No pre-op stroke/TIA    Post-Tx Position: Up in Chair, Alarms activated, and Call light and personal items within reach  PPE: gloves and surgical mask    Therapy Charges for Today       Code Description Service Date Service Provider Modifiers Qty    01170616883 HC PT EVAL MOD COMPLEXITY 4 2024 Roro Galvan, PT GP 1    40912007178 HC PT THERAPEUTIC ACT EA 15 MIN 2024 Roro Galvan, PT GP 1           PT Charges       Row Name 24 1508             Time Calculation    Start Time 1410  -CL      Stop Time 1424  -CL      Time Calculation (min) 14 min  -CL      PT Received On 24  -CL      PT - Next Appointment 24  -CL      PT Goal Re-Cert Due Date 24  -CL         Time Calculation- PT    Total Timed Code Minutes- PT 14 minute(s)  -CL                User Key  (r) = Recorded By, (t) = Taken By, (c) = Cosigned By      Initials Name Provider Type    Roro Araya, PT Physical Therapist                '

## 2024-12-05 NOTE — PLAN OF CARE
Goal Outcome Evaluation:  Plan of Care Reviewed With: patient          Mikael Shanks presents with functional mobility impairments which indicate the need for skilled intervention. Tolerating session today without incident. Pt with complete resolution of groin pain.  Pt reports that he was able to walk in the parham with RT today. Will continue to follow and progress as tolerated. Pt demonstrates improved bed mobility and transfers, may be safe to return home with HH PT.     Anticipated Discharge Disposition (PT): Home with HH PT

## 2024-12-05 NOTE — PROCEDURES
Exercise Oximetry    Patient Name:Mikael Shanks   MRN: [id number]   Date: 12/05/24             ROOM AIR BASELINE   SpO2% 92   Heart Rate 62   Blood Pressure      EXERCISE ON ROOM AIR SpO2% EXERCISE ON O2 @  LPM SpO2%   1 MINUTE 94 1 MINUTE    2 MINUTES 96 2 MINUTES    3 MINUTES 63 3 MINUTES    4 MINUTES 90 4 MINUTES    5 MINUTES 94 5 MINUTES    6 MINUTES 94 6 MINUTES               Distance Walked  6 minutes in parham Distance Walked   Dyspnea (Kenyatta Scale)   Dyspnea (Kenyatta Scale)   Fatigue (Kenyatta Scale)   Fatigue (Kenyatta Scale)   SpO2% Post Exercise  96 SpO2% Post Exercise   HR Post Exercise  57 HR Post Exercise   Time to Recovery    Time to Recovery     Comments: Patient ambulated in parham for six minutes with walker and x1 assist.

## 2024-12-05 NOTE — PROGRESS NOTES
Hematology/Oncology Inpatient Progress Note    PATIENT NAME: Mikael Shanks  : 1935  MRN: [id number]    CHIEF COMPLAINT: shortness of breath    HISTORY OF PRESENT ILLNESS:      Mikael Shanks is a 89 y.o. male who presented to [facility] on 12/3/2024 with complaints of shortness of breath.  Past medical history of CAD, diabetes, GERD, hyperlipidemia, hypertension.  Presented with shortness of breath and right groin pain.  Due to being very hard of hearing and not having his hearing aids his HPI was obtained from family.  Family stated that the patient had a recent admission at the [facility] where they were concerned for congestive heart failure and also performed a heart cath.    Evaluation in the ED showed leukocytosis with a WBC of 20.53 and increased neutrophils.  Respiratory panel was positive for rhinovirus chest x-ray revealed chronic findings without acute changes.      CT of the chest without contrast  -Large mass in the right hepatic lobe involving segment 7 and 8 which may relate to malignancy or metastasis measuring up to 9.5 cm  -Patchy areas of airspace disease involving left upper lobes and bilateral lower lobes concerning for multifocal pneumonia superimposed on background of chronic interstitial lung disease  -Cardiomegaly    CT of the abdomen and pelvis with contrast  -Heterogenous appearing mass in the right hepatic lobe at the hepatic dome measuring up to 9.3 cm suspicious for malignancy.  Further characterization with MRI abdomen with and without recommended    24  Hematology/Oncology was consulted due to newly discovered liver mass.    Subjective     Denies any bleeding issues    ROS:  Review of Systems   Constitutional:  Negative for chills, fatigue and fever.   HENT:  Negative for congestion, drooling, ear discharge, rhinorrhea, sinus pressure and tinnitus.    Eyes:  Negative for photophobia, pain and discharge.   Respiratory:  Negative for apnea, choking and  stridor.    Cardiovascular:  Negative for palpitations.   Gastrointestinal:  Negative for abdominal distention, abdominal pain and anal bleeding.   Endocrine: Negative for polydipsia and polyphagia.   Genitourinary:  Negative for decreased urine volume, flank pain and genital sores.   Musculoskeletal:  Negative for gait problem, neck pain and neck stiffness.   Skin:  Negative for color change, rash and wound.   Neurological:  Negative for tremors, seizures, syncope, facial asymmetry and speech difficulty.   Hematological:  Negative for adenopathy.   Psychiatric/Behavioral:  Negative for agitation, confusion, hallucinations and self-injury. The patient is not hyperactive.         MEDICATIONS:    Scheduled Meds:  atorvastatin, 80 mg, Oral, Nightly  cefTRIAXone, 2,000 mg, Intravenous, Q24H  cetirizine, 10 mg, Oral, Daily  cholecalciferol, 1,000 Units, Oral, Daily  empagliflozin, 12.5 mg, Oral, Daily  finasteride, 5 mg, Oral, Daily  furosemide, 20 mg, Oral, Daily  guaiFENesin, 1,200 mg, Oral, Q12H  insulin lispro, 2-7 Units, Subcutaneous, 4x Daily AC & at Bedtime  metoprolol succinate XL, 50 mg, Oral, Daily  montelukast, 10 mg, Oral, Nightly  pantoprazole, 40 mg, Oral, Q AM  predniSONE, 20 mg, Oral, Daily With Breakfast  sacubitril-valsartan, 1 tablet, Oral, Daily  sertraline, 50 mg, Oral, Daily       Continuous Infusions:      PRN Meds:    acetaminophen **OR** acetaminophen **OR** acetaminophen    albuterol sulfate HFA    aluminum-magnesium hydroxide-simethicone    senna-docusate sodium **AND** polyethylene glycol **AND** bisacodyl **AND** bisacodyl    Calcium Replacement - Follow Nurse / BPA Driven Protocol    dextrose    dextrose    glucagon (human recombinant)    ipratropium-albuterol    Magnesium Standard Dose Replacement - Follow Nurse / BPA Driven Protocol    nitroglycerin    Phosphorus Replacement - Follow Nurse / BPA Driven Protocol    Potassium Replacement - Follow Nurse / BPA Driven Protocol    [COMPLETED]  "Insert Peripheral IV **AND** sodium chloride     ALLERGIES:    Allergies   Allergen Reactions    Penicillins Hives       Objective    VITALS:   /63 (BP Location: Right arm, Patient Position: Lying)   Pulse 63   Temp 97.4 °F (36.3 °C) (Oral)   Resp 20   Ht 177.8 cm (70\")   Wt 68.6 kg (151 lb 3.8 oz)   SpO2 95%   BMI 21.70 kg/m²     PHYSICAL EXAM: (performed by MD)  Physical Exam  Vitals and nursing note reviewed.   Constitutional:       General: He is not in acute distress.     Appearance: He is not diaphoretic.   HENT:      Head: Normocephalic and atraumatic.   Eyes:      General: No scleral icterus.        Right eye: No discharge.         Left eye: No discharge.      Conjunctiva/sclera: Conjunctivae normal.   Neck:      Thyroid: No thyromegaly.   Cardiovascular:      Rate and Rhythm: Normal rate and regular rhythm.      Heart sounds: Normal heart sounds.      No friction rub. No gallop.   Pulmonary:      Effort: Pulmonary effort is normal. No respiratory distress.      Breath sounds: No stridor. No wheezing.   Abdominal:      General: Bowel sounds are normal.      Palpations: Abdomen is soft. There is no mass.      Tenderness: There is no abdominal tenderness. There is no guarding or rebound.   Musculoskeletal:         General: No tenderness. Normal range of motion.      Cervical back: Normal range of motion and neck supple.   Lymphadenopathy:      Cervical: No cervical adenopathy.   Skin:     General: Skin is warm.      Findings: No erythema or rash.   Neurological:      Mental Status: He is alert and oriented to person, place, and time.      Motor: No abnormal muscle tone.   Psychiatric:         Behavior: Behavior normal.           RECENT LABS:  Lab Results (last 24 hours)       Procedure Component Value Units Date/Time    POC Glucose Once [[id number]]  (Abnormal) Collected: 12/05/24 1617    Specimen: Blood Updated: 12/05/24 1619     Glucose 182 mg/dL      Comment: Serial Number: [id number]: "  [id number]       aPTT [[id number]]  (Normal) Collected: 12/05/24 1238    Specimen: Blood Updated: 12/05/24 1255     PTT 34.3 seconds     Protime-INR [[id number]]  (Abnormal) Collected: 12/05/24 1238    Specimen: Blood Updated: 12/05/24 1255     Protime 14.8 Seconds      INR 1.16    Hemochromatosis Mutation [[id number]] Collected: 12/05/24 1238    Specimen: Blood Updated: 12/05/24 1244    Iron Profile [[id number]]  (Abnormal) Collected: 12/05/24 0151    Specimen: Blood from Arm, Left Updated: 12/05/24 1209     Iron 25 mcg/dL      Iron Saturation (TSAT) 12 %      Transferrin 143 mg/dL      TIBC 213 mcg/dL     Ferritin [[id number]]  (Normal) Collected: 12/05/24 0151    Specimen: Blood from Arm, Left Updated: 12/05/24 1209     Ferritin 203.00 ng/mL     Narrative:      Results may be falsely decreased if patient taking Biotin.      Hepatitis Panel, Acute [[id number]]  (Normal) Collected: 12/03/24 0955    Specimen: Blood Updated: 12/05/24 1209     Hepatitis B Surface Ag Non-Reactive     Hep A IgM Non-Reactive     Hep B C IgM Non-Reactive     Hepatitis C Ab Non-Reactive    Narrative:      Results may be falsely decreased if patient taking Biotin.     POC Glucose 4x Daily Before Meals & at Bedtime [[id number]]  (Abnormal) Collected: 12/05/24 1112    Specimen: Blood Updated: 12/05/24 1114     Glucose 126 mg/dL      Comment: Serial Number: [id number]:  [id number]       Blood Culture - Blood, Arm, Right [[id number]]  (Normal) Collected: 12/03/24 1047    Specimen: Blood from Arm, Right Updated: 12/05/24 1100     Blood Culture No growth at 2 days    Blood Culture - Blood, Arm, Left [[id number]]  (Normal) Collected: 12/03/24 1053    Specimen: Blood from Arm, Left Updated: 12/05/24 1100     Blood Culture No growth at 2 days    Narrative:      Less than seven (7) mL's of blood was collected.  Insufficient quantity may yield false negative results.    POC Glucose 4x Daily Before Meals & at Bedtime [[id number]]  (Abnormal) Collected:  12/05/24 0716    Specimen: Blood Updated: 12/05/24 0717     Glucose 111 mg/dL      Comment: Serial Number: [id number]:  [id number]       Basic Metabolic Panel [[id number]]  (Abnormal) Collected: 12/05/24 0151    Specimen: Blood from Arm, Left Updated: 12/05/24 0221     Glucose 138 mg/dL      BUN 24 mg/dL      Creatinine 1.53 mg/dL      Sodium 137 mmol/L      Potassium 4.8 mmol/L      Chloride 106 mmol/L      CO2 22.8 mmol/L      Calcium 8.6 mg/dL      BUN/Creatinine Ratio 15.7     Anion Gap 8.2 mmol/L      eGFR 43.2 mL/min/1.73     Narrative:      GFR Normal >60  Chronic Kidney Disease <60  Kidney Failure <15    The GFR formula is only valid for adults with stable renal function between ages 18 and 70.    CBC & Differential [[id number]]  (Abnormal) Collected: 12/05/24 0151    Specimen: Blood from Arm, Left Updated: 12/05/24 0200    Narrative:      The following orders were created for panel order CBC & Differential.  Procedure                               Abnormality         Status                     ---------                               -----------         ------                     CBC Auto Differential[[id number]]        Abnormal            Final result                 Please view results for these tests on the individual orders.    CBC Auto Differential [[id number]]  (Abnormal) Collected: 12/05/24 0151    Specimen: Blood from Arm, Left Updated: 12/05/24 0200     WBC 8.57 10*3/mm3      RBC 3.79 10*6/mm3      Hemoglobin 10.8 g/dL      Hematocrit 35.7 %      MCV 94.2 fL      MCH 28.5 pg      MCHC 30.3 g/dL      RDW 15.2 %      RDW-SD 52.5 fl      MPV 12.3 fL      Platelets 147 10*3/mm3      Neutrophil % 85.7 %      Lymphocyte % 6.3 %      Monocyte % 6.0 %      Eosinophil % 0.4 %      Basophil % 0.4 %      Immature Grans % 1.2 %      Neutrophils, Absolute 7.36 10*3/mm3      Lymphocytes, Absolute 0.54 10*3/mm3      Monocytes, Absolute 0.51 10*3/mm3      Eosinophils, Absolute 0.03 10*3/mm3      Basophils,  Absolute 0.03 10*3/mm3      Immature Grans, Absolute 0.10 10*3/mm3      nRBC 0.0 /100 WBC     AFP Tumor Marker [[id number]]  (Abnormal) Collected: 12/04/24 1805    Specimen: Blood Updated: 12/05/24 0126     ALPHA-FETOPROTEIN 3,304.00 ng/mL     Narrative:      Alpha Fetoprotein Tumor Marker Reference Range:    0.0-8.3 ng/mL    Note: Normal values apply only to males and nonpregnant females. These results are not interpretable for pregnant females.    Testing Method: Roche [employer] Electrochemiluminescence Immunoassay(ECLIA)  Values obtained with different assay methods or kits cannot be used interchangeably.    CEA [[id number]] Collected: 12/04/24 1805    Specimen: Blood Updated: 12/05/24 0055     CEA 3.19 ng/mL     Narrative:      CEA Reference Range:    Non Smokers:   Less than 3 ng/mL  Smokers:       Less than 5 ng/mL  Results may be falsely decreased if patient taking Biotin.    Testing Method: [employer] Electrochemiluminescence Immunoassay(ECLIA)  Values obtained with different assay methods or kits cannot be used interchangeably.    POC Glucose Once [[id number]]  (Abnormal) Collected: 12/04/24 2113    Specimen: Blood Updated: 12/04/24 2114     Glucose 207 mg/dL      Comment: Serial Number: [id number]:  [id number]               PENDING RESULTS:     IMAGING REVIEWED:  No radiology results for the last day    Assessment & Plan   ASSESSMENT:    Right hepatic lobe mass: 9.3 cm mass suspicious for malignancy. AFP is  elevated to 3,304.  CEA is 3.1  Normocytic anemia: Hemoglobin 10.6, MCV 92.0.  Has a history of pernicious anemia and is on B12 injection monthly with his PCP.     PLAN    Viral hepatitis panel  Iron studies with ferritn  CT guided bx of liver mass  Discussed with patient.       Electronically signed by Carmen Aragon MD, 12/06/24, 5:27 PM EST.

## 2024-12-05 NOTE — PROGRESS NOTES
[facility] MEDICINE SERVICE  DAILY PROGRESS NOTE    NAME: Mikael Shanks  : 1935  MRN: [id number]      LOS: 2 days     PROVIDER OF SERVICE: Timothy Duane Brammell, MD    Chief Complaint: Multifocal pneumonia    Subjective:     Interval History:  History taken from: patient    Patient can hearing today with his hearing aid.  He has been able to be weaned to room air.  He denies any shortness of breath.  His appetite has returned.  He is aware of his planned liver biopsy.  He denies any abdominal pain.  Groin pain is improved.  He is ambulating with therapy.  Denies any other additional acute issues.        Review of Systems:   Review of Systems   All other systems reviewed and are negative.      Objective:     Vital Signs  Temp:  [97.3 °F (36.3 °C)-97.8 °F (36.6 °C)] 97.3 °F (36.3 °C)  Heart Rate:  [53-68] 56  Resp:  [12-22] 19  BP: (122-143)/(54-68) 137/54  Flow (L/min) (Oxygen Therapy):  [1-2] 2   Body mass index is 21.7 kg/m².    Physical Exam  Physical Exam  Vitals reviewed.   Constitutional:       Appearance: Normal appearance.   HENT:      Head: Normocephalic.   Cardiovascular:      Rate and Rhythm: Normal rate and regular rhythm.   Pulmonary:      Effort: Pulmonary effort is normal.      Breath sounds: Normal breath sounds.   Abdominal:      General: Bowel sounds are normal.      Palpations: Abdomen is soft.      Tenderness: There is no abdominal tenderness.   Musculoskeletal:         General: No swelling.   Neurological:      Mental Status: He is alert. Mental status is at baseline.            Diagnostic Data    Results from last 7 days   Lab Units 24  0151 24  0133 24  0955   WBC 10*3/mm3 8.57   < > 20.53*   HEMOGLOBIN g/dL 10.8*   < > 11.7*   HEMATOCRIT % 35.7*   < > 37.6   PLATELETS 10*3/mm3 147   < > 167   GLUCOSE mg/dL 138*   < > 140*   CREATININE mg/dL 1.53*   < > 1.65*   BUN mg/dL 24*   < > 23   SODIUM mmol/L 137   < > 135*   POTASSIUM mmol/L 4.8   < > 4.5   AST  (SGOT) U/L  --   --  41*   ALT (SGPT) U/L  --   --  41   ALK PHOS U/L  --   --  83   BILIRUBIN mg/dL  --   --  1.0   ANION GAP mmol/L 8.2   < > 12.5    < > = values in this interval not displayed.       CT Abdomen Pelvis With Contrast    Result Date: 12/3/2024  Impression: Heterogenous appearing mass in the right hepatic lobe at the hepatic dome measuring up to 9.3 cm remain suspicious for malignancy. Further characterization with MR abdomen with and without contrast is recommended. Unchanged appearing bilateral lower lobe opacities suspicious for pneumonia superimposed on chronic interstitial lung disease. Electronically Signed: Tesfaye Paulino  12/3/2024 4:55 PM EST  Workstation ID: [id number]           Assessment:    Acute hypoxemic respiratory failure  Rhinovirus    probable  pneumonia  Liver mass  Type 2 diabetes  Hard of hearing  History of coronary artery disease  Chronic diastolic congestive heart failure  Chronic kidney disease  anemia       Plan.  Planned liver biopsy.  Noted extremely elevated alpha-fetoprotein.  Discharge planning to home.  Active and Resolved Problems  Active Hospital Problems    Diagnosis  POA    **Multifocal pneumonia [J18.9]  Yes    Moderate protein-calorie malnutrition [E44.0]  Yes      Resolved Hospital Problems   No resolved problems to display.           VTE Prophylaxis:  Mechanical VTE prophylaxis orders are present.             Disposition Planning:     Barriers to Discharge:oncology clearance  Anticipated Date of Discharge: 12/7  Place of Discharge: home      Time: 30 minutes     Code Status and Medical Interventions: No CPR (Do Not Attempt to Resuscitate); Full Support   Ordered at: 12/03/24 1453     Code Status (Patient has no pulse and is not breathing):    No CPR (Do Not Attempt to Resuscitate)     Medical Interventions (Patient has pulse or is breathing):    Full Support       Signature: Electronically signed by Timothy Duane Brammell, MD, 12/05/24, 15:04 EST.  [facility]

## 2024-12-06 ENCOUNTER — APPOINTMENT (OUTPATIENT)
Dept: CT IMAGING | Facility: HOSPITAL | Age: 89
DRG: 193 | End: 2024-12-06
Payer: MEDICARE

## 2024-12-06 LAB
ANION GAP SERPL CALCULATED.3IONS-SCNC: 10.2 MMOL/L (ref 5–15)
BASOPHILS # BLD AUTO: 0.08 10*3/MM3 (ref 0–0.2)
BASOPHILS NFR BLD AUTO: 0.7 % (ref 0–1.5)
BUN SERPL-MCNC: 30 MG/DL (ref 8–23)
BUN/CREAT SERPL: 14.9 (ref 7–25)
CALCIUM SPEC-SCNC: 9.2 MG/DL (ref 8.6–10.5)
CHLORIDE SERPL-SCNC: 103 MMOL/L (ref 98–107)
CO2 SERPL-SCNC: 23.8 MMOL/L (ref 22–29)
CREAT SERPL-MCNC: 2.01 MG/DL (ref 0.76–1.27)
DEPRECATED RDW RBC AUTO: 51.7 FL (ref 37–54)
EGFRCR SERPLBLD CKD-EPI 2021: 31.1 ML/MIN/1.73
EOSINOPHIL # BLD AUTO: 0.19 10*3/MM3 (ref 0–0.4)
EOSINOPHIL NFR BLD AUTO: 1.6 % (ref 0.3–6.2)
ERYTHROCYTE [DISTWIDTH] IN BLOOD BY AUTOMATED COUNT: 14.8 % (ref 12.3–15.4)
GLUCOSE BLDC GLUCOMTR-MCNC: 110 MG/DL (ref 70–105)
GLUCOSE BLDC GLUCOMTR-MCNC: 137 MG/DL (ref 70–105)
GLUCOSE BLDC GLUCOMTR-MCNC: 152 MG/DL (ref 70–105)
GLUCOSE BLDC GLUCOMTR-MCNC: 78 MG/DL (ref 70–105)
GLUCOSE SERPL-MCNC: 170 MG/DL (ref 65–99)
HCT VFR BLD AUTO: 39.5 % (ref 37.5–51)
HGB BLD-MCNC: 12.1 G/DL (ref 13–17.7)
IMM GRANULOCYTES # BLD AUTO: 0.18 10*3/MM3 (ref 0–0.05)
IMM GRANULOCYTES NFR BLD AUTO: 1.5 % (ref 0–0.5)
LYMPHOCYTES # BLD AUTO: 1.15 10*3/MM3 (ref 0.7–3.1)
LYMPHOCYTES NFR BLD AUTO: 9.8 % (ref 19.6–45.3)
MCH RBC QN AUTO: 28.7 PG (ref 26.6–33)
MCHC RBC AUTO-ENTMCNC: 30.6 G/DL (ref 31.5–35.7)
MCV RBC AUTO: 93.6 FL (ref 79–97)
MONOCYTES # BLD AUTO: 0.57 10*3/MM3 (ref 0.1–0.9)
MONOCYTES NFR BLD AUTO: 4.8 % (ref 5–12)
NEUTROPHILS NFR BLD AUTO: 81.6 % (ref 42.7–76)
NEUTROPHILS NFR BLD AUTO: 9.62 10*3/MM3 (ref 1.7–7)
NRBC BLD AUTO-RTO: 0 /100 WBC (ref 0–0.2)
PLATELET # BLD AUTO: 225 10*3/MM3 (ref 140–450)
PMV BLD AUTO: 12.2 FL (ref 6–12)
POTASSIUM SERPL-SCNC: 4.8 MMOL/L (ref 3.5–5.2)
RBC # BLD AUTO: 4.22 10*6/MM3 (ref 4.14–5.8)
SODIUM SERPL-SCNC: 137 MMOL/L (ref 136–145)
WBC NRBC COR # BLD AUTO: 11.79 10*3/MM3 (ref 3.4–10.8)

## 2024-12-06 PROCEDURE — 88333 PATH CONSLTJ SURG CYTO XM 1: CPT | Performed by: NURSE PRACTITIONER

## 2024-12-06 PROCEDURE — 25010000002 FENTANYL CITRATE (PF) 50 MCG/ML SOLUTION

## 2024-12-06 PROCEDURE — 0FB03ZX EXCISION OF LIVER, PERCUTANEOUS APPROACH, DIAGNOSTIC: ICD-10-PCS | Performed by: RADIOLOGY

## 2024-12-06 PROCEDURE — 85025 COMPLETE CBC W/AUTO DIFF WBC: CPT

## 2024-12-06 PROCEDURE — 63710000001 PREDNISONE PER 1 MG: Performed by: INTERNAL MEDICINE

## 2024-12-06 PROCEDURE — 25010000002 MIDAZOLAM PER 1 MG

## 2024-12-06 PROCEDURE — 63710000001 INSULIN LISPRO (HUMAN) PER 5 UNITS

## 2024-12-06 PROCEDURE — 82948 REAGENT STRIP/BLOOD GLUCOSE: CPT

## 2024-12-06 PROCEDURE — 94660 CPAP INITIATION&MGMT: CPT

## 2024-12-06 PROCEDURE — 94799 UNLISTED PULMONARY SVC/PX: CPT

## 2024-12-06 PROCEDURE — 80048 BASIC METABOLIC PNL TOTAL CA: CPT

## 2024-12-06 PROCEDURE — 99152 MOD SED SAME PHYS/QHP 5/>YRS: CPT

## 2024-12-06 PROCEDURE — 25010000002 LIDOCAINE 1 % SOLUTION

## 2024-12-06 PROCEDURE — 77012 CT SCAN FOR NEEDLE BIOPSY: CPT

## 2024-12-06 PROCEDURE — 88307 TISSUE EXAM BY PATHOLOGIST: CPT | Performed by: NURSE PRACTITIONER

## 2024-12-06 PROCEDURE — 25010000002 ONDANSETRON PER 1 MG

## 2024-12-06 PROCEDURE — 25010000002 CEFTRIAXONE PER 250 MG: Performed by: INTERNAL MEDICINE

## 2024-12-06 PROCEDURE — 25810000003 SODIUM CHLORIDE 0.9 % SOLUTION: Performed by: HOSPITALIST

## 2024-12-06 RX ORDER — LIDOCAINE HYDROCHLORIDE 10 MG/ML
INJECTION, SOLUTION INFILTRATION; PERINEURAL AS NEEDED
Status: COMPLETED | OUTPATIENT
Start: 2024-12-06 | End: 2024-12-06

## 2024-12-06 RX ORDER — FENTANYL CITRATE 50 UG/ML
INJECTION, SOLUTION INTRAMUSCULAR; INTRAVENOUS AS NEEDED
Status: COMPLETED | OUTPATIENT
Start: 2024-12-06 | End: 2024-12-06

## 2024-12-06 RX ORDER — MIDAZOLAM HYDROCHLORIDE 1 MG/ML
INJECTION, SOLUTION INTRAMUSCULAR; INTRAVENOUS AS NEEDED
Status: COMPLETED | OUTPATIENT
Start: 2024-12-06 | End: 2024-12-06

## 2024-12-06 RX ORDER — ONDANSETRON 2 MG/ML
INJECTION INTRAMUSCULAR; INTRAVENOUS AS NEEDED
Status: COMPLETED | OUTPATIENT
Start: 2024-12-06 | End: 2024-12-06

## 2024-12-06 RX ADMIN — SERTRALINE HYDROCHLORIDE 50 MG: 50 TABLET, FILM COATED ORAL at 09:50

## 2024-12-06 RX ADMIN — SODIUM CHLORIDE 500 ML: 9 INJECTION, SOLUTION INTRAVENOUS at 17:32

## 2024-12-06 RX ADMIN — MIDAZOLAM 1 MG: 1 INJECTION INTRAMUSCULAR; INTRAVENOUS at 13:14

## 2024-12-06 RX ADMIN — INSULIN LISPRO 2 UNITS: 100 INJECTION, SOLUTION INTRAVENOUS; SUBCUTANEOUS at 09:50

## 2024-12-06 RX ADMIN — GELATIN ABSORBABLE SPONGE 12-7 MM 1 EACH: 12-7 MISC at 13:28

## 2024-12-06 RX ADMIN — ALUMINUM HYDROXIDE, MAGNESIUM HYDROXIDE, AND DIMETHICONE 15 ML: 400; 400; 40 SUSPENSION ORAL at 01:11

## 2024-12-06 RX ADMIN — CEFTRIAXONE SODIUM 2000 MG: 2 INJECTION, POWDER, FOR SOLUTION INTRAMUSCULAR; INTRAVENOUS at 11:45

## 2024-12-06 RX ADMIN — SACUBITRIL AND VALSARTAN 1 TABLET: 24; 26 TABLET, FILM COATED ORAL at 09:50

## 2024-12-06 RX ADMIN — FINASTERIDE 5 MG: 5 TABLET, FILM COATED ORAL at 09:50

## 2024-12-06 RX ADMIN — ONDANSETRON 4 MG: 2 INJECTION INTRAMUSCULAR; INTRAVENOUS at 13:14

## 2024-12-06 RX ADMIN — MONTELUKAST 10 MG: 10 TABLET, FILM COATED ORAL at 20:39

## 2024-12-06 RX ADMIN — PREDNISONE 20 MG: 20 TABLET ORAL at 09:48

## 2024-12-06 RX ADMIN — Medication 1000 UNITS: at 09:50

## 2024-12-06 RX ADMIN — ATORVASTATIN CALCIUM 80 MG: 40 TABLET, FILM COATED ORAL at 20:39

## 2024-12-06 RX ADMIN — PANTOPRAZOLE SODIUM 40 MG: 40 TABLET, DELAYED RELEASE ORAL at 05:40

## 2024-12-06 RX ADMIN — FUROSEMIDE 20 MG: 20 TABLET ORAL at 09:50

## 2024-12-06 RX ADMIN — CETIRIZINE HYDROCHLORIDE 10 MG: 10 TABLET, FILM COATED ORAL at 09:48

## 2024-12-06 RX ADMIN — GUAIFENESIN 1200 MG: 600 TABLET, MULTILAYER, EXTENDED RELEASE ORAL at 20:39

## 2024-12-06 RX ADMIN — EMPAGLIFLOZIN 12.5 MG: 25 TABLET, FILM COATED ORAL at 09:49

## 2024-12-06 RX ADMIN — FENTANYL CITRATE 50 MCG: 50 INJECTION, SOLUTION INTRAMUSCULAR; INTRAVENOUS at 13:14

## 2024-12-06 RX ADMIN — LIDOCAINE HYDROCHLORIDE 10 ML: 10 INJECTION, SOLUTION INFILTRATION; PERINEURAL at 13:22

## 2024-12-06 RX ADMIN — GUAIFENESIN 1200 MG: 600 TABLET, MULTILAYER, EXTENDED RELEASE ORAL at 09:48

## 2024-12-06 NOTE — CASE MANAGEMENT/SOCIAL WORK
"Continued Stay Note  [facility]     Patient Name: Mikael Shanks  MRN: [id number]  Today's Date: 12/6/2024    Admit Date: 12/3/2024    Plan: Return home with caregivers   Discharge Plan       Row Name 12/06/24 5017       Plan    Plan Comments Primary nurse updated CM that patients family was bedside and said \"they can't take him home when he discharges and would like him to go to a facility\" that was sister Adán. Family reported \"his bathroom was covered in stool and he didn't even know because he can hardly see, they said they may need to get APS involved because his brother will not step up and help and the sisters physically can not help\". CM previously talked about SNF or HHC based on PT/OT recommendatons and patient was adament that he did not want to go to rehab and did not want home care. He told this CM that he has a caregiver two days a week but is unsure of the company ([facility]). CM left weekend handoff note to follow up. Attempted to talk to patient but he is sleeping.               Heather Gifford RN     [facility]  Office: [phone]  Cell: [phone]  Fax # [phone]     "

## 2024-12-06 NOTE — PROGRESS NOTES
[facility] MEDICINE SERVICE  DAILY PROGRESS NOTE    NAME: Mikael Shanks  : 1935  MRN: [id number]      LOS: 3 days     PROVIDER OF SERVICE: Timothy Duane Brammell, MD    Chief Complaint: Multifocal pneumonia    Subjective:     Interval History:  History taken from: patient/nurse/family    Patient is sleeping after administered medication during his needle biopsy.  Nurses report no acute concerns.  Family at bedside/daughter.        Review of Systems:   Review of Systems   All other systems reviewed and are negative.      Objective:     Vital Signs  Temp:  [97.4 °F (36.3 °C)-98.1 °F (36.7 °C)] 97.5 °F (36.4 °C)  Heart Rate:  [55-66] 57  Resp:  [14-26] 17  BP: (110-147)/(53-65) 130/55  Flow (L/min) (Oxygen Therapy):  [0-2] 0   Body mass index is 21.61 kg/m².    Physical Exam  Physical Exam  Vitals reviewed.   Constitutional:       General: He is not in acute distress.     Appearance: Normal appearance.   HENT:      Head: Normocephalic.   Cardiovascular:      Rate and Rhythm: Normal rate and regular rhythm.   Pulmonary:      Effort: Pulmonary effort is normal.      Breath sounds: Normal breath sounds.   Abdominal:      Palpations: Abdomen is soft.            Diagnostic Data    Results from last 7 days   Lab Units 24  0055 24  0133 24  0955   WBC 10*3/mm3 11.79*   < > 20.53*   HEMOGLOBIN g/dL 12.1*   < > 11.7*   HEMATOCRIT % 39.5   < > 37.6   PLATELETS 10*3/mm3 225   < > 167   GLUCOSE mg/dL 170*   < > 140*   CREATININE mg/dL 2.01*   < > 1.65*   BUN mg/dL 30*   < > 23   SODIUM mmol/L 137   < > 135*   POTASSIUM mmol/L 4.8   < > 4.5   AST (SGOT) U/L  --   --  41*   ALT (SGPT) U/L  --   --  41   ALK PHOS U/L  --   --  83   BILIRUBIN mg/dL  --   --  1.0   ANION GAP mmol/L 10.2   < > 12.5    < > = values in this interval not displayed.       No radiology results for the last day          Assessment:    Acute hypoxemic respiratory failure  Rhinovirus    probable  pneumonia  Liver mass  Type  2 diabetes  Hard of hearing  History of coronary artery disease  Chronic diastolic congestive heart failure  Chronic kidney disease  anemia     Plan:  Await liver biopsy.  Possible discharge home with outpatient follow-up without any other additional acute issues.  Follow-up lab work. 500ml saline times one.  Active and Resolved Problems  Active Hospital Problems    Diagnosis  POA    **Multifocal pneumonia [J18.9]  Yes    Moderate protein-calorie malnutrition [E44.0]  Yes      Resolved Hospital Problems   No resolved problems to display.           VTE Prophylaxis:  Mechanical VTE prophylaxis orders are present.             Disposition Planning:     Barriers to Discharge: lab improvement  Anticipated Date of Discharge: 12/7  Place of Discharge: home      Time: 30 minutes     Code Status and Medical Interventions: No CPR (Do Not Attempt to Resuscitate); Limited Support; No intubation (DNI)   Ordered at: 12/06/24 1520     Medical Intervention Limits:    No intubation (DNI)     Level Of Support Discussed With:    Patient     Code Status (Patient has no pulse and is not breathing):    No CPR (Do Not Attempt to Resuscitate)     Medical Interventions (Patient has pulse or is breathing):    Limited Support       Signature: Electronically signed by Timothy Duane Brammell, MD, 12/06/24, 15:21 EST.  [facility] Hospitalist Team

## 2024-12-06 NOTE — PLAN OF CARE
Goal Outcome Evaluation:  Plan of Care Reviewed With: patient        Progress: no change        Pt remaining on RA overnight and tolerating it well. Other VSS. Pt also ambulating well to the bathroom with walker and stand by assistance. Pt did have some complaints of indigestion but otherwise has been doing well. Plan is for liver biopsy today.

## 2024-12-06 NOTE — CASE MANAGEMENT/SOCIAL WORK
Continued Stay Note  [facility]     Patient Name: Mikael Shanks  MRN: [id number]  Today's Date: 12/6/2024    Admit Date: 12/3/2024    Plan: Return home with caregivers   Discharge Plan       Row Name 12/06/24 0823       Plan    Plan Return home with caregivers    Plan Comments DC Barriers: Hem/Onc and Pulm consults; IV Abt, liver mass/biopsy ordered               Heather Gifford RN     [facility]  Office: [phone]  Cell: [phone]  Fax # [phone]

## 2024-12-06 NOTE — SIGNIFICANT NOTE
12/06/24 1257   OTHER   Discipline physical therapist   Rehab Time/Intention   Session Not Performed patient unavailable for treatment  (Pt off unit for biopsy)   Therapy Assessment/Plan (PT)   Criteria for Skilled Interventions Met (PT) yes;skilled treatment is necessary   Recommendation   PT - Next Appointment 12/09/24

## 2024-12-06 NOTE — PROGRESS NOTES
Daily Progress Note          Assessment    Multifocal pneumonia  Hypoxemia  Human rhinovirus infection  BILL: Polysomnography 2019 AHI 78.3, titrated for auto BiPAP 6-16 with PS 4 cm H2O  Liver mass  CHF  CKD  HTN  HLD  GERD     PFTs/spirometry 8/31/2020: Normal, FEV1/FVC 84%, FVC 89%, FEV1 107%        Recommendations:  Respiratory status is gradually improving     antibiotic: Rocephin  Oxygen supplement and titration to maintain saturation 90 to 95%: Currently on room air alternating with 2 L per nasal cannula  Mucinex  Prednisone 20 mg daily  Encourage use of incentive spirometry well     Diuresis: Furosemide  Singulair     Oncology consulted for workup of the liver mass        I personally reviewed the radiological studies             LOS: 3 days     Subjective     Patient reports improvement in the cough and shortness of breath    Objective     Vital signs for last 24 hours:  Vitals:    12/06/24 0254 12/06/24 0300 12/06/24 0552 12/06/24 0838   BP:   135/64 132/60   BP Location:   Right arm Right arm   Patient Position:   Lying Lying   Pulse:   57 55   Resp:   17 15   Temp: 98.1 °F (36.7 °C)  97.4 °F (36.3 °C) 97.5 °F (36.4 °C)   TempSrc: Oral  Oral Axillary   SpO2:   93% 94%   Weight:  68.3 kg (150 lb 9.2 oz)     Height:           Intake/Output last 3 shifts:  I/O last 3 completed shifts:  In: 480 [P.O.:480]  Out: 1350 [Urine:1350]  Intake/Output this shift:  I/O this shift:  In: -   Out: 300 [Urine:300]      Radiology  Imaging Results (Last 24 Hours)       ** No results found for the last 24 hours. **            Labs:  Results from last 7 days   Lab Units 12/06/24  0055   WBC 10*3/mm3 11.79*   HEMOGLOBIN g/dL 12.1*   HEMATOCRIT % 39.5   PLATELETS 10*3/mm3 225     Results from last 7 days   Lab Units 12/06/24  0055 12/04/24  0133 12/03/24  0955   SODIUM mmol/L 137   < > 135*   POTASSIUM mmol/L 4.8   < > 4.5   CHLORIDE mmol/L 103   < > 98   CO2 mmol/L 23.8   < > 24.5   BUN mg/dL 30*   < > 23   CREATININE mg/dL  2.01*   < > 1.65*   CALCIUM mg/dL 9.2   < > 9.4   BILIRUBIN mg/dL  --   --  1.0   ALK PHOS U/L  --   --  83   ALT (SGPT) U/L  --   --  41   AST (SGOT) U/L  --   --  41*   GLUCOSE mg/dL 170*   < > 140*    < > = values in this interval not displayed.         Results from last 7 days   Lab Units 12/03/24  0955   ALBUMIN g/dL 3.6     Results from last 7 days   Lab Units 12/03/24  1148 12/03/24  0955   HSTROP T ng/L 49* 47*             Results from last 7 days   Lab Units 12/05/24  1238   INR  1.16*   APTT seconds 34.3               Meds:   SCHEDULE  atorvastatin, 80 mg, Oral, Nightly  cefTRIAXone, 2,000 mg, Intravenous, Q24H  cetirizine, 10 mg, Oral, Daily  cholecalciferol, 1,000 Units, Oral, Daily  empagliflozin, 12.5 mg, Oral, Daily  finasteride, 5 mg, Oral, Daily  furosemide, 20 mg, Oral, Daily  guaiFENesin, 1,200 mg, Oral, Q12H  insulin lispro, 2-7 Units, Subcutaneous, 4x Daily AC & at Bedtime  metoprolol succinate XL, 50 mg, Oral, Daily  montelukast, 10 mg, Oral, Nightly  pantoprazole, 40 mg, Oral, Q AM  predniSONE, 20 mg, Oral, Daily With Breakfast  sacubitril-valsartan, 1 tablet, Oral, Daily  sertraline, 50 mg, Oral, Daily      Infusions     PRNs    acetaminophen **OR** acetaminophen **OR** acetaminophen    albuterol sulfate HFA    aluminum-magnesium hydroxide-simethicone    senna-docusate sodium **AND** polyethylene glycol **AND** bisacodyl **AND** bisacodyl    Calcium Replacement - Follow Nurse / BPA Driven Protocol    dextrose    dextrose    glucagon (human recombinant)    ipratropium-albuterol    Magnesium Standard Dose Replacement - Follow Nurse / BPA Driven Protocol    nitroglycerin    Phosphorus Replacement - Follow Nurse / BPA Driven Protocol    Potassium Replacement - Follow Nurse / BPA Driven Protocol    [COMPLETED] Insert Peripheral IV **AND** sodium chloride    Physical Exam:  General Appearance:  Alert   HEENT:  Normocephalic, without obvious abnormality, Conjunctiva/corneas clear,.   Nares normal,  no drainage     Neck:  Supple, symmetrical, trachea midline.   Lungs /Chest wall: Improved air entry with mild bilateral basal rhonchi, respirations unlabored, symmetrical wall movement.     Heart:  Regular rate and rhythm, S1 S2 normal  Abdomen: Soft, non-tender, no masses, no organomegaly.    Extremities: No edema, no clubbing or cyanosis     ROS  Constitutional: Negative for chills, fever and malaise/fatigue.   HENT: Negative.    Eyes: Negative.    Cardiovascular: Negative.    Respiratory: Positive for cough and shortness of breath.    Skin: Negative.    Musculoskeletal: Negative.    Gastrointestinal: Negative.    Genitourinary: Negative.    Neurological: Generalized weakness      I reviewed the recent clinical results  I personally reviewed the latest radiological studies    Part of this note may be an electronic transcription/translation of spoken language to printed text using the Dragon Dictation System.

## 2024-12-06 NOTE — H&P
[facility]   Interventional Radiology H&P    Patient Name: Mikael Shanks  : 1935  MRN: [id number]  Primary Care Physician:  Xander Robison MD  Referring Physician: No Known Provider  Date of admission: 12/3/2024    Subjective   Subjective     HPI:  Mikael Shanks is a 89 y.o. male with multiple liver lesions.    Review of Systems:   Constitutional no fever,  no weight loss       Otolaryngeal no difficulty swallowing   Cardiovascular no chest pain   Pulmonary no cough, no sputum production   Gastrointestinal no constipation, no diarrhea                         Personal History       Past Medical/Surgical History:   Past Medical History:   Diagnosis Date    Carotid artery disease     Coronary artery disease     Diabetes mellitus     GERD (gastroesophageal reflux disease)     [place] (hard of hearing)     Hyperlipidemia     Hypertension     Osteoarthritis     Prostatic hypertrophy     Pulmonary valve disorder     Sleep apnea     cpap   doesnt use     Stroke      Past Surgical History:   Procedure Laterality Date    BACK SURGERY      CARDIAC CATHETERIZATION N/A 2019    Procedure: Left Heart Cath and coronary angiogram;  Surgeon: Dayday Ruiz MD;  Location: [facility] CATH INVASIVE LOCATION;  Service: Cardiovascular    CARDIAC CATHETERIZATION N/A 2019    Procedure: Right and Left Heart Cath;  Surgeon: Dayday Ruiz MD;  Location: [facility] CATH INVASIVE LOCATION;  Service: Cardiovascular    CARDIAC CATHETERIZATION N/A 2020    Procedure: Left and Right Heart Cath with Coronary Angiography;  Surgeon: Dayday Ruiz MD;  Location: [facility] CATH INVASIVE LOCATION;  Service: Cardiovascular;  Laterality: N/A;    CAROTID ENDARTERECTOMY Left     CHOLECYSTECTOMY      COLONOSCOPY  2018    No repeat    CORONARY ANGIOPLASTY WITH STENT PLACEMENT      LUMBAR LAMINECTOMY Bilateral 2022    Procedure: LUMBAR LAMINECTOMY WITH/WITHOUT FUSION L4-L5;  Surgeon: Geronimo Gonzales MD;  Location: [facility]  MAIN OR;  Service: Neurosurgery;  Laterality: Bilateral;       Social History:  reports that he quit smoking about 64 years ago. His smoking use included cigarettes. He started smoking about 71 years ago. He has a 35 pack-year smoking history. He has been exposed to tobacco smoke. He has never used smokeless tobacco. He reports that he does not currently use alcohol. He reports that he does not use drugs.    Medications:  Facility-Administered Medications Prior to Admission   Medication Dose Route Frequency Provider Last Rate Last Admin    cyanocobalamin injection 1,000 mcg  1,000 mcg Intramuscular Q28 Days Xander Robison MD   1,000 mcg at 11/26/24 1552     Medications Prior to Admission   Medication Sig Dispense Refill Last Dose/Taking    albuterol sulfate  (90 Base) MCG/ACT inhaler Inhale 2 puffs Every 4 (Four) Hours As Needed for Wheezing. 18 g 5 Past Week    atorvastatin (LIPITOR) 80 MG tablet Take 1 tablet by mouth every night at bedtime. 90 tablet 4 Past Week    B Complex Vitamins (VITAMIN B COMPLEX PO) Take 1 capsule by mouth Daily.   Past Week    Cholecalciferol 25 MCG (1000 UT) tablet Take 1 tablet by mouth Daily.   Past Week    empagliflozin (JARDIANCE) 25 MG tablet tablet Take 0.5 tablets by mouth Daily.   Past Week    enalapril (VASOTEC) 2.5 MG tablet TAKE 1 TABLET BY MOUTH DAILY 90 tablet 4 Past Week    finasteride (PROSCAR) 5 MG tablet Take 1 tablet by mouth Daily. 90 tablet 4 Past Week    fluticasone (FLONASE) 50 MCG/ACT nasal spray Administer 1 spray into the nostril(s) as directed by provider Daily.   Past Week    furosemide (LASIX) 40 MG tablet Take 0.5 tablets by mouth Daily.   Past Week    glipizide (glipiZIDE XL) 5 MG ER tablet Take 1 tablet by mouth Daily. 90 tablet 4 Past Week    lidocaine (LIDODERM) 5 % Place 1 patch on the skin as directed by provider Daily. Remove & Discard patch within 12 hours or as directed by MD   Past Week    loratadine (CLARITIN) 10 MG tablet Take 1 tablet  by mouth Daily.   Past Week    metoprolol succinate XL (TOPROL-XL) 50 MG 24 hr tablet Take 1 tablet by mouth Daily.   Past Week    montelukast (Singulair) 10 MG tablet Take 1 tablet by mouth Every Night.   Past Week    Multiple Vitamins-Minerals (PRESERVISION AREDS 2 PO) Take 1 Capful by mouth 2 (Two) Times a Day.   Past Week    omeprazole (priLOSEC) 40 MG capsule Take 1 capsule by mouth Daily. 90 capsule 4 Past Week    sacubitril-valsartan (Entresto) 24-26 MG tablet Take 1 tablet by mouth Daily.   Past Week    sertraline (ZOLOFT) 50 MG tablet Take 1 tablet by mouth Daily. 90 tablet 4 Past Week     Current medications:  atorvastatin, 80 mg, Oral, Nightly  cefTRIAXone, 2,000 mg, Intravenous, Q24H  cetirizine, 10 mg, Oral, Daily  cholecalciferol, 1,000 Units, Oral, Daily  empagliflozin, 12.5 mg, Oral, Daily  finasteride, 5 mg, Oral, Daily  furosemide, 20 mg, Oral, Daily  gelatin absorbable, , ,   guaiFENesin, 1,200 mg, Oral, Q12H  insulin lispro, 2-7 Units, Subcutaneous, 4x Daily AC & at Bedtime  metoprolol succinate XL, 50 mg, Oral, Daily  montelukast, 10 mg, Oral, Nightly  pantoprazole, 40 mg, Oral, Q AM  predniSONE, 20 mg, Oral, Daily With Breakfast  sacubitril-valsartan, 1 tablet, Oral, Daily  sertraline, 50 mg, Oral, Daily      Current IV drips:       Allergies:  Allergies   Allergen Reactions    Penicillins Hives       Objective    Objective     Vitals:   Temp:  [97.4 °F (36.3 °C)-98.1 °F (36.7 °C)] 97.5 °F (36.4 °C)  Heart Rate:  [55-66] 58  Resp:  [14-25] 25  BP: (110-146)/(54-64) 123/61  Flow (L/min) (Oxygen Therapy):  [0-2] 0      Physical Exam:   Constitutional: Awake, alert, No acute distress    Respiratory: Clear to auscultation bilaterally, nonlabored respirations    Cardiovascular: RRR, no murmurs, rubs, or gallops, palpable pedal pulses bilaterally   Gastrointestinal: Positive bowel sounds, soft, nontender, nondistended        ASA SCALE ASSESSMENT:  2-Mild to moderate systemic disease, medically well  "controlled, with no functional limitation    MALLAMPATI CLASSIFICATION:  2-Able to visualize the soft palate, fauces, uvula. The anterior & posterior tonsilar pillars are hidden by the tongue.       Result Review        Result Review:     Sodium   Date Value Ref Range Status   12/06/2024 137 136 - 145 mmol/L Final   12/05/2024 137 136 - 145 mmol/L Final   12/04/2024 139 136 - 145 mmol/L Final       Potassium   Date Value Ref Range Status   12/06/2024 4.8 3.5 - 5.2 mmol/L Final   12/05/2024 4.8 3.5 - 5.2 mmol/L Final   12/04/2024 4.4 3.5 - 5.2 mmol/L Final       Chloride   Date Value Ref Range Status   12/06/2024 103 98 - 107 mmol/L Final   12/05/2024 106 98 - 107 mmol/L Final   12/04/2024 104 98 - 107 mmol/L Final       No results found for: \"PLASMABICARB\"    BUN   Date Value Ref Range Status   12/06/2024 30 (H) 8 - 23 mg/dL Final   12/05/2024 24 (H) 8 - 23 mg/dL Final   12/04/2024 25 (H) 8 - 23 mg/dL Final       Creatinine   Date Value Ref Range Status   12/06/2024 2.01 (H) 0.76 - 1.27 mg/dL Final   12/05/2024 1.53 (H) 0.76 - 1.27 mg/dL Final   12/04/2024 1.70 (H) 0.76 - 1.27 mg/dL Final       Calcium   Date Value Ref Range Status   12/06/2024 9.2 8.6 - 10.5 mg/dL Final   12/05/2024 8.6 8.6 - 10.5 mg/dL Final   12/04/2024 8.8 8.6 - 10.5 mg/dL Final           No components found for: \"GLUCOSE.*\"  Results from last 7 days   Lab Units 12/06/24  0055   WBC 10*3/mm3 11.79*   HEMOGLOBIN g/dL 12.1*   HEMATOCRIT % 39.5   PLATELETS 10*3/mm3 225      Results from last 7 days   Lab Units 12/05/24  1238   INR  1.16*           Assessment / Plan     Assesment:   Multiple liver lesions.      Plan:   CT guided liver lesion biopsy.     The risks and benefits of the procedure were discussed with the patient.    Electronically signed by RAMONITA Lockwood, 12/06/24, 1:05 PM EST.  "

## 2024-12-06 NOTE — SIGNIFICANT NOTE
12/06/24 1339   OTHER   Discipline occupational therapist   Rehab Time/Intention   Session Not Performed other (see comments)  (LINDA for liver biopsy, will follow up as able)   Therapy Assessment/Plan (PT)   Criteria for Skilled Interventions Met (PT) yes;meets criteria;skilled treatment is necessary   Recommendation   OT - Next Appointment 12/09/24

## 2024-12-07 ENCOUNTER — APPOINTMENT (OUTPATIENT)
Dept: GENERAL RADIOLOGY | Facility: HOSPITAL | Age: 89
DRG: 193 | End: 2024-12-07
Payer: MEDICARE

## 2024-12-07 LAB
ANION GAP SERPL CALCULATED.3IONS-SCNC: 8.9 MMOL/L (ref 5–15)
BASOPHILS # BLD AUTO: 0.12 10*3/MM3 (ref 0–0.2)
BASOPHILS NFR BLD AUTO: 1.2 % (ref 0–1.5)
BUN SERPL-MCNC: 31 MG/DL (ref 8–23)
BUN/CREAT SERPL: 19.4 (ref 7–25)
CALCIUM SPEC-SCNC: 9.2 MG/DL (ref 8.6–10.5)
CHLORIDE SERPL-SCNC: 104 MMOL/L (ref 98–107)
CO2 SERPL-SCNC: 25.1 MMOL/L (ref 22–29)
CREAT SERPL-MCNC: 1.6 MG/DL (ref 0.76–1.27)
DEPRECATED RDW RBC AUTO: 51.8 FL (ref 37–54)
EGFRCR SERPLBLD CKD-EPI 2021: 40.9 ML/MIN/1.73
EOSINOPHIL # BLD AUTO: 0.34 10*3/MM3 (ref 0–0.4)
EOSINOPHIL NFR BLD AUTO: 3.5 % (ref 0.3–6.2)
ERYTHROCYTE [DISTWIDTH] IN BLOOD BY AUTOMATED COUNT: 14.9 % (ref 12.3–15.4)
GLUCOSE BLDC GLUCOMTR-MCNC: 115 MG/DL (ref 70–105)
GLUCOSE BLDC GLUCOMTR-MCNC: 132 MG/DL (ref 70–105)
GLUCOSE BLDC GLUCOMTR-MCNC: 144 MG/DL (ref 70–105)
GLUCOSE BLDC GLUCOMTR-MCNC: 180 MG/DL (ref 70–105)
GLUCOSE SERPL-MCNC: 161 MG/DL (ref 65–99)
HCT VFR BLD AUTO: 40.6 % (ref 37.5–51)
HGB BLD-MCNC: 12.1 G/DL (ref 13–17.7)
IMM GRANULOCYTES # BLD AUTO: 0.2 10*3/MM3 (ref 0–0.05)
IMM GRANULOCYTES NFR BLD AUTO: 2.1 % (ref 0–0.5)
LYMPHOCYTES # BLD AUTO: 1.38 10*3/MM3 (ref 0.7–3.1)
LYMPHOCYTES NFR BLD AUTO: 14.3 % (ref 19.6–45.3)
MCH RBC QN AUTO: 28 PG (ref 26.6–33)
MCHC RBC AUTO-ENTMCNC: 29.8 G/DL (ref 31.5–35.7)
MCV RBC AUTO: 94 FL (ref 79–97)
MONOCYTES # BLD AUTO: 0.69 10*3/MM3 (ref 0.1–0.9)
MONOCYTES NFR BLD AUTO: 7.1 % (ref 5–12)
NEUTROPHILS NFR BLD AUTO: 6.95 10*3/MM3 (ref 1.7–7)
NEUTROPHILS NFR BLD AUTO: 71.8 % (ref 42.7–76)
NRBC BLD AUTO-RTO: 0 /100 WBC (ref 0–0.2)
PLATELET # BLD AUTO: 238 10*3/MM3 (ref 140–450)
PMV BLD AUTO: 12.2 FL (ref 6–12)
POTASSIUM SERPL-SCNC: 4.6 MMOL/L (ref 3.5–5.2)
RBC # BLD AUTO: 4.32 10*6/MM3 (ref 4.14–5.8)
SODIUM SERPL-SCNC: 138 MMOL/L (ref 136–145)
WBC NRBC COR # BLD AUTO: 9.68 10*3/MM3 (ref 3.4–10.8)

## 2024-12-07 PROCEDURE — 25010000002 CEFTRIAXONE PER 250 MG: Performed by: INTERNAL MEDICINE

## 2024-12-07 PROCEDURE — 85025 COMPLETE CBC W/AUTO DIFF WBC: CPT | Performed by: HOSPITALIST

## 2024-12-07 PROCEDURE — 63710000001 INSULIN LISPRO (HUMAN) PER 5 UNITS

## 2024-12-07 PROCEDURE — 80048 BASIC METABOLIC PNL TOTAL CA: CPT | Performed by: HOSPITALIST

## 2024-12-07 PROCEDURE — 63710000001 PREDNISONE PER 1 MG: Performed by: INTERNAL MEDICINE

## 2024-12-07 PROCEDURE — 73502 X-RAY EXAM HIP UNI 2-3 VIEWS: CPT

## 2024-12-07 PROCEDURE — 97530 THERAPEUTIC ACTIVITIES: CPT

## 2024-12-07 PROCEDURE — 82948 REAGENT STRIP/BLOOD GLUCOSE: CPT

## 2024-12-07 RX ORDER — LIDOCAINE 4 G/G
1 PATCH TOPICAL
Status: DISCONTINUED | OUTPATIENT
Start: 2024-12-07 | End: 2024-12-12 | Stop reason: HOSPADM

## 2024-12-07 RX ORDER — HYDROCODONE BITARTRATE AND ACETAMINOPHEN 5; 325 MG/1; MG/1
1 TABLET ORAL EVERY 6 HOURS PRN
Status: DISCONTINUED | OUTPATIENT
Start: 2024-12-07 | End: 2024-12-11

## 2024-12-07 RX ADMIN — SERTRALINE HYDROCHLORIDE 50 MG: 50 TABLET, FILM COATED ORAL at 09:30

## 2024-12-07 RX ADMIN — METOPROLOL SUCCINATE 50 MG: 50 TABLET, EXTENDED RELEASE ORAL at 09:30

## 2024-12-07 RX ADMIN — MONTELUKAST 10 MG: 10 TABLET, FILM COATED ORAL at 20:39

## 2024-12-07 RX ADMIN — GUAIFENESIN 1200 MG: 600 TABLET, MULTILAYER, EXTENDED RELEASE ORAL at 20:38

## 2024-12-07 RX ADMIN — LIDOCAINE 1 PATCH: 4 PATCH TOPICAL at 18:19

## 2024-12-07 RX ADMIN — CETIRIZINE HYDROCHLORIDE 10 MG: 10 TABLET, FILM COATED ORAL at 09:30

## 2024-12-07 RX ADMIN — Medication 10 ML: at 20:39

## 2024-12-07 RX ADMIN — HYDROCODONE BITARTRATE AND ACETAMINOPHEN 1 TABLET: 5; 325 TABLET ORAL at 19:29

## 2024-12-07 RX ADMIN — SACUBITRIL AND VALSARTAN 1 TABLET: 24; 26 TABLET, FILM COATED ORAL at 09:30

## 2024-12-07 RX ADMIN — FINASTERIDE 5 MG: 5 TABLET, FILM COATED ORAL at 09:30

## 2024-12-07 RX ADMIN — ACETAMINOPHEN 650 MG: 325 TABLET, FILM COATED ORAL at 14:05

## 2024-12-07 RX ADMIN — PANTOPRAZOLE SODIUM 40 MG: 40 TABLET, DELAYED RELEASE ORAL at 05:29

## 2024-12-07 RX ADMIN — INSULIN LISPRO 2 UNITS: 100 INJECTION, SOLUTION INTRAVENOUS; SUBCUTANEOUS at 18:20

## 2024-12-07 RX ADMIN — Medication 10 ML: at 09:30

## 2024-12-07 RX ADMIN — ATORVASTATIN CALCIUM 80 MG: 40 TABLET, FILM COATED ORAL at 20:38

## 2024-12-07 RX ADMIN — CEFTRIAXONE SODIUM 2000 MG: 2 INJECTION, POWDER, FOR SOLUTION INTRAMUSCULAR; INTRAVENOUS at 14:38

## 2024-12-07 RX ADMIN — EMPAGLIFLOZIN 12.5 MG: 25 TABLET, FILM COATED ORAL at 09:30

## 2024-12-07 RX ADMIN — GUAIFENESIN 1200 MG: 600 TABLET, MULTILAYER, EXTENDED RELEASE ORAL at 09:30

## 2024-12-07 RX ADMIN — Medication 1000 UNITS: at 09:30

## 2024-12-07 RX ADMIN — PREDNISONE 20 MG: 20 TABLET ORAL at 09:30

## 2024-12-07 NOTE — PLAN OF CARE
Assessment: Mikael Shanks presents with functional mobility impairments which indicate the need for skilled intervention. Tolerating session today without incident. Pt with severe pain in R groin this date, reporting it is burning/stabbig and is at cath site from last month. Pt with inability to stand, and required assistance to perform SPS to bed. Pt reports pain improved when supine. Performed resistance assessment into hip flexion, abd and add without significant increase in pain. Will continue to follow and progress as tolerated.

## 2024-12-07 NOTE — THERAPY TREATMENT NOTE
"Subjective: Pt agreeable to therapeutic plan of care.    Objective:     Precautions - Falls    Bed mobility - Mod-A  Transfers - Max-A SPS to bed from chair. PT positioned in front of pt with gait belt.  Ambulation - 0 feet N/A or Not attempted. Severe pain with WB, unable to ambulate this date.    PT instructed to perform LE mobility, able to perform SLR, and no significant increase in pain with resisted mobiltiy into flexion abd or adduction.     Vitals: WNL    Pain: 10 VAS   Location: R groin  Intervention for pain: Repositioned    Education: Provided education on the importance of mobility in the acute care setting, Verbal/Tactile Cues, and Transfer Training    Assessment: Mikael Shanks presents with functional mobility impairments which indicate the need for skilled intervention. Tolerating session today without incident. Pt with severe pain in R groin this date, reporting it is burning/stabbig and is at cath site from last month. Pt with inability to stand, and required assistance to perform SPS to bed. Pt reports pain improved when supine. Performed resistance assessment into hip flexion, abd and add without significant increase in pain. Will continue to follow and progress as tolerated.     Plan/Recommendations:   If medically appropriate, Moderate Intensity Therapy recommended post-acute care. This is recommended as therapy feels the patient would require 3-4 days per week and wouldn't tolerate \"3 hour daily\" rehab intensity. SNF would be the preferred choice. If the patient does not agree to SNF, arrange HH or OP depending on home bound status. If patient is medically complex, consider LTACH. Pt requires no DME at discharge.     Pt desires Skilled Rehab placement at discharge. Pt cooperative; agreeable to therapeutic recommendations and plan of care.         Basic Mobility 6-click:  Rollin = Total, A lot = 2, A little = 3; 4 = None  Supine>Sit:   1 = Total, A lot = 2, A little = 3; 4 = None "   Sit>Stand with arms:  1 = Total, A lot = 2, A little = 3; 4 = None  Bed>Chair:   1 = Total, A lot = 2, A little = 3; 4 = None  Ambulate in room:  1 = Total, A lot = 2, A little = 3; 4 = None  3-5 Steps with railin = Total, A lot = 2, A little = 3; 4 = None  Score: 10    Modified Sophia: N/A = No pre-op stroke/TIA    Post-Tx Position: Supine with HOB Elevated and Staff Present  PPE: gloves and surgical mask    Therapy Charges for Today       Code Description Service Date Service Provider Modifiers Qty    59667562454 HC PT THERAPEUTIC ACT EA 15 MIN 2024 Lisandro Figueroa, PT GP 2           PT Charges       Row Name 24 1623             Time Calculation    Start Time 1406  -EL      Stop Time 1430  -EL      Time Calculation (min) 24 min  -EL      PT Received On 24  -EL      PT - Next Appointment 24  -EL         Time Calculation- PT    Total Timed Code Minutes- PT 24 minute(s)  -EL                User Key  (r) = Recorded By, (t) = Taken By, (c) = Cosigned By      Initials Name Provider Type    Lisandro León, PT Physical Therapist

## 2024-12-07 NOTE — PROGRESS NOTES
[facility] MEDICINE SERVICE  DAILY PROGRESS NOTE    NAME: Mikael Shanks  : 1935  MRN: [id number]      LOS: 4 days     PROVIDER OF SERVICE: Timothy Duane Brammell, MD    Chief Complaint: Multifocal pneumonia    Subjective:     Interval History:  History taken from: patient    Patient now with recurrence of his right hip pain.  Denies any pain in association with his liver biopsy.  Denies any abdominal pain.  Denies any bowel or urinary symptoms.  Denies any shortness of breath.  Denies any other additional acute issues.        Review of Systems:   Review of Systems   All other systems reviewed and are negative.      Objective:     Vital Signs  Temp:  [96.8 °F (36 °C)-97.5 °F (36.4 °C)] 97.5 °F (36.4 °C)  Heart Rate:  [60-63] 62  Resp:  [15-19] 17  BP: ()/(49-61) 109/53   Body mass index is 21.64 kg/m².    Physical Exam  Physical Exam  Vitals reviewed.   Constitutional:       Appearance: Normal appearance.   HENT:      Head: Normocephalic.   Cardiovascular:      Rate and Rhythm: Normal rate and regular rhythm.   Pulmonary:      Effort: Pulmonary effort is normal.      Breath sounds: Normal breath sounds.   Abdominal:      General: Bowel sounds are normal.      Palpations: Abdomen is soft.      Tenderness: There is no abdominal tenderness.   Musculoskeletal:         General: No swelling.   Neurological:      Mental Status: He is alert. Mental status is at baseline.            Diagnostic Data    Results from last 7 days   Lab Units 24  0542 24  0133 24  0955   WBC 10*3/mm3 9.68   < > 20.53*   HEMOGLOBIN g/dL 12.1*   < > 11.7*   HEMATOCRIT % 40.6   < > 37.6   PLATELETS 10*3/mm3 238   < > 167   GLUCOSE mg/dL 161*   < > 140*   CREATININE mg/dL 1.60*   < > 1.65*   BUN mg/dL 31*   < > 23   SODIUM mmol/L 138   < > 135*   POTASSIUM mmol/L 4.6   < > 4.5   AST (SGOT) U/L  --   --  41*   ALT (SGPT) U/L  --   --  41   ALK PHOS U/L  --   --  83   BILIRUBIN mg/dL  --   --  1.0   ANION GAP  mmol/L 8.9   < > 12.5    < > = values in this interval not displayed.       CT Needle Biopsy Liver    Result Date: 12/6/2024  Successful CT-guided liver lesion core biopsy. Report dictated by: Samuel Tavarez DNP  I have personally reviewed this case and agree with the findings above: Electronically Signed: Yovanny Chen MD  12/6/2024 4:25 PM EST  Workstation ID: [id number]           Assessment:    Right hip pain.   Acute hypoxemic respiratory failure  Rhinovirus    probable  pneumonia  Liver mass  Type 2 diabetes  Hard of hearing  History of coronary artery disease  Chronic diastolic congestive heart failure  Chronic kidney disease  Anemia    Plan.  Plain films of right hip.  Topical Lidoderm patches.  Nurses now state that family may have wishes for patient to be placed.  Case management investigating.      Active and Resolved Problems  Active Hospital Problems    Diagnosis  POA    **Multifocal pneumonia [J18.9]  Yes    Moderate protein-calorie malnutrition [E44.0]  Yes      Resolved Hospital Problems   No resolved problems to display.           VTE Prophylaxis:  Mechanical VTE prophylaxis orders are present.             Disposition Planning:     Barriers to Discharge:placement  Anticipated Date of Discharge: 12/10  Place of Discharge: snf      Time: 30 minutes     Code Status and Medical Interventions: No CPR (Do Not Attempt to Resuscitate); Limited Support; No intubation (DNI)   Ordered at: 12/06/24 1520     Medical Intervention Limits:    No intubation (DNI)     Level Of Support Discussed With:    Patient     Code Status (Patient has no pulse and is not breathing):    No CPR (Do Not Attempt to Resuscitate)     Medical Interventions (Patient has pulse or is breathing):    Limited Support       Signature: Electronically signed by Timothy Duane Brammell, MD, 12/07/24, 15:18 EST.  [facility] Hospitalist Team

## 2024-12-07 NOTE — PROGRESS NOTES
Daily Progress Note          Assessment    Multifocal pneumonia  Hypoxemia  Human rhinovirus infection  BILL: Polysomnography 2019 AHI 78.3, titrated for auto BiPAP 6-16 with PS 4 cm H2O  Liver mass  CHF  CKD  HTN  HLD  GERD     PFTs/spirometry 8/31/2020: Normal, FEV1/FVC 84%, FVC 89%, FEV1 107%        Recommendations:  Respiratory status is gradually improving     antibiotic: Completed Rocephin  Oxygen supplement and titration to maintain saturation 90 to 95%: Currently on room air   Mucinex  Prednisone 20 mg daily to complete 12/8/2024  Encourage use of incentive spirometry well     Diuresis: Furosemide  Singulair     Oncology consulted for workup of the liver mass, status post CT-guided biopsy        I personally reviewed the radiological studies             LOS: 4 days     Subjective     Patient reports improvement in the cough and shortness of breath    Objective     Vital signs for last 24 hours:  Vitals:    12/07/24 0159 12/07/24 0548 12/07/24 0930 12/07/24 0959   BP: 99/49 110/55 113/61 113/61   BP Location: Right arm Right arm  Right arm   Patient Position: Lying Lying  Lying   Pulse: 63 61 61 61   Resp: 19 17  16   Temp: 97.5 °F (36.4 °C) 97.1 °F (36.2 °C)  96.8 °F (36 °C)   TempSrc: Axillary Oral  Axillary   SpO2: 92% 91%  95%   Weight:  68.4 kg (150 lb 12.7 oz)     Height:           Intake/Output last 3 shifts:  I/O last 3 completed shifts:  In: 480 [P.O.:480]  Out: 1150 [Urine:1150]  Intake/Output this shift:  I/O this shift:  In: 240 [P.O.:240]  Out: -       Radiology  Imaging Results (Last 24 Hours)       Procedure Component Value Units Date/Time    CT Needle Biopsy Liver [[id number]] Collected: 12/06/24 1520    Specimen: Tissue Updated: 12/06/24 1627    Narrative:      DATE OF EXAM:  12/6/2024 12:57 PM EST    PROCEDURE:  CT NEEDLE BIOPSY LIVER    INDICATIONS:  liver mass    COMPARISON:  CT abdomen pelvis 12/3/2024    FLUOROSCOPIC TIME:  9.6 seconds    PHYSICIAN MONITORED CONSCIOUS SEDATION TIME:  21  minutes    TECHNIQUE:   A detailed explanation of the procedure, including the risks, benefits, and alternatives was provided. Informed consent was obtained from the patient. A preprocedure timeout was performed. The interventional radiology nurse monitored the patient all   times provided conscious sedation. The patient was placed supine on the CT table. A planning CT was done which demonstrated multiple liver lesions consistent with findings of CT abdomen pelvis 12/3/2024. The appropriate access site was marked, prepped   and draped using maximal sterile barrier technique. The skin and subcutaneous tissue was anesthetized 1% lidocaine. A small skin incision made. Next, utilizing CT fluoroscopic guidance, a 17-gauge coaxial needle was advanced to the liver lesion of   interest. From this position, a total of 4 18-gauge core biopsies were obtained and given the pathologist at bedside who noted the presence of atypical tissue. Following the final pass, a Gelfoam slurry was injected to the tract. The needle was removed   and hemostasis was achieved. A sterile bandage was applied. The patient tolerated the procedure well without immediate complication. The patient was transported back to the inpatient unit in stable condition.    FINDINGS:  See above      Impression:      Successful CT-guided liver lesion core biopsy.      Report dictated by: Samuel Tavarez DNP      I have personally reviewed this case and agree with the findings above:    Electronically Signed: Yovanny Chen MD    12/6/2024 4:25 PM EST    Workstation ID: [id number]            Labs:  Results from last 7 days   Lab Units 12/07/24  0542   WBC 10*3/mm3 9.68   HEMOGLOBIN g/dL 12.1*   HEMATOCRIT % 40.6   PLATELETS 10*3/mm3 238     Results from last 7 days   Lab Units 12/07/24  0542 12/04/24  0133 12/03/24  0955   SODIUM mmol/L 138   < > 135*   POTASSIUM mmol/L 4.6   < > 4.5   CHLORIDE mmol/L 104   < > 98   CO2 mmol/L 25.1   < > 24.5   BUN mg/dL 31*   < > 23    CREATININE mg/dL 1.60*   < > 1.65*   CALCIUM mg/dL 9.2   < > 9.4   BILIRUBIN mg/dL  --   --  1.0   ALK PHOS U/L  --   --  83   ALT (SGPT) U/L  --   --  41   AST (SGOT) U/L  --   --  41*   GLUCOSE mg/dL 161*   < > 140*    < > = values in this interval not displayed.         Results from last 7 days   Lab Units 12/03/24  0955   ALBUMIN g/dL 3.6     Results from last 7 days   Lab Units 12/03/24  1148 12/03/24  0955   HSTROP T ng/L 49* 47*             Results from last 7 days   Lab Units 12/05/24  1238   INR  1.16*   APTT seconds 34.3               Meds:   SCHEDULE  atorvastatin, 80 mg, Oral, Nightly  cefTRIAXone, 2,000 mg, Intravenous, Q24H  cetirizine, 10 mg, Oral, Daily  cholecalciferol, 1,000 Units, Oral, Daily  empagliflozin, 12.5 mg, Oral, Daily  finasteride, 5 mg, Oral, Daily  guaiFENesin, 1,200 mg, Oral, Q12H  insulin lispro, 2-7 Units, Subcutaneous, 4x Daily AC & at Bedtime  metoprolol succinate XL, 50 mg, Oral, Daily  montelukast, 10 mg, Oral, Nightly  pantoprazole, 40 mg, Oral, Q AM  predniSONE, 20 mg, Oral, Daily With Breakfast  sacubitril-valsartan, 1 tablet, Oral, Daily  sertraline, 50 mg, Oral, Daily      Infusions     PRNs    acetaminophen **OR** acetaminophen **OR** acetaminophen    albuterol sulfate HFA    aluminum-magnesium hydroxide-simethicone    senna-docusate sodium **AND** polyethylene glycol **AND** bisacodyl **AND** bisacodyl    Calcium Replacement - Follow Nurse / BPA Driven Protocol    dextrose    dextrose    glucagon (human recombinant)    ipratropium-albuterol    Magnesium Standard Dose Replacement - Follow Nurse / BPA Driven Protocol    nitroglycerin    Phosphorus Replacement - Follow Nurse / BPA Driven Protocol    Potassium Replacement - Follow Nurse / BPA Driven Protocol    [COMPLETED] Insert Peripheral IV **AND** sodium chloride    Physical Exam:  General Appearance:  Alert   HEENT:  Normocephalic, without obvious abnormality, Conjunctiva/corneas clear,.   Nares normal, no drainage      Neck:  Supple, symmetrical, trachea midline.   Lungs /Chest wall: Improved air entry with mild bilateral basal rhonchi, respirations unlabored, symmetrical wall movement.     Heart:  Regular rate and rhythm, S1 S2 normal  Abdomen: Soft, non-tender, no masses, no organomegaly.    Extremities: No edema, no clubbing or cyanosis     ROS  Constitutional: Negative for chills, fever and malaise/fatigue.   HENT: Hard of hearing.    Eyes: Negative.    Cardiovascular: Negative.    Respiratory: Positive for improving cough and shortness of breath.    Skin: Negative.    Musculoskeletal: Negative.    Gastrointestinal: Negative.    Genitourinary: Negative.    Neurological: Generalized weakness      I reviewed the recent clinical results  I personally reviewed the latest radiological studies    Part of this note may be an electronic transcription/translation of spoken language to printed text using the Dragon Dictation System.

## 2024-12-07 NOTE — PLAN OF CARE
Goal Outcome Evaluation:   Pt complained of rt groin pain at site of were he had a heart cath three weels ago. It was a 10 of 10 as he sat in the chair. Reported pain was worse than the RT groin pain that brought him to the ED initially. Physical therapy was need to transfer pt from chair back to bed due to intensity of the rt groin pain. MD was notified, ordered a hip X-ray, add PRN pain med and lidocaine patch. Will continue to monitor. Case management is still working with family for placement of the patient. Vitals stable.

## 2024-12-08 LAB
BACTERIA SPEC AEROBE CULT: NORMAL
BACTERIA SPEC AEROBE CULT: NORMAL
GLUCOSE BLDC GLUCOMTR-MCNC: 104 MG/DL (ref 70–105)
GLUCOSE BLDC GLUCOMTR-MCNC: 156 MG/DL (ref 70–105)
GLUCOSE BLDC GLUCOMTR-MCNC: 192 MG/DL (ref 70–105)
GLUCOSE BLDC GLUCOMTR-MCNC: 232 MG/DL (ref 70–105)

## 2024-12-08 PROCEDURE — 82948 REAGENT STRIP/BLOOD GLUCOSE: CPT

## 2024-12-08 PROCEDURE — 63710000001 INSULIN LISPRO (HUMAN) PER 5 UNITS

## 2024-12-08 PROCEDURE — 63710000001 PREDNISONE PER 1 MG: Performed by: INTERNAL MEDICINE

## 2024-12-08 RX ADMIN — FINASTERIDE 5 MG: 5 TABLET, FILM COATED ORAL at 08:06

## 2024-12-08 RX ADMIN — Medication 10 ML: at 21:29

## 2024-12-08 RX ADMIN — Medication 1000 UNITS: at 08:06

## 2024-12-08 RX ADMIN — ATORVASTATIN CALCIUM 80 MG: 40 TABLET, FILM COATED ORAL at 21:29

## 2024-12-08 RX ADMIN — PANTOPRAZOLE SODIUM 40 MG: 40 TABLET, DELAYED RELEASE ORAL at 05:15

## 2024-12-08 RX ADMIN — INSULIN LISPRO 2 UNITS: 100 INJECTION, SOLUTION INTRAVENOUS; SUBCUTANEOUS at 21:29

## 2024-12-08 RX ADMIN — METOPROLOL SUCCINATE 50 MG: 50 TABLET, EXTENDED RELEASE ORAL at 08:06

## 2024-12-08 RX ADMIN — INSULIN LISPRO 3 UNITS: 100 INJECTION, SOLUTION INTRAVENOUS; SUBCUTANEOUS at 17:49

## 2024-12-08 RX ADMIN — EMPAGLIFLOZIN 12.5 MG: 25 TABLET, FILM COATED ORAL at 08:06

## 2024-12-08 RX ADMIN — Medication 10 ML: at 08:06

## 2024-12-08 RX ADMIN — LIDOCAINE 1 PATCH: 4 PATCH TOPICAL at 17:48

## 2024-12-08 RX ADMIN — PREDNISONE 20 MG: 20 TABLET ORAL at 08:06

## 2024-12-08 RX ADMIN — MONTELUKAST 10 MG: 10 TABLET, FILM COATED ORAL at 21:29

## 2024-12-08 RX ADMIN — GUAIFENESIN 1200 MG: 600 TABLET, MULTILAYER, EXTENDED RELEASE ORAL at 08:06

## 2024-12-08 RX ADMIN — INSULIN LISPRO 2 UNITS: 100 INJECTION, SOLUTION INTRAVENOUS; SUBCUTANEOUS at 12:07

## 2024-12-08 RX ADMIN — GUAIFENESIN 1200 MG: 600 TABLET, MULTILAYER, EXTENDED RELEASE ORAL at 21:29

## 2024-12-08 RX ADMIN — CETIRIZINE HYDROCHLORIDE 10 MG: 10 TABLET, FILM COATED ORAL at 08:06

## 2024-12-08 RX ADMIN — SERTRALINE HYDROCHLORIDE 50 MG: 50 TABLET, FILM COATED ORAL at 08:06

## 2024-12-08 RX ADMIN — SACUBITRIL AND VALSARTAN 1 TABLET: 24; 26 TABLET, FILM COATED ORAL at 08:06

## 2024-12-08 NOTE — PROGRESS NOTES
Daily Progress Note          Assessment    Multifocal pneumonia  Hypoxemia  Human rhinovirus infection  BILL: Polysomnography 2019 AHI 78.3, titrated for auto BiPAP 6-16 with PS 4 cm H2O  Liver mass  CHF  CKD  HTN  HLD  GERD     PFTs/spirometry 8/31/2020: Normal, FEV1/FVC 84%, FVC 89%, FEV1 107%        Recommendations:  Respiratory status is gradually improving can be discharged from pulmonary standpoint follow-up in the office in 3 weeks    antibiotic: Completed Rocephin  Oxygen supplement and titration to maintain saturation 90 to 95%: Currently on room air   Mucinex  Prednisone 20 mg daily completed  Encourage use of incentive spirometry     Singulair     Oncology consulted for workup of the liver mass, status post CT-guided biopsy        I personally reviewed the radiological studies             LOS: 5 days     Subjective     Patient reports improvement in the cough and shortness of breath    Objective     Vital signs for last 24 hours:  Vitals:    12/07/24 2036 12/08/24 0118 12/08/24 0531 12/08/24 0907   BP: 111/56 117/50 151/63 142/56   BP Location: Right arm Right arm Right arm Right arm   Patient Position: Lying Lying Lying Sitting   Pulse: 69 62 60 54   Resp: 20 19 19 16   Temp: 97.6 °F (36.4 °C) 97.4 °F (36.3 °C) 97.3 °F (36.3 °C) 97.5 °F (36.4 °C)   TempSrc: Axillary Oral Oral Temporal   SpO2: 94% 93% 92% 94%   Weight:   67.9 kg (149 lb 11.1 oz)    Height:           Intake/Output last 3 shifts:  I/O last 3 completed shifts:  In: 960 [P.O.:960]  Out: 700 [Urine:700]  Intake/Output this shift:  I/O this shift:  In: -   Out: 550 [Urine:550]      Radiology  Imaging Results (Last 24 Hours)       Procedure Component Value Units Date/Time    XR Hip With or Without Pelvis 2 - 3 View Right [[id number]] Collected: 12/07/24 1603     Updated: 12/07/24 1606    Narrative:      XR HIP W OR WO PELVIS 2-3 VIEW RIGHT    Date of Exam: 12/7/2024 3:34 PM EST    Indication: pain right groin pain.    Comparison: CT abdomen pelvis  12/3/2024    Findings:  Osteopenia. No fractures on radiograph. Severe arthritis of the hips left greater than right. Limited evaluation sacrum due to overlying bowel. Vascular calcifications. No aggressive osseous lesions.      Impression:      Impression:  No fractures or dislocations on radiograph. Osteopenia. 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 If continued pain or difficulty weightbearing, MRI of the pelvis is recommended for better evaluation.      Electronically Signed: Hazel Brock MD    12/7/2024 4:04 PM EST    Workstation ID: [id number]            Labs:  Results from last 7 days   Lab Units 12/07/24  0542   WBC 10*3/mm3 9.68   HEMOGLOBIN g/dL 12.1*   HEMATOCRIT % 40.6   PLATELETS 10*3/mm3 238     Results from last 7 days   Lab Units 12/07/24  0542 12/04/24  0133 12/03/24  0955   SODIUM mmol/L 138   < > 135*   POTASSIUM mmol/L 4.6   < > 4.5   CHLORIDE mmol/L 104   < > 98   CO2 mmol/L 25.1   < > 24.5   BUN mg/dL 31*   < > 23   CREATININE mg/dL 1.60*   < > 1.65*   CALCIUM mg/dL 9.2   < > 9.4   BILIRUBIN mg/dL  --   --  1.0   ALK PHOS U/L  --   --  83   ALT (SGPT) U/L  --   --  41   AST (SGOT) U/L  --   --  41*   GLUCOSE mg/dL 161*   < > 140*    < > = values in this interval not displayed.         Results from last 7 days   Lab Units 12/03/24  0955   ALBUMIN g/dL 3.6     Results from last 7 days   Lab Units 12/03/24  1148 12/03/24  0955   HSTROP T ng/L 49* 47*             Results from last 7 days   Lab Units 12/05/24  1238   INR  1.16*   APTT seconds 34.3               Meds:   SCHEDULE  atorvastatin, 80 mg, Oral, Nightly  cetirizine, 10 mg, Oral, Daily  cholecalciferol, 1,000 Units, Oral, Daily  empagliflozin, 12.5 mg, Oral, Daily  finasteride, 5 mg, Oral, Daily  guaiFENesin, 1,200 mg, Oral, Q12H  insulin lispro, 2-7 Units, Subcutaneous, 4x Daily AC & at Bedtime  Lidocaine, 1 patch, Transdermal, Q24H  metoprolol succinate XL, 50 mg, Oral, Daily  montelukast, 10 mg, Oral,  Nightly  pantoprazole, 40 mg, Oral, Q AM  sacubitril-valsartan, 1 tablet, Oral, Daily  sertraline, 50 mg, Oral, Daily      Infusions     PRNs    acetaminophen **OR** acetaminophen **OR** acetaminophen    albuterol sulfate HFA    aluminum-magnesium hydroxide-simethicone    senna-docusate sodium **AND** polyethylene glycol **AND** bisacodyl **AND** bisacodyl    Calcium Replacement - Follow Nurse / BPA Driven Protocol    dextrose    dextrose    glucagon (human recombinant)    HYDROcodone-acetaminophen    ipratropium-albuterol    Magnesium Standard Dose Replacement - Follow Nurse / BPA Driven Protocol    nitroglycerin    Phosphorus Replacement - Follow Nurse / BPA Driven Protocol    Potassium Replacement - Follow Nurse / BPA Driven Protocol    [COMPLETED] Insert Peripheral IV **AND** sodium chloride    Physical Exam:  General Appearance:  Alert   HEENT:  Normocephalic, without obvious abnormality, Conjunctiva/corneas clear,.   Nares normal, no drainage     Neck:  Supple, symmetrical, trachea midline.   Lungs /Chest wall: Improved air entry with mild bilateral basal rhonchi, respirations unlabored, symmetrical wall movement.     Heart:  Regular rate and rhythm, S1 S2 normal  Abdomen: Soft, non-tender, no masses, no organomegaly.    Extremities: No edema, no clubbing or cyanosis     ROS  Constitutional: Negative for chills, fever and malaise/fatigue.   HENT: Hard of hearing.    Eyes: Negative.    Cardiovascular: Negative.    Respiratory: Positive for improving cough and shortness of breath.    Skin: Negative.    Musculoskeletal: Negative.    Gastrointestinal: Negative.    Genitourinary: Negative.    Neurological: Generalized weakness      I reviewed the recent clinical results  I personally reviewed the latest radiological studies    Part of this note may be an electronic transcription/translation of spoken language to printed text using the Dragon Dictation System.

## 2024-12-08 NOTE — PLAN OF CARE
Goal Outcome Evaluation:  Plan of Care Reviewed With: patient  Pt resting in bed throughout the night, c/o's of pain x1 this shift, treated with prn norco with positive effect voiced per pt, has remained on room air all shift, resp remain even and unlabored, plan of care ongoing

## 2024-12-08 NOTE — PROGRESS NOTES
[facility] MEDICINE SERVICE  DAILY PROGRESS NOTE    NAME: Mikael Shanks  : 1935  MRN: [id number]      LOS: 5 days     PROVIDER OF SERVICE: Timothy Duane Brammell, MD    Chief Complaint: Multifocal pneumonia    Subjective:     Interval History:  History taken from: patient    Patient with pain that he feels is at the site of his recent catheterization.  It appears to be his right hip.  It is somewhat positional at times.  It is only been recent in onset.  It is now resolved.  It seems to recur at times.  He denies any shortness of breath.  He is eating without issue.  He denies any other pain complaints.  Apparently there is some discussion about his discharge and whether or not he can return home.  He has lived independently prior.        Review of Systems:   Review of Systems   All other systems reviewed and are negative.      Objective:     Vital Signs  Temp:  [97.3 °F (36.3 °C)-97.6 °F (36.4 °C)] 97.5 °F (36.4 °C)  Heart Rate:  [54-69] 54  Resp:  [16-20] 16  BP: (109-151)/(50-63) 142/56   Body mass index is 21.48 kg/m².    Physical Exam  Physical Exam  Vitals reviewed.   Constitutional:       General: He is not in acute distress.     Appearance: Normal appearance.   HENT:      Head: Normocephalic.   Cardiovascular:      Rate and Rhythm: Normal rate and regular rhythm.   Pulmonary:      Effort: Pulmonary effort is normal.      Breath sounds: Normal breath sounds.   Abdominal:      General: Bowel sounds are normal.      Palpations: Abdomen is soft.      Tenderness: There is no abdominal tenderness.   Musculoskeletal:         General: No swelling.      Comments: Right groin without abnormalities.   Neurological:      Mental Status: He is alert.            Diagnostic Data    Results from last 7 days   Lab Units 24  0542 24  0133 24  0955   WBC 10*3/mm3 9.68   < > 20.53*   HEMOGLOBIN g/dL 12.1*   < > 11.7*   HEMATOCRIT % 40.6   < > 37.6   PLATELETS 10*3/mm3 238   < > 167   GLUCOSE  mg/dL 161*   < > 140*   CREATININE mg/dL 1.60*   < > 1.65*   BUN mg/dL 31*   < > 23   SODIUM mmol/L 138   < > 135*   POTASSIUM mmol/L 4.6   < > 4.5   AST (SGOT) U/L  --   --  41*   ALT (SGPT) U/L  --   --  41   ALK PHOS U/L  --   --  83   BILIRUBIN mg/dL  --   --  1.0   ANION GAP mmol/L 8.9   < > 12.5    < > = values in this interval not displayed.       XR Hip With or Without Pelvis 2 - 3 View Right    Result Date: 12/7/2024  Impression: No fractures or dislocations on radiograph. Osteopenia. 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 If continued pain or difficulty weightbearing, MRI of the pelvis is recommended for better evaluation. Electronically Signed: Hazel Brock MD  12/7/2024 4:04 PM EST  Workstation ID: [id number]    CT Needle Biopsy Liver    Result Date: 12/6/2024  Successful CT-guided liver lesion core biopsy. Report dictated by: Samuel Tavarez DNP  I have personally reviewed this case and agree with the findings above: Electronically Signed: Yovanny Chen MD  12/6/2024 4:25 PM EST  Workstation ID: [id number]           Assessment:    Acute hypoxemic respiratory failure  Rhinovirus    probable  pneumonia  Liver mass/elevated alpha-fetoprotein  Type 2 diabetes  Hard of hearing  History of coronary artery disease  Chronic diastolic congestive heart failure  Chronic kidney disease  Anemia  Right hip/groin pain       Plan.  Awaiting return of liver biopsy.  Case management investigating patient's ability to be discharged to his prior living situation.  Patient states that he is going to return home alone.  Follow-up blood work.      Active and Resolved Problems  Active Hospital Problems    Diagnosis  POA    **Multifocal pneumonia [J18.9]  Yes    Moderate protein-calorie malnutrition [E44.0]  Yes      Resolved Hospital Problems   No resolved problems to display.           VTE Prophylaxis:  Mechanical VTE prophylaxis orders are present.             Disposition Planning:     Barriers to  Discharge:discharge planning and Bx results  Anticipated Date of Discharge: 12/9  Place of Discharge: rehab vs home      Time: 30 minutes     Code Status and Medical Interventions: No CPR (Do Not Attempt to Resuscitate); Limited Support; No intubation (DNI)   Ordered at: 12/06/24 1520     Medical Intervention Limits:    No intubation (DNI)     Level Of Support Discussed With:    Patient     Code Status (Patient has no pulse and is not breathing):    No CPR (Do Not Attempt to Resuscitate)     Medical Interventions (Patient has pulse or is breathing):    Limited Support       Signature: Electronically signed by Timothy Duane Brammell, MD, 12/08/24, 10:05 EST.  [facility] Hospitalist Team

## 2024-12-08 NOTE — CASE MANAGEMENT/SOCIAL WORK
Continued Stay Note  [facility]     Patient Name: Mikael Shanks  MRN: [id number]  Today's Date: 12/8/2024    Admit Date: 12/3/2024    Plan: Home health vs. Palliative care.   Discharge Plan       Row Name 12/08/24 1824       Plan    Plan Home health vs. Palliative care.    Plan Comments Met with pt at bedside. Pt adamantly refusing SNF. May be agreeable to home health, but undecided at this time. Palliative care was also discussed. DC barriers: pain management, hip xrays     Lydia Basilio RN BSN  Weekend   [facility]  Phone: [phone]  Fax: [phone]

## 2024-12-09 LAB
ANION GAP SERPL CALCULATED.3IONS-SCNC: 9 MMOL/L (ref 5–15)
BASOPHILS # BLD AUTO: 0.15 10*3/MM3 (ref 0–0.2)
BASOPHILS NFR BLD AUTO: 1.2 % (ref 0–1.5)
BUN SERPL-MCNC: 33 MG/DL (ref 8–23)
BUN/CREAT SERPL: 19.6 (ref 7–25)
CALCIUM SPEC-SCNC: 9.3 MG/DL (ref 8.6–10.5)
CHLORIDE SERPL-SCNC: 104 MMOL/L (ref 98–107)
CO2 SERPL-SCNC: 27 MMOL/L (ref 22–29)
CREAT SERPL-MCNC: 1.68 MG/DL (ref 0.76–1.27)
DEPRECATED RDW RBC AUTO: 49.9 FL (ref 37–54)
EGFRCR SERPLBLD CKD-EPI 2021: 38.6 ML/MIN/1.73
EOSINOPHIL # BLD AUTO: 0.42 10*3/MM3 (ref 0–0.4)
EOSINOPHIL NFR BLD AUTO: 3.2 % (ref 0.3–6.2)
ERYTHROCYTE [DISTWIDTH] IN BLOOD BY AUTOMATED COUNT: 14.9 % (ref 12.3–15.4)
GLUCOSE BLDC GLUCOMTR-MCNC: 112 MG/DL (ref 70–105)
GLUCOSE BLDC GLUCOMTR-MCNC: 140 MG/DL (ref 70–105)
GLUCOSE BLDC GLUCOMTR-MCNC: 154 MG/DL (ref 70–105)
GLUCOSE BLDC GLUCOMTR-MCNC: 190 MG/DL (ref 70–105)
GLUCOSE SERPL-MCNC: 109 MG/DL (ref 65–99)
HCT VFR BLD AUTO: 38.9 % (ref 37.5–51)
HGB BLD-MCNC: 12 G/DL (ref 13–17.7)
IMM GRANULOCYTES # BLD AUTO: 0.53 10*3/MM3 (ref 0–0.05)
IMM GRANULOCYTES NFR BLD AUTO: 4.1 % (ref 0–0.5)
LAB AP CASE REPORT: NORMAL
LYMPHOCYTES # BLD AUTO: 1.65 10*3/MM3 (ref 0.7–3.1)
LYMPHOCYTES NFR BLD AUTO: 12.7 % (ref 19.6–45.3)
Lab: NORMAL
MCH RBC QN AUTO: 28.5 PG (ref 26.6–33)
MCHC RBC AUTO-ENTMCNC: 30.8 G/DL (ref 31.5–35.7)
MCV RBC AUTO: 92.4 FL (ref 79–97)
MONOCYTES # BLD AUTO: 0.86 10*3/MM3 (ref 0.1–0.9)
MONOCYTES NFR BLD AUTO: 6.6 % (ref 5–12)
NEUTROPHILS NFR BLD AUTO: 72.2 % (ref 42.7–76)
NEUTROPHILS NFR BLD AUTO: 9.43 10*3/MM3 (ref 1.7–7)
NRBC BLD AUTO-RTO: 0 /100 WBC (ref 0–0.2)
PATH REPORT.FINAL DX SPEC: NORMAL
PATH REPORT.GROSS SPEC: NORMAL
PLATELET # BLD AUTO: 256 10*3/MM3 (ref 140–450)
PMV BLD AUTO: 11.5 FL (ref 6–12)
POTASSIUM SERPL-SCNC: 4.5 MMOL/L (ref 3.5–5.2)
PROCALCITONIN SERPL-MCNC: 0.26 NG/ML (ref 0–0.25)
RBC # BLD AUTO: 4.21 10*6/MM3 (ref 4.14–5.8)
SODIUM SERPL-SCNC: 140 MMOL/L (ref 136–145)
T4 FREE SERPL-MCNC: 1.14 NG/DL (ref 0.93–1.7)
TSH SERPL DL<=0.05 MIU/L-ACNC: 6.46 UIU/ML (ref 0.27–4.2)
WBC NRBC COR # BLD AUTO: 13.04 10*3/MM3 (ref 3.4–10.8)

## 2024-12-09 PROCEDURE — 84443 ASSAY THYROID STIM HORMONE: CPT | Performed by: STUDENT IN AN ORGANIZED HEALTH CARE EDUCATION/TRAINING PROGRAM

## 2024-12-09 PROCEDURE — 85025 COMPLETE CBC W/AUTO DIFF WBC: CPT | Performed by: HOSPITALIST

## 2024-12-09 PROCEDURE — 82948 REAGENT STRIP/BLOOD GLUCOSE: CPT

## 2024-12-09 PROCEDURE — 97530 THERAPEUTIC ACTIVITIES: CPT

## 2024-12-09 PROCEDURE — 99232 SBSQ HOSP IP/OBS MODERATE 35: CPT | Performed by: INTERNAL MEDICINE

## 2024-12-09 PROCEDURE — 97116 GAIT TRAINING THERAPY: CPT

## 2024-12-09 PROCEDURE — 84439 ASSAY OF FREE THYROXINE: CPT | Performed by: STUDENT IN AN ORGANIZED HEALTH CARE EDUCATION/TRAINING PROGRAM

## 2024-12-09 PROCEDURE — 97110 THERAPEUTIC EXERCISES: CPT

## 2024-12-09 PROCEDURE — 80048 BASIC METABOLIC PNL TOTAL CA: CPT | Performed by: HOSPITALIST

## 2024-12-09 PROCEDURE — 63710000001 INSULIN LISPRO (HUMAN) PER 5 UNITS

## 2024-12-09 PROCEDURE — 97112 NEUROMUSCULAR REEDUCATION: CPT

## 2024-12-09 PROCEDURE — 84145 PROCALCITONIN (PCT): CPT | Performed by: HOSPITALIST

## 2024-12-09 RX ORDER — CARVEDILOL 3.12 MG/1
3.12 TABLET ORAL 2 TIMES DAILY WITH MEALS
Status: DISCONTINUED | OUTPATIENT
Start: 2024-12-09 | End: 2024-12-10

## 2024-12-09 RX ADMIN — CETIRIZINE HYDROCHLORIDE 10 MG: 10 TABLET, FILM COATED ORAL at 08:46

## 2024-12-09 RX ADMIN — INSULIN LISPRO 2 UNITS: 100 INJECTION, SOLUTION INTRAVENOUS; SUBCUTANEOUS at 21:04

## 2024-12-09 RX ADMIN — FINASTERIDE 5 MG: 5 TABLET, FILM COATED ORAL at 09:02

## 2024-12-09 RX ADMIN — CARVEDILOL 3.12 MG: 3.12 TABLET, FILM COATED ORAL at 18:01

## 2024-12-09 RX ADMIN — MONTELUKAST 10 MG: 10 TABLET, FILM COATED ORAL at 21:04

## 2024-12-09 RX ADMIN — PANTOPRAZOLE SODIUM 40 MG: 40 TABLET, DELAYED RELEASE ORAL at 05:53

## 2024-12-09 RX ADMIN — LIDOCAINE 1 PATCH: 4 PATCH TOPICAL at 18:01

## 2024-12-09 RX ADMIN — GUAIFENESIN 1200 MG: 600 TABLET, MULTILAYER, EXTENDED RELEASE ORAL at 08:46

## 2024-12-09 RX ADMIN — SACUBITRIL AND VALSARTAN 1 TABLET: 24; 26 TABLET, FILM COATED ORAL at 08:46

## 2024-12-09 RX ADMIN — BISACODYL 5 MG: 5 TABLET, COATED ORAL at 21:04

## 2024-12-09 RX ADMIN — INSULIN LISPRO 2 UNITS: 100 INJECTION, SOLUTION INTRAVENOUS; SUBCUTANEOUS at 18:02

## 2024-12-09 RX ADMIN — Medication 1000 UNITS: at 08:46

## 2024-12-09 RX ADMIN — METOPROLOL SUCCINATE 50 MG: 50 TABLET, EXTENDED RELEASE ORAL at 08:46

## 2024-12-09 RX ADMIN — Medication 10 ML: at 21:04

## 2024-12-09 RX ADMIN — SERTRALINE HYDROCHLORIDE 50 MG: 50 TABLET, FILM COATED ORAL at 08:46

## 2024-12-09 RX ADMIN — Medication 10 ML: at 08:47

## 2024-12-09 RX ADMIN — ATORVASTATIN CALCIUM 80 MG: 40 TABLET, FILM COATED ORAL at 21:04

## 2024-12-09 RX ADMIN — GUAIFENESIN 1200 MG: 600 TABLET, MULTILAYER, EXTENDED RELEASE ORAL at 21:04

## 2024-12-09 RX ADMIN — EMPAGLIFLOZIN 12.5 MG: 25 TABLET, FILM COATED ORAL at 08:46

## 2024-12-09 NOTE — TREATMENT PLAN
Objective:     Precautions - Falls    Bed mobility - Min-A Pulled up from therapist hand.   Transfers - Supervision and with rolling walker  Ambulation - 60 feet, and 20 feet SBA and with rolling walker    Therapeutic Exercise -    Unsupported seated trunk TE reaching outside AZUL and across midline    Balance - Standing dynamic balance challenge with single UE support reaching outside AZUL and across midline with SBA. Demo's mild instability although no overt LOB this date.     Education: Provided education on the importance of mobility in the acute care setting, Verbal/Tactile Cues, Transfer Training, Gait Training, and Energy conservation strategies    Assessment: Mikael Shanks presents with functional mobility impairments which indicate the need for skilled intervention. Pt demo's improved mobility this date although still recommending rehab at discharge due to being below baseline. Pt states he wants to go home and will think about considering  PT. Tolerating session today without incident. Will continue to follow and progress as tolerated.

## 2024-12-09 NOTE — PROGRESS NOTES
[facility] MEDICINE SERVICE  DAILY PROGRESS NOTE    NAME: Mikael Shanks  : 1935  MRN: [id number]      LOS: 6 days     PROVIDER OF SERVICE: Uday Andrews MD    Chief Complaint: Multifocal pneumonia    Subjective:     Interval History:  History taken from: patient and nurse    No acute overnight events.  Patient is hard of hearing, denies any chest pain, shortness of breath improving per patient, currently on low-flow nasal cannula 1 to 2 L, denies any fever or chills overnight.    Review of Systems:   Review of Systems    Objective:     Vital Signs  Temp:  [97 °F (36.1 °C)-97.5 °F (36.4 °C)] 97.4 °F (36.3 °C)  Heart Rate:  [54-73] 73  Resp:  [12-30] 30  BP: (118-164)/(53-68) 164/68  Flow (L/min) (Oxygen Therapy):  [1] 1   Body mass index is 21.64 kg/m².    Physical Exam  General Appearance:  Awake, alert   Head:  Atraumatic normocephalic       Neck: Range of motion normal my exam   Pulm: Decreased breath sounds at bases, no wheezes   Cardio: Moderate, rhythm regular on my exam   Extremities: No significant edema of the bilateral lower extremities   Abdomen: Soft, nontender                           Scheduled Meds   atorvastatin, 80 mg, Oral, Nightly  cetirizine, 10 mg, Oral, Daily  cholecalciferol, 1,000 Units, Oral, Daily  empagliflozin, 12.5 mg, Oral, Daily  finasteride, 5 mg, Oral, Daily  guaiFENesin, 1,200 mg, Oral, Q12H  insulin lispro, 2-7 Units, Subcutaneous, 4x Daily AC & at Bedtime  Lidocaine, 1 patch, Transdermal, Q24H  metoprolol succinate XL, 50 mg, Oral, Daily  montelukast, 10 mg, Oral, Nightly  pantoprazole, 40 mg, Oral, Q AM  sacubitril-valsartan, 1 tablet, Oral, Daily  sertraline, 50 mg, Oral, Daily       PRN Meds     acetaminophen **OR** acetaminophen **OR** acetaminophen    albuterol sulfate HFA    aluminum-magnesium hydroxide-simethicone    senna-docusate sodium **AND** polyethylene glycol **AND** bisacodyl **AND** bisacodyl    Calcium Replacement - Follow Nurse / BPA  Driven Protocol    dextrose    dextrose    glucagon (human recombinant)    HYDROcodone-acetaminophen    ipratropium-albuterol    Magnesium Standard Dose Replacement - Follow Nurse / BPA Driven Protocol    nitroglycerin    Phosphorus Replacement - Follow Nurse / BPA Driven Protocol    Potassium Replacement - Follow Nurse / BPA Driven Protocol    [COMPLETED] Insert Peripheral IV **AND** sodium chloride   Infusions         Diagnostic Data    Results from last 7 days   Lab Units 12/09/24  0355 12/04/24  0133 12/03/24  0955   WBC 10*3/mm3 13.04*   < > 20.53*   HEMOGLOBIN g/dL 12.0*   < > 11.7*   HEMATOCRIT % 38.9   < > 37.6   PLATELETS 10*3/mm3 256   < > 167   GLUCOSE mg/dL 109*   < > 140*   CREATININE mg/dL 1.68*   < > 1.65*   BUN mg/dL 33*   < > 23   SODIUM mmol/L 140   < > 135*   POTASSIUM mmol/L 4.5   < > 4.5   AST (SGOT) U/L  --   --  41*   ALT (SGPT) U/L  --   --  41   ALK PHOS U/L  --   --  83   BILIRUBIN mg/dL  --   --  1.0   ANION GAP mmol/L 9.0   < > 12.5    < > = values in this interval not displayed.       XR Hip With or Without Pelvis 2 - 3 View Right    Result Date: 12/7/2024  Impression: No fractures or dislocations on radiograph. Osteopenia. 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 If continued pain or difficulty weightbearing, MRI of the pelvis is recommended for better evaluation. Electronically Signed: Hazel Brock MD  12/7/2024 4:04 PM EST  Workstation ID: [id number]       I reviewed the patient's new clinical results.    Assessment/Plan:   Mikael Shanks is a 89 y.o. male with a CMH of CAD, diabetes, GERD, hyperlipidemia, hypertension who presented to [facility] on 12/3/2024 with shortness of breath.  Patient states he came to the hospital for shortness of breath and right groin pain.  Patient is very hard of hearing and history was limited due to patient not having his hearing aids.  Patient gave permission to speak with his daughter, Sadia.  Sadia stated that the patient  had a recent admission at the [facility] where they were concerned for congestive heart failure and they also performed a heart cath.  Patient does live alone, does not use home oxyg     Assessments:  # Acute hypoxic respiratory failure, CHF exacerbation, possible pneumonia, combined  #Status multifocal pneumonia, status post antibiotics, pulmonology following  # Rhinovirus positive  #HFrEF, LVEF of 20 to 25% per the cardiology note on 6/20 lvef of 25 %   #CAD s/p stent in 2003  #Liver mass status post liver biopsy taken  # Elevated AFP levels, with liver mass, concerning for HCC, oncology following  # SANDY on CKD versus CKD, creatinine on admission 1.65 previously creatinine 1.85 from July 2024  #DM 2  #GERD  #Anemia, hemoglobin 11.7    Plan:   -S/p abx for PNA, pulm reccs noted  -On Entresto 24-26 daily, d/c metoprolol - start Coreg for GDMT and BP, Continue SGLT jardiance 12.5 daily  -Patient not on ASA as per his home meds ( will discuss with patient reason for not being on ASA), continue statins for now  -Will consult nephrology for sandy on ckd vs Possible just CKD, he is on entresto, will await nephrology if can continue  -Oncology following for liver mass, s/p biopsy results pending   -If no further procedure, will start him on heparin 5K TID dvt ppx  -AM labs will follow       VTE Prophylaxis:  Mechanical VTE prophylaxis orders are present.         Code status is   Code Status and Medical Interventions: No CPR (Do Not Attempt to Resuscitate); Limited Support; No intubation (DNI)   Ordered at: 12/06/24 1520     Medical Intervention Limits:    No intubation (DNI)     Level Of Support Discussed With:    Patient     Code Status (Patient has no pulse and is not breathing):    No CPR (Do Not Attempt to Resuscitate)     Medical Interventions (Patient has pulse or is breathing):    Limited Support       Plan for disposition: Oncology, Pulm     Time: 30 minutes    Part of this note may be an electronic  transcription/translation of spoken language to printed text using the Dragon Dictation System.    Signature: Electronically signed by Uday Andrews MD, 12/09/24, 10:53 EST.  [facility] Hospitalist Team

## 2024-12-09 NOTE — PROGRESS NOTES
Hematology/Oncology Inpatient Progress Note    PATIENT NAME: Mikael Shanks  : 1935  MRN: [id number]    CHIEF COMPLAINT: shortness of breath    HISTORY OF PRESENT ILLNESS:      Mikael Shanks is a 89 y.o. male who presented to [facility] on 12/3/2024 with complaints of shortness of breath.  Past medical history of CAD, diabetes, GERD, hyperlipidemia, hypertension.  Presented with shortness of breath and right groin pain.  Due to being very hard of hearing and not having his hearing aids his HPI was obtained from family.  Family stated that the patient had a recent admission at the [facility] where they were concerned for congestive heart failure and also performed a heart cath.    Evaluation in the ED showed leukocytosis with a WBC of 20.53 and increased neutrophils.  Respiratory panel was positive for rhinovirus chest x-ray revealed chronic findings without acute changes.      CT of the chest without contrast  -Large mass in the right hepatic lobe involving segment 7 and 8 which may relate to malignancy or metastasis measuring up to 9.5 cm  -Patchy areas of airspace disease involving left upper lobes and bilateral lower lobes concerning for multifocal pneumonia superimposed on background of chronic interstitial lung disease  -Cardiomegaly    CT of the abdomen and pelvis with contrast  -Heterogenous appearing mass in the right hepatic lobe at the hepatic dome measuring up to 9.3 cm suspicious for malignancy.  Further characterization with MRI abdomen with and without recommended    24  Hematology/Oncology was consulted due to newly discovered liver mass.    Subjective     Denies any specific complaints.    ROS:  Review of Systems   Constitutional:  Negative for chills, fatigue and fever.   HENT:  Negative for congestion, drooling, ear discharge, rhinorrhea, sinus pressure and tinnitus.    Eyes:  Negative for photophobia, pain and discharge.   Respiratory:  Negative for apnea, choking  and stridor.    Cardiovascular:  Negative for palpitations.   Gastrointestinal:  Negative for abdominal distention, abdominal pain and anal bleeding.   Endocrine: Negative for polydipsia and polyphagia.   Genitourinary:  Negative for decreased urine volume, flank pain and genital sores.   Musculoskeletal:  Negative for gait problem, neck pain and neck stiffness.   Skin:  Negative for color change, rash and wound.   Neurological:  Negative for tremors, seizures, syncope, facial asymmetry and speech difficulty.   Hematological:  Negative for adenopathy.   Psychiatric/Behavioral:  Negative for agitation, confusion, hallucinations and self-injury. The patient is not hyperactive.         MEDICATIONS:    Scheduled Meds:  atorvastatin, 80 mg, Oral, Nightly  carvedilol, 3.125 mg, Oral, BID With Meals  cetirizine, 10 mg, Oral, Daily  cholecalciferol, 1,000 Units, Oral, Daily  empagliflozin, 12.5 mg, Oral, Daily  finasteride, 5 mg, Oral, Daily  guaiFENesin, 1,200 mg, Oral, Q12H  insulin lispro, 2-7 Units, Subcutaneous, 4x Daily AC & at Bedtime  Lidocaine, 1 patch, Transdermal, Q24H  montelukast, 10 mg, Oral, Nightly  pantoprazole, 40 mg, Oral, Q AM  sacubitril-valsartan, 1 tablet, Oral, Daily  sertraline, 50 mg, Oral, Daily       Continuous Infusions:      PRN Meds:    acetaminophen **OR** acetaminophen **OR** acetaminophen    albuterol sulfate HFA    aluminum-magnesium hydroxide-simethicone    senna-docusate sodium **AND** polyethylene glycol **AND** bisacodyl **AND** bisacodyl    Calcium Replacement - Follow Nurse / BPA Driven Protocol    dextrose    dextrose    glucagon (human recombinant)    HYDROcodone-acetaminophen    ipratropium-albuterol    Magnesium Standard Dose Replacement - Follow Nurse / BPA Driven Protocol    nitroglycerin    Phosphorus Replacement - Follow Nurse / BPA Driven Protocol    Potassium Replacement - Follow Nurse / BPA Driven Protocol    [COMPLETED] Insert Peripheral IV **AND** sodium chloride  "    ALLERGIES:    Allergies   Allergen Reactions    Penicillins Hives       Objective    VITALS:   /64 (BP Location: Right arm, Patient Position: Lying)   Pulse 58   Temp 97.4 °F (36.3 °C) (Oral)   Resp 12   Ht 177.8 cm (70\")   Wt 68.4 kg (150 lb 12.7 oz)   SpO2 91%   BMI 21.64 kg/m²     PHYSICAL EXAM: (performed by MD)  Physical Exam  Vitals and nursing note reviewed.   Constitutional:       General: He is not in acute distress.     Appearance: He is not diaphoretic.   HENT:      Head: Normocephalic and atraumatic.   Eyes:      General: No scleral icterus.        Right eye: No discharge.         Left eye: No discharge.      Conjunctiva/sclera: Conjunctivae normal.   Neck:      Thyroid: No thyromegaly.   Cardiovascular:      Rate and Rhythm: Normal rate and regular rhythm.      Heart sounds: Normal heart sounds.      No friction rub. No gallop.   Pulmonary:      Effort: Pulmonary effort is normal. No respiratory distress.      Breath sounds: No stridor. No wheezing.   Abdominal:      General: Bowel sounds are normal.      Palpations: Abdomen is soft. There is no mass.      Tenderness: There is no abdominal tenderness. There is no guarding or rebound.   Musculoskeletal:         General: No tenderness. Normal range of motion.      Cervical back: Normal range of motion and neck supple.   Lymphadenopathy:      Cervical: No cervical adenopathy.   Skin:     General: Skin is warm.      Findings: No erythema or rash.   Neurological:      Mental Status: He is alert and oriented to person, place, and time.      Motor: No abnormal muscle tone.   Psychiatric:         Behavior: Behavior normal.       I have reexamined the patient and the results are consistent with the previously documented exam. Carmen Aragon MD      RECENT LABS:  Lab Results (last 24 hours)       Procedure Component Value Units Date/Time    Tissue Pathology Exam [[id number]] Collected: 12/06/24 1325    Specimen: Tissue from Liver Updated: " 12/09/24 1426     Case Report --     Surgical Pathology Report                         Case: [id number]                                  Authorizing Provider:  Gloria Baxter APRN Collected:           12/06/2024 01:25 PM          Ordering Location:     [facility]       Received:            12/06/2024 01:45 PM                                 PROGRESS CARE                                                                Pathologist:           Balbir Abernathy MD                                                             Specimen:    Liver                                                                                       Final Diagnosis --     Liver, CT-guided core biopsy:    Moderately differentiated hepatocellular carcinoma    JPR       Intraoperative Consultation --     Touch prep: Positive for malignancy.  ANA       Gross Description --     1. Liver.  Received in formalin designated liver are a few pink fragments of tissue measuring up to 1.5 cm in greatest dimension.  Submitted in 1 cassette.  ANA        T4, Free [[id number]]  (Normal) Collected: 12/09/24 0355    Specimen: Blood from Arm, Right Updated: 12/09/24 1216     Free T4 1.14 ng/dL     TSH Rfx On Abnormal To Free T4 [[id number]]  (Abnormal) Collected: 12/09/24 0355    Specimen: Blood from Arm, Right Updated: 12/09/24 1151     TSH 6.460 uIU/mL     POC Glucose 4x Daily Before Meals & at Bedtime [[id number]]  (Abnormal) Collected: 12/09/24 1142    Specimen: Blood Updated: 12/09/24 1144     Glucose 140 mg/dL      Comment: Serial Number: [id number]:  [id number]       POC Glucose 4x Daily Before Meals & at Bedtime [[id number]]  (Abnormal) Collected: 12/09/24 0700    Specimen: Blood Updated: 12/09/24 0702     Glucose 112 mg/dL      Comment: Serial Number: [id number]:  [id number]       Procalcitonin [[id number]]  (Abnormal) Collected: 12/09/24 0355    Specimen: Blood from Arm, Right Updated: 12/09/24 0430     Procalcitonin 0.26 ng/mL      "Narrative:      As a Marker for Sepsis (Non-Neonates):    1. <0.5 ng/mL represents a low risk of severe sepsis and/or septic shock.  2. >2 ng/mL represents a high risk of severe sepsis and/or septic shock.    As a Marker for Lower Respiratory Tract Infections that require antibiotic therapy:    PCT on Admission    Antibiotic Therapy       6-12 Hrs later    >0.5                Strongly Recommended  >0.25 - <0.5        Recommended   0.1 - 0.25          Discouraged              Remeasure/reassess PCT  <0.1                Strongly Discouraged     Remeasure/reassess PCT    As 28 day mortality risk marker: \"Change in Procalcitonin Result\" (>80% or <=80%) if Day 0 (or Day 1) and Day 4 values are available. Refer to http://[email or web address]pct-calculator.[email or web address]    Change in PCT <=80%  A decrease of PCT levels below or equal to 80% defines a positive change in PCT test result representing a higher risk for 28-day all-cause mortality of patients diagnosed with severe sepsis for septic shock.    Change in PCT >80%  A decrease of PCT levels of more than 80% defines a negative change in PCT result representing a lower risk for 28-day all-cause mortality of patients diagnosed with severe sepsis or septic shock.       Basic Metabolic Panel [[id number]]  (Abnormal) Collected: 12/09/24 0355    Specimen: Blood from Arm, Right Updated: 12/09/24 0430     Glucose 109 mg/dL      BUN 33 mg/dL      Creatinine 1.68 mg/dL      Sodium 140 mmol/L      Potassium 4.5 mmol/L      Chloride 104 mmol/L      CO2 27.0 mmol/L      Calcium 9.3 mg/dL      BUN/Creatinine Ratio 19.6     Anion Gap 9.0 mmol/L      eGFR 38.6 mL/min/1.73     Narrative:      GFR Normal >60  Chronic Kidney Disease <60  Kidney Failure <15    The GFR formula is only valid for adults with stable renal function between ages 18 and 70.    CBC & Differential [[id number]]  (Abnormal) Collected: 12/09/24 0355    Specimen: Blood from Arm, Right Updated: 12/09/24 0408    Narrative:      The " following orders were created for panel order CBC & Differential.  Procedure                               Abnormality         Status                     ---------                               -----------         ------                     CBC Auto Differential[[id number]]        Abnormal            Final result                 Please view results for these tests on the individual orders.    CBC Auto Differential [[id number]]  (Abnormal) Collected: 12/09/24 0355    Specimen: Blood from Arm, Right Updated: 12/09/24 0408     WBC 13.04 10*3/mm3      RBC 4.21 10*6/mm3      Hemoglobin 12.0 g/dL      Hematocrit 38.9 %      MCV 92.4 fL      MCH 28.5 pg      MCHC 30.8 g/dL      RDW 14.9 %      RDW-SD 49.9 fl      MPV 11.5 fL      Platelets 256 10*3/mm3      Neutrophil % 72.2 %      Lymphocyte % 12.7 %      Monocyte % 6.6 %      Eosinophil % 3.2 %      Basophil % 1.2 %      Immature Grans % 4.1 %      Neutrophils, Absolute 9.43 10*3/mm3      Lymphocytes, Absolute 1.65 10*3/mm3      Monocytes, Absolute 0.86 10*3/mm3      Eosinophils, Absolute 0.42 10*3/mm3      Basophils, Absolute 0.15 10*3/mm3      Immature Grans, Absolute 0.53 10*3/mm3      nRBC 0.0 /100 WBC     POC Glucose Once [[id number]]  (Abnormal) Collected: 12/08/24 2006    Specimen: Blood Updated: 12/08/24 2007     Glucose 156 mg/dL      Comment: Serial Number: [id number]:  [id number]               PENDING RESULTS:     IMAGING REVIEWED:  No radiology results for the last day    Assessment & Plan   ASSESSMENT:    Moderately differentiated hepatocellular carcinoma presenting as a right hepatic lobe mass: 9.3 cm mass suspicious for malignancy. AFP is  elevated to 3,304.  CEA is 3.1.  Viral hepatitis panel is negative, HFE analysis is pending  Normocytic anemia: Hemoglobin 10.6, MCV 92.0.  Has a history of pernicious anemia and is on B12 injection monthly with his PCP.     PLAN    Reviewed pathology with patient.  Discussed the diagnosis.  Discussed about  hepatocellular process and surgical disease given his advanced age probably will not be able to tolerate hepatic resection.  Other local treatment options including TACE could be considered to see if will be a candidate given the large size of the mass.  This will require outpatient consultation  Iron studies with mitch reviewed.  HFE analysis is pending  Discussed with patient  Will continue to follow        Electronically signed by Carmen Aragon MD, 12/10/24, 8:01 AM EST.

## 2024-12-09 NOTE — PROGRESS NOTES
Daily Progress Note          Assessment    Multifocal pneumonia  Hypoxemia  Human rhinovirus infection  BILL: Polysomnography 2019 AHI 78.3, titrated for auto BiPAP 6-16 with PS 4 cm H2O  Liver mass  CHF  CKD  HTN  HLD  GERD     PFTs/spirometry 8/31/2020: Normal, FEV1/FVC 84%, FVC 89%, FEV1 107%        Recommendations:  Respiratory status is gradually improving can be discharged from pulmonary standpoint follow-up in the office in 3 weeks    antibiotic: Completed Rocephin  Oxygen supplement and titration to maintain saturation 90 to 95%: Currently on room air alternating with 1 L per nasal cannula  Mucinex  Prednisone 20 mg daily completed  Encourage use of incentive spirometry     Singulair     Oncology consulted for workup of the liver mass, status post CT-guided biopsy, the results are pending        I personally reviewed the radiological studies             LOS: 6 days     Subjective     Patient reports improvement in the cough and shortness of breath    Objective     Vital signs for last 24 hours:  Vitals:    12/08/24 2105 12/09/24 0043 12/09/24 0458 12/09/24 0500   BP: 140/68 118/59 138/65    BP Location: Right arm Right arm Right arm    Patient Position: Lying  Lying    Pulse: 59 66 61    Resp: 13 20 17    Temp: 97.4 °F (36.3 °C) 97.5 °F (36.4 °C) 97.5 °F (36.4 °C)    TempSrc: Oral Oral Oral    SpO2: 93% 90% 95%    Weight:    68.4 kg (150 lb 12.7 oz)   Height:           Intake/Output last 3 shifts:  I/O last 3 completed shifts:  In: 840 [P.O.:840]  Out: 3275 [Urine:3275]  Intake/Output this shift:  No intake/output data recorded.      Radiology  Imaging Results (Last 24 Hours)       ** No results found for the last 24 hours. **            Labs:  Results from last 7 days   Lab Units 12/09/24  0355   WBC 10*3/mm3 13.04*   HEMOGLOBIN g/dL 12.0*   HEMATOCRIT % 38.9   PLATELETS 10*3/mm3 256     Results from last 7 days   Lab Units 12/09/24  0355 12/04/24  0133 12/03/24  0955   SODIUM mmol/L 140   < > 135*    POTASSIUM mmol/L 4.5   < > 4.5   CHLORIDE mmol/L 104   < > 98   CO2 mmol/L 27.0   < > 24.5   BUN mg/dL 33*   < > 23   CREATININE mg/dL 1.68*   < > 1.65*   CALCIUM mg/dL 9.3   < > 9.4   BILIRUBIN mg/dL  --   --  1.0   ALK PHOS U/L  --   --  83   ALT (SGPT) U/L  --   --  41   AST (SGOT) U/L  --   --  41*   GLUCOSE mg/dL 109*   < > 140*    < > = values in this interval not displayed.         Results from last 7 days   Lab Units 12/03/24  0955   ALBUMIN g/dL 3.6     Results from last 7 days   Lab Units 12/03/24  1148 12/03/24  0955   HSTROP T ng/L 49* 47*             Results from last 7 days   Lab Units 12/05/24  1238   INR  1.16*   APTT seconds 34.3               Meds:   SCHEDULE  atorvastatin, 80 mg, Oral, Nightly  cetirizine, 10 mg, Oral, Daily  cholecalciferol, 1,000 Units, Oral, Daily  empagliflozin, 12.5 mg, Oral, Daily  finasteride, 5 mg, Oral, Daily  guaiFENesin, 1,200 mg, Oral, Q12H  insulin lispro, 2-7 Units, Subcutaneous, 4x Daily AC & at Bedtime  Lidocaine, 1 patch, Transdermal, Q24H  metoprolol succinate XL, 50 mg, Oral, Daily  montelukast, 10 mg, Oral, Nightly  pantoprazole, 40 mg, Oral, Q AM  sacubitril-valsartan, 1 tablet, Oral, Daily  sertraline, 50 mg, Oral, Daily      Infusions     PRNs    acetaminophen **OR** acetaminophen **OR** acetaminophen    albuterol sulfate HFA    aluminum-magnesium hydroxide-simethicone    senna-docusate sodium **AND** polyethylene glycol **AND** bisacodyl **AND** bisacodyl    Calcium Replacement - Follow Nurse / BPA Driven Protocol    dextrose    dextrose    glucagon (human recombinant)    HYDROcodone-acetaminophen    ipratropium-albuterol    Magnesium Standard Dose Replacement - Follow Nurse / BPA Driven Protocol    nitroglycerin    Phosphorus Replacement - Follow Nurse / BPA Driven Protocol    Potassium Replacement - Follow Nurse / BPA Driven Protocol    [COMPLETED] Insert Peripheral IV **AND** sodium chloride    Physical Exam:  General Appearance:  Alert   HEENT:   Normocephalic, without obvious abnormality, Conjunctiva/corneas clear,.   Nares normal, no drainage     Neck:  Supple, symmetrical, trachea midline.   Lungs /Chest wall: Improved air entry with mild bilateral basal rhonchi, respirations unlabored, symmetrical wall movement.     Heart:  Regular rate and rhythm, S1 S2 normal  Abdomen: Soft, non-tender, no masses, no organomegaly.    Extremities: No edema, no clubbing or cyanosis     ROS  Constitutional: Negative for chills, fever and malaise/fatigue.   HENT: Hard of hearing.    Eyes: Negative.    Cardiovascular: Negative.    Respiratory: Positive for improving cough and shortness of breath.    Skin: Negative.    Musculoskeletal: Negative.    Gastrointestinal: Negative.    Genitourinary: Negative.    Neurological: Generalized weakness      I reviewed the recent clinical results  I personally reviewed the latest radiological studies    Part of this note may be an electronic transcription/translation of spoken language to printed text using the Dragon Dictation System.

## 2024-12-09 NOTE — PLAN OF CARE
Goal Outcome Evaluation:  Plan of Care Reviewed With: patient  Pt resting in bed throughout shift, no c/o's of pain this shift, lidocaine patch remains in place to right groin at this time, c/o's of SOB with normal resp and O2 sats, placed on 1L O2 for comfort, no further c/o's at this time, resp remain even and unlabored, IV to left AC infiltrated, new IV started to right AC, pt tolerated well, plan of care ongoing

## 2024-12-09 NOTE — PROGRESS NOTES
Nutrition Services  Patient Name: Mikael Shanks  YOB: 1935  MRN: [id number]  Admission date: 12/3/2024    PROGRESS NOTE      Nutrition Intervention Updates: Continue current po diet and encourage good po intake and ONS consumption.        Encounter Information: Checking in to monitor po diet tolerance and intake. Tolerating po diet without noted issues at this time. RD will continue to monitor per protocol.        PO Diet: Diet: Regular/House; Fluid Consistency: Thin (IDDSI 0)   PO Supplements: Boost Glucose Control BID (Provides 380 kcals, 32 g protein if consumed)      PO Intake:  93% average po intake x last 7 documented meals        Current nutrition support: -   Nutrition support review: -       Labs (reviewed below): Reviewed. Management per attending.        GI Function:  Last documented BM 11/8       Brief weight review   Wt Readings from Last 10 Encounters:   12/09/24 0500 68.4 kg (150 lb 12.7 oz)   12/08/24 0531 67.9 kg (149 lb 11.1 oz)   12/07/24 0548 68.4 kg (150 lb 12.7 oz)   12/06/24 0300 68.3 kg (150 lb 9.2 oz)   12/05/24 0457 68.6 kg (151 lb 3.8 oz)   12/04/24 0800 66.2 kg (146 lb)   12/04/24 0613 66.6 kg (146 lb 13.2 oz)   12/03/24 2006 66.6 kg (146 lb 13.2 oz)   12/03/24 1037 69 kg (152 lb 1.9 oz)   11/26/24 1032 69 kg (152 lb 3.2 oz)   11/20/24 1417 69 kg (152 lb 3.2 oz)   07/31/24 1335 73.6 kg (162 lb 3.2 oz)   07/24/24 1337 72.6 kg (160 lb)   06/20/24 1130 71.7 kg (158 lb)   06/10/24 1508 72.2 kg (159 lb 3.2 oz)   05/30/24 1107 72.6 kg (160 lb)   05/29/24 1129 71.9 kg (158 lb 9.6 oz)   03/19/24 1305 78.2 kg (172 lb 6.4 oz)        Results from last 7 days   Lab Units 12/09/24  0355 12/07/24  0542 12/06/24  0055 12/04/24  0133 12/03/24  0955   SODIUM mmol/L 140 138 137   < > 135*   POTASSIUM mmol/L 4.5 4.6 4.8   < > 4.5   CHLORIDE mmol/L 104 104 103   < > 98   CO2 mmol/L 27.0 25.1 23.8   < > 24.5   BUN mg/dL 33* 31* 30*   < > 23   CREATININE mg/dL 1.68* 1.60* 2.01*   < > 1.65*    CALCIUM mg/dL 9.3 9.2 9.2   < > 9.4   BILIRUBIN mg/dL  --   --   --   --  1.0   ALK PHOS U/L  --   --   --   --  83   ALT (SGPT) U/L  --   --   --   --  41   AST (SGOT) U/L  --   --   --   --  41*   GLUCOSE mg/dL 109* 161* 170*   < > 140*    < > = values in this interval not displayed.     Results from last 7 days   Lab Units 12/09/24  0355   HEMOGLOBIN g/dL 12.0*   HEMATOCRIT % 38.9         RD to follow up per protocol.    Electronically signed by:  Natalie Kidd, DEVENDRA  12/09/24 08:50 EST

## 2024-12-09 NOTE — CASE MANAGEMENT/SOCIAL WORK
Continued Stay Note  [facility]     Patient Name: Mikael Shanks  MRN: [id number]  Today's Date: 12/9/2024    Admit Date: 12/3/2024    Plan: Home health vs. Palliative care.   Discharge Plan       Row Name 12/09/24 0908       Plan    Plan Comments DC Barriers: Awaiting liver biopsy results, Hem/Onc and Pulm consults               Heather Gifford RN      [facility]  Office: [phone]  Cell: [phone]  Fax # [phone]

## 2024-12-09 NOTE — PLAN OF CARE
Goal Outcome Evaluation:      Pt still has an aching burning pain his rt groin at site of a previous heart cath insertion site. He did not have bouts of extreme  10 or 10 pain in the rt groin like he did yesterday. Pt was up walking yesterday unlike today, and that maybe the difference. The pain is aggravated with movement and relieves lying down and rest. Vitals stable.

## 2024-12-09 NOTE — THERAPY TREATMENT NOTE
"Subjective: Pt agreeable to therapeutic plan of care.    Objective:     Precautions - Falls    Bed mobility - Min-A Pulled up from therapist hand.   Transfers - Supervision and with rolling walker  Ambulation - 60 feet, and 20 feet SBA and with rolling walker    Therapeutic Exercise -    Unsupported seated trunk TE reaching outside AZUL and across midline    Balance - Standing dynamic balance challenge with single UE support reaching outside AZUL and across midline with SBA. Demo's mild instability although no overt LOB this date.     Education: Provided education on the importance of mobility in the acute care setting, Verbal/Tactile Cues, Transfer Training, Gait Training, and Energy conservation strategies    Assessment: Mikael Shanks presents with functional mobility impairments which indicate the need for skilled intervention. Pt demo's improved mobility this date although still recommending rehab at discharge due to being below baseline. Pt states he wants to go home and will think about considering HH PT. Tolerating session today without incident. Will continue to follow and progress as tolerated.     Plan/Recommendations:   If medically appropriate, Moderate Intensity Therapy recommended post-acute care. This is recommended as therapy feels the patient would require 3-4 days per week and wouldn't tolerate \"3 hour daily\" rehab intensity. SNF would be the preferred choice. If the patient does not agree to SNF, arrange HH or OP depending on home bound status. If patient is medically complex, consider LTACH. Pt requires no DME at discharge.     Pt desires Home with Home Health at discharge. Pt cooperative; agreeable to therapeutic recommendations and plan of care.         Basic Mobility 6-click:  Rollin = Total, A lot = 2, A little = 3; 4 = None  Supine>Sit:   1 = Total, A lot = 2, A little = 3; 4 = None   Sit>Stand with arms:  1 = Total, A lot = 2, A little = 3; 4 = None  Bed>Chair:   1 = Total, A " lot = 2, A little = 3; 4 = None  Ambulate in room:  1 = Total, A lot = 2, A little = 3; 4 = None  3-5 Steps with railin = Total, A lot = 2, A little = 3; 4 = None  Score: 17    Modified [place]: N/A = No pre-op stroke/TIA    Post-Tx Position: Up in Chair and Call light and personal items within reach  PPE: gloves, surgical mask, and gown    Therapy Charges for Today       Code Description Service Date Service Provider Modifiers Qty    67068915848 HC PT THER PROC EA 15 MIN 2024 Georgia Storm, LAZARO GP 1    77658744224 HC GAIT TRAINING EA 15 MIN 2024 Georgia Storm, LAZARO GP 1    [id number]  PT THERAPEUTIC ACT EA 15 MIN 2024 Georgia Storm, LAZARO GP 1    [id number]  PT NEUROMUSC RE EDUCATION EA 15 MIN 2024 Georgia Storm, LAZARO GP 1           PT Charges       Row Name 24 1543             Time Calculation    Start Time 1500  -CC      Stop Time 1533  -CC      Time Calculation (min) 33 min  -CC      PT Received On 24  -CC      PT - Next Appointment 12/10/24  -CC         Time Calculation- PT    Total Timed Code Minutes- PT 33 minute(s)  -CC                User Key  (r) = Recorded By, (t) = Taken By, (c) = Cosigned By      Initials Name Provider Type    CC Georgia Storm PTA Physical Therapist Assistant

## 2024-12-10 LAB
ALBUMIN SERPL-MCNC: 3.3 G/DL (ref 3.5–5.2)
ALBUMIN/GLOB SERPL: 1.3 G/DL
ALP SERPL-CCNC: 83 U/L (ref 39–117)
ALT SERPL W P-5'-P-CCNC: 50 U/L (ref 1–41)
ANION GAP SERPL CALCULATED.3IONS-SCNC: 10.6 MMOL/L (ref 5–15)
AST SERPL-CCNC: 47 U/L (ref 1–40)
BASOPHILS # BLD AUTO: 0.21 10*3/MM3 (ref 0–0.2)
BASOPHILS NFR BLD AUTO: 1.4 % (ref 0–1.5)
BILIRUB SERPL-MCNC: 0.3 MG/DL (ref 0–1.2)
BUN SERPL-MCNC: 28 MG/DL (ref 8–23)
BUN/CREAT SERPL: 20.6 (ref 7–25)
CALCIUM SPEC-SCNC: 9.2 MG/DL (ref 8.6–10.5)
CHLORIDE SERPL-SCNC: 104 MMOL/L (ref 98–107)
CO2 SERPL-SCNC: 22.4 MMOL/L (ref 22–29)
CREAT SERPL-MCNC: 1.36 MG/DL (ref 0.76–1.27)
DEPRECATED RDW RBC AUTO: 52.7 FL (ref 37–54)
EGFRCR SERPLBLD CKD-EPI 2021: 49.7 ML/MIN/1.73
EOSINOPHIL # BLD AUTO: 0.41 10*3/MM3 (ref 0–0.4)
EOSINOPHIL NFR BLD AUTO: 2.7 % (ref 0.3–6.2)
ERYTHROCYTE [DISTWIDTH] IN BLOOD BY AUTOMATED COUNT: 15.5 % (ref 12.3–15.4)
GLOBULIN UR ELPH-MCNC: 2.5 GM/DL
GLUCOSE BLDC GLUCOMTR-MCNC: 103 MG/DL (ref 70–105)
GLUCOSE BLDC GLUCOMTR-MCNC: 114 MG/DL (ref 70–105)
GLUCOSE BLDC GLUCOMTR-MCNC: 124 MG/DL (ref 70–105)
GLUCOSE BLDC GLUCOMTR-MCNC: 87 MG/DL (ref 70–105)
GLUCOSE SERPL-MCNC: 94 MG/DL (ref 65–99)
HCT VFR BLD AUTO: 43.3 % (ref 37.5–51)
HGB BLD-MCNC: 12.8 G/DL (ref 13–17.7)
IMM GRANULOCYTES # BLD AUTO: 0.81 10*3/MM3 (ref 0–0.05)
IMM GRANULOCYTES NFR BLD AUTO: 5.3 % (ref 0–0.5)
LYMPHOCYTES # BLD AUTO: 1.57 10*3/MM3 (ref 0.7–3.1)
LYMPHOCYTES NFR BLD AUTO: 10.2 % (ref 19.6–45.3)
MAGNESIUM SERPL-MCNC: 1.9 MG/DL (ref 1.6–2.4)
MCH RBC QN AUTO: 27.6 PG (ref 26.6–33)
MCHC RBC AUTO-ENTMCNC: 29.6 G/DL (ref 31.5–35.7)
MCV RBC AUTO: 93.3 FL (ref 79–97)
MONOCYTES # BLD AUTO: 0.83 10*3/MM3 (ref 0.1–0.9)
MONOCYTES NFR BLD AUTO: 5.4 % (ref 5–12)
NEUTROPHILS NFR BLD AUTO: 11.54 10*3/MM3 (ref 1.7–7)
NEUTROPHILS NFR BLD AUTO: 75 % (ref 42.7–76)
NRBC BLD AUTO-RTO: 0 /100 WBC (ref 0–0.2)
PHOSPHATE SERPL-MCNC: 3.9 MG/DL (ref 2.5–4.5)
PLATELET # BLD AUTO: 273 10*3/MM3 (ref 140–450)
PMV BLD AUTO: 10.8 FL (ref 6–12)
POTASSIUM SERPL-SCNC: 4.7 MMOL/L (ref 3.5–5.2)
PROT SERPL-MCNC: 5.8 G/DL (ref 6–8.5)
RBC # BLD AUTO: 4.64 10*6/MM3 (ref 4.14–5.8)
SODIUM SERPL-SCNC: 137 MMOL/L (ref 136–145)
WBC NRBC COR # BLD AUTO: 15.37 10*3/MM3 (ref 3.4–10.8)

## 2024-12-10 PROCEDURE — 94799 UNLISTED PULMONARY SVC/PX: CPT

## 2024-12-10 PROCEDURE — 85025 COMPLETE CBC W/AUTO DIFF WBC: CPT | Performed by: STUDENT IN AN ORGANIZED HEALTH CARE EDUCATION/TRAINING PROGRAM

## 2024-12-10 PROCEDURE — 83735 ASSAY OF MAGNESIUM: CPT | Performed by: STUDENT IN AN ORGANIZED HEALTH CARE EDUCATION/TRAINING PROGRAM

## 2024-12-10 PROCEDURE — 82948 REAGENT STRIP/BLOOD GLUCOSE: CPT

## 2024-12-10 PROCEDURE — 97535 SELF CARE MNGMENT TRAINING: CPT

## 2024-12-10 PROCEDURE — 97530 THERAPEUTIC ACTIVITIES: CPT

## 2024-12-10 PROCEDURE — 84100 ASSAY OF PHOSPHORUS: CPT | Performed by: STUDENT IN AN ORGANIZED HEALTH CARE EDUCATION/TRAINING PROGRAM

## 2024-12-10 PROCEDURE — 99221 1ST HOSP IP/OBS SF/LOW 40: CPT | Performed by: NURSE PRACTITIONER

## 2024-12-10 PROCEDURE — 97116 GAIT TRAINING THERAPY: CPT

## 2024-12-10 PROCEDURE — 94761 N-INVAS EAR/PLS OXIMETRY MLT: CPT

## 2024-12-10 PROCEDURE — 99232 SBSQ HOSP IP/OBS MODERATE 35: CPT | Performed by: INTERNAL MEDICINE

## 2024-12-10 PROCEDURE — 80053 COMPREHEN METABOLIC PANEL: CPT | Performed by: STUDENT IN AN ORGANIZED HEALTH CARE EDUCATION/TRAINING PROGRAM

## 2024-12-10 RX ORDER — METOPROLOL SUCCINATE 25 MG/1
12.5 TABLET, EXTENDED RELEASE ORAL
Status: DISCONTINUED | OUTPATIENT
Start: 2024-12-10 | End: 2024-12-12 | Stop reason: HOSPADM

## 2024-12-10 RX ORDER — AMOXICILLIN 250 MG
1 CAPSULE ORAL DAILY
Status: DISCONTINUED | OUTPATIENT
Start: 2024-12-10 | End: 2024-12-12 | Stop reason: HOSPADM

## 2024-12-10 RX ORDER — TAMSULOSIN HYDROCHLORIDE 0.4 MG/1
0.4 CAPSULE ORAL DAILY
Status: DISCONTINUED | OUTPATIENT
Start: 2024-12-10 | End: 2024-12-12 | Stop reason: HOSPADM

## 2024-12-10 RX ORDER — POLYETHYLENE GLYCOL 3350 17 G/17G
17 POWDER, FOR SOLUTION ORAL DAILY
Status: DISCONTINUED | OUTPATIENT
Start: 2024-12-10 | End: 2024-12-11

## 2024-12-10 RX ADMIN — MONTELUKAST 10 MG: 10 TABLET, FILM COATED ORAL at 20:46

## 2024-12-10 RX ADMIN — ACETAMINOPHEN 650 MG: 325 TABLET, FILM COATED ORAL at 20:45

## 2024-12-10 RX ADMIN — FINASTERIDE 5 MG: 5 TABLET, FILM COATED ORAL at 08:27

## 2024-12-10 RX ADMIN — POLYETHYLENE GLYCOL 3350 17 G: 17 POWDER, FOR SOLUTION ORAL at 12:01

## 2024-12-10 RX ADMIN — GUAIFENESIN 1200 MG: 600 TABLET, MULTILAYER, EXTENDED RELEASE ORAL at 08:26

## 2024-12-10 RX ADMIN — Medication 1000 UNITS: at 08:27

## 2024-12-10 RX ADMIN — Medication 10 ML: at 08:27

## 2024-12-10 RX ADMIN — PANTOPRAZOLE SODIUM 40 MG: 40 TABLET, DELAYED RELEASE ORAL at 05:39

## 2024-12-10 RX ADMIN — TAMSULOSIN HYDROCHLORIDE 0.4 MG: 0.4 CAPSULE ORAL at 15:47

## 2024-12-10 RX ADMIN — CETIRIZINE HYDROCHLORIDE 10 MG: 10 TABLET, FILM COATED ORAL at 08:26

## 2024-12-10 RX ADMIN — GUAIFENESIN 1200 MG: 600 TABLET, MULTILAYER, EXTENDED RELEASE ORAL at 20:45

## 2024-12-10 RX ADMIN — EMPAGLIFLOZIN 12.5 MG: 25 TABLET, FILM COATED ORAL at 08:27

## 2024-12-10 RX ADMIN — SACUBITRIL AND VALSARTAN 1 TABLET: 24; 26 TABLET, FILM COATED ORAL at 08:26

## 2024-12-10 RX ADMIN — ATORVASTATIN CALCIUM 80 MG: 40 TABLET, FILM COATED ORAL at 20:45

## 2024-12-10 RX ADMIN — LIDOCAINE 1 PATCH: 4 PATCH TOPICAL at 15:47

## 2024-12-10 RX ADMIN — SERTRALINE HYDROCHLORIDE 50 MG: 50 TABLET, FILM COATED ORAL at 08:26

## 2024-12-10 RX ADMIN — SENNOSIDES AND DOCUSATE SODIUM 2 TABLET: 50; 8.6 TABLET ORAL at 08:26

## 2024-12-10 NOTE — PLAN OF CARE
Goal Outcome Evaluation:            Pleasant and cooperative with care. Patient c/o abdominal pain and having need to have a bowel movement. Senikote and Miralax given. Patient noted to have low urine output and was bladder scanned, which showed 879 mL in bladder. Straight cath done and 850 mL urine was returned. Patient stated the pain was all but gone afterwards. MD notified and orders were placed for bladder scan q 4 hrs with straight cath > 300 mL. Flomax 0.4 also ordered and first dose given today. Patient did also have a bowel movement. Stable throughout the shift on room air. Continuing to monitor.

## 2024-12-10 NOTE — PLAN OF CARE
"Assessment: Mikael Shanks presents with ADL impairments affecting function including balance, endurance / activity tolerance, pain, postural / trunk control, and strength. Demonstrated functioning below baseline abilities indicate the need for continued skilled intervention while inpatient. Pt reports significant rectal pain d/t constipation, agreeable to attempt to sit on commode to void. Pt hips in flexion with LE on elevated surface to improve empty of bowels, pt reports slight relief in pain. Pt requiring increased assist with functional transfers and ADL routine, thus OT continues to recommend SNF when medically appropriate for dc. Tolerating session today without incident. Will continue to follow and progress as tolerated.     Plan/Recommendations:   Moderate Intensity Therapy recommended post-acute care. This is recommended as therapy feels the patient would require 3-4 days per week and wouldn't tolerate \"3 hour daily\" rehab intensity. SNF would be the preferred choice. If the patient does not agree to SNF, arrange HH or OP depending on home bound status. If patient is medically complex, consider LTACH.. Pt requires no DME at discharge.     Pt desires Home with family assist and Home Health vs SNF at discharge. Pt cooperative; agreeable to therapeutic recommendations and plan of care.  "

## 2024-12-10 NOTE — CONSULTS
[facility] Consult Note      Patient Name: Mikael Shanks  : 1935  MRN: [id number]  Primary Care Physician:  Xander Robison MD  Referring Physician: No Known Provider  Date of admission: 12/3/2024    Subjective     Reason for Consult:  SANDY     HPI:   Mikael Shanks is a 89 y.o. male known to have history of coronary disease, hypertension, hyperlipidemia, type 2 diabetes mellitus who presented to the hospital with shortness of breath and was noted to have elevated creatinine of 1.65 in addition to elevated BNP of 4624.  Patient was also found upon investigation to have large mass in the right hepatic lobe and multifocal pneumonia.  Patient was started on antibiotic therapy with gentle diuresis with improvement of overall respiratory status.  Creatinine improved down to 1.36 from 2.0 on 2024.  Currently doing well.  Denies any nausea or vomiting.  No fever no chills.  No chest pain or shortness of breath  Review of Systems:   14 point review of systems is otherwise negative except for mentioned above on HPI    Personal History     Past Medical History:   Diagnosis Date    Carotid artery disease     Coronary artery disease     Diabetes mellitus     GERD (gastroesophageal reflux disease)     [place] (hard of hearing)     Hyperlipidemia     Hypertension     Osteoarthritis     Prostatic hypertrophy     Pulmonary valve disorder     Sleep apnea     cpap   doesnt use     Stroke        Past Surgical History:   Procedure Laterality Date    BACK SURGERY      CARDIAC CATHETERIZATION N/A 2019    Procedure: Left Heart Cath and coronary angiogram;  Surgeon: Dayday Ruiz MD;  Location: [facility] CATH INVASIVE LOCATION;  Service: Cardiovascular    CARDIAC CATHETERIZATION N/A 2019    Procedure: Right and Left Heart Cath;  Surgeon: Dayday Ruiz MD;  Location: [facility] CATH INVASIVE LOCATION;  Service: Cardiovascular    CARDIAC CATHETERIZATION N/A 2020    Procedure: Left and  Right Heart Cath with Coronary Angiography;  Surgeon: Dayday Ruiz MD;  Location: [facility] CATH INVASIVE LOCATION;  Service: Cardiovascular;  Laterality: N/A;    CAROTID ENDARTERECTOMY Left     CHOLECYSTECTOMY      COLONOSCOPY  08/14/2018    No repeat    CORONARY ANGIOPLASTY WITH STENT PLACEMENT      LUMBAR LAMINECTOMY Bilateral 1/31/2022    Procedure: LUMBAR LAMINECTOMY WITH/WITHOUT FUSION L4-L5;  Surgeon: Geronimo Gonzales MD;  Location: [facility] MAIN OR;  Service: Neurosurgery;  Laterality: Bilateral;       Family History: family history includes Cancer in his brother and mother; Heart disease in his father.    Social History:  reports that he quit smoking about 64 years ago. His smoking use included cigarettes. He started smoking about 71 years ago. He has a 35 pack-year smoking history. He has been exposed to tobacco smoke. He has never used smokeless tobacco. He reports that he does not currently use alcohol. He reports that he does not use drugs.    Home Medications:  Prior to Admission medications    Medication Sig Start Date End Date Taking? Authorizing Provider   albuterol sulfate  (90 Base) MCG/ACT inhaler Inhale 2 puffs Every 4 (Four) Hours As Needed for Wheezing. 7/31/24  Yes Xander Robison MD   atorvastatin (LIPITOR) 80 MG tablet Take 1 tablet by mouth every night at bedtime. 7/31/24  Yes Xander Robison MD   B Complex Vitamins (VITAMIN B COMPLEX PO) Take 1 capsule by mouth Daily.   Yes ProviderCarlos A MD   Cholecalciferol 25 MCG (1000 UT) tablet Take 1 tablet by mouth Daily.   Yes Carlos A Berger MD   empagliflozin (JARDIANCE) 25 MG tablet tablet Take 0.5 tablets by mouth Daily.   Yes Carlos A Berger MD   enalapril (VASOTEC) 2.5 MG tablet TAKE 1 TABLET BY MOUTH DAILY 7/31/24  Yes Xander Robison MD   finasteride (PROSCAR) 5 MG tablet Take 1 tablet by mouth Daily. 7/31/24  Yes Xander Robison MD   fluticasone (FLONASE) 50 MCG/ACT nasal spray Administer 1 spray into the  nostril(s) as directed by provider Daily.   Yes Carlos A Berger MD   furosemide (LASIX) 40 MG tablet Take 0.5 tablets by mouth Daily.   Yes Carlos A Berger MD   glipizide (glipiZIDE XL) 5 MG ER tablet Take 1 tablet by mouth Daily. 7/31/24  Yes Xander Robison MD   lidocaine (LIDODERM) 5 % Place 1 patch on the skin as directed by provider Daily. Remove & Discard patch within 12 hours or as directed by MD   Yes Carlos A Berger MD   loratadine (CLARITIN) 10 MG tablet Take 1 tablet by mouth Daily.   Yes ProviderCarlos A MD   metoprolol succinate XL (TOPROL-XL) 50 MG 24 hr tablet Take 1 tablet by mouth Daily.   Yes Carlos A Beregr MD   montelukast (Singulair) 10 MG tablet Take 1 tablet by mouth Every Night. 11/20/24  Yes Xander Robison MD   Multiple Vitamins-Minerals (PRESERVISION AREDS 2 PO) Take 1 Capful by mouth 2 (Two) Times a Day.   Yes Carlos A Berger MD   omeprazole (priLOSEC) 40 MG capsule Take 1 capsule by mouth Daily. 7/31/24  Yes Xander Robison MD   sacubitril-valsartan (Entresto) 24-26 MG tablet Take 1 tablet by mouth Daily.   Yes ProviderCarlos A MD   sertraline (ZOLOFT) 50 MG tablet Take 1 tablet by mouth Daily. 7/31/24  Yes Xander Robison MD       Allergies:  Allergies   Allergen Reactions    Penicillins Hives       Objective     Vitals:   Temp:  [97.4 °F (36.3 °C)-98.1 °F (36.7 °C)] 97.7 °F (36.5 °C)  Heart Rate:  [54-70] 65  Resp:  [12-19] 19  BP: (109-125)/(45-64) 124/57    Intake/Output Summary (Last 24 hours) at 12/10/2024 1007  Last data filed at 12/9/2024 2058  Gross per 24 hour   Intake 840 ml   Output 500 ml   Net 340 ml       Physical Exam:   Constitutional: Looking frail  HEENT: Sclera anicteric, no conjunctival injection  Neck: Supple, no thyromegaly, no lymphadenopathy, trachea at midline, no JVD  Respiratory: Clear to auscultation bilaterally, nonlabored respiration  Cardiovascular: RRR, no murmurs, no rubs or gallops, no carotid  bruit  Gastrointestinal: Positive bowel sounds, abdomen is soft, nontender and nondistended  : No palpable bladder  Musculoskeletal: No edema, no clubbing or cyanosis  Psychiatric: Appropriate affect, cooperative  Neurologic: Oriented x3, moving all extremities, normal speech and mental status  Skin: Warm and dry       Scheduled Meds:     atorvastatin, 80 mg, Oral, Nightly  cetirizine, 10 mg, Oral, Daily  cholecalciferol, 1,000 Units, Oral, Daily  empagliflozin, 12.5 mg, Oral, Daily  finasteride, 5 mg, Oral, Daily  guaiFENesin, 1,200 mg, Oral, Q12H  insulin lispro, 2-7 Units, Subcutaneous, 4x Daily AC & at Bedtime  Lidocaine, 1 patch, Transdermal, Q24H  metoprolol succinate XL, 12.5 mg, Oral, Q24H  montelukast, 10 mg, Oral, Nightly  pantoprazole, 40 mg, Oral, Q AM  polyethylene glycol, 17 g, Oral, Daily  sacubitril-valsartan, 1 tablet, Oral, Daily  senna-docusate sodium, 1 tablet, Oral, Daily  sertraline, 50 mg, Oral, Daily      IV Meds:        Results Reviewed:   I have personally reviewed the results from the time of this admission to 12/10/2024 10:07 EST     Lab Results   Component Value Date    GLUCOSE 94 12/10/2024    CALCIUM 9.2 12/10/2024     12/10/2024    K 4.7 12/10/2024    CO2 22.4 12/10/2024     12/10/2024    BUN 28 (H) 12/10/2024    CREATININE 1.36 (H) 12/10/2024    EGFRIFAFRI 54 (L) 10/18/2021    EGFRIFNONA 54 (L) 01/25/2022    BCR 20.6 12/10/2024    ANIONGAP 10.6 12/10/2024      Lab Results   Component Value Date    MG 1.9 12/10/2024    PHOS 3.9 12/10/2024    ALBUMIN 3.3 (L) 12/10/2024           Assessment / Plan     ASSESSMENT:  Acute Kidney injury on top of chronic kidney disease stage IIIa baseline creatinine between 1.3 and 1.5 up to 2 mg/dL.  etiology was likely secondary to prerenal/ATN due to possible sepsis, pneumonia, cardiorenal syndrome due to CHF exacerbation.  Creatinine has improved  Hypertension with CKD blood pressure is acceptable  Type 2 diabetes mellitus with  CKD  Hepatocellular carcinoma newly diagnosed followed by oncology  Dyspnea secondary to COVID-19 infection  Heart failure exacerbation EF of 20 to 25%      PLAN:  No objection from nephrology standpoint to continue Entresto and Jardiance  No indication for diuretic therapy today  Continue to monitor kidney function test and electrolytes  We will follow along    Thank you for involving us in the care of Mikael Shanks.  Please feel free to call with any questions.    José Tee MD  12/10/24  10:07 [facility]    [facility]  [phone]    Parts of this note may be an electronic transcription/translation of spoken language to printed text using the Dragon dictation system.

## 2024-12-10 NOTE — PROGRESS NOTES
Daily Progress Note          Assessment    Multifocal pneumonia  Hypoxemia  Human rhinovirus infection  BILL: Polysomnography 2019 AHI 78.3, titrated for auto BiPAP 6-16 with PS 4 cm H2O  Liver mass: Hepatocellular carcinoma  CHF  CKD  HTN  HLD  GERD     PFTs/spirometry 8/31/2020: Normal, FEV1/FVC 84%, FVC 89%, FEV1 107%        Recommendations:  Respiratory status is gradually improving can be discharged from pulmonary standpoint follow-up in the office in 3 weeks    antibiotic: Completed Rocephin  Oxygen supplement and titration to maintain saturation 90 to 95%: Currently on room air alternating with 1 L per nasal cannula  Mucinex  Prednisone 20 mg daily completed  Encourage use of incentive spirometry     Singulair     Oncology following for liver mass, status post CT-guided biopsy, Moderately differentiated hepatocellular carcinoma         I personally reviewed the radiological studies             LOS: 7 days     Subjective     Patient reports improvement in the cough and shortness of breath    Objective     Vital signs for last 24 hours:  Vitals:    12/10/24 0608 12/10/24 0826 12/10/24 0830 12/10/24 1012   BP:   124/57 130/61   BP Location:    Right arm   Patient Position:    Lying   Pulse: 64 70 65 72   Resp:    21   Temp:    97.2 °F (36.2 °C)   TempSrc:    Oral   SpO2: 94%   94%   Weight: 69.4 kg (153 lb)      Height:           Intake/Output last 3 shifts:  I/O last 3 completed shifts:  In: 1080 [P.O.:1080]  Out: 2100 [Urine:2100]  Intake/Output this shift:  No intake/output data recorded.      Radiology  Imaging Results (Last 24 Hours)       ** No results found for the last 24 hours. **            Labs:  Results from last 7 days   Lab Units 12/10/24  0435   WBC 10*3/mm3 15.37*   HEMOGLOBIN g/dL 12.8*   HEMATOCRIT % 43.3   PLATELETS 10*3/mm3 273     Results from last 7 days   Lab Units 12/10/24  0435   SODIUM mmol/L 137   POTASSIUM mmol/L 4.7   CHLORIDE mmol/L 104   CO2 mmol/L 22.4   BUN mg/dL 28*   CREATININE  mg/dL 1.36*   CALCIUM mg/dL 9.2   BILIRUBIN mg/dL 0.3   ALK PHOS U/L 83   ALT (SGPT) U/L 50*   AST (SGOT) U/L 47*   GLUCOSE mg/dL 94         Results from last 7 days   Lab Units 12/10/24  0435   ALBUMIN g/dL 3.3*     Results from last 7 days   Lab Units 12/03/24  1148   HSTROP T ng/L 49*         Results from last 7 days   Lab Units 12/10/24  0435   MAGNESIUM mg/dL 1.9     Results from last 7 days   Lab Units 12/05/24  1238   INR  1.16*   APTT seconds 34.3     Results from last 7 days   Lab Units 12/09/24  0355   TSH uIU/mL 6.460*   FREE T4 ng/dL 1.14           Meds:   SCHEDULE  atorvastatin, 80 mg, Oral, Nightly  cetirizine, 10 mg, Oral, Daily  cholecalciferol, 1,000 Units, Oral, Daily  empagliflozin, 12.5 mg, Oral, Daily  finasteride, 5 mg, Oral, Daily  guaiFENesin, 1,200 mg, Oral, Q12H  insulin lispro, 2-7 Units, Subcutaneous, 4x Daily AC & at Bedtime  Lidocaine, 1 patch, Transdermal, Q24H  metoprolol succinate XL, 12.5 mg, Oral, Q24H  montelukast, 10 mg, Oral, Nightly  pantoprazole, 40 mg, Oral, Q AM  polyethylene glycol, 17 g, Oral, Daily  sacubitril-valsartan, 1 tablet, Oral, Daily  senna-docusate sodium, 1 tablet, Oral, Daily  sertraline, 50 mg, Oral, Daily      Infusions     PRNs    acetaminophen **OR** acetaminophen **OR** acetaminophen    albuterol sulfate HFA    aluminum-magnesium hydroxide-simethicone    senna-docusate sodium **AND** polyethylene glycol **AND** bisacodyl **AND** bisacodyl    Calcium Replacement - Follow Nurse / BPA Driven Protocol    dextrose    dextrose    glucagon (human recombinant)    HYDROcodone-acetaminophen    ipratropium-albuterol    Magnesium Standard Dose Replacement - Follow Nurse / BPA Driven Protocol    nitroglycerin    Phosphorus Replacement - Follow Nurse / BPA Driven Protocol    Potassium Replacement - Follow Nurse / BPA Driven Protocol    [COMPLETED] Insert Peripheral IV **AND** sodium chloride    Physical Exam:  General Appearance:  Alert   HEENT:  Normocephalic, without  obvious abnormality, Conjunctiva/corneas clear,.   Nares normal, no drainage     Neck:  Supple, symmetrical, trachea midline.   Lungs /Chest wall: Improved air entry with mild bilateral basal rhonchi, respirations unlabored, symmetrical wall movement.     Heart:  Regular rate and rhythm, S1 S2 normal  Abdomen: Soft, non-tender, no masses, no organomegaly.    Extremities: No edema, no clubbing or cyanosis     ROS  Constitutional: Negative for chills, fever and malaise/fatigue.   HENT: Hard of hearing.    Eyes: Negative.    Cardiovascular: Negative.    Respiratory: Positive for improving cough and shortness of breath.    Skin: Negative.    Musculoskeletal: Negative.    Gastrointestinal: Negative.    Genitourinary: Negative.    Neurological: Generalized weakness      I reviewed the recent clinical results  I personally reviewed the latest radiological studies    Part of this note may be an electronic transcription/translation of spoken language to printed text using the Dragon Dictation System.

## 2024-12-10 NOTE — CONSULTS
"Palliative Care Consultation    Patient Name: Mikael Shanks  : 1935  MRN: [id number]  Allergies: Penicillins    Requesting clinician:  Juliana   Reason for consult: Consultation for clarification of goals of care and code status.      Patient Code Status:   Code Status and Medical Interventions: No CPR (Do Not Attempt to Resuscitate); Limited Support; No intubation (DNI)   Ordered at: 24 1520     Medical Intervention Limits:    No intubation (DNI)     Level Of Support Discussed With:    Patient     Code Status (Patient has no pulse and is not breathing):    No CPR (Do Not Attempt to Resuscitate)     Medical Interventions (Patient has pulse or is breathing):    Limited Support           Chief Complaint:    Shortness of breath     History of Present Illness    Mikael Shanks is a 89 y.o. male who presented to [facility] ED on 12/3 with reports of shortness of breath and right groin pain. Patient is [place] and difficult to communicate with at time of presentation. Daughter Sadia assisted with HPI. No other symptoms reported.     Of note: Recent [facility] with workup for CHF.     In ED: Troponin 47, BNP 4620, Creatinine 1.65, Glucose 140, Lactic 2.2, WBC 20.53    Chest CT results as follows: \" Large mass in the right hepatic lobe involving segments 7 and 8 which may relate to malignancy or metastasis measuring up to 9.5 cm. Recommend CT abdomen and pelvis with contrast for further assessment. Patchy areas of airspace disease involving left upper lobes and bilateral lower lobes concerning for multifocal pneumonia superimposed on background of chronic interstitial lung disease. \"     Hem onc and Pulmonary consulted on arrival. Echo and blood cultures obtained. IV antibiotics started.       VITAL SIGNS:   Temp:  [97.2 °F (36.2 °C)-98.1 °F (36.7 °C)] 97.2 °F (36.2 °C)  Heart Rate:  [54-72] 72  Resp:  [12-21] 21  BP: (109-130)/(45-64) 130/61       PMH: CAD, GERD, DM, HLD, HTN    Past Surgical History: "   Procedure Laterality Date    BACK SURGERY      CARDIAC CATHETERIZATION N/A 2019    Procedure: Left Heart Cath and coronary angiogram;  Surgeon: Dayday Ruiz MD;  Location: [facility] CATH INVASIVE LOCATION;  Service: Cardiovascular    CARDIAC CATHETERIZATION N/A 2019    Procedure: Right and Left Heart Cath;  Surgeon: Dayday Ruiz MD;  Location: [facility] CATH INVASIVE LOCATION;  Service: Cardiovascular    CARDIAC CATHETERIZATION N/A 2020    Procedure: Left and Right Heart Cath with Coronary Angiography;  Surgeon: Dayday Ruiz MD;  Location: [facility] CATH INVASIVE LOCATION;  Service: Cardiovascular;  Laterality: N/A;    CAROTID ENDARTERECTOMY Left     CHOLECYSTECTOMY      COLONOSCOPY  2018    No repeat    CORONARY ANGIOPLASTY WITH STENT PLACEMENT      LUMBAR LAMINECTOMY Bilateral 2022    Procedure: LUMBAR LAMINECTOMY WITH/WITHOUT FUSION L4-L5;  Surgeon: Geronimo Gonzales MD;  Location: [facility] MAIN OR;  Service: Neurosurgery;  Laterality: Bilateral;       Family History   Problem Relation Age of Onset    Cancer Mother     Heart disease Father     Cancer Brother        Social History     Tobacco Use    Smoking status: Former     Current packs/day: 0.00     Average packs/day: 5.0 packs/day for 7.0 years (35.0 ttl pk-yrs)     Types: Cigarettes     Start date: 1953     Quit date: 1960     Years since quittin.3     Passive exposure: Past    Smokeless tobacco: Never   Vaping Use    Vaping status: Never Used   Substance Use Topics    Alcohol use: Not Currently     Comment: very rare    Drug use: No           LABS:    Results from last 7 days   Lab Units 12/10/24  0435   WBC 10*3/mm3 15.37*   HEMOGLOBIN g/dL 12.8*   HEMATOCRIT % 43.3   PLATELETS 10*3/mm3 273     Results from last 7 days   Lab Units 12/10/24  0435   SODIUM mmol/L 137   POTASSIUM mmol/L 4.7   CHLORIDE mmol/L 104   CO2 mmol/L 22.4   BUN mg/dL 28*   CREATININE mg/dL 1.36*   GLUCOSE mg/dL 94   CALCIUM mg/dL 9.2      Results from last 7 days   Lab Units 12/10/24  0435   SODIUM mmol/L 137   POTASSIUM mmol/L 4.7   CHLORIDE mmol/L 104   CO2 mmol/L 22.4   BUN mg/dL 28*   CREATININE mg/dL 1.36*   CALCIUM mg/dL 9.2   BILIRUBIN mg/dL 0.3   ALK PHOS U/L 83   ALT (SGPT) U/L 50*   AST (SGOT) U/L 47*   GLUCOSE mg/dL 94         IMAGING STUDIES:  No radiology results for the last day      I reviewed the patient's new clinical results including labs, imaging, and vitals.        Scheduled Meds:  atorvastatin, 80 mg, Oral, Nightly  cetirizine, 10 mg, Oral, Daily  cholecalciferol, 1,000 Units, Oral, Daily  empagliflozin, 12.5 mg, Oral, Daily  finasteride, 5 mg, Oral, Daily  guaiFENesin, 1,200 mg, Oral, Q12H  insulin lispro, 2-7 Units, Subcutaneous, 4x Daily AC & at Bedtime  Lidocaine, 1 patch, Transdermal, Q24H  metoprolol succinate XL, 12.5 mg, Oral, Q24H  montelukast, 10 mg, Oral, Nightly  pantoprazole, 40 mg, Oral, Q AM  polyethylene glycol, 17 g, Oral, Daily  sacubitril-valsartan, 1 tablet, Oral, Daily  senna-docusate sodium, 1 tablet, Oral, Daily  sertraline, 50 mg, Oral, Daily      Continuous Infusions:       I have reviewed patient's current medication list.     Review of Systems   Constitutional:  Positive for fatigue.   HENT:  Positive for hearing loss.    Respiratory:  Positive for shortness of breath.    All other systems reviewed and are negative.        Physical Exam  Vitals and nursing note reviewed.   Constitutional:       Appearance: He is ill-appearing.   HENT:      Head: Normocephalic and atraumatic.      Ears:      Comments: [place]     Nose: Nose normal.      Mouth/Throat:      Mouth: Mucous membranes are dry.      Pharynx: Oropharynx is clear.   Eyes:      Conjunctiva/sclera: Conjunctivae normal.   Cardiovascular:      Rate and Rhythm: Normal rate.      Pulses: Normal pulses.   Pulmonary:      Effort: Pulmonary effort is normal.   Abdominal:      Palpations: Abdomen is soft.   Musculoskeletal:         General: Normal range  "of motion.      Cervical back: Normal range of motion.   Skin:     General: Skin is dry.   Neurological:      General: No focal deficit present.             PROBLEM LIST:    Multifocal pneumonia    Moderate protein-calorie malnutrition          ASSESSMENT/PLAN:    Acute hypoxic respiratory failure: likely secondary to rhinovirus and pneumonia. On symptomatic management. Pulmonary consulted. On IV antibiotics.     Liver mass: Noted on CT. Chest CT results as follows: \" Large mass in the right hepatic lobe involving segments 7 and 8 which may relate to malignancy or metastasis measuring up to 9.5 cm. Recommend CT abdomen and pelvis with contrast for further assessment. Patchy areas of airspace disease involving left upper lobes and bilateral lower lobes concerning for multifocal pneumonia superimposed on background of chronic interstitial lung disease. \" Taken for biopsy on .     CHF: with elevated BNP. Echo in  with EF 55%. Repeat echo ordered.     CKD/HLD/HTN/DM/GERD/Hypothyroidism: chronic conditions per primary.     These illnesses and their management contribute to the need for a palliative consult and advanced care planning.      ADVANCED CARE PLANNIN/10 Met with patient at bedside. He is sleeping, and [place], not appropriate for a goals of care discussion. My  attempted to reach family to discuss discharge and plan of care. Unsuccessful. Complicated social dynamics were shared with the floor , they also plan to follow up with family. Patient is a DNR/DNI. POST on file affirming goals as DNR/DNI, limited intervention. This information was shared with nursing and case management.       Advanced Directives: Patient has advance directive, copy in chart  Health Care Directive on file: No  Health Care Surrogate:      Palliative Performance Scale Score:    Comments:           Decisional Capacity: yes  Patient's understanding of illness: unknown  Patient goals of care: "  DNR/DNI      Thank you for this consult and allowing us to participate in patient's plan of care. Palliative Care Team will continue to follow patient.       I spent 45 minutes reviewing providers documentation, medication records, assessing and examining patient, discussing with family, answering questions, providing guidance about a plan of care, and coordinating care with other healthcare members. More than 50% of time spent face to face discussing disease education, current clinical status, and medication management.       Laney Howe, APRN  12/10/2024

## 2024-12-10 NOTE — THERAPY TREATMENT NOTE
Subjective: Pt agreeable to therapeutic plan of care. Pt c/o significant pain in rectum d/t constipation and discomfort d/t difficulty urinating.     Cognition: oriented to Person and Place difficult to assess d/t hearing loss     Objective:     Precautions - high falls, hearing loss, R groin pain     Bed Mobility: Min-A supine to sit with use of bed rails    Functional Transfers: CGA from edge of bed, min A to come to standing from commode with use of safety bar      Balance: supported, static, with UE support, and standing CGA, Min-A, and with rolling walker  Functional Ambulation: CGA and with rolling walker    Toileting: Mod-A  ADL Position: supported standing and unsupported sitting  ADL Comments: assist with managing clothing, perineal hygiene for thoroughness     Vitals: tachypneic, RR increase to 40 at rest d/t discomfort     Pain: does not give a number VAS  Location: rectum   Interventions for pain: Repositioned, RN notified, Increased Activity, and Therapeutic Presence  Education: Provided education on the importance of mobility in the acute care setting, Verbal/Tactile Cues, ADL training, and Transfer Training      Assessment: Mikael Shanks presents with ADL impairments affecting function including balance, endurance / activity tolerance, pain, postural / trunk control, and strength. Demonstrated functioning below baseline abilities indicate the need for continued skilled intervention while inpatient. Pt reports significant rectal pain d/t constipation, agreeable to attempt to sit on commode to void. Pt hips in flexion with LE on elevated surface to improve empty of bowels, pt reports slight relief in pain. Pt requiring increased assist with functional transfers and ADL routine, thus OT continues to recommend SNF when medically appropriate for dc. Tolerating session today without incident. Will continue to follow and progress as tolerated.     Plan/Recommendations:   Moderate Intensity Therapy  "recommended post-acute care. This is recommended as therapy feels the patient would require 3-4 days per week and wouldn't tolerate \"3 hour daily\" rehab intensity. SNF would be the preferred choice. If the patient does not agree to SNF, arrange HH or OP depending on home bound status. If patient is medically complex, consider LTACH.. Pt requires no DME at discharge.     Pt desires Home with family assist and Home Health vs SNF at discharge. Pt cooperative; agreeable to therapeutic recommendations and plan of care.     Modified Sophia: N/A = No pre-op stroke/TIA    Post-Tx Position: Alarms activated and Call light and personal items within reach, side lying on L side   PPE: gloves    Therapy Charges for Today       Code Description Service Date Service Provider Modifiers Qty    78653188889  OT THERAPEUTIC ACT EA 15 MIN 12/10/2024 Brandy Rivera OT GO 1    [id number]  OT SELF CARE/MGMT/TRAIN EA 15 MIN 12/10/2024 Brandy Rivera OT GO 1           Time Calculation- OT       Row Name 12/10/24 1209             Time Calculation- OT    OT Start Time 1038  -MS      OT Stop Time 1101  -MS      OT Time Calculation (min) 23 min  -MS      Total Timed Code Minutes- OT 23 minute(s)  -MS      OT Received On 12/10/24  -MS      OT - Next Appointment 12/12/24  -MS                User Key  (r) = Recorded By, (t) = Taken By, (c) = Cosigned By      Initials Name Provider Type    Brandy Good OT Occupational Therapist                    "

## 2024-12-10 NOTE — PLAN OF CARE
"Assessment: Mikael Shanks presents with functional mobility impairments which indicate the need for skilled intervention. Pt limited this date due to persistent, sharp rectal pain, reporting constipation. Pt was CGA for ambulation with RW to the bathroom, Cm to help facilitate   \"Squatty potty\" positioning. Guided through breath work due to tachypnea and to prevent bearing down. Pt reported improved pain, but not able to go to the bathroom. Smear BM noted this date and observed rectal bleeding. Notified RN. Helped pt repositioning into L sidelying fetal position with pillow in between legs for comfort and for improved digestion. Tolerating session today without incident. Will continue to follow and progress as tolerated.   "

## 2024-12-10 NOTE — PROGRESS NOTES
[facility] MEDICINE SERVICE  DAILY PROGRESS NOTE    NAME: Mikael Shanks  : 1935  MRN: [id number]      LOS: 7 days     PROVIDER OF SERVICE: Uday Andrews MD    Chief Complaint: Multifocal pneumonia    Subjective:     Interval History:  History taken from: patient and nurse    No acute overnight events.  Patient is hard of hearing, denies any chest pain, shortness of breath improving per patient, currently on room air evaluated, reports constipation, denies vomiting nausea.    Review of Systems:   Review of Systems    Objective:     Vital Signs  Temp:  [97.4 °F (36.3 °C)-98.1 °F (36.7 °C)] 97.7 °F (36.5 °C)  Heart Rate:  [54-70] 65  Resp:  [12-19] 19  BP: (109-125)/(45-64) 124/57   Body mass index is 21.95 kg/m².    Physical Exam  General Appearance:  Awake, alert   Head:  Atraumatic normocephalic       Neck: Range of motion normal my exam   Pulm: Decreased breath sounds at bases, no wheezes   Cardio: Moderate, rhythm regular on my exam   Extremities: No significant edema of the bilateral lower extremities   Abdomen: Soft, nontender                           Scheduled Meds   atorvastatin, 80 mg, Oral, Nightly  cetirizine, 10 mg, Oral, Daily  cholecalciferol, 1,000 Units, Oral, Daily  empagliflozin, 12.5 mg, Oral, Daily  finasteride, 5 mg, Oral, Daily  guaiFENesin, 1,200 mg, Oral, Q12H  insulin lispro, 2-7 Units, Subcutaneous, 4x Daily AC & at Bedtime  Lidocaine, 1 patch, Transdermal, Q24H  metoprolol succinate XL, 12.5 mg, Oral, Q24H  montelukast, 10 mg, Oral, Nightly  pantoprazole, 40 mg, Oral, Q AM  polyethylene glycol, 17 g, Oral, Daily  sacubitril-valsartan, 1 tablet, Oral, Daily  senna-docusate sodium, 1 tablet, Oral, Daily  sertraline, 50 mg, Oral, Daily       PRN Meds     acetaminophen **OR** acetaminophen **OR** acetaminophen    albuterol sulfate HFA    aluminum-magnesium hydroxide-simethicone    senna-docusate sodium **AND** polyethylene glycol **AND** bisacodyl **AND** bisacodyl     Calcium Replacement - Follow Nurse / BPA Driven Protocol    dextrose    dextrose    glucagon (human recombinant)    HYDROcodone-acetaminophen    ipratropium-albuterol    Magnesium Standard Dose Replacement - Follow Nurse / BPA Driven Protocol    nitroglycerin    Phosphorus Replacement - Follow Nurse / BPA Driven Protocol    Potassium Replacement - Follow Nurse / BPA Driven Protocol    [COMPLETED] Insert Peripheral IV **AND** sodium chloride   Infusions         Diagnostic Data    Results from last 7 days   Lab Units 12/10/24  0435   WBC 10*3/mm3 15.37*   HEMOGLOBIN g/dL 12.8*   HEMATOCRIT % 43.3   PLATELETS 10*3/mm3 273   GLUCOSE mg/dL 94   CREATININE mg/dL 1.36*   BUN mg/dL 28*   SODIUM mmol/L 137   POTASSIUM mmol/L 4.7   AST (SGOT) U/L 47*   ALT (SGPT) U/L 50*   ALK PHOS U/L 83   BILIRUBIN mg/dL 0.3   ANION GAP mmol/L 10.6       No radiology results for the last day      I reviewed the patient's new clinical results.    Assessment/Plan:   Mikael Shanks is a 89 y.o. male with a CMH of CAD, diabetes, GERD, hyperlipidemia, hypertension who presented to [facility] on 12/3/2024 with shortness of breath.  Patient states he came to the hospital for shortness of breath and right groin pain.  Patient is very hard of hearing and history was limited due to patient not having his hearing aids.  Patient gave permission to speak with his daughter, Sadia.  Sadia stated that the patient had a recent admission at the [facility] where they were concerned for congestive heart failure and they also performed a heart cath.  Patient does live alone, does not use home oxyg     Assessments:  # Acute hypoxic respiratory failure, CHF exacerbation, possible pneumonia, combined  #Status multifocal pneumonia, status post antibiotics, pulmonology following  # Rhinovirus positive  #HFrEF, LVEF of 20 to 25% per the cardiology note on 6/20 lvef of 25 %   #CAD s/p stent in 2003  #Liver mass status post liver biopsy taken  # Elevated  AFP levels, with liver mass, concerning for HCC, oncology following  # SANDY on CKD versus CKD, creatinine on admission 1.65 previously creatinine 1.85 from July 2024  #DM 2  #GERD  #Anemia, hemoglobin 11.7    Plan:   -S/p abx for PNA, pulm reccs noted  -Biopsy showing HCC  -On Entresto 24-26 daily, continue metoprolol succinate 12.5, Continue SGLT jardiance 12.5 daily  -Asa, continue statins for now  -Will consult nephrology for sandy on ckd vs Possible just CKD, he is on entresto, will await nephrology if can continue  -Oncology following for liver mass, s/p biopsy results pending   -If no further procedure, will start him on heparin 5K TID dvt ppx  -AM labs will follow           VTE Prophylaxis:  Mechanical VTE prophylaxis orders are present.      Plan for disposition: Palliative Consult given HCC, oncology following, possible anticipate dc tomorrow 12/10 if pulm clears       Code status is   Code Status and Medical Interventions: No CPR (Do Not Attempt to Resuscitate); Limited Support; No intubation (DNI)   Ordered at: 12/06/24 1520     Medical Intervention Limits:    No intubation (DNI)     Level Of Support Discussed With:    Patient     Code Status (Patient has no pulse and is not breathing):    No CPR (Do Not Attempt to Resuscitate)     Medical Interventions (Patient has pulse or is breathing):    Limited Support       Time: 30 minutes    Part of this note may be an electronic transcription/translation of spoken language to printed text using the Dragon Dictation System.    Signature: Electronically signed by Uday Andrews MD, 12/10/24, 10:04 EST.  [facility]ist Team

## 2024-12-10 NOTE — PROGRESS NOTES
Hematology/Oncology Inpatient Progress Note    PATIENT NAME: Mikael Shanks  : 1935  MRN: [id number]    CHIEF COMPLAINT: shortness of breath    HISTORY OF PRESENT ILLNESS:      Mikael Shanks is a 89 y.o. male who presented to [facility] on 12/3/2024 with complaints of shortness of breath.  Past medical history of CAD, diabetes, GERD, hyperlipidemia, hypertension.  Presented with shortness of breath and right groin pain.  Due to being very hard of hearing and not having his hearing aids his HPI was obtained from family.  Family stated that the patient had a recent admission at the [facility] where they were concerned for congestive heart failure and also performed a heart cath.    Evaluation in the ED showed leukocytosis with a WBC of 20.53 and increased neutrophils.  Respiratory panel was positive for rhinovirus chest x-ray revealed chronic findings without acute changes.      CT of the chest without contrast  -Large mass in the right hepatic lobe involving segment 7 and 8 which may relate to malignancy or metastasis measuring up to 9.5 cm  -Patchy areas of airspace disease involving left upper lobes and bilateral lower lobes concerning for multifocal pneumonia superimposed on background of chronic interstitial lung disease  -Cardiomegaly    CT of the abdomen and pelvis with contrast  -Heterogenous appearing mass in the right hepatic lobe at the hepatic dome measuring up to 9.3 cm suspicious for malignancy.  Further characterization with MRI abdomen with and without recommended    24  Hematology/Oncology was consulted due to newly discovered liver mass.    Subjective         ROS:  Review of Systems   Constitutional:  Negative for chills, fatigue and fever.   HENT:  Negative for congestion, drooling, ear discharge, rhinorrhea, sinus pressure and tinnitus.    Eyes:  Negative for photophobia, pain and discharge.   Respiratory:  Negative for apnea, choking and stridor.     Cardiovascular:  Negative for palpitations.   Gastrointestinal:  Negative for abdominal distention, abdominal pain and anal bleeding.   Endocrine: Negative for polydipsia and polyphagia.   Genitourinary:  Negative for decreased urine volume, flank pain and genital sores.   Musculoskeletal:  Negative for gait problem, neck pain and neck stiffness.   Skin:  Negative for color change, rash and wound.   Neurological:  Negative for tremors, seizures, syncope, facial asymmetry and speech difficulty.   Hematological:  Negative for adenopathy.   Psychiatric/Behavioral:  Negative for agitation, confusion, hallucinations and self-injury. The patient is not hyperactive.         MEDICATIONS:    Scheduled Meds:  atorvastatin, 80 mg, Oral, Nightly  cetirizine, 10 mg, Oral, Daily  cholecalciferol, 1,000 Units, Oral, Daily  empagliflozin, 12.5 mg, Oral, Daily  finasteride, 5 mg, Oral, Daily  guaiFENesin, 1,200 mg, Oral, Q12H  insulin lispro, 2-7 Units, Subcutaneous, 4x Daily AC & at Bedtime  Lidocaine, 1 patch, Transdermal, Q24H  metoprolol succinate XL, 12.5 mg, Oral, Q24H  montelukast, 10 mg, Oral, Nightly  pantoprazole, 40 mg, Oral, Q AM  polyethylene glycol, 17 g, Oral, Daily  sacubitril-valsartan, 1 tablet, Oral, Daily  senna-docusate sodium, 1 tablet, Oral, Daily  sertraline, 50 mg, Oral, Daily  tamsulosin, 0.4 mg, Oral, Daily       Continuous Infusions:      PRN Meds:    acetaminophen **OR** acetaminophen **OR** acetaminophen    albuterol sulfate HFA    aluminum-magnesium hydroxide-simethicone    senna-docusate sodium **AND** polyethylene glycol **AND** bisacodyl **AND** bisacodyl    Calcium Replacement - Follow Nurse / BPA Driven Protocol    dextrose    dextrose    glucagon (human recombinant)    HYDROcodone-acetaminophen    ipratropium-albuterol    Magnesium Standard Dose Replacement - Follow Nurse / BPA Driven Protocol    nitroglycerin    Phosphorus Replacement - Follow Nurse / BPA Driven Protocol    Potassium  "Replacement - Follow Nurse / BPA Driven Protocol    [COMPLETED] Insert Peripheral IV **AND** sodium chloride     ALLERGIES:    Allergies   Allergen Reactions    Penicillins Hives       Objective    VITALS:   /50 (BP Location: Right arm, Patient Position: Lying)   Pulse 72   Temp 97.9 °F (36.6 °C) (Axillary)   Resp 20   Ht 177.8 cm (70\")   Wt 69.4 kg (153 lb)   SpO2 94%   BMI 21.95 kg/m²     PHYSICAL EXAM: (performed by MD)  Physical Exam  Vitals and nursing note reviewed.   Constitutional:       General: He is not in acute distress.     Appearance: He is not diaphoretic.   HENT:      Head: Normocephalic and atraumatic.   Eyes:      General: No scleral icterus.        Right eye: No discharge.         Left eye: No discharge.      Conjunctiva/sclera: Conjunctivae normal.   Neck:      Thyroid: No thyromegaly.   Cardiovascular:      Rate and Rhythm: Normal rate and regular rhythm.      Heart sounds: Normal heart sounds.      No friction rub. No gallop.   Pulmonary:      Effort: Pulmonary effort is normal. No respiratory distress.      Breath sounds: No stridor. No wheezing.   Abdominal:      General: Bowel sounds are normal.      Palpations: Abdomen is soft. There is no mass.      Tenderness: There is no abdominal tenderness. There is no guarding or rebound.   Musculoskeletal:         General: No tenderness. Normal range of motion.      Cervical back: Normal range of motion and neck supple.   Lymphadenopathy:      Cervical: No cervical adenopathy.   Skin:     General: Skin is warm.      Findings: No erythema or rash.   Neurological:      Mental Status: He is alert and oriented to person, place, and time.      Motor: No abnormal muscle tone.   Psychiatric:         Behavior: Behavior normal.         RECENT LABS:  Lab Results (last 24 hours)       Procedure Component Value Units Date/Time    POC Glucose 4x Daily Before Meals & at Bedtime [[id number]]  (Abnormal) Collected: 12/10/24 2843    Specimen: Blood " Updated: 12/10/24 1720     Glucose 114 mg/dL      Comment: Serial Number: [id number]:  [id number]       POC Glucose Once [[id number]]  (Normal) Collected: 12/10/24 1129    Specimen: Blood Updated: 12/10/24 1131     Glucose 103 mg/dL      Comment: Serial Number: [id number]:  [id number]       POC Glucose 4x Daily Before Meals & at Bedtime [[id number]]  (Normal) Collected: 12/10/24 0719    Specimen: Blood Updated: 12/10/24 0721     Glucose 87 mg/dL      Comment: Serial Number: [id number]:  [id number]       Comprehensive Metabolic Panel [[id number]]  (Abnormal) Collected: 12/10/24 0435    Specimen: Blood Updated: 12/10/24 0516     Glucose 94 mg/dL      BUN 28 mg/dL      Creatinine 1.36 mg/dL      Sodium 137 mmol/L      Potassium 4.7 mmol/L      Comment: Slight hemolysis detected by analyzer. Result may be falsely elevated.        Chloride 104 mmol/L      CO2 22.4 mmol/L      Calcium 9.2 mg/dL      Total Protein 5.8 g/dL      Albumin 3.3 g/dL      ALT (SGPT) 50 U/L      AST (SGOT) 47 U/L      Alkaline Phosphatase 83 U/L      Total Bilirubin 0.3 mg/dL      Globulin 2.5 gm/dL      A/G Ratio 1.3 g/dL      BUN/Creatinine Ratio 20.6     Anion Gap 10.6 mmol/L      eGFR 49.7 mL/min/1.73     Narrative:      GFR Normal >60  Chronic Kidney Disease <60  Kidney Failure <15    The GFR formula is only valid for adults with stable renal function between ages 18 and 70.    Magnesium [[id number]]  (Normal) Collected: 12/10/24 0435    Specimen: Blood Updated: 12/10/24 0516     Magnesium 1.9 mg/dL     Phosphorus [[id number]]  (Normal) Collected: 12/10/24 0435    Specimen: Blood Updated: 12/10/24 0513     Phosphorus 3.9 mg/dL     CBC & Differential [[id number]]  (Abnormal) Collected: 12/10/24 0435    Specimen: Blood Updated: 12/10/24 0442    Narrative:      The following orders were created for panel order CBC & Differential.  Procedure                               Abnormality         Status                     ---------                                -----------         ------                     CBC Auto Differential[[id number]]        Abnormal            Final result               Scan Slide[[id number]]                                                                    Please view results for these tests on the individual orders.    CBC Auto Differential [[id number]]  (Abnormal) Collected: 12/10/24 0435    Specimen: Blood Updated: 12/10/24 0442     WBC 15.37 10*3/mm3      RBC 4.64 10*6/mm3      Hemoglobin 12.8 g/dL      Hematocrit 43.3 %      MCV 93.3 fL      MCH 27.6 pg      MCHC 29.6 g/dL      RDW 15.5 %      RDW-SD 52.7 fl      MPV 10.8 fL      Platelets 273 10*3/mm3      Neutrophil % 75.0 %      Lymphocyte % 10.2 %      Monocyte % 5.4 %      Eosinophil % 2.7 %      Basophil % 1.4 %      Immature Grans % 5.3 %      Neutrophils, Absolute 11.54 10*3/mm3      Lymphocytes, Absolute 1.57 10*3/mm3      Monocytes, Absolute 0.83 10*3/mm3      Eosinophils, Absolute 0.41 10*3/mm3      Basophils, Absolute 0.21 10*3/mm3      Immature Grans, Absolute 0.81 10*3/mm3      nRBC 0.0 /100 WBC     POC Glucose Once [[id number]]  (Abnormal) Collected: 12/09/24 1956    Specimen: Blood Updated: 12/09/24 1958     Glucose 154 mg/dL      Comment: Serial Number: [id number]:  [id number]               PENDING RESULTS:     IMAGING REVIEWED:  No radiology results for the last day    Assessment & Plan   ASSESSMENT:    Moderately differentiated hepatocellular carcinoma presenting as a right hepatic lobe mass: 9.3 cm mass suspicious for malignancy. AFP is  elevated to 3,304.  CEA is 3.1.  Viral hepatitis panel is negative, HFE analysis is pending  Normocytic anemia: Hemoglobin 10.6, MCV 92.0.  Has a history of pernicious anemia and is on B12 injection monthly with his PCP.     PLAN    Reviewed pathology with patient.  Discussed the diagnosis.  Discussed about hepatocellular process and surgical disease given his advanced age probably will not be able to tolerate  hepatic resection.  Other local treatment options including TACE could be considered to see if will be a candidate given the large size of the mass.  This will require outpatient consultation  Iron studies with mitch reviewed.  HFE analysis is pending  Discussed with patient  Will continue to follow

## 2024-12-10 NOTE — THERAPY TREATMENT NOTE
"Subjective: Pt agreeable to therapeutic plan of care.    Objective:     Precautions - falls    Bed mobility - Modified-Independent    Transfers - CGA, Min-A, and with rolling walker from bed and low toilet    Ambulation - 2 x 12 feet CGA and with rolling walker    Toileting - SBA; helped place small trash cans under BLE to facilitate \"squatty potty\" position to reduce patient's pain    Vitals: Increased respiratory rate - responded well to cues for breath work    Pain: 7 VAS   Location: bottom, constipated  Intervention for pain: Repositioned, RN notified, Increased Activity, and Therapeutic Presence    Education: Provided education on the importance of mobility in the acute care setting, Verbal/Tactile Cues, ADL training, Transfer Training, Gait Training, and Energy conservation strategies    Assessment: Mikael Shanks presents with functional mobility impairments which indicate the need for skilled intervention. Pt limited this date due to persistent, sharp rectal pain, reporting constipation. Pt was CGA for ambulation with RW to the bathroom, Cm to help facilitate   \"Squatty potty\" positioning. Guided through breath work due to tachypnea and to prevent bearing down. Pt reported improved pain, but not able to go to the bathroom. Smear BM noted this date and observed rectal bleeding. Notified RN. Helped pt repositioning into L sidelying fetal position with pillow in between legs for comfort and for improved digestion. Tolerating session today without incident. Will continue to follow and progress as tolerated.     Plan/Recommendations:   If medically appropriate, Low Intensity Therapy recommended post-acute care - This is recommended as therapy feels this patient would require 2-3 visits per week. OP or HH would be the best option depending on patient's home bound status. Consider, if the patient has other  \"skilled\" needs such as wounds, IV antibiotics, etc. Combined with \"low intensity\" could also equate to a " SNF. If patient is medically complex, consider LTAC. Pt requires no DME at discharge.     Pt desires Home with Home Health at discharge. Pt cooperative; agreeable to therapeutic recommendations and plan of care.         Basic Mobility 6-click:  Rollin = Total, A lot = 2, A little = 3; 4 = None  Supine>Sit:   1 = Total, A lot = 2, A little = 3; 4 = None   Sit>Stand with arms:  1 = Total, A lot = 2, A little = 3; 4 = None  Bed>Chair:   1 = Total, A lot = 2, A little = 3; 4 = None  Ambulate in room:  1 = Total, A lot = 2, A little = 3; 4 = None  3-5 Steps with railin = Total, A lot = 2, A little = 3; 4 = None  Score: 19    Modified [place]: N/A = No pre-op stroke/TIA    Post-Tx Position: sidelying fetal position with pillow between legs; call light within reach; bed alarm on  PPE: gloves, surgical mask, and gown    Therapy Charges for Today       Code Description Service Date Service Provider Modifiers Qty    80116863092 HC PT THERAPEUTIC ACT EA 15 MIN 12/10/2024 Patricia Drew, PT GP 1    36879380850 HC GAIT TRAINING EA 15 MIN 12/10/2024 Patricia Drew, PT GP 1           PT Charges       Row Name 12/10/24 1441             Time Calculation    Start Time 1038  -OD      Stop Time 1100  -OD      Time Calculation (min) 22 min  -OD      PT Received On 12/10/24  -OD      PT - Next Appointment 24  -OD         Time Calculation- PT    Total Timed Code Minutes- PT 22 minute(s)  -OD                User Key  (r) = Recorded By, (t) = Taken By, (c) = Cosigned By      Initials Name Provider Type    OD Patricia Drew PT Physical Therapist

## 2024-12-11 LAB
ALBUMIN SERPL-MCNC: 3.3 G/DL (ref 3.5–5.2)
ALBUMIN/GLOB SERPL: 1.1 G/DL
ALP SERPL-CCNC: 94 U/L (ref 39–117)
ALT SERPL W P-5'-P-CCNC: 53 U/L (ref 1–41)
ANION GAP SERPL CALCULATED.3IONS-SCNC: 16.3 MMOL/L (ref 5–15)
AST SERPL-CCNC: 48 U/L (ref 1–40)
BASOPHILS # BLD AUTO: 0.14 10*3/MM3 (ref 0–0.2)
BASOPHILS NFR BLD AUTO: 0.7 % (ref 0–1.5)
BILIRUB SERPL-MCNC: 0.6 MG/DL (ref 0–1.2)
BUN SERPL-MCNC: 28 MG/DL (ref 8–23)
BUN/CREAT SERPL: 19.4 (ref 7–25)
CALCIUM SPEC-SCNC: 9.7 MG/DL (ref 8.6–10.5)
CHLORIDE SERPL-SCNC: 103 MMOL/L (ref 98–107)
CO2 SERPL-SCNC: 20.7 MMOL/L (ref 22–29)
CREAT SERPL-MCNC: 1.44 MG/DL (ref 0.76–1.27)
DEPRECATED RDW RBC AUTO: 50.9 FL (ref 37–54)
EGFRCR SERPLBLD CKD-EPI 2021: 46.4 ML/MIN/1.73
EOSINOPHIL # BLD AUTO: 0.05 10*3/MM3 (ref 0–0.4)
EOSINOPHIL NFR BLD AUTO: 0.3 % (ref 0.3–6.2)
ERYTHROCYTE [DISTWIDTH] IN BLOOD BY AUTOMATED COUNT: 15.5 % (ref 12.3–15.4)
GLOBULIN UR ELPH-MCNC: 2.9 GM/DL
GLUCOSE BLDC GLUCOMTR-MCNC: 137 MG/DL (ref 70–105)
GLUCOSE BLDC GLUCOMTR-MCNC: 150 MG/DL (ref 70–105)
GLUCOSE BLDC GLUCOMTR-MCNC: 150 MG/DL (ref 70–105)
GLUCOSE BLDC GLUCOMTR-MCNC: 152 MG/DL (ref 70–105)
GLUCOSE SERPL-MCNC: 123 MG/DL (ref 65–99)
HCT VFR BLD AUTO: 42.6 % (ref 37.5–51)
HFE GENE MUT ANL BLD/T: NORMAL
HGB BLD-MCNC: 13.6 G/DL (ref 13–17.7)
IMM GRANULOCYTES # BLD AUTO: 0.62 10*3/MM3 (ref 0–0.05)
IMM GRANULOCYTES NFR BLD AUTO: 3.1 % (ref 0–0.5)
IMP & REVIEW OF LAB RESULTS: NORMAL
LYMPHOCYTES # BLD AUTO: 0.86 10*3/MM3 (ref 0.7–3.1)
LYMPHOCYTES NFR BLD AUTO: 4.3 % (ref 19.6–45.3)
MAGNESIUM SERPL-MCNC: 1.9 MG/DL (ref 1.6–2.4)
MCH RBC QN AUTO: 29 PG (ref 26.6–33)
MCHC RBC AUTO-ENTMCNC: 31.9 G/DL (ref 31.5–35.7)
MCV RBC AUTO: 90.8 FL (ref 79–97)
MONOCYTES # BLD AUTO: 0.85 10*3/MM3 (ref 0.1–0.9)
MONOCYTES NFR BLD AUTO: 4.3 % (ref 5–12)
NEUTROPHILS NFR BLD AUTO: 17.29 10*3/MM3 (ref 1.7–7)
NEUTROPHILS NFR BLD AUTO: 87.3 % (ref 42.7–76)
NRBC BLD AUTO-RTO: 0 /100 WBC (ref 0–0.2)
PHOSPHATE SERPL-MCNC: 4.8 MG/DL (ref 2.5–4.5)
PLATELET # BLD AUTO: 276 10*3/MM3 (ref 140–450)
PMV BLD AUTO: 11 FL (ref 6–12)
POTASSIUM SERPL-SCNC: 4.9 MMOL/L (ref 3.5–5.2)
PROT SERPL-MCNC: 6.2 G/DL (ref 6–8.5)
RBC # BLD AUTO: 4.69 10*6/MM3 (ref 4.14–5.8)
SODIUM SERPL-SCNC: 140 MMOL/L (ref 136–145)
WBC NRBC COR # BLD AUTO: 19.81 10*3/MM3 (ref 3.4–10.8)

## 2024-12-11 PROCEDURE — 83735 ASSAY OF MAGNESIUM: CPT | Performed by: STUDENT IN AN ORGANIZED HEALTH CARE EDUCATION/TRAINING PROGRAM

## 2024-12-11 PROCEDURE — 85025 COMPLETE CBC W/AUTO DIFF WBC: CPT | Performed by: STUDENT IN AN ORGANIZED HEALTH CARE EDUCATION/TRAINING PROGRAM

## 2024-12-11 PROCEDURE — 99231 SBSQ HOSP IP/OBS SF/LOW 25: CPT | Performed by: INTERNAL MEDICINE

## 2024-12-11 PROCEDURE — 82948 REAGENT STRIP/BLOOD GLUCOSE: CPT

## 2024-12-11 PROCEDURE — 80053 COMPREHEN METABOLIC PANEL: CPT | Performed by: STUDENT IN AN ORGANIZED HEALTH CARE EDUCATION/TRAINING PROGRAM

## 2024-12-11 PROCEDURE — 84100 ASSAY OF PHOSPHORUS: CPT | Performed by: STUDENT IN AN ORGANIZED HEALTH CARE EDUCATION/TRAINING PROGRAM

## 2024-12-11 RX ORDER — POLYETHYLENE GLYCOL 3350 17 G/17G
17 POWDER, FOR SOLUTION ORAL DAILY
Status: DISCONTINUED | OUTPATIENT
Start: 2024-12-12 | End: 2024-12-12 | Stop reason: HOSPADM

## 2024-12-11 RX ADMIN — POLYETHYLENE GLYCOL 3350 17 G: 17 POWDER, FOR SOLUTION ORAL at 08:26

## 2024-12-11 RX ADMIN — LIDOCAINE 1 PATCH: 4 PATCH TOPICAL at 17:59

## 2024-12-11 RX ADMIN — SENNOSIDES AND DOCUSATE SODIUM 2 TABLET: 50; 8.6 TABLET ORAL at 21:42

## 2024-12-11 RX ADMIN — TAMSULOSIN HYDROCHLORIDE 0.4 MG: 0.4 CAPSULE ORAL at 08:26

## 2024-12-11 RX ADMIN — HYDROCODONE BITARTRATE AND ACETAMINOPHEN 1 TABLET: 5; 325 TABLET ORAL at 21:42

## 2024-12-11 RX ADMIN — CETIRIZINE HYDROCHLORIDE 10 MG: 10 TABLET, FILM COATED ORAL at 08:26

## 2024-12-11 RX ADMIN — PANTOPRAZOLE SODIUM 40 MG: 40 TABLET, DELAYED RELEASE ORAL at 05:25

## 2024-12-11 RX ADMIN — HYDROCODONE BITARTRATE AND ACETAMINOPHEN 1 TABLET: 5; 325 TABLET ORAL at 10:07

## 2024-12-11 RX ADMIN — SENNOSIDES AND DOCUSATE SODIUM 1 TABLET: 50; 8.6 TABLET ORAL at 08:26

## 2024-12-11 RX ADMIN — GUAIFENESIN 1200 MG: 600 TABLET, MULTILAYER, EXTENDED RELEASE ORAL at 21:42

## 2024-12-11 RX ADMIN — GUAIFENESIN 1200 MG: 600 TABLET, MULTILAYER, EXTENDED RELEASE ORAL at 08:26

## 2024-12-11 RX ADMIN — MONTELUKAST 10 MG: 10 TABLET, FILM COATED ORAL at 21:42

## 2024-12-11 RX ADMIN — BISACODYL 10 MG: 10 SUPPOSITORY RECTAL at 18:00

## 2024-12-11 RX ADMIN — SERTRALINE HYDROCHLORIDE 50 MG: 50 TABLET, FILM COATED ORAL at 08:26

## 2024-12-11 RX ADMIN — Medication 1000 UNITS: at 08:27

## 2024-12-11 RX ADMIN — ATORVASTATIN CALCIUM 80 MG: 40 TABLET, FILM COATED ORAL at 21:42

## 2024-12-11 RX ADMIN — FINASTERIDE 5 MG: 5 TABLET, FILM COATED ORAL at 08:27

## 2024-12-11 RX ADMIN — SACUBITRIL AND VALSARTAN 1 TABLET: 24; 26 TABLET, FILM COATED ORAL at 08:26

## 2024-12-11 RX ADMIN — EMPAGLIFLOZIN 12.5 MG: 25 TABLET, FILM COATED ORAL at 08:26

## 2024-12-11 NOTE — PROGRESS NOTES
[facility] MEDICINE SERVICE  DAILY PROGRESS NOTE    NAME: Mikael Shanks  : 1935  MRN: [id number]      LOS: 8 days     PROVIDER OF SERVICE: Uday Andrews MD    Chief Complaint: Multifocal pneumonia    Subjective:     Interval History:  History taken from: patient and nurse    No acute overnight events.  Patient is hard of hearing, denies any chest pain, shortness of breath improving per patient, currently on room air evaluated, reports constipation, denies vomiting nausea.    Review of Systems:   Review of Systems    Objective:     Vital Signs  Temp:  [97.2 °F (36.2 °C)-98.4 °F (36.9 °C)] 98.4 °F (36.9 °C)  Heart Rate:  [69-81] 81  Resp:  [17-32] 25  BP: ()/(47-63) 114/52   Body mass index is 21.48 kg/m².    Physical Exam  General Appearance:  Awake, alert   Head:  Atraumatic normocephalic       Neck: Range of motion normal my exam   Pulm: Decreased breath sounds at bases, no wheezes   Cardio: Moderate, rhythm regular on my exam   Extremities: No significant edema of the bilateral lower extremities   Abdomen: Soft, nontender                           Scheduled Meds   atorvastatin, 80 mg, Oral, Nightly  cetirizine, 10 mg, Oral, Daily  cholecalciferol, 1,000 Units, Oral, Daily  empagliflozin, 12.5 mg, Oral, Daily  finasteride, 5 mg, Oral, Daily  guaiFENesin, 1,200 mg, Oral, Q12H  insulin lispro, 2-7 Units, Subcutaneous, 4x Daily AC & at Bedtime  Lidocaine, 1 patch, Transdermal, Q24H  metoprolol succinate XL, 12.5 mg, Oral, Q24H  montelukast, 10 mg, Oral, Nightly  pantoprazole, 40 mg, Oral, Q AM  polyethylene glycol, 17 g, Oral, Daily  sacubitril-valsartan, 1 tablet, Oral, Daily  senna-docusate sodium, 1 tablet, Oral, Daily  sertraline, 50 mg, Oral, Daily  tamsulosin, 0.4 mg, Oral, Daily       PRN Meds     acetaminophen **OR** acetaminophen **OR** acetaminophen    albuterol sulfate HFA    aluminum-magnesium hydroxide-simethicone    senna-docusate sodium **AND** polyethylene glycol  **AND** bisacodyl **AND** bisacodyl    Calcium Replacement - Follow Nurse / BPA Driven Protocol    dextrose    dextrose    glucagon (human recombinant)    HYDROcodone-acetaminophen    ipratropium-albuterol    Magnesium Standard Dose Replacement - Follow Nurse / BPA Driven Protocol    nitroglycerin    Phosphorus Replacement - Follow Nurse / BPA Driven Protocol    Potassium Replacement - Follow Nurse / BPA Driven Protocol    [COMPLETED] Insert Peripheral IV **AND** sodium chloride   Infusions         Diagnostic Data    Results from last 7 days   Lab Units 12/11/24  0428   WBC 10*3/mm3 19.81*   HEMOGLOBIN g/dL 13.6   HEMATOCRIT % 42.6   PLATELETS 10*3/mm3 276   GLUCOSE mg/dL 123*   CREATININE mg/dL 1.44*   BUN mg/dL 28*   SODIUM mmol/L 140   POTASSIUM mmol/L 4.9   AST (SGOT) U/L 48*   ALT (SGPT) U/L 53*   ALK PHOS U/L 94   BILIRUBIN mg/dL 0.6   ANION GAP mmol/L 16.3*       No radiology results for the last day      I reviewed the patient's new clinical results.    Assessment/Plan:   Mikael Shanks is a 89 y.o. male with a CMH of CAD, diabetes, GERD, hyperlipidemia, hypertension who presented to [facility] on 12/3/2024 with shortness of breath.  Patient states he came to the hospital for shortness of breath and right groin pain.  Patient is very hard of hearing and history was limited due to patient not having his hearing aids.  Patient gave permission to speak with his daughter, Sadia.  Sadia stated that the patient had a recent admission at the [facility] where they were concerned for congestive heart failure and they also performed a heart cath.  Patient does live alone, does not use home oxyg     Assessments:  # Acute hypoxic respiratory failure, CHF exacerbation, possible pneumonia, combined  #Status multifocal pneumonia, status post antibiotics, pulmonology following  # Rhinovirus positive  #HFrEF, LVEF of 20 to 25% per the cardiology note on 6/20 lvef of 25 %   #CAD s/p stent in 2003  #Liver mass  status post liver biopsy taken  # Elevated AFP levels, with liver mass, concerning for HCC, oncology following  # SANDY on CKD versus CKD, creatinine on admission 1.65 previously creatinine 1.85 from July 2024  #DM 2  #GERD  #Anemia, hemoglobin 11.7    Plan:   -S/p abx for PNA, pulm reccs noted  -Biopsy showing HCC, oncology following  -On Entresto 24-26 daily, continue metoprolol succinate 12.5, Continue SGLT jardiance 12.5 daily  -Asa, continue statins for now  -Will consult nephrology for sandy on ckd vs Possible just CKD, he is on entresto, will await nephrology if can continue  -Oncology following for liver mass, s/p biopsy results pending   -If no further procedure, will start him on heparin 5K TID dvt ppx  -AM labs will follow     12/11:   -Oncology following, palliative consulted, ongoing discussion for GOC, family meeting today this afternoon  -Urinary retention, consulted urology given he is needing frequent straight cath, on tamsulosin   -Will follow palliative reccs, leukocytosis likely reactive with ongoing malignancy, just finished course, if continue to uptrend will consider repeating blood cultures at that time   -AM labs will follow          VTE Prophylaxis:  Mechanical VTE prophylaxis orders are present.      Plan for disposition: Palliative Consult given HCC, oncology following, possible anticipate dc tomorrow 12/10 if pulm clears       Code status is   Code Status and Medical Interventions: No CPR (Do Not Attempt to Resuscitate); Limited Support; No intubation (DNI)   Ordered at: 12/06/24 1520     Medical Intervention Limits:    No intubation (DNI)     Level Of Support Discussed With:    Patient     Code Status (Patient has no pulse and is not breathing):    No CPR (Do Not Attempt to Resuscitate)     Medical Interventions (Patient has pulse or is breathing):    Limited Support       Time: 30 minutes    Part of this note may be an electronic transcription/translation of spoken language to printed text  using the Dragon Dictation System.    Signature: Electronically signed by Uday Andrews MD, 12/11/24, 14:05 EST.  [facility] Hospitalist Team

## 2024-12-11 NOTE — PROGRESS NOTES
Daily Progress Note          Assessment    Multifocal pneumonia  Hypoxemia  Human rhinovirus infection  BILL: Polysomnography 2019 AHI 78.3, titrated for auto BiPAP 6-16 with PS 4 cm H2O  Liver mass: Hepatocellular carcinoma  CHF  CKD  HTN  HLD  GERD     PFTs/spirometry 8/31/2020: Normal, FEV1/FVC 84%, FVC 89%, FEV1 107%        Recommendations:  Respiratory status is gradually improving can be discharged from pulmonary standpoint follow-up in the office in 3 weeks    antibiotic: Completed Rocephin  Oxygen supplement and titration to maintain saturation 90 to 95%: Currently on room air alternating with 1 L per nasal cannula  Mucinex  Prednisone 20 mg daily completed  Encourage use of incentive spirometry     Singulair     Oncology following for liver mass, status post CT-guided biopsy, Moderately differentiated hepatocellular carcinoma         I personally reviewed the radiological studies             LOS: 8 days     Subjective     Patient reports improvement in the cough and shortness of breath    Objective     Vital signs for last 24 hours:  Vitals:    12/11/24 0010 12/11/24 0513 12/11/24 0616 12/11/24 0826   BP: 98/47 109/48  134/59   BP Location: Right arm Right arm  Right arm   Patient Position: Lying Lying  Lying   Pulse: 75 75 80 78   Resp: 27 17  20   Temp: 98.1 °F (36.7 °C) 97.7 °F (36.5 °C)  97.7 °F (36.5 °C)   TempSrc: Oral Axillary  Oral   SpO2: 93% 92% (!) 88% 94%   Weight:   67.9 kg (149 lb 11.1 oz)    Height:           Intake/Output last 3 shifts:  I/O last 3 completed shifts:  In: 600 [P.O.:600]  Out: 1250 [Urine:1250]  Intake/Output this shift:  No intake/output data recorded.      Radiology  Imaging Results (Last 24 Hours)       ** No results found for the last 24 hours. **            Labs:  Results from last 7 days   Lab Units 12/11/24  0428   WBC 10*3/mm3 19.81*   HEMOGLOBIN g/dL 13.6   HEMATOCRIT % 42.6   PLATELETS 10*3/mm3 276     Results from last 7 days   Lab Units 12/11/24  0428   SODIUM  mmol/L 140   POTASSIUM mmol/L 4.9   CHLORIDE mmol/L 103   CO2 mmol/L 20.7*   BUN mg/dL 28*   CREATININE mg/dL 1.44*   CALCIUM mg/dL 9.7   BILIRUBIN mg/dL 0.6   ALK PHOS U/L 94   ALT (SGPT) U/L 53*   AST (SGOT) U/L 48*   GLUCOSE mg/dL 123*         Results from last 7 days   Lab Units 12/11/24  0428 12/10/24  0435   ALBUMIN g/dL 3.3* 3.3*               Results from last 7 days   Lab Units 12/11/24  0428   MAGNESIUM mg/dL 1.9     Results from last 7 days   Lab Units 12/05/24  1238   INR  1.16*   APTT seconds 34.3     Results from last 7 days   Lab Units 12/09/24  0355   TSH uIU/mL 6.460*   FREE T4 ng/dL 1.14           Meds:   SCHEDULE  atorvastatin, 80 mg, Oral, Nightly  cetirizine, 10 mg, Oral, Daily  cholecalciferol, 1,000 Units, Oral, Daily  empagliflozin, 12.5 mg, Oral, Daily  finasteride, 5 mg, Oral, Daily  guaiFENesin, 1,200 mg, Oral, Q12H  insulin lispro, 2-7 Units, Subcutaneous, 4x Daily AC & at Bedtime  Lidocaine, 1 patch, Transdermal, Q24H  metoprolol succinate XL, 12.5 mg, Oral, Q24H  montelukast, 10 mg, Oral, Nightly  pantoprazole, 40 mg, Oral, Q AM  polyethylene glycol, 17 g, Oral, Daily  sacubitril-valsartan, 1 tablet, Oral, Daily  senna-docusate sodium, 1 tablet, Oral, Daily  sertraline, 50 mg, Oral, Daily  tamsulosin, 0.4 mg, Oral, Daily      Infusions     PRNs    acetaminophen **OR** acetaminophen **OR** acetaminophen    albuterol sulfate HFA    aluminum-magnesium hydroxide-simethicone    senna-docusate sodium **AND** polyethylene glycol **AND** bisacodyl **AND** bisacodyl    Calcium Replacement - Follow Nurse / BPA Driven Protocol    dextrose    dextrose    glucagon (human recombinant)    HYDROcodone-acetaminophen    ipratropium-albuterol    Magnesium Standard Dose Replacement - Follow Nurse / BPA Driven Protocol    nitroglycerin    Phosphorus Replacement - Follow Nurse / BPA Driven Protocol    Potassium Replacement - Follow Nurse / BPA Driven Protocol    [COMPLETED] Insert Peripheral IV **AND**  sodium chloride    Physical Exam:  General Appearance:  Alert   HEENT:  Normocephalic, without obvious abnormality, Conjunctiva/corneas clear,.   Nares normal, no drainage     Neck:  Supple, symmetrical, trachea midline.   Lungs /Chest wall: Improved air entry with mild bilateral basal rhonchi, respirations unlabored, symmetrical wall movement.     Heart:  Regular rate and rhythm, S1 S2 normal  Abdomen: Soft, non-tender, no masses, no organomegaly.    Extremities: No edema, no clubbing or cyanosis     ROS  Constitutional: Negative for chills, fever and malaise/fatigue.   HENT: Hard of hearing.    Eyes: Negative.    Cardiovascular: Negative.    Respiratory: Positive for improving cough and shortness of breath.    Skin: Negative.    Musculoskeletal: Negative.    Gastrointestinal: Negative.    Genitourinary: Negative.    Neurological: Generalized weakness      I reviewed the recent clinical results  I personally reviewed the latest radiological studies    Part of this note may be an electronic transcription/translation of spoken language to printed text using the Dragon Dictation System.

## 2024-12-11 NOTE — PROGRESS NOTES
Hematology/Oncology Inpatient Progress Note    PATIENT NAME: Mikael Shanks  : 1935  MRN: [id number]    CHIEF COMPLAINT: shortness of breath    HISTORY OF PRESENT ILLNESS:      Mikael Shanks is a 89 y.o. male who presented to [facility] on 12/3/2024 with complaints of shortness of breath.  Past medical history of CAD, diabetes, GERD, hyperlipidemia, hypertension.  Presented with shortness of breath and right groin pain.  Due to being very hard of hearing and not having his hearing aids his HPI was obtained from family.  Family stated that the patient had a recent admission at the [facility] where they were concerned for congestive heart failure and also performed a heart cath.    Evaluation in the ED showed leukocytosis with a WBC of 20.53 and increased neutrophils.  Respiratory panel was positive for rhinovirus chest x-ray revealed chronic findings without acute changes.      CT of the chest without contrast  -Large mass in the right hepatic lobe involving segment 7 and 8 which may relate to malignancy or metastasis measuring up to 9.5 cm  -Patchy areas of airspace disease involving left upper lobes and bilateral lower lobes concerning for multifocal pneumonia superimposed on background of chronic interstitial lung disease  -Cardiomegaly    CT of the abdomen and pelvis with contrast  -Heterogenous appearing mass in the right hepatic lobe at the hepatic dome measuring up to 9.3 cm suspicious for malignancy.  Further characterization with MRI abdomen with and without recommended    24  Hematology/Oncology was consulted due to newly discovered liver mass.    Subjective         ROS:  Review of Systems   Constitutional:  Negative for chills, fatigue and fever.   HENT:  Negative for congestion, drooling, ear discharge, rhinorrhea, sinus pressure and tinnitus.    Eyes:  Negative for photophobia, pain and discharge.   Respiratory:  Negative for apnea, choking and stridor.     Cardiovascular:  Negative for palpitations.   Gastrointestinal:  Negative for abdominal distention, abdominal pain and anal bleeding.   Endocrine: Negative for polydipsia and polyphagia.   Genitourinary:  Negative for decreased urine volume, flank pain and genital sores.   Musculoskeletal:  Negative for gait problem, neck pain and neck stiffness.   Skin:  Negative for color change, rash and wound.   Neurological:  Negative for tremors, seizures, syncope, facial asymmetry and speech difficulty.   Hematological:  Negative for adenopathy.   Psychiatric/Behavioral:  Negative for agitation, confusion, hallucinations and self-injury. The patient is not hyperactive.         MEDICATIONS:    Scheduled Meds:  atorvastatin, 80 mg, Oral, Nightly  cetirizine, 10 mg, Oral, Daily  cholecalciferol, 1,000 Units, Oral, Daily  empagliflozin, 12.5 mg, Oral, Daily  finasteride, 5 mg, Oral, Daily  guaiFENesin, 1,200 mg, Oral, Q12H  insulin lispro, 2-7 Units, Subcutaneous, 4x Daily AC & at Bedtime  Lidocaine, 1 patch, Transdermal, Q24H  metoprolol succinate XL, 12.5 mg, Oral, Q24H  montelukast, 10 mg, Oral, Nightly  pantoprazole, 40 mg, Oral, Q AM  polyethylene glycol, 17 g, Oral, Daily  sacubitril-valsartan, 1 tablet, Oral, Daily  senna-docusate sodium, 1 tablet, Oral, Daily  sertraline, 50 mg, Oral, Daily  tamsulosin, 0.4 mg, Oral, Daily       Continuous Infusions:      PRN Meds:    acetaminophen **OR** acetaminophen **OR** acetaminophen    albuterol sulfate HFA    aluminum-magnesium hydroxide-simethicone    senna-docusate sodium **AND** polyethylene glycol **AND** bisacodyl **AND** bisacodyl    Calcium Replacement - Follow Nurse / BPA Driven Protocol    dextrose    dextrose    glucagon (human recombinant)    HYDROcodone-acetaminophen    ipratropium-albuterol    Magnesium Standard Dose Replacement - Follow Nurse / BPA Driven Protocol    nitroglycerin    Phosphorus Replacement - Follow Nurse / BPA Driven Protocol    Potassium  "Replacement - Follow Nurse / BPA Driven Protocol    [COMPLETED] Insert Peripheral IV **AND** sodium chloride     ALLERGIES:    Allergies   Allergen Reactions    Penicillins Hives       Objective    VITALS:   /93 (BP Location: Right arm, Patient Position: Lying)   Pulse 78   Temp 97.9 °F (36.6 °C) (Axillary)   Resp 28   Ht 177.8 cm (70\")   Wt 67.9 kg (149 lb 11.1 oz)   SpO2 95%   BMI 21.48 kg/m²     PHYSICAL EXAM: (performed by MD)  Physical Exam  Vitals and nursing note reviewed.   Constitutional:       General: He is not in acute distress.     Appearance: He is not diaphoretic.   HENT:      Head: Normocephalic and atraumatic.   Eyes:      General: No scleral icterus.        Right eye: No discharge.         Left eye: No discharge.      Conjunctiva/sclera: Conjunctivae normal.   Neck:      Thyroid: No thyromegaly.   Cardiovascular:      Rate and Rhythm: Normal rate and regular rhythm.      Heart sounds: Normal heart sounds.      No friction rub. No gallop.   Pulmonary:      Effort: Pulmonary effort is normal. No respiratory distress.      Breath sounds: No stridor. No wheezing.   Abdominal:      General: Bowel sounds are normal.      Palpations: Abdomen is soft. There is no mass.      Tenderness: There is no abdominal tenderness. There is no guarding or rebound.   Musculoskeletal:         General: No tenderness. Normal range of motion.      Cervical back: Normal range of motion and neck supple.   Lymphadenopathy:      Cervical: No cervical adenopathy.   Skin:     General: Skin is warm.      Findings: No erythema or rash.   Neurological:      Mental Status: He is alert and oriented to person, place, and time.      Motor: No abnormal muscle tone.   Psychiatric:         Behavior: Behavior normal.         RECENT LABS:  Lab Results (last 24 hours)       Procedure Component Value Units Date/Time    POC Glucose 4x Daily Before Meals & at Bedtime [[id number]]  (Abnormal) Collected: 12/11/24 1702    Specimen: " Blood Updated: 12/11/24 1704     Glucose 150 mg/dL      Comment: Serial Number: [id number]:  [id number]       POC Glucose 4x Daily Before Meals & at Bedtime [[id number]]  (Abnormal) Collected: 12/11/24 1141    Specimen: Blood Updated: 12/11/24 1145     Glucose 150 mg/dL      Comment: Serial Number: [id number]:  [id number]       Hemochromatosis Mutation [[id number]] Collected: 12/05/24 1238    Specimen: Blood Updated: 12/11/24 0912     Hemochromatosis Gene Comment     Comment: Result:  c.845G>A (p.Dij449Jin) - Not Detected  c.187C>G (p.Xwh97Gjr) - Not Detected  c.193A>T (p.Qwf36Crf) - Not Detected  Not associated with increased risk to develop clinical symptoms  of Hereditary Hemochromatosis. In symptomatic individuals, other  causes of iron overload should be evaluated. See Additional  Information and Comments.  Additional Clinical Information:  Hereditary hemochromatosis (HFE related) is an autosomal recessive  iron storage disorder. Patients may have a genetic diagnosis of  hereditary hemochromatosis and never show clinical symptoms. Clinical  symptoms typically appear between 40 to 60 years in males and after  menopause in females. Signs and symptoms may include organ damage,  primarily in the liver, risk for hepatocellular carcinoma, diabetes,  and heart disease due to iron accumulation. Life expectancy may be  decreased in individuals who develop cirrhosis. Treatment for  clinically symptomatic individuals may include therapeutic  phlebotomy. Liver transplant may be used to treat end stage liver  failure. For preventive care, monitoring for iron overload is  recommended for patients who are homozygous for c.845G>A  (p.Cpt284Vnu) and have yet to experience clinical symptoms.  Comments:  The most common HFE variants associated with hereditary  hemochromatosis are c.845G>A (p.Wxn629Cry), c.187C>G (p.Xeu97Gli),  c.193A>T (p.Jcm62Uew). While patients homozygous for c.845G>A  (p.Nom365Lvq) are the most  likely to present clinical symptoms, less  than 10% develop clinically significant iron overload with tissue  and organ damage.  Genetic counseling is recommended to discuss the potential clinical  implications of positive results, as well as recommendations for  testing family members.  Genetic Coordinators are available for health care providers to  discuss results at [phone]).  Test Details:  Three variants analyzed:  c.845G>A (p.Thr049Mqj), commonly referred to as C282Y  c.187C>G (p.Rnt78Lmz), commonly referred to as H63D  c.193A>T (p.Jja33Dca), commonly referred to as S65C  Methods/Limitations:  DNA Analysis of the HFE gene (NM_000410.4) was performed by PCR  amplification followed by restriction enzyme digestion analyses.  Results must be combined with clinical information for the most  accurate interpretation. Molecular-based testing is highly accurate,  but as in any laboratory test, diagnostic errors may occur. False  positive or false negative results may occur for reasons that  include genetic variants, blood transfusions, bone marrow  transplantation, somatic or tissue-specific mosaicism, mislabeled  samples, or erroneous representation of family relationships.  This test was developed and its performance characteristics  determined by [facility]. It has not been cleared or approved by the  Food and Drug Administration.  References:  Jose BR, Rene PC, Adis KV, Milton LW, Chas AS; American  Association for the Study of Liver Diseases. Diagnosis and management  of hemochromatosis: 2011 practice guideline by the American  Association for the Study of Liver Diseases. Hepatology. 2011 Jul;54(1):328-43. doi: 10.1002/hep.52128. PMID: 17464100; PMCID:  CTS1216979.  Bright G, Abdias P, Darleen YUEN, Fatemeh H, Kade O, Brad S,  Antony I, Cameron M, Herminio S. EMQN best practice guidelines for the  molecular genetic diagnosis of hereditary hemochromatosis (HH). Eur  J Hum [place]. 2016  Apr;24(4):875-04. doi: 10.1038/[facility].2015.128.  Epub 2015 Jul 8. PMID: 40465610; PMCID: KFR7186426.        REVIEWED BY Comment     Comment: Technical Component performed at [facility]  Professional Component performed by:  [facility]  Mando Gloria, Ph.D., [facility]  Director, Molecular Genetics  [address] [place] [address]       Narrative:      Performed at:  01 - [facility]  [address] [facility], [facility], [place]  [id number]  : Last [facility], Phone:  [phone]    POC Glucose 4x Daily Before Meals & at Bedtime [[id number]]  (Abnormal) Collected: 12/11/24 0728    Specimen: Blood Updated: 12/11/24 0730     Glucose 137 mg/dL      Comment: Serial Number: [id number]:  [id number]       Comprehensive Metabolic Panel [[id number]]  (Abnormal) Collected: 12/11/24 0428    Specimen: Blood Updated: 12/11/24 0503     Glucose 123 mg/dL      BUN 28 mg/dL      Creatinine 1.44 mg/dL      Sodium 140 mmol/L      Potassium 4.9 mmol/L      Comment: Slight hemolysis detected by analyzer. Result may be falsely elevated.        Chloride 103 mmol/L      CO2 20.7 mmol/L      Calcium 9.7 mg/dL      Total Protein 6.2 g/dL      Albumin 3.3 g/dL      ALT (SGPT) 53 U/L      AST (SGOT) 48 U/L      Alkaline Phosphatase 94 U/L      Total Bilirubin 0.6 mg/dL      Globulin 2.9 gm/dL      A/G Ratio 1.1 g/dL      BUN/Creatinine Ratio 19.4     Anion Gap 16.3 mmol/L      eGFR 46.4 mL/min/1.73     Narrative:      GFR Categories in Chronic Kidney Disease (CKD)      GFR Category          GFR (mL/min/1.73)    Interpretation  G1                     90 or greater         Normal or high (1)  G2                      60-89                Mild decrease (1)  G3a                   45-59                Mild to moderate decrease  G3b                   30-44                Moderate to severe decrease  G4                    15-29                Severe decrease  G5                    14 or less           Kidney  failure          (1)In the absence of evidence of kidney disease, neither GFR category G1 or G2 fulfill the criteria for CKD.    eGFR calculation 2021 CKD-EPI creatinine equation, which does not include race as a factor    Magnesium [[id number]]  (Normal) Collected: 12/11/24 0428    Specimen: Blood Updated: 12/11/24 0503     Magnesium 1.9 mg/dL     Phosphorus [[id number]]  (Abnormal) Collected: 12/11/24 0428    Specimen: Blood Updated: 12/11/24 0502     Phosphorus 4.8 mg/dL     CBC & Differential [[id number]]  (Abnormal) Collected: 12/11/24 0428    Specimen: Blood Updated: 12/11/24 0443    Narrative:      The following orders were created for panel order CBC & Differential.  Procedure                               Abnormality         Status                     ---------                               -----------         ------                     CBC Auto Differential[[id number]]        Abnormal            Final result                 Please view results for these tests on the individual orders.    CBC Auto Differential [[id number]]  (Abnormal) Collected: 12/11/24 0428    Specimen: Blood Updated: 12/11/24 0443     WBC 19.81 10*3/mm3      RBC 4.69 10*6/mm3      Hemoglobin 13.6 g/dL      Hematocrit 42.6 %      MCV 90.8 fL      MCH 29.0 pg      MCHC 31.9 g/dL      RDW 15.5 %      RDW-SD 50.9 fl      MPV 11.0 fL      Platelets 276 10*3/mm3      Neutrophil % 87.3 %      Lymphocyte % 4.3 %      Monocyte % 4.3 %      Eosinophil % 0.3 %      Basophil % 0.7 %      Immature Grans % 3.1 %      Neutrophils, Absolute 17.29 10*3/mm3      Lymphocytes, Absolute 0.86 10*3/mm3      Monocytes, Absolute 0.85 10*3/mm3      Eosinophils, Absolute 0.05 10*3/mm3      Basophils, Absolute 0.14 10*3/mm3      Immature Grans, Absolute 0.62 10*3/mm3      nRBC 0.0 /100 WBC     POC Glucose Once [[id number]]  (Abnormal) Collected: 12/10/24 1955    Specimen: Blood Updated: 12/10/24 1957     Glucose 124 mg/dL      Comment: Serial Number:  [id number]:  [id number]               PENDING RESULTS:     IMAGING REVIEWED:  No radiology results for the last day    Assessment & Plan   ASSESSMENT:    Moderately differentiated hepatocellular carcinoma presenting as a right hepatic lobe mass: 9.3 cm mass suspicious for malignancy. AFP is  elevated to 3,304.  CEA is 3.1.  Viral hepatitis panel is negative, HFE analysis is pending  Normocytic anemia: Hemoglobin 10.6, MCV 92.0.  Has a history of pernicious anemia and is on B12 injection monthly with his PCP.     PLAN    Reviewed pathology with patient.  Discussed the diagnosis.  Discussed about hepatocellular process and surgical disease given his advanced age probably will not be able to tolerate hepatic resection.  Other local treatment options including TACE could be considered to see if will be a candidate given the large size of the mass.  This will require outpatient consultation  Iron studies with mitch reviewed.  HFE analysis is pending  Discussed with patient  Will continue to follow

## 2024-12-11 NOTE — CASE MANAGEMENT/SOCIAL WORK
Continued Stay Note  [facility]     Patient Name: Mikael Shanks  MRN: [id number]  Today's Date: 12/11/2024    Admit Date: 12/3/2024    Plan: Return home- TBD   Discharge Plan       Row Name 12/11/24 1549       Plan    Plan Comments CM and LSW met with patient and brother (Tamra) at bedside to discuss discharge planning. Brother Tamra is also hard of hearing. Patient verbalized that he is going to go to Fina's house at discharge and stated she used to be his step daughter. Brother Tamra verified that he has spoken to Fina. CM attempted to reach Fina at number provided and listed in chart but was unable to reach, left detailed VM requesting call back.               Heather Gifford RN     [facility]  Office: [phone]  Cell: [phone]  Fax # [phone]

## 2024-12-11 NOTE — CASE MANAGEMENT/SOCIAL WORK
Continued Stay Note  [facility]     Patient Name: Mikael Shanks  MRN: [id number]  Today's Date: 12/11/2024    Admit Date: 12/3/2024    Plan: Home w/ family. [facility] (pending)   Discharge Plan       Row Name 12/11/24 1654       Plan    Plan Home w/ family. [facility] (pending)    Plan Comments CM returned to bedside to speak with patient and family. Patient agreeable to hospice referral after speaking to brother Tamra and his fiance. Family requested referral to [facility]. CM sent inbasket and notified liaison. CM spoke to Barbara via speakerphone in the room with family to figure out time for meeting. Brother requested 3pm tomorrow. CM and brother to attempt reaching Fina between now and then to see if she can be present for meeting. Primary nurse and MD updated.             Heather Gifford RN     [facility]  Office: [phone]  Cell: [phone]  Fax # [phone]

## 2024-12-11 NOTE — CASE MANAGEMENT/SOCIAL WORK
Social Work Assessment  [facility]     Patient Name: Mikael Shanks  MRN: [id number]  Today's Date: 12/11/2024    Admit Date: 12/3/2024     Discharge Plan       Row Name 12/11/24 1221       Plan    Plan Comments LSW attempted to contact daughter (Sadia Cintron, [phone]) x2 attempts (12/10, 12/11), left voicemail on both attempts. Attempted to call sister, Adán Stewart ([phone]), left voicemail requesting a call back. Attempted to call brother, Tamra Shanks/HCR ([phone]) and left voicemail requesting a call back. Received return call from brother’s phone and spoke to his fiancé (Catalino Lama), who states brother is [place], but that he is HCR. States that brother will be visiting at bedside around 3pm. LSW/CM to meet with brother/patient at bedside to discuss disposition once here. Attending updated in MDR about family visiting later today, 12/11.                  ROBE Olsen, LSW, [facility]    Phone: [phone]  Cell: [phone]  Fax: [phone]  [email or web address]

## 2024-12-11 NOTE — CASE MANAGEMENT/SOCIAL WORK
Continued Stay Note  [facility]     Patient Name: Mikael Shanks  MRN: [id number]  Today's Date: 12/11/2024    Admit Date: 12/3/2024    Plan: Return home- TBD   Discharge Plan       Row Name 12/11/24 0846       Plan    Plan Return home- TBD    Plan Comments DC Barriers: Hem/Onc and Pulm consults, Cr 1.44, WBC 19.81 trending up, per nursing notes urinary retention w/ straight caths and q 4 bladder scans               Heather Gifford RN      [facility]  Office: [phone]  Cell: [phone]  Fax # [phone]

## 2024-12-11 NOTE — CONSULTS
Nutrition Services    Patient Name: Mikael Shanks  YOB: 1935  MRN: [id number]  Admission date: 12/3/2024    Comment:    Moderate chronic disease related malnutrition related to prolonged suboptimal intake and suspected hypermetabolism in the setting of multiple chronic diseases including cancer, CHF, CKD, DM, and anemia as evidenced by po intake meeting < 75% est energy requirement for > 1 month and evidence of moderate muscle and fat wasting per NFPE.     Continue Boost Glucose Control BID (Provides 380 kcals, 32 g protein if consumed)       CLINICAL NUTRITION ASSESSMENT      Reason for Assessment 12/11: Follow-up protocol   12/4: Malnutrition screening tool score of 5 and Unintentional weight loss consult     H&P      Past Medical History:   Diagnosis Date    Carotid artery disease     Coronary artery disease     Diabetes mellitus     GERD (gastroesophageal reflux disease)     [place] (hard of hearing)     Hyperlipidemia     Hypertension     Osteoarthritis     Prostatic hypertrophy     Pulmonary valve disorder     Sleep apnea     cpap   doesnt use     Stroke        Past Surgical History:   Procedure Laterality Date    BACK SURGERY      CARDIAC CATHETERIZATION N/A 12/5/2019    Procedure: Left Heart Cath and coronary angiogram;  Surgeon: Dayday Ruiz MD;  Location:  [facility];  Service: Cardiovascular    CARDIAC CATHETERIZATION N/A 12/5/2019    Procedure: Right and Left Heart Cath;  Surgeon: Dayday Ruiz MD;  Location:  [facility];  Service: Cardiovascular    CARDIAC CATHETERIZATION N/A 9/4/2020    Procedure: Left and Right Heart Cath with Coronary Angiography;  Surgeon: Dayday Ruiz MD;  Location:  [facility];  Service: Cardiovascular;  Laterality: N/A;    CAROTID ENDARTERECTOMY Left     CHOLECYSTECTOMY      COLONOSCOPY  08/14/2018    No repeat    CORONARY ANGIOPLASTY WITH STENT PLACEMENT      LUMBAR LAMINECTOMY Bilateral 1/31/2022  "   Procedure: LUMBAR LAMINECTOMY WITH/WITHOUT FUSION L4-L5;  Surgeon: Geronimo Gonzales MD;  Location: [facility] MAIN OR;  Service: Neurosurgery;  Laterality: Bilateral;        Current Problems     Multifocal pneumonia     Liver mass      Congestive Heart Failure    CKD     Hyperlipidemia     Diabetes Mellitus      GERD     Hypertension      Hypothyroidism     Anemia          Encounter Information        Trending Narrative     12/11: Checking in to monitor po diet tolerance and intake. Since last RD review, pt continues to recover. Pt continues to have constipation with smear BM yesterday with observed rectal bleeding per PT.  Intake continues to be inadequate to meet est needs without supplementation. Pt continues to receive ONS. RD will continue to monitor per protocol and encourage good po intake.     12/4: Pt presented to ED on 12/3 with complaints of SOB and R groin pain. Pt is [place] and does not wear hearing aids. Pt does not use supplemental O2 at home. Pt admitted r/t multifocal pneumonia and transferred to PCU today. RD visited pt at bedside. Pt is VERY [place] and difficult to have a conversation with. Pt reports that his appetite is \"not to bad but not up to par where it used to be.\" Pt reports that his intake has decreased quite a bit in the last month. NFPE completed, consistent with nutrition diagnosis of malnutrition using AND/ASPEN criteria. See MSA below.        Anthropometrics        Current Height, Weight Height: 177.8 cm (70\")  Weight: 67.9 kg (149 lb 11.1 oz) (12/11/24 0616)       Usual Body Weight (UBW) Unable to obtain from patient       Trending Weight Hx     This admission: 12/4: 146#             PTA: 12/4: 8% weight loss in the last 6 months     Wt Readings from Last 30 Encounters:   12/11/24 0616 67.9 kg (149 lb 11.1 oz)   12/10/24 0608 69.4 kg (153 lb)   12/09/24 0500 68.4 kg (150 lb 12.7 oz)   12/08/24 0531 67.9 kg (149 lb 11.1 oz)   12/07/24 0548 68.4 kg (150 lb 12.7 oz)   12/06/24 0300 68.3 kg " (150 lb 9.2 oz)   12/05/24 0457 68.6 kg (151 lb 3.8 oz)   12/04/24 0800 66.2 kg (146 lb)   12/04/24 0613 66.6 kg (146 lb 13.2 oz)   12/03/24 2006 66.6 kg (146 lb 13.2 oz)   12/03/24 1037 69 kg (152 lb 1.9 oz)   11/26/24 1032 69 kg (152 lb 3.2 oz)   11/20/24 1417 69 kg (152 lb 3.2 oz)   07/31/24 1335 73.6 kg (162 lb 3.2 oz)   07/24/24 1337 72.6 kg (160 lb)   06/20/24 1130 71.7 kg (158 lb)   06/10/24 1508 72.2 kg (159 lb 3.2 oz)   05/30/24 1107 72.6 kg (160 lb)   05/29/24 1129 71.9 kg (158 lb 9.6 oz)   03/19/24 1305 78.2 kg (172 lb 6.4 oz)   12/18/23 1454 79.5 kg (175 lb 3.2 oz)   11/06/23 1348 78.2 kg (172 lb 8 oz)   09/18/23 1332 78.7 kg (173 lb 9.6 oz)   06/05/23 1417 81.9 kg (180 lb 9.6 oz)   05/02/23 1506 79.8 kg (176 lb)   03/01/23 1356 80.2 kg (176 lb 12.8 oz)   02/21/23 1437 81.1 kg (178 lb 12.8 oz)   02/21/23 1425 81.1 kg (178 lb 11.2 oz)   01/25/23 1423 79.4 kg (175 lb)   11/01/22 1003 87.4 kg (192 lb 9.6 oz)   10/26/22 1229 79.8 kg (176 lb)   08/01/22 1142 77.2 kg (170 lb 3.2 oz)   06/29/22 1409 75.1 kg (165 lb 9.6 oz)   05/17/22 1325 80.7 kg (178 lb)   04/25/22 1533 80.7 kg (178 lb)   03/30/22 1359 81.2 kg (179 lb)   02/15/22 1158 85.3 kg (188 lb)   01/31/22 1625 85.7 kg (188 lb 15 oz)   01/31/22 0629 83.6 kg (184 lb 3.2 oz)   01/24/22 1403 82.6 kg (182 lb)   01/13/22 1327 82.6 kg (182 lb)   12/29/21 1356 83 kg (183 lb)      BMI kg/m2 Body mass index is 21.48 kg/m².       Labs        Pertinent Labs Reviewed. Management per attending    Results from last 7 days   Lab Units 12/11/24  0428 12/10/24  0435 12/09/24  0355   SODIUM mmol/L 140 137 140   POTASSIUM mmol/L 4.9 4.7 4.5   CHLORIDE mmol/L 103 104 104   CO2 mmol/L 20.7* 22.4 27.0   BUN mg/dL 28* 28* 33*   CREATININE mg/dL 1.44* 1.36* 1.68*   CALCIUM mg/dL 9.7 9.2 9.3   BILIRUBIN mg/dL 0.6 0.3  --    ALK PHOS U/L 94 83  --    ALT (SGPT) U/L 53* 50*  --    AST (SGOT) U/L 48* 47*  --    GLUCOSE mg/dL 123* 94 109*     Results from last 7 days   Lab Units  12/11/24  0428 12/10/24  0435   MAGNESIUM mg/dL 1.9 1.9   PHOSPHORUS mg/dL 4.8* 3.9   HEMOGLOBIN g/dL 13.6 12.8*   HEMATOCRIT % 42.6 43.3     Lab Results   Component Value Date    HGBA1C 6.5 (H) 11/20/2024        Medications    Scheduled Medications atorvastatin, 80 mg, Oral, Nightly  cetirizine, 10 mg, Oral, Daily  cholecalciferol, 1,000 Units, Oral, Daily  empagliflozin, 12.5 mg, Oral, Daily  finasteride, 5 mg, Oral, Daily  guaiFENesin, 1,200 mg, Oral, Q12H  insulin lispro, 2-7 Units, Subcutaneous, 4x Daily AC & at Bedtime  Lidocaine, 1 patch, Transdermal, Q24H  metoprolol succinate XL, 12.5 mg, Oral, Q24H  montelukast, 10 mg, Oral, Nightly  pantoprazole, 40 mg, Oral, Q AM  polyethylene glycol, 17 g, Oral, Daily  sacubitril-valsartan, 1 tablet, Oral, Daily  senna-docusate sodium, 1 tablet, Oral, Daily  sertraline, 50 mg, Oral, Daily  tamsulosin, 0.4 mg, Oral, Daily        Infusions        PRN Medications   acetaminophen **OR** acetaminophen **OR** acetaminophen    albuterol sulfate HFA    aluminum-magnesium hydroxide-simethicone    senna-docusate sodium **AND** polyethylene glycol **AND** bisacodyl **AND** bisacodyl    Calcium Replacement - Follow Nurse / BPA Driven Protocol    dextrose    dextrose    glucagon (human recombinant)    HYDROcodone-acetaminophen    ipratropium-albuterol    Magnesium Standard Dose Replacement - Follow Nurse / BPA Driven Protocol    nitroglycerin    Phosphorus Replacement - Follow Nurse / BPA Driven Protocol    Potassium Replacement - Follow Nurse / BPA Driven Protocol    [COMPLETED] Insert Peripheral IV **AND** sodium chloride     Physical Findings        Trending Physical   Appearance, NFPE 12/11: Agree with previous diagnosis  12/4: NFPE completed, consistent with nutrition diagnosis of malnutrition using AND/ASPEN criteria. See MSA below.      --  Edema  No edema documented      Bowel Function Last BM 12/11     Tubes No feeding tube in place      Chewing/Swallowing No issues  reported      Skin L medial arm skin tear      --  Current Nutrition Orders & Evaluation of Intake       Oral Nutrition     Food Allergies NKFA   Current PO Diet Diet: Regular/House; Fluid Consistency: Thin (IDDSI 0)   Supplement Boost Glucose Control BID (Provides 380 kcals, 32 g protein if consumed)       PO Evaluation     Trending % PO Intake 12/11: 0-50% intake  12/4: No meals documented    --  Nutritional Risk Screening        NRS-2002 Score          Nutrition Diagnosis         Nutrition Dx Problem 1 Moderate chronic disease related malnutrition related to prolonged suboptimal intake and suspected hypermetabolism in the setting of multiple chronic diseases including cancer, CHF, CKD, DM, and anemia as evidenced by po intake meeting < 75% est energy requirement for > 1 month and evidence of moderate muscle and fat wasting per NFPE.       Nutrition Dx Problem 2        Intervention Goal         Intervention Goal(s) Tolerate po diet  PO intake > 75%  Accept ONS     Nutrition Intervention        RD Action Continue current diet and encourage good po intake.     Continue Boost Glucose Control BID (Provides 380 kcals, 32 g protein if consumed)        Nutrition Prescription          Diet Prescription Regular diet   Supplement Prescription Boost Glucose Control BID (Provides 380 kcals, 32 g protein if consumed)      --  Monitor/Evaluation        Monitor Per protocol, I&O, PO intake, Supplement intake, Pertinent labs, Weight, Skin status, GI status, Swallow function, Hemodynamic stability     Malnutrition Severity Assessment      Patient meets criteria for : Moderate (non-severe) Malnutrition               Electronically signed by:  Natalie Kidd RD  12/11/24 10:42 EST

## 2024-12-11 NOTE — PLAN OF CARE
Goal Outcome Evaluation:              Outcome Evaluation: Patient currently resting abed, no distress noted. Patient remained on room air during the night. Patient did not void during the shift, a straight cath was performed earlier in shift due to the result of bladder scan. Patient repositions himself in bed but has not got out of bed during the shift.

## 2024-12-11 NOTE — PROGRESS NOTES
[facility] Progress Note      Patient Name: Mikael Shanks  : 1935  MRN: [id number]  Primary Care Physician:  Xander Robison MD  Date of admission: 12/3/2024    Subjective     Interval History:   The patient was seen and examined today for follow-up on acute kidney injury.  Doing well overall.  Denies any nausea or vomiting.    Review of Systems:   As noted above    Objective     Vitals:   Temp:  [97.7 °F (36.5 °C)-98.4 °F (36.9 °C)] 97.9 °F (36.6 °C)  Heart Rate:  [72-81] 78  Resp:  [17-28] 28  BP: ()/(47-93) 112/93    Intake/Output Summary (Last 24 hours) at 2024 1642  Last data filed at 12/10/2024 2045  Gross per 24 hour   Intake 120 ml   Output 400 ml   Net -280 ml       Physical Exam:    General Appearance: alert, oriented x 3, no acute distress   Skin: warm and dry  HEENT: oral mucosa normal, nonicteric sclera  Neck: supple, no JVD  Lungs: CTA  Heart: RRR, normal S1 and S2  Abdomen: soft, nontender, nondistended  : no palpable bladder  Extremities: no edema, cyanosis or clubbing  Neuro: normal speech and mental status     Scheduled Meds:     atorvastatin, 80 mg, Oral, Nightly  cetirizine, 10 mg, Oral, Daily  cholecalciferol, 1,000 Units, Oral, Daily  empagliflozin, 12.5 mg, Oral, Daily  finasteride, 5 mg, Oral, Daily  guaiFENesin, 1,200 mg, Oral, Q12H  insulin lispro, 2-7 Units, Subcutaneous, 4x Daily AC & at Bedtime  Lidocaine, 1 patch, Transdermal, Q24H  metoprolol succinate XL, 12.5 mg, Oral, Q24H  montelukast, 10 mg, Oral, Nightly  pantoprazole, 40 mg, Oral, Q AM  polyethylene glycol, 17 g, Oral, Daily  sacubitril-valsartan, 1 tablet, Oral, Daily  senna-docusate sodium, 1 tablet, Oral, Daily  sertraline, 50 mg, Oral, Daily  tamsulosin, 0.4 mg, Oral, Daily      IV Meds:        Results Reviewed:   I have personally reviewed the results from the time of this admission to 2024 16:42 EST     Results from last 7 days   Lab Units 24  0427  12/10/24  0435 12/09/24  0355   SODIUM mmol/L 140 137 140   POTASSIUM mmol/L 4.9 4.7 4.5   CHLORIDE mmol/L 103 104 104   CO2 mmol/L 20.7* 22.4 27.0   BUN mg/dL 28* 28* 33*   CREATININE mg/dL 1.44* 1.36* 1.68*   CALCIUM mg/dL 9.7 9.2 9.3   BILIRUBIN mg/dL 0.6 0.3  --    ALK PHOS U/L 94 83  --    ALT (SGPT) U/L 53* 50*  --    AST (SGOT) U/L 48* 47*  --    GLUCOSE mg/dL 123* 94 109*       Estimated Creatinine Clearance: 33.4 mL/min (A) (by C-G formula based on SCr of 1.44 mg/dL (H)).    Results from last 7 days   Lab Units 12/11/24  0428 12/10/24  0435   MAGNESIUM mg/dL 1.9 1.9   PHOSPHORUS mg/dL 4.8* 3.9             Results from last 7 days   Lab Units 12/11/24  0428 12/10/24  0435 12/09/24  0355 12/07/24  0542 12/06/24  0055   WBC 10*3/mm3 19.81* 15.37* 13.04* 9.68 11.79*   HEMOGLOBIN g/dL 13.6 12.8* 12.0* 12.1* 12.1*   PLATELETS 10*3/mm3 276 273 256 238 225       Results from last 7 days   Lab Units 12/05/24  1238   INR  1.16*       Assessment / Plan     ASSESSMENT:  Acute Kidney injury on top of chronic kidney disease stage IIIa baseline creatinine between 1.3 and 1.5 up to 2 mg/dL.  Etiology  was likely secondary to prerenal/ATN due to possible sepsis, pneumonia, cardiorenal syndrome due to CHF exacerbation.  Creatinine has improved  Hypertension with CKD blood pressure is acceptable  Type 2 diabetes mellitus with CKD  Hepatocellular carcinoma newly diagnosed followed by oncology  Dyspnea secondary to COVID-19 infection  Heart failure exacerbation EF of 20 to 25%        PLAN:  Kidney function is relatively stable  We will continue current therapy for the time being  Labs in a.m.      Thank you for involving us in the care of Mikael Shanks.  Please feel free to call with any questions.    José Tee MD  12/11/24  16:42 EST    [facility]  [phone]    Parts of this note may be an electronic transcription/translation of spoken language to printed text using the Dragon dictation  system.

## 2024-12-11 NOTE — CASE MANAGEMENT/SOCIAL WORK
Social Work Assessment  [facility]     Patient Name: Mikael Shanks  MRN: [id number]  Today's Date: 12/11/2024    Admit Date: 12/3/2024     Discharge Plan       Row Name 12/11/24 1650       Plan    Plan Comments LSW and CM met with patient and patient’s HCR (Tamra) at bedside. Both [place]. Discussed potential discharge plan needs and patient reports that he is going to stay with previous step-daughter (Fina) at discharge. CM discussed additional home services that could be supplied, see CM note regarding f/u.                  ROBE Olsen, SARAH, CCM    Phone: [phone]  Cell: [phone]  Fax: [phone]  [email or web address]

## 2024-12-11 NOTE — DISCHARGE PLACEMENT REQUEST
"Angela Bales (89 y.o. Male)       Date of Birth   1935    Social Security Number       [place]   [address] [place]    Home Phone   [phone]    MRN   [id number]       Mandaeism   None    Marital Status                               Admission Date   12/3/24    Admission Type   Emergency    Admitting Provider   Brennan Smith MD    Attending Provider   Uday Andrews MD    Department, Room/Bed   [facility], 2101/1       Discharge Date       Discharge Disposition       Discharge Destination                                 Attending Provider: Uday Andrews MD    Allergies: Penicillins    Isolation: Droplet   Infection: Rhinovirus  (12/03/24)   Code Status: No CPR    Ht: 177.8 cm (70\")   Wt: 67.9 kg (149 lb 11.1 oz)    Admission Cmt: None   Principal Problem: Multifocal pneumonia [J18.9]                   Active Insurance as of 12/3/2024       Primary Coverage       Payor Plan Insurance Group Employer/Plan Group    MEDICARE MEDICARE A & B        Payor Plan Address Payor Plan Phone Number Payor Plan Fax Number Effective Dates    [address] [phone]  7/1/2000 - None Entered    [place] [id number]         Subscriber Name Subscriber Birth Date Member ID       ANGELA BALES 1935 [id number]               Secondary Coverage       Payor Plan Insurance Group Employer/Plan Group    [employer]       Payor Plan Address Payor Plan Phone Number Payor Plan Fax Number Effective Dates    [address] [phone]  7/1/2000 - None Entered    [facility] [id number]         Subscriber Name Subscriber Birth Date Member ID       ANGELA BALES 1935 [id number]                     Emergency Contacts        (Rel.) Home Phone Work Phone Mobile Phone    Sadia Cintron (Daughter) -- -- [phone]    TAN BALES (Brother) [phone] -- --    TerryAdán chahal (Sister) [phone] -- -- "    Fina Mcdonald (used to be step daughter) (Other) [phone] -- --

## 2024-12-11 NOTE — CONSULTS
[facility] CONSULT      Patient Identification:  NAME:  Mikael Shanks  Age:  89 y.o.   Sex:  male   :  1935   MRN:  [id number]       Chief complaint/Reason for consult: Urinary retention    History of present illness:  89 y.o. male known history of BPH admitted 12/3/2024 with focal pneumonia.  Patient now having difficulty urinating requiring intermittent catheterization last night for high residual.  Urology consulted for urinary retention.      Past medical history:  Past Medical History:   Diagnosis Date    Carotid artery disease     Coronary artery disease     Diabetes mellitus     GERD (gastroesophageal reflux disease)     [place] (hard of hearing)     Hyperlipidemia     Hypertension     Osteoarthritis     Prostatic hypertrophy     Pulmonary valve disorder     Sleep apnea     cpap   doesnt use     Stroke        Past surgical history:  Past Surgical History:   Procedure Laterality Date    BACK SURGERY      CARDIAC CATHETERIZATION N/A 2019    Procedure: Left Heart Cath and coronary angiogram;  Surgeon: Dayday Ruiz MD;  Location: [facility] CATH INVASIVE LOCATION;  Service: Cardiovascular    CARDIAC CATHETERIZATION N/A 2019    Procedure: Right and Left Heart Cath;  Surgeon: Dayday Ruiz MD;  Location: [facility] CATH INVASIVE LOCATION;  Service: Cardiovascular    CARDIAC CATHETERIZATION N/A 2020    Procedure: Left and Right Heart Cath with Coronary Angiography;  Surgeon: Dayday Ruiz MD;  Location: [facility] CATH INVASIVE LOCATION;  Service: Cardiovascular;  Laterality: N/A;    CAROTID ENDARTERECTOMY Left     CHOLECYSTECTOMY      COLONOSCOPY  2018    No repeat    CORONARY ANGIOPLASTY WITH STENT PLACEMENT      LUMBAR LAMINECTOMY Bilateral 2022    Procedure: LUMBAR LAMINECTOMY WITH/WITHOUT FUSION L4-L5;  Surgeon: Geronimo Gonzales MD;  Location: [facility] MAIN OR;  Service: Neurosurgery;  Laterality: Bilateral;       Allergies:  Penicillins    Home  medications:  Facility-Administered Medications Prior to Admission   Medication Dose Route Frequency Provider Last Rate Last Admin    cyanocobalamin injection 1,000 mcg  1,000 mcg Intramuscular Q28 Days Xander Robison MD   1,000 mcg at 11/26/24 1552     Medications Prior to Admission   Medication Sig Dispense Refill Last Dose/Taking    albuterol sulfate  (90 Base) MCG/ACT inhaler Inhale 2 puffs Every 4 (Four) Hours As Needed for Wheezing. 18 g 5 Past Week    atorvastatin (LIPITOR) 80 MG tablet Take 1 tablet by mouth every night at bedtime. 90 tablet 4 Past Week    B Complex Vitamins (VITAMIN B COMPLEX PO) Take 1 capsule by mouth Daily.   Past Week    Cholecalciferol 25 MCG (1000 UT) tablet Take 1 tablet by mouth Daily.   Past Week    empagliflozin (JARDIANCE) 25 MG tablet tablet Take 0.5 tablets by mouth Daily.   Past Week    enalapril (VASOTEC) 2.5 MG tablet TAKE 1 TABLET BY MOUTH DAILY 90 tablet 4 Past Week    finasteride (PROSCAR) 5 MG tablet Take 1 tablet by mouth Daily. 90 tablet 4 Past Week    fluticasone (FLONASE) 50 MCG/ACT nasal spray Administer 1 spray into the nostril(s) as directed by provider Daily.   Past Week    furosemide (LASIX) 40 MG tablet Take 0.5 tablets by mouth Daily.   Past Week    glipizide (glipiZIDE XL) 5 MG ER tablet Take 1 tablet by mouth Daily. 90 tablet 4 Past Week    lidocaine (LIDODERM) 5 % Place 1 patch on the skin as directed by provider Daily. Remove & Discard patch within 12 hours or as directed by MD   Past Week    loratadine (CLARITIN) 10 MG tablet Take 1 tablet by mouth Daily.   Past Week    metoprolol succinate XL (TOPROL-XL) 50 MG 24 hr tablet Take 1 tablet by mouth Daily.   Past Week    montelukast (Singulair) 10 MG tablet Take 1 tablet by mouth Every Night.   Past Week    Multiple Vitamins-Minerals (PRESERVISION AREDS 2 PO) Take 1 Capful by mouth 2 (Two) Times a Day.   Past Week    omeprazole (priLOSEC) 40 MG capsule Take 1 capsule by mouth Daily. 90 capsule 4  Past Week    sacubitril-valsartan (Entresto) 24-26 MG tablet Take 1 tablet by mouth Daily.   Past Week    sertraline (ZOLOFT) 50 MG tablet Take 1 tablet by mouth Daily. 90 tablet 4 Past Week        Hospital medications:  atorvastatin, 80 mg, Oral, Nightly  cetirizine, 10 mg, Oral, Daily  cholecalciferol, 1,000 Units, Oral, Daily  empagliflozin, 12.5 mg, Oral, Daily  finasteride, 5 mg, Oral, Daily  guaiFENesin, 1,200 mg, Oral, Q12H  insulin lispro, 2-7 Units, Subcutaneous, 4x Daily AC & at Bedtime  Lidocaine, 1 patch, Transdermal, Q24H  metoprolol succinate XL, 12.5 mg, Oral, Q24H  montelukast, 10 mg, Oral, Nightly  pantoprazole, 40 mg, Oral, Q AM  polyethylene glycol, 17 g, Oral, Daily  sacubitril-valsartan, 1 tablet, Oral, Daily  senna-docusate sodium, 1 tablet, Oral, Daily  sertraline, 50 mg, Oral, Daily  tamsulosin, 0.4 mg, Oral, Daily           acetaminophen **OR** acetaminophen **OR** acetaminophen    albuterol sulfate HFA    aluminum-magnesium hydroxide-simethicone    senna-docusate sodium **AND** polyethylene glycol **AND** bisacodyl **AND** bisacodyl    Calcium Replacement - Follow Nurse / BPA Driven Protocol    dextrose    dextrose    glucagon (human recombinant)    HYDROcodone-acetaminophen    ipratropium-albuterol    Magnesium Standard Dose Replacement - Follow Nurse / BPA Driven Protocol    nitroglycerin    Phosphorus Replacement - Follow Nurse / BPA Driven Protocol    Potassium Replacement - Follow Nurse / BPA Driven Protocol    [COMPLETED] Insert Peripheral IV **AND** sodium chloride    Family history:  Family History   Problem Relation Age of Onset    Cancer Mother     Heart disease Father     Cancer Brother        Social history:  Social History     Tobacco Use    Smoking status: Former     Current packs/day: 0.00     Average packs/day: 5.0 packs/day for 7.0 years (35.0 ttl pk-yrs)     Types: Cigarettes     Start date: 1953     Quit date: 1960     Years since quittin.3     Passive  exposure: Past    Smokeless tobacco: Never   Vaping Use    Vaping status: Never Used   Substance Use Topics    Alcohol use: Not Currently     Comment: very rare    Drug use: No       Objective:  TMax 24 hours:   Temp (24hrs), Av.8 °F (36.6 °C), Min:97.2 °F (36.2 °C), Max:98.1 °F (36.7 °C)      Vitals Ranges:   Temp:  [97.2 °F (36.2 °C)-98.1 °F (36.7 °C)] 97.7 °F (36.5 °C)  Heart Rate:  [69-80] 78  Resp:  [17-32] 20  BP: ()/(47-63) 134/59    Intake/Output Last 3 shifts:  I/O last 3 completed shifts:  In: 600 [P.O.:600]  Out: 1250 [Urine:1250]     Physical Exam:    General Appearance:    Alert, cooperative, NAD   Lungs:     Respirations unlabored, no audible wheezing    Heart:    No cyanosis   Abdomen:     Soft, ND    :    No suprapubic distention            Results review:   I reviewed the patient's new clinical results.    Data review:  Lab Results (last 24 hours)       Procedure Component Value Units Date/Time    POC Glucose 4x Daily Before Meals & at Bedtime [[id number]]  (Abnormal) Collected: 24 1141    Specimen: Blood Updated: 24 1145     Glucose 150 mg/dL      Comment: Serial Number: [id number]:  [id number]       Hemochromatosis Mutation [[id number]] Collected: 24 1238    Specimen: Blood Updated: 24 0912     Hemochromatosis Gene Comment     Comment: Result:  c.845G>A (p.Vhm241Hnd) - Not Detected  c.187C>G (p.Xbf48Miq) - Not Detected  c.193A>T (p.Fhf22Dsj) - Not Detected  Not associated with increased risk to develop clinical symptoms  of Hereditary Hemochromatosis. In symptomatic individuals, other  causes of iron overload should be evaluated. See Additional  Information and Comments.  Additional Clinical Information:  Hereditary hemochromatosis (HFE related) is an autosomal recessive  iron storage disorder. Patients may have a genetic diagnosis of  hereditary hemochromatosis and never show clinical symptoms. Clinical  symptoms typically appear between 40 to 60 years in  males and after  menopause in females. Signs and symptoms may include organ damage,  primarily in the liver, risk for hepatocellular carcinoma, diabetes,  and heart disease due to iron accumulation. Life expectancy may be  decreased in individuals who develop cirrhosis. Treatment for  clinically symptomatic individuals may include therapeutic  phlebotomy. Liver transplant may be used to treat end stage liver  failure. For preventive care, monitoring for iron overload is  recommended for patients who are homozygous for c.845G>A  (p.Krw418Yhu) and have yet to experience clinical symptoms.  Comments:  The most common HFE variants associated with hereditary  hemochromatosis are c.845G>A (p.Tve815Ive), c.187C>G (p.Rkz79Lbl),  c.193A>T (p.Opg15Mqi). While patients homozygous for c.845G>A  (p.Jil952Iqd) are the most likely to present clinical symptoms, less  than 10% develop clinically significant iron overload with tissue  and organ damage.  Genetic counseling is recommended to discuss the potential clinical  implications of positive results, as well as recommendations for  testing family members.  Genetic Coordinators are available for health care providers to  discuss results at [phone]  Test Details:  Three variants analyzed:  c.845G>A (p.Qix967Pwf), commonly referred to as C282Y  c.187C>G (p.Jwh52Ugu), commonly referred to as H63D  c.193A>T (p.Cin65Qbz), commonly referred to as S65C  Methods/Limitations:  DNA Analysis of the HFE gene (NM_000410.4) was performed by PCR  amplification followed by restriction enzyme digestion analyses.  Results must be combined with clinical information for the most  accurate interpretation. Molecular-based testing is highly accurate,  but as in any laboratory test, diagnostic errors may occur. False  positive or false negative results may occur for reasons that  include genetic variants, blood transfusions, bone marrow  transplantation, somatic or tissue-specific  mosaicism, mislabeled  samples, or erroneous representation of family relationships.  This test was developed and its performance characteristics  determined by "[employer]. It has not been cleared or approved by the  Food and Drug Administration.  References:  Jose BR, Rene PC, Adis KV, Milton LW, Chas AS; American  Association for the Study of Liver Diseases. Diagnosis and management  of hemochromatosis: 2011 practice guideline by the American  Association for the Study of Liver Diseases. Hepatology. 2011  Jul;54(1):328-43. doi: 10.1002/hep.17208. PMID: 97100517; PMCID:  ZVK1382873.  Bright G, Abdias P, Darleen DW, Fatemeh H, Kade O, Brad S,  Antony I, Cameron M, Herminio S. [facility] best practice guidelines for the  molecular genetic diagnosis of hereditary hemochromatosis (HH). Eur  J Hum [place]. 2016 Apr;24(4):479-95. doi: 10.1038/ejhg.2015.128.  Epub 2015 Jul 8. PMID: 37793035; PMCID: XXE7847763.        REVIEWED BY Comment     Comment: Technical Component performed at [facility]  Professional Component performed by:  [employer]  Mando Gloria, Ph.D., [facility]  Director, Molecular Genetics  [address] [place] [address]       Narrative:      Performed at:  01 - [facility]  [address] [place], [place]  [id number]  : Last [facility], Phone:  [phone]    POC Glucose 4x Daily Before Meals & at Bedtime [[id number]]  (Abnormal) Collected: 12/11/24 0728    Specimen: Blood Updated: 12/11/24 0730     Glucose 137 mg/dL      Comment: Serial Number: [id number]:  [id number]       Comprehensive Metabolic Panel [[id number]]  (Abnormal) Collected: 12/11/24 0428    Specimen: Blood Updated: 12/11/24 0503     Glucose 123 mg/dL      BUN 28 mg/dL      Creatinine 1.44 mg/dL      Sodium 140 mmol/L      Potassium 4.9 mmol/L      Comment: Slight hemolysis detected by analyzer. Result may be falsely elevated.        Chloride 103 mmol/L      CO2 20.7 mmol/L      Calcium  9.7 mg/dL      Total Protein 6.2 g/dL      Albumin 3.3 g/dL      ALT (SGPT) 53 U/L      AST (SGOT) 48 U/L      Alkaline Phosphatase 94 U/L      Total Bilirubin 0.6 mg/dL      Globulin 2.9 gm/dL      A/G Ratio 1.1 g/dL      BUN/Creatinine Ratio 19.4     Anion Gap 16.3 mmol/L      eGFR 46.4 mL/min/1.73     Narrative:      GFR Categories in Chronic Kidney Disease (CKD)      GFR Category          GFR (mL/min/1.73)    Interpretation  G1                     90 or greater         Normal or high (1)  G2                      60-89                Mild decrease (1)  G3a                   45-59                Mild to moderate decrease  G3b                   30-44                Moderate to severe decrease  G4                    15-29                Severe decrease  G5                    14 or less           Kidney failure          (1)In the absence of evidence of kidney disease, neither GFR category G1 or G2 fulfill the criteria for CKD.    eGFR calculation 2021 CKD-EPI creatinine equation, which does not include race as a factor    Magnesium [[id number]]  (Normal) Collected: 12/11/24 0428    Specimen: Blood Updated: 12/11/24 0503     Magnesium 1.9 mg/dL     Phosphorus [[id number]]  (Abnormal) Collected: 12/11/24 0428    Specimen: Blood Updated: 12/11/24 0502     Phosphorus 4.8 mg/dL     CBC & Differential [[id number]]  (Abnormal) Collected: 12/11/24 0428    Specimen: Blood Updated: 12/11/24 0443    Narrative:      The following orders were created for panel order CBC & Differential.  Procedure                               Abnormality         Status                     ---------                               -----------         ------                     CBC Auto Differential[[id number]]        Abnormal            Final result                 Please view results for these tests on the individual orders.    CBC Auto Differential [[id number]]  (Abnormal) Collected: 12/11/24 0428    Specimen: Blood Updated: 12/11/24 0443     WBC 19.81  10*3/mm3      RBC 4.69 10*6/mm3      Hemoglobin 13.6 g/dL      Hematocrit 42.6 %      MCV 90.8 fL      MCH 29.0 pg      MCHC 31.9 g/dL      RDW 15.5 %      RDW-SD 50.9 fl      MPV 11.0 fL      Platelets 276 10*3/mm3      Neutrophil % 87.3 %      Lymphocyte % 4.3 %      Monocyte % 4.3 %      Eosinophil % 0.3 %      Basophil % 0.7 %      Immature Grans % 3.1 %      Neutrophils, Absolute 17.29 10*3/mm3      Lymphocytes, Absolute 0.86 10*3/mm3      Monocytes, Absolute 0.85 10*3/mm3      Eosinophils, Absolute 0.05 10*3/mm3      Basophils, Absolute 0.14 10*3/mm3      Immature Grans, Absolute 0.62 10*3/mm3      nRBC 0.0 /100 WBC     POC Glucose Once [[id number]]  (Abnormal) Collected: 12/10/24 1955    Specimen: Blood Updated: 12/10/24 1957     Glucose 124 mg/dL      Comment: Serial Number: [id number]:  [id number]       POC Glucose 4x Daily Before Meals & at Bedtime [[id number]]  (Abnormal) Collected: 12/10/24 1718    Specimen: Blood Updated: 12/10/24 1720     Glucose 114 mg/dL      Comment: Serial Number: [id number]:  [id number]                Imaging:  Imaging Results (Last 24 Hours)       ** No results found for the last 24 hours. **               Assessment:       Multifocal pneumonia    Moderate protein-calorie malnutrition      Urinary retention requiring straight catheterization.    Plan:     Continue finasteride and tamsulosin as prescribed.  Continue bladder scans every 4 hours.  Discussed straight cathing versus placing indwelling Mackenzie catheter with patient.  Patient would like to continue with straight cathing at this time however if this becomes uncomfortable may place indwelling Mackenzie catheter for high residuals.  If indwelling Mackenzie catheter is placed patient will need voiding trial early a.m. before discharge.  If fails voiding trial replace indwelling Mackenzie catheter, teach cath care, and have patient follow-up with Dr. Cortez in office 1 week after discharge.  Urology will sign off please call with  any questions concerns or clinical changes.    RAMONITA Bradshaw  [facility]  [address], [address]  [place] [address]  Office: [phone]  Available via [employer] Secure Chat  12/11/24  13:07 EST     Plan reviewed and discussed with Dr. Cortez.

## 2024-12-12 ENCOUNTER — TELEPHONE (OUTPATIENT)
Dept: FAMILY MEDICINE CLINIC | Facility: CLINIC | Age: 89
End: 2024-12-12
Payer: MEDICARE

## 2024-12-12 VITALS
RESPIRATION RATE: 14 BRPM | HEIGHT: 70 IN | OXYGEN SATURATION: 93 % | DIASTOLIC BLOOD PRESSURE: 70 MMHG | HEART RATE: 87 BPM | WEIGHT: 149.69 LBS | SYSTOLIC BLOOD PRESSURE: 133 MMHG | BODY MASS INDEX: 21.43 KG/M2 | TEMPERATURE: 97.4 F

## 2024-12-12 LAB
ALBUMIN SERPL-MCNC: 3.5 G/DL (ref 3.5–5.2)
ALBUMIN/GLOB SERPL: 1.1 G/DL
ALP SERPL-CCNC: 107 U/L (ref 39–117)
ALT SERPL W P-5'-P-CCNC: 48 U/L (ref 1–41)
ANION GAP SERPL CALCULATED.3IONS-SCNC: 12.6 MMOL/L (ref 5–15)
AST SERPL-CCNC: 40 U/L (ref 1–40)
BASOPHILS # BLD AUTO: 0.13 10*3/MM3 (ref 0–0.2)
BASOPHILS NFR BLD AUTO: 0.6 % (ref 0–1.5)
BILIRUB SERPL-MCNC: 0.5 MG/DL (ref 0–1.2)
BUN SERPL-MCNC: 31 MG/DL (ref 8–23)
BUN/CREAT SERPL: 19.9 (ref 7–25)
CALCIUM SPEC-SCNC: 9.3 MG/DL (ref 8.6–10.5)
CHLORIDE SERPL-SCNC: 105 MMOL/L (ref 98–107)
CO2 SERPL-SCNC: 23.4 MMOL/L (ref 22–29)
CREAT SERPL-MCNC: 1.56 MG/DL (ref 0.76–1.27)
DEPRECATED RDW RBC AUTO: 53.4 FL (ref 37–54)
EGFRCR SERPLBLD CKD-EPI 2021: 42.2 ML/MIN/1.73
EOSINOPHIL # BLD AUTO: 0.11 10*3/MM3 (ref 0–0.4)
EOSINOPHIL NFR BLD AUTO: 0.5 % (ref 0.3–6.2)
ERYTHROCYTE [DISTWIDTH] IN BLOOD BY AUTOMATED COUNT: 15.7 % (ref 12.3–15.4)
GLOBULIN UR ELPH-MCNC: 3.1 GM/DL
GLUCOSE BLDC GLUCOMTR-MCNC: 107 MG/DL (ref 70–105)
GLUCOSE BLDC GLUCOMTR-MCNC: 128 MG/DL (ref 70–105)
GLUCOSE SERPL-MCNC: 124 MG/DL (ref 65–99)
HCT VFR BLD AUTO: 42.5 % (ref 37.5–51)
HGB BLD-MCNC: 13 G/DL (ref 13–17.7)
IMM GRANULOCYTES # BLD AUTO: 0.43 10*3/MM3 (ref 0–0.05)
IMM GRANULOCYTES NFR BLD AUTO: 2.1 % (ref 0–0.5)
LYMPHOCYTES # BLD AUTO: 0.91 10*3/MM3 (ref 0.7–3.1)
LYMPHOCYTES NFR BLD AUTO: 4.4 % (ref 19.6–45.3)
MAGNESIUM SERPL-MCNC: 2 MG/DL (ref 1.6–2.4)
MCH RBC QN AUTO: 28.6 PG (ref 26.6–33)
MCHC RBC AUTO-ENTMCNC: 30.6 G/DL (ref 31.5–35.7)
MCV RBC AUTO: 93.4 FL (ref 79–97)
MONOCYTES # BLD AUTO: 0.98 10*3/MM3 (ref 0.1–0.9)
MONOCYTES NFR BLD AUTO: 4.7 % (ref 5–12)
NEUTROPHILS NFR BLD AUTO: 18.25 10*3/MM3 (ref 1.7–7)
NEUTROPHILS NFR BLD AUTO: 87.7 % (ref 42.7–76)
NRBC BLD AUTO-RTO: 0 /100 WBC (ref 0–0.2)
PHOSPHATE SERPL-MCNC: 4.5 MG/DL (ref 2.5–4.5)
PLATELET # BLD AUTO: 272 10*3/MM3 (ref 140–450)
PMV BLD AUTO: 10.9 FL (ref 6–12)
POTASSIUM SERPL-SCNC: 4.4 MMOL/L (ref 3.5–5.2)
PROT SERPL-MCNC: 6.6 G/DL (ref 6–8.5)
RBC # BLD AUTO: 4.55 10*6/MM3 (ref 4.14–5.8)
SODIUM SERPL-SCNC: 141 MMOL/L (ref 136–145)
WBC NRBC COR # BLD AUTO: 20.81 10*3/MM3 (ref 3.4–10.8)

## 2024-12-12 PROCEDURE — 85025 COMPLETE CBC W/AUTO DIFF WBC: CPT | Performed by: STUDENT IN AN ORGANIZED HEALTH CARE EDUCATION/TRAINING PROGRAM

## 2024-12-12 PROCEDURE — 83735 ASSAY OF MAGNESIUM: CPT | Performed by: STUDENT IN AN ORGANIZED HEALTH CARE EDUCATION/TRAINING PROGRAM

## 2024-12-12 PROCEDURE — 80053 COMPREHEN METABOLIC PANEL: CPT | Performed by: STUDENT IN AN ORGANIZED HEALTH CARE EDUCATION/TRAINING PROGRAM

## 2024-12-12 PROCEDURE — 82948 REAGENT STRIP/BLOOD GLUCOSE: CPT

## 2024-12-12 PROCEDURE — 84100 ASSAY OF PHOSPHORUS: CPT | Performed by: STUDENT IN AN ORGANIZED HEALTH CARE EDUCATION/TRAINING PROGRAM

## 2024-12-12 RX ORDER — TAMSULOSIN HYDROCHLORIDE 0.4 MG/1
0.4 CAPSULE ORAL DAILY
Qty: 30 CAPSULE | Refills: 0 | Status: SHIPPED | OUTPATIENT
Start: 2024-12-13 | End: 2025-01-12

## 2024-12-12 RX ORDER — POLYETHYLENE GLYCOL 3350 17 G/17G
17 POWDER, FOR SOLUTION ORAL DAILY PRN
Qty: 510 G | Refills: 0 | Status: SHIPPED | OUTPATIENT
Start: 2024-12-12 | End: 2025-01-11

## 2024-12-12 RX ADMIN — Medication 1000 UNITS: at 10:00

## 2024-12-12 RX ADMIN — GUAIFENESIN 1200 MG: 600 TABLET, MULTILAYER, EXTENDED RELEASE ORAL at 10:00

## 2024-12-12 RX ADMIN — PANTOPRAZOLE SODIUM 40 MG: 40 TABLET, DELAYED RELEASE ORAL at 06:20

## 2024-12-12 RX ADMIN — METOPROLOL SUCCINATE 12.5 MG: 25 TABLET, EXTENDED RELEASE ORAL at 10:00

## 2024-12-12 RX ADMIN — SERTRALINE HYDROCHLORIDE 50 MG: 50 TABLET, FILM COATED ORAL at 10:00

## 2024-12-12 RX ADMIN — FINASTERIDE 5 MG: 5 TABLET, FILM COATED ORAL at 10:00

## 2024-12-12 RX ADMIN — TAMSULOSIN HYDROCHLORIDE 0.4 MG: 0.4 CAPSULE ORAL at 10:01

## 2024-12-12 RX ADMIN — SACUBITRIL AND VALSARTAN 1 TABLET: 24; 26 TABLET, FILM COATED ORAL at 10:00

## 2024-12-12 RX ADMIN — CETIRIZINE HYDROCHLORIDE 10 MG: 10 TABLET, FILM COATED ORAL at 10:00

## 2024-12-12 RX ADMIN — EMPAGLIFLOZIN 12.5 MG: 25 TABLET, FILM COATED ORAL at 10:01

## 2024-12-12 NOTE — PLAN OF CARE
Problem: Adult Inpatient Plan of Care  Goal: Plan of Care Review  Outcome: Progressing  Goal: Patient-Specific Goal (Individualized)  Outcome: Progressing  Goal: Absence of Hospital-Acquired Illness or Injury  Outcome: Progressing  Intervention: Identify and Manage Fall Risk  Recent Flowsheet Documentation  Taken 12/11/2024 1800 by Varsha Cortes RN  Safety Promotion/Fall Prevention:   activity supervised   assistive device/personal items within reach   clutter free environment maintained   fall prevention program maintained   nonskid shoes/slippers when out of bed   room organization consistent   safety round/check completed  Taken 12/11/2024 1600 by Varsha Cortes RN  Safety Promotion/Fall Prevention:   activity supervised   assistive device/personal items within reach   clutter free environment maintained   fall prevention program maintained   nonskid shoes/slippers when out of bed   safety round/check completed   room organization consistent  Taken 12/11/2024 1400 by Varsha Cortes RN  Safety Promotion/Fall Prevention:   activity supervised   assistive device/personal items within reach   clutter free environment maintained   fall prevention program maintained   nonskid shoes/slippers when out of bed   room organization consistent   safety round/check completed  Taken 12/11/2024 1209 by Varsha Cortes RN  Safety Promotion/Fall Prevention:   activity supervised   assistive device/personal items within reach   clutter free environment maintained   fall prevention program maintained   nonskid shoes/slippers when out of bed   room organization consistent   safety round/check completed  Taken 12/11/2024 1007 by Varsha Cortes RN  Safety Promotion/Fall Prevention:   activity supervised   assistive device/personal items within reach   clutter free environment maintained   fall prevention program maintained   nonskid shoes/slippers when out of bed   room organization consistent   safety round/check  completed  Taken 12/11/2024 0800 by Varsha Cortes RN  Safety Promotion/Fall Prevention:   activity supervised   assistive device/personal items within reach   clutter free environment maintained   fall prevention program maintained   nonskid shoes/slippers when out of bed   room organization consistent   safety round/check completed  Intervention: Prevent Skin Injury  Recent Flowsheet Documentation  Taken 12/11/2024 1800 by Varsha Cortes RN  Body Position: position changed independently  Taken 12/11/2024 1600 by Varsha Cortes RN  Body Position:   supine   position changed independently  Skin Protection:   incontinence pads utilized   transparent dressing maintained  Taken 12/11/2024 1400 by Varsha Cortes RN  Body Position:   side-lying   left  Taken 12/11/2024 1209 by Varsha Cortes RN  Body Position: weight shifting  Skin Protection:   incontinence pads utilized   transparent dressing maintained  Taken 12/11/2024 1007 by Varsha Cortes RN  Body Position: weight shifting  Taken 12/11/2024 0800 by Varsha Cortes RN  Body Position:   turned   left  Skin Protection:   incontinence pads utilized   transparent dressing maintained  Intervention: Prevent and Manage VTE (Venous Thromboembolism) Risk  Recent Flowsheet Documentation  Taken 12/11/2024 0800 by Varsha Cortes RN  VTE Prevention/Management:   patient refused intervention   SCDs (sequential compression devices) off  Intervention: Prevent Infection  Recent Flowsheet Documentation  Taken 12/11/2024 1800 by Varsha Cortes RN  Infection Prevention:   hand hygiene promoted   personal protective equipment utilized   single patient room provided  Taken 12/11/2024 1600 by Varsha Cortes RN  Infection Prevention:   hand hygiene promoted   personal protective equipment utilized   single patient room provided  Taken 12/11/2024 1400 by Varsha Cortes RN  Infection Prevention:   hand hygiene promoted   personal protective equipment utilized   single patient room  provided  Taken 12/11/2024 1209 by Varsha Cortes RN  Infection Prevention:   hand hygiene promoted   personal protective equipment utilized   single patient room provided  Taken 12/11/2024 1007 by Varsha Cortes RN  Infection Prevention:   hand hygiene promoted   personal protective equipment utilized   single patient room provided  Taken 12/11/2024 0800 by Varsha Cortes RN  Infection Prevention:   hand hygiene promoted   personal protective equipment utilized   single patient room provided  Goal: Optimal Comfort and Wellbeing  Outcome: Progressing  Intervention: Provide Person-Centered Care  Recent Flowsheet Documentation  Taken 12/11/2024 1600 by Varsha Cortes RN  Trust Relationship/Rapport:   care explained   choices provided   questions answered   questions encouraged  Taken 12/11/2024 1209 by Varsha Cortes RN  Trust Relationship/Rapport:   care explained   choices provided   questions answered   questions encouraged  Taken 12/11/2024 0800 by Varsha Cortes RN  Trust Relationship/Rapport:   care explained   emotional support provided   choices provided   questions answered   questions encouraged   reassurance provided   thoughts/feelings acknowledged  Goal: Readiness for Transition of Care  Outcome: Progressing     Problem: Sepsis/Septic Shock  Goal: Optimal Coping  Outcome: Progressing  Goal: Absence of Bleeding  Outcome: Progressing  Goal: Blood Glucose Level Within Target Range  Outcome: Progressing  Intervention: Optimize Glycemic Control  Recent Flowsheet Documentation  Taken 12/11/2024 1600 by Varsha Cortes RN  Hyperglycemia Management: blood glucose monitored  Hypoglycemia Management: blood glucose monitored  Taken 12/11/2024 1209 by Varsha Cortes RN  Hyperglycemia Management: blood glucose monitored  Hypoglycemia Management: blood glucose monitored  Taken 12/11/2024 0800 by Varsha Cortes RN  Hyperglycemia Management: blood glucose monitored  Hypoglycemia Management: blood glucose  monitored  Goal: Absence of Infection Signs and Symptoms  Outcome: Progressing  Intervention: Initiate Sepsis Management  Recent Flowsheet Documentation  Taken 12/11/2024 1800 by Varsha Cortes RN  Infection Prevention:   hand hygiene promoted   personal protective equipment utilized   single patient room provided  Isolation Precautions:   precautions maintained   droplet  Taken 12/11/2024 1600 by Varsha Cortes RN  Infection Prevention:   hand hygiene promoted   personal protective equipment utilized   single patient room provided  Taken 12/11/2024 1400 by Varsha Cortes RN  Infection Prevention:   hand hygiene promoted   personal protective equipment utilized   single patient room provided  Isolation Precautions:   precautions maintained   droplet  Taken 12/11/2024 1209 by Varsha Cortes RN  Infection Prevention:   hand hygiene promoted   personal protective equipment utilized   single patient room provided  Isolation Precautions:   precautions maintained   droplet  Taken 12/11/2024 1007 by Varsha Cortes RN  Infection Prevention:   hand hygiene promoted   personal protective equipment utilized   single patient room provided  Isolation Precautions:   precautions maintained   droplet  Taken 12/11/2024 0800 by Varsha Cortes RN  Infection Prevention:   hand hygiene promoted   personal protective equipment utilized   single patient room provided  Isolation Precautions:   precautions maintained   droplet  Intervention: Promote Recovery  Recent Flowsheet Documentation  Taken 12/11/2024 1400 by Varsha Cortes RN  Activity Management: activity encouraged  Taken 12/11/2024 1209 by Varsha Cortes RN  Activity Management: activity encouraged  Taken 12/11/2024 0800 by Varsha Cortes RN  Activity Management: activity encouraged  Goal: Optimal Nutrition Delivery  Outcome: Progressing     Problem: Comorbidity Management  Goal: Maintenance of Behavioral Health Symptom Control  Outcome: Progressing  Goal: Blood Glucose  Level Within Target Range  Outcome: Progressing  Goal: Maintenance of Heart Failure Symptom Control  Outcome: Progressing  Goal: Blood Pressure in Desired Range  Outcome: Progressing  Goal: Maintenance of Osteoarthritis Symptom Control  Outcome: Progressing  Intervention: Maintain Osteoarthritis Symptom Control  Recent Flowsheet Documentation  Taken 12/11/2024 1400 by Varsha Cortes RN  Activity Management: activity encouraged  Taken 12/11/2024 1209 by Varsha Cortes, RN  Activity Management: activity encouraged  Taken 12/11/2024 0800 by Varsha Cortes RN  Activity Management: activity encouraged     Problem: Fall Injury Risk  Goal: Absence of Fall and Fall-Related Injury  Outcome: Progressing  Intervention: Promote Injury-Free Environment  Recent Flowsheet Documentation  Taken 12/11/2024 1800 by Varsha Cortes RN  Safety Promotion/Fall Prevention:   activity supervised   assistive device/personal items within reach   clutter free environment maintained   fall prevention program maintained   nonskid shoes/slippers when out of bed   room organization consistent   safety round/check completed  Taken 12/11/2024 1600 by Varsha Cortes RN  Safety Promotion/Fall Prevention:   activity supervised   assistive device/personal items within reach   clutter free environment maintained   fall prevention program maintained   nonskid shoes/slippers when out of bed   safety round/check completed   room organization consistent  Taken 12/11/2024 1400 by Varsha Cortes RN  Safety Promotion/Fall Prevention:   activity supervised   assistive device/personal items within reach   clutter free environment maintained   fall prevention program maintained   nonskid shoes/slippers when out of bed   room organization consistent   safety round/check completed  Taken 12/11/2024 1209 by Varsha Cortes RN  Safety Promotion/Fall Prevention:   activity supervised   assistive device/personal items within reach   clutter free environment  maintained   fall prevention program maintained   nonskid shoes/slippers when out of bed   room organization consistent   safety round/check completed  Taken 12/11/2024 1007 by Varsha Cortes RN  Safety Promotion/Fall Prevention:   activity supervised   assistive device/personal items within reach   clutter free environment maintained   fall prevention program maintained   nonskid shoes/slippers when out of bed   room organization consistent   safety round/check completed  Taken 12/11/2024 0800 by Varsha Cortes RN  Safety Promotion/Fall Prevention:   activity supervised   assistive device/personal items within reach   clutter free environment maintained   fall prevention program maintained   nonskid shoes/slippers when out of bed   room organization consistent   safety round/check completed     Problem: Noninvasive Ventilation Acute  Goal: Effective Unassisted Ventilation and Oxygenation  Outcome: Progressing     Problem: Malnutrition  Goal: Improved Nutritional Intake  Outcome: Progressing     Problem: Skin Injury Risk Increased  Goal: Skin Health and Integrity  Outcome: Progressing  Intervention: Optimize Skin Protection  Recent Flowsheet Documentation  Taken 12/11/2024 1600 by Varsha Cortes RN  Pressure Reduction Techniques: frequent weight shift encouraged  Head of Bed (HOB) Positioning: HOB elevated  Pressure Reduction Devices: pressure-redistributing mattress utilized  Skin Protection:   incontinence pads utilized   transparent dressing maintained  Taken 12/11/2024 1400 by Varsha Cortes, RN  Activity Management: activity encouraged  Head of Bed (HOB) Positioning: HOB elevated  Taken 12/11/2024 1209 by Varsha Cortes RN  Activity Management: activity encouraged  Pressure Reduction Techniques:   frequent weight shift encouraged   weight shift assistance provided  Head of Bed (HOB) Positioning: HOB elevated  Pressure Reduction Devices:   pressure-redistributing mattress utilized   positioning supports  utilized  Skin Protection:   incontinence pads utilized   transparent dressing maintained  Taken 12/11/2024 1007 by Varsha Cortes, RN  Head of Bed (HOB) Positioning: HOB elevated  Taken 12/11/2024 0800 by Varsha Cortes RN  Activity Management: activity encouraged  Pressure Reduction Techniques:   frequent weight shift encouraged   weight shift assistance provided  Head of Bed (HOB) Positioning: HOB elevated  Pressure Reduction Devices: pressure-redistributing mattress utilized  Skin Protection:   incontinence pads utilized   transparent dressing maintained   Goal Outcome Evaluation: Pt able to voice concerns. Call light within reach.

## 2024-12-12 NOTE — CASE MANAGEMENT/SOCIAL WORK
"Continued Stay Note  [facility]     Patient Name: Mikael Shanks  MRN: [id number]  Today's Date: 12/12/2024    Admit Date: 12/3/2024    Plan: Home w/ family. [facility] (accepted)   Discharge Plan       Row Name 12/12/24 1017       Plan    Plan Home w/ family. [facility] (accepted)    Plan Comments [facility] liaison Fatmata updated CM that she has spoken with patient and family as well as \"Fina\" and they are going to come and  the patient today and likely will not need the family meeting at 3.      Row Name 12/12/24 7961       Plan    Plan Comments [facility] liaison Fatmata met with CM and confirmed family meeting today at 3 and potential DC after or early tomorrow. Ex step daughter Fina that patient is going to live with sent text to CM confirming she will be here for family meeting today as well. [facility] provided with patient's advanced directive on file and patient confirmed valid.               Heather Gifford RN     [facility]  Office: [phone]  Cell: [phone]  Fax # [phone]     "

## 2024-12-12 NOTE — PLAN OF CARE
Goal Outcome Evaluation:   Pt discharged to home with [facility]. Belongings sent with pt. Pt's brother aware to  medications at outpatient pharmacy. Discharge instruction provided.

## 2024-12-12 NOTE — PROGRESS NOTES
[facility] Progress Note      Patient Name: Mikael Shanks  : 1935  MRN: [id number]  Primary Care Physician:  Xander Robison MD  Date of admission: 12/3/2024    Subjective     Interval History:   The patient was seen and examined today for follow-up on acute kidney injury.  Sleepy this morning.  Denies any nausea or vomiting.  No fever no chills    Review of Systems:   As noted above    Objective     Vitals:   Temp:  [97.4 °F (36.3 °C)-98.1 °F (36.7 °C)] 97.4 °F (36.3 °C)  Heart Rate:  [77-87] 87  Resp:  [14-21] 14  BP: (100-136)/(47-70) 133/70    Intake/Output Summary (Last 24 hours) at 2024 1743  Last data filed at 2024 1100  Gross per 24 hour   Intake 360 ml   Output 500 ml   Net -140 ml       Physical Exam:    General Appearance: alert, oriented x 3, no acute distress   Skin: warm and dry  HEENT: oral mucosa normal, nonicteric sclera  Neck: supple, no JVD  Lungs: CTA  Heart: RRR, normal S1 and S2  Abdomen: soft, nontender, nondistended  : no palpable bladder  Extremities: no edema, cyanosis or clubbing  Neuro: normal speech and mental status     Scheduled Meds:     cyanocobalamin, 1,000 mcg, Intramuscular, Q28 Days      IV Meds:        Results Reviewed:   I have personally reviewed the results from the time of this admission to 2024 17:43 EST     Results from last 7 days   Lab Units 24  0543 24  0428 12/10/24  0435   SODIUM mmol/L 141 140 137   POTASSIUM mmol/L 4.4 4.9 4.7   CHLORIDE mmol/L 105 103 104   CO2 mmol/L 23.4 20.7* 22.4   BUN mg/dL 31* 28* 28*   CREATININE mg/dL 1.56* 1.44* 1.36*   CALCIUM mg/dL 9.3 9.7 9.2   BILIRUBIN mg/dL 0.5 0.6 0.3   ALK PHOS U/L 107 94 83   ALT (SGPT) U/L 48* 53* 50*   AST (SGOT) U/L 40 48* 47*   GLUCOSE mg/dL 124* 123* 94       Estimated Creatinine Clearance: 30.8 mL/min (A) (by C-G formula based on SCr of 1.56 mg/dL (H)).    Results from last 7 days   Lab Units 24  0543 24  3882  12/10/24  0435   MAGNESIUM mg/dL 2.0 1.9 1.9   PHOSPHORUS mg/dL 4.5 4.8* 3.9             Results from last 7 days   Lab Units 12/12/24  0543 12/11/24  0428 12/10/24  0435 12/09/24  0355 12/07/24  0542   WBC 10*3/mm3 20.81* 19.81* 15.37* 13.04* 9.68   HEMOGLOBIN g/dL 13.0 13.6 12.8* 12.0* 12.1*   PLATELETS 10*3/mm3 272 276 273 256 238               Assessment / Plan     ASSESSMENT:  Acute Kidney injury on top of chronic kidney disease stage IIIa baseline creatinine between 1.3 and 1.5 up to 2 mg/dL.  Etiology  was likely secondary to prerenal/ATN due to possible sepsis, pneumonia, cardiorenal syndrome due to CHF exacerbation.  Creatinine has improved  Hypertension with CKD blood pressure is acceptable  Type 2 diabetes mellitus with CKD  Hepatocellular carcinoma newly diagnosed followed by oncology  Dyspnea secondary to COVID-19 infection  Heart failure exacerbation EF of 20 to 25%        PLAN:  No labs today labs were not reported during rounds but showed a creatinine of 1.56 that is relatively stable very close to his baseline  We will arrange for follow-up in nephrology clinic in 1 to 2 weeks after discharge      Thank you for involving us in the care of Mikael Shanks.  Please feel free to call with any questions.    José Tee MD  12/12/24  17:43 EST    [facility]  [phone]    Parts of this note may be an electronic transcription/translation of spoken language to printed text using the Dragon dictation system.

## 2024-12-12 NOTE — TELEPHONE ENCOUNTER
Called and left a message that I was calling to check on Mr. Shanks and to please return my call.

## 2024-12-12 NOTE — PLAN OF CARE
Goal Outcome Evaluation:              Outcome Evaluation: Patient currently resting abed, no distress noted. Patient up to restroom this shift. Patient requesting ice chips but refusing snacks. Patient voided once after bladder scan and was rescanned, no straight cath performed

## 2024-12-12 NOTE — SIGNIFICANT NOTE
12/12/24 1139   OTHER   Discipline physical therapist   Rehab Time/Intention   Session Not Performed other (see comments)  (Pt to discharge home with hospice today.)   Therapy Assessment/Plan (PT)   Criteria for Skilled Interventions Met (PT) no problems identified which require skilled intervention     We will complete PT order at this time

## 2024-12-12 NOTE — PROGRESS NOTES
Daily Progress Note          Assessment    Multifocal pneumonia  Hypoxemia  Human rhinovirus infection  BILL: Polysomnography 2019 AHI 78.3, titrated for auto BiPAP 6-16 with PS 4 cm H2O  Liver mass: Hepatocellular carcinoma  CHF  CKD  HTN  HLD  GERD     PFTs/spirometry 8/31/2020: Normal, FEV1/FVC 84%, FVC 89%, FEV1 107%        Recommendations:  Respiratory status is gradually improving can be discharged from pulmonary standpoint       antibiotic: Completed Rocephin  Oxygen supplement and titration to maintain saturation 90 to 95%: Currently on room air   Mucinex  Prednisone 20 mg daily completed  Encourage use of incentive spirometry     Singulair     Oncology following for liver mass, status post CT-guided biopsy, Moderately differentiated hepatocellular carcinoma: Noted the plan for hospice at home        I personally reviewed the radiological studies             LOS: 9 days     Subjective     Patient reports improvement in the cough and shortness of breath    Objective     Vital signs for last 24 hours:  Vitals:    12/11/24 2203 12/12/24 0120 12/12/24 0509 12/12/24 0900   BP: 116/58 100/47 136/54 133/70   BP Location: Right arm Right arm Right arm Left arm   Patient Position: Lying Lying Lying Lying   Pulse: 77 79 80 87   Resp: 17 21 14 14   Temp: 98.1 °F (36.7 °C) 97.7 °F (36.5 °C) 97.9 °F (36.6 °C) 97.4 °F (36.3 °C)   TempSrc: Oral Oral Oral Axillary   SpO2:  92% 95% 93%   Weight:       Height:           Intake/Output last 3 shifts:  I/O last 3 completed shifts:  In: 120 [P.O.:120]  Out: 900 [Urine:900]  Intake/Output this shift:  No intake/output data recorded.      Radiology  Imaging Results (Last 24 Hours)       ** No results found for the last 24 hours. **            Labs:  Results from last 7 days   Lab Units 12/12/24  0543   WBC 10*3/mm3 20.81*   HEMOGLOBIN g/dL 13.0   HEMATOCRIT % 42.5   PLATELETS 10*3/mm3 272     Results from last 7 days   Lab Units 12/12/24  0543   SODIUM mmol/L 141   POTASSIUM mmol/L  4.4   CHLORIDE mmol/L 105   CO2 mmol/L 23.4   BUN mg/dL 31*   CREATININE mg/dL 1.56*   CALCIUM mg/dL 9.3   BILIRUBIN mg/dL 0.5   ALK PHOS U/L 107   ALT (SGPT) U/L 48*   AST (SGOT) U/L 40   GLUCOSE mg/dL 124*         Results from last 7 days   Lab Units 12/12/24  0543 12/11/24  0428 12/10/24  0435   ALBUMIN g/dL 3.5 3.3* 3.3*               Results from last 7 days   Lab Units 12/12/24  0543   MAGNESIUM mg/dL 2.0     Results from last 7 days   Lab Units 12/05/24  1238   INR  1.16*   APTT seconds 34.3     Results from last 7 days   Lab Units 12/09/24  0355   TSH uIU/mL 6.460*   FREE T4 ng/dL 1.14           Meds:   SCHEDULE  atorvastatin, 80 mg, Oral, Nightly  cetirizine, 10 mg, Oral, Daily  cholecalciferol, 1,000 Units, Oral, Daily  empagliflozin, 12.5 mg, Oral, Daily  finasteride, 5 mg, Oral, Daily  guaiFENesin, 1,200 mg, Oral, Q12H  insulin lispro, 2-7 Units, Subcutaneous, 4x Daily AC & at Bedtime  Lidocaine, 1 patch, Transdermal, Q24H  metoprolol succinate XL, 12.5 mg, Oral, Q24H  montelukast, 10 mg, Oral, Nightly  pantoprazole, 40 mg, Oral, Q AM  polyethylene glycol, 17 g, Oral, Daily  sacubitril-valsartan, 1 tablet, Oral, Daily  senna-docusate sodium, 1 tablet, Oral, Daily  sertraline, 50 mg, Oral, Daily  tamsulosin, 0.4 mg, Oral, Daily      Infusions     PRNs    acetaminophen **OR** acetaminophen **OR** acetaminophen    albuterol sulfate HFA    aluminum-magnesium hydroxide-simethicone    Calcium Replacement - Follow Nurse / BPA Driven Protocol    dextrose    dextrose    glucagon (human recombinant)    ipratropium-albuterol    Magnesium Standard Dose Replacement - Follow Nurse / BPA Driven Protocol    nitroglycerin    Phosphorus Replacement - Follow Nurse / BPA Driven Protocol    Potassium Replacement - Follow Nurse / BPA Driven Protocol    [COMPLETED] Insert Peripheral IV **AND** sodium chloride    Physical Exam:  General Appearance:  Alert: Patient looks chronically ill but not in acute distress  HEENT:   Normocephalic, without obvious abnormality, Conjunctiva/corneas clear,.   Nares normal, no drainage     Neck:  Supple, symmetrical, trachea midline.   Lungs /Chest wall: mild bilateral basal rhonchi, respirations unlabored, symmetrical wall movement.     Heart:  Regular rate and rhythm, S1 S2 normal  Abdomen: Soft, non-tender, no masses, no organomegaly.    Extremities: No edema, no clubbing or cyanosis     ROS  Constitutional: Negative for chills, fever and malaise/fatigue.   HENT: Hard of hearing.    Eyes: Negative.    Cardiovascular: Negative.    Respiratory: Mild cough and shortness of breath.    Skin: Negative.    Musculoskeletal: Negative.    Gastrointestinal: Negative.    Genitourinary: Negative.    Neurological: Generalized weakness      I reviewed the recent clinical results  I personally reviewed the latest radiological studies    Part of this note may be an electronic transcription/translation of spoken language to printed text using the Dragon Dictation System.

## 2024-12-12 NOTE — DISCHARGE SUMMARY
"             [facility]  Discharge Summary    Date of Service: 24  Patient Name: Mikael Shanks  : 1935  MRN: [id number]    Date of Admission: 12/3/2024  Discharge Diagnosis:     Hepatic cellular carcinoma  Discharged home with hospice    Date of Discharge:  24  Primary Care Physician: Xander Robison MD      Presenting Problem:   Multifocal pneumonia [J18.9]    Active and Resolved Hospital Problems:  Active Hospital Problems    Diagnosis POA    **Multifocal pneumonia [J18.9] Yes    Moderate protein-calorie malnutrition [E44.0] Yes      Resolved Hospital Problems   No resolved problems to display.         Hospital Course     HPI:  Per the H&P written by Arpita Jean APRN , dated 24 :  \"shortness of breath \"    Hospital Course:  Mikael Shanks is a 89 y.o. male with a CMH of CAD, diabetes, GERD, hyperlipidemia, hypertension who presented to [facility] on 12/3/2024 with shortness of breath.  Patient states he came to the hospital for shortness of breath and right groin pain.  Patient is very hard of hearing and history was limited due to patient not having his hearing aids.  Patient gave permission to speak with his daughter, Sadia.  Sadia stated that the patient had a recent admission at the [facility] where they were concerned for congestive heart failure and they also performed a heart cath.  Patient does live alone, does not use home oxyg      # Acute hypoxic respiratory failure, CHF exacerbation, possible pneumonia, combined  #Status multifocal pneumonia, status post antibiotics, pulmonology following  # Rhinovirus positive  #HFrEF, LVEF of 20 to 25% per the cardiology note on  lvef of 25 %   #CAD s/p stent in   #Liver mass status post liver biopsy taken: Concerning for HCC  # Elevated AFP levels, with liver mass, concerning for HCC, oncology following  # SANDY on CKD versus CKD, creatinine on admission 1.65 previously creatinine 1.85 from " July 2024  #DM 2  #GERD  #Anemia, hemoglobin 11.7    Pulmonology was consulted, patient was treated for pneumonia with antibiotics.  Patient was retaining urine, started on tamsulosin, was straight cath, urology consulted recommended straight cath at this time with the tamsulosin.2    Found to have a liver mass, status post biopsy taken consistent with hepatocellular carcinoma, elevated AFP levels as well, oncology consulted.  Given patient multiple comorbidities including CKD cardiac pulmonology and malignancy palliative was consulted, patient decided to go with comfort care at this time.  Hospice saw the patient inpatient I discussed his wishes along with hospice team present, patient wants to be comfort at this time wants to go home with hospice.  It was discussed that the patient that comfort measure means patient will not be treated aggressively medically, which again leads to death, patient understood the risk, further care deferred to hospice team at this time.     DISCHARGE Follow Up Recommendations for labs and diagnostics:     Follow-up with hospice team for further care and management          Reasons For Change In Medications and Indications for New Medications:      Day of Discharge     Vital Signs:  Temp:  [97.4 °F (36.3 °C)-98.4 °F (36.9 °C)] 97.4 °F (36.3 °C)  Heart Rate:  [77-87] 87  Resp:  [14-28] 14  BP: (100-136)/(47-93) 133/70    Physical Exam:  General Appearance:  Awake, alert   Head:  Atraumatic normocephalic         Neck: Range of motion normal my exam   Pulm: Decreased breath sounds at bases, no wheezes   Cardio: Moderate, rhythm regular on my exam   Extremities: No significant edema of the bilateral lower extremities   Abdomen: Soft, nontender         Pertinent  and/or Most Recent Results     LAB RESULTS:      Lab 12/12/24  0543 12/11/24  0428 12/10/24  0435 12/09/24  0355 12/07/24  0542 12/06/24  0055 12/05/24  1238   WBC 20.81* 19.81* 15.37* 13.04* 9.68   < >  --    HEMOGLOBIN 13.0 13.6  12.8* 12.0* 12.1*   < >  --    HEMATOCRIT 42.5 42.6 43.3 38.9 40.6   < >  --    PLATELETS 272 276 273 256 238   < >  --    NEUTROS ABS 18.25* 17.29* 11.54* 9.43* 6.95   < >  --    IMMATURE GRANS (ABS) 0.43* 0.62* 0.81* 0.53* 0.20*   < >  --    LYMPHS ABS 0.91 0.86 1.57 1.65 1.38   < >  --    MONOS ABS 0.98* 0.85 0.83 0.86 0.69   < >  --    EOS ABS 0.11 0.05 0.41* 0.42* 0.34   < >  --    MCV 93.4 90.8 93.3 92.4 94.0   < >  --    PROCALCITONIN  --   --   --  0.26*  --   --   --    PROTIME  --   --   --   --   --   --  14.8*   APTT  --   --   --   --   --   --  34.3    < > = values in this interval not displayed.         Lab 12/12/24  0543 12/11/24  0428 12/10/24  0435 12/09/24 0355 12/07/24  0542   SODIUM 141 140 137 140 138   POTASSIUM 4.4 4.9 4.7 4.5 4.6   CHLORIDE 105 103 104 104 104   CO2 23.4 20.7* 22.4 27.0 25.1   ANION GAP 12.6 16.3* 10.6 9.0 8.9   BUN 31* 28* 28* 33* 31*   CREATININE 1.56* 1.44* 1.36* 1.68* 1.60*   EGFR 42.2* 46.4* 49.7* 38.6* 40.9*   GLUCOSE 124* 123* 94 109* 161*   CALCIUM 9.3 9.7 9.2 9.3 9.2   MAGNESIUM 2.0 1.9 1.9  --   --    PHOSPHORUS 4.5 4.8* 3.9  --   --    TSH  --   --   --  6.460*  --          Lab 12/12/24  0543 12/11/24 0428 12/10/24  0435   TOTAL PROTEIN 6.6 6.2 5.8*   ALBUMIN 3.5 3.3* 3.3*   GLOBULIN 3.1 2.9 2.5   ALT (SGPT) 48* 53* 50*   AST (SGOT) 40 48* 47*   BILIRUBIN 0.5 0.6 0.3   ALK PHOS 107 94 83         Lab 12/05/24  1238   PROTIME 14.8*   INR 1.16*                 Brief Urine Lab Results  (Last result in the past 365 days)        Color   Clarity   Blood   Leuk Est   Nitrite   Protein   CREAT   Urine HCG        05/29/24 1340 Yellow   Clear   Negative   Trace   Negative   30 mg/dL                 Microbiology Results (last 10 days)       Procedure Component Value - Date/Time    Respiratory Panel PCR w/COVID-19(SARS-CoV-2) DARVIN/CLAUDIA/ARELIS/PAD/COR/CLIFFORD In-House, NP Swab in UTM/VTM, 2 HR TAT - Swab, Nasopharynx [[id number]]  (Abnormal) Collected: 12/03/24 1442    Lab Status:  Final result Specimen: Swab from Nasopharynx Updated: 12/03/24 1540     ADENOVIRUS, PCR Not Detected     Coronavirus 229E Not Detected     Coronavirus HKU1 Not Detected     Coronavirus NL63 Not Detected     Coronavirus OC43 Not Detected     COVID19 Not Detected     Human Metapneumovirus Not Detected     Human Rhinovirus/Enterovirus Detected     Influenza A PCR Not Detected     Influenza B PCR Not Detected     Parainfluenza Virus 1 Not Detected     Parainfluenza Virus 2 Not Detected     Parainfluenza Virus 3 Not Detected     Parainfluenza Virus 4 Not Detected     RSV, PCR Not Detected     Bordetella pertussis pcr Not Detected     Bordetella parapertussis PCR Not Detected     Chlamydophila pneumoniae PCR Not Detected     Mycoplasma pneumo by PCR Not Detected    Narrative:      In the setting of a positive respiratory panel with a viral infection PLUS a negative procalcitonin without other underlying concern for bacterial infection, consider observing off antibiotics or discontinuation of antibiotics and continue supportive care. If the respiratory panel is positive for atypical bacterial infection (Bordetella pertussis, Chlamydophila pneumoniae, or Mycoplasma pneumoniae), consider antibiotic de-escalation to target atypical bacterial infection.    Blood Culture - Blood, Arm, Left [[id number]]  (Normal) Collected: 12/03/24 1053    Lab Status: Final result Specimen: Blood from Arm, Left Updated: 12/08/24 1100     Blood Culture No growth at 5 days    Narrative:      Less than seven (7) mL's of blood was collected.  Insufficient quantity may yield false negative results.    Blood Culture - Blood, Arm, Right [[id number]]  (Normal) Collected: 12/03/24 1047    Lab Status: Final result Specimen: Blood from Arm, Right Updated: 12/08/24 1100     Blood Culture No growth at 5 days            XR Hip With or Without Pelvis 2 - 3 View Right    Result Date: 12/7/2024  Impression: Impression: No fractures or dislocations on radiograph.  Osteopenia. 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 If continued pain or difficulty weightbearing, MRI of the pelvis is recommended for better evaluation. Electronically Signed: Hazel Brock MD  12/7/2024 4:04 PM EST  Workstation ID: [id number]    CT Needle Biopsy Liver    Result Date: 12/6/2024  Impression: Successful CT-guided liver lesion core biopsy. Report dictated by: Samuel Tavarez DNP  I have personally reviewed this case and agree with the findings above: Electronically Signed: Yovanny Chen MD  12/6/2024 4:25 PM EST  Workstation ID: [id number]    CT Abdomen Pelvis With Contrast    Result Date: 12/3/2024  Impression: Impression: Heterogenous appearing mass in the right hepatic lobe at the hepatic dome measuring up to 9.3 cm remain suspicious for malignancy. Further characterization with MR abdomen with and without contrast is recommended. Unchanged appearing bilateral lower lobe opacities suspicious for pneumonia superimposed on chronic interstitial lung disease. Electronically Signed: Tesfaye Paulino  12/3/2024 4:55 PM EST  Workstation ID: [id number]    CT Chest Without Contrast Diagnostic    Result Date: 12/3/2024  Impression: Impression: 1. Large mass in the right hepatic lobe involving segments 7 and 8 which may relate to malignancy or metastasis measuring up to 9.5 cm. Recommend CT abdomen and pelvis with contrast for further assessment. 2. Patchy areas of airspace disease involving left upper lobes and bilateral lower lobes concerning for multifocal pneumonia superimposed on background of chronic interstitial lung disease. 3. Cardiomegaly and additional incidental findings above. Electronically Signed: Dane Bain MD  12/3/2024 1:53 PM EST  Workstation ID: [id number]    XR Chest 1 View    Result Date: 12/3/2024  Impression: Impression: Chronic findings, without significant change. Electronically Signed: Jaclyn Reagan MD  12/3/2024 10:38 AM EST  Workstation ID: [id number]    XR Chest PA &  Lateral    Result Date: 11/21/2024  Impression: Impression: 1. No acute process. 2. Stable peripheral and basilar interstitial thickening suggesting chronic lung disease. Electronically Signed: Dane Bain MD  11/21/2024 4:41 PM EST  Workstation ID: [id number]     Results for orders placed during the hospital encounter of 12/03/24    Duplex Pseudoaneurysm CAR    Interpretation Summary    No evidence of pseudoaneurysm or AV fistula in the right groin.      Results for orders placed during the hospital encounter of 12/03/24    Duplex Pseudoaneurysm CAR    Interpretation Summary    No evidence of pseudoaneurysm or AV fistula in the right groin.      Results for orders placed during the hospital encounter of 12/03/24    Adult Transthoracic Echo Complete W/ Cont if Necessary Per Protocol    Interpretation Summary  Indications  Heart Failure, Cardiomyopathy or Systemic or Pulmonary Hypertension, Known Heart Failure    Technically satisfactory study.  Mitral valve is structurally normal.  Mild mitral regurgitation is present.  Tricuspid valve is structurally normal.  Moderate tricuspid regurgitation is present.  Aortic valve is thickened with adequate opening motion.  Pulmonic valve could not be well visualized.  Mild pulmonic regurgitation is present.  Moderate left atrial enlargement is present.  Right atrium is normal in size.  Left ventricle is enlarged with diffuse hypocontractility with ejection fraction of 20 to 25%.  Left ventricular peak systolic global longitudinal strain (GLS) is at -11%.  Grade 1 left ventricular diastolic dysfunction is present.  Right ventricle is normal in size and hypocontractile with TAPSE of 1.4 cm..  Atrial septum is intact.  Aorta is normal.  No pericardial effusion or intracardiac thrombus is seen.    Impression  And mitral regurgitation is present.  Moderate tricuspid regurgitation.  Mild pulmonary regurgitation.  Aortic valve is thickened with adequate opening motion.  Left ventricle  is enlarged with diffuse hypocontractility with ejection fraction of 20 to 25%.  Left ventricular peak systolic global longitudinal strain (GLS) is at -11%.  Grade 1 left ventricular diastolic dysfunction is present.  Right ventricle is normal in size and hypocontractile with TAPSE of 1.4 cm..      Labs Pending at Discharge:  Pending Results       Procedure [Order ID] Specimen - Date/Time    Urinalysis With Microscopic If Indicated (No Culture) - Urine, Clean Catch [[id number]]     Specimen: Urine, Clean Catch             Procedures Performed           Consults:   Consults       Date and Time Order Name Status Description    12/11/2024  9:12 AM Inpatient Urology Consult Completed     12/9/2024  6:32 PM Inpatient Nephrology Consult Completed     12/3/2024  3:02 PM Inpatient Pulmonology Consult Completed     12/3/2024  2:15 PM Inpatient Hematology & Oncology Consult Completed               Discharge Details        Discharge Medications        New Medications        Instructions Start Date   polyethylene glycol 17 g packet  Commonly known as: MIRALAX   17 g, Oral, Daily PRN      tamsulosin 0.4 MG capsule 24 hr capsule  Commonly known as: FLOMAX   0.4 mg, Oral, Daily   Start Date: December 13, 2024            Continue These Medications        Instructions Start Date   albuterol sulfate  (90 Base) MCG/ACT inhaler  Commonly known as: PROVENTIL HFA;VENTOLIN HFA;PROAIR HFA   2 puffs, Inhalation, Every 4 Hours PRN      atorvastatin 80 MG tablet  Commonly known as: LIPITOR   80 mg, Oral, Every Night at Bedtime      cholecalciferol 25 MCG (1000 UT) tablet  Commonly known as: VITAMIN D3   1,000 Units, Oral, Daily      empagliflozin 25 MG tablet tablet  Commonly known as: JARDIANCE   12.5 mg, Daily      Entresto 24-26 MG tablet  Generic drug: sacubitril-valsartan   1 tablet, Daily      finasteride 5 MG tablet  Commonly known as: PROSCAR   5 mg, Oral, Daily      fluticasone 50 MCG/ACT nasal spray  Commonly known as:  FLONASE   1 spray, Daily      glipizide 5 MG ER tablet  Commonly known as: glipiZIDE XL   5 mg, Oral, Daily      lidocaine 5 %  Commonly known as: LIDODERM   1 patch, Every 24 Hours      loratadine 10 MG tablet  Commonly known as: CLARITIN   10 mg, Daily      metoprolol succinate XL 50 MG 24 hr tablet  Commonly known as: TOPROL-XL   50 mg, Oral, Daily      montelukast 10 MG tablet  Commonly known as: Singulair   10 mg, Oral, Nightly      omeprazole 40 MG capsule  Commonly known as: priLOSEC   40 mg, Oral, Daily      PRESERVISION AREDS 2 PO   1 Capful, 2 Times Daily      sertraline 50 MG tablet  Commonly known as: ZOLOFT   50 mg, Oral, Daily      VITAMIN B COMPLEX PO   1 capsule, Daily             Stop These Medications      enalapril 2.5 MG tablet  Commonly known as: VASOTEC     furosemide 40 MG tablet  Commonly known as: LASIX              Allergies   Allergen Reactions    Penicillins Hives         Discharge Disposition:   Hospice/Home    Diet:  Hospital:  Diet Order   Procedures    Diet: Regular/House; Fluid Consistency: Thin (IDDSI 0)         Discharge Activity:         CODE STATUS:  Code Status and Medical Interventions: No CPR (Do Not Attempt to Resuscitate); Limited Support; No intubation (DNI)   Ordered at: 12/06/24 1520     Medical Intervention Limits:    No intubation (DNI)     Level Of Support Discussed With:    Patient     Code Status (Patient has no pulse and is not breathing):    No CPR (Do Not Attempt to Resuscitate)     Medical Interventions (Patient has pulse or is breathing):    Limited Support         Future Appointments   Date Time Provider Department Center   1/6/2025 11:00 AM Xander Robison MD [facility]   1/6/2025 12:15 PM Dayday Ruiz MD [facility]       Additional Instructions for the Follow-ups that You Need to Schedule       Discharge Follow-up with PCP   As directed       Currently Documented PCP:    Xander Robison MD    PCP Phone Number:    [phone]      Follow Up Details: one week        Discharge Follow-up with Specified Provider: oncology; 5 Days   As directed      To: oncology   Follow Up: 5 Days                Time spent on Discharge including face to face service:  >30 minutes    Part of this note may be an electronic transcription/translation of spoken language to printed text using the Dragon Dictation System.    Signature: Electronically signed by Uday Andrews MD, 12/12/24, 10:59 EST.  [facility]

## 2024-12-13 NOTE — CASE MANAGEMENT/SOCIAL WORK
Case Management Discharge Note      Final Note: home with [facility] hospice    Provided Post Acute Provider List?: Yes  Post Acute Provider List: Nursing Home, Inpatient Rehab  Delivered To: Patient, Support Person  Support Person: Sadia-Daughter    Selected Continued Care - Discharged on 12/12/2024 Admission date: 12/3/2024 - Discharge disposition: Hospice/Home       Transportation Services  Private: Car    Final Discharge Disposition Code: 50 - home with hospice

## 2024-12-16 ENCOUNTER — TELEPHONE (OUTPATIENT)
Dept: FAMILY MEDICINE CLINIC | Facility: CLINIC | Age: 89
End: 2024-12-16
Payer: MEDICARE

## 2024-12-16 NOTE — TELEPHONE ENCOUNTER
Patient's daughter returned my call about Mr. Shanks- He is not doing very well;  He is in hospice care.  He is living with his daughter.

## 2024-12-26 NOTE — ED PROVIDER NOTES
Subjective   History of Present Illness  89-year-old male presents for shortness of breath.  Had recent admission for CHF at the [facility].  Does not wear oxygen at home.  Review of Systems  See HPI  Past Medical History:   Diagnosis Date    Carotid artery disease     Coronary artery disease     Diabetes mellitus     GERD (gastroesophageal reflux disease)     [place] (hard of hearing)     Hyperlipidemia     Hypertension     Osteoarthritis     Prostatic hypertrophy     Pulmonary valve disorder     Sleep apnea     cpap   doesnt use     Stroke        Allergies   Allergen Reactions    Penicillins Hives       Past Surgical History:   Procedure Laterality Date    BACK SURGERY      CARDIAC CATHETERIZATION N/A 12/5/2019    Procedure: Left Heart Cath and coronary angiogram;  Surgeon: Dayday Ruiz MD;  Location: [facility] CATH INVASIVE LOCATION;  Service: Cardiovascular    CARDIAC CATHETERIZATION N/A 12/5/2019    Procedure: Right and Left Heart Cath;  Surgeon: Dayday Ruiz MD;  Location: [facility] CATH INVASIVE LOCATION;  Service: Cardiovascular    CARDIAC CATHETERIZATION N/A 9/4/2020    Procedure: Left and Right Heart Cath with Coronary Angiography;  Surgeon: Dayday Ruiz MD;  Location: [facility] CATH INVASIVE LOCATION;  Service: Cardiovascular;  Laterality: N/A;    CAROTID ENDARTERECTOMY Left     CHOLECYSTECTOMY      COLONOSCOPY  08/14/2018    No repeat    CORONARY ANGIOPLASTY WITH STENT PLACEMENT      LUMBAR LAMINECTOMY Bilateral 1/31/2022    Procedure: LUMBAR LAMINECTOMY WITH/WITHOUT FUSION L4-L5;  Surgeon: Geronimo Gonzales MD;  Location: [facility] MAIN OR;  Service: Neurosurgery;  Laterality: Bilateral;       Family History   Problem Relation Age of Onset    Cancer Mother     Heart disease Father     Cancer Brother        Social History     Socioeconomic History    Marital status:    Tobacco Use    Smoking status: Former     Current packs/day: 0.00     Average packs/day: 5.0 packs/day for 7.0 years  "(35.0 ttl pk-yrs)     Types: Cigarettes     Start date: 1953     Quit date: 1960     Years since quittin.3     Passive exposure: Past    Smokeless tobacco: Never   Vaping Use    Vaping status: Never Used   Substance and Sexual Activity    Alcohol use: Not Currently     Comment: very rare    Drug use: No    Sexual activity: Never           Objective   Physical Exam  No acute distress, nasal cannula in place at 3 L, diminished breath sounds with coarse breath sounds bilaterally, abdomen soft and nontender, no scleral icterus, NCAT  Procedures           ED Course      /70 (BP Location: Left arm, Patient Position: Lying)   Pulse 87   Temp 97.4 °F (36.3 °C) (Axillary)   Resp 14   Ht 177.8 cm (70\")   Wt 67.9 kg (149 lb 11.1 oz)   SpO2 93%   BMI 21.48 kg/m²   Labs Reviewed   RESPIRATORY PANEL PCR W/ COVID-19 (SARS-COV-2), NP SWAB IN UTM/VTP, 2 HR TAT - Abnormal; Notable for the following components:       Result Value    Human Rhinovirus/Enterovirus Detected (*)     All other components within normal limits    Narrative:     In the setting of a positive respiratory panel with a viral infection PLUS a negative procalcitonin without other underlying concern for bacterial infection, consider observing off antibiotics or discontinuation of antibiotics and continue supportive care. If the respiratory panel is positive for atypical bacterial infection (Bordetella pertussis, Chlamydophila pneumoniae, or Mycoplasma pneumoniae), consider antibiotic de-escalation to target atypical bacterial infection.   TROPONIN - Abnormal; Notable for the following components:    HS Troponin T 47 (*)     All other components within normal limits    Narrative:     High Sensitive Troponin T Reference Range:  <14.0 ng/L- Negative Female for AMI  <22.0 ng/L- Negative Male for AMI  >=14 - Abnormal Female indicating possible myocardial injury.  >=22 - Abnormal Male indicating possible myocardial injury.   Clinicians would " have to utilize clinical acumen, EKG, Troponin, and serial changes to determine if it is an Acute Myocardial Infarction or myocardial injury due to an underlying chronic condition.        COMPREHENSIVE METABOLIC PANEL - Abnormal; Notable for the following components:    Glucose 140 (*)     Creatinine 1.65 (*)     Sodium 135 (*)     AST (SGOT) 41 (*)     eGFR 39.4 (*)     All other components within normal limits    Narrative:     GFR Normal >60  Chronic Kidney Disease <60  Kidney Failure <15    The GFR formula is only valid for adults with stable renal function between ages 18 and 70.   BNP (IN-HOUSE) - Abnormal; Notable for the following components:    proBNP 4,624.0 (*)     All other components within normal limits    Narrative:     This assay is used as an aid in the diagnosis of individuals suspected of having heart failure. It can be used as an aid in the diagnosis of acute decompensated heart failure (ADHF) in patients presenting with signs and symptoms of ADHF to the emergency department (ED). In addition, NT-proBNP of <300 pg/mL indicates ADHF is not likely.    Age Range Result Interpretation  NT-proBNP Concentration (pg/mL:      <50             Positive            >450                   Gray                 300-450                    Negative             <300    50-75           Positive            >900                  Gray                300-900                  Negative            <300      >75             Positive            >1800                  Gray                300-1800                  Negative            <300   CBC WITH AUTO DIFFERENTIAL - Abnormal; Notable for the following components:    WBC 20.53 (*)     Hemoglobin 11.7 (*)     MCHC 31.1 (*)     Neutrophil % 91.0 (*)     Lymphocyte % 3.3 (*)     Monocyte % 4.6 (*)     Eosinophil % 0.2 (*)     Neutrophils, Absolute 18.68 (*)     Lymphocytes, Absolute 0.68 (*)     Monocytes, Absolute 0.94 (*)     Immature Grans, Absolute 0.08 (*)     All other  "components within normal limits   HIGH SENSITIVITIY TROPONIN T 2HR - Abnormal; Notable for the following components:    HS Troponin T 49 (*)     All other components within normal limits    Narrative:     High Sensitive Troponin T Reference Range:  <14.0 ng/L- Negative Female for AMI  <22.0 ng/L- Negative Male for AMI  >=14 - Abnormal Female indicating possible myocardial injury.  >=22 - Abnormal Male indicating possible myocardial injury.   Clinicians would have to utilize clinical acumen, EKG, Troponin, and serial changes to determine if it is an Acute Myocardial Infarction or myocardial injury due to an underlying chronic condition.        PROCALCITONIN - Abnormal; Notable for the following components:    Procalcitonin 1.35 (*)     All other components within normal limits    Narrative:     As a Marker for Sepsis (Non-Neonates):    1. <0.5 ng/mL represents a low risk of severe sepsis and/or septic shock.  2. >2 ng/mL represents a high risk of severe sepsis and/or septic shock.    As a Marker for Lower Respiratory Tract Infections that require antibiotic therapy:    PCT on Admission    Antibiotic Therapy       6-12 Hrs later    >0.5                Strongly Recommended  >0.25 - <0.5        Recommended   0.1 - 0.25          Discouraged              Remeasure/reassess PCT  <0.1                Strongly Discouraged     Remeasure/reassess PCT    As 28 day mortality risk marker: \"Change in Procalcitonin Result\" (>80% or <=80%) if Day 0 (or Day 1) and Day 4 values are available. Refer to http://[email or web address]    Change in PCT <=80%  A decrease of PCT levels below or equal to 80% defines a positive change in PCT test result representing a higher risk for 28-day all-cause mortality of patients diagnosed with severe sepsis for septic shock.    Change in PCT >80%  A decrease of PCT levels of more than 80% defines a negative change in PCT result representing a lower risk for 28-day all-cause mortality of " patients diagnosed with severe sepsis or septic shock.      BASIC METABOLIC PANEL - Abnormal; Notable for the following components:    Glucose 109 (*)     BUN 25 (*)     Creatinine 1.70 (*)     eGFR 38.1 (*)     All other components within normal limits    Narrative:     GFR Normal >60  Chronic Kidney Disease <60  Kidney Failure <15    The GFR formula is only valid for adults with stable renal function between ages 18 and 70.   CBC WITH AUTO DIFFERENTIAL - Abnormal; Notable for the following components:    WBC 14.03 (*)     RBC 3.75 (*)     Hemoglobin 10.6 (*)     Hematocrit 34.5 (*)     MCHC 30.7 (*)     MPV 12.2 (*)     Neutrophil % 82.0 (*)     Lymphocyte % 8.2 (*)     Immature Grans % 0.6 (*)     Neutrophils, Absolute 11.50 (*)     Monocytes, Absolute 0.96 (*)     Immature Grans, Absolute 0.08 (*)     All other components within normal limits   AFP TUMOR MARKER - Abnormal; Notable for the following components:    ALPHA-FETOPROTEIN 3,304.00 (*)     All other components within normal limits    Narrative:     Alpha Fetoprotein Tumor Marker Reference Range:    0.0-8.3 ng/mL    Note: Normal values apply only to males and nonpregnant females. These results are not interpretable for pregnant females.    Testing Method: [employer] Electrochemiluminescence Immunoassay(ECLIA)  Values obtained with different assay methods or kits cannot be used interchangeably.   BASIC METABOLIC PANEL - Abnormal; Notable for the following components:    Glucose 138 (*)     BUN 24 (*)     Creatinine 1.53 (*)     eGFR 43.2 (*)     All other components within normal limits    Narrative:     GFR Normal >60  Chronic Kidney Disease <60  Kidney Failure <15    The GFR formula is only valid for adults with stable renal function between ages 18 and 70.   CBC WITH AUTO DIFFERENTIAL - Abnormal; Notable for the following components:    RBC 3.79 (*)     Hemoglobin 10.8 (*)     Hematocrit 35.7 (*)     MCHC 30.3 (*)     MPV 12.3 (*)     Neutrophil  % 85.7 (*)     Lymphocyte % 6.3 (*)     Immature Grans % 1.2 (*)     Neutrophils, Absolute 7.36 (*)     Lymphocytes, Absolute 0.54 (*)     Immature Grans, Absolute 0.10 (*)     All other components within normal limits   IRON PROFILE - Abnormal; Notable for the following components:    Iron 25 (*)     Iron Saturation (TSAT) 12 (*)     Transferrin 143 (*)     TIBC 213 (*)     All other components within normal limits   PROTIME-INR - Abnormal; Notable for the following components:    Protime 14.8 (*)     INR 1.16 (*)     All other components within normal limits   BASIC METABOLIC PANEL - Abnormal; Notable for the following components:    Glucose 170 (*)     BUN 30 (*)     Creatinine 2.01 (*)     eGFR 31.1 (*)     All other components within normal limits    Narrative:     GFR Normal >60  Chronic Kidney Disease <60  Kidney Failure <15    The GFR formula is only valid for adults with stable renal function between ages 18 and 70.   CBC WITH AUTO DIFFERENTIAL - Abnormal; Notable for the following components:    WBC 11.79 (*)     Hemoglobin 12.1 (*)     MCHC 30.6 (*)     MPV 12.2 (*)     Neutrophil % 81.6 (*)     Lymphocyte % 9.8 (*)     Monocyte % 4.8 (*)     Immature Grans % 1.5 (*)     Neutrophils, Absolute 9.62 (*)     Immature Grans, Absolute 0.18 (*)     All other components within normal limits   BASIC METABOLIC PANEL - Abnormal; Notable for the following components:    Glucose 161 (*)     BUN 31 (*)     Creatinine 1.60 (*)     eGFR 40.9 (*)     All other components within normal limits    Narrative:     GFR Normal >60  Chronic Kidney Disease <60  Kidney Failure <15    The GFR formula is only valid for adults with stable renal function between ages 18 and 70.   CBC WITH AUTO DIFFERENTIAL - Abnormal; Notable for the following components:    Hemoglobin 12.1 (*)     MCHC 29.8 (*)     MPV 12.2 (*)     Lymphocyte % 14.3 (*)     Immature Grans % 2.1 (*)     Immature Grans, Absolute 0.20 (*)     All other components within  "normal limits   PROCALCITONIN - Abnormal; Notable for the following components:    Procalcitonin 0.26 (*)     All other components within normal limits    Narrative:     As a Marker for Sepsis (Non-Neonates):    1. <0.5 ng/mL represents a low risk of severe sepsis and/or septic shock.  2. >2 ng/mL represents a high risk of severe sepsis and/or septic shock.    As a Marker for Lower Respiratory Tract Infections that require antibiotic therapy:    PCT on Admission    Antibiotic Therapy       6-12 Hrs later    >0.5                Strongly Recommended  >0.25 - <0.5        Recommended   0.1 - 0.25          Discouraged              Remeasure/reassess PCT  <0.1                Strongly Discouraged     Remeasure/reassess PCT    As 28 day mortality risk marker: \"Change in Procalcitonin Result\" (>80% or <=80%) if Day 0 (or Day 1) and Day 4 values are available. Refer to [email or web address]    Change in PCT <=80%  A decrease of PCT levels below or equal to 80% defines a positive change in PCT test result representing a higher risk for 28-day all-cause mortality of patients diagnosed with severe sepsis for septic shock.    Change in PCT >80%  A decrease of PCT levels of more than 80% defines a negative change in PCT result representing a lower risk for 28-day all-cause mortality of patients diagnosed with severe sepsis or septic shock.      BASIC METABOLIC PANEL - Abnormal; Notable for the following components:    Glucose 109 (*)     BUN 33 (*)     Creatinine 1.68 (*)     eGFR 38.6 (*)     All other components within normal limits    Narrative:     GFR Normal >60  Chronic Kidney Disease <60  Kidney Failure <15    The GFR formula is only valid for adults with stable renal function between ages 18 and 70.   CBC WITH AUTO DIFFERENTIAL - Abnormal; Notable for the following components:    WBC 13.04 (*)     Hemoglobin 12.0 (*)     MCHC 30.8 (*)     Lymphocyte % 12.7 (*)     Immature Grans % 4.1 (*)     Neutrophils, " Absolute 9.43 (*)     Eosinophils, Absolute 0.42 (*)     Immature Grans, Absolute 0.53 (*)     All other components within normal limits   TSH RFX ON ABNORMAL TO FREE T4 - Abnormal; Notable for the following components:    TSH 6.460 (*)     All other components within normal limits   COMPREHENSIVE METABOLIC PANEL - Abnormal; Notable for the following components:    BUN 28 (*)     Creatinine 1.36 (*)     Total Protein 5.8 (*)     Albumin 3.3 (*)     ALT (SGPT) 50 (*)     AST (SGOT) 47 (*)     eGFR 49.7 (*)     All other components within normal limits    Narrative:     GFR Normal >60  Chronic Kidney Disease <60  Kidney Failure <15    The GFR formula is only valid for adults with stable renal function between ages 18 and 70.   CBC WITH AUTO DIFFERENTIAL - Abnormal; Notable for the following components:    WBC 15.37 (*)     Hemoglobin 12.8 (*)     MCHC 29.6 (*)     RDW 15.5 (*)     Lymphocyte % 10.2 (*)     Immature Grans % 5.3 (*)     Neutrophils, Absolute 11.54 (*)     Eosinophils, Absolute 0.41 (*)     Basophils, Absolute 0.21 (*)     Immature Grans, Absolute 0.81 (*)     All other components within normal limits   COMPREHENSIVE METABOLIC PANEL - Abnormal; Notable for the following components:    Glucose 123 (*)     BUN 28 (*)     Creatinine 1.44 (*)     CO2 20.7 (*)     Albumin 3.3 (*)     ALT (SGPT) 53 (*)     AST (SGOT) 48 (*)     Anion Gap 16.3 (*)     eGFR 46.4 (*)     All other components within normal limits    Narrative:     GFR Categories in Chronic Kidney Disease (CKD)      GFR Category          GFR (mL/min/1.73)    Interpretation  G1                     90 or greater         Normal or high (1)  G2                      60-89                Mild decrease (1)  G3a                   45-59                Mild to moderate decrease  G3b                   30-44                Moderate to severe decrease  G4                    15-29                Severe decrease  G5                    14 or less           Kidney  failure          (1)In the absence of evidence of kidney disease, neither GFR category G1 or G2 fulfill the criteria for CKD.    eGFR calculation 2021 CKD-EPI creatinine equation, which does not include race as a factor   PHOSPHORUS - Abnormal; Notable for the following components:    Phosphorus 4.8 (*)     All other components within normal limits   CBC WITH AUTO DIFFERENTIAL - Abnormal; Notable for the following components:    WBC 19.81 (*)     RDW 15.5 (*)     Neutrophil % 87.3 (*)     Lymphocyte % 4.3 (*)     Monocyte % 4.3 (*)     Immature Grans % 3.1 (*)     Neutrophils, Absolute 17.29 (*)     Immature Grans, Absolute 0.62 (*)     All other components within normal limits   COMPREHENSIVE METABOLIC PANEL - Abnormal; Notable for the following components:    Glucose 124 (*)     BUN 31 (*)     Creatinine 1.56 (*)     ALT (SGPT) 48 (*)     eGFR 42.2 (*)     All other components within normal limits    Narrative:     GFR Categories in Chronic Kidney Disease (CKD)      GFR Category          GFR (mL/min/1.73)    Interpretation  G1                     90 or greater         Normal or high (1)  G2                      60-89                Mild decrease (1)  G3a                   45-59                Mild to moderate decrease  G3b                   30-44                Moderate to severe decrease  G4                    15-29                Severe decrease  G5                    14 or less           Kidney failure          (1)In the absence of evidence of kidney disease, neither GFR category G1 or G2 fulfill the criteria for CKD.    eGFR calculation 2021 CKD-EPI creatinine equation, which does not include race as a factor   CBC WITH AUTO DIFFERENTIAL - Abnormal; Notable for the following components:    WBC 20.81 (*)     MCHC 30.6 (*)     RDW 15.7 (*)     Neutrophil % 87.7 (*)     Lymphocyte % 4.4 (*)     Monocyte % 4.7 (*)     Immature Grans % 2.1 (*)     Neutrophils, Absolute 18.25 (*)     Monocytes, Absolute 0.98 (*)      Immature Grans, Absolute 0.43 (*)     All other components within normal limits   POC LACTATE - Abnormal; Notable for the following components:    Lactate 2.2 (*)     All other components within normal limits   POCT GLUCOSE FINGERSTICK - Abnormal; Notable for the following components:    Glucose 115 (*)     All other components within normal limits   POCT GLUCOSE FINGERSTICK - Abnormal; Notable for the following components:    Glucose 106 (*)     All other components within normal limits   POCT GLUCOSE FINGERSTICK - Abnormal; Notable for the following components:    Glucose 151 (*)     All other components within normal limits   POCT GLUCOSE FINGERSTICK - Abnormal; Notable for the following components:    Glucose 111 (*)     All other components within normal limits   POCT GLUCOSE FINGERSTICK - Abnormal; Notable for the following components:    Glucose 126 (*)     All other components within normal limits   POCT GLUCOSE FINGERSTICK - Abnormal; Notable for the following components:    Glucose 190 (*)     All other components within normal limits   POCT GLUCOSE FINGERSTICK - Abnormal; Notable for the following components:    Glucose 207 (*)     All other components within normal limits   POCT GLUCOSE FINGERSTICK - Abnormal; Notable for the following components:    Glucose 182 (*)     All other components within normal limits   POCT GLUCOSE FINGERSTICK - Abnormal; Notable for the following components:    Glucose 142 (*)     All other components within normal limits   POCT GLUCOSE FINGERSTICK - Abnormal; Notable for the following components:    Glucose 152 (*)     All other components within normal limits   POCT GLUCOSE FINGERSTICK - Abnormal; Notable for the following components:    Glucose 144 (*)     All other components within normal limits   POCT GLUCOSE FINGERSTICK - Abnormal; Notable for the following components:    Glucose 115 (*)     All other components within normal limits   POCT GLUCOSE FINGERSTICK -  Abnormal; Notable for the following components:    Glucose 137 (*)     All other components within normal limits   POCT GLUCOSE FINGERSTICK - Abnormal; Notable for the following components:    Glucose 110 (*)     All other components within normal limits   POCT GLUCOSE FINGERSTICK - Abnormal; Notable for the following components:    Glucose 192 (*)     All other components within normal limits   POCT GLUCOSE FINGERSTICK - Abnormal; Notable for the following components:    Glucose 180 (*)     All other components within normal limits   POCT GLUCOSE FINGERSTICK - Abnormal; Notable for the following components:    Glucose 232 (*)     All other components within normal limits   POCT GLUCOSE FINGERSTICK - Abnormal; Notable for the following components:    Glucose 132 (*)     All other components within normal limits   POCT GLUCOSE FINGERSTICK - Abnormal; Notable for the following components:    Glucose 112 (*)     All other components within normal limits   POCT GLUCOSE FINGERSTICK - Abnormal; Notable for the following components:    Glucose 140 (*)     All other components within normal limits   POCT GLUCOSE FINGERSTICK - Abnormal; Notable for the following components:    Glucose 156 (*)     All other components within normal limits   POCT GLUCOSE FINGERSTICK - Abnormal; Notable for the following components:    Glucose 114 (*)     All other components within normal limits   POCT GLUCOSE FINGERSTICK - Abnormal; Notable for the following components:    Glucose 190 (*)     All other components within normal limits   POCT GLUCOSE FINGERSTICK - Abnormal; Notable for the following components:    Glucose 154 (*)     All other components within normal limits   POCT GLUCOSE FINGERSTICK - Abnormal; Notable for the following components:    Glucose 137 (*)     All other components within normal limits   POCT GLUCOSE FINGERSTICK - Abnormal; Notable for the following components:    Glucose 150 (*)     All other components within  normal limits   POCT GLUCOSE FINGERSTICK - Abnormal; Notable for the following components:    Glucose 150 (*)     All other components within normal limits   POCT GLUCOSE FINGERSTICK - Abnormal; Notable for the following components:    Glucose 124 (*)     All other components within normal limits   POCT GLUCOSE FINGERSTICK - Abnormal; Notable for the following components:    Glucose 107 (*)     All other components within normal limits   POCT GLUCOSE FINGERSTICK - Abnormal; Notable for the following components:    Glucose 128 (*)     All other components within normal limits   POCT GLUCOSE FINGERSTICK - Abnormal; Notable for the following components:    Glucose 152 (*)     All other components within normal limits   BLOOD CULTURE - Normal   BLOOD CULTURE - Normal    Narrative:     Less than seven (7) mL's of blood was collected.  Insufficient quantity may yield false negative results.   LACTIC ACID, REFLEX - Normal   HEPATITIS PANEL, ACUTE - Normal    Narrative:     Results may be falsely decreased if patient taking Biotin.    FERRITIN - Normal    Narrative:     Results may be falsely decreased if patient taking Biotin.     APTT - Normal   T4, FREE - Normal   MAGNESIUM - Normal   PHOSPHORUS - Normal   MAGNESIUM - Normal   MAGNESIUM - Normal   PHOSPHORUS - Normal   POCT GLUCOSE FINGERSTICK - Normal   POCT GLUCOSE FINGERSTICK - Normal   POCT GLUCOSE FINGERSTICK - Normal   POCT GLUCOSE FINGERSTICK - Normal   POCT GLUCOSE FINGERSTICK - Normal   RAINBOW DRAW    Narrative:     The following orders were created for panel order [place] Draw.  Procedure                               Abnormality         Status                     ---------                               -----------         ------                     Green Top (Gel)[[id number]]                                  Final result               Lavender Top[[id number]]                                     Final result               Gold Top - [facility][[id number]]                                    Final result               Light Blue Top[[id number]]                                   Final result                 Please view results for these tests on the individual orders.   CEA    Narrative:     CEA Reference Range:    Non Smokers:   Less than 3 ng/mL  Smokers:       Less than 5 ng/mL  Results may be falsely decreased if patient taking Biotin.    Testing Method: Roche [employer] Electrochemiluminescence Immunoassay(ECLIA)  Values obtained with different assay methods or kits cannot be used interchangeably.   HEMOCHROMATOSIS MUTATION    Narrative:     Performed at:  01 - [facility]  [address] [facility], [place]  [id number]  : Last [facility], Phone:  [phone]   POC LACTATE   POCT GLUCOSE FINGERSTICK   POCT GLUCOSE FINGERSTICK   POCT GLUCOSE FINGERSTICK   POCT GLUCOSE FINGERSTICK   POCT GLUCOSE FINGERSTICK   POCT GLUCOSE FINGERSTICK   POCT GLUCOSE FINGERSTICK   POCT GLUCOSE FINGERSTICK   POCT GLUCOSE FINGERSTICK   POCT GLUCOSE FINGERSTICK   POCT GLUCOSE FINGERSTICK   POCT GLUCOSE FINGERSTICK   POCT GLUCOSE FINGERSTICK   POCT GLUCOSE FINGERSTICK   POCT GLUCOSE FINGERSTICK   TISSUE PATHOLOGY EXAM   GREEN TOP   LAVENDER TOP   GOLD TOP - SST   LIGHT BLUE TOP   CBC AND DIFFERENTIAL    Narrative:     The following orders were created for panel order CBC & Differential.  Procedure                               Abnormality         Status                     ---------                               -----------         ------                     CBC Auto Differential[[id number]]        Abnormal            Final result                 Please view results for these tests on the individual orders.   CBC AND DIFFERENTIAL    Narrative:     The following orders were created for panel order CBC & Differential.  Procedure                               Abnormality         Status                     ---------                               -----------         ------                     CBC Auto  Differential[[id number]]        Abnormal            Final result                 Please view results for these tests on the individual orders.   CBC AND DIFFERENTIAL    Narrative:     The following orders were created for panel order CBC & Differential.  Procedure                               Abnormality         Status                     ---------                               -----------         ------                     CBC Auto Differential[[id number]]        Abnormal            Final result                 Please view results for these tests on the individual orders.   CBC AND DIFFERENTIAL    Narrative:     The following orders were created for panel order CBC & Differential.  Procedure                               Abnormality         Status                     ---------                               -----------         ------                     CBC Auto Differential[[id number]]        Abnormal            Final result                 Please view results for these tests on the individual orders.   CBC AND DIFFERENTIAL    Narrative:     The following orders were created for panel order CBC & Differential.  Procedure                               Abnormality         Status                     ---------                               -----------         ------                     CBC Auto Differential[[id number]]        Abnormal            Final result                 Please view results for these tests on the individual orders.   CBC AND DIFFERENTIAL    Narrative:     The following orders were created for panel order CBC & Differential.  Procedure                               Abnormality         Status                     ---------                               -----------         ------                     CBC Auto Differential[[id number]]        Abnormal            Final result                 Please view results for these tests on the individual orders.   CBC AND DIFFERENTIAL    Narrative:     The following  orders were created for panel order CBC & Differential.  Procedure                               Abnormality         Status                     ---------                               -----------         ------                     CBC Auto Differential[[id number]]        Abnormal            Final result               Scan Slide[[id number]]                                                                    Please view results for these tests on the individual orders.   CBC AND DIFFERENTIAL    Narrative:     The following orders were created for panel order CBC & Differential.  Procedure                               Abnormality         Status                     ---------                               -----------         ------                     CBC Auto Differential[[id number]]        Abnormal            Final result                 Please view results for these tests on the individual orders.   CBC AND DIFFERENTIAL    Narrative:     The following orders were created for panel order CBC & Differential.  Procedure                               Abnormality         Status                     ---------                               -----------         ------                     CBC Auto Differential[[id number]]        Abnormal            Final result                 Please view results for these tests on the individual orders.     XR Hip With or Without Pelvis 2 - 3 View Right   Final Result   Impression:   No fractures or dislocations on radiograph. Osteopenia. 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 If continued pain or difficulty weightbearing, MRI of the pelvis is recommended for better evaluation.         Electronically Signed: Hazel Brock MD     12/7/2024 4:04 PM EST     Workstation ID: [id number]      CT Needle Biopsy Liver   Final Result   Successful CT-guided liver lesion core biopsy.         Report dictated by: Samuel Tavarez DNP        I have personally reviewed this case and agree with the findings  above:      Electronically Signed: Yovanny Chen MD     12/6/2024 4:25 PM EST     Workstation ID: [id number]      CT Abdomen Pelvis With Contrast   Final Result   Impression:   Heterogenous appearing mass in the right hepatic lobe at the hepatic dome measuring up to 9.3 cm remain suspicious for malignancy. Further characterization with MR abdomen with and without contrast is recommended.      Unchanged appearing bilateral lower lobe opacities suspicious for pneumonia superimposed on chronic interstitial lung disease.            Electronically Signed: Tesfaye Paulino     12/3/2024 4:55 PM EST     Workstation ID: [id number]      CT Chest Without Contrast Diagnostic   Final Result   Impression:   1. Large mass in the right hepatic lobe involving segments 7 and 8 which may relate to malignancy or metastasis measuring up to 9.5 cm. Recommend CT abdomen and pelvis with contrast for further assessment.   2. Patchy areas of airspace disease involving left upper lobes and bilateral lower lobes concerning for multifocal pneumonia superimposed on background of chronic interstitial lung disease.   3. Cardiomegaly and additional incidental findings above.            Electronically Signed: Dane Bain MD     12/3/2024 1:53 PM EST     Workstation ID: [id number]      XR Chest 1 View   Final Result   Impression:   Chronic findings, without significant change.         Electronically Signed: Jaclyn Reagan MD     12/3/2024 10:38 AM EST     Workstation ID: [id number]                                                         Medical Decision Making  CT concerning for liver mass as well as pneumonia.  Elevated white count.  Elevated Pro-Galileo.  Will treat.  Admitted to hospitalist service.    Final diagnoses:   Pneumonia due to infectious organism, unspecified laterality, unspecified part of lung       ED Disposition  ED Disposition       ED Disposition   Decision to Admit    Condition   --    Comment   Level of Care: Progressive Care [20]    Diagnosis: Multifocal pneumonia [[id number]]   Admitting Physician: DIANELYS BECERRA [[id number]]   Certification: I Certify That Inpatient Hospital Services Are Medically Necessary For Greater Than 2 Midnights                 Xander Robison MD  [address] [place] [address] [address]  [place] [facility]  [phone]      one week    Xander Robison MD  [address] [facility] [address]  [place] [facility]  [phone]               Medication List        New Prescriptions      polyethylene glycol 17 GM/SCOOP powder  Commonly known as: MIRALAX  Take 17 g by mouth Daily As Needed (for constipation) for up to 30 days.     tamsulosin 0.4 MG capsule 24 hr capsule  Commonly known as: FLOMAX  Take 1 capsule by mouth Daily for 30 days.            Stop      enalapril 2.5 MG tablet  Commonly known as: VASOTEC     furosemide 40 MG tablet  Commonly known as: LASIX               Where to Get Your Medications        These medications were sent to [address] [place] [address]      Hours: Monday to Friday 7 AM to 7 PM Phone: [phone]   polyethylene glycol 17 GM/SCOOP powder  tamsulosin 0.4 MG capsule 24 hr capsule            Wojciech Cabrera MD  12/25/24 2304       Wojciech Cabrera MD  12/25/24 2301

## 2025-01-14 ENCOUNTER — TELEPHONE (OUTPATIENT)
Dept: FAMILY MEDICINE CLINIC | Facility: CLINIC | Age: OVER 89
End: 2025-01-14
Payer: MEDICARE

## 2025-01-14 NOTE — TELEPHONE ENCOUNTER
Called and left a message that we were calling to reschedule appt from when it snowed and the office was closed

## (undated) DEVICE — SMOKE EVACUATION TUBING WITH 7/8 IN TO 1/4 IN REDUCER: Brand: BUFFALO FILTER

## (undated) DEVICE — SKIN PREP TRAY W/CHG: Brand: MEDLINE INDUSTRIES, INC.

## (undated) DEVICE — CATH DIAG IMPULSE PIG 5F 100CM

## (undated) DEVICE — PK BASIC SPINE 50

## (undated) DEVICE — COVER,MAYO STAND,STERILE: Brand: MEDLINE

## (undated) DEVICE — SUT VIC 2/0 CT1 CR8 18IN J839D

## (undated) DEVICE — INTENDED FOR TISSUE SEPARATION, AND OTHER PROCEDURES THAT REQUIRE A SHARP SURGICAL BLADE TO PUNCTURE OR CUT.: Brand: BARD-PARKER ® CARBON RIB-BACK BLADES

## (undated) DEVICE — GW DIAG EMERALD HEPCOAT MOVE JTIP STD .035 3MM 150CM

## (undated) DEVICE — SPHR MARKR STEALTH STATION

## (undated) DEVICE — CATH DIAG IMPULSE FL4 5F 100CM

## (undated) DEVICE — GLV SURG SIGNATURE ESSENTIAL PF LTX SZ7.5

## (undated) DEVICE — Device: Brand: SMARTDRAPE

## (undated) DEVICE — UNDERGLV SURG BIOGEL INDICATOR LF PF 7.5

## (undated) DEVICE — SWAN-GANZ TRUE SIZE THERMODILUTION "S" TIP: Brand: SWAN-GANZ TRUE SIZE

## (undated) DEVICE — CODMAN® SURGICAL PATTIES 1" X 1" (2.54CM X 2.54CM): Brand: CODMAN®

## (undated) DEVICE — CATH DIAG IMPULSE FR4 5F 100CM

## (undated) DEVICE — SUT VIC 2/0 CP2 CR8 18IN J762D

## (undated) DEVICE — ADHS SKIN PREMIERPRO EXOFIN TOPICAL HI/VISC .5ML

## (undated) DEVICE — SUT VIC 0 CT1 CR8 18IN J840D

## (undated) DEVICE — PK TRY HEART CATH 50

## (undated) DEVICE — COVADERM: Brand: DEROYAL

## (undated) DEVICE — DRN WND EVAC BULB 100CC

## (undated) DEVICE — PINNACLE INTRODUCER SHEATH: Brand: PINNACLE

## (undated) DEVICE — CATH IV INSYTE AUTOGARD 14G 1 1/2IN ORNG

## (undated) DEVICE — RADIFOCUS OBTURATOR: Brand: RADIFOCUS

## (undated) DEVICE — KT SURG TURNOVER 050

## (undated) DEVICE — ELECTRD BLD EZ CLN MOD 4IN

## (undated) DEVICE — DRP OPMI 326071

## (undated) DEVICE — PENCL HND ROCKRSWTCH HOLSTR EZ CLEAN TP CRD 10FT

## (undated) DEVICE — SUT MNCRYL 3/0 Y936H

## (undated) DEVICE — 3.0MM PRECISION NEURO (MATCH HEAD)

## (undated) DEVICE — Device

## (undated) DEVICE — DRN WND FLT FUL PERF NO/TROC 7MM

## (undated) DEVICE — DECANTER: Brand: UNBRANDED

## (undated) DEVICE — 1010 S-DRAPE TOWEL DRAPE 10/BX: Brand: STERI-DRAPE™

## (undated) DEVICE — SOLUTION,WATER,IRRIGATION,1000ML,STERILE: Brand: MEDLINE

## (undated) DEVICE — FRCP SPEC BIP 203X76X1.5MM DISP